# Patient Record
Sex: FEMALE | Race: WHITE | NOT HISPANIC OR LATINO | Employment: OTHER | URBAN - METROPOLITAN AREA
[De-identification: names, ages, dates, MRNs, and addresses within clinical notes are randomized per-mention and may not be internally consistent; named-entity substitution may affect disease eponyms.]

---

## 2017-01-11 ENCOUNTER — HOSPITAL ENCOUNTER (OUTPATIENT)
Dept: MAMMOGRAPHY | Facility: HOSPITAL | Age: 67
Discharge: HOME | End: 2017-01-11

## 2017-01-11 DIAGNOSIS — Z12.31 VISIT FOR SCREENING MAMMOGRAM: ICD-10-CM

## 2017-02-22 ENCOUNTER — OFFICE VISIT - HEALTHEAST (OUTPATIENT)
Dept: BEHAVIORAL HEALTH | Facility: HOSPITAL | Age: 67
End: 2017-02-22

## 2017-02-22 DIAGNOSIS — F33.1 MDD (MAJOR DEPRESSIVE DISORDER), RECURRENT EPISODE, MODERATE (H): ICD-10-CM

## 2017-02-22 DIAGNOSIS — F41.1 GAD (GENERALIZED ANXIETY DISORDER): ICD-10-CM

## 2017-03-01 ENCOUNTER — AMBULATORY - HEALTHEAST (OUTPATIENT)
Dept: BEHAVIORAL HEALTH | Facility: CLINIC | Age: 67
End: 2017-03-01

## 2017-03-01 ENCOUNTER — OFFICE VISIT - HEALTHEAST (OUTPATIENT)
Dept: BEHAVIORAL HEALTH | Facility: HOSPITAL | Age: 67
End: 2017-03-01

## 2017-03-01 DIAGNOSIS — F33.1 MDD (MAJOR DEPRESSIVE DISORDER), RECURRENT EPISODE, MODERATE (H): ICD-10-CM

## 2017-03-01 DIAGNOSIS — F41.1 GAD (GENERALIZED ANXIETY DISORDER): ICD-10-CM

## 2017-03-08 ENCOUNTER — COMMUNICATION - HEALTHEAST (OUTPATIENT)
Dept: BEHAVIORAL HEALTH | Facility: CLINIC | Age: 67
End: 2017-03-08

## 2017-03-15 ENCOUNTER — OFFICE VISIT - HEALTHEAST (OUTPATIENT)
Dept: BEHAVIORAL HEALTH | Facility: HOSPITAL | Age: 67
End: 2017-03-15

## 2017-03-15 DIAGNOSIS — F41.1 GAD (GENERALIZED ANXIETY DISORDER): ICD-10-CM

## 2017-03-15 DIAGNOSIS — F33.1 MDD (MAJOR DEPRESSIVE DISORDER), RECURRENT EPISODE, MODERATE (H): ICD-10-CM

## 2017-03-21 ENCOUNTER — COMMUNICATION - HEALTHEAST (OUTPATIENT)
Dept: BEHAVIORAL HEALTH | Facility: CLINIC | Age: 67
End: 2017-03-21

## 2017-03-21 DIAGNOSIS — F33.1 MDD (MAJOR DEPRESSIVE DISORDER), RECURRENT EPISODE, MODERATE (H): ICD-10-CM

## 2017-03-21 DIAGNOSIS — F29 ATYPICAL PSYCHOSIS (H): ICD-10-CM

## 2017-03-22 ENCOUNTER — OFFICE VISIT - HEALTHEAST (OUTPATIENT)
Dept: BEHAVIORAL HEALTH | Facility: HOSPITAL | Age: 67
End: 2017-03-22

## 2017-03-22 DIAGNOSIS — F33.1 MDD (MAJOR DEPRESSIVE DISORDER), RECURRENT EPISODE, MODERATE (H): ICD-10-CM

## 2017-03-22 DIAGNOSIS — F41.1 GAD (GENERALIZED ANXIETY DISORDER): ICD-10-CM

## 2017-03-29 ENCOUNTER — COMMUNICATION - HEALTHEAST (OUTPATIENT)
Dept: BEHAVIORAL HEALTH | Facility: CLINIC | Age: 67
End: 2017-03-29

## 2017-04-03 ENCOUNTER — OFFICE VISIT - HEALTHEAST (OUTPATIENT)
Dept: BEHAVIORAL HEALTH | Facility: CLINIC | Age: 67
End: 2017-04-03

## 2017-04-03 DIAGNOSIS — F33.1 MDD (MAJOR DEPRESSIVE DISORDER), RECURRENT EPISODE, MODERATE (H): ICD-10-CM

## 2017-04-03 ASSESSMENT — MIFFLIN-ST. JEOR: SCORE: 1482.04

## 2017-04-13 DIAGNOSIS — G20.A1 PARALYSIS AGITANS (H): ICD-10-CM

## 2017-04-13 RX ORDER — CARBIDOPA AND LEVODOPA 25; 100 MG/1; MG/1
2 TABLET ORAL 3 TIMES DAILY
Qty: 186 TABLET | Refills: 5 | Status: SHIPPED | OUTPATIENT
Start: 2017-04-13

## 2017-04-26 ENCOUNTER — COMMUNICATION - HEALTHEAST (OUTPATIENT)
Dept: BEHAVIORAL HEALTH | Facility: CLINIC | Age: 67
End: 2017-04-26

## 2017-05-10 ENCOUNTER — RECORDS - HEALTHEAST (OUTPATIENT)
Dept: LAB | Facility: CLINIC | Age: 67
End: 2017-05-10

## 2017-05-10 LAB
CHOLEST SERPL-MCNC: 88 MG/DL
FASTING STATUS PATIENT QL REPORTED: YES
HDLC SERPL-MCNC: 30 MG/DL
LDLC SERPL CALC-MCNC: 40 MG/DL
TRIGL SERPL-MCNC: 90 MG/DL

## 2017-07-10 ENCOUNTER — OFFICE VISIT - HEALTHEAST (OUTPATIENT)
Dept: BEHAVIORAL HEALTH | Facility: CLINIC | Age: 67
End: 2017-07-10

## 2017-07-10 DIAGNOSIS — F06.30 MOOD DISORDER IN CONDITIONS CLASSIFIED ELSEWHERE: ICD-10-CM

## 2017-07-10 ASSESSMENT — MIFFLIN-ST. JEOR: SCORE: 1441.21

## 2017-12-29 ENCOUNTER — AMBULATORY - HEALTHEAST (OUTPATIENT)
Dept: ADMINISTRATIVE | Facility: CLINIC | Age: 67
End: 2017-12-29

## 2017-12-29 ENCOUNTER — OFFICE VISIT - HEALTHEAST (OUTPATIENT)
Dept: GERIATRICS | Facility: CLINIC | Age: 67
End: 2017-12-29

## 2017-12-29 DIAGNOSIS — Z98.890 S/P ORIF (OPEN REDUCTION INTERNAL FIXATION) FRACTURE: ICD-10-CM

## 2017-12-29 DIAGNOSIS — R52 PAIN MANAGEMENT: ICD-10-CM

## 2017-12-29 DIAGNOSIS — N39.0 URINARY TRACT INFECTION: ICD-10-CM

## 2017-12-29 DIAGNOSIS — S72.001A CLOSED RIGHT HIP FRACTURE (H): ICD-10-CM

## 2017-12-29 DIAGNOSIS — Z87.81 S/P ORIF (OPEN REDUCTION INTERNAL FIXATION) FRACTURE: ICD-10-CM

## 2017-12-29 DIAGNOSIS — D64.9 POSTOPERATIVE ANEMIA: ICD-10-CM

## 2017-12-29 DIAGNOSIS — F32.A DEPRESSION: ICD-10-CM

## 2018-01-01 ENCOUNTER — OFFICE VISIT - HEALTHEAST (OUTPATIENT)
Dept: GERIATRICS | Facility: CLINIC | Age: 68
End: 2018-01-01

## 2018-01-01 DIAGNOSIS — R52 PAIN MANAGEMENT: ICD-10-CM

## 2018-01-01 DIAGNOSIS — S72.009A HIP FRACTURE (H): ICD-10-CM

## 2018-01-01 DIAGNOSIS — K59.00 CONSTIPATION: ICD-10-CM

## 2018-01-01 DIAGNOSIS — R05.9 COUGH: ICD-10-CM

## 2018-01-01 DIAGNOSIS — I10 ESSENTIAL HYPERTENSION: ICD-10-CM

## 2018-01-02 ENCOUNTER — RECORDS - HEALTHEAST (OUTPATIENT)
Dept: LAB | Facility: CLINIC | Age: 68
End: 2018-01-02

## 2018-01-03 LAB — HGB BLD-MCNC: 8.9 G/DL (ref 12–16)

## 2018-01-04 ENCOUNTER — OFFICE VISIT - HEALTHEAST (OUTPATIENT)
Dept: GERIATRICS | Facility: CLINIC | Age: 68
End: 2018-01-04

## 2018-01-04 DIAGNOSIS — R52 PAIN MANAGEMENT: ICD-10-CM

## 2018-01-04 DIAGNOSIS — I10 ESSENTIAL HYPERTENSION: ICD-10-CM

## 2018-01-04 DIAGNOSIS — K59.00 CONSTIPATION: ICD-10-CM

## 2018-01-04 DIAGNOSIS — S72.009A HIP FRACTURE (H): ICD-10-CM

## 2018-01-08 ENCOUNTER — RECORDS - HEALTHEAST (OUTPATIENT)
Dept: LAB | Facility: CLINIC | Age: 68
End: 2018-01-08

## 2018-01-08 ENCOUNTER — OFFICE VISIT - HEALTHEAST (OUTPATIENT)
Dept: GERIATRICS | Facility: CLINIC | Age: 68
End: 2018-01-08

## 2018-01-08 DIAGNOSIS — K59.00 CONSTIPATION: ICD-10-CM

## 2018-01-08 DIAGNOSIS — S72.009A HIP FRACTURE (H): ICD-10-CM

## 2018-01-08 DIAGNOSIS — R41.89 COGNITIVE IMPAIRMENT: ICD-10-CM

## 2018-01-08 DIAGNOSIS — I10 ESSENTIAL HYPERTENSION: ICD-10-CM

## 2018-01-08 LAB
ALBUMIN UR-MCNC: ABNORMAL MG/DL
APPEARANCE UR: ABNORMAL
BACTERIA #/AREA URNS HPF: ABNORMAL HPF
BILIRUB UR QL STRIP: NEGATIVE
CAOX CRY #/AREA URNS HPF: PRESENT /[HPF]
COLOR UR AUTO: YELLOW
GLUCOSE UR STRIP-MCNC: NEGATIVE MG/DL
HGB UR QL STRIP: ABNORMAL
KETONES UR STRIP-MCNC: NEGATIVE MG/DL
LEUKOCYTE ESTERASE UR QL STRIP: ABNORMAL
MUCOUS THREADS #/AREA URNS LPF: ABNORMAL LPF
NITRATE UR QL: NEGATIVE
PH UR STRIP: 7 [PH] (ref 4.5–8)
RBC #/AREA URNS AUTO: ABNORMAL HPF
SP GR UR STRIP: 1.02 (ref 1–1.03)
SQUAMOUS #/AREA URNS AUTO: ABNORMAL LPF
UROBILINOGEN UR STRIP-ACNC: ABNORMAL
WBC #/AREA URNS AUTO: >100 HPF

## 2018-01-10 ENCOUNTER — AMBULATORY - HEALTHEAST (OUTPATIENT)
Dept: GERIATRICS | Facility: CLINIC | Age: 68
End: 2018-01-10

## 2018-01-10 LAB
BACTERIA SPEC CULT: ABNORMAL
BACTERIA SPEC CULT: ABNORMAL

## 2018-01-29 ENCOUNTER — OFFICE VISIT - HEALTHEAST (OUTPATIENT)
Dept: GERIATRICS | Facility: CLINIC | Age: 68
End: 2018-01-29

## 2018-01-29 ENCOUNTER — AMBULATORY - HEALTHEAST (OUTPATIENT)
Dept: GERIATRICS | Facility: CLINIC | Age: 68
End: 2018-01-29

## 2018-01-29 ENCOUNTER — AMBULATORY - HEALTHEAST (OUTPATIENT)
Dept: ADMINISTRATIVE | Facility: CLINIC | Age: 68
End: 2018-01-29

## 2018-01-29 ENCOUNTER — RECORDS - HEALTHEAST (OUTPATIENT)
Dept: LAB | Facility: CLINIC | Age: 68
End: 2018-01-29

## 2018-01-29 DIAGNOSIS — N39.0 URINARY TRACT INFECTION: ICD-10-CM

## 2018-01-29 DIAGNOSIS — S72.009A HIP FRACTURE (H): ICD-10-CM

## 2018-01-29 DIAGNOSIS — D64.9 POSTOPERATIVE ANEMIA: ICD-10-CM

## 2018-01-29 DIAGNOSIS — R52 PAIN MANAGEMENT: ICD-10-CM

## 2018-01-29 DIAGNOSIS — R41.89 COGNITIVE IMPAIRMENT: ICD-10-CM

## 2018-01-29 LAB — HGB BLD-MCNC: 8.5 G/DL (ref 12–16)

## 2018-01-31 ENCOUNTER — RECORDS - HEALTHEAST (OUTPATIENT)
Dept: LAB | Facility: CLINIC | Age: 68
End: 2018-01-31

## 2018-01-31 ENCOUNTER — OFFICE VISIT - HEALTHEAST (OUTPATIENT)
Dept: GERIATRICS | Facility: CLINIC | Age: 68
End: 2018-01-31

## 2018-01-31 DIAGNOSIS — R41.89 COGNITIVE IMPAIRMENT: ICD-10-CM

## 2018-01-31 DIAGNOSIS — S72.009A HIP FRACTURE (H): ICD-10-CM

## 2018-01-31 DIAGNOSIS — K59.00 CONSTIPATION: ICD-10-CM

## 2018-01-31 DIAGNOSIS — D64.9 ANEMIA: ICD-10-CM

## 2018-01-31 DIAGNOSIS — R52 PAIN MANAGEMENT: ICD-10-CM

## 2018-01-31 DIAGNOSIS — I10 ESSENTIAL HYPERTENSION: ICD-10-CM

## 2018-01-31 LAB
25(OH)D3 SERPL-MCNC: 32.6 NG/ML (ref 30–80)
ANION GAP SERPL CALCULATED.3IONS-SCNC: 6 MMOL/L (ref 5–18)
BUN SERPL-MCNC: 15 MG/DL (ref 8–22)
CALCIUM SERPL-MCNC: 7.7 MG/DL (ref 8.5–10.5)
CHLORIDE BLD-SCNC: 107 MMOL/L (ref 98–107)
CO2 SERPL-SCNC: 30 MMOL/L (ref 22–31)
CREAT SERPL-MCNC: 0.57 MG/DL (ref 0.6–1.1)
ERYTHROCYTE [DISTWIDTH] IN BLOOD BY AUTOMATED COUNT: 18.2 % (ref 11–14.5)
GFR SERPL CREATININE-BSD FRML MDRD: >60 ML/MIN/1.73M2
GLUCOSE BLD-MCNC: 83 MG/DL (ref 70–125)
HCT VFR BLD AUTO: 27.9 % (ref 35–47)
HGB BLD-MCNC: 8.1 G/DL (ref 12–16)
MAGNESIUM SERPL-MCNC: 1.3 MG/DL (ref 1.8–2.6)
MCH RBC QN AUTO: 27.6 PG (ref 27–34)
MCHC RBC AUTO-ENTMCNC: 29 G/DL (ref 32–36)
MCV RBC AUTO: 95 FL (ref 80–100)
PLATELET # BLD AUTO: 213 THOU/UL (ref 140–440)
PMV BLD AUTO: 11.2 FL (ref 8.5–12.5)
POTASSIUM BLD-SCNC: 3.7 MMOL/L (ref 3.5–5)
RBC # BLD AUTO: 2.94 MILL/UL (ref 3.8–5.4)
SODIUM SERPL-SCNC: 143 MMOL/L (ref 136–145)
WBC: 3.7 THOU/UL (ref 4–11)

## 2018-02-01 LAB
25(OH)D3 SERPL-MCNC: 30.9 NG/ML (ref 30–80)
ANION GAP SERPL CALCULATED.3IONS-SCNC: 5 MMOL/L (ref 5–18)
BUN SERPL-MCNC: 17 MG/DL (ref 8–22)
CALCIUM SERPL-MCNC: 7.8 MG/DL (ref 8.5–10.5)
CHLORIDE BLD-SCNC: 107 MMOL/L (ref 98–107)
CO2 SERPL-SCNC: 30 MMOL/L (ref 22–31)
CREAT SERPL-MCNC: 0.54 MG/DL (ref 0.6–1.1)
ERYTHROCYTE [DISTWIDTH] IN BLOOD BY AUTOMATED COUNT: 18.3 % (ref 11–14.5)
GFR SERPL CREATININE-BSD FRML MDRD: >60 ML/MIN/1.73M2
GLUCOSE BLD-MCNC: 85 MG/DL (ref 70–125)
HCT VFR BLD AUTO: 25.9 % (ref 35–47)
HGB BLD-MCNC: 7.4 G/DL (ref 12–16)
MAGNESIUM SERPL-MCNC: 1.6 MG/DL (ref 1.8–2.6)
MCH RBC QN AUTO: 27 PG (ref 27–34)
MCHC RBC AUTO-ENTMCNC: 28.6 G/DL (ref 32–36)
MCV RBC AUTO: 95 FL (ref 80–100)
PLATELET # BLD AUTO: 214 THOU/UL (ref 140–440)
PMV BLD AUTO: 10.7 FL (ref 8.5–12.5)
POTASSIUM BLD-SCNC: 4 MMOL/L (ref 3.5–5)
RBC # BLD AUTO: 2.74 MILL/UL (ref 3.8–5.4)
SODIUM SERPL-SCNC: 142 MMOL/L (ref 136–145)
WBC: 3.2 THOU/UL (ref 4–11)

## 2018-02-02 ENCOUNTER — RECORDS - HEALTHEAST (OUTPATIENT)
Dept: LAB | Facility: CLINIC | Age: 68
End: 2018-02-02

## 2018-02-02 LAB — VIT B12 SERPL-MCNC: 468 PG/ML (ref 213–816)

## 2018-02-05 ENCOUNTER — OFFICE VISIT - HEALTHEAST (OUTPATIENT)
Dept: GERIATRICS | Facility: CLINIC | Age: 68
End: 2018-02-05

## 2018-02-05 ENCOUNTER — RECORDS - HEALTHEAST (OUTPATIENT)
Dept: LAB | Facility: CLINIC | Age: 68
End: 2018-02-05

## 2018-02-05 DIAGNOSIS — I10 ESSENTIAL HYPERTENSION: ICD-10-CM

## 2018-02-05 DIAGNOSIS — D64.9 ANEMIA: ICD-10-CM

## 2018-02-05 DIAGNOSIS — S72.009A HIP FRACTURE (H): ICD-10-CM

## 2018-02-05 DIAGNOSIS — R52 PAIN MANAGEMENT: ICD-10-CM

## 2018-02-05 LAB
ERYTHROCYTE [DISTWIDTH] IN BLOOD BY AUTOMATED COUNT: 16.8 % (ref 11–14.5)
HCT VFR BLD AUTO: 29.1 % (ref 35–47)
HGB BLD-MCNC: 8.2 G/DL (ref 12–16)
MCH RBC QN AUTO: 26.4 PG (ref 27–34)
MCHC RBC AUTO-ENTMCNC: 28.2 G/DL (ref 32–36)
MCV RBC AUTO: 94 FL (ref 80–100)
PLATELET # BLD AUTO: 216 THOU/UL (ref 140–440)
PMV BLD AUTO: 10.5 FL (ref 8.5–12.5)
RBC # BLD AUTO: 3.11 MILL/UL (ref 3.8–5.4)
WBC: 2.9 THOU/UL (ref 4–11)

## 2018-02-07 ENCOUNTER — OFFICE VISIT - HEALTHEAST (OUTPATIENT)
Dept: GERIATRICS | Facility: CLINIC | Age: 68
End: 2018-02-07

## 2018-02-07 DIAGNOSIS — R41.89 COGNITIVE IMPAIRMENT: ICD-10-CM

## 2018-02-07 DIAGNOSIS — D64.9 ANEMIA: ICD-10-CM

## 2018-02-07 DIAGNOSIS — S72.009A HIP FRACTURE (H): ICD-10-CM

## 2018-02-07 DIAGNOSIS — I95.1 ORTHOSTATIC HYPOTENSION: ICD-10-CM

## 2018-02-07 DIAGNOSIS — K59.00 CONSTIPATION: ICD-10-CM

## 2018-02-07 DIAGNOSIS — R52 PAIN MANAGEMENT: ICD-10-CM

## 2018-02-08 ENCOUNTER — RECORDS - HEALTHEAST (OUTPATIENT)
Dept: LAB | Facility: CLINIC | Age: 68
End: 2018-02-08

## 2018-02-08 LAB
ALBUMIN UR-MCNC: ABNORMAL MG/DL
APPEARANCE UR: ABNORMAL
BACTERIA #/AREA URNS HPF: ABNORMAL HPF
BILIRUB UR QL STRIP: NEGATIVE
COLOR UR AUTO: YELLOW
GLUCOSE UR STRIP-MCNC: NEGATIVE MG/DL
HGB UR QL STRIP: ABNORMAL
KETONES UR STRIP-MCNC: NEGATIVE MG/DL
LEUKOCYTE ESTERASE UR QL STRIP: ABNORMAL
MUCOUS THREADS #/AREA URNS LPF: ABNORMAL LPF
NITRATE UR QL: NEGATIVE
PH UR STRIP: 6.5 [PH] (ref 4.5–8)
RBC #/AREA URNS AUTO: ABNORMAL HPF
SP GR UR STRIP: 1.02 (ref 1–1.03)
SQUAMOUS #/AREA URNS AUTO: ABNORMAL LPF
UROBILINOGEN UR STRIP-ACNC: ABNORMAL
WBC #/AREA URNS AUTO: >100 HPF
WBC CLUMPS #/AREA URNS HPF: PRESENT /[HPF]

## 2018-02-11 LAB
BACTERIA SPEC CULT: ABNORMAL
BACTERIA SPEC CULT: ABNORMAL

## 2018-02-12 ENCOUNTER — OFFICE VISIT - HEALTHEAST (OUTPATIENT)
Dept: GERIATRICS | Facility: CLINIC | Age: 68
End: 2018-02-12

## 2018-02-12 DIAGNOSIS — D64.9 ANEMIA: ICD-10-CM

## 2018-02-12 DIAGNOSIS — I95.1 ORTHOSTATIC HYPOTENSION: ICD-10-CM

## 2018-02-12 DIAGNOSIS — S72.009A HIP FRACTURE (H): ICD-10-CM

## 2018-02-12 DIAGNOSIS — I10 ESSENTIAL HYPERTENSION: ICD-10-CM

## 2018-02-14 ENCOUNTER — OFFICE VISIT - HEALTHEAST (OUTPATIENT)
Dept: GERIATRICS | Facility: CLINIC | Age: 68
End: 2018-02-14

## 2018-02-14 DIAGNOSIS — R52 PAIN MANAGEMENT: ICD-10-CM

## 2018-02-14 DIAGNOSIS — I10 ESSENTIAL HYPERTENSION: ICD-10-CM

## 2018-02-14 DIAGNOSIS — D64.9 ANEMIA: ICD-10-CM

## 2018-02-14 DIAGNOSIS — K59.00 CONSTIPATION: ICD-10-CM

## 2018-02-14 DIAGNOSIS — R41.89 COGNITIVE IMPAIRMENT: ICD-10-CM

## 2018-02-14 DIAGNOSIS — S72.009A HIP FRACTURE (H): ICD-10-CM

## 2018-02-17 ENCOUNTER — RECORDS - HEALTHEAST (OUTPATIENT)
Dept: LAB | Facility: CLINIC | Age: 68
End: 2018-02-17

## 2018-02-19 ENCOUNTER — OFFICE VISIT - HEALTHEAST (OUTPATIENT)
Dept: GERIATRICS | Facility: CLINIC | Age: 68
End: 2018-02-19

## 2018-02-19 DIAGNOSIS — R52 PAIN MANAGEMENT: ICD-10-CM

## 2018-02-19 DIAGNOSIS — I10 ESSENTIAL HYPERTENSION: ICD-10-CM

## 2018-02-19 DIAGNOSIS — S72.009A HIP FRACTURE (H): ICD-10-CM

## 2018-02-19 DIAGNOSIS — D64.9 ANEMIA: ICD-10-CM

## 2018-02-19 DIAGNOSIS — I95.1 ORTHOSTATIC HYPOTENSION: ICD-10-CM

## 2018-02-19 LAB
ERYTHROCYTE [DISTWIDTH] IN BLOOD BY AUTOMATED COUNT: 15.4 % (ref 11–14.5)
HCT VFR BLD AUTO: 39 % (ref 35–47)
HGB BLD-MCNC: 11.3 G/DL (ref 12–16)
MCH RBC QN AUTO: 26.3 PG (ref 27–34)
MCHC RBC AUTO-ENTMCNC: 29 G/DL (ref 32–36)
MCV RBC AUTO: 91 FL (ref 80–100)
PLATELET # BLD AUTO: 224 THOU/UL (ref 140–440)
PMV BLD AUTO: 10.7 FL (ref 8.5–12.5)
RBC # BLD AUTO: 4.3 MILL/UL (ref 3.8–5.4)
WBC: 4.4 THOU/UL (ref 4–11)

## 2018-02-21 ENCOUNTER — OFFICE VISIT - HEALTHEAST (OUTPATIENT)
Dept: GERIATRICS | Facility: CLINIC | Age: 68
End: 2018-02-21

## 2018-02-21 DIAGNOSIS — R52 PAIN MANAGEMENT: ICD-10-CM

## 2018-02-21 DIAGNOSIS — S72.009A HIP FRACTURE (H): ICD-10-CM

## 2018-02-21 DIAGNOSIS — I95.1 ORTHOSTATIC HYPOTENSION: ICD-10-CM

## 2018-02-21 DIAGNOSIS — K59.01 SLOW TRANSIT CONSTIPATION: ICD-10-CM

## 2018-02-21 DIAGNOSIS — R41.89 COGNITIVE IMPAIRMENT: ICD-10-CM

## 2018-02-21 DIAGNOSIS — I10 ESSENTIAL HYPERTENSION: ICD-10-CM

## 2018-02-21 DIAGNOSIS — D64.9 ANEMIA: ICD-10-CM

## 2018-02-26 ENCOUNTER — OFFICE VISIT - HEALTHEAST (OUTPATIENT)
Dept: GERIATRICS | Facility: CLINIC | Age: 68
End: 2018-02-26

## 2018-02-26 DIAGNOSIS — R41.89 COGNITIVE IMPAIRMENT: ICD-10-CM

## 2018-02-26 DIAGNOSIS — I10 ESSENTIAL HYPERTENSION: ICD-10-CM

## 2018-02-26 DIAGNOSIS — D64.9 ANEMIA: ICD-10-CM

## 2018-02-26 DIAGNOSIS — S72.009A HIP FRACTURE (H): ICD-10-CM

## 2018-02-28 ENCOUNTER — OFFICE VISIT - HEALTHEAST (OUTPATIENT)
Dept: GERIATRICS | Facility: CLINIC | Age: 68
End: 2018-02-28

## 2018-02-28 DIAGNOSIS — S72.009A HIP FRACTURE (H): ICD-10-CM

## 2018-02-28 DIAGNOSIS — D64.9 ANEMIA: ICD-10-CM

## 2018-02-28 DIAGNOSIS — I10 ESSENTIAL HYPERTENSION: ICD-10-CM

## 2018-02-28 DIAGNOSIS — R52 PAIN MANAGEMENT: ICD-10-CM

## 2018-02-28 DIAGNOSIS — I95.1 ORTHOSTATIC HYPOTENSION: ICD-10-CM

## 2018-02-28 DIAGNOSIS — R41.89 COGNITIVE IMPAIRMENT: ICD-10-CM

## 2018-02-28 DIAGNOSIS — K59.01 SLOW TRANSIT CONSTIPATION: ICD-10-CM

## 2018-03-05 ENCOUNTER — OFFICE VISIT - HEALTHEAST (OUTPATIENT)
Dept: GERIATRICS | Facility: CLINIC | Age: 68
End: 2018-03-05

## 2018-03-05 DIAGNOSIS — S72.009A HIP FRACTURE (H): ICD-10-CM

## 2018-03-05 DIAGNOSIS — R41.89 COGNITIVE IMPAIRMENT: ICD-10-CM

## 2018-03-05 DIAGNOSIS — I10 ESSENTIAL HYPERTENSION: ICD-10-CM

## 2018-03-05 DIAGNOSIS — D64.9 ANEMIA: ICD-10-CM

## 2018-03-06 ENCOUNTER — RECORDS - HEALTHEAST (OUTPATIENT)
Dept: LAB | Facility: CLINIC | Age: 68
End: 2018-03-06

## 2018-03-06 LAB
ERYTHROCYTE [DISTWIDTH] IN BLOOD BY AUTOMATED COUNT: 14.4 % (ref 11–14.5)
HCT VFR BLD AUTO: 36.6 % (ref 35–47)
HGB BLD-MCNC: 10.7 G/DL (ref 12–16)
MCH RBC QN AUTO: 25.8 PG (ref 27–34)
MCHC RBC AUTO-ENTMCNC: 29.2 G/DL (ref 32–36)
MCV RBC AUTO: 88 FL (ref 80–100)
PLATELET # BLD AUTO: 192 THOU/UL (ref 140–440)
PMV BLD AUTO: 11.1 FL (ref 8.5–12.5)
RBC # BLD AUTO: 4.14 MILL/UL (ref 3.8–5.4)
WBC: 3.3 THOU/UL (ref 4–11)

## 2018-03-07 ENCOUNTER — OFFICE VISIT - HEALTHEAST (OUTPATIENT)
Dept: GERIATRICS | Facility: CLINIC | Age: 68
End: 2018-03-07

## 2018-03-07 DIAGNOSIS — S72.009A HIP FRACTURE (H): ICD-10-CM

## 2018-03-07 DIAGNOSIS — I10 ESSENTIAL HYPERTENSION: ICD-10-CM

## 2018-03-07 DIAGNOSIS — K59.01 SLOW TRANSIT CONSTIPATION: ICD-10-CM

## 2018-03-07 DIAGNOSIS — R52 PAIN MANAGEMENT: ICD-10-CM

## 2018-03-07 DIAGNOSIS — D64.9 ANEMIA: ICD-10-CM

## 2018-03-07 DIAGNOSIS — R41.89 COGNITIVE IMPAIRMENT: ICD-10-CM

## 2018-03-12 ENCOUNTER — OFFICE VISIT - HEALTHEAST (OUTPATIENT)
Dept: BEHAVIORAL HEALTH | Facility: CLINIC | Age: 68
End: 2018-03-12

## 2018-03-12 ENCOUNTER — OFFICE VISIT - HEALTHEAST (OUTPATIENT)
Dept: GERIATRICS | Facility: CLINIC | Age: 68
End: 2018-03-12

## 2018-03-12 DIAGNOSIS — D64.9 ANEMIA: ICD-10-CM

## 2018-03-12 DIAGNOSIS — F32.9 MDD (MAJOR DEPRESSIVE DISORDER): ICD-10-CM

## 2018-03-12 DIAGNOSIS — R52 PAIN MANAGEMENT: ICD-10-CM

## 2018-03-12 DIAGNOSIS — S72.009A HIP FRACTURE (H): ICD-10-CM

## 2018-03-12 DIAGNOSIS — R41.89 COGNITIVE IMPAIRMENT: ICD-10-CM

## 2018-03-12 DIAGNOSIS — K59.01 SLOW TRANSIT CONSTIPATION: ICD-10-CM

## 2018-03-13 ENCOUNTER — RECORDS - HEALTHEAST (OUTPATIENT)
Dept: LAB | Facility: CLINIC | Age: 68
End: 2018-03-13

## 2018-03-13 LAB
ERYTHROCYTE [DISTWIDTH] IN BLOOD BY AUTOMATED COUNT: 14.5 % (ref 11–14.5)
HCT VFR BLD AUTO: 41.9 % (ref 35–47)
HGB BLD-MCNC: 12 G/DL (ref 12–16)
MCH RBC QN AUTO: 25.7 PG (ref 27–34)
MCHC RBC AUTO-ENTMCNC: 28.6 G/DL (ref 32–36)
MCV RBC AUTO: 90 FL (ref 80–100)
PLATELET # BLD AUTO: 211 THOU/UL (ref 140–440)
PMV BLD AUTO: 11.4 FL (ref 8.5–12.5)
RBC # BLD AUTO: 4.67 MILL/UL (ref 3.8–5.4)
WBC: 4.5 THOU/UL (ref 4–11)

## 2018-03-14 ENCOUNTER — OFFICE VISIT - HEALTHEAST (OUTPATIENT)
Dept: GERIATRICS | Facility: CLINIC | Age: 68
End: 2018-03-14

## 2018-03-14 DIAGNOSIS — S72.001S CLOSED FRACTURE OF RIGHT HIP, SEQUELA: ICD-10-CM

## 2018-03-14 DIAGNOSIS — R52 PAIN MANAGEMENT: ICD-10-CM

## 2018-03-14 DIAGNOSIS — I10 ESSENTIAL HYPERTENSION: ICD-10-CM

## 2018-03-14 DIAGNOSIS — K59.01 SLOW TRANSIT CONSTIPATION: ICD-10-CM

## 2018-03-14 DIAGNOSIS — R41.89 COGNITIVE IMPAIRMENT: ICD-10-CM

## 2018-03-14 DIAGNOSIS — D64.9 ANEMIA: ICD-10-CM

## 2018-03-15 ENCOUNTER — RECORDS - HEALTHEAST (OUTPATIENT)
Dept: LAB | Facility: CLINIC | Age: 68
End: 2018-03-15

## 2018-03-15 LAB — 25(OH)D3 SERPL-MCNC: 43.9 NG/ML (ref 30–80)

## 2018-03-16 ENCOUNTER — OFFICE VISIT - HEALTHEAST (OUTPATIENT)
Dept: GERIATRICS | Facility: CLINIC | Age: 68
End: 2018-03-16

## 2018-03-16 DIAGNOSIS — D64.9 ANEMIA: ICD-10-CM

## 2018-03-16 DIAGNOSIS — S72.001S CLOSED FRACTURE OF RIGHT HIP, SEQUELA: ICD-10-CM

## 2018-03-16 DIAGNOSIS — R52 PAIN MANAGEMENT: ICD-10-CM

## 2018-03-16 DIAGNOSIS — R41.89 COGNITIVE IMPAIRMENT: ICD-10-CM

## 2018-03-16 DIAGNOSIS — K59.01 SLOW TRANSIT CONSTIPATION: ICD-10-CM

## 2018-03-16 DIAGNOSIS — I10 ESSENTIAL HYPERTENSION: ICD-10-CM

## 2018-03-19 ENCOUNTER — OFFICE VISIT - HEALTHEAST (OUTPATIENT)
Dept: GERIATRICS | Facility: CLINIC | Age: 68
End: 2018-03-19

## 2018-03-19 ENCOUNTER — RECORDS - HEALTHEAST (OUTPATIENT)
Dept: LAB | Facility: CLINIC | Age: 68
End: 2018-03-19

## 2018-03-19 DIAGNOSIS — S72.001S CLOSED FRACTURE OF RIGHT HIP, SEQUELA: ICD-10-CM

## 2018-03-19 DIAGNOSIS — D64.9 ANEMIA: ICD-10-CM

## 2018-03-19 DIAGNOSIS — I10 ESSENTIAL HYPERTENSION: ICD-10-CM

## 2018-03-19 DIAGNOSIS — R41.89 COGNITIVE IMPAIRMENT: ICD-10-CM

## 2018-03-19 LAB
ALBUMIN UR-MCNC: ABNORMAL MG/DL
APPEARANCE UR: ABNORMAL
BACTERIA #/AREA URNS HPF: ABNORMAL HPF
BILIRUB UR QL STRIP: NEGATIVE
COLOR UR AUTO: ABNORMAL
GLUCOSE UR STRIP-MCNC: NEGATIVE MG/DL
HGB UR QL STRIP: ABNORMAL
KETONES UR STRIP-MCNC: NEGATIVE MG/DL
LEUKOCYTE ESTERASE UR QL STRIP: ABNORMAL
NITRATE UR QL: POSITIVE
PH UR STRIP: 7 [PH] (ref 4.5–8)
RBC #/AREA URNS AUTO: >100 HPF
SP GR UR STRIP: 1.03 (ref 1–1.03)
SQUAMOUS #/AREA URNS AUTO: ABNORMAL LPF
UROBILINOGEN UR STRIP-ACNC: ABNORMAL
WBC #/AREA URNS AUTO: >100 HPF
WBC CLUMPS #/AREA URNS HPF: PRESENT /[HPF]

## 2018-03-22 ENCOUNTER — AMBULATORY - HEALTHEAST (OUTPATIENT)
Dept: GERIATRICS | Facility: CLINIC | Age: 68
End: 2018-03-22

## 2018-03-22 LAB
BACTERIA SPEC CULT: ABNORMAL
BACTERIA SPEC CULT: ABNORMAL

## 2018-03-27 ENCOUNTER — RECORDS - HEALTHEAST (OUTPATIENT)
Dept: LAB | Facility: CLINIC | Age: 68
End: 2018-03-27

## 2018-03-29 LAB — BACTERIA SPEC CULT: ABNORMAL

## 2018-04-19 ENCOUNTER — RECORDS - HEALTHEAST (OUTPATIENT)
Dept: LAB | Facility: CLINIC | Age: 68
End: 2018-04-19

## 2018-04-19 LAB
ALBUMIN SERPL-MCNC: 3.6 G/DL (ref 3.5–5)
ALP SERPL-CCNC: 327 U/L (ref 45–120)
ALT SERPL W P-5'-P-CCNC: 21 U/L (ref 0–45)
ANION GAP SERPL CALCULATED.3IONS-SCNC: 10 MMOL/L (ref 5–18)
AST SERPL W P-5'-P-CCNC: 24 U/L (ref 0–40)
BILIRUB SERPL-MCNC: 0.2 MG/DL (ref 0–1)
BUN SERPL-MCNC: 28 MG/DL (ref 8–22)
CALCIUM SERPL-MCNC: 8.9 MG/DL (ref 8.5–10.5)
CHLORIDE BLD-SCNC: 105 MMOL/L (ref 98–107)
CHOLEST SERPL-MCNC: 95 MG/DL
CO2 SERPL-SCNC: 27 MMOL/L (ref 22–31)
CREAT SERPL-MCNC: 0.71 MG/DL (ref 0.6–1.1)
FASTING STATUS PATIENT QL REPORTED: ABNORMAL
GFR SERPL CREATININE-BSD FRML MDRD: >60 ML/MIN/1.73M2
GLUCOSE BLD-MCNC: 103 MG/DL (ref 70–125)
HDLC SERPL-MCNC: 35 MG/DL
LDLC SERPL CALC-MCNC: 37 MG/DL
POTASSIUM BLD-SCNC: 4.4 MMOL/L (ref 3.5–5)
PROT SERPL-MCNC: 7.1 G/DL (ref 6–8)
SODIUM SERPL-SCNC: 142 MMOL/L (ref 136–145)
TRIGL SERPL-MCNC: 117 MG/DL

## 2018-04-21 LAB
BACTERIA SPEC CULT: ABNORMAL
BACTERIA SPEC CULT: ABNORMAL

## 2018-04-30 ENCOUNTER — COMMUNICATION - HEALTHEAST (OUTPATIENT)
Dept: BEHAVIORAL HEALTH | Facility: CLINIC | Age: 68
End: 2018-04-30

## 2018-04-30 DIAGNOSIS — F33.1 MDD (MAJOR DEPRESSIVE DISORDER), RECURRENT EPISODE, MODERATE (H): ICD-10-CM

## 2018-05-03 RX ORDER — POLYETHYLENE GLYCOL 3350 17 G/17G
POWDER, FOR SOLUTION ORAL
Qty: 527 G | Refills: 11 | OUTPATIENT
Start: 2018-05-03

## 2018-06-11 ENCOUNTER — OFFICE VISIT - HEALTHEAST (OUTPATIENT)
Dept: BEHAVIORAL HEALTH | Facility: CLINIC | Age: 68
End: 2018-06-11

## 2018-06-11 DIAGNOSIS — F06.30 MOOD DISORDER IN CONDITIONS CLASSIFIED ELSEWHERE: ICD-10-CM

## 2018-06-15 ENCOUNTER — RECORDS - HEALTHEAST (OUTPATIENT)
Dept: LAB | Facility: CLINIC | Age: 68
End: 2018-06-15

## 2018-06-15 LAB
ANION GAP SERPL CALCULATED.3IONS-SCNC: 7 MMOL/L (ref 5–18)
BUN SERPL-MCNC: 29 MG/DL (ref 8–22)
CALCIUM SERPL-MCNC: 9.5 MG/DL (ref 8.5–10.5)
CHLORIDE BLD-SCNC: 108 MMOL/L (ref 98–107)
CHOLEST SERPL-MCNC: 102 MG/DL
CO2 SERPL-SCNC: 29 MMOL/L (ref 22–31)
CREAT SERPL-MCNC: 0.74 MG/DL (ref 0.6–1.1)
FASTING STATUS PATIENT QL REPORTED: ABNORMAL
GFR SERPL CREATININE-BSD FRML MDRD: >60 ML/MIN/1.73M2
GLUCOSE BLD-MCNC: 108 MG/DL (ref 70–125)
HDLC SERPL-MCNC: 32 MG/DL
LDLC SERPL CALC-MCNC: 48 MG/DL
POTASSIUM BLD-SCNC: 4.5 MMOL/L (ref 3.5–5)
SODIUM SERPL-SCNC: 144 MMOL/L (ref 136–145)
TRIGL SERPL-MCNC: 109 MG/DL

## 2018-09-10 ENCOUNTER — OFFICE VISIT - HEALTHEAST (OUTPATIENT)
Dept: BEHAVIORAL HEALTH | Facility: CLINIC | Age: 68
End: 2018-09-10

## 2018-09-10 DIAGNOSIS — F39 MOOD DISORDER (H): ICD-10-CM

## 2018-10-29 RX ORDER — UBIDECARENONE 75 MG
CAPSULE ORAL
Refills: 11 | OUTPATIENT
Start: 2018-10-29

## 2018-11-03 RX ORDER — UBIDECARENONE 75 MG
CAPSULE ORAL
Refills: 11 | OUTPATIENT
Start: 2018-11-03

## 2018-12-10 ENCOUNTER — COMMUNICATION - HEALTHEAST (OUTPATIENT)
Dept: BEHAVIORAL HEALTH | Facility: CLINIC | Age: 68
End: 2018-12-10

## 2019-01-21 ENCOUNTER — OFFICE VISIT - HEALTHEAST (OUTPATIENT)
Dept: BEHAVIORAL HEALTH | Facility: CLINIC | Age: 69
End: 2019-01-21

## 2019-01-21 DIAGNOSIS — F39 MOOD DISORDER (H): ICD-10-CM

## 2019-01-21 DIAGNOSIS — F33.1 MDD (MAJOR DEPRESSIVE DISORDER), RECURRENT EPISODE, MODERATE (H): ICD-10-CM

## 2019-02-15 ENCOUNTER — COMMUNICATION - HEALTHEAST (OUTPATIENT)
Dept: BEHAVIORAL HEALTH | Facility: CLINIC | Age: 69
End: 2019-02-15

## 2019-02-15 DIAGNOSIS — F33.1 MDD (MAJOR DEPRESSIVE DISORDER), RECURRENT EPISODE, MODERATE (H): ICD-10-CM

## 2019-03-22 ENCOUNTER — RECORDS - HEALTHEAST (OUTPATIENT)
Dept: LAB | Facility: CLINIC | Age: 69
End: 2019-03-22

## 2019-03-22 LAB
ALBUMIN SERPL-MCNC: 3.8 G/DL (ref 3.5–5)
ALP SERPL-CCNC: 95 U/L (ref 45–120)
ALT SERPL W P-5'-P-CCNC: 10 U/L (ref 0–45)
ANION GAP SERPL CALCULATED.3IONS-SCNC: 6 MMOL/L (ref 5–18)
AST SERPL W P-5'-P-CCNC: 18 U/L (ref 0–40)
BILIRUB SERPL-MCNC: 0.3 MG/DL (ref 0–1)
BUN SERPL-MCNC: 37 MG/DL (ref 8–22)
CALCIUM SERPL-MCNC: 9.1 MG/DL (ref 8.5–10.5)
CHLORIDE BLD-SCNC: 107 MMOL/L (ref 98–107)
CHOLEST SERPL-MCNC: 92 MG/DL
CO2 SERPL-SCNC: 32 MMOL/L (ref 22–31)
CREAT SERPL-MCNC: 0.75 MG/DL (ref 0.6–1.1)
FASTING STATUS PATIENT QL REPORTED: NO
GFR SERPL CREATININE-BSD FRML MDRD: >60 ML/MIN/1.73M2
GLUCOSE BLD-MCNC: 94 MG/DL (ref 70–125)
HDLC SERPL-MCNC: 38 MG/DL
LDLC SERPL CALC-MCNC: 43 MG/DL
POTASSIUM BLD-SCNC: 4.6 MMOL/L (ref 3.5–5)
PROT SERPL-MCNC: 6.7 G/DL (ref 6–8)
SODIUM SERPL-SCNC: 145 MMOL/L (ref 136–145)
TRIGL SERPL-MCNC: 56 MG/DL

## 2019-04-22 ENCOUNTER — OFFICE VISIT - HEALTHEAST (OUTPATIENT)
Dept: BEHAVIORAL HEALTH | Facility: CLINIC | Age: 69
End: 2019-04-22

## 2019-04-22 DIAGNOSIS — F39 MOOD DISORDER (H): ICD-10-CM

## 2019-04-22 DIAGNOSIS — F33.1 MDD (MAJOR DEPRESSIVE DISORDER), RECURRENT EPISODE, MODERATE (H): ICD-10-CM

## 2019-04-22 ASSESSMENT — MIFFLIN-ST. JEOR: SCORE: 1427.6

## 2019-06-10 ENCOUNTER — RECORDS - HEALTHEAST (OUTPATIENT)
Dept: LAB | Facility: CLINIC | Age: 69
End: 2019-06-10

## 2019-06-11 LAB
ANION GAP SERPL CALCULATED.3IONS-SCNC: 7 MMOL/L (ref 5–18)
BUN SERPL-MCNC: 30 MG/DL (ref 8–22)
CALCIUM SERPL-MCNC: 9.2 MG/DL (ref 8.5–10.5)
CHLORIDE BLD-SCNC: 105 MMOL/L (ref 98–107)
CO2 SERPL-SCNC: 32 MMOL/L (ref 22–31)
CREAT SERPL-MCNC: 0.69 MG/DL (ref 0.6–1.1)
GFR SERPL CREATININE-BSD FRML MDRD: >60 ML/MIN/1.73M2
GLUCOSE BLD-MCNC: 81 MG/DL (ref 70–125)
POTASSIUM BLD-SCNC: 4 MMOL/L (ref 3.5–5)
SODIUM SERPL-SCNC: 144 MMOL/L (ref 136–145)

## 2019-07-17 ENCOUNTER — DOCUMENTATION ONLY (OUTPATIENT)
Dept: CARE COORDINATION | Facility: CLINIC | Age: 69
End: 2019-07-17

## 2019-08-15 ENCOUNTER — COMMUNICATION - HEALTHEAST (OUTPATIENT)
Dept: BEHAVIORAL HEALTH | Facility: CLINIC | Age: 69
End: 2019-08-15

## 2019-08-15 DIAGNOSIS — F33.1 MDD (MAJOR DEPRESSIVE DISORDER), RECURRENT EPISODE, MODERATE (H): ICD-10-CM

## 2019-08-15 DIAGNOSIS — F39 MOOD DISORDER (H): ICD-10-CM

## 2019-08-22 ENCOUNTER — OFFICE VISIT (OUTPATIENT)
Dept: NEUROLOGY | Facility: CLINIC | Age: 69
End: 2019-08-22
Payer: MEDICARE

## 2019-08-22 VITALS
HEART RATE: 67 BPM | SYSTOLIC BLOOD PRESSURE: 127 MMHG | DIASTOLIC BLOOD PRESSURE: 74 MMHG | RESPIRATION RATE: 16 BRPM | OXYGEN SATURATION: 98 % | WEIGHT: 184 LBS

## 2019-08-22 DIAGNOSIS — G20.A1 PARKINSON DISEASE (H): Primary | ICD-10-CM

## 2019-08-22 RX ORDER — METOPROLOL TARTRATE 25 MG/1
12.5 TABLET, FILM COATED ORAL DAILY
COMMUNITY
End: 2021-12-20

## 2019-08-22 RX ORDER — MAGNESIUM OXIDE 400 MG/1
400 TABLET ORAL 2 TIMES DAILY
COMMUNITY

## 2019-08-22 RX ORDER — BACITRACIN ZINC 500 [USP'U]/G
OINTMENT TOPICAL
Status: ON HOLD | COMMUNITY
End: 2022-01-07

## 2019-08-22 RX ORDER — POLYETHYLENE GLYCOL 3350 17 G/17G
17 POWDER, FOR SOLUTION ORAL
COMMUNITY
Start: 2017-12-28 | End: 2021-11-10 | Stop reason: ALTCHOICE

## 2019-08-22 RX ORDER — LORAZEPAM 0.5 MG/1
0.5 TABLET ORAL 2 TIMES DAILY PRN
COMMUNITY
End: 2022-01-12

## 2019-08-22 RX ORDER — PYRIDOXINE HCL (VITAMIN B6) 100 MG
500 TABLET ORAL 2 TIMES DAILY
COMMUNITY
Start: 2017-12-27

## 2019-08-22 RX ORDER — ACETAMINOPHEN 325 MG/1
650 TABLET ORAL EVERY 8 HOURS PRN
Status: ON HOLD | COMMUNITY
End: 2022-01-07

## 2019-08-22 RX ORDER — AMOXICILLIN 250 MG
2 CAPSULE ORAL
COMMUNITY
Start: 2017-12-27 | End: 2021-12-20

## 2019-08-22 RX ORDER — ATORVASTATIN CALCIUM 40 MG/1
40 TABLET, FILM COATED ORAL EVERY EVENING
COMMUNITY
Start: 2017-03-08

## 2019-08-22 RX ORDER — NITROGLYCERIN 0.4 MG/1
0.4 TABLET SUBLINGUAL EVERY 5 MIN PRN
COMMUNITY
Start: 2017-03-08

## 2019-08-22 ASSESSMENT — PAIN SCALES - GENERAL: PAINLEVEL: NO PAIN (0)

## 2019-08-22 NOTE — NURSING NOTE
Chief Complaint   Patient presents with     RECHECK     NEUROPSYCH EVAL     REQUEST     Josette Baer, CMA

## 2019-08-22 NOTE — LETTER
"     RE: Alison Bashir  [address] [address]       Service Date: 2019      Ramon Echeverria MD   [facility]   [address] [place]  [address]      RE: Alison Bashir   MRN: [id number]   : 1950      Dear Dr. Echeverria:      This is in regard to followup on Alison Bashir.  The patient returned today with a chief complaint of Parkinson disease and static encephalopathy.      The patient was accompanied by her sight coordinator, Shanel.  She did not have any medical records on her other than her medical referral form.  This included her medications.  I have not seen the patient for almost 3 years.  She has a very complicated history.  She has been under the care of you but also a psychiatrist, Dr. Johnson.  I was unable to retrieve any notes from your office nor his through the [facility] site.  The patient has been felt to be stable, but review by a coordinator there suggested that she should have neurologic followup as it has been almost 3 years since she was last seen.  The patient had presumed anoxic encephalopathy and has a static condition with memory loss.  This does not seem to be any worse.  She evidently has been diagnosed with formal dementia.  Since I last saw her, she could tell me that she had 2 knee replacements and her right hip operated on in 2017.  She continues to use a walker.  She also told me that she had a \"slight heart attack\" 2 years ago but recovered.  She denied any shortness of breath or any chest pain.  The patient has not had any hallucinations.  She has never had any REM sleep disturbance.  She has not suffered from seizure.  She denied constipation.  She wears briefs but she has not suffered really with difficulties with incontinence of urine or stool.  The patient continues to do her exercises.  She finds she is somewhat bored.  She denied any other new complaints.  The patient does suffer from chronic ankle and leg swelling and does " use compression stockings.  She does get elevation of her legs daily.  She also does get daily soaks.  The patient does suffer from hearing loss but has hearing aids in.  She has two sons and one does regularly visit her.  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  The patient does continue to dress herself with some assistance pulling her pants up.  She eats well.  She can shower.  She does suffer from some depression.  She does not drink or smoke.  It has been felt in the past that she probably had parkinsonian syndrome causing her tremor that could relate to medications but also to her brain injury.  The patient has had no significant change in weight.  She does seem to have a good appetite.  She was able to indicate to me that her one son does not visit and she was not certain why nor was Shanel.  She could tell me she had never .  Her records indicate she had prior substance abuse, which obviously she does not have now.      I reviewed her medical referral form with her and Shanel, and she has allergies listed to ibuprofen and morphine.  The patient does require 24-hour staffing.  Her current medicines include low-dose aspirin, atorvastatin, Sinemet 25/100 two tablets t.i.d., cranberry concentrate, metoprolol, magnesium oxide, mirtazapine 45 mg, MiraLax, pramipexole 0.5 mg at bedtime, Multivite, trazodone 150 mg at bedtime, venlafaxine 300 mg ER along with 75 mg ER daily, VESIcare 10 mg, B12 1000 mcg, vitamin D 4000 International Units, p.r.n. Tylenol, p.r.n. lorazepam 0.5 mg b.i.d., p.r.n. nitroglycerin which she has not taken and p.r.n. tramadol which she has not taken.      The patient said that she has not had any further restless leg symptoms.  She notes, as does Shanel, that Sinemet does seem to help her tremor.      Neurologic examination showed that the patient was oriented to Thursday, 08/22/2019.  She knew she was in a doctor's office but then did not know if it was [place] or  "[place].  She could register 3/3 objects and then recall them after 5 minutes.  She was able to tell me the states that touch [place].  She later told me she thought she was with the psychiatrist and at the \"[facility]\" and said it was [place].  She could not recall the president's name but said he had bland hair.  She said she did not pay attention to him or to politics and could not give any details.  She is somewhat reticent, but she also seems to have some bradyphrenia.  She has fairly severe bilateral leg edema with some dependent rubor.  She had no obvious breakdown of her skin.  The patient's blood pressure was 127/74 with a pulse of 67.  She is thin and has a precordial thrush.  She has a mild heart murmur auscultated between the base and apex.  The patient did not have any gallops and had a regular rhythm.  Her lungs were clear to auscultation.  She did not have palmomental reflexes and did not have Arleth reflexes.  Her toes were downward going to plantar stimulation.  She had hypokinetic arm reflexes and trace knee reflexes and absent ankle jerks.  The patient had normal strength in her limbs.  She had bilateral rest tremor.  This involved her arms.  She had very mild cogwheeling of her limbs and no significant limb rigidity but did have moderate spine rigidity, especially in the neck.  She had marked decreased extension in lateral rotation.  The patient had a positive glabellar tap sign.  She had good extraocular movements.  She had a hypokinetic dysarthria.  Cranial motor strength was unremarkable.  The patient could perceive vibratory and position sense testing of her feet.  She was able to draw a clock correctly with findings of a minimal tremor, indicating the clock stated 4:30, which I requested.  The patient does have bilateral hearing aids in.      IMPRESSION:  Parkinsonian syndrome which could be idiopathic, drug related or in part related to presumed anoxic brain damage.      The " patient fortunately seems to be stable since I last saw her.  It has been almost 3 years.  I reassured her and her caregiver, Shanel.  The patient seems to be doing well on her present medications.  She needs to continue to mobilize.  I cannot really think of anything to offer differently.  I think she should continue to follow up here at [facility].  She and Shanel understand that I am going to be retiring at the end of the year.  I have asked that she see Dr. Saúl Cancino, my partner, or Dr. Wilmer Ny in 6 months and on a p.r.n. basis.  I have not made a change in her Sinemet dosage.      Thank you for allowing me to see this patient.      Sincerely,      Alejandro Singh MD      I spent 50 minutes with the patient today.  Over 50% of the time involved counseling and coordination of care. A complete review of medical systems was done, and a positive review listed in the report above.

## 2019-08-25 NOTE — PROGRESS NOTES
"Service Date: 2019      Ramon Echeverria MD   [address] [place]  [address]      RE: Alison Bashir   MRN: [id number]   : 1950      Dear Dr. Echeverria:      This is in regard to followup on Alison Bashir.  The patient returned today with a chief complaint of Parkinson disease and static encephalopathy.      The patient was accompanied by her sight coordinator, Shanel.  She did not have any medical records on her other than her medical referral form.  This included her medications.  I have not seen the patient for almost 3 years.  She has a very complicated history.  She has been under the care of you but also a psychiatrist, Dr. Johnson.  I was unable to retrieve any notes from your office nor his through the [facility] site.  The patient has been felt to be stable, but review by a coordinator there suggested that she should have neurologic followup as it has been almost 3 years since she was last seen.  The patient had presumed anoxic encephalopathy and has a static condition with memory loss.  This does not seem to be any worse.  She evidently has been diagnosed with formal dementia.  Since I last saw her, she could tell me that she had 2 knee replacements and her right hip operated on in 2017.  She continues to use a walker.  She also told me that she had a \"slight heart attack\" 2 years ago but recovered.  She denied any shortness of breath or any chest pain.  The patient has not had any hallucinations.  She has never had any REM sleep disturbance.  She has not suffered from seizure.  She denied constipation.  She wears briefs but she has not suffered really with difficulties with incontinence of urine or stool.  The patient continues to do her exercises.  She finds she is somewhat bored.  She denied any other new complaints.  The patient does suffer from chronic ankle and leg swelling and does use compression stockings.  She does get elevation of her legs daily.  " She also does get daily soaks.  The patient does suffer from hearing loss but has hearing aids in.  She has two sons and one does regularly visit her.  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  The patient does continue to dress herself with some assistance pulling her pants up.  She eats well.  She can shower.  She does suffer from some depression.  She does not drink or smoke.  It has been felt in the past that she probably had parkinsonian syndrome causing her tremor that could relate to medications but also to her brain injury.  The patient has had no significant change in weight.  She does seem to have a good appetite.  She was able to indicate to me that her one son does not visit and she was not certain why nor was Shanel.  She could tell me she had never .  Her records indicate she had prior substance abuse, which obviously she does not have now.      I reviewed her medical referral form with her and Shanel, and she has allergies listed to ibuprofen and morphine.  The patient does require 24-hour staffing.  Her current medicines include low-dose aspirin, atorvastatin, Sinemet 25/100 two tablets t.i.d., cranberry concentrate, metoprolol, magnesium oxide, mirtazapine 45 mg, MiraLax, pramipexole 0.5 mg at bedtime, Multivite, trazodone 150 mg at bedtime, venlafaxine 300 mg ER along with 75 mg ER daily, VESIcare 10 mg, B12 1000 mcg, vitamin D 4000 International Units, p.r.n. Tylenol, p.r.n. lorazepam 0.5 mg b.i.d., p.r.n. nitroglycerin which she has not taken and p.r.n. tramadol which she has not taken.      The patient said that she has not had any further restless leg symptoms.  She notes, as does Shanel, that Sinemet does seem to help her tremor.      Neurologic examination showed that the patient was oriented to Thursday, 08/22/2019.  She knew she was in a doctor's office but then did not know if it was [place] or [place].  She could register 3/3 objects and then recall them  "after 5 minutes.  She was able to tell me the states that touch [place].  She later told me she thought she was with the psychiatrist and at the \"[facility]\" and said it was [place].  She could not recall the president's name but said he had bland hair.  She said she did not pay attention to him or to politics and could not give any details.  She is somewhat reticent, but she also seems to have some bradyphrenia.  She has fairly severe bilateral leg edema with some dependent rubor.  She had no obvious breakdown of her skin.  The patient's blood pressure was 127/74 with a pulse of 67.  She is thin and has a precordial thrush.  She has a mild heart murmur auscultated between the base and apex.  The patient did not have any gallops and had a regular rhythm.  Her lungs were clear to auscultation.  She did not have palmomental reflexes and did not have Arleth reflexes.  Her toes were downward going to plantar stimulation.  She had hypokinetic arm reflexes and trace knee reflexes and absent ankle jerks.  The patient had normal strength in her limbs.  She had bilateral rest tremor.  This involved her arms.  She had very mild cogwheeling of her limbs and no significant limb rigidity but did have moderate spine rigidity, especially in the neck.  She had marked decreased extension in lateral rotation.  The patient had a positive glabellar tap sign.  She had good extraocular movements.  She had a hypokinetic dysarthria.  Cranial motor strength was unremarkable.  The patient could perceive vibratory and position sense testing of her feet.  She was able to draw a clock correctly with findings of a minimal tremor, indicating the clock stated 4:30, which I requested.  The patient does have bilateral hearing aids in.      IMPRESSION:  Parkinsonian syndrome which could be idiopathic, drug related or in part related to presumed anoxic brain damage.      The patient fortunately seems to be stable since I last saw her.  It " has been almost 3 years.  I reassured her and her caregiver, Shanel.  The patient seems to be doing well on her present medications.  She needs to continue to mobilize.  I cannot really think of anything to offer differently.  I think she should continue to follow up here at [facility].  She and Shanel understand that I am going to be retiring at the end of the year.  I have asked that she see Dr. Saúl Cancino, my partner, or Dr. Wilmer Ny in 6 months and on a p.r.n. basis.  I have not made a change in her Sinemet dosage.      Thank you for allowing me to see this patient.      Sincerely,      Yasmin Singh MD      I spent 50 minutes with the patient today.  Over 50% of the time involved counseling and coordination of care. A complete review of medical systems was done, and a positive review listed in the report above.         YASMIN SINGH MD             D: 2019   T: 2019   MT: [email or web address] m      Name:     DOMINGA SANDOVAL   MRN:      [id number]        Account:      [id number]   :      1950           Service Date: 2019      Document: [id number]

## 2019-09-23 ENCOUNTER — RECORDS - HEALTHEAST (OUTPATIENT)
Dept: LAB | Facility: CLINIC | Age: 69
End: 2019-09-23

## 2019-09-23 LAB — URATE SERPL-MCNC: 3.6 MG/DL (ref 2–7.5)

## 2019-11-04 ENCOUNTER — COMMUNICATION - HEALTHEAST (OUTPATIENT)
Dept: BEHAVIORAL HEALTH | Facility: CLINIC | Age: 69
End: 2019-11-04

## 2019-11-04 DIAGNOSIS — F39 MOOD DISORDER (H): ICD-10-CM

## 2019-11-06 ENCOUNTER — COMMUNICATION - HEALTHEAST (OUTPATIENT)
Dept: BEHAVIORAL HEALTH | Facility: CLINIC | Age: 69
End: 2019-11-06

## 2019-11-06 DIAGNOSIS — F33.1 MDD (MAJOR DEPRESSIVE DISORDER), RECURRENT EPISODE, MODERATE (H): ICD-10-CM

## 2019-12-02 ENCOUNTER — COMMUNICATION - HEALTHEAST (OUTPATIENT)
Dept: BEHAVIORAL HEALTH | Facility: CLINIC | Age: 69
End: 2019-12-02

## 2019-12-02 DIAGNOSIS — F39 MOOD DISORDER (H): ICD-10-CM

## 2019-12-03 ENCOUNTER — COMMUNICATION - HEALTHEAST (OUTPATIENT)
Dept: BEHAVIORAL HEALTH | Facility: CLINIC | Age: 69
End: 2019-12-03

## 2019-12-03 DIAGNOSIS — F33.1 MDD (MAJOR DEPRESSIVE DISORDER), RECURRENT EPISODE, MODERATE (H): ICD-10-CM

## 2019-12-16 ENCOUNTER — OFFICE VISIT - HEALTHEAST (OUTPATIENT)
Dept: BEHAVIORAL HEALTH | Facility: CLINIC | Age: 69
End: 2019-12-16

## 2019-12-16 DIAGNOSIS — F39 MOOD DISORDER (H): ICD-10-CM

## 2019-12-16 DIAGNOSIS — F33.1 MDD (MAJOR DEPRESSIVE DISORDER), RECURRENT EPISODE, MODERATE (H): ICD-10-CM

## 2020-02-26 ENCOUNTER — RECORDS - HEALTHEAST (OUTPATIENT)
Dept: LAB | Facility: CLINIC | Age: 70
End: 2020-02-26

## 2020-02-26 LAB
ALBUMIN SERPL-MCNC: 3.7 G/DL (ref 3.5–5)
ALP SERPL-CCNC: 161 U/L (ref 45–120)
ALT SERPL W P-5'-P-CCNC: <9 U/L (ref 0–45)
ANION GAP SERPL CALCULATED.3IONS-SCNC: 9 MMOL/L (ref 5–18)
AST SERPL W P-5'-P-CCNC: 15 U/L (ref 0–40)
BILIRUB SERPL-MCNC: 0.5 MG/DL (ref 0–1)
BUN SERPL-MCNC: 22 MG/DL (ref 8–28)
CALCIUM SERPL-MCNC: 9.2 MG/DL (ref 8.5–10.5)
CHLORIDE BLD-SCNC: 101 MMOL/L (ref 98–107)
CHOLEST SERPL-MCNC: 106 MG/DL
CO2 SERPL-SCNC: 31 MMOL/L (ref 22–31)
CREAT SERPL-MCNC: 0.71 MG/DL (ref 0.6–1.1)
FASTING STATUS PATIENT QL REPORTED: NO
GFR SERPL CREATININE-BSD FRML MDRD: >60 ML/MIN/1.73M2
GLUCOSE BLD-MCNC: 87 MG/DL (ref 70–125)
HDLC SERPL-MCNC: 43 MG/DL
LDLC SERPL CALC-MCNC: 50 MG/DL
POTASSIUM BLD-SCNC: 4 MMOL/L (ref 3.5–5)
PROT SERPL-MCNC: 7 G/DL (ref 6–8)
SODIUM SERPL-SCNC: 141 MMOL/L (ref 136–145)
TRIGL SERPL-MCNC: 66 MG/DL

## 2020-03-03 ENCOUNTER — COMMUNICATION - HEALTHEAST (OUTPATIENT)
Dept: BEHAVIORAL HEALTH | Facility: CLINIC | Age: 70
End: 2020-03-03

## 2020-03-09 ENCOUNTER — OFFICE VISIT - HEALTHEAST (OUTPATIENT)
Dept: BEHAVIORAL HEALTH | Facility: CLINIC | Age: 70
End: 2020-03-09

## 2020-03-09 DIAGNOSIS — F33.1 MDD (MAJOR DEPRESSIVE DISORDER), RECURRENT EPISODE, MODERATE (H): ICD-10-CM

## 2020-03-09 DIAGNOSIS — F39 MOOD DISORDER (H): ICD-10-CM

## 2020-05-14 ENCOUNTER — COMMUNICATION - HEALTHEAST (OUTPATIENT)
Dept: BEHAVIORAL HEALTH | Facility: CLINIC | Age: 70
End: 2020-05-14

## 2020-05-15 ENCOUNTER — OFFICE VISIT - HEALTHEAST (OUTPATIENT)
Dept: BEHAVIORAL HEALTH | Facility: CLINIC | Age: 70
End: 2020-05-15

## 2020-05-15 DIAGNOSIS — F33.1 MDD (MAJOR DEPRESSIVE DISORDER), RECURRENT EPISODE, MODERATE (H): ICD-10-CM

## 2020-05-15 DIAGNOSIS — F39 MOOD DISORDER (H): ICD-10-CM

## 2020-05-18 ENCOUNTER — COMMUNICATION - HEALTHEAST (OUTPATIENT)
Dept: BEHAVIORAL HEALTH | Facility: CLINIC | Age: 70
End: 2020-05-18

## 2020-06-08 ENCOUNTER — COMMUNICATION - HEALTHEAST (OUTPATIENT)
Dept: BEHAVIORAL HEALTH | Facility: CLINIC | Age: 70
End: 2020-06-08

## 2020-06-11 ENCOUNTER — HOME CARE/HOSPICE - HEALTHEAST (OUTPATIENT)
Dept: HOME HEALTH SERVICES | Facility: HOME HEALTH | Age: 70
End: 2020-06-11

## 2020-06-15 ENCOUNTER — COMMUNICATION - HEALTHEAST (OUTPATIENT)
Dept: EMERGENCY MEDICINE | Facility: HOSPITAL | Age: 70
End: 2020-06-15

## 2020-06-16 ENCOUNTER — OFFICE VISIT - HEALTHEAST (OUTPATIENT)
Dept: GERIATRICS | Facility: CLINIC | Age: 70
End: 2020-06-16

## 2020-06-16 DIAGNOSIS — F02.81 ALZHEIMER'S DEMENTIA WITH BEHAVIORAL DISTURBANCE, UNSPECIFIED TIMING OF DEMENTIA ONSET: ICD-10-CM

## 2020-06-16 DIAGNOSIS — R41.89 COGNITIVE IMPAIRMENT: ICD-10-CM

## 2020-06-16 DIAGNOSIS — G89.4 CHRONIC PAIN SYNDROME: ICD-10-CM

## 2020-06-16 DIAGNOSIS — I95.1 ORTHOSTATIC HYPOTENSION: ICD-10-CM

## 2020-06-16 DIAGNOSIS — I95.9 ACUTE HYPOTENSION: ICD-10-CM

## 2020-06-16 DIAGNOSIS — G30.9 ALZHEIMER'S DEMENTIA WITH BEHAVIORAL DISTURBANCE, UNSPECIFIED TIMING OF DEMENTIA ONSET: ICD-10-CM

## 2020-06-17 ENCOUNTER — OFFICE VISIT - HEALTHEAST (OUTPATIENT)
Dept: GERIATRICS | Facility: CLINIC | Age: 70
End: 2020-06-17

## 2020-06-17 DIAGNOSIS — G89.4 CHRONIC PAIN SYNDROME: ICD-10-CM

## 2020-06-17 DIAGNOSIS — R41.89 COGNITIVE IMPAIRMENT: ICD-10-CM

## 2020-06-17 DIAGNOSIS — G30.9 ALZHEIMER'S DEMENTIA WITH BEHAVIORAL DISTURBANCE, UNSPECIFIED TIMING OF DEMENTIA ONSET: ICD-10-CM

## 2020-06-17 DIAGNOSIS — F02.81 ALZHEIMER'S DEMENTIA WITH BEHAVIORAL DISTURBANCE, UNSPECIFIED TIMING OF DEMENTIA ONSET: ICD-10-CM

## 2020-06-17 DIAGNOSIS — I95.9 ACUTE HYPOTENSION: ICD-10-CM

## 2020-06-19 ENCOUNTER — RECORDS - HEALTHEAST (OUTPATIENT)
Dept: ADMINISTRATIVE | Facility: OTHER | Age: 70
End: 2020-06-19

## 2020-06-22 ENCOUNTER — OFFICE VISIT - HEALTHEAST (OUTPATIENT)
Dept: GERIATRICS | Facility: CLINIC | Age: 70
End: 2020-06-22

## 2020-06-22 DIAGNOSIS — R07.9 CHEST PAIN, UNSPECIFIED TYPE: ICD-10-CM

## 2020-06-22 DIAGNOSIS — I10 ESSENTIAL HYPERTENSION: ICD-10-CM

## 2020-06-22 DIAGNOSIS — I25.10 CORONARY ARTERY DISEASE DUE TO LIPID RICH PLAQUE: ICD-10-CM

## 2020-06-22 DIAGNOSIS — I95.1 ORTHOSTATIC HYPOTENSION: ICD-10-CM

## 2020-06-22 DIAGNOSIS — F02.81 ALZHEIMER'S DEMENTIA WITH BEHAVIORAL DISTURBANCE, UNSPECIFIED TIMING OF DEMENTIA ONSET: ICD-10-CM

## 2020-06-22 DIAGNOSIS — I10 HYPERTENSION, UNSPECIFIED TYPE: ICD-10-CM

## 2020-06-22 DIAGNOSIS — K21.00 GASTROESOPHAGEAL REFLUX DISEASE WITH ESOPHAGITIS: ICD-10-CM

## 2020-06-22 DIAGNOSIS — I25.83 CORONARY ARTERY DISEASE DUE TO LIPID RICH PLAQUE: ICD-10-CM

## 2020-06-22 DIAGNOSIS — G30.9 ALZHEIMER'S DEMENTIA WITH BEHAVIORAL DISTURBANCE, UNSPECIFIED TIMING OF DEMENTIA ONSET: ICD-10-CM

## 2020-06-22 ASSESSMENT — MIFFLIN-ST. JEOR: SCORE: 1307.4

## 2020-06-26 ENCOUNTER — OFFICE VISIT - HEALTHEAST (OUTPATIENT)
Dept: GERIATRICS | Facility: CLINIC | Age: 70
End: 2020-06-26

## 2020-06-26 DIAGNOSIS — R41.89 COGNITIVE IMPAIRMENT: ICD-10-CM

## 2020-06-26 DIAGNOSIS — I95.1 ORTHOSTATIC HYPOTENSION: ICD-10-CM

## 2020-06-26 DIAGNOSIS — S06.9X9S TRAUMATIC BRAIN INJURY WITH LOSS OF CONSCIOUSNESS, SEQUELA (H): ICD-10-CM

## 2020-06-26 DIAGNOSIS — I95.9 ACUTE HYPOTENSION: ICD-10-CM

## 2020-06-30 ENCOUNTER — RECORDS - HEALTHEAST (OUTPATIENT)
Dept: LAB | Facility: CLINIC | Age: 70
End: 2020-06-30

## 2020-06-30 ENCOUNTER — OFFICE VISIT - HEALTHEAST (OUTPATIENT)
Dept: GERIATRICS | Facility: CLINIC | Age: 70
End: 2020-06-30

## 2020-06-30 DIAGNOSIS — F02.81 ALZHEIMER'S DEMENTIA WITH BEHAVIORAL DISTURBANCE, UNSPECIFIED TIMING OF DEMENTIA ONSET: ICD-10-CM

## 2020-06-30 DIAGNOSIS — S06.9X9S TRAUMATIC BRAIN INJURY WITH LOSS OF CONSCIOUSNESS, SEQUELA (H): ICD-10-CM

## 2020-06-30 DIAGNOSIS — G30.9 ALZHEIMER'S DEMENTIA WITH BEHAVIORAL DISTURBANCE, UNSPECIFIED TIMING OF DEMENTIA ONSET: ICD-10-CM

## 2020-06-30 DIAGNOSIS — R07.1 CHEST PAIN ON BREATHING: ICD-10-CM

## 2020-06-30 DIAGNOSIS — I95.1 ORTHOSTATIC HYPOTENSION: ICD-10-CM

## 2020-06-30 LAB
ANION GAP SERPL CALCULATED.3IONS-SCNC: 6 MMOL/L (ref 5–18)
BUN SERPL-MCNC: 33 MG/DL (ref 8–28)
CALCIUM SERPL-MCNC: 8.4 MG/DL (ref 8.5–10.5)
CHLORIDE BLD-SCNC: 105 MMOL/L (ref 98–107)
CO2 SERPL-SCNC: 33 MMOL/L (ref 22–31)
CREAT SERPL-MCNC: 0.76 MG/DL (ref 0.6–1.1)
ERYTHROCYTE [DISTWIDTH] IN BLOOD BY AUTOMATED COUNT: 14.6 % (ref 11–14.5)
GFR SERPL CREATININE-BSD FRML MDRD: >60 ML/MIN/1.73M2
GLUCOSE BLD-MCNC: 85 MG/DL (ref 70–125)
HCT VFR BLD AUTO: 34.1 % (ref 35–47)
HGB BLD-MCNC: 10.2 G/DL (ref 12–16)
MCH RBC QN AUTO: 28.2 PG (ref 27–34)
MCHC RBC AUTO-ENTMCNC: 29.9 G/DL (ref 32–36)
MCV RBC AUTO: 94 FL (ref 80–100)
PLATELET # BLD AUTO: 140 THOU/UL (ref 140–440)
PMV BLD AUTO: 11.4 FL (ref 8.5–12.5)
POTASSIUM BLD-SCNC: 4 MMOL/L (ref 3.5–5)
RBC # BLD AUTO: 3.62 MILL/UL (ref 3.8–5.4)
SODIUM SERPL-SCNC: 144 MMOL/L (ref 136–145)
WBC: 3.5 THOU/UL (ref 4–11)

## 2020-07-02 ENCOUNTER — OFFICE VISIT - HEALTHEAST (OUTPATIENT)
Dept: GERIATRICS | Facility: CLINIC | Age: 70
End: 2020-07-02

## 2020-07-02 DIAGNOSIS — R07.9 CHEST PAIN, UNSPECIFIED TYPE: ICD-10-CM

## 2020-07-02 DIAGNOSIS — I95.1 ORTHOSTATIC HYPOTENSION: ICD-10-CM

## 2020-07-02 DIAGNOSIS — K21.00 GASTROESOPHAGEAL REFLUX DISEASE WITH ESOPHAGITIS: ICD-10-CM

## 2020-07-02 DIAGNOSIS — I95.9 ACUTE HYPOTENSION: ICD-10-CM

## 2020-07-02 DIAGNOSIS — K59.01 SLOW TRANSIT CONSTIPATION: ICD-10-CM

## 2020-07-02 DIAGNOSIS — F51.02 ADJUSTMENT INSOMNIA: ICD-10-CM

## 2020-07-02 DIAGNOSIS — F02.81 ALZHEIMER'S DEMENTIA WITH BEHAVIORAL DISTURBANCE, UNSPECIFIED TIMING OF DEMENTIA ONSET: ICD-10-CM

## 2020-07-02 DIAGNOSIS — G30.9 ALZHEIMER'S DEMENTIA WITH BEHAVIORAL DISTURBANCE, UNSPECIFIED TIMING OF DEMENTIA ONSET: ICD-10-CM

## 2020-07-03 ASSESSMENT — MIFFLIN-ST. JEOR: SCORE: 1307.4

## 2020-07-08 ENCOUNTER — OFFICE VISIT - HEALTHEAST (OUTPATIENT)
Dept: GERIATRICS | Facility: CLINIC | Age: 70
End: 2020-07-08

## 2020-07-08 DIAGNOSIS — I95.1 ORTHOSTATIC HYPOTENSION: ICD-10-CM

## 2020-07-08 DIAGNOSIS — G30.9 ALZHEIMER'S DEMENTIA WITH BEHAVIORAL DISTURBANCE, UNSPECIFIED TIMING OF DEMENTIA ONSET: ICD-10-CM

## 2020-07-08 DIAGNOSIS — I10 ESSENTIAL HYPERTENSION: ICD-10-CM

## 2020-07-08 DIAGNOSIS — K21.00 GASTROESOPHAGEAL REFLUX DISEASE WITH ESOPHAGITIS: ICD-10-CM

## 2020-07-08 DIAGNOSIS — I25.10 CORONARY ARTERY DISEASE DUE TO LIPID RICH PLAQUE: ICD-10-CM

## 2020-07-08 DIAGNOSIS — F02.81 ALZHEIMER'S DEMENTIA WITH BEHAVIORAL DISTURBANCE, UNSPECIFIED TIMING OF DEMENTIA ONSET: ICD-10-CM

## 2020-07-08 DIAGNOSIS — F51.02 ADJUSTMENT INSOMNIA: ICD-10-CM

## 2020-07-08 DIAGNOSIS — I25.83 CORONARY ARTERY DISEASE DUE TO LIPID RICH PLAQUE: ICD-10-CM

## 2020-07-10 ENCOUNTER — OFFICE VISIT - HEALTHEAST (OUTPATIENT)
Dept: GERIATRICS | Facility: CLINIC | Age: 70
End: 2020-07-10

## 2020-07-10 DIAGNOSIS — I95.1 ORTHOSTATIC HYPOTENSION: ICD-10-CM

## 2020-07-10 DIAGNOSIS — F02.81 ALZHEIMER'S DEMENTIA WITH BEHAVIORAL DISTURBANCE, UNSPECIFIED TIMING OF DEMENTIA ONSET: ICD-10-CM

## 2020-07-10 DIAGNOSIS — I95.9 ACUTE HYPOTENSION: ICD-10-CM

## 2020-07-10 DIAGNOSIS — S06.9X9S TRAUMATIC BRAIN INJURY WITH LOSS OF CONSCIOUSNESS, SEQUELA (H): ICD-10-CM

## 2020-07-10 DIAGNOSIS — G30.9 ALZHEIMER'S DEMENTIA WITH BEHAVIORAL DISTURBANCE, UNSPECIFIED TIMING OF DEMENTIA ONSET: ICD-10-CM

## 2020-07-14 ENCOUNTER — OFFICE VISIT - HEALTHEAST (OUTPATIENT)
Dept: GERIATRICS | Facility: CLINIC | Age: 70
End: 2020-07-14

## 2020-07-14 DIAGNOSIS — S06.9X9S TRAUMATIC BRAIN INJURY WITH LOSS OF CONSCIOUSNESS, SEQUELA (H): ICD-10-CM

## 2020-07-14 DIAGNOSIS — F02.81 ALZHEIMER'S DEMENTIA WITH BEHAVIORAL DISTURBANCE, UNSPECIFIED TIMING OF DEMENTIA ONSET: ICD-10-CM

## 2020-07-14 DIAGNOSIS — I95.1 ORTHOSTATIC HYPOTENSION: ICD-10-CM

## 2020-07-14 DIAGNOSIS — G30.9 ALZHEIMER'S DEMENTIA WITH BEHAVIORAL DISTURBANCE, UNSPECIFIED TIMING OF DEMENTIA ONSET: ICD-10-CM

## 2020-07-14 DIAGNOSIS — I10 HYPERTENSION, UNSPECIFIED TYPE: ICD-10-CM

## 2020-07-16 ENCOUNTER — OFFICE VISIT - HEALTHEAST (OUTPATIENT)
Dept: GERIATRICS | Facility: CLINIC | Age: 70
End: 2020-07-16

## 2020-07-16 DIAGNOSIS — F02.81 ALZHEIMER'S DEMENTIA WITH BEHAVIORAL DISTURBANCE, UNSPECIFIED TIMING OF DEMENTIA ONSET: ICD-10-CM

## 2020-07-16 DIAGNOSIS — K21.00 GASTROESOPHAGEAL REFLUX DISEASE WITH ESOPHAGITIS: ICD-10-CM

## 2020-07-16 DIAGNOSIS — E61.2 MAGNESIUM DEFICIENCY: ICD-10-CM

## 2020-07-16 DIAGNOSIS — I95.1 ORTHOSTATIC HYPOTENSION: ICD-10-CM

## 2020-07-16 DIAGNOSIS — G30.9 ALZHEIMER'S DEMENTIA WITH BEHAVIORAL DISTURBANCE, UNSPECIFIED TIMING OF DEMENTIA ONSET: ICD-10-CM

## 2020-07-16 DIAGNOSIS — F51.02 ADJUSTMENT INSOMNIA: ICD-10-CM

## 2020-07-21 ENCOUNTER — OFFICE VISIT - HEALTHEAST (OUTPATIENT)
Dept: GERIATRICS | Facility: CLINIC | Age: 70
End: 2020-07-21

## 2020-07-21 DIAGNOSIS — S06.9X9S TRAUMATIC BRAIN INJURY WITH LOSS OF CONSCIOUSNESS, SEQUELA (H): ICD-10-CM

## 2020-07-21 DIAGNOSIS — G89.4 CHRONIC PAIN SYNDROME: ICD-10-CM

## 2020-07-21 DIAGNOSIS — I95.9 ACUTE HYPOTENSION: ICD-10-CM

## 2020-07-21 DIAGNOSIS — G25.81 RLS (RESTLESS LEGS SYNDROME): ICD-10-CM

## 2020-07-21 DIAGNOSIS — I95.1 ORTHOSTATIC HYPOTENSION: ICD-10-CM

## 2020-07-23 ENCOUNTER — RECORDS - HEALTHEAST (OUTPATIENT)
Dept: ADMINISTRATIVE | Facility: OTHER | Age: 70
End: 2020-07-23

## 2020-07-23 ENCOUNTER — AMBULATORY - HEALTHEAST (OUTPATIENT)
Dept: GERIATRICS | Facility: CLINIC | Age: 70
End: 2020-07-23

## 2020-08-27 ENCOUNTER — COMMUNICATION - HEALTHEAST (OUTPATIENT)
Dept: SCHEDULING | Facility: CLINIC | Age: 70
End: 2020-08-27

## 2020-08-28 ENCOUNTER — OFFICE VISIT - HEALTHEAST (OUTPATIENT)
Dept: GERIATRICS | Facility: CLINIC | Age: 70
End: 2020-08-28

## 2020-08-28 DIAGNOSIS — Z71.89 ACP (ADVANCE CARE PLANNING): ICD-10-CM

## 2020-08-28 DIAGNOSIS — G20.C PARKINSONISM, UNSPECIFIED PARKINSONISM TYPE (H): ICD-10-CM

## 2020-08-28 DIAGNOSIS — R41.89 COGNITIVE IMPAIRMENT: ICD-10-CM

## 2020-08-28 DIAGNOSIS — Z91.81 PERSONAL HISTORY OF FALL: ICD-10-CM

## 2020-08-30 ENCOUNTER — RECORDS - HEALTHEAST (OUTPATIENT)
Dept: LAB | Facility: CLINIC | Age: 70
End: 2020-08-30

## 2020-08-31 ENCOUNTER — COMMUNICATION - HEALTHEAST (OUTPATIENT)
Dept: GERIATRICS | Facility: CLINIC | Age: 70
End: 2020-08-31

## 2020-08-31 LAB
ANION GAP SERPL CALCULATED.3IONS-SCNC: 7 MMOL/L (ref 5–18)
BUN SERPL-MCNC: 30 MG/DL (ref 8–28)
CALCIUM SERPL-MCNC: 8.5 MG/DL (ref 8.5–10.5)
CHLORIDE BLD-SCNC: 107 MMOL/L (ref 98–107)
CO2 SERPL-SCNC: 31 MMOL/L (ref 22–31)
CREAT SERPL-MCNC: 0.72 MG/DL (ref 0.6–1.1)
GFR SERPL CREATININE-BSD FRML MDRD: >60 ML/MIN/1.73M2
GLUCOSE BLD-MCNC: 80 MG/DL (ref 70–125)
HGB BLD-MCNC: 9.9 G/DL (ref 12–16)
POTASSIUM BLD-SCNC: 3.8 MMOL/L (ref 3.5–5)
SODIUM SERPL-SCNC: 145 MMOL/L (ref 136–145)

## 2020-09-01 ENCOUNTER — OFFICE VISIT - HEALTHEAST (OUTPATIENT)
Dept: GERIATRICS | Facility: CLINIC | Age: 70
End: 2020-09-01

## 2020-09-01 DIAGNOSIS — G20.C PARKINSONISM, UNSPECIFIED PARKINSONISM TYPE (H): ICD-10-CM

## 2020-09-01 DIAGNOSIS — I95.1 ORTHOSTATIC HYPOTENSION: ICD-10-CM

## 2020-09-01 DIAGNOSIS — R41.89 COGNITIVE IMPAIRMENT: ICD-10-CM

## 2020-09-01 DIAGNOSIS — G89.4 CHRONIC PAIN SYNDROME: ICD-10-CM

## 2020-09-01 DIAGNOSIS — S06.9X9S TRAUMATIC BRAIN INJURY WITH LOSS OF CONSCIOUSNESS, SEQUELA (H): ICD-10-CM

## 2020-09-01 DIAGNOSIS — Z91.81 PERSONAL HISTORY OF FALL: ICD-10-CM

## 2020-09-03 ENCOUNTER — OFFICE VISIT - HEALTHEAST (OUTPATIENT)
Dept: GERIATRICS | Facility: CLINIC | Age: 70
End: 2020-09-03

## 2020-09-03 DIAGNOSIS — G89.4 CHRONIC PAIN SYNDROME: ICD-10-CM

## 2020-09-03 DIAGNOSIS — G20.C PARKINSONISM, UNSPECIFIED PARKINSONISM TYPE (H): ICD-10-CM

## 2020-09-03 DIAGNOSIS — I95.1 ORTHOSTATIC HYPOTENSION: ICD-10-CM

## 2020-09-03 DIAGNOSIS — R41.89 COGNITIVE IMPAIRMENT: ICD-10-CM

## 2020-09-08 ENCOUNTER — OFFICE VISIT - HEALTHEAST (OUTPATIENT)
Dept: GERIATRICS | Facility: CLINIC | Age: 70
End: 2020-09-08

## 2020-09-08 DIAGNOSIS — G89.4 CHRONIC PAIN SYNDROME: ICD-10-CM

## 2020-09-08 DIAGNOSIS — I95.1 ORTHOSTATIC HYPOTENSION: ICD-10-CM

## 2020-09-08 DIAGNOSIS — G20.C PARKINSONISM, UNSPECIFIED PARKINSONISM TYPE (H): ICD-10-CM

## 2020-09-08 DIAGNOSIS — S06.9X9S TRAUMATIC BRAIN INJURY WITH LOSS OF CONSCIOUSNESS, SEQUELA (H): ICD-10-CM

## 2020-09-08 DIAGNOSIS — R41.89 COGNITIVE IMPAIRMENT: ICD-10-CM

## 2020-09-10 ENCOUNTER — OFFICE VISIT - HEALTHEAST (OUTPATIENT)
Dept: GERIATRICS | Facility: CLINIC | Age: 70
End: 2020-09-10

## 2020-09-10 ENCOUNTER — COMMUNICATION - HEALTHEAST (OUTPATIENT)
Dept: BEHAVIORAL HEALTH | Facility: CLINIC | Age: 70
End: 2020-09-10

## 2020-09-10 DIAGNOSIS — G89.4 CHRONIC PAIN SYNDROME: ICD-10-CM

## 2020-09-10 DIAGNOSIS — R41.89 COGNITIVE IMPAIRMENT: ICD-10-CM

## 2020-09-10 DIAGNOSIS — I95.1 ORTHOSTATIC HYPOTENSION: ICD-10-CM

## 2020-09-10 DIAGNOSIS — F32.1 CURRENT MODERATE EPISODE OF MAJOR DEPRESSIVE DISORDER, UNSPECIFIED WHETHER RECURRENT (H): ICD-10-CM

## 2020-09-10 DIAGNOSIS — G20.C PARKINSONISM, UNSPECIFIED PARKINSONISM TYPE (H): ICD-10-CM

## 2020-09-14 ENCOUNTER — OFFICE VISIT - HEALTHEAST (OUTPATIENT)
Dept: GERIATRICS | Facility: CLINIC | Age: 70
End: 2020-09-14

## 2020-09-14 DIAGNOSIS — G20.C PARKINSONISM, UNSPECIFIED PARKINSONISM TYPE (H): ICD-10-CM

## 2020-09-14 DIAGNOSIS — Z91.81 PERSONAL HISTORY OF FALL: ICD-10-CM

## 2020-09-14 DIAGNOSIS — R26.2 INABILITY TO WALK: ICD-10-CM

## 2020-09-16 ENCOUNTER — OFFICE VISIT - HEALTHEAST (OUTPATIENT)
Dept: GERIATRICS | Facility: CLINIC | Age: 70
End: 2020-09-16

## 2020-09-16 DIAGNOSIS — G89.4 CHRONIC PAIN SYNDROME: ICD-10-CM

## 2020-09-16 DIAGNOSIS — I10 ESSENTIAL HYPERTENSION: ICD-10-CM

## 2020-09-22 ENCOUNTER — OFFICE VISIT - HEALTHEAST (OUTPATIENT)
Dept: GERIATRICS | Facility: CLINIC | Age: 70
End: 2020-09-22

## 2020-09-22 DIAGNOSIS — G20.C PARKINSONISM, UNSPECIFIED PARKINSONISM TYPE (H): ICD-10-CM

## 2020-09-22 DIAGNOSIS — R41.89 COGNITIVE IMPAIRMENT: ICD-10-CM

## 2020-09-22 DIAGNOSIS — G89.4 CHRONIC PAIN SYNDROME: ICD-10-CM

## 2020-09-22 DIAGNOSIS — Z91.81 PERSONAL HISTORY OF FALL: ICD-10-CM

## 2020-09-22 DIAGNOSIS — I95.1 ORTHOSTATIC HYPOTENSION: ICD-10-CM

## 2020-09-23 ENCOUNTER — OFFICE VISIT - HEALTHEAST (OUTPATIENT)
Dept: GERIATRICS | Facility: CLINIC | Age: 70
End: 2020-09-23

## 2020-09-23 DIAGNOSIS — R26.2 INABILITY TO WALK: ICD-10-CM
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
## 2020-09-23 DIAGNOSIS — R41.89 COGNITIVE IMPAIRMENT: ICD-10-CM

## 2020-09-25 ENCOUNTER — COMMUNICATION - HEALTHEAST (OUTPATIENT)
Dept: BEHAVIORAL HEALTH | Facility: CLINIC | Age: 70
End: 2020-09-25

## 2020-09-25 ENCOUNTER — AMBULATORY - HEALTHEAST (OUTPATIENT)
Dept: GERIATRICS | Facility: CLINIC | Age: 70
End: 2020-09-25

## 2020-09-28 ENCOUNTER — OFFICE VISIT - HEALTHEAST (OUTPATIENT)
Dept: BEHAVIORAL HEALTH | Facility: CLINIC | Age: 70
End: 2020-09-28

## 2020-09-28 DIAGNOSIS — F51.01 PRIMARY INSOMNIA: ICD-10-CM

## 2020-09-30 ENCOUNTER — RECORDS - HEALTHEAST (OUTPATIENT)
Dept: ADMINISTRATIVE | Facility: OTHER | Age: 70
End: 2020-09-30

## 2020-10-02 ENCOUNTER — COMMUNICATION - HEALTHEAST (OUTPATIENT)
Dept: SCHEDULING | Facility: CLINIC | Age: 70
End: 2020-10-02

## 2020-10-05 ENCOUNTER — OFFICE VISIT - HEALTHEAST (OUTPATIENT)
Dept: GERIATRICS | Facility: CLINIC | Age: 70
End: 2020-10-05

## 2020-10-05 ENCOUNTER — COMMUNICATION - HEALTHEAST (OUTPATIENT)
Dept: SCHEDULING | Facility: CLINIC | Age: 70
End: 2020-10-05
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
## 2020-10-05 DIAGNOSIS — G20.C PARKINSONISM, UNSPECIFIED PARKINSONISM TYPE (H): ICD-10-CM

## 2020-10-05 DIAGNOSIS — G89.4 CHRONIC PAIN SYNDROME: ICD-10-CM

## 2020-10-05 DIAGNOSIS — I95.1 ORTHOSTATIC HYPOTENSION: ICD-10-CM

## 2020-10-05 DIAGNOSIS — R26.2 INABILITY TO WALK: ICD-10-CM

## 2020-10-05 DIAGNOSIS — Z91.81 PERSONAL HISTORY OF FALL: ICD-10-CM

## 2020-10-07 ENCOUNTER — OFFICE VISIT - HEALTHEAST (OUTPATIENT)
Dept: GERIATRICS | Facility: CLINIC | Age: 70
End: 2020-10-07

## 2020-10-07 DIAGNOSIS — G20.C PARKINSONISM, UNSPECIFIED PARKINSONISM TYPE (H): ICD-10-CM

## 2020-10-07 DIAGNOSIS — Z91.81 PERSONAL HISTORY OF FALL: ICD-10-CM

## 2020-10-07 DIAGNOSIS — G89.4 CHRONIC PAIN SYNDROME: ICD-10-CM

## 2020-10-07 DIAGNOSIS — I95.1 ORTHOSTATIC HYPOTENSION: ICD-10-CM

## 2020-10-07 DIAGNOSIS — R41.89 COGNITIVE IMPAIRMENT: ICD-10-CM

## 2020-10-07 DIAGNOSIS — S06.9X9S TRAUMATIC BRAIN INJURY WITH LOSS OF CONSCIOUSNESS, SEQUELA (H): ICD-10-CM

## 2020-10-12 ENCOUNTER — OFFICE VISIT - HEALTHEAST (OUTPATIENT)
Dept: GERIATRICS | Facility: CLINIC | Age: 70
End: 2020-10-12

## 2020-10-12 DIAGNOSIS — I95.1 ORTHOSTATIC HYPOTENSION: ICD-10-CM

## 2020-10-12 DIAGNOSIS — I95.9 ACUTE HYPOTENSION: ICD-10-CM

## 2020-10-12 DIAGNOSIS — G89.4 CHRONIC PAIN SYNDROME: ICD-10-CM

## 2020-10-12 DIAGNOSIS — G20.C PARKINSONISM, UNSPECIFIED PARKINSONISM TYPE (H): ICD-10-CM

## 2020-10-12 DIAGNOSIS — R41.89 COGNITIVE IMPAIRMENT: ICD-10-CM

## 2020-10-14 ENCOUNTER — OFFICE VISIT - HEALTHEAST (OUTPATIENT)
Dept: GERIATRICS | Facility: CLINIC | Age: 70
End: 2020-10-14

## 2020-10-14 DIAGNOSIS — Z91.81 PERSONAL HISTORY OF FALL: ICD-10-CM

## 2020-10-14 DIAGNOSIS — G89.4 CHRONIC PAIN SYNDROME: ICD-10-CM

## 2020-10-14 DIAGNOSIS — I95.1 ORTHOSTATIC HYPOTENSION: ICD-10-CM

## 2020-10-14 DIAGNOSIS — R26.2 INABILITY TO WALK: ICD-10-CM

## 2020-10-14 DIAGNOSIS — G20.C PARKINSONISM, UNSPECIFIED PARKINSONISM TYPE (H): ICD-10-CM

## 2020-10-14 DIAGNOSIS — R41.89 COGNITIVE IMPAIRMENT: ICD-10-CM

## 2020-10-19 ENCOUNTER — OFFICE VISIT - HEALTHEAST (OUTPATIENT)
Dept: GERIATRICS | Facility: CLINIC | Age: 70
End: 2020-10-19

## 2020-10-19 DIAGNOSIS — R26.2 INABILITY TO WALK: ICD-10-CM

## 2020-10-19 DIAGNOSIS — G20.C PARKINSONISM, UNSPECIFIED PARKINSONISM TYPE (H): ICD-10-CM

## 2020-10-19 DIAGNOSIS — I95.1 ORTHOSTATIC HYPOTENSION: ICD-10-CM

## 2020-10-19 DIAGNOSIS — G89.4 CHRONIC PAIN SYNDROME: ICD-10-CM

## 2020-10-19 DIAGNOSIS — R41.89 COGNITIVE IMPAIRMENT: ICD-10-CM

## 2020-10-21 ENCOUNTER — OFFICE VISIT - HEALTHEAST (OUTPATIENT)
Dept: GERIATRICS | Facility: CLINIC | Age: 70
End: 2020-10-21

## 2020-10-21 DIAGNOSIS — R41.89 COGNITIVE IMPAIRMENT: ICD-10-CM

## 2020-10-21 DIAGNOSIS — G20.C PARKINSONISM, UNSPECIFIED PARKINSONISM TYPE (H): ICD-10-CM

## 2020-10-21 DIAGNOSIS — G89.4 CHRONIC PAIN SYNDROME: ICD-10-CM

## 2020-10-21 DIAGNOSIS — R26.2 INABILITY TO WALK: ICD-10-CM

## 2020-10-21 DIAGNOSIS — I95.1 ORTHOSTATIC HYPOTENSION: ICD-10-CM

## 2020-10-26 ENCOUNTER — OFFICE VISIT - HEALTHEAST (OUTPATIENT)
Dept: GERIATRICS | Facility: CLINIC | Age: 70
End: 2020-10-26

## 2020-10-26 DIAGNOSIS — G20.C PARKINSONISM, UNSPECIFIED PARKINSONISM TYPE (H): ICD-10-CM

## 2020-10-26 DIAGNOSIS — R26.2 INABILITY TO WALK: ICD-10-CM

## 2020-10-26 DIAGNOSIS — G89.4 CHRONIC PAIN SYNDROME: ICD-10-CM

## 2020-10-26 DIAGNOSIS — I95.9 ACUTE HYPOTENSION: ICD-10-CM

## 2020-10-26 DIAGNOSIS — I95.1 ORTHOSTATIC HYPOTENSION: ICD-10-CM

## 2020-10-27 ENCOUNTER — AMBULATORY - HEALTHEAST (OUTPATIENT)
Dept: GERIATRICS | Facility: CLINIC | Age: 70
End: 2020-10-27

## 2020-10-27 NOTE — TELEPHONE ENCOUNTER
RECORDS RECEIVED FROM: Self (former [facility] patient)   Date of Appt: 2/2/21   NOTES (FOR ALL VISITS) STATUS DETAILS   OFFICE NOTE from referring provider N/A    OFFICE NOTE from other specialist Internal Dr Singh @ [facility]:  8/22/19  11/22/16  3/9/16  11/11/15   DISCHARGE SUMMARY from hospital N/A    DISCHARGE REPORT from the ER N/A    OPERATIVE REPORT N/A    MEDICATION LIST Internal    IMAGING  (FOR ALL VISITS)     EMG N/A    EEG N/A    LUMBAR PUNCTURE N/A    LEEROY SCAN N/A    DEXA SCAN *NEUROSURGERY* N/A    ULTRASOUND (CAROTID BILAT) *VASCULAR* N/A    MRI (HEAD, NECK, SPINE) Received [facility]:  MRI Head 7/30/15   XRAY (SPINE) *NEUROSURGERY* N/A    CT (HEAD, NECK, SPINE) N/A       Action 10/27/20 MV 3.03pm   Action Taken Imaging request faxed to [facility] for:  MRI Head 7/30/15     Imaging Received  10/29/20 MV 11.21am  HE   Image Type (x): Disc:   PACS: x   Exam Date/Name MRI Head 7/30/15 Comments: images resolved in PACS

## 2020-11-10 ENCOUNTER — COMMUNICATION - HEALTHEAST (OUTPATIENT)
Dept: BEHAVIORAL HEALTH | Facility: CLINIC | Age: 70
End: 2020-11-10

## 2020-12-21 ENCOUNTER — OFFICE VISIT - HEALTHEAST (OUTPATIENT)
Dept: BEHAVIORAL HEALTH | Facility: CLINIC | Age: 70
End: 2020-12-21

## 2020-12-21 DIAGNOSIS — F32.1 CURRENT MODERATE EPISODE OF MAJOR DEPRESSIVE DISORDER, UNSPECIFIED WHETHER RECURRENT (H): ICD-10-CM

## 2020-12-21 DIAGNOSIS — F39 MOOD DISORDER (H): ICD-10-CM

## 2020-12-31 ENCOUNTER — COMMUNICATION - HEALTHEAST (OUTPATIENT)
Dept: BEHAVIORAL HEALTH | Facility: CLINIC | Age: 70
End: 2020-12-31

## 2021-01-07 ENCOUNTER — COMMUNICATION - HEALTHEAST (OUTPATIENT)
Dept: BEHAVIORAL HEALTH | Facility: CLINIC | Age: 71
End: 2021-01-07

## 2021-01-07 DIAGNOSIS — F32.1 CURRENT MODERATE EPISODE OF MAJOR DEPRESSIVE DISORDER, UNSPECIFIED WHETHER RECURRENT (H): ICD-10-CM

## 2021-01-13 ENCOUNTER — DOCUMENTATION ONLY (OUTPATIENT)
Dept: CARE COORDINATION | Facility: CLINIC | Age: 71
End: 2021-01-13

## 2021-02-01 ENCOUNTER — COMMUNICATION - HEALTHEAST (OUTPATIENT)
Dept: BEHAVIORAL HEALTH | Facility: CLINIC | Age: 71
End: 2021-02-01

## 2021-02-01 DIAGNOSIS — F33.1 MDD (MAJOR DEPRESSIVE DISORDER), RECURRENT EPISODE, MODERATE (H): ICD-10-CM

## 2021-02-02 ENCOUNTER — PRE VISIT (OUTPATIENT)
Dept: NEUROLOGY | Facility: CLINIC | Age: 71
End: 2021-02-02

## 2021-02-08 ENCOUNTER — OFFICE VISIT - HEALTHEAST (OUTPATIENT)
Dept: BEHAVIORAL HEALTH | Facility: CLINIC | Age: 71
End: 2021-02-08

## 2021-02-08 ENCOUNTER — COMMUNICATION - HEALTHEAST (OUTPATIENT)
Dept: BEHAVIORAL HEALTH | Facility: CLINIC | Age: 71
End: 2021-02-08

## 2021-02-08 DIAGNOSIS — F33.1 MDD (MAJOR DEPRESSIVE DISORDER), RECURRENT EPISODE, MODERATE (H): ICD-10-CM

## 2021-05-10 ENCOUNTER — OFFICE VISIT - HEALTHEAST (OUTPATIENT)
Dept: BEHAVIORAL HEALTH | Facility: CLINIC | Age: 71
End: 2021-05-10

## 2021-05-10 DIAGNOSIS — F32.1 CURRENT MODERATE EPISODE OF MAJOR DEPRESSIVE DISORDER, UNSPECIFIED WHETHER RECURRENT (H): ICD-10-CM

## 2021-05-26 ENCOUNTER — RECORDS - HEALTHEAST (OUTPATIENT)
Dept: ADMINISTRATIVE | Facility: CLINIC | Age: 71
End: 2021-05-26

## 2021-05-27 ENCOUNTER — COMMUNICATION - HEALTHEAST (OUTPATIENT)
Dept: BEHAVIORAL HEALTH | Facility: CLINIC | Age: 71
End: 2021-05-27

## 2021-05-27 ENCOUNTER — RECORDS - HEALTHEAST (OUTPATIENT)
Dept: ADMINISTRATIVE | Facility: CLINIC | Age: 71
End: 2021-05-27

## 2021-05-27 DIAGNOSIS — F33.1 MDD (MAJOR DEPRESSIVE DISORDER), RECURRENT EPISODE, MODERATE (H): ICD-10-CM

## 2021-05-28 ENCOUNTER — RECORDS - HEALTHEAST (OUTPATIENT)
Dept: BEHAVIORAL HEALTH | Facility: CLINIC | Age: 71
End: 2021-05-28

## 2021-05-28 NOTE — PROGRESS NOTES
Correct pharmacy verified with patient and confirmed in snapshot? [x] yes []no    Charge captured ? [x] yes  [] no    Medications Phoned  to Pharmacy [] yes [x]no  Name of Pharmacist:  List Medications, including dose, quantity and instructions      Medication Prescriptions given to patient   [] yes  [x] no   List the name of the drug the prescription was written for.       Medications ordered this visit were e-scribed.  Verified by order class [x] yes  [] no   Remeron 30 mg; Effexor-XR 75 mg  Medication changes or discontinuations were communicated to patient's pharmacy: [] yes  [x] no    UA collected [] yes  [x] no    [place] Prescription Monitoring Program Reviewed? [] yes  [x] no    Referrals were made to:  None    Future appointment was made: [x] yes  [] no  8/19/2019  Dictation completed at time of chart check: [x] yes  [] no    I have checked the documentation for today s encounters and the above information has been reviewed and completed.

## 2021-05-28 NOTE — PROGRESS NOTES
Outpatient Followup Psychiatric Evaluation      Pertinent History: Patient presents today for the purposes of medication management.  She has a history of a mood disorder and also with prior psychotic symptoms.  Per the patient's request we have been tapering the Zyprexa and that was discontinued in 2017.    She was restarted on the Remeron earlier in 2018.  I saw the patient in the summer 2018 and did not make any medication changes.  In September 2018 we did increase the patient's Remeron to 45 mg at night.    I saw the patient in January 2019.  At that time she was continuing to struggle with isolation and hoarding behaviors and was extremely anxious.  She was having some bedwetting issues that have been falling more seeming perhaps to be overmedicated.  There had not been any improvement with the increase in Remeron.  At that visit we did decrease the Effexor and the Remeron.    Current Symptoms:   The patient tells me she is doing well.  She is fairly vague.  She tells me she is sleeping well.  No change in cognition or appetite.  No suicidality and no psychosis.  She denies having any new medical concerns or issues and does not feel she needs any medication changes.  She denies side effects.    The staff member present reports the patient is doing well.  Her unsteady gait has improved.  She does have a new walker.  She still is having some bedwetting at night although that may be is improved a bit.  They do get her up at night to use the bathroom.  They report there is been no change in cognition.  She sleeps over 7 hours a night.  They have noted no change in appetite or cognition.  There is been no psychosis and no suicidality.  They do not feel there needs to be any medication changes at this time.  The patient apparently spent Easter with her family and her sister reported that she thought the patient was doing well as well to the staff.    Current Medications: Please see chart    Medication Compliance:  Yes    Side Effects to Medications: No      Vitals:  Wt Readings from Last 3 Encounters:   04/22/19 180 lb (81.6 kg)   03/16/18 197 lb (89.4 kg)   03/14/18 197 lb (89.4 kg)     Temp Readings from Last 3 Encounters:   06/11/18 97.8  F (36.6  C) (Oral)   03/19/18 98  F (36.7  C)   03/16/18 97.8  F (36.6  C)     BP Readings from Last 3 Encounters:   04/22/19 107/61   01/21/19 117/62   09/10/18 119/60     Pulse Readings from Last 3 Encounters:   04/22/19 68   01/21/19 74   09/10/18 66         Mental Status Exam:   Appearance: Patient presents appearing relatively comfortable.  No obvious distress.  Not short of breath.  No obvious pain.  Sitting in a chair.  Occasional eye contact.  Behavior: Patient maintains good behavioral control.  He is currently not restless or agitated.  Speech: No obvious recent change.  Simple answers.  Fairly concrete.  He continues to need structure and prompts.  Mood/Affect: Not significantly depressed or anxious.  She is baseline flat but is a bit more alert and participates a bit more today.  No irritability.  No lability or agitation.  Thought Content: No reports of any recent psychosis.  No evidence of any psychosis.  Suicidal or Homicidal Thoughts:  None apparent or reported. The patient denies any suicidal or homicidal ideation.   Thought Process/Formulation: Slow.  Somewhat concrete and vague.  He is able to track and follow some conversation.  She is slow with a delay.  She does need prompts.  No racing thoughts.  Associations: No obvious recent change.  Somewhat concrete.  Able to process some simple information.  Fund of Knowledge: No significant recent change.  He continues somewhat impaired.  Attention/Concentration: Slow.  Somewhat concrete.  Still with impaired concentration.  Insight:  Grossly unchanged.  Continues impaired  Judgement:  Grossly unchanged.  Continues impaired  Memory:  Grossly fair.  Needing prompts and structure.    Motor Status:  No recent change reported. No  current tremor.   Orientation: No reports of any recent change.     Diagnosis managed and treated at today's visit :      Major depressive disorder   History of psychosis NOS    Plan:  Medication Adjustment:  We will decrease the patient's Remeron down to 30 mg a day.  We are going to decrease the patient's Effexor XR to 225 mg a day.  Staff will monitor for any changes in in 1-2 months will call us for possible further reduction in these medications and attempt to see how the patient does as well as consideration for alternative medication options.    Other: Patient will return to clinic in 3 months for further assessment and treatment the staff and patient agreed to return sooner or call if questions concerns or problems arise.    Continue with the support of the clinic, reassurance, and redirection. Staff monitoring and ongoing assessments per team plan. Current psychotropic medication appears to represent the minimum effective dosage and appears medically necessary. We will continue to monitor and reassess. This team will utilize appropriate emergency services if necessary. I will make myself available if concerns or problems arise.    Norberto Johnson MD

## 2021-05-28 NOTE — PROGRESS NOTES
Pt is here for psychiatric follow up and medication management.    Pt arrived 15 minutes late for appointment today with staff from .

## 2021-05-30 VITALS — WEIGHT: 192 LBS | BODY MASS INDEX: 26.88 KG/M2 | HEIGHT: 71 IN

## 2021-05-31 ENCOUNTER — RECORDS - HEALTHEAST (OUTPATIENT)
Dept: ADMINISTRATIVE | Facility: CLINIC | Age: 71
End: 2021-05-31

## 2021-05-31 VITALS — BODY MASS INDEX: 28.31 KG/M2 | WEIGHT: 203 LBS

## 2021-05-31 VITALS — HEIGHT: 71 IN | BODY MASS INDEX: 25.62 KG/M2 | WEIGHT: 183 LBS

## 2021-06-01 ENCOUNTER — RECORDS - HEALTHEAST (OUTPATIENT)
Dept: ADMINISTRATIVE | Facility: CLINIC | Age: 71
End: 2021-06-01

## 2021-06-01 VITALS — WEIGHT: 200 LBS | BODY MASS INDEX: 27.89 KG/M2

## 2021-06-01 VITALS — BODY MASS INDEX: 27.62 KG/M2 | WEIGHT: 198 LBS

## 2021-06-01 VITALS — BODY MASS INDEX: 27.48 KG/M2 | WEIGHT: 197 LBS

## 2021-06-01 VITALS — WEIGHT: 206 LBS | BODY MASS INDEX: 28.73 KG/M2

## 2021-06-01 VITALS — HEIGHT: 71 IN | BODY MASS INDEX: 27.48 KG/M2

## 2021-06-01 VITALS — WEIGHT: 197 LBS | BODY MASS INDEX: 27.48 KG/M2

## 2021-06-01 VITALS — BODY MASS INDEX: 27.75 KG/M2 | WEIGHT: 199 LBS

## 2021-06-01 VITALS — WEIGHT: 207 LBS | BODY MASS INDEX: 28.87 KG/M2

## 2021-06-01 VITALS — BODY MASS INDEX: 28.03 KG/M2 | WEIGHT: 201 LBS

## 2021-06-01 VITALS — BODY MASS INDEX: 27.48 KG/M2 | HEIGHT: 71 IN

## 2021-06-02 ENCOUNTER — RECORDS - HEALTHEAST (OUTPATIENT)
Dept: ADMINISTRATIVE | Facility: CLINIC | Age: 71
End: 2021-06-02

## 2021-06-02 VITALS — HEIGHT: 71 IN | BODY MASS INDEX: 25.2 KG/M2 | WEIGHT: 180 LBS

## 2021-06-02 VITALS — BODY MASS INDEX: 27.48 KG/M2 | HEIGHT: 71 IN

## 2021-06-03 ENCOUNTER — RECORDS - HEALTHEAST (OUTPATIENT)
Dept: ADMINISTRATIVE | Facility: CLINIC | Age: 71
End: 2021-06-03

## 2021-06-03 NOTE — TELEPHONE ENCOUNTER
Date of Last Office Visit: 4/22/19  Date of Next Office Visit: none - coordinator will contact to schedule  No shows since last visit: 8/19/19   Cancellations since last visit: no  ED visits since last visit: no    Medication Remeron 30 mg date last ordered: 11/5/19  Qty: 31  Refills: 0    Lapse in therapy greater than 7 days: no  Medication refill request verified as identical to current order: yes  Result of Last DAM, VPA, Li+ Level, CBC, or Carbamazepine Level (at or since last visit): N/A        []Eligibility - not seen in last year    [x]Supervision - no future appointment    [x]Compliance : F/U in 3 mos; N/S on 8/19/19    []Verification - order discrepancy    []Controlled Medication    []90 - day supply request    [x]Other LPN pending medications    Current Medication list:      acetaminophen (TYLENOL) 325 MG tablet Take 650 mg by mouth 4 (four) times a day.   aspirin 81 mg chewable tablet Chew 81 mg daily.   carbidopa-levodopa (SINEMET)  mg per tablet Take 2 tablets by mouth 3 (three) times a day.   cholecalciferol, vitamin D3, 1,000 unit tablet Take 1,000 Units by mouth daily.   cranberry 500 mg cap Take 500 mg by mouth 2 (two) times a day.   cyanocobalamin (VITAMIN B-12) 100 MCG tablet Take 100 mcg by mouth daily.   lidocaine (LIDODERM) 5 % Place 1 patch on the skin daily. Remove & Discard patch within 12 hours or as directed by MD   LORazepam (ATIVAN) 0.5 MG tablet Take 0.5 mg by mouth 2 (two) times a day.    magnesium oxide (MAG-OX) 400 mg tablet Take 400 mg by mouth 2 (two) times a day.   metoprolol tartrate (LOPRESSOR) 25 MG tablet Take 12.5 mg by mouth daily.   mirtazapine (REMERON) 30 MG tablet TAKE 1 TABLET BY MOUTH AT BEDTIME.   multivitamin (MULTIVITAMIN) per tablet Take 1 tablet by mouth daily.   nitroglycerin (NITROSTAT) 0.4 MG SL tablet Place 0.4 mg under the tongue every 5 (five) minutes as needed.    polyethylene glycol (MIRALAX) 17 gram packet Take 17 g by mouth daily.   pramipexole  (MIRAPEX) 0.5 MG tablet Take 0.5 mg by mouth bedtime.   psyllium (METAMUCIL) powder Take by mouth every morning. 1 PKT   senna-docusate (PERICOLACE) 8.6-50 mg tablet Take 2 tablets by mouth 2 (two) times a day.   solifenacin (VESICARE) 10 MG tablet Take 10 mg by mouth daily.   sorbitol 70 % solution Take 30 mL by mouth daily.   traZODone (DESYREL) 150 MG tablet TAKE 1 TABLET BY MOUTH AT BEDTIME. *1 TOTAL FILL*   venlafaxine (EFFEXOR-XR) 75 MG 24 hr capsule TAKE 3 CAPSULES (225MG) BY MOUTH ONCE DAILY       Medication Plan of Care at last office visit with MD/CNP:    PLAN:  Plan:  Medication Adjustment:  We will decrease the patient's Remeron down to 30 mg a day.  We are going to decrease the patient's Effexor XR to 225 mg a day.  Staff will monitor for any changes in in 1-2 months will call us for possible further reduction in these medications and attempt to see how the patient does as well as consideration for alternative medication options.     Other: Patient will return to clinic in 3 months for further assessment and treatment the staff and patient agreed to return sooner or call if questions concerns or problems arise.    [place], [place], [place], and [place] : NA

## 2021-06-03 NOTE — TELEPHONE ENCOUNTER
Date of Last Office Visit: 04/22/2019  Date of Next Office Visit: 12/16/2019  No shows since last visit: 08/19/2019  Cancellations since last visit: none  ED visits since last visit:  none  Medication Venlafaxine ER 75 mg date last ordered: 11/07/2019  Qty: 93  Refills: 0  Lapse in therapy greater than 7 days: no  Medication refill request verified as identical to current order: yes  Result of Last DAM, VPA, Li+ Level, CBC, or Carbamazepine Level (at or since last visit): N/A     [x] Medication refilled per [facility] M-1.   [] Medication unable to be refilled by RN due to criteria not met as indicated below:     []Eligibility - not seen in last year    []Supervision - no future appointment    []Compliance     []Verification - order discrepancy    []Controlled Medication    []Medication not included in RN Protocol    []90 - day supply request    []Other   Current Medication list:    Alison Bashir    (MRN [id number])   Your Current Medications Are     acetaminophen (TYLENOL) 325 MG tablet Take 650 mg by mouth 4 (four) times a day.   aspirin 81 mg chewable tablet Chew 81 mg daily.   carbidopa-levodopa (SINEMET)  mg per tablet Take 2 tablets by mouth 3 (three) times a day.   cholecalciferol, vitamin D3, 1,000 unit tablet Take 1,000 Units by mouth daily.   cranberry 500 mg cap Take 500 mg by mouth 2 (two) times a day.   cyanocobalamin (VITAMIN B-12) 100 MCG tablet Take 100 mcg by mouth daily.   lidocaine (LIDODERM) 5 % Place 1 patch on the skin daily. Remove & Discard patch within 12 hours or as directed by MD   LORazepam (ATIVAN) 0.5 MG tablet Take 0.5 mg by mouth 2 (two) times a day.    magnesium oxide (MAG-OX) 400 mg tablet Take 400 mg by mouth 2 (two) times a day.   metoprolol tartrate (LOPRESSOR) 25 MG tablet Take 12.5 mg by mouth daily.   mirtazapine (REMERON) 30 MG tablet TAKE 1 TABLET BY MOUTH AT BEDTIME  *1 TOTAL FILL*   multivitamin (MULTIVITAMIN) per tablet Take 1 tablet by mouth daily.   nitroglycerin  (NITROSTAT) 0.4 MG SL tablet Place 0.4 mg under the tongue every 5 (five) minutes as needed.    polyethylene glycol (MIRALAX) 17 gram packet Take 17 g by mouth daily.   pramipexole (MIRAPEX) 0.5 MG tablet Take 0.5 mg by mouth bedtime.   psyllium (METAMUCIL) powder Take by mouth every morning. 1 PKT   senna-docusate (PERICOLACE) 8.6-50 mg tablet Take 2 tablets by mouth 2 (two) times a day.   solifenacin (VESICARE) 10 MG tablet Take 10 mg by mouth daily.   sorbitol 70 % solution Take 30 mL by mouth daily.   traZODone (DESYREL) 150 MG tablet TAKE 1 TABLET BY MOUTH AT BEDTIME. *1 TOTAL FILL*   venlafaxine (EFFEXOR-XR) 75 MG 24 hr capsule TAKE 3 CAPSULES (225MG) BY MOUTH ONCE DAILY.       Medication Plan of Care at last office visit with MD/CNP:    Plan:  Medication Adjustment:  We will decrease the patient's Remeron down to 30 mg a day.  We are going to decrease the patient's Effexor XR to 225 mg a day.  Staff will monitor for any changes in in 1-2 months will call us for possible further reduction in these medications and attempt to see how the patient does as well as consideration for alternative medication options.     Other: Patient will return to clinic in 3 months for further assessment and treatment the staff and patient agreed to return sooner or call if questions concerns or problems arise.     Continue with the support of the clinic, reassurance, and redirection. Staff monitoring and ongoing assessments per team plan. Current psychotropic medication appears to represent the minimum effective dosage and appears medically necessary. We will continue to monitor and reassess. This team will utilize appropriate emergency services if necessary. I will make myself available if concerns or problems arise.     Norberto Johnson MD

## 2021-06-03 NOTE — TELEPHONE ENCOUNTER
Date of Last Office Visit: 4-22-19  Date of Next Office Visit: none, scheduling will contact patient  No shows since last visit: yes, 8-19-19  Cancellations since last visit: 0  ED visits since last visit:  0  Medication effexor date last ordered: 8-15-19  Qty: 93  Refills: 2  Lapse in therapy greater than 7 days: no  Medication refill request verified as identical to current order: yes  Result of Last DAM, VPA, Li+ Level, CBC, or Carbamazepine Level (at or since last visit): N/A     [] Medication refilled per [facility] M-1.   [x] Medication unable to be refilled by RN due to criteria not met as indicated below:     []Eligibility - not seen in last year    [x]Supervision - no future appointment    []Compliance     []Verification - order discrepancy    []Controlled Medication    []Medication not included in RN Protocol    []90 - day supply request    []Other   Current Medication list:    acetaminophen (TYLENOL) 325 MG tablet Take 650 mg by mouth 4 (four) times a day.   aspirin 81 mg chewable tablet Chew 81 mg daily.   carbidopa-levodopa (SINEMET)  mg per tablet Take 2 tablets by mouth 3 (three) times a day.   cholecalciferol, vitamin D3, 1,000 unit tablet Take 1,000 Units by mouth daily.   cranberry 500 mg cap Take 500 mg by mouth 2 (two) times a day.   cyanocobalamin (VITAMIN B-12) 100 MCG tablet Take 100 mcg by mouth daily.   lidocaine (LIDODERM) 5 % Place 1 patch on the skin daily. Remove & Discard patch within 12 hours or as directed by MD   LORazepam (ATIVAN) 0.5 MG tablet Take 0.5 mg by mouth 2 (two) times a day.    magnesium oxide (MAG-OX) 400 mg tablet Take 400 mg by mouth 2 (two) times a day.   metoprolol tartrate (LOPRESSOR) 25 MG tablet Take 12.5 mg by mouth daily.   mirtazapine (REMERON) 30 MG tablet TAKE 1 TABLET BY MOUTH AT BEDTIME.   multivitamin (MULTIVITAMIN) per tablet Take 1 tablet by mouth daily.   nitroglycerin (NITROSTAT) 0.4 MG SL tablet Place 0.4 mg under the tongue every 5 (five) minutes as  needed.    polyethylene glycol (MIRALAX) 17 gram packet Take 17 g by mouth daily.   pramipexole (MIRAPEX) 0.5 MG tablet Take 0.5 mg by mouth bedtime.   psyllium (METAMUCIL) powder Take by mouth every morning. 1 PKT   senna-docusate (PERICOLACE) 8.6-50 mg tablet Take 2 tablets by mouth 2 (two) times a day.   solifenacin (VESICARE) 10 MG tablet Take 10 mg by mouth daily.   sorbitol 70 % solution Take 30 mL by mouth daily.   traZODone (DESYREL) 150 MG tablet TAKE 1 TABLET BY MOUTH AT BEDTIME. *1 TOTAL FILL*   venlafaxine (EFFEXOR-XR) 75 MG 24 hr capsule TAKE 3 CAPSULES (225 MG TOTAL) BY MOUTH ONCE DAILY   Allergies        Medication Plan of Care at last office visit with MD/CNP:  Medication Adjustment:  We will decrease the patient's Remeron down to 30 mg a day.  We are going to decrease the patient's Effexor XR to 225 mg a day.  Staff will monitor for any changes in in 1-2 months will call us for possible further reduction in these medications and attempt to see how the patient does as well as consideration for alternative medication options.     Other: Patient will return to clinic in 3 months for further assessment and treatment the staff and patient agreed to return sooner or call if questions concerns or problems arise.     Continue with the support of the clinic, reassurance, and redirection. Staff monitoring and ongoing assessments per team plan. Current psychotropic medication appears to represent the minimum effective dosage and appears medically necessary. We will continue to monitor and reassess. This team will utilize appropriate emergency services if necessary. I will make myself available if concerns or problems arise.

## 2021-06-03 NOTE — TELEPHONE ENCOUNTER
Date of Last Office Visit: 4-22-19   Date of Next Office Visit: none, scheduling will contact patient  No show since last appointment 8-19-19  Cancellations since last visit: 0  ED visits since last visit:  0  Medication remeron date last ordered: 8-15-19  Qty: 31  Refills: 2  Lapse in therapy greater than 7 days: no  Medication refill request verified as identical to current order: yes  Result of Last DAM, VPA, Li+ Level, CBC, or Carbamazepine Level (at or since last visit): N/A     [] Medication refilled per [facility] M-1.   [x] Medication unable to be refilled by RN due to criteria not met as indicated below:     []Eligibility - not seen in last year    []Supervision - no future appointment    []Compliance     []Verification - order discrepancy    []Controlled Medication    []Medication not included in RN Protocol    []90 - day supply request    []Other   Current Medication list:    acetaminophen (TYLENOL) 325 MG tablet Take 650 mg by mouth 4 (four) times a day.   aspirin 81 mg chewable tablet Chew 81 mg daily.   carbidopa-levodopa (SINEMET)  mg per tablet Take 2 tablets by mouth 3 (three) times a day.   cholecalciferol, vitamin D3, 1,000 unit tablet Take 1,000 Units by mouth daily.   cranberry 500 mg cap Take 500 mg by mouth 2 (two) times a day.   cyanocobalamin (VITAMIN B-12) 100 MCG tablet Take 100 mcg by mouth daily.   lidocaine (LIDODERM) 5 % Place 1 patch on the skin daily. Remove & Discard patch within 12 hours or as directed by MD   LORazepam (ATIVAN) 0.5 MG tablet Take 0.5 mg by mouth 2 (two) times a day.    magnesium oxide (MAG-OX) 400 mg tablet Take 400 mg by mouth 2 (two) times a day.   metoprolol tartrate (LOPRESSOR) 25 MG tablet Take 12.5 mg by mouth daily.   mirtazapine (REMERON) 30 MG tablet TAKE 1 TABLET BY MOUTH AT BEDTIME   multivitamin (MULTIVITAMIN) per tablet Take 1 tablet by mouth daily.   nitroglycerin (NITROSTAT) 0.4 MG SL tablet Place 0.4 mg under the tongue every 5 (five) minutes as  needed.    polyethylene glycol (MIRALAX) 17 gram packet Take 17 g by mouth daily.   pramipexole (MIRAPEX) 0.5 MG tablet Take 0.5 mg by mouth bedtime.   psyllium (METAMUCIL) powder Take by mouth every morning. 1 PKT   senna-docusate (PERICOLACE) 8.6-50 mg tablet Take 2 tablets by mouth 2 (two) times a day.   solifenacin (VESICARE) 10 MG tablet Take 10 mg by mouth daily.   sorbitol 70 % solution Take 30 mL by mouth daily.   traZODone (DESYREL) 150 MG tablet TAKE 1 TABLET BY MOUTH AT BEDTIME. *1 TOTAL FILL*   venlafaxine (EFFEXOR-XR) 75 MG 24 hr capsule TAKE 3 CAPSULES (225 MG TOTAL) BY MOUTH ONCE DAILY       Medication Plan of Care at last office visit with MD/CNP:  Plan:  Medication Adjustment:  We will decrease the patient's Remeron down to 30 mg a day.  We are going to decrease the patient's Effexor XR to 225 mg a day.  Staff will monitor for any changes in in 1-2 months will call us for possible further reduction in these medications and attempt to see how the patient does as well as consideration for alternative medication options.     Other: Patient will return to clinic in 3 months for further assessment and treatment the staff and patient agreed to return sooner or call if questions concerns or problems arise.     Continue with the support of the clinic, reassurance, and redirection. Staff monitoring and ongoing assessments per team plan. Current psychotropic medication appears to represent the minimum effective dosage and appears medically necessary. We will continue to monitor and reassess. This team will utilize appropriate emergency services if necessary. I will make myself available if concerns or problems arise.     Norberto Johnson MD

## 2021-06-04 VITALS
BODY MASS INDEX: 22.48 KG/M2 | HEART RATE: 64 BPM | DIASTOLIC BLOOD PRESSURE: 67 MMHG | RESPIRATION RATE: 16 BRPM | OXYGEN SATURATION: 95 % | SYSTOLIC BLOOD PRESSURE: 124 MMHG | TEMPERATURE: 98 F | HEIGHT: 70 IN | WEIGHT: 157 LBS

## 2021-06-04 VITALS — HEIGHT: 71 IN | BODY MASS INDEX: 25.66 KG/M2

## 2021-06-04 VITALS
TEMPERATURE: 97 F | SYSTOLIC BLOOD PRESSURE: 109 MMHG | HEART RATE: 63 BPM | WEIGHT: 144 LBS | OXYGEN SATURATION: 94 % | RESPIRATION RATE: 18 BRPM | BODY MASS INDEX: 20.66 KG/M2 | DIASTOLIC BLOOD PRESSURE: 70 MMHG

## 2021-06-04 VITALS
RESPIRATION RATE: 16 BRPM | TEMPERATURE: 97 F | HEART RATE: 73 BPM | BODY MASS INDEX: 22.48 KG/M2 | OXYGEN SATURATION: 99 % | DIASTOLIC BLOOD PRESSURE: 67 MMHG | HEIGHT: 70 IN | SYSTOLIC BLOOD PRESSURE: 115 MMHG | WEIGHT: 157 LBS

## 2021-06-04 VITALS
WEIGHT: 166.4 LBS | TEMPERATURE: 97.8 F | HEART RATE: 65 BPM | BODY MASS INDEX: 23.88 KG/M2 | SYSTOLIC BLOOD PRESSURE: 122 MMHG | DIASTOLIC BLOOD PRESSURE: 68 MMHG

## 2021-06-04 VITALS
DIASTOLIC BLOOD PRESSURE: 79 MMHG | HEART RATE: 61 BPM | TEMPERATURE: 96.4 F | SYSTOLIC BLOOD PRESSURE: 128 MMHG | BODY MASS INDEX: 23.88 KG/M2 | WEIGHT: 166.4 LBS

## 2021-06-04 VITALS
SYSTOLIC BLOOD PRESSURE: 99 MMHG | HEART RATE: 80 BPM | TEMPERATURE: 99 F | WEIGHT: 174.5 LBS | DIASTOLIC BLOOD PRESSURE: 63 MMHG | BODY MASS INDEX: 25.04 KG/M2

## 2021-06-04 VITALS
BODY MASS INDEX: 25.66 KG/M2 | DIASTOLIC BLOOD PRESSURE: 69 MMHG | SYSTOLIC BLOOD PRESSURE: 116 MMHG | HEART RATE: 72 BPM | HEIGHT: 71 IN

## 2021-06-04 VITALS
WEIGHT: 160 LBS | TEMPERATURE: 97 F | SYSTOLIC BLOOD PRESSURE: 136 MMHG | HEART RATE: 67 BPM | OXYGEN SATURATION: 94 % | BODY MASS INDEX: 22.96 KG/M2 | RESPIRATION RATE: 17 BRPM | DIASTOLIC BLOOD PRESSURE: 70 MMHG

## 2021-06-04 VITALS
HEART RATE: 71 BPM | SYSTOLIC BLOOD PRESSURE: 109 MMHG | HEIGHT: 71 IN | DIASTOLIC BLOOD PRESSURE: 57 MMHG | BODY MASS INDEX: 25.66 KG/M2

## 2021-06-04 NOTE — PROGRESS NOTES
Correct pharmacy verified with patient and confirmed in snapshot? [x] yes []no    Charge captured ? [x] yes  [] no    Medications Phoned  to Pharmacy [] yes [x]no  Name of Pharmacist:  List Medications, including dose, quantity and instructions      Medication Prescriptions given to patient   [] yes  [x] no   List the name of the drug the prescription was written for.       Medications ordered this visit were e-scribed.  Verified by order class [x] yes  [] no  Effexor- mg; Remeron 45 mg; trazodone 150 mg   Medication changes or discontinuations were communicated to patient's pharmacy: [] yes  [x] no    UA collected [] yes  [x] no    [place] Prescription Monitoring Program Reviewed? [x] yes  [] no    Referrals were made to:  None    Future appointment was made: [] yes  [x] no  Staff to call and schedule at later date  Dictation completed at time of chart check: [x] yes  [] no    I have checked the documentation for today s encounters and the above information has been reviewed and completed.

## 2021-06-04 NOTE — PROGRESS NOTES
"Outpatient Followup Psychiatric Evaluation      Pertinent History: Patient presents today for the purposes of medication management.  She has a history of a mood disorder and also with prior psychotic symptoms.  Per the patient's request we have been tapering the Zyprexa and that was discontinued in 2017.    She was restarted on the Remeron earlier in 2018.  I saw the patient in the summer 2018 and did not make any medication changes.  In September 2018 we did increase the patient's Remeron to 45 mg at night.    I saw the patient in January 2019.  At that time she was continuing to struggle with isolation and hoarding behaviors and was extremely anxious.  She was having some bedwetting issues that have been falling more seeming perhaps to be overmedicated.  There had not been any improvement with the increase in Remeron.  At that visit we did decrease the Effexor and the Remeron.    I saw the patient in the spring 2019 and at that time she was doing fairly well.  We elected to try a medication taper and we decreased her Effexor XR to 225 mg a day and decrease the Remeron down to 30 mg at night.    Current Symptoms:   The patient presents today with worsening behavioral issues and probable increasing depression.  She has been refusing day programming.  There is been an increase in her SIBs.  She is more defiant she is eating less.  Poor hygiene and refusals to exercise.      The patient was a vague historian.  She admits she is more depressed and frustrated.  She is vague on reasons why.  The staff reaffirm the above-mentioned information but thought that perhaps it was \"just the patient\".  They were not sure it was related to the medication change.  Their recollection as we decrease the medication because the patient was having a bit more difficulty ambulating but now she is wheelchair-bound to to a broken foot.  They report her SIBs consist of \"picking\" at her skin.  I am not sure this is self-injurious behavior " but that has increased as her apparent depression is increased.  They have not noted any psychosis and the patient denies such.  The patient denies having any suicidality.  No diagnoses or treatments.  No new allergies.  I do talk with them about the possibility of going back up on the medication to where she was when she was doing well and both agree.  Risks and benefits were discussed.  They will watch for any unsteadiness.      Current Medications: Please see chart    Medication Compliance: Yes    Side Effects to Medications: No      Vitals:  Wt Readings from Last 3 Encounters:   04/22/19 180 lb (81.6 kg)   03/16/18 197 lb (89.4 kg)   03/14/18 197 lb (89.4 kg)     Temp Readings from Last 3 Encounters:   06/11/18 97.8  F (36.6  C) (Oral)   03/19/18 98  F (36.7  C)   03/16/18 97.8  F (36.6  C)     BP Readings from Last 3 Encounters:   12/16/19 116/69   04/22/19 107/61   01/21/19 117/62     Pulse Readings from Last 3 Encounters:   12/16/19 72   04/22/19 68   01/21/19 74         Mental Status Exam:   Appearance: The patient appears flat and slow.  Sitting up in a wheelchair.  Limited initiation and limited eye.  The patient appears depressed.  Behavior: Limited initiation.  Flat slow and disinterested.  Not agitated or threatening.  No current restlessness.  Speech: Shaking and nodding her head but no initiation.  Vague but appropriate and consistent responses.  Mood/Affect: Patient appears depressed, flat and slow.  Not currently with any manic symptoms.  No significant anxiety or agitation.  Not irritable or labile.  Thought Content: No reports of any recent psychosis.  No evidence of psychosis on exam.  Suicidal or Homicidal Thoughts: None reported or apparent.  Thought Process/Formulation: [place] and slow.  No racing thoughts.  Limited effort however.  Patient needs prompts to participate.  Associations: Limited effort.  Fair.  Fund of Knowledge: Limited effort and participation.  No obvious significant  change.  Attention/Concentration: Patient needs prompts to participate.  Overall with limited effort and disinterested.  Tracking some conversation.  Insight: No obvious change.  Limited effort.  Judgement: No obvious change.  Limited effort.  Memory:  Limited participation.  No obvious significant change.  Disinterested and slow.  Motor Status:  Limited participation. No current tremor.  Orientation: Limited effort and participation.  No apparent recent change..    Diagnosis managed and treated at today's visit :      Major depressive disorder   History of psychosis NOS    Plan:  Medication Adjustment:  I going to increase the Remeron back up to 45 mg a day and increase the Effexor XR up to 300 mg a day again.  They will let us know if there is no improvement in the next month or so.    Other: Patient will return to clinic in 3 months for further assessment and treatment the staff and patient agreed to return sooner or call if questions concerns or problems arise.    Continue with the support of the clinic, reassurance, and redirection. Staff monitoring and ongoing assessments per team plan. Current psychotropic medication appears to represent the minimum effective dosage and appears medically necessary. We will continue to monitor and reassess. This team will utilize appropriate emergency services if necessary. I will make myself available if concerns or problems arise.    Norberto Johnson MD

## 2021-06-05 VITALS
BODY MASS INDEX: 24.22 KG/M2 | SYSTOLIC BLOOD PRESSURE: 128 MMHG | DIASTOLIC BLOOD PRESSURE: 76 MMHG | WEIGHT: 168.8 LBS | TEMPERATURE: 98.5 F | HEART RATE: 57 BPM

## 2021-06-06 NOTE — TELEPHONE ENCOUNTER
RECEIVED MEDICAL UPDATE TO BE REVIEWED BY PROVIDER AT NEXT SCHEDULED APPOINTMENT OF 3- - PLACED IN PROVIDER'S CLINIC MAILBOX

## 2021-06-06 NOTE — PROGRESS NOTES
Correct pharmacy verified with patient and confirmed in snapshot? [x] yes []no    Charge captured ? [x] yes  [] no    Medications Phoned  to Pharmacy [] yes [x]no  Name of Pharmacist:  List Medications, including dose, quantity and instructions      Medication Prescriptions given to patient   [] yes  [x] no   List the name of the drug the prescription was written for.       Medications ordered this visit were e-scribed.  Verified by order class [x] yes  [] no  Effexor- mg; Remeron 45 mg; Wellbutrin  mg   Medication changes or discontinuations were communicated to patient's pharmacy: [] yes  [x] no    UA collected [] yes  [x] no    [place] Prescription Monitoring Program Reviewed? [x] yes  [] no    Referrals were made to:   None    Future appointment was made: [x] yes  [] no  5/11/2020  Dictation completed at time of chart check: [x] yes  [] no    I have checked the documentation for today s encounters and the above information has been reviewed and completed.

## 2021-06-06 NOTE — PROGRESS NOTES
Outpatient Followup Psychiatric Evaluation      Pertinent History: Patient presents today for the purposes of medication management.  She has a history of a mood disorder and also with prior psychotic symptoms.  Per the patient's request we have been tapering the Zyprexa and that was discontinued in 2017.    She was restarted on the Remeron earlier in 2018.  I saw the patient in the summer 2018 and did not make any medication changes.  In September 2018 we did increase the patient's Remeron to 45 mg at night.    I saw the patient in January 2019.  At that time she was continuing to struggle with isolation and hoarding behaviors and was extremely anxious.  She was having some bedwetting issues that have been falling more seeming perhaps to be overmedicated.  There had not been any improvement with the increase in Remeron.  At that visit we did decrease the Effexor and the Remeron.    I saw the patient in the spring 2019 and at that time she was doing fairly well.  We elected to try a medication taper and we decreased her Effexor XR to 225 mg a day and decrease the Remeron down to 30 mg at night.    I saw the patient in December 2019.  She was having increased depression with some worsening behavioral issues.  She had been refusing day programming and there was an increase in her SIBs.  She was more defiant and eating less.  She had a decline in her hygiene.  We did increase both the Remeron and Effexor at that time.    Current Symptoms:   The patient reports with to staff today.  Both report the patient is been flat and slow with no significant improvement.  They report that all though their objective scores do not show any significant change or problems they feel that the staff filling those out have adjusted to the patient's new normal which is low motivation low energy and low participation.  They also bring forth information from the primary care doctor when she filled out the PHQ 9 there the score was 14 and she  endorsed suicidality.  I asked the patient about this she states she is not suicidal anymore and was unsure if she was ever suicidal.  She is able to contract for safety.  She admits she is frustrated.    In talking with staff they report there is been a lot of staff turnover in the quality of interactions with staff have diminished quite a bit.  They do wonder whether that may be contributing.  There is been no change in cognition.  They were wondering about a neuropsych but at this point with the superimposed depression and no other clinical indications I do not know that that will be useful.  We can consider that in the future.  They also wanted to know if she should be admitted to the hospital for a period of time so they could do more observation.  Again, I do not think that that is clinically indicated at this time and I explained to them that I thought doing medication changes in her home environment with staff that know her may actually be more beneficial.    There is been no psychosis.  No self-injurious behaviors.  No other new medical issues although the patient has had some leg pain from a joint injury.  She will be following up with orthopedics in the near future.  Risks and benefits of the medication changes were discussed.  The patient has been on Wellbutrin in the distant past and felt that was helpful.  Although it shows that she had some GI questionable issues with that      Current Medications: Please see chart    Medication Compliance: Yes    Side Effects to Medications: No      Vitals:  Wt Readings from Last 3 Encounters:   04/22/19 180 lb (81.6 kg)   03/16/18 197 lb (89.4 kg)   03/14/18 197 lb (89.4 kg)     Temp Readings from Last 3 Encounters:   06/11/18 97.8  F (36.6  C) (Oral)   03/19/18 98  F (36.7  C)   03/16/18 97.8  F (36.6  C)     BP Readings from Last 3 Encounters:   12/16/19 116/69   04/22/19 107/61   01/21/19 117/62     Pulse Readings from Last 3 Encounters:   12/16/19 72   04/22/19  68   01/21/19 74         Mental Status Exam:   Appearance: Patient appears with limited effort in participation.  Limited eye contact.  Sitting up in a wheelchair.  Slow and flat.  No obvious pain or shortness of breath.  Behavior: Flat and disinterested with limited initiation.  Not currently agitated.  No current restlessness.  Speech: Rare slow soft-spoken and monotone.  Mood/Affect: Depressed and flat.  Slow with limited participation.  Not currently labile, anxious or agitated.  Limited effort however.  Thought Content: No reports of psychosis recently.  No current evidence of psychosis but limited participation.  Suicidal or Homicidal Thoughts:  None apparent or reported.   Thought Process/Formulation: Limited effort.  Slow with a delay.  No obvious racing thoughts.  Associations: [place] and slow with limited effort.  No obvious racing thoughts.    Fund of Knowledge: Please see the therapy notes.  Limited effort in participation at this time.  Attention/Concentration: Limited effort.  Limited attention and participation.    Insight:  Limited effort.  No obvious significant recent change however.  Continues impaired.  Judgement:  Limited effort.  Continues impaired.  Memory:  Limited participation. Slow. Disinterested.    Motor Status:  Limited participation. No current tremor.  Orientation: No reports of any recent change.  Limited effort at this time.      Diagnosis managed and treated at today's visit :      Major depressive disorder   History of psychosis NOS    Plan:  Medication Adjustment:  I have added Wellbutrin  mg a day.  Risks and benefits were discussed.    Other: Patient will return to clinic in 2 months for further assessment and treatment the staff and patient agreed to return sooner or call if questions concerns or problems arise.  Did discuss trying to program more activities for the patient and to keep her busier during the day.  They will attempt to do that.    Continue with the  support of the clinic, reassurance, and redirection. Staff monitoring and ongoing assessments per team plan. Current psychotropic medication appears to represent the minimum effective dosage and appears medically necessary. We will continue to monitor and reassess. This team will utilize appropriate emergency services if necessary. I will make myself available if concerns or problems arise.    Norberto Johnson MD

## 2021-06-08 NOTE — TELEPHONE ENCOUNTER
Writer calling to reschedule patient's appt for 7.13 with Dr Johnson to following week. This patient was scheduled as office visit not telephone visit in epic but staff had been told telephone visit by [facility] on 5.15. when appt was last scheduled.       Changed to phone follow up for 7.20.2020 with Dr Johnson however, Rissa the  wanted RN to call back as this patient has been having lots of mood issues and SI. Patient is currently in ER @ [facility] for chest pains/ breathing so staff is wondering if she can possibly have mental health assessment there since the Covid19 crisis has been the reason she could not be seen until now for her mental health [phone]

## 2021-06-08 NOTE — PROGRESS NOTES
"[facility]   Video Visit    Code Status: DNR    Alison Bashir is a 69 y.o. female who is being evaluated via a billable video visit.      The patient has been notified of following:     \"This video visit will be conducted via a call between you and your physician/provider. We have found that certain health care needs can be provided without the need for an in-person physical exam.  This service lets us provide the care you need with a video conversation.  If a prescription is necessary we can send it to the facility team.  If lab work is needed we can place an order through the facility team to have that test done at a later time.    If during the course of the call the physician/provider feels a video visit is not appropriate, you will not be charged for this service.\"     Physician/Provider has received verbal consent for a Video Visit from the patient? Yes    Patient would like the video invitation sent by: Send to: [facility]    Video Start Time: 10.10am    Chief Complaint/Reason for Visit:  Chief Complaint   Patient presents with     H & P       HPI:   Alison is a 69 y.o. female who is admitted to the TCU.  Patient was admitted in the hospital with chest pain.  Cardiology was consulted.  Due to underlying history of CAD and stenting she underwent a nuclear stress test which was unrevealing for any reversible ischemia  She has been discharged on medical management.    She was noted to have profound hypotension with bradycardia.  She has been started on midodrine for blood pressure management.  Unfortunately blood pressures remain low.  They recommended outpatient follow-up and her beta-blockers were held in the hospital.    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    She also has a history of falls and has been discharged to the TCU for strengthening and rehab    I have reviewed and updated the patient's Past Medical History, including history of traumatic brain injury hypertension and coronary artery disease  Social History,lives in a group home; no smoking or etoh   Family History includes cancer in her mother; father had cad and chf   and Medication List.    ALLERGIES  Blood-group specific substance; Fd and c no.5 (tartrazine); Ibuprofen; and Morphine    Review of Systems  Constitutional: Negative.  Negative for fever, chills, she has activity change, appetite change and fatigue.   Loss of 50lb in last few months  HENT: Negative for congestion and facial swelling.    Eyes: Negative for photophobia, redness and visual disturbance.   Respiratory: Negative for cough and chest tightness.    Cardiovascular: Negative for chest pain, palpitations and leg swelling.   Gastrointestinal: Negative for nausea, diarrhea, constipation, blood in stool and abdominal distention.   Genitourinary: Has chronic incontinence due to overactive bladder    Musculoskeletal: Has gait instability and falls requires assistance with ADLs  Skin: Negative.    Neurological: Negative for dizziness, tremors, syncope, weakness, light-headedness and headaches.   Hematological: Does not bruise/bleed easily.   Psychiatric/Behavioral: Negative.          Physical exam  Wt 197lb  Bp113/60 t98 p70  GENERAL: no acute distress. Cooperative in conversation.   Appears very frail and weak  HEENT: pupils are equal, round and reactive. Oral mucosa is moist and intact.  RESP:Chest symmetric. Regular respiratory rate. No stridor.  ABD: Nondistended, soft.  EXTREMITIES: No lower extremity edema.  Range of movement in the legs especially right lower extremity is quite limited  NEURO: non focal. Alert and oriented x3.  Recall is impaired  PSYCH: within normal  limits. No depression or anxiety.  SKIN: warm dry intact         Medication List:  Current Outpatient Medications   Medication Sig     acetaminophen (TYLENOL) 500 MG tablet Take 500 mg by mouth every 6 (six) hours as needed for pain.     aspirin 81 mg chewable tablet Chew 81 mg daily.     atorvastatin (LIPITOR) 40 MG tablet Take 40 mg by mouth every evening.      bacitracin 500 unit/gram ointment Apply 1 application topically as needed.     buPROPion (WELLBUTRIN XL) 300 MG 24 hr tablet Take 300 mg by mouth every morning.      carbidopa-levodopa (SINEMET)  mg per tablet Take 2 tablets by mouth 3 (three) times a day.     cholecalciferol, vitamin D3, 1,000 unit tablet Take 4,000 Units by mouth daily.      cranberry 500 mg cap Take 500 mg by mouth 2 (two) times a day.     cyanocobalamin (VITAMIN B-12) 100 MCG tablet Take 100 mcg by mouth daily.      doxycycline (VIBRA-TABS) 100 MG tablet Take 1 tablet (100 mg total) by mouth 2 (two) times a day for 7 days.     furosemide (LASIX) 20 MG tablet 20 mg daily.      ketoconazole (NIZORAL) 2 % cream 1 application daily. Apply to clean and dry stomach fold     lansoprazole (PREVACID) 30 MG capsule Take 30 mg by mouth daily.      magnesium oxide (MAG-OX) 400 mg tablet Take 400 mg by mouth 2 (two) times a day.     midodrine (PROAMATINE) 5 MG tablet Take 1 tablet (5 mg total) by mouth 3 (three) times a day with meals.     mirtazapine (REMERON) 45 MG tablet TAKE 1 TABLET BY MOUTH AT BEDTIME  *1 TOTAL FILL*     multivitamin (MULTIVITAMIN) per tablet Take 1 tablet by mouth daily.     nitroglycerin (NITROSTAT) 0.4 MG SL tablet Place 0.4 mg under the tongue every 5 (five) minutes as needed.      nystatin (MYCOSTATIN) powder Apply 1 application topically daily.     nystatin (MYCOSTATIN) powder Apply 1 application topically 2 (two) times a day as needed.     polyethylene glycol (MIRALAX) 17 gram packet Take 17 g by mouth daily.     pramipexole (MIRAPEX) 0.25 MG tablet Take 1 tablet  (0.25 mg total) by mouth at bedtime.     solifenacin (VESICARE) 10 MG tablet Take 10 mg by mouth daily.     traZODone (DESYREL) 150 MG tablet TAKE 1 TABLET BY MOUTH AT BEDTIME. *1 TOTAL FILL*     venlafaxine (EFFEXOR-XR) 150 MG 24 hr capsule TAKE 2 CAPSULES (300MG) BY MOUTH ONCE DAILY *1 TOTAL FILL*       Labs:  Recent Results (from the past 240 hour(s))   ECG 12 lead nursing unit performed   Result Value Ref Range    SYSTOLIC BLOOD PRESSURE 110 mmHg    DIASTOLIC BLOOD PRESSURE 58 mmHg    VENTRICULAR RATE 71 BPM    ATRIAL RATE 71 BPM    P-R INTERVAL 162 ms    QRS DURATION 160 ms    Q-T INTERVAL 448 ms    QTC CALCULATION (BEZET) 486 ms    P Axis 64 degrees    R AXIS 12 degrees    T AXIS 46 degrees    MUSE DIAGNOSIS       Normal sinus rhythm  Right bundle branch block  Abnormal ECG  When compared with ECG of 05-MAY-2014 10:14,  Right bundle branch block is now Present  Confirmed by SEE ED PROVIDER NOTE FOR, ECG INTERPRETATION ([id number]),  DELISA LANDEROS ([id number]) on 6/9/2020 7:24:34 AM     Basic metabolic panel   Result Value Ref Range    Sodium 144 136 - 145 mmol/L    Potassium 4.7 3.5 - 5.0 mmol/L    Chloride 104 98 - 107 mmol/L    CO2 31 22 - 31 mmol/L    Anion Gap, Calculation 9 5 - 18 mmol/L    Glucose 98 70 - 125 mg/dL    Calcium 9.1 8.5 - 10.5 mg/dL    BUN 29 (H) 8 - 28 mg/dL    Creatinine 0.78 0.60 - 1.10 mg/dL    GFR MDRD Af Amer >60 >60 mL/min/1.73m2    GFR MDRD Non Af Amer >60 >60 mL/min/1.73m2   B-type natriuretic peptide   Result Value Ref Range    BNP 80 0 - 120 pg/mL   Troponin I   Result Value Ref Range    Troponin I 0.01 0.00 - 0.29 ng/mL   D-dimer, Quantitative   Result Value Ref Range    D-Dimer, Quant 0.37 <=0.50 FEU ug/mL   APTT   Result Value Ref Range    PTT 29 24 - 37 seconds   Protime-INR   Result Value Ref Range    INR 1.10 0.90 - 1.10   CBC   Result Value Ref Range    WBC 3.7 (L) 4.0 - 11.0 thou/uL    RBC 4.00 3.80 - 5.40 mill/uL    Hemoglobin 11.4 (L) 12.0 - 16.0 g/dL    Hematocrit 36.2  35.0 - 47.0 %    MCV 91 80 - 100 fL    MCH 28.5 27.0 - 34.0 pg    MCHC 31.5 (L) 32.0 - 36.0 g/dL    RDW 14.3 11.0 - 14.5 %    Platelets 146 140 - 440 thou/uL    MPV 10.7 8.5 - 12.5 fL   Lactic Acid   Result Value Ref Range    Lactic Acid 0.6 0.5 - 2.2 mmol/L   COVID-19 Virus PCR MRF    Specimen: Respiratory   Result Value Ref Range    COVID-19 VIRUS SPECIMEN SOURCE Nasopharyngeal     2019-nCOV       Test received-See reflex to IDDL test SARS CoV2 (COVID-19) Virus RT-PCR   SARS-CoV-2 (COVID-19) RT-PCR-IDDL    Specimen: Respiratory   Result Value Ref Range    SARS-CoV-2 Virus Specimen Source Nasopharyngeal     SARS-CoV-2 PCR Result NEGATIVE     SARS-COV-2 PCR COMMENT       This automated, real-time RT-PCR assay by [employer] on the [employer] Instrument   Troponin I   Result Value Ref Range    Troponin I <0.01 0.00 - 0.29 ng/mL   Troponin I   Result Value Ref Range    Troponin I <0.01 0.00 - 0.29 ng/mL   Comprehensive Metabolic Panel   Result Value Ref Range    Sodium 142 136 - 145 mmol/L    Potassium 4.1 3.5 - 5.0 mmol/L    Chloride 105 98 - 107 mmol/L    CO2 34 (H) 22 - 31 mmol/L    Anion Gap, Calculation 3 (L) 5 - 18 mmol/L    Glucose 92 70 - 125 mg/dL    BUN 27 8 - 28 mg/dL    Creatinine 0.75 0.60 - 1.10 mg/dL    GFR MDRD Af Amer >60 >60 mL/min/1.73m2    GFR MDRD Non Af Amer >60 >60 mL/min/1.73m2    Bilirubin, Total 0.3 0.0 - 1.0 mg/dL    Calcium 8.2 (L) 8.5 - 10.5 mg/dL    Protein, Total 5.2 (L) 6.0 - 8.0 g/dL    Albumin 3.0 (L) 3.5 - 5.0 g/dL    Alkaline Phosphatase 79 45 - 120 U/L    AST 12 0 - 40 U/L    ALT <9 0 - 45 U/L   HM1 (CBC with Diff)   Result Value Ref Range    WBC 4.2 4.0 - 11.0 thou/uL    RBC 3.68 (L) 3.80 - 5.40 mill/uL    Hemoglobin 10.4 (L) 12.0 - 16.0 g/dL    Hematocrit 33.7 (L) 35.0 - 47.0 %    MCV 92 80 - 100 fL    MCH 28.3 27.0 - 34.0 pg    MCHC 30.9 (L) 32.0 - 36.0 g/dL    RDW 14.6 (H) 11.0 - 14.5 %    Platelets 127 (L) 140 - 440 thou/uL    MPV 10.6 8.5 - 12.5 fL    Neutrophils % 50 50 - 70 %     Lymphocytes % 40 20 - 40 %    Monocytes % 7 2 - 10 %    Eosinophils % 3 0 - 6 %    Basophils % 0 0 - 2 %    Neutrophils Absolute 2.1 2.0 - 7.7 thou/uL    Lymphocytes Absolute 1.7 0.8 - 4.4 thou/uL    Monocytes Absolute 0.3 0.0 - 0.9 thou/uL    Eosinophils Absolute 0.1 0.0 - 0.4 thou/uL    Basophils Absolute 0.0 0.0 - 0.2 thou/uL   Echo Complete   Result Value Ref Range    LV volume diastolic 89.3 46 - 106 cm3    LV volume systolic 31.3 14 - 42 cm3    HR 79 bpm    IVSd 1.43 (!) 0.6 - 0.9 cm    LVIDd 4.68 3.8 - 5.2 cm    LVIDs 2.48 2.2 - 3.5 cm    LVOT diam 2.1 cm    LVOT mean gradient 1 mmHg    LVOT peak VTI 19.5 cm    LVOT mean tali 54 cm/s    LVOT peak tali 73.9 cm/s    LVOT peak gradient 2 mmHg    LV PWd 1.36 (!) 0.6 - 0.9 cm    MV E' lat tali 10.5 cm/s    MV E' med tali 7.54 cm/s    AR decel slope 2,260 mm/s2    AR p 1/2 time 467 ms    AR peak tali 358 cm/s    AO root 3.6 cm    LA size 3.8 cm    LA length 3.7 cm    MV decel slope 4,520 mm/s2    MV decel time 201 ms    MV P 1/2 time 59 ms    MV peak A tali 96 cm/s    MV peak E tali 90.7 cm/s    RVIDd 3.29 cm    TR peak tali 238 cm/s    LA area 2 21.4 cm2    LA area 1 14.8 cm2    BSA 1.89 m2    Hieght 71 in    Weight 2,505.6 lbs    BP 90/71 mmHg    IVS/PW ratio 1.1     TR peak gradent 22.7 mmHg    LV FS 47.0 28 - 44 %    Echo LVEF calculated 65 55 - 75 %    LA volume 72.8 mL    LV mass 262.1 g    MV area p 1/2 time 3.7 cm2    MV E/A Ratio 0.9     LVOT area 3.46 cm2    LVOT SV 67.5 cm3    LV systolic volume index 16.6 8 - 24 cm3/m2    LV diastolic volume index 47.2 29 - 61 cm3/m2    LA volume index 38.5 mL/m2    LV mass index 138.7 g/m2    LV SVi 35.7 ml/m2    TAPSE 2.1 cm    MV med E/e' ratio 12.0     MV lat E/e' ratio 8.6     LV CO 5.3 l/min    LV Ci 2.8 l/min/m2    Height 71.0 in    Weight 157 lbs    MV Avg E/e' Ratio 10.1 cm/s    AR peak gradient 51.3 mmHg   Comprehensive Metabolic Panel   Result Value Ref Range    Sodium 141 136 - 145 mmol/L    Potassium 4.2 3.5 - 5.0  mmol/L    Chloride 105 98 - 107 mmol/L    CO2 34 (H) 22 - 31 mmol/L    Anion Gap, Calculation 2 (L) 5 - 18 mmol/L    Glucose 99 70 - 125 mg/dL    BUN 24 8 - 28 mg/dL    Creatinine 0.74 0.60 - 1.10 mg/dL    GFR MDRD Af Amer >60 >60 mL/min/1.73m2    GFR MDRD Non Af Amer >60 >60 mL/min/1.73m2    Bilirubin, Total 0.3 0.0 - 1.0 mg/dL    Calcium 7.9 (L) 8.5 - 10.5 mg/dL    Protein, Total 5.1 (L) 6.0 - 8.0 g/dL    Albumin 2.8 (L) 3.5 - 5.0 g/dL    Alkaline Phosphatase 77 45 - 120 U/L    AST 11 0 - 40 U/L    ALT <9 0 - 45 U/L   HM1 (CBC with Diff)   Result Value Ref Range    WBC 4.0 4.0 - 11.0 thou/uL    RBC 3.85 3.80 - 5.40 mill/uL    Hemoglobin 10.8 (L) 12.0 - 16.0 g/dL    Hematocrit 35.6 35.0 - 47.0 %    MCV 93 80 - 100 fL    MCH 28.1 27.0 - 34.0 pg    MCHC 30.3 (L) 32.0 - 36.0 g/dL    RDW 14.6 (H) 11.0 - 14.5 %    Platelets 124 (L) 140 - 440 thou/uL    MPV 10.7 8.5 - 12.5 fL    Neutrophils % 57 50 - 70 %    Lymphocytes % 33 20 - 40 %    Monocytes % 6 2 - 10 %    Eosinophils % 3 0 - 6 %    Basophils % 0 0 - 2 %    Neutrophils Absolute 2.3 2.0 - 7.7 thou/uL    Lymphocytes Absolute 1.3 0.8 - 4.4 thou/uL    Monocytes Absolute 0.3 0.0 - 0.9 thou/uL    Eosinophils Absolute 0.1 0.0 - 0.4 thou/uL    Basophils Absolute 0.0 0.0 - 0.2 thou/uL   Urine culture    Specimen: Urine   Result Value Ref Range    Culture >100,000 col/ml Morganella morganii (!)     Culture 10,000-50,000 col/ml Escherichia coli (!)        Susceptibility    Escherichia coli - KULDEEP     Amoxicillin + Clavulanate <=4/2 Sensitive      Ampicillin <=4 Sensitive      Cefazolin <=1 Sensitive      Cefepime <=1 Sensitive      Ceftriaxone <=1 Sensitive      Ciprofloxacin >2 Resistant      Gentamicin <=2 Sensitive      Levofloxacin >4 Resistant      Meropenem <=0.25 Sensitive      Nitrofurantoin <=16 Sensitive      Tetracycline <=2 Sensitive      Tobramycin <=2 Sensitive      Trimethoprim + Sulfamethoxazole <=0.5 Sensitive     Morganella morganii - KULDEEP     Amoxicillin +  Clavulanate >16/8 Resistant      Ampicillin >16 Resistant      Cefazolin >16 Resistant      Cefepime <=1 Sensitive      Ceftriaxone <=1 Sensitive      Ciprofloxacin >2 Resistant      Gentamicin <=2 Sensitive      Levofloxacin >4 Resistant      Meropenem <=0.25 Sensitive      Nitrofurantoin 64 Resistant      Tetracycline <=2 Sensitive      Tobramycin <=2 Sensitive      Trimethoprim + Sulfamethoxazole >2/38 Resistant    Comprehensive Metabolic Panel   Result Value Ref Range    Sodium 142 136 - 145 mmol/L    Potassium 3.8 3.5 - 5.0 mmol/L    Chloride 108 (H) 98 - 107 mmol/L    CO2 31 22 - 31 mmol/L    Anion Gap, Calculation 3 (L) 5 - 18 mmol/L    Glucose 99 70 - 125 mg/dL    BUN 25 8 - 28 mg/dL    Creatinine 0.77 0.60 - 1.10 mg/dL    GFR MDRD Af Amer >60 >60 mL/min/1.73m2    GFR MDRD Non Af Amer >60 >60 mL/min/1.73m2    Bilirubin, Total 0.3 0.0 - 1.0 mg/dL    Calcium 7.8 (L) 8.5 - 10.5 mg/dL    Protein, Total 4.9 (L) 6.0 - 8.0 g/dL    Albumin 2.7 (L) 3.5 - 5.0 g/dL    Alkaline Phosphatase 82 45 - 120 U/L    AST 10 0 - 40 U/L    ALT <9 0 - 45 U/L   HM1 (CBC with Diff)   Result Value Ref Range    WBC 4.1 4.0 - 11.0 thou/uL    RBC 3.64 (L) 3.80 - 5.40 mill/uL    Hemoglobin 10.4 (L) 12.0 - 16.0 g/dL    Hematocrit 34.0 (L) 35.0 - 47.0 %    MCV 93 80 - 100 fL    MCH 28.6 27.0 - 34.0 pg    MCHC 30.6 (L) 32.0 - 36.0 g/dL    RDW 14.6 (H) 11.0 - 14.5 %    Platelets 127 (L) 140 - 440 thou/uL    MPV 10.8 8.5 - 12.5 fL    Neutrophils % 48 (L) 50 - 70 %    Lymphocytes % 40 20 - 40 %    Monocytes % 8 2 - 10 %    Eosinophils % 3 0 - 6 %    Basophils % 0 0 - 2 %    Neutrophils Absolute 2.0 2.0 - 7.7 thou/uL    Lymphocytes Absolute 1.7 0.8 - 4.4 thou/uL    Monocytes Absolute 0.3 0.0 - 0.9 thou/uL    Eosinophils Absolute 0.1 0.0 - 0.4 thou/uL    Basophils Absolute 0.0 0.0 - 0.2 thou/uL   ECG 12 lead MUSE   Result Value Ref Range    SYSTOLIC BLOOD PRESSURE      DIASTOLIC BLOOD PRESSURE      VENTRICULAR RATE 70 BPM    ATRIAL RATE 70 BPM     P-R INTERVAL 162 ms    QRS DURATION 162 ms    Q-T INTERVAL 438 ms    QTC CALCULATION (BEZET) 473 ms    P Axis 51 degrees    R AXIS -11 degrees    T AXIS 26 degrees    MUSE DIAGNOSIS       Sinus rhythm with occasional Premature ventricular complexes  Right bundle branch block  Abnormal ECG  When compared with ECG of 08-JUN-2020 10:29,  Premature ventricular complexes are now Present  Confirmed by TAWNYA BEAULIEU MD LOC:[facility] ([id number]) on 6/11/2020 1:28:16 PM     NM Pharmacologic Stress Test   Result Value Ref Range    Pharmacologic Protocol  Lexiscan     Test Type Pharmacological     Baseline HR 81     Baseline Systolic      Baseline Diastolic BP 53     Last Stress HR 87     Last Stress Systolic      Last Stress Diastolic BP 53     Target      PERCENT HR 85%     ST Deviation Elevation II 0.1mm     Deviation Time V2 -0.6mm     ST Elevation Amount II 2.0mm     ST Deviation Amount he V1 -2.1mm     Final Resting /55     Final Resting HR 86     Max Treadmill Speed 0.0     Max Treadmill Grade 0.0     Peak Systolic /44     Peak Diastolic /55     Max HR 89     Stress Phase Resting     Stress Resting Pt Position SUPINE     Current HR 82     Current /53     Stress Phase Stress     Stage Minute EXE 00:00     Exercise Stage STAGE 2     Current HR 81     Current /53     Stress Phase Stress     Stage Minute EXE 01:00     Exercise Stage STAGE 3     Current HR 80     Current /53     Stress Phase Stress     Stage Minute EXE 01:36     Exercise Stage STAGE 3     Current HR 85     Current /44     Stress Phase Stress     Stage Minute EXE 02:00     Exercise Stage STAGE 4     Current HR 85     Current /44     Stress Phase Stress     Stage Minute EXE 02:42     Exercise Stage STAGE 4     Current HR 86     Current BP 98/52     Stress Phase Stress     Stage Minute EXE 03:00     Exercise Stage STAGE 5     Current HR 87     Current BP 98/52     Stress Phase Stress     Stage Minute EXE  03:25     Exercise Stage STAGE 5     Current HR 87     Current /53     Stress Phase Stress     Stage Minute EXE 04:00     Exercise Stage STAGE 6     Current HR 87     Current /53     Stress Phase Stress     Stage Minute EXE 04:00     Exercise Stage STAGE 6     Current HR 87     Current /53     Stress Phase Recovery     Stage Minute REC 00:31     Exercise Stage Recovery     Current HR 88     Current /51     Stress Phase Recovery     Stage Minute REC 00:59     Exercise Stage Recovery     Current HR 87     Current /51     Stress Phase Recovery     Stage Minute REC 01:59     Exercise Stage Recovery     Current HR 86     Current /51     Stress Phase Recovery     Stage Minute REC 02:06     Exercise Stage Recovery     Current HR 86     Current /55     Stress Phase Recovery     Stage Minute REC 02:59     Exercise Stage Recovery     Current HR 88     Current /55     Stress Phase Recovery     Stage Minute REC 03:59     Exercise Stage Recovery     Current HR 86     Current /55     Stress Phase Recovery     Stage Minute REC 04:01     Exercise Stage Recovery     Current HR 86     Current /55     Calculated Percent HR 59 %    Rate Pressure Product 8,989.0     Left Ventricular EF 69 %   Basic Metabolic Panel   Result Value Ref Range    Sodium 142 136 - 145 mmol/L    Potassium 4.0 3.5 - 5.0 mmol/L    Chloride 106 98 - 107 mmol/L    CO2 32 (H) 22 - 31 mmol/L    Anion Gap, Calculation 4 (L) 5 - 18 mmol/L    Glucose 90 70 - 125 mg/dL    Calcium 8.0 (L) 8.5 - 10.5 mg/dL    BUN 25 8 - 28 mg/dL    Creatinine 0.69 0.60 - 1.10 mg/dL    GFR MDRD Af Amer >60 >60 mL/min/1.73m2    GFR MDRD Non Af Amer >60 >60 mL/min/1.73m2   HM2(CBC w/o Differential)   Result Value Ref Range    WBC 3.6 (L) 4.0 - 11.0 thou/uL    RBC 3.36 (L) 3.80 - 5.40 mill/uL    Hemoglobin 9.4 (L) 12.0 - 16.0 g/dL    Hematocrit 31.4 (L) 35.0 - 47.0 %    MCV 94 80 - 100 fL    MCH 28.0 27.0 - 34.0 pg    MCHC 29.9 (L)  32.0 - 36.0 g/dL    RDW 14.7 (H) 11.0 - 14.5 %    Platelets 112 (L) 140 - 440 thou/uL    MPV 11.0 8.5 - 12.5 fL   COVID-19 Virus PCR MRF    Specimen: Respiratory   Result Value Ref Range    COVID-19 VIRUS SPECIMEN SOURCE Nasopharyngeal     2019-nCOV       Test received-See reflex to IDDL test SARS CoV2 (COVID-19) Virus RT-PCR   SARS-CoV-2 (COVID-19) RT-PCR-IDDL    Specimen: Respiratory   Result Value Ref Range    SARS-CoV-2 Virus Specimen Source Nasopharyngeal     SARS-CoV-2 PCR Result NEGATIVE     SARS-COV-2 PCR COMMENT       The [employer] COVID-19 direct PCR assay by [employer] on the [facility] [employer] instrument       Assessment/Plan:  -History of chest pain/shortness of breath currently resolved  -Underlying history of coronary artery disease with stenting on medical management  -Profound hypotension with orthostatic hypotension  - Bradycardia  -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  She was admitted to the hospital and had extensive evaluation both by cardiology and neurology.  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  Mood and behaviors have been stable.  She is currently on Effexor Wellbutrin will monitor mood closely.  She is also had progressive weight loss and will be monitored for any dysphagia she is on Remeron.  Overall she is bedridden and remains  a high fall risk.  She does remain low and she is quite on a low-dose of metoprolol currently.  We will monitor her blood pressures and intake if she is continuing to shows  Continue with her PT OT and rehab  Neurology has recommended palliative approach in light of her overall guarded prognosis  Video-Visit Details    Type of service:  Video Visit    Video End Time (time video stopped): 10.35am    Originating Location (pt. Location):[facility] [[id number]]    Distant Location (provider location):  [facility]     Mode of Communication:  Zoom Video Conference        NERY Sosa

## 2021-06-08 NOTE — TELEPHONE ENCOUNTER
Spoke with patient. She reports that she hasn't noticed a difference yet with the increase to 300 mg of wellbutrin. Pt would like to continue taking it to see if it will help eventually. She has no other concerns at this time.

## 2021-06-08 NOTE — TELEPHONE ENCOUNTER
Returned call to Miky and she reported that patient was admitted yesterday and remains at the hospital. No updates noted.

## 2021-06-08 NOTE — PROGRESS NOTES
________________________________________  Medications Phoned  to Pharmacy [] yes [x]no  Name of Pharmacist:  List Medications, including dose, quantity and instructions    Medications ordered this visit were e-scribed.  Verified by order class [x] yes  [] no  Effexor- mg; Remeron 45 mg; trazodone 150 mg; Wellbutrin  mg  Medication changes or discontinuations were communicated to patient's pharmacy: [] yes  [x] no    Dictation completed at time of chart check: [x] yes  [] no    I have checked the documentation for today s encounters and the above information has been reviewed and completed.

## 2021-06-08 NOTE — PROGRESS NOTES
[facility] Admission note      Patient: Alison Bashir  MRN: [id number]  Date of Service: 6/17/2020      [facility] [[id number]]  Reason for Visit     Chief Complaint   Patient presents with     Review Of Multiple Medical Conditions   Evaluation for weakness and low blood pressures    Code Status     dnr    Assessment     -Persistent hypotension  -Underlying history of Parkinson's disease  -Cognitive impairment  - Generalized weakness quite profound with a history of falls  -Failure to thrive    Plan     Admitted to the TCU.  Blood pressures were reviewed and continue to run on the low side.  She has been taken off most of her antihypertensives now  She is on Midodrin for additional support.  Staff also advised to use compression hose and push fluids  Suspect some degree of deconditioning also contributes and she may need aggressive therapy.  Unfortunately she gets very anxious and her participation remains poor  At present therapy feels that she is testing cognitively much lower than last time and with her progressive weakness suspect that she may become wheelchair-bound.  She remains a high fall risk.  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 female who is admitted to TCU  Patient was evaluated due to persistent low blood pressure she was noted to have profound hypotension with bradycardia in the hospital and remains on midodrine for supplementation.  Unfortunately most of her antihypertensives have been discontinued nursing was advised to push fluids.  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  She also has a history of profound cognitive decline and is testing much worse cognitively than last time she was in the TCU as per my discussion with OT      Past Medical History     Active Ambulatory (Non-Hospital) Problems    Diagnosis     Acute hypotension     RBBB     Chest pain, unspecified     Leukopenia     Gastroesophageal reflux disease with esophagitis     Personal history of fall     Chest pain     Chronic pain syndrome     Traumatic brain injury (H)     Stented coronary artery     RLS (restless legs syndrome)     Overactive bladder     HTN (hypertension)     Depression     CAD (coronary artery disease)     Alzheimer disease (H)     Orthostatic hypotension     Anemia     Cognitive impairment     Hip fracture (H)     Pain management     Constipation     Essential hypertension     Neuroleptic signed 8/29/16     Past Medical History:   Diagnosis Date     Acute blood loss anemia      Alzheimer disease (H)      CAD (coronary artery disease)      Chronic pain syndrome      Dementia (H)      Depression      Depression      Hip fracture, right (H) 12/21/2017     HTN (hypertension)      Kidney stone      Overactive bladder      PMB (postmenopausal bleeding)      RLS (restless legs syndrome)      Spine pain      Stented coronary artery      Traumatic brain injury (H)      Unsteady gait      UTI (lower urinary tract infection)        Past Social History     Reviewed, and she  reports that she has never smoked. She has never used smokeless tobacco. She reports that she does not drink alcohol or use drugs.    Family History     Reviewed, and family history includes Cancer in her mother; Coronary artery disease in her father; Heart attack in her father; Heart failure in her father.    Medication List   Post Discharge Medication Reconciliation Status: discharge medications reconciled, continue medications  without change   Current Outpatient Medications on File Prior to Visit   Medication Sig Dispense Refill     acetaminophen (TYLENOL) 500 MG tablet Take 500 mg by mouth every 6 (six) hours as needed for pain.       aspirin 81 mg chewable tablet Chew 81 mg daily.       atorvastatin (LIPITOR) 40 MG tablet Take 40 mg by mouth every evening.        bacitracin 500 unit/gram ointment Apply 1 application topically as needed.       buPROPion (WELLBUTRIN XL) 300 MG 24 hr tablet Take 300 mg by mouth every morning.        carbidopa-levodopa (SINEMET)  mg per tablet Take 2 tablets by mouth 3 (three) times a day.       cholecalciferol, vitamin D3, 1,000 unit tablet Take 4,000 Units by mouth daily.        cranberry 500 mg cap Take 500 mg by mouth 2 (two) times a day.       cyanocobalamin (VITAMIN B-12) 100 MCG tablet Take 100 mcg by mouth daily.        doxycycline (VIBRA-TABS) 100 MG tablet Take 1 tablet (100 mg total) by mouth 2 (two) times a day for 7 days. 14 tablet 0     furosemide (LASIX) 20 MG tablet 20 mg daily.        ketoconazole (NIZORAL) 2 % cream 1 application daily. Apply to clean and dry stomach fold       lansoprazole (PREVACID) 30 MG capsule Take 30 mg by mouth daily.        magnesium oxide (MAG-OX) 400 mg tablet Take 400 mg by mouth 2 (two) times a day.       midodrine (PROAMATINE) 5 MG tablet Take 1 tablet (5 mg total) by mouth 3 (three) times a day with meals. 30 tablet 0     mirtazapine (REMERON) 45 MG tablet TAKE 1 TABLET BY MOUTH AT BEDTIME  *1 TOTAL FILL* 30 tablet 3     multivitamin (MULTIVITAMIN) per tablet Take 1 tablet by mouth daily.       nitroglycerin (NITROSTAT) 0.4 MG SL tablet Place 0.4 mg under the tongue every 5 (five) minutes as needed.        nystatin (MYCOSTATIN) powder Apply 1 application topically daily.       nystatin (MYCOSTATIN) powder Apply 1 application topically 2 (two) times a day as needed.       polyethylene glycol (MIRALAX) 17 gram packet Take 17 g by mouth daily.        pramipexole (MIRAPEX) 0.25 MG tablet Take 1 tablet (0.25 mg total) by mouth at bedtime. 30 tablet 0     solifenacin (VESICARE) 10 MG tablet Take 10 mg by mouth daily.       traZODone (DESYREL) 150 MG tablet TAKE 1 TABLET BY MOUTH AT BEDTIME. *1 TOTAL FILL* 31 tablet 3     venlafaxine (EFFEXOR-XR) 150 MG 24 hr capsule TAKE 2 CAPSULES (300MG) BY MOUTH ONCE DAILY *1 TOTAL FILL* 60 capsule 3     No current facility-administered medications on file prior to visit.        Allergies     Allergies   Allergen Reactions     Blood-Group Specific Substance Other (See Comments)     Patient has anti-K. Blood product orders maybe delayed. Draw one red top and 2 purple top tubes for all Type and Screen/Red blood Cell product orders     Fd And C No.5 (Tartrazine)      GI UPSET     Ibuprofen Nausea And Vomiting     Morphine Rash     swelling       Review of Systems   A comprehensive review of 14 systems was done. Pertinent findings noted here and in history of present illness. All the rest negative.  Constitutional: Negative.  Negative for fever, chills, she has activity change, appetite change and fatigue.   HENT: Negative for congestion and facial swelling.    Eyes: Negative for photophobia, redness and visual disturbance.   Respiratory: Negative for cough and chest tightness.    Cardiovascular: Negative for chest pain, palpitations and leg swelling.   Gastrointestinal: Negative for nausea, diarrhea, constipation, blood in stool and abdominal distention.   Genitourinary: Negative.    Musculoskeletal: Negative.  Very weak  Skin: Negative.    Neurological: Negative for dizziness, tremors, syncope, weakness, light-headedness and headaches.   Hematological: Does not bruise/bleed easily.   Psychiatric/Behavioral: Negative.  Gets anxious with any therapy cognitively worse than before      Physical Exam     Recent Vitals 6/15/2020   Height -   Weight -   BSA (m2) -   /72   Pulse 77   Temp 97.9   Temp src 1   SpO2 99   Some recent  data might be hidden     Recheck blood pressure 90/55 wt 160lb  Constitutional: Oriented to person, and appears well-developed.   HEENT:  Normocephalic and atraumatic.  Eyes: Conjunctivae and EOM are normal. Pupils are equal, round, and reactive to light. No discharge.  No scleral icterus. Nose normal. Mouth/Throat: Oropharynx is clear and moist. No oropharyngeal exudate.    NECK: Normal range of motion. Neck supple. No JVD present. No tracheal deviation present. No thyromegaly present.   CARDIOVASCULAR: Normal rate, regular rhythm and intact distal pulses.  Exam reveals no gallop and no friction rub.  Systolic murmur present.  PULMONARY: Effort normal and breath sounds normal. No respiratory distress.No Wheezing or rales.  ABDOMEN: Soft. Bowel sounds are normal. No distension and no mass.  There is no tenderness. There is no rebound and no guarding. No HSM.  MUSCULOSKELETAL: Normal range of motion. No edema and no tenderness. Mild kyphosis, no tenderness.  LYMPH NODES: Has no cervical, supraclavicular, axillary and groin adenopathy.   NEUROLOGICAL: Alert and oriented to person. No cranial nerve deficit.  Normal muscle tone. Coordination normal.   GENITOURINARY: Deferred exam.  SKIN: Skin is warm and dry. No rash noted. No erythema. No pallor.   EXTREMITIES: No cyanosis, no clubbing, no edema. No Deformity.  PSYCHIATRIC: Normal mood, affect and behavior.  However exhibits anxiety with movement      Lab Results     Recent Results (from the past 240 hour(s))   ECG 12 lead nursing unit performed    Collection Time: 06/08/20 10:29 AM   Result Value Ref Range    SYSTOLIC BLOOD PRESSURE 110 mmHg    DIASTOLIC BLOOD PRESSURE 58 mmHg    VENTRICULAR RATE 71 BPM    ATRIAL RATE 71 BPM    P-R INTERVAL 162 ms    QRS DURATION 160 ms    Q-T INTERVAL 448 ms    QTC CALCULATION (BEZET) 486 ms    P Axis 64 degrees    R AXIS 12 degrees    T AXIS 46 degrees    MUSE DIAGNOSIS       Normal sinus rhythm  Right bundle branch block  Abnormal  ECG  When compared with ECG of 05-MAY-2014 10:14,  Right bundle branch block is now Present  Confirmed by SEE ED PROVIDER NOTE FOR, ECG INTERPRETATION ([id number]),  DELISA LANDEROS ([id number]) on 6/9/2020 7:24:34 AM     Basic metabolic panel    Collection Time: 06/08/20 11:10 AM   Result Value Ref Range    Sodium 144 136 - 145 mmol/L    Potassium 4.7 3.5 - 5.0 mmol/L    Chloride 104 98 - 107 mmol/L    CO2 31 22 - 31 mmol/L    Anion Gap, Calculation 9 5 - 18 mmol/L    Glucose 98 70 - 125 mg/dL    Calcium 9.1 8.5 - 10.5 mg/dL    BUN 29 (H) 8 - 28 mg/dL    Creatinine 0.78 0.60 - 1.10 mg/dL    GFR MDRD Af Amer >60 >60 mL/min/1.73m2    GFR MDRD Non Af Amer >60 >60 mL/min/1.73m2   B-type natriuretic peptide    Collection Time: 06/08/20 11:10 AM   Result Value Ref Range    BNP 80 0 - 120 pg/mL   Troponin I    Collection Time: 06/08/20 11:10 AM   Result Value Ref Range    Troponin I 0.01 0.00 - 0.29 ng/mL   D-dimer, Quantitative    Collection Time: 06/08/20 11:10 AM   Result Value Ref Range    D-Dimer, Quant 0.37 <=0.50 FEU ug/mL   APTT    Collection Time: 06/08/20 11:10 AM   Result Value Ref Range    PTT 29 24 - 37 seconds   Protime-INR    Collection Time: 06/08/20 11:10 AM   Result Value Ref Range    INR 1.10 0.90 - 1.10   CBC    Collection Time: 06/08/20 11:10 AM   Result Value Ref Range    WBC 3.7 (L) 4.0 - 11.0 thou/uL    RBC 4.00 3.80 - 5.40 mill/uL    Hemoglobin 11.4 (L) 12.0 - 16.0 g/dL    Hematocrit 36.2 35.0 - 47.0 %    MCV 91 80 - 100 fL    MCH 28.5 27.0 - 34.0 pg    MCHC 31.5 (L) 32.0 - 36.0 g/dL    RDW 14.3 11.0 - 14.5 %    Platelets 146 140 - 440 thou/uL    MPV 10.7 8.5 - 12.5 fL   Lactic Acid    Collection Time: 06/08/20 11:10 AM   Result Value Ref Range    Lactic Acid 0.6 0.5 - 2.2 mmol/L   COVID-19 Virus PCR MRF    Collection Time: 06/08/20 11:41 AM    Specimen: Respiratory   Result Value Ref Range    COVID-19 VIRUS SPECIMEN SOURCE Nasopharyngeal     2019-nCOV       Test received-See reflex to IDDL test SARS  CoV2 (COVID-19) Virus RT-PCR   SARS-CoV-2 (COVID-19) RT-PCR-IDDL    Collection Time: 06/08/20 11:41 AM    Specimen: Respiratory   Result Value Ref Range    SARS-CoV-2 Virus Specimen Source Nasopharyngeal     SARS-CoV-2 PCR Result NEGATIVE     SARS-COV-2 PCR COMMENT       This automated, real-time RT-PCR assay by [employer] on the [employer] Instrument   Troponin I    Collection Time: 06/08/20  8:10 PM   Result Value Ref Range    Troponin I <0.01 0.00 - 0.29 ng/mL   Troponin I    Collection Time: 06/09/20  1:16 AM   Result Value Ref Range    Troponin I <0.01 0.00 - 0.29 ng/mL   Comprehensive Metabolic Panel    Collection Time: 06/09/20  5:32 AM   Result Value Ref Range    Sodium 142 136 - 145 mmol/L    Potassium 4.1 3.5 - 5.0 mmol/L    Chloride 105 98 - 107 mmol/L    CO2 34 (H) 22 - 31 mmol/L    Anion Gap, Calculation 3 (L) 5 - 18 mmol/L    Glucose 92 70 - 125 mg/dL    BUN 27 8 - 28 mg/dL    Creatinine 0.75 0.60 - 1.10 mg/dL    GFR MDRD Af Amer >60 >60 mL/min/1.73m2    GFR MDRD Non Af Amer >60 >60 mL/min/1.73m2    Bilirubin, Total 0.3 0.0 - 1.0 mg/dL    Calcium 8.2 (L) 8.5 - 10.5 mg/dL    Protein, Total 5.2 (L) 6.0 - 8.0 g/dL    Albumin 3.0 (L) 3.5 - 5.0 g/dL    Alkaline Phosphatase 79 45 - 120 U/L    AST 12 0 - 40 U/L    ALT <9 0 - 45 U/L   HM1 (CBC with Diff)    Collection Time: 06/09/20  5:32 AM   Result Value Ref Range    WBC 4.2 4.0 - 11.0 thou/uL    RBC 3.68 (L) 3.80 - 5.40 mill/uL    Hemoglobin 10.4 (L) 12.0 - 16.0 g/dL    Hematocrit 33.7 (L) 35.0 - 47.0 %    MCV 92 80 - 100 fL    MCH 28.3 27.0 - 34.0 pg    MCHC 30.9 (L) 32.0 - 36.0 g/dL    RDW 14.6 (H) 11.0 - 14.5 %    Platelets 127 (L) 140 - 440 thou/uL    MPV 10.6 8.5 - 12.5 fL    Neutrophils % 50 50 - 70 %    Lymphocytes % 40 20 - 40 %    Monocytes % 7 2 - 10 %    Eosinophils % 3 0 - 6 %    Basophils % 0 0 - 2 %    Neutrophils Absolute 2.1 2.0 - 7.7 thou/uL    Lymphocytes Absolute 1.7 0.8 - 4.4 thou/uL    Monocytes Absolute 0.3 0.0 - 0.9 thou/uL    Eosinophils  Absolute 0.1 0.0 - 0.4 thou/uL    Basophils Absolute 0.0 0.0 - 0.2 thou/uL   Echo Complete    Collection Time: 06/09/20  9:21 AM   Result Value Ref Range    LV volume diastolic 89.3 46 - 106 cm3    LV volume systolic 31.3 14 - 42 cm3    HR 79 bpm    IVSd 1.43 (!) 0.6 - 0.9 cm    LVIDd 4.68 3.8 - 5.2 cm    LVIDs 2.48 2.2 - 3.5 cm    LVOT diam 2.1 cm    LVOT mean gradient 1 mmHg    LVOT peak VTI 19.5 cm    LVOT mean tali 54 cm/s    LVOT peak tali 73.9 cm/s    LVOT peak gradient 2 mmHg    LV PWd 1.36 (!) 0.6 - 0.9 cm    MV E' lat tali 10.5 cm/s    MV E' med tali 7.54 cm/s    AR decel slope 2,260 mm/s2    AR p 1/2 time 467 ms    AR peak tali 358 cm/s    AO root 3.6 cm    LA size 3.8 cm    LA length 3.7 cm    MV decel slope 4,520 mm/s2    MV decel time 201 ms    MV P 1/2 time 59 ms    MV peak A tali 96 cm/s    MV peak E tali 90.7 cm/s    RVIDd 3.29 cm    TR peak tali 238 cm/s    LA area 2 21.4 cm2    LA area 1 14.8 cm2    BSA 1.89 m2    Hieght 71 in    Weight 2,505.6 lbs    BP 90/71 mmHg    IVS/PW ratio 1.1     TR peak gradent 22.7 mmHg    LV FS 47.0 28 - 44 %    Echo LVEF calculated 65 55 - 75 %    LA volume 72.8 mL    LV mass 262.1 g    MV area p 1/2 time 3.7 cm2    MV E/A Ratio 0.9     LVOT area 3.46 cm2    LVOT SV 67.5 cm3    LV systolic volume index 16.6 8 - 24 cm3/m2    LV diastolic volume index 47.2 29 - 61 cm3/m2    LA volume index 38.5 mL/m2    LV mass index 138.7 g/m2    LV SVi 35.7 ml/m2    TAPSE 2.1 cm    MV med E/e' ratio 12.0     MV lat E/e' ratio 8.6     LV CO 5.3 l/min    LV Ci 2.8 l/min/m2    Height 71.0 in    Weight 157 lbs    MV Avg E/e' Ratio 10.1 cm/s    AR peak gradient 51.3 mmHg   Comprehensive Metabolic Panel    Collection Time: 06/10/20  5:17 AM   Result Value Ref Range    Sodium 141 136 - 145 mmol/L    Potassium 4.2 3.5 - 5.0 mmol/L    Chloride 105 98 - 107 mmol/L    CO2 34 (H) 22 - 31 mmol/L    Anion Gap, Calculation 2 (L) 5 - 18 mmol/L    Glucose 99 70 - 125 mg/dL    BUN 24 8 - 28 mg/dL    Creatinine  0.74 0.60 - 1.10 mg/dL    GFR MDRD Af Amer >60 >60 mL/min/1.73m2    GFR MDRD Non Af Amer >60 >60 mL/min/1.73m2    Bilirubin, Total 0.3 0.0 - 1.0 mg/dL    Calcium 7.9 (L) 8.5 - 10.5 mg/dL    Protein, Total 5.1 (L) 6.0 - 8.0 g/dL    Albumin 2.8 (L) 3.5 - 5.0 g/dL    Alkaline Phosphatase 77 45 - 120 U/L    AST 11 0 - 40 U/L    ALT <9 0 - 45 U/L   HM1 (CBC with Diff)    Collection Time: 06/10/20  5:17 AM   Result Value Ref Range    WBC 4.0 4.0 - 11.0 thou/uL    RBC 3.85 3.80 - 5.40 mill/uL    Hemoglobin 10.8 (L) 12.0 - 16.0 g/dL    Hematocrit 35.6 35.0 - 47.0 %    MCV 93 80 - 100 fL    MCH 28.1 27.0 - 34.0 pg    MCHC 30.3 (L) 32.0 - 36.0 g/dL    RDW 14.6 (H) 11.0 - 14.5 %    Platelets 124 (L) 140 - 440 thou/uL    MPV 10.7 8.5 - 12.5 fL    Neutrophils % 57 50 - 70 %    Lymphocytes % 33 20 - 40 %    Monocytes % 6 2 - 10 %    Eosinophils % 3 0 - 6 %    Basophils % 0 0 - 2 %    Neutrophils Absolute 2.3 2.0 - 7.7 thou/uL    Lymphocytes Absolute 1.3 0.8 - 4.4 thou/uL    Monocytes Absolute 0.3 0.0 - 0.9 thou/uL    Eosinophils Absolute 0.1 0.0 - 0.4 thou/uL    Basophils Absolute 0.0 0.0 - 0.2 thou/uL   Urine culture    Collection Time: 06/10/20  7:56 PM    Specimen: Urine   Result Value Ref Range    Culture >100,000 col/ml Morganella morganii (!)     Culture 10,000-50,000 col/ml Escherichia coli (!)        Susceptibility    Escherichia coli - KULDEEP     Amoxicillin + Clavulanate <=4/2 Sensitive      Ampicillin <=4 Sensitive      Cefazolin <=1 Sensitive      Cefepime <=1 Sensitive      Ceftriaxone <=1 Sensitive      Ciprofloxacin >2 Resistant      Gentamicin <=2 Sensitive      Levofloxacin >4 Resistant      Meropenem <=0.25 Sensitive      Nitrofurantoin <=16 Sensitive      Tetracycline <=2 Sensitive      Tobramycin <=2 Sensitive      Trimethoprim + Sulfamethoxazole <=0.5 Sensitive     Morganella morganii - KULDEEP     Amoxicillin + Clavulanate >16/8 Resistant      Ampicillin >16 Resistant      Cefazolin >16 Resistant      Cefepime <=1  Sensitive      Ceftriaxone <=1 Sensitive      Ciprofloxacin >2 Resistant      Gentamicin <=2 Sensitive      Levofloxacin >4 Resistant      Meropenem <=0.25 Sensitive      Nitrofurantoin 64 Resistant      Tetracycline <=2 Sensitive      Tobramycin <=2 Sensitive      Trimethoprim + Sulfamethoxazole >2/38 Resistant    Comprehensive Metabolic Panel    Collection Time: 06/11/20  5:14 AM   Result Value Ref Range    Sodium 142 136 - 145 mmol/L    Potassium 3.8 3.5 - 5.0 mmol/L    Chloride 108 (H) 98 - 107 mmol/L    CO2 31 22 - 31 mmol/L    Anion Gap, Calculation 3 (L) 5 - 18 mmol/L    Glucose 99 70 - 125 mg/dL    BUN 25 8 - 28 mg/dL    Creatinine 0.77 0.60 - 1.10 mg/dL    GFR MDRD Af Amer >60 >60 mL/min/1.73m2    GFR MDRD Non Af Amer >60 >60 mL/min/1.73m2    Bilirubin, Total 0.3 0.0 - 1.0 mg/dL    Calcium 7.8 (L) 8.5 - 10.5 mg/dL    Protein, Total 4.9 (L) 6.0 - 8.0 g/dL    Albumin 2.7 (L) 3.5 - 5.0 g/dL    Alkaline Phosphatase 82 45 - 120 U/L    AST 10 0 - 40 U/L    ALT <9 0 - 45 U/L   HM1 (CBC with Diff)    Collection Time: 06/11/20  5:14 AM   Result Value Ref Range    WBC 4.1 4.0 - 11.0 thou/uL    RBC 3.64 (L) 3.80 - 5.40 mill/uL    Hemoglobin 10.4 (L) 12.0 - 16.0 g/dL    Hematocrit 34.0 (L) 35.0 - 47.0 %    MCV 93 80 - 100 fL    MCH 28.6 27.0 - 34.0 pg    MCHC 30.6 (L) 32.0 - 36.0 g/dL    RDW 14.6 (H) 11.0 - 14.5 %    Platelets 127 (L) 140 - 440 thou/uL    MPV 10.8 8.5 - 12.5 fL    Neutrophils % 48 (L) 50 - 70 %    Lymphocytes % 40 20 - 40 %    Monocytes % 8 2 - 10 %    Eosinophils % 3 0 - 6 %    Basophils % 0 0 - 2 %    Neutrophils Absolute 2.0 2.0 - 7.7 thou/uL    Lymphocytes Absolute 1.7 0.8 - 4.4 thou/uL    Monocytes Absolute 0.3 0.0 - 0.9 thou/uL    Eosinophils Absolute 0.1 0.0 - 0.4 thou/uL    Basophils Absolute 0.0 0.0 - 0.2 thou/uL   ECG 12 lead MUSE    Collection Time: 06/11/20  7:42 AM   Result Value Ref Range    SYSTOLIC BLOOD PRESSURE      DIASTOLIC BLOOD PRESSURE      VENTRICULAR RATE 70 BPM    ATRIAL RATE  70 BPM    P-R INTERVAL 162 ms    QRS DURATION 162 ms    Q-T INTERVAL 438 ms    QTC CALCULATION (BEZET) 473 ms    P Axis 51 degrees    R AXIS -11 degrees    T AXIS 26 degrees    MUSE DIAGNOSIS       Sinus rhythm with occasional Premature ventricular complexes  Right bundle branch block  Abnormal ECG  When compared with ECG of 08-JUN-2020 10:29,  Premature ventricular complexes are now Present  Confirmed by TAWNYA BEAULIEU MD LOC:[facility] ([id number]) on 6/11/2020 1:28:16 PM     Blood culture from PERIPHERAL SITE    Collection Time: 06/11/20  9:42 AM    Specimen: Vein, Peripheral; Blood   Result Value Ref Range    Anaerobic Blood Culture Bottle No Growth No Growth, No organisms seen, bottle returned to instrument, Specimen not received, No Growth at 24 hours, No Growth at 48 hours, No Growth at 72 hours, No Growth at 96 hours, No Growth at 120 hours    Aerobic Blood Culture Bottle No Growth No Growth, No organisms seen, bottle returned to instrument, Specimen not received, No Growth at 24 hours, No Growth at 120 hours, No Growth at 48 hours, No Growth at 72 hours, No Growth at 96 hours   Blood Culture from PERIPHERAL SITE (2nd One)    Collection Time: 06/11/20  9:42 AM    Specimen: Vein, Peripheral; Blood   Result Value Ref Range    Anaerobic Blood Culture Bottle No Growth No Growth, No organisms seen, bottle returned to instrument, Specimen not received, No Growth at 24 hours, No Growth at 48 hours, No Growth at 72 hours, No Growth at 96 hours, No Growth at 120 hours    Aerobic Blood Culture Bottle No Growth No Growth, No organisms seen, bottle returned to instrument, Specimen not received, No Growth at 24 hours, No Growth at 120 hours, No Growth at 48 hours, No Growth at 72 hours, No Growth at 96 hours   NM Pharmacologic Stress Test    Collection Time: 06/11/20  1:47 PM   Result Value Ref Range    Pharmacologic Protocol  Lexiscan     Test Type Pharmacological     Baseline HR 81     Baseline Systolic      Baseline  Diastolic BP 53     Last Stress HR 87     Last Stress Systolic      Last Stress Diastolic BP 53     Target      PERCENT HR 85%     ST Deviation Elevation II 0.1mm     Deviation Time V2 -0.6mm     ST Elevation Amount II 2.0mm     ST Deviation Amount he V1 -2.1mm     Final Resting /55     Final Resting HR 86     Max Treadmill Speed 0.0     Max Treadmill Grade 0.0     Peak Systolic /44     Peak Diastolic /55     Max HR 89     Stress Phase Resting     Stress Resting Pt Position SUPINE     Current HR 82     Current /53     Stress Phase Stress     Stage Minute EXE 00:00     Exercise Stage STAGE 2     Current HR 81     Current /53     Stress Phase Stress     Stage Minute EXE 01:00     Exercise Stage STAGE 3     Current HR 80     Current /53     Stress Phase Stress     Stage Minute EXE 01:36     Exercise Stage STAGE 3     Current HR 85     Current /44     Stress Phase Stress     Stage Minute EXE 02:00     Exercise Stage STAGE 4     Current HR 85     Current /44     Stress Phase Stress     Stage Minute EXE 02:42     Exercise Stage STAGE 4     Current HR 86     Current BP 98/52     Stress Phase Stress     Stage Minute EXE 03:00     Exercise Stage STAGE 5     Current HR 87     Current BP 98/52     Stress Phase Stress     Stage Minute EXE 03:25     Exercise Stage STAGE 5     Current HR 87     Current /53     Stress Phase Stress     Stage Minute EXE 04:00     Exercise Stage STAGE 6     Current HR 87     Current /53     Stress Phase Stress     Stage Minute EXE 04:00     Exercise Stage STAGE 6     Current HR 87     Current /53     Stress Phase Recovery     Stage Minute REC 00:31     Exercise Stage Recovery     Current HR 88     Current /51     Stress Phase Recovery     Stage Minute REC 00:59     Exercise Stage Recovery     Current HR 87     Current /51     Stress Phase Recovery     Stage Minute REC 01:59     Exercise Stage Recovery      Current HR 86     Current /51     Stress Phase Recovery     Stage Minute REC 02:06     Exercise Stage Recovery     Current HR 86     Current /55     Stress Phase Recovery     Stage Minute REC 02:59     Exercise Stage Recovery     Current HR 88     Current /55     Stress Phase Recovery     Stage Minute REC 03:59     Exercise Stage Recovery     Current HR 86     Current /55     Stress Phase Recovery     Stage Minute REC 04:01     Exercise Stage Recovery     Current HR 86     Current /55     Calculated Percent HR 59 %    Rate Pressure Product 8,989.0     Left Ventricular EF 69 %   Basic Metabolic Panel    Collection Time: 06/13/20  5:24 AM   Result Value Ref Range    Sodium 142 136 - 145 mmol/L    Potassium 4.0 3.5 - 5.0 mmol/L    Chloride 106 98 - 107 mmol/L    CO2 32 (H) 22 - 31 mmol/L    Anion Gap, Calculation 4 (L) 5 - 18 mmol/L    Glucose 90 70 - 125 mg/dL    Calcium 8.0 (L) 8.5 - 10.5 mg/dL    BUN 25 8 - 28 mg/dL    Creatinine 0.69 0.60 - 1.10 mg/dL    GFR MDRD Af Amer >60 >60 mL/min/1.73m2    GFR MDRD Non Af Amer >60 >60 mL/min/1.73m2   HM2(CBC w/o Differential)    Collection Time: 06/13/20  5:24 AM   Result Value Ref Range    WBC 3.6 (L) 4.0 - 11.0 thou/uL    RBC 3.36 (L) 3.80 - 5.40 mill/uL    Hemoglobin 9.4 (L) 12.0 - 16.0 g/dL    Hematocrit 31.4 (L) 35.0 - 47.0 %    MCV 94 80 - 100 fL    MCH 28.0 27.0 - 34.0 pg    MCHC 29.9 (L) 32.0 - 36.0 g/dL    RDW 14.7 (H) 11.0 - 14.5 %    Platelets 112 (L) 140 - 440 thou/uL    MPV 11.0 8.5 - 12.5 fL   COVID-19 Virus PCR MRF    Collection Time: 06/15/20  9:43 AM    Specimen: Respiratory   Result Value Ref Range    COVID-19 VIRUS SPECIMEN SOURCE Nasopharyngeal     2019-nCOV       Test received-See reflex to IDDL test SARS CoV2 (COVID-19) Virus RT-PCR   SARS-CoV-2 (COVID-19) RT-PCR-IDDL    Collection Time: 06/15/20  9:43 AM    Specimen: Respiratory   Result Value Ref Range    SARS-CoV-2 Virus Specimen Source Nasopharyngeal     SARS-CoV-2 PCR  Result NEGATIVE     SARS-COV-2 PCR COMMENT       The [employer]plex[employer] COVID-19 direct PCR assay by [employer] on the [employer] instrument            Imaging Results     Xr Chest 1 View Portable    Result Date: 6/8/2020  EXAM: XR CHEST 1 VIEW PORTABLE LOCATION: [facility] DATE/TIME: 6/8/2020 11:52 AM INDICATION: chest pain COMPARISON: 7/16/2015.     The lungs are clear of focal consolidation. There is no pneumothorax or pleural effusion. There are postoperative changes with surgical clips over the right hilum. Heart size and pulmonary vascularity are normal. There are old fractures of the left third through sixth posterior ribs.    Xr Ribs Left    Result Date: 6/8/2020  EXAM: XR RIBS LEFT LOCATION: [facility] DATE/TIME: 6/8/2020 6:39 PM INDICATION: left-sided chest pain with hx of falls. previous hx of rib fractures s/p falls. COMPARISON: 2/13/2020 and 1/17/2019     Heart size normal. Tortuous thoracic aorta similar to previous exam with mediastinal clips that project centrally, unchanged. Multiple old healed left rib fractures, no definite acute fracture evident.     Nm Pharmacologic Stress Test    Result Date: 6/11/2020     The nuclear stress test is negative for inducible myocardial ischemia or infarction.    The left ventricular ejection fraction at stress is 69%.    There is no prior study for comparison.    The findings of this examination were communicated to Dr. Stephan Hurd.             NERY Sosa

## 2021-06-08 NOTE — PROGRESS NOTES
This video/telephone visit will be conducted via a call between you and your physician/provider. We have found that certain health care needs can be provided without the need for an in-person physical exam. This service lets us provide the care you need with a video /telephone conversation. If a prescription is necessary we can send it directly to your pharmacy. If lab work is needed we can place an order for that and you can then stop by our lab to have the test done at a later time.    Just as we bill insurance for in-person visits, we also bill insurance for video/telephone visits. If you have questions about your insurance coverage, we recommend that you speak with your insurance company.    Patient has given verbal consent for video/Telephone visit? Yes   Patient would like the video visit invitation sent by: Text to cell phone: [phone]  Altagracia [facility] faxed over Referral form for today's visit. Please have provider complete and fax back to [facility] as requested.   Pt is currently in wheel chair bound due to continuous falls.     Patient verified allergies, medications and pharmacy via phone. Patient states she is ready for visit.

## 2021-06-08 NOTE — TELEPHONE ENCOUNTER
Please call this patient the week of June 1st to see how she is doing on increased dose of Wellbutrin.  [place] [phone]

## 2021-06-08 NOTE — PROGRESS NOTES
"Telemedicine Visit: The patient's condition can be safely assessed and treated via synchronous audio and visual telemedicine encounter.      Reason for Telemedicine Visit: Patient has requested telehealth visit    Originating Site (Patient Location): Patient's home    Distant Site (Provider Location): [facility]: [facility].    Consent:  The patient/guardian has verbally consented to: the potential risks and benefits of telemedicine (video visit) versus in person care; bill my insurance or make self-payment for services provided; and responsibility for payment of non-covered services.     Mode of Communication:  Video Conference via codetag    As the provider I attest to compliance with applicable laws and regulations related to telemedicine.         HPI: 69-year-old female with MDD, history of psychosis, Parkinson's. Last saw Dr. Johnson 3/9/2020.  At that visit, Wellbutrin  mg started. Nothing changed with this.    Seen with staff Antonio.  Has lived at [facility]5 years.    Staff report pt is passive, hesitant, refuses to participate in activities, amotivation, apathy, lack of interest.    \"At times she is like a wild cat and feisty\".    Does solo activities- colors, plays game on the tablet.     Quit day program a few years ago. Not interested in returning.  When asked why she replied:\" I do not want to\".    Frequent falls, prefers to use wheelchair to walker.    Lorazepam DC'd December 2018.    -------------------------------------------------------------------------------------------------------  Note by Dr. Johnson 3/9/2020:  \"Pertinent History: Patient presents today for the purposes of medication management.  She has a history of a mood disorder and also with prior psychotic symptoms.  Per the patient's request we have been tapering the Zyprexa and that was discontinued in 2017.     She was restarted on the Remeron earlier in 2018.  I saw the patient in " the summer 2018 and did not make any medication changes.  In September 2018 we did increase the patient's Remeron to 45 mg at night.     I saw the patient in January 2019.  At that time she was continuing to struggle with isolation and hoarding behaviors and was extremely anxious.  She was having some bedwetting issues that have been falling more seeming perhaps to be overmedicated.  There had not been any improvement with the increase in Remeron.  At that visit we did decrease the Effexor and the Remeron.     I saw the patient in the spring 2019 and at that time she was doing fairly well.  We elected to try a medication taper and we decreased her Effexor XR to 225 mg a day and decrease the Remeron down to 30 mg at night.    I saw the patient in December 2019.  She was having increased depression with some worsening behavioral issues.  She had been refusing day programming and there was an increase in her SIBs.  She was more defiant and eating less.  She had a decline in her hygiene.  We did increase both the Remeron and Effexor at that time.     Current Symptoms:   The patient reports with to staff today.  Both report the patient is been flat and slow with no significant improvement.  They report that all though their objective scores do not show any significant change or problems they feel that the staff filling those out have adjusted to the patient's new normal which is low motivation low energy and low participation.  They also bring forth information from the primary care doctor when she filled out the PHQ 9 there the score was 14 and she endorsed suicidality.  I asked the patient about this she states she is not suicidal anymore and was unsure if she was ever suicidal.  She is able to contract for safety.  She admits she is frustrated.     In talking with staff they report there is been a lot of staff turnover in the quality of interactions with staff have diminished quite a bit.  They do wonder whether that  "may be contributing.  There is been no change in cognition.  They were wondering about a neuropsych but at this point with the superimposed depression and no other clinical indications I do not know that that will be useful.  We can consider that in the future.  They also wanted to know if she should be admitted to the hospital for a period of time so they could do more observation.  Again, I do not think that that is clinically indicated at this time and I explained to them that I thought doing medication changes in her home environment with staff that know her may actually be more beneficial.     There is been no psychosis.  No self-injurious behaviors.  No other new medical issues although the patient has had some leg pain from a joint injury.  She will be following up with orthopedics in the near future.  Risks and benefits of the medication changes were discussed.  The patient has been on Wellbutrin in the distant past and felt that was helpful.  Although it shows that she had some GI questionable issues with that.\"   -Dr. Johnson 3/9/2020  -----------------------------------------------------------------------------------------------------------------------------       ROS:  Sleep/appetite good. Denies SI/manic/psychotic sx.   10 point ROS was negative except for the items listed in HPI.      LABS: lipids, CMP wnl 2/2020      I have reviewed and updated the patient's Past Medical History, Social History, Family History and Medication List.      Current Outpatient Medications   Medication Sig Dispense Refill     atorvastatin (LIPITOR) 40 MG tablet        carbidopa-levodopa (SINEMET)  mg per tablet Take 2 tablets by mouth 3 (three) times a day.       cholecalciferol, vitamin D3, 1,000 unit tablet Take 1,000 Units by mouth daily.       cranberry 500 mg cap Take 500 mg by mouth 2 (two) times a day.       cyanocobalamin (VITAMIN B-12) 100 MCG tablet Take 100 mcg by mouth daily.       furosemide (LASIX) 20 MG " "tablet        ketoconazole (NIZORAL) 2 % cream        lansoprazole (PREVACID) 30 MG capsule        magnesium oxide (MAG-OX) 400 mg tablet Take 400 mg by mouth 2 (two) times a day.       metoprolol tartrate (LOPRESSOR) 25 MG tablet Take 12.5 mg by mouth daily.       multivitamin (MULTIVITAMIN) per tablet Take 1 tablet by mouth daily.       nitroglycerin (NITROSTAT) 0.4 MG SL tablet Place 0.4 mg under the tongue every 5 (five) minutes as needed.        NYAMYC powder        polyethylene glycol (MIRALAX) 17 gram packet Take 17 g by mouth daily.       pramipexole (MIRAPEX) 0.5 MG tablet Take 0.5 mg by mouth bedtime.       psyllium (METAMUCIL) powder Take by mouth every morning. 1 PKT       senna-docusate (PERICOLACE) 8.6-50 mg tablet Take 2 tablets by mouth 2 (two) times a day.       solifenacin (VESICARE) 10 MG tablet Take 10 mg by mouth daily.       sorbitol 70 % solution Take 30 mL by mouth daily.       aspirin 81 mg chewable tablet Chew 81 mg daily.       buPROPion (WELLBUTRIN XL) 300 MG 24 hr tablet Take 1 tablet (300 mg total) by mouth daily. 30 tablet 3     mirtazapine (REMERON) 45 MG tablet TAKE 1 TABLET BY MOUTH AT BEDTIME  *1 TOTAL FILL* 30 tablet 3     traZODone (DESYREL) 150 MG tablet TAKE 1 TABLET BY MOUTH AT BEDTIME. *1 TOTAL FILL* 31 tablet 3     venlafaxine (EFFEXOR-XR) 150 MG 24 hr capsule TAKE 2 CAPSULES (300MG) BY MOUTH ONCE DAILY *1 TOTAL FILL* 60 capsule 3     No current facility-administered medications for this visit.        ALLERGIES  Blood-group specific substance; Fd and c no.5 (tartrazine); Ibuprofen; and Morphine        MSE:      Vital signs: Height 5' 11\" (1.803 m), last menstrual period 09/09/1999.       Mental Status Exam:   Appearance: Well groomed, Sitting up in a wheelchair.  Limited initiation and limited eye.    Behavior: Limited initiation.  Flat slow and disinterested.  Not agitated or threatening.  No current restlessness.  Speech: Shaking and nodding her head but no initiation.  " Vague but appropriate and consistent responses.  Mood/Affect: Patient appears depressed, flat and slow. Cried during visit.   No significant anxiety or agitation.    Thought Content: No reports of any recent psychosis.  No evidence of psychosis on exam.  Suicidal or Homicidal Thoughts: None reported or apparent.  Thought Process/Formulation: [place] and slow.  No racing thoughts.  Limited effort however.  Patient needs prompts to participate.  Associations: wnl  Fund of Knowledge: Limited effort and participation.  No obvious significant change.  Attention/Concentration: Patient needs prompts to participate.  Overall with limited effort and disinterested.  Tracking some conversation.  Insight: Limited  Judgement: Limited   Memory:  Limited participation.  No obvious significant change.  Disinterested and slow.  Motor Status:  No tremor.  Orientation: A&O x 3, limited effort and participation.           Impression:  Major depressive disorder  History of psychosis NOS  PD  Apathy, hypofrontality- consider low-dose stimulant after seeing response to increased Wellbutrin.  Frequent falls, on multiple medications- consider anticholinergic s/e.         Plan:  Discussed risks/benefits of medication including seizure.  1. Could benefit from dose increase of Wellbutrin.  Increase to 300 mg/day.    RN call 2 weeks. Dr. Johnson visit in 10 weeks.  2.  Continue other meds as above.      Dianna Brown MD       Start Time 3:00 PM  End Time  3:40 PM      Total Time and Coordination of Care:       40 Minutes spent in the care of this patient with >50% of this time was spent in   Face-to-face counseling specifically discussing and educating about pharmacologic and psycho-social treatment options, the risks, benefits and side effects of medication,    And Coordination of Care specifically   Completing paperwork, Discussing and educating the patient and  staff.      Prolonged service    I spent an additional 31 minutes  with the patient from  Start Time 3:40  End Time 4:12  Additional time needed due to cognitive issues, discuss motivational strategies with pt and [facility] staff.     High risk decision making secondary to drug therapy requiring ongoing lab monitoring. Additional tests such as EKG, potential for multiple interactions, requiring special neuroleptic consent and education of the patient.

## 2021-06-09 NOTE — PROGRESS NOTES
Outpatient Mental Health Treatment Plan    Name:  Alison Bashir  :  1950  MRN:  [id number]    Treatment Plan:  Initial Treatment Plan  Intake/initial treatment plan date:  3/01/2017  Benefit and risks and alternatives have been discussed: Yes  Is this treatment appropriate with minimal intrusion/restrictions: Yes  Estimated duration of treatment:  10 +  Anticipated frequency of services:  Every week  Necessity for frequency: This frequency is needed to establish therapeutic goals and for continuity of care in order to monitor progress.  Necessity for treatment: To address cognitive, behavioral, and/or emotional barriers in order to work toward goals and to improve quality of life.    Plan:      ? Depression    Goal:  Decrease average depression level from 4 to 3   Strategies:    ?[x] Decrease social isolation     [x] Increase involvement in meaningful activities     ?[x] Discuss sleep hygiene     ?[x] Explore thoughts and expectations about self and others     ?[x] Process grief (loss of significant person, independence, role, etc.)     ?[x] Assess for suicide risk     ?[x] Implement physical activity routine (with physician approval)     [x] Consider introduction of bibliotherapy and/or videos     [x] Continue compliance with medical treatment plan (or explore barriers)     Degree to which this is a problem (1-4): 4   Degree to which goal is met (1-4)1  Date of Review: 2017        ?   ? Anxiety    Goal:  Decrease average anxiety level from 4 to 3   Strategies: ? [x]Learn and practice relaxation techniques and other coping  strategies (e.g., thought stopping, reframing, meditation)     ? [x] Increase involvement in meaningful activities     ? [x] Discuss sleep hygiene     ? [x] Explore thoughts and expectations about self and others     ? [x] Identify and monitor triggers for panic/anxiety symptoms     ? [x] Implement physical activity routine (with physician approval)     ? [x] Consider introduction of  "bibliotherapy and/or videos     ? [x] Continue compliance with medical treatment plan (or explore barriers)     Degree to which this is a problem (1-4): 4  Degree to which goal is met (1-4)1  Date of Review: 6/01/2017    Functional Impairment: 1=Not at all/Rarely  2=Some days  3=Most Days  4=Every Day   Personal: 4  Family: 4  Social: 4  Work: 0    Diagnosis:  1.MDD (major depressive disorder), recurrent episode, moderate F 33.1also per patient and chart  2.FOZIA (generalized anxiety disorder) F 41.1 also per patient and chart      WHODAS 2.0 12-item version: 34  H1=30   H2=30   H3=30   In the last 30 days, the patient's level of disability was at 71 %      Clinical assessments completed: FOZIA-7, PHQ-9, Mood Questionnaire current, CAGE-AID, PANSI, WHODAS 2.0    Strengths:  \"[place], caring,good sister, strong, good work ethic; good mother, good friend.\"    Limitations: \" lack of self confidence, low self esteem, Physical and mental health issues.     Cultural Considerations:, navigate the system with some help from the group home,uses [facility] , has health insurance.    Persons responsible for this plan:  ? [x] Patient ? [x] Provider ? [x] Other: ___[facility]______________      Provider:Performed and documented by INES Dee; [facility] 3/1/2017    Date:  3/1/2017  Time:  8:28 AM        Patient Signature:____________________________________ Date: ______________     Guardian Signature: __________________________________ Date: ______________     Therapist Signature: __________________________________ Date: ______________      "

## 2021-06-09 NOTE — PROGRESS NOTES
"3/16/2017    Start time: 9:10    Stop Time: 10:00   Session # 2    Alison Bashir is a 66 y.o. female is being seen today for    Chief Complaint   Patient presents with      Follow Up     Follow up in regards to ongoing symptom management of anxiety and depression.     New symptoms or complaints: None    Functional Impairment:   Personal: 4  Family: 3  Social: 4  Work: 0    Clinical assessment of mental status:   Alison Bashir presented on time.   She was oriented x3, open and cooperative, and dressed appropriately for this session and weather. Her memory was Normal cognitive functioning .  Her speech was  Soft.  Language was appropriate when she talks, most of the talking are being done by Marlen.  Concentration and focus is Within normal. Psychosis is not noted or reported. She reports her mood is Congruent w/content of speech.  Affect is congruent with speech and is Tearful, Anxious and Depressed.  Fund of knowledge is adequate. Insight is adequate for therapy.    Suicidal/Homicidal Ideation present: Patient denies suicidal and homicidal ideations/means or plans.     Patient's impression of their current status:Patient presented to the clinic with her , Marlen. Patient reports no change in her mood; feeling depressed. She still has not had her hearing Aid yet, Alona is still working with her insurance to approve the prescription. Meanwhile, Marlen is the one who speaks most of the time. Patient missed her appointment last week. She was at [facility]; had a minor heart attack. Had stint in her heart and reports feeling better. Brother who lives in [place] lost wife several days before the heart attack. Marlen does not think their is a connection with her recent heart attack event.  Patient has been crying because \" I don't feel good about myself\" and \" I am not happy about myself\" but she is not able to pin point the clear reasons of these feelings. Patient's sister has been gone to [place] to be with her " patient's brother. Patient reports missing her; though Marlen stated even if she was around, patient was not seeing her everyday.  Patient's son won't return phone calls. Patient has been crying for not being able to see her grand daughter. Marlen believes their are some issues between patient and son that prevent him from communicating with patient. Marlen wants the patient to be able to express these issues in therapy. Patient never had therapy before and reports she does not want to be here. Though also stated she wants to return to the session. Patient and Marlen are working on challenging the patient negative thinking especially the self defeating thinking or worthless. Her next session is scheduled for next week.     Therapist impression of patients current state: This 66 y.o. White or  female presented to the clinic today with her  Marlen. Patient was using a walker. Has difficulties to walk due to pain on her foot. Writer actively listened to the patient; offered empathy, validated her feelings where appropriate and guided the patient and Marlen with some practical strategies to cope with presenting issues today especially how to challenge her negative thinking especially the cognitive distortions reported or noted  in the session.  Patient will benefit from different modalities that include patient's centered, CBT; existential approach, motivational interviewing, and DBT.     Assessment tools used today include:  How to you feel today?    Type of psychotherapeutic technique provided: Client centered and CBT    Progress toward short term goals:reports feeling the same( anxious and depressed)    Review of long term goals: initial treatment plan: 3/01/2017 next review: 6/01/2017 .    Diagnosis:   1. MDD (major depressive disorder), recurrent episode, moderate    2. FOZIA (generalized anxiety disorder)    No change    Plan and Follow-up:   1.  Patient to continue taking her medications as  prescribed.  2. Patient will using positive self talk to feel better, as needed  3.Patient will participate in different activities held at her group home   4. Patient will see this therapist in a week    Discharge Criteria/Planning: Client has chronic symptoms and ongoing therapy for maintenance stability recommended.    Performed and documented by INES Dee; [facility] 3/16/2017    This note has been dictated using voice recognition software. Any grammatical or context distortions are unintentional and inherent to the software.

## 2021-06-09 NOTE — PROGRESS NOTES
[facility]      Facility:    [facility] [[id number]]  Code Status: DNR      Chief Complaint/Reason for Visit:   Chief Complaint   Patient presents with     Review Of Multiple Medical Conditions       HPI:   Aliosn is a 69 y.o. female who is a transfer from [facility] with an admission on 20 and discharge on 6/15/20. She has a PMH of Parkinsons dx, Htn, frequent UTIs, depression, CAD, GERD, leukopenia who presented to the hospital chest pain shortness of breath.  Had a negative work-up through cardiology.  Noted to have severe hypotension despite holding PTA medications.  Therefore Middaugh drain was started at discharge, likely due to Parkinson's per neurology evaluation.  She was then discharged to TCU for additional rehab.    Past Medical History:  Past Medical History:   Diagnosis Date     Acute blood loss anemia      Alzheimer disease (H)      CAD (coronary artery disease)      Chronic pain syndrome      Dementia (H)      Depression      Depression      Hip fracture, right (H) 2017     HTN (hypertension)      Kidney stone      Overactive bladder      PMB (postmenopausal bleeding)      RLS (restless legs syndrome)      Spine pain      Stented coronary artery      Traumatic brain injury (H)      Unsteady gait     uses walker     UTI (lower urinary tract infection)     on antibiotic course           Surgical History:  Past Surgical History:   Procedure Laterality Date      SECTION       CHOLECYSTECTOMY  May 2014     COMBINED HYSTEROSCOPY DIAGNOSTIC / D&C N/A 2014    Procedure: DILATION AND CURETTAGE WITH HYSTEROSCOPY;  Surgeon: Karime Cifuentes MD;  Location: [facility];  Service:      CORONARY STENT PLACEMENT       HEMIARTHROPLASTY HIP Right 2017     INCISION AND DRAINAGE HIP Right 2018    ORIF REVISION      LUNG REMOVAL, PARTIAL       REVISION TOTAL HIP ARTHROPLASTY Right 01/10/2018     TONSILLECTOMY AND  ADENOIDECTOMY       TOTAL KNEE ARTHROPLASTY Right 09/30/2016     TOTAL KNEE ARTHROPLASTY Left 11/2015       Family History:   Family History   Problem Relation Age of Onset     Cancer Mother      Heart attack Father      Coronary artery disease Father      Heart failure Father        Social History:    Social History     Socioeconomic History     Marital status: Single     Spouse name: Not on file     Number of children: Not on file     Years of education: Not on file     Highest education level: Not on file   Occupational History     Not on file   Social Needs     Financial resource strain: Not on file     Food insecurity     Worry: Not on file     Inability: Not on file     Transportation needs     Medical: Not on file     Non-medical: Not on file   Tobacco Use     Smoking status: Never Smoker     Smokeless tobacco: Never Used   Substance and Sexual Activity     Alcohol use: No     Drug use: No     Sexual activity: Not on file   Lifestyle     Physical activity     Days per week: Not on file     Minutes per session: Not on file     Stress: Not on file   Relationships     Social connections     Talks on phone: Not on file     Gets together: Not on file     Attends [employer] service: Not on file     Active member of club or organization: Not on file     Attends meetings of clubs or organizations: Not on file     Relationship status: Not on file     Intimate partner violence     Fear of current or ex partner: Not on file     Emotionally abused: Not on file     Physically abused: Not on file     Forced sexual activity: Not on file   Other Topics Concern     Not on file   Social History Narrative    She lives in a group home right now. She can make her own medical decisions. Patient is not on supplemental O2 at home. Patient uses walker and wheelchair for ambulation. Please update sister Chelo.           Review of Systems   Constitutional: Positive for activity change and fatigue. Negative for appetite change, chills  "and diaphoresis.   HENT: Negative for congestion and hearing loss.    Eyes: Negative.    Cardiovascular: Negative.    Gastrointestinal: Negative for abdominal distention, abdominal pain, blood in stool, constipation, diarrhea and nausea.   Endocrine: Negative.    Genitourinary: Negative for difficulty urinating.   Musculoskeletal: Positive for gait problem.        Denies pain   Skin:        intact   Allergic/Immunologic: Negative.    Neurological: Positive for tremors. Negative for dizziness.   Psychiatric/Behavioral: Positive for confusion. Negative for agitation, hallucinations and sleep disturbance. The patient is not nervous/anxious.        Vitals:    06/22/20 1057   BP: 115/67   Pulse: 73   Resp: 16   Temp: 97  F (36.1  C)   SpO2: 99%   Weight: 157 lb (71.2 kg)   Height: 5' 10\" (1.778 m)       Physical Exam  HENT:      Head: Normocephalic.      Right Ear: External ear normal.      Left Ear: External ear normal.      Nose: No congestion or rhinorrhea.      Mouth/Throat:      Pharynx: No oropharyngeal exudate.   Eyes:      General:         Right eye: No discharge.         Left eye: No discharge.   Cardiovascular:      Rate and Rhythm: Normal rate.   Pulmonary:      Effort: Pulmonary effort is normal. No respiratory distress.      Comments: Room air, no auscultation per COVID-19 recommendations  Abdominal:      General: There is no distension.      Comments: No constipation diarrhea reported   Genitourinary:     Comments: Incontinent  Musculoskeletal:      Right lower leg: No edema.      Left lower leg: No edema.      Comments: History of Parkinson's, denies pain   Skin:     General: Skin is warm and dry.      Comments: intact   Neurological:      Mental Status: She is alert. She is disoriented.      Motor: Weakness present.      Comments: A/O x2   Psychiatric:         Mood and Affect: Mood normal.      Comments: No behaviors reported         Medication List:  Current Outpatient Medications   Medication Sig     " metoprolol succinate (TOPROL-XL) 12.5 MG Take 12.5 mg by mouth daily.     acetaminophen (TYLENOL) 500 MG tablet Take 500 mg by mouth every 6 (six) hours as needed for pain.     aspirin 81 mg chewable tablet Chew 81 mg daily.     atorvastatin (LIPITOR) 40 MG tablet Take 40 mg by mouth every evening.      buPROPion (WELLBUTRIN XL) 300 MG 24 hr tablet Take 300 mg by mouth every morning.      carbidopa-levodopa (SINEMET)  mg per tablet Take 2 tablets by mouth 3 (three) times a day.     cholecalciferol, vitamin D3, 1,000 unit tablet Take 4,000 Units by mouth daily.      cyanocobalamin (VITAMIN B-12) 100 MCG tablet Take 100 mcg by mouth daily.      furosemide (LASIX) 20 MG tablet 20 mg daily.      lansoprazole (PREVACID) 30 MG capsule Take 30 mg by mouth daily.      magnesium oxide (MAG-OX) 400 mg tablet Take 400 mg by mouth 2 (two) times a day.     midodrine (PROAMATINE) 5 MG tablet Take 1 tablet (5 mg total) by mouth 3 (three) times a day with meals.     mirtazapine (REMERON) 45 MG tablet TAKE 1 TABLET BY MOUTH AT BEDTIME  *1 TOTAL FILL*     multivitamin (MULTIVITAMIN) per tablet Take 1 tablet by mouth daily.     nitroglycerin (NITROSTAT) 0.4 MG SL tablet Place 0.4 mg under the tongue every 5 (five) minutes as needed.      polyethylene glycol (MIRALAX) 17 gram packet Take 17 g by mouth daily.     pramipexole (MIRAPEX) 0.25 MG tablet Take 1 tablet (0.25 mg total) by mouth at bedtime.     solifenacin (VESICARE) 10 MG tablet Take 10 mg by mouth daily.     traZODone (DESYREL) 150 MG tablet TAKE 1 TABLET BY MOUTH AT BEDTIME. *1 TOTAL FILL*     venlafaxine (EFFEXOR-XR) 150 MG 24 hr capsule TAKE 2 CAPSULES (300MG) BY MOUTH ONCE DAILY *1 TOTAL FILL*       Labs:  Results for orders placed or performed during the hospital encounter of 06/08/20   Basic Metabolic Panel   Result Value Ref Range    Sodium 142 136 - 145 mmol/L    Potassium 4.0 3.5 - 5.0 mmol/L    Chloride 106 98 - 107 mmol/L    CO2 32 (H) 22 - 31 mmol/L    Anion  Gap, Calculation 4 (L) 5 - 18 mmol/L    Glucose 90 70 - 125 mg/dL    Calcium 8.0 (L) 8.5 - 10.5 mg/dL    BUN 25 8 - 28 mg/dL    Creatinine 0.69 0.60 - 1.10 mg/dL    GFR MDRD Af Amer >60 >60 mL/min/1.73m2    GFR MDRD Non Af Amer >60 >60 mL/min/1.73m2     Lab Results   Component Value Date    WBC 3.6 (L) 06/13/2020    HGB 9.4 (L) 06/13/2020    HCT 31.4 (L) 06/13/2020    MCV 94 06/13/2020     (L) 06/13/2020     Lab Results   Component Value Date    TSH 1.09 07/28/2015     Vitamin D, Total (25-Hydroxy)   Date Value Ref Range Status   03/15/2018 43.9 30.0 - 80.0 ng/mL Final         Assessment/Plan:    Chest pain with a history of CAD: Continue aspirin 81 mg daily and atorvastatin 40 mg every evening, denies chest pain    Depression: Continue Wellbutrin 300 mg in AM, Remeron 45 mg at bedtime and Effexor 300 mg daily    Parkinson's: Continue Sinemet 25/100 mg 2 tabs 3 times daily    Vitamin D deficiency: Continue D3 4000 units daily    Vitamin B12 deficiency: continue cyanocobalamin 100mcg daily    Hypomagnesia: continue mag oxide 400mg two times a day    Htn: continue lasix 20mg daily and metoprolol succinate 12.5 mg daily, SBP <110    Hypotension: Continue midodrine 5 mg 3 times daily    GERD: Continue Prevacid 30 mg daily    Constipation: continue miralax daily    Insomnia: continue trazodone 150mg at HS    [facility]     The care plan has been reviewed and all orders signed. Changes to care plan, if any, as noted. Otherwise, continue care plan of care.  The total time spent with this patient was 35 minutes, with greater than 50% spent in counseling and coordination of care that included multiple issues per CP, BP and therapy, which lasted 18 minutes.    Post Discharge Medication Reconciliation Status: discharge medications reconciled and changed, per note/orders (see AVS)        Electronically signed by: Dexter Rico NP

## 2021-06-09 NOTE — PROGRESS NOTES
Correct pharmacy verified with patient and confirmed in snapshot? [x] yes []no    Medications Phoned  to Pharmacy [] yes [x]no  Name of Pharmacist:  List Medications, including dose, quantity and instructions      Medication Prescriptions given to patient   [] yes  [x] no   List the name of the drug the prescription was written for.       Medications ordered this visit were e-scribed.  Verified by order class [x] yes  [] no  Remeron 45 mg   Medication changes or discontinuations were communicated to patient's pharmacy: [] yes  [x] no   Remeron 30 mg no refills remaining  UA collected [] yes    [x] no    [place] Prescription Monitoring Program Reviewed? [] yes  [x] no    Referrals were made to:  none    Future appointment was made: [x] yes  [] no    Dictation completed at time of chart check: [x] yes  [] no    I have checked the documentation for today s encounters and the above information has been reviewed and completed.
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
## 2021-06-09 NOTE — PROGRESS NOTES
[facility] Admission note      Patient: Alison Bashir  MRN: [id number]  Date of Service: 7/14/2020      [facility] [[id number]]  Reason for Visit     Chief Complaint   Patient presents with     Review Of Multiple Medical Conditions   Evaluation for weakness and low blood pressures    Code Status     dnr    Assessment     -Persistent hypotension  -Underlying history of Parkinson's disease  -Cognitive impairment  --TBI  - Generalized weakness quite profound with a history of falls  -Failure to thrive    Plan     Admitted to the TCU.  She is admitted with significant gait instability and weakness and unfortunately she remains poorly ambulatory  She is primarily using a wheelchair  Blood pressures today appear to be stable  Nursing holds her metoprolol often and gives her midodrine.  Cognitively mood and behaviors have been stable  Recheck slums is 16/30  Recheck weights were done because of 10 pound weight loss and her actual weights are stable at 160 pounds with no further weight loss noted.  Patient oral intake appears to be adequate as per her  Continue with her PT OT and rehab discharge plan is to go back to group home if able  Per staff discharge planning has been initiated her accepting facility has requested she be covered negative before they will accept her and she is pending the results of those tests    History     Patient is a very pleasant 69 y.o. female who is admitted to TCU  Patient was evaluated due to persistent low blood pressure she was noted to have profound hypotension with bradycardia in the hospital and remains on midodrine for supplementation.  She continues to have several episodes of low blood pressure.  She is on low-dose of metoprolol nursing has been holding it quite often.  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  She is currently compliant with her restrictions    She also has a history of profound cognitive decline and is testing much worse cognitively than last time she was in the TCU as per my discussion with ZAHRA ALFORD 16/30    Past Medical History     Active Ambulatory (Non-Hospital) Problems    Diagnosis     Adjustment insomnia     Acute hypotension     RBBB     Chest pain, unspecified     Leukopenia     Gastroesophageal reflux disease with esophagitis     Personal history of fall     Chest pain     Chronic pain syndrome     Traumatic brain injury (H)     Stented coronary artery     RLS (restless legs syndrome)     Overactive bladder     HTN (hypertension)     Depression     CAD (coronary artery disease)     Alzheimer disease (H)     Orthostatic hypotension     Anemia     Cognitive impairment     Hip fracture (H)     Pain management     Constipation     Essential hypertension     Neuroleptic signed 8/29/16     Past Medical History:   Diagnosis Date     Acute blood loss anemia      Alzheimer disease (H)      CAD (coronary artery disease)      Chronic pain syndrome      Dementia (H)      Depression      Depression      Hip fracture, right (H) 12/21/2017     HTN (hypertension)      Kidney stone      Overactive bladder      PMB (postmenopausal bleeding)      RLS (restless legs syndrome)      Spine pain      Stented coronary artery      Traumatic brain injury (H)      Unsteady gait      UTI (lower urinary tract infection)        Past Social History     Reviewed, and she  reports that she has never smoked. She has never used smokeless tobacco. She reports that she does not drink alcohol or use drugs.    Family History     Reviewed, and family history includes Cancer in her mother; Coronary artery disease  in her father; Heart attack in her father; Heart failure in her father.    Medication List   Post Discharge Medication Reconciliation Status: discharge medications reconciled, continue medications without change   Current Outpatient Medications on File Prior to Visit   Medication Sig Dispense Refill     acetaminophen (TYLENOL) 500 MG tablet Take 500 mg by mouth every 6 (six) hours as needed for pain.       aspirin 81 mg chewable tablet Chew 81 mg daily.       atorvastatin (LIPITOR) 40 MG tablet Take 40 mg by mouth every evening.        buPROPion (WELLBUTRIN XL) 300 MG 24 hr tablet Take 300 mg by mouth every morning.        carbidopa-levodopa (SINEMET)  mg per tablet Take 2 tablets by mouth 3 (three) times a day.       cholecalciferol, vitamin D3, 1,000 unit tablet Take 4,000 Units by mouth daily.        cyanocobalamin (VITAMIN B-12) 100 MCG tablet Take 100 mcg by mouth daily.        furosemide (LASIX) 20 MG tablet 20 mg daily.        lansoprazole (PREVACID) 30 MG capsule Take 30 mg by mouth daily.        magnesium oxide (MAG-OX) 400 mg tablet Take 400 mg by mouth 2 (two) times a day.       metoprolol succinate (TOPROL-XL) 12.5 MG Take 12.5 mg by mouth daily.       midodrine (PROAMATINE) 5 MG tablet Take 1 tablet (5 mg total) by mouth 3 (three) times a day with meals. (Patient taking differently: Take 7.5 mg by mouth 3 (three) times a day with meals. ) 30 tablet 0     mirtazapine (REMERON) 45 MG tablet TAKE 1 TABLET BY MOUTH AT BEDTIME  *1 TOTAL FILL* 30 tablet 3     multivitamin (MULTIVITAMIN) per tablet Take 1 tablet by mouth daily.       nitroglycerin (NITROSTAT) 0.4 MG SL tablet Place 0.4 mg under the tongue every 5 (five) minutes as needed.        polyethylene glycol (MIRALAX) 17 gram packet Take 17 g by mouth daily.       pramipexole (MIRAPEX) 0.25 MG tablet Take 1 tablet (0.25 mg total) by mouth at bedtime. 30 tablet 0     solifenacin (VESICARE) 10 MG tablet Take 10 mg by mouth daily.       traZODone  (DESYREL) 150 MG tablet TAKE 1 TABLET BY MOUTH AT BEDTIME. *1 TOTAL FILL* 31 tablet 3     venlafaxine (EFFEXOR-XR) 150 MG 24 hr capsule TAKE 2 CAPSULES (300MG) BY MOUTH ONCE DAILY *1 TOTAL FILL* 60 capsule 3     No current facility-administered medications on file prior to visit.        Allergies     Allergies   Allergen Reactions     Blood-Group Specific Substance Other (See Comments)     Patient has anti-K. Blood product orders maybe delayed. Draw one red top and 2 purple top tubes for all Type and Screen/Red blood Cell product orders     Fd And C No.5 (Tartrazine)      GI UPSET     Ibuprofen Nausea And Vomiting     Morphine Rash     swelling       Review of Systems   A comprehensive review of 14 systems was done. Pertinent findings noted here and in history of present illness. All the rest negative.  Constitutional: Negative.  Negative for fever, chills, she has activity change, appetite change and fatigue.   HENT: Negative for congestion and facial swelling.    Eyes: Negative for photophobia, redness and visual disturbance.   Respiratory: Negative for cough and chest tightness.    Cardiovascular: Negative for chest pain, palpitations and leg swelling.   Gastrointestinal: Negative for nausea, diarrhea, constipation, blood in stool and abdominal distention.   Genitourinary: Negative.    Musculoskeletal: Negative.  Very weak  Skin: Negative.    Neurological: Negative for dizziness, tremors, syncope, weakness, light-headedness and headaches.   Hematological: Does not bruise/bleed easily.   Psychiatric/Behavioral: Negative.  Gets anxious with any therapy cognitively worse than before      Physical Exam     Recent Vitals 7/9/2020   Height -   Weight 144 lbs   BSA (m2) 1.8 m2   /70   Pulse 63   Temp 97   Temp src -   SpO2 94   Some recent data might be hidden     Recheck WT 160LB /79  Constitutional: Oriented to person, and appears well-developed.   HEENT:  Normocephalic and atraumatic.  Eyes: Conjunctivae  and EOM are normal. Pupils are equal, round, and reactive to light. No discharge.  No scleral icterus. Nose normal. Mouth/Throat: Oropharynx is clear and moist. No oropharyngeal exudate.    NECK: Normal range of motion. Neck supple. No JVD present. No tracheal deviation present. No thyromegaly present.   CARDIOVASCULAR: Normal rate, regular rhythm and intact distal pulses.  Exam reveals no gallop and no friction rub.  Systolic murmur present.  PULMONARY: Effort normal and breath sounds normal. No respiratory distress.No Wheezing or rales.  ABDOMEN: Soft. Bowel sounds are normal. No distension and no mass.  There is no tenderness. There is no rebound and no guarding. No HSM.  MUSCULOSKELETAL: Normal range of motion. No edema and no tenderness. Mild kyphosis, no tenderness.  LYMPH NODES: Has no cervical, supraclavicular, axillary and groin adenopathy.   NEUROLOGICAL: Alert and oriented to person. No cranial nerve deficit.  Normal muscle tone. Coordination normal.   GENITOURINARY: Deferred exam.  SKIN: Skin is warm and dry. No rash noted. No erythema. No pallor.   EXTREMITIES: No cyanosis, no clubbing, no edema. No Deformity.  PSYCHIATRIC: Normal mood, affect and behavior.  However exhibits anxiety with movement      Lab Results     Results for orders placed or performed in visit on 06/30/20   Basic Metabolic Panel   Result Value Ref Range    Sodium 144 136 - 145 mmol/L    Potassium 4.0 3.5 - 5.0 mmol/L    Chloride 105 98 - 107 mmol/L    CO2 33 (H) 22 - 31 mmol/L    Anion Gap, Calculation 6 5 - 18 mmol/L    Glucose 85 70 - 125 mg/dL    Calcium 8.4 (L) 8.5 - 10.5 mg/dL    BUN 33 (H) 8 - 28 mg/dL    Creatinine 0.76 0.60 - 1.10 mg/dL    GFR MDRD Af Amer >60 >60 mL/min/1.73m2    GFR MDRD Non Af Amer >60 >60 mL/min/1.73m2             NERY Sosa

## 2021-06-09 NOTE — PROGRESS NOTES
[facility]      Facility:    [facility] [[id number]]  Code Status: DNR      Chief Complaint/Reason for Visit:   Chief Complaint   Patient presents with     Review Of Multiple Medical Conditions     Parkinsons dx       HPI:   Alison is a 69 y.o. female who is a transfer from [facility] with an admission on 20 and discharge on 6/15/20. She has a PMH of Parkinsons dx, Htn, frequent UTIs, depression, CAD, GERD, leukopenia who presented to the hospital chest pain shortness of breath.  Had a negative work-up through cardiology.  Noted to have severe hypotension despite holding PTA medications.  Therefore midodrine was started at discharge, likely due to Parkinson's per neurology evaluation.  She was then discharged to TCU for additional rehab.    Today will assess vitals, hypotension tx and therapy progress.    Past Medical History:  Past Medical History:   Diagnosis Date     Acute blood loss anemia      Alzheimer disease (H)      CAD (coronary artery disease)      Chronic pain syndrome      Dementia (H)      Depression      Depression      Hip fracture, right (H) 2017     HTN (hypertension)      Kidney stone      Overactive bladder      PMB (postmenopausal bleeding)      RLS (restless legs syndrome)      Spine pain      Stented coronary artery      Traumatic brain injury (H)      Unsteady gait     uses walker     UTI (lower urinary tract infection)     on antibiotic course           Surgical History:  Past Surgical History:   Procedure Laterality Date      SECTION       CHOLECYSTECTOMY  May 2014     COMBINED HYSTEROSCOPY DIAGNOSTIC / D&C N/A 2014    Procedure: DILATION AND CURETTAGE WITH HYSTEROSCOPY;  Surgeon: Karime Cifuentes MD;  Location: [facility] OR;  Service:      CORONARY STENT PLACEMENT       HEMIARTHROPLASTY HIP Right 2017     INCISION AND DRAINAGE HIP Right 2018    ORIF REVISION      LUNG REMOVAL, PARTIAL        REVISION TOTAL HIP ARTHROPLASTY Right 01/10/2018     TONSILLECTOMY AND ADENOIDECTOMY       TOTAL KNEE ARTHROPLASTY Right 09/30/2016     TOTAL KNEE ARTHROPLASTY Left 11/2015       Family History:   Family History   Problem Relation Age of Onset     Cancer Mother      Heart attack Father      Coronary artery disease Father      Heart failure Father        Social History:    Social History     Socioeconomic History     Marital status: Single     Spouse name: Not on file     Number of children: Not on file     Years of education: Not on file     Highest education level: Not on file   Occupational History     Not on file   Social Needs     Financial resource strain: Not on file     Food insecurity     Worry: Not on file     Inability: Not on file     Transportation needs     Medical: Not on file     Non-medical: Not on file   Tobacco Use     Smoking status: Never Smoker     Smokeless tobacco: Never Used   Substance and Sexual Activity     Alcohol use: No     Drug use: No     Sexual activity: Not on file   Lifestyle     Physical activity     Days per week: Not on file     Minutes per session: Not on file     Stress: Not on file   Relationships     Social connections     Talks on phone: Not on file     Gets together: Not on file     Attends Spiritism service: Not on file     Active member of club or organization: Not on file     Attends meetings of clubs or organizations: Not on file     Relationship status: Not on file     Intimate partner violence     Fear of current or ex partner: Not on file     Emotionally abused: Not on file     Physically abused: Not on file     Forced sexual activity: Not on file   Other Topics Concern     Not on file   Social History Narrative    She lives in a group home right now. She can make her own medical decisions. Patient is not on supplemental O2 at home. Patient uses walker and wheelchair for ambulation. Please update sister Chelo.           Review of Systems   Constitutional: Positive  for activity change. Negative for appetite change, chills, diaphoresis and fatigue.        No concerns   HENT: Negative for congestion and hearing loss.    Eyes: Negative.    Cardiovascular: Negative.    Gastrointestinal: Negative for abdominal distention, abdominal pain, blood in stool, constipation, diarrhea and nausea.   Endocrine: Negative.    Genitourinary: Negative for difficulty urinating.   Musculoskeletal: Negative for gait problem.        Denies pain   Skin:        intact   Allergic/Immunologic: Negative.    Neurological: Positive for tremors. Negative for dizziness.   Psychiatric/Behavioral: Positive for confusion. Negative for agitation, hallucinations and sleep disturbance. The patient is not nervous/anxious.        There were no vitals filed for this visit.    Physical Exam  Vitals signs reviewed.   Constitutional:       Comments: Hypotension improved   HENT:      Head: Normocephalic.      Right Ear: External ear normal.      Left Ear: External ear normal.      Nose: No congestion or rhinorrhea.      Mouth/Throat:      Pharynx: No oropharyngeal exudate.   Eyes:      General:         Right eye: No discharge.         Left eye: No discharge.   Cardiovascular:      Rate and Rhythm: Normal rate.   Pulmonary:      Effort: Pulmonary effort is normal. No respiratory distress.      Comments: Room air, no auscultation per COVID-19 recommendations  Abdominal:      General: There is no distension.      Comments: No constipation diarrhea reported   Genitourinary:     Comments: Incontinent  Musculoskeletal:      Right lower leg: No edema.      Left lower leg: No edema.      Comments: History of Parkinson's, denies pain   Skin:     General: Skin is warm and dry.      Comments: intact   Neurological:      Mental Status: She is alert. She is disoriented.      Motor: Weakness present.      Comments: A/O x2   Psychiatric:         Mood and Affect: Mood normal.      Comments: No behaviors reported         Medication  List:  Current Outpatient Medications   Medication Sig     acetaminophen (TYLENOL) 500 MG tablet Take 500 mg by mouth every 6 (six) hours as needed for pain.     aspirin 81 mg chewable tablet Chew 81 mg daily.     atorvastatin (LIPITOR) 40 MG tablet Take 40 mg by mouth every evening.      buPROPion (WELLBUTRIN XL) 300 MG 24 hr tablet Take 300 mg by mouth every morning.      carbidopa-levodopa (SINEMET)  mg per tablet Take 2 tablets by mouth 3 (three) times a day.     cholecalciferol, vitamin D3, 1,000 unit tablet Take 4,000 Units by mouth daily.      cyanocobalamin (VITAMIN B-12) 100 MCG tablet Take 100 mcg by mouth daily.      furosemide (LASIX) 20 MG tablet 20 mg daily.      lansoprazole (PREVACID) 30 MG capsule Take 30 mg by mouth daily.      magnesium oxide (MAG-OX) 400 mg tablet Take 400 mg by mouth 2 (two) times a day.     metoprolol succinate (TOPROL-XL) 12.5 MG Take 12.5 mg by mouth daily.     midodrine (PROAMATINE) 5 MG tablet Take 1 tablet (5 mg total) by mouth 3 (three) times a day with meals. (Patient taking differently: Take 7.5 mg by mouth 3 (three) times a day with meals. )     mirtazapine (REMERON) 45 MG tablet TAKE 1 TABLET BY MOUTH AT BEDTIME  *1 TOTAL FILL*     multivitamin (MULTIVITAMIN) per tablet Take 1 tablet by mouth daily.     nitroglycerin (NITROSTAT) 0.4 MG SL tablet Place 0.4 mg under the tongue every 5 (five) minutes as needed.      polyethylene glycol (MIRALAX) 17 gram packet Take 17 g by mouth daily.     pramipexole (MIRAPEX) 0.25 MG tablet Take 1 tablet (0.25 mg total) by mouth at bedtime.     solifenacin (VESICARE) 10 MG tablet Take 10 mg by mouth daily.     traZODone (DESYREL) 150 MG tablet TAKE 1 TABLET BY MOUTH AT BEDTIME. *1 TOTAL FILL*     venlafaxine (EFFEXOR-XR) 150 MG 24 hr capsule TAKE 2 CAPSULES (300MG) BY MOUTH ONCE DAILY *1 TOTAL FILL*       Labs:  Results for orders placed or performed in visit on 06/30/20   Basic Metabolic Panel   Result Value Ref Range    Sodium  144 136 - 145 mmol/L    Potassium 4.0 3.5 - 5.0 mmol/L    Chloride 105 98 - 107 mmol/L    CO2 33 (H) 22 - 31 mmol/L    Anion Gap, Calculation 6 5 - 18 mmol/L    Glucose 85 70 - 125 mg/dL    Calcium 8.4 (L) 8.5 - 10.5 mg/dL    BUN 33 (H) 8 - 28 mg/dL    Creatinine 0.76 0.60 - 1.10 mg/dL    GFR MDRD Af Amer >60 >60 mL/min/1.73m2    GFR MDRD Non Af Amer >60 >60 mL/min/1.73m2     Lab Results   Component Value Date    WBC 3.5 (L) 06/30/2020    HGB 10.2 (L) 06/30/2020    HCT 34.1 (L) 06/30/2020    MCV 94 06/30/2020     06/30/2020     Lab Results   Component Value Date    TSH 1.09 07/28/2015     Vitamin D, Total (25-Hydroxy)   Date Value Ref Range Status   03/15/2018 43.9 30.0 - 80.0 ng/mL Final         Assessment/Plan:    Chest pain with a history of CAD: Continue aspirin 81 mg daily and atorvastatin 40 mg every evening, denies chest pain    Depression: Continue Wellbutrin 300 mg in AM, Remeron 45 mg at bedtime and Effexor 300 mg daily    Parkinson's: Continue Sinemet 25/100 mg 2 tabs 3 times daily    Vitamin D deficiency: Continue D3 4000 units daily    Vitamin B12 deficiency: continue cyanocobalamin 100mcg daily    Hypomagnesia: continue mag oxide 400mg two times a day    Htn: continue lasix 20mg daily and metoprolol succinate 12.5 mg daily, SBP <110    CAD: continue metoprolol succinate 12.5mg daily, HR <70    Orthostatic hypotension: Probably due to Parkinson's dysfunction, prior increased midodrine to 7.5 mg 3 times daily, improved    GERD: Continue Prevacid 30 mg daily    Constipation: continue miralax daily    Insomnia: continue trazodone 150mg at HS    Therapy: has plateau'd, will be ending next week    Disposition: Group home is not taking her back, TBD. ROCÍO 16/30   CPT 4.9      Electronically signed by: Dexter Rico NP

## 2021-06-09 NOTE — PROGRESS NOTES
Outpatient Followup Psychiatric Evaluation      Pertinent History: Patient presents today for the purposes of medication management.  She has a history of a mood disorder and also with prior psychotic symptoms.  We did not make any changes at the last clinic appointment.  The fairly recent past we increase the patient's Effexor and the Remeron.  At the last visit I did decrease the at bedtime Zyprexa.    Current Symptoms:   Patient has had multiple issues since our last meeting.  She did have knee surgery and was sent to a rehab facility and then home.  She apparently then broke her ankle and she had a heart attack.  She is required a cardiac stent.  She is about to start cardiac rehabilitation.  She is currently being treated for UTI.  The team reports that there is been very poor appetite.  She is not drinking fluids.  She is disinterested and appears depressed.      Current Medications: Please see chart    Medication Compliance: Yes    Side Effects to Medications: No      Vitals:  Wt Readings from Last 3 Encounters:   11/28/16 212 lb (96.2 kg)   08/29/16 219 lb (99.3 kg)   02/29/16 (!) 245 lb (111.1 kg)     Temp Readings from Last 3 Encounters:   08/29/16 97.6  F (36.4  C) (Oral)   02/29/16 98.1  F (36.7  C) (Oral)   11/16/15 98.3  F (36.8  C) (Oral)     BP Readings from Last 3 Encounters:   11/28/16 126/60   08/29/16 139/63   02/29/16 119/68     Pulse Readings from Last 3 Encounters:   11/28/16 87   08/29/16 80   02/29/16 79         Mental Status Exam:   The patient was flat and slow but fairly well groomed.  Only occasional eye contact.  No initiation.  She needed multiple prompts.  She was not loose or agitated.  Speech was rare slow and simple.  She occasionally shook and nodded her head.  Affect was flat.  Mood was depressed.  Concentration and attention are both impaired.  Thought content does not show any hallucinations or delusions.  No suicidal or homicidal ideation.  Thought formation does not show the  patient to be loose.  Insight, judgment and memory all appear baseline impaired and unchanged.    Diagnosis managed and treated at today's visit :    Axis I: Major depressive disorder   History of psychosis NOS    Axis II: Deferred    Axis III: See intake note    Plan:  Medication Adjustment:  Discontinue the at bedtime Zyprexa.  I have increased the Remeron to 45 mg at night.  We will continue with the Effexor at this time.    Other: Patient will return to clinic in 3 months for further assessment and treatment the staff and patient agreed to return sooner or call if questions concerns or problems arise.    Continue with the support of the clinic, reassurance, and redirection. Staff monitoring and ongoing assessments per team plan. Current psychotropic medication appears to represent the minimum effective dosage and appears medically necessary. We will continue to monitor and reassess. This team will utilize appropriate emergency services if necessary. I will make myself available if concerns or problems arise.    Norberto Johnson

## 2021-06-09 NOTE — PROGRESS NOTES
3/22/2017    Start time: 10:05    Stop Time: 11:00   Session # 3    Alison Bashir is a 66 y.o. female is being seen today for    Chief Complaint   Patient presents with      Follow Up     Follow up in regards to ongoing symptom management of anxiety and depression.     New symptoms or complaints: None    Functional Impairment:   Personal: 4  Family: 3  Social: 4  Work: 0    Clinical assessment of mental status:   Alison Bashir presented on time.   She was oriented x3, open and cooperative, and dressed appropriately for this session and weather. Her memory was Normal cognitive functioning .  Her speech was  Soft.  Language was appropriate when she talks, most of the talking are being done by Marlen.  Concentration and focus is Within normal. Psychosis is not noted or reported. She reports her mood is Congruent w/content of speech.  Affect is congruent with speech and is Tearful, Anxious and Depressed.  Fund of knowledge is adequate. Insight is adequate for therapy.    Suicidal/Homicidal Ideation present: Patient denies suicidal and homicidal ideations/means or plans.     Patient's impression of their current status:Patient presented to the clinic with her , Marlen. Patient reports no change in her mood; feeling depressed and continues with self defeating thinking that she is a bad mother and she is not a good person. She still has not had her hearing Aid yet, Alona is still working with her insurance to approve the prescription. Meanwhile, Marlen is the one who speaks most of the time. Today, they came in at wrong time. They were able to be seen then as there was a cancellation. Marlen stated she looked at a wrong date on her calendar.  Patient is healing well from her foot operation. She and Marlen stated that she has been able to walk some short distance without a walker. Patient continues to express frustration from not seeing her older son. Marlen was able to sort out with the patient what  might have happened between the patient and her older son. Patient was agreeable to let it go and focus on herself. She also was agreeable to stay in touch with her other family members for support. Patient stated she felt better and wants to continue wit therapy weekly.     Therapist impression of patients current state: This 66 y.o. White or  female presented to the clinic today with her  Marlen. Patient was able to walk without a walker to this writers' office. Writer actively listened to the patient; offered empathy, validated her feelings where appropriate and guided the patient and Marlen with some practical strategies to cope with presenting issues today especially how to challenge her negative thinking especially the cognitive distortions reported or noted  in the session.  Patient will benefit from different modalities that include patient's centered, CBT; existential approach, motivational interviewing, and DBT. Today, writer introduced the 3 Cs concept in the patient and practiced it with some examples from Marlen. Patient and Marlen reported satisfaction from today's intervention.    Assessment tools used today include:  How to you feel today?    Type of psychotherapeutic technique provided: Client centered and CBT    Progress toward short term goals:reports feeling the same( anxious and depressed)    Review of long term goals: initial treatment plan: 3/01/2017 next review: 6/01/2017 .    Diagnosis:   1. MDD (major depressive disorder), recurrent episode, moderate    2. FOZIA (generalized anxiety disorder)    No change    Plan and Follow-up:   1.  Patient to continue taking her medications as prescribed.  2. Patient will using positive self talk to feel better, as needed  3. Patient will participate in different activities held at her group home   4.Patient will challenge her negative thinking using the 3 Cs with the help from the   5. Patient will see this  therapist in a week    Discharge Criteria/Planning: Client has chronic symptoms and ongoing therapy for maintenance stability recommended.    Performed and documented by SHANTI Dee- YANELY; [facility] 3/22/2017    This note has been dictated using voice recognition software. Any grammatical or context distortions are unintentional and inherent to the software.

## 2021-06-09 NOTE — PROGRESS NOTES
3/1/2017    Start time: 8:10    Stop Time: 9:00   Session # 1    Alison Bashir is a 66 y.o. female is being seen today for    Chief Complaint   Patient presents with      Follow Up   .     Follow up in regards to ongoing symptom management of anxiety and depression.     New symptoms or complaints: None    Functional Impairment:   Personal: 4  Family: 2  Social: 4  Work: 0    Clinical assessment of mental status:   Alison Bashir presented on time.   She was oriented x3, open and cooperative, and dressed appropriately for this session and weather. Her memory was Normal cognitive functioning .  Her speech was  Soft.  Language was appropriate when she talks, most of the talking are being done by Marlen.  Concentration and focus is Within normal. Psychosis is not noted or reported. She reports her mood is Congruent w/content of speech.  Affect is congruent with speech and is Anxious and Depressed.  Fund of knowledge is adequate. Insight is adequate for therapy.    Suicidal/Homicidal Ideation present: Patient denies suicidal and homicidal ideations/means or plans.     Patient's impression of their current status: patient presented to the clinic with her , Marlen. Patient reports no change in her mood; feeling depressed. She still has not had her hearing Aid yet so, Marlen is the one who speaks most of the time. Marlen assisted with identifying the patient's needs to include in the treatment plan. Patient and Marlen see that a weekly session will be helpful which was supported by this writer. Patients' plan will be targeting depression and anxiety. Marlen identified low self esteem and lack of self confidence being the every day struggle with the patient. Patient nodded positive and wishes to do something about it. Her next session is scheduled for next week.     Therapist impression of patients current state: This 66 y.o. White or  female presented to the clinic today with her group home  supervisor Marlen. Writer actively listened to the patient; offered empathy, validated her feelings where appropriate and guided the patient and Marlen with some practical strategies to cope with presenting issues today. Writer went over the results from the assessment, answered questions about these results as needed and guided the patient Marlen developing the treatment plan. Patient will benefit from different modalities that include patient's centered, CBT; existential approach, motivational interviewing, and DBT.     Assessment tools used today include:  FOZIA-7, PHQ-9, CAGE-AID, Mood Disorder Questionnaire, PANSI, WHODAS 2.0     Type of psychotherapeutic technique provided: Client centered, CBT and initial treatment Plan    Progress toward short term goals:reports feeling the same( anxious and depressed)    Review of long term goals: initial treatment plan: 3/01/2017 next review: 6/01/2017 .    Diagnosis:   1. MDD (major depressive disorder), recurrent episode, moderate    2. FOZIA (generalized anxiety disorder)    No change    Plan and Follow-up:   1.  Patient to continue taking her medications as prescribed.  2. Patient will using positive self talk to feel better, as needed  3. Patient will see this therapist in a week    Discharge Criteria/Planning: Client has chronic symptoms and ongoing therapy for maintenance stability recommended.    Performed and documented by SHANTI Dee- YANELY; [facility] 3/1/2017    This note has been dictated using voice recognition software. Any grammatical or context distortions are unintentional and inherent to the software.

## 2021-06-09 NOTE — PROGRESS NOTES
[facility]      Facility:    [facility] [[id number]]  Code Status: DNR      Chief Complaint/Reason for Visit:   Chief Complaint   Patient presents with     Review Of Multiple Medical Conditions     hypotension       HPI:   Alison is a 69 y.o. female who is a transfer from [facility] with an admission on 20 and discharge on 6/15/20. She has a PMH of Parkinsons dx, Htn, frequent UTIs, depression, CAD, GERD, leukopenia who presented to the hospital chest pain shortness of breath.  Had a negative work-up through cardiology.  Noted to have severe hypotension despite holding PTA medications.  Therefore midodrine was started at discharge, likely due to Parkinson's per neurology evaluation.  She was then discharged to TCU for additional rehab.    Today will assess vitals, labs and therapy progress.    Past Medical History:  Past Medical History:   Diagnosis Date     Acute blood loss anemia      Alzheimer disease (H)      CAD (coronary artery disease)      Chronic pain syndrome      Dementia (H)      Depression      Depression      Hip fracture, right (H) 2017     HTN (hypertension)      Kidney stone      Overactive bladder      PMB (postmenopausal bleeding)      RLS (restless legs syndrome)      Spine pain      Stented coronary artery      Traumatic brain injury (H)      Unsteady gait     uses walker     UTI (lower urinary tract infection)     on antibiotic course           Surgical History:  Past Surgical History:   Procedure Laterality Date      SECTION       CHOLECYSTECTOMY  May 2014     COMBINED HYSTEROSCOPY DIAGNOSTIC / D&C N/A 2014    Procedure: DILATION AND CURETTAGE WITH HYSTEROSCOPY;  Surgeon: Karime Cifuentes MD;  Location: [facility] OR;  Service:      CORONARY STENT PLACEMENT       HEMIARTHROPLASTY HIP Right 2017     INCISION AND DRAINAGE HIP Right 2018    ORIF REVISION      LUNG REMOVAL, PARTIAL       REVISION TOTAL  HIP ARTHROPLASTY Right 01/10/2018     TONSILLECTOMY AND ADENOIDECTOMY       TOTAL KNEE ARTHROPLASTY Right 09/30/2016     TOTAL KNEE ARTHROPLASTY Left 11/2015       Family History:   Family History   Problem Relation Age of Onset     Cancer Mother      Heart attack Father      Coronary artery disease Father      Heart failure Father        Social History:    Social History     Socioeconomic History     Marital status: Single     Spouse name: Not on file     Number of children: Not on file     Years of education: Not on file     Highest education level: Not on file   Occupational History     Not on file   Social Needs     Financial resource strain: Not on file     Food insecurity     Worry: Not on file     Inability: Not on file     Transportation needs     Medical: Not on file     Non-medical: Not on file   Tobacco Use     Smoking status: Never Smoker     Smokeless tobacco: Never Used   Substance and Sexual Activity     Alcohol use: No     Drug use: No     Sexual activity: Not on file   Lifestyle     Physical activity     Days per week: Not on file     Minutes per session: Not on file     Stress: Not on file   Relationships     Social connections     Talks on phone: Not on file     Gets together: Not on file     Attends Hoahaoism service: Not on file     Active member of club or organization: Not on file     Attends meetings of clubs or organizations: Not on file     Relationship status: Not on file     Intimate partner violence     Fear of current or ex partner: Not on file     Emotionally abused: Not on file     Physically abused: Not on file     Forced sexual activity: Not on file   Other Topics Concern     Not on file   Social History Narrative    She lives in a group home right now. She can make her own medical decisions. Patient is not on supplemental O2 at home. Patient uses walker and wheelchair for ambulation. Please update sister Chelo.           Review of Systems   Constitutional: Positive for activity  "change and fatigue. Negative for appetite change, chills and diaphoresis.        No concerns   HENT: Negative for congestion and hearing loss.    Eyes: Negative.    Cardiovascular: Negative.    Gastrointestinal: Negative for abdominal distention, abdominal pain, blood in stool, constipation, diarrhea and nausea.   Endocrine: Negative.    Genitourinary: Negative for difficulty urinating.   Musculoskeletal: Positive for gait problem.        Denies pain   Skin:        intact   Allergic/Immunologic: Negative.    Neurological: Positive for tremors. Negative for dizziness.   Psychiatric/Behavioral: Positive for confusion. Negative for agitation, hallucinations and sleep disturbance. The patient is not nervous/anxious.        Vitals:    07/03/20 1034   BP: 124/67   Pulse: 64   Resp: 16   Temp: 98  F (36.7  C)   SpO2: 95%   Weight: 157 lb (71.2 kg)   Height: 5' 10\" (1.778 m)       Physical Exam  Constitutional:       Comments: hypotension   HENT:      Head: Normocephalic.      Right Ear: External ear normal.      Left Ear: External ear normal.      Nose: No congestion or rhinorrhea.      Mouth/Throat:      Pharynx: No oropharyngeal exudate.   Eyes:      General:         Right eye: No discharge.         Left eye: No discharge.   Cardiovascular:      Rate and Rhythm: Normal rate.   Pulmonary:      Effort: Pulmonary effort is normal. No respiratory distress.      Comments: Room air, no auscultation per COVID-19 recommendations  Abdominal:      General: There is no distension.      Comments: No constipation diarrhea reported   Genitourinary:     Comments: Incontinent  Musculoskeletal:      Right lower leg: No edema.      Left lower leg: No edema.      Comments: History of Parkinson's, denies pain   Skin:     General: Skin is warm and dry.      Comments: intact   Neurological:      Mental Status: She is alert. She is disoriented.      Motor: Weakness present.      Comments: A/O x2   Psychiatric:         Mood and Affect: Mood " normal.      Comments: No behaviors reported         Medication List:  Current Outpatient Medications   Medication Sig     acetaminophen (TYLENOL) 500 MG tablet Take 500 mg by mouth every 6 (six) hours as needed for pain.     aspirin 81 mg chewable tablet Chew 81 mg daily.     atorvastatin (LIPITOR) 40 MG tablet Take 40 mg by mouth every evening.      buPROPion (WELLBUTRIN XL) 300 MG 24 hr tablet Take 300 mg by mouth every morning.      carbidopa-levodopa (SINEMET)  mg per tablet Take 2 tablets by mouth 3 (three) times a day.     cholecalciferol, vitamin D3, 1,000 unit tablet Take 4,000 Units by mouth daily.      cyanocobalamin (VITAMIN B-12) 100 MCG tablet Take 100 mcg by mouth daily.      furosemide (LASIX) 20 MG tablet 20 mg daily.      lansoprazole (PREVACID) 30 MG capsule Take 30 mg by mouth daily.      magnesium oxide (MAG-OX) 400 mg tablet Take 400 mg by mouth 2 (two) times a day.     metoprolol succinate (TOPROL-XL) 12.5 MG Take 12.5 mg by mouth daily.     midodrine (PROAMATINE) 5 MG tablet Take 1 tablet (5 mg total) by mouth 3 (three) times a day with meals. (Patient taking differently: Take 7.5 mg by mouth 3 (three) times a day with meals. )     mirtazapine (REMERON) 45 MG tablet TAKE 1 TABLET BY MOUTH AT BEDTIME  *1 TOTAL FILL*     multivitamin (MULTIVITAMIN) per tablet Take 1 tablet by mouth daily.     nitroglycerin (NITROSTAT) 0.4 MG SL tablet Place 0.4 mg under the tongue every 5 (five) minutes as needed.      polyethylene glycol (MIRALAX) 17 gram packet Take 17 g by mouth daily.     pramipexole (MIRAPEX) 0.25 MG tablet Take 1 tablet (0.25 mg total) by mouth at bedtime.     solifenacin (VESICARE) 10 MG tablet Take 10 mg by mouth daily.     traZODone (DESYREL) 150 MG tablet TAKE 1 TABLET BY MOUTH AT BEDTIME. *1 TOTAL FILL*     venlafaxine (EFFEXOR-XR) 150 MG 24 hr capsule TAKE 2 CAPSULES (300MG) BY MOUTH ONCE DAILY *1 TOTAL FILL*       Labs:  Results for orders placed or performed in visit on  06/30/20   Basic Metabolic Panel   Result Value Ref Range    Sodium 144 136 - 145 mmol/L    Potassium 4.0 3.5 - 5.0 mmol/L    Chloride 105 98 - 107 mmol/L    CO2 33 (H) 22 - 31 mmol/L    Anion Gap, Calculation 6 5 - 18 mmol/L    Glucose 85 70 - 125 mg/dL    Calcium 8.4 (L) 8.5 - 10.5 mg/dL    BUN 33 (H) 8 - 28 mg/dL    Creatinine 0.76 0.60 - 1.10 mg/dL    GFR MDRD Af Amer >60 >60 mL/min/1.73m2    GFR MDRD Non Af Amer >60 >60 mL/min/1.73m2     Lab Results   Component Value Date    WBC 3.5 (L) 06/30/2020    HGB 10.2 (L) 06/30/2020    HCT 34.1 (L) 06/30/2020    MCV 94 06/30/2020     06/30/2020     Lab Results   Component Value Date    TSH 1.09 07/28/2015     Vitamin D, Total (25-Hydroxy)   Date Value Ref Range Status   03/15/2018 43.9 30.0 - 80.0 ng/mL Final         Assessment/Plan:    Chest pain with a history of CAD: Continue aspirin 81 mg daily and atorvastatin 40 mg every evening, denies chest pain    Depression: Continue Wellbutrin 300 mg in AM, Remeron 45 mg at bedtime and Effexor 300 mg daily    Parkinson's: Continue Sinemet 25/100 mg 2 tabs 3 times daily    Vitamin D deficiency: Continue D3 4000 units daily    Vitamin B12 deficiency: continue cyanocobalamin 100mcg daily    Hypomagnesia: continue mag oxide 400mg two times a day    Htn: continue lasix 20mg daily and metoprolol succinate 12.5 mg daily, SBP <110    Orthostatic hypotension: Probably due to Parkinson's dysfunction, increase midodrine to 7.5 mg 3 times daily    GERD: Continue Prevacid 30 mg daily    Constipation: continue miralax daily    Insomnia: continue trazodone 150mg at HS    Disposition: JEYSON ALFORD 16/30      Electronically signed by: Dexter Rico NP

## 2021-06-09 NOTE — PROGRESS NOTES
"[facility]   Video Visit    Code Status: DNR    Alison Bashir is a 69 y.o. female who is being evaluated via a billable video visit.      The patient has been notified of following:     \"This video visit will be conducted via a call between you and your physician/provider. We have found that certain health care needs can be provided without the need for an in-person physical exam.  This service lets us provide the care you need with a video conversation.  If a prescription is necessary we can send it to the facility team.  If lab work is needed we can place an order through the facility team to have that test done at a later time.    If during the course of the call the physician/provider feels a video visit is not appropriate, you will not be charged for this service.\"     Physician/Provider has received verbal consent for a Video Visit from the patient? Yes    Patient would like the video invitation sent by: Send to: [email or web address]    Video Start Time: 9.25 am    Chief Complaint/Reason for Visit:  Chief Complaint   Patient presents with     Review Of Multiple Medical Conditions     Evaluation for acute hypertension and Parkinson's  HPI:   Alison is a 69 y.o. female who is admitted to the TCU.  Patient was admitted in the hospital with chest pain.  Cardiology was consulted.  Due to underlying history of CAD and stenting she underwent a nuclear stress test which was unrevealing for any reversible ischemia  She has been discharged on medical management.  Currently denying any chest pain and plans to follow-up with cardiology as an outpatient    She was noted to have profound hypotension with bradycardia.  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  She is also has some cognitive decline but overall mood and behaviors has been stable    I have reviewed and updated the patient's Past Medical History, including history of traumatic brain injury hypertension and coronary artery disease    Social History,lives in a group home; no smoking or etoh     Family History includes cancer in her mother; father had cad and chf   and Medication List.    ALLERGIES  Blood-group specific substance; Fd and c no.5 (tartrazine); Ibuprofen; and Morphine    Review of Systems  Constitutional: Negative.  Negative for fever, chills, she has activity change, appetite change and fatigue.   Loss of 50lb in last few months  HENT: Negative for congestion and facial swelling.    Eyes: Negative for photophobia, redness and visual disturbance.   Respiratory: Negative for cough and chest tightness.    Cardiovascular: Negative for chest pain, palpitations and leg swelling.   Gastrointestinal: Negative for nausea, diarrhea, constipation, blood in stool and abdominal distention.   Genitourinary: Has chronic incontinence due to overactive bladder    Musculoskeletal: Has gait instability and falls requires assistance with ADLs  Skin: Negative.    Neurological: Negative for dizziness, tremors, syncope, weakness, light-headedness and headaches.   Hematological: Does not bruise/bleed easily.   Psychiatric/Behavioral: Negative.          Physical exam  Wt 157 lb  Bp 90 / 58  t 98 p70  GENERAL: no acute distress. Cooperative in conversation.   Appears very frail and weak  HEENT: pupils are equal, round and reactive. Oral mucosa is moist and intact.  RESP:Chest  symmetric. Regular respiratory rate. No stridor.  ABD: Nondistended, soft.  EXTREMITIES: No lower extremity edema.  Range of movement in the legs especially right lower extremity is quite limited  NEURO: non focal. Alert and oriented x3.  Recall is impaired  PSYCH: within normal limits. No depression or anxiety.  SKIN: warm dry intact         Medication List:  Current Outpatient Medications   Medication Sig     acetaminophen (TYLENOL) 500 MG tablet Take 500 mg by mouth every 6 (six) hours as needed for pain.     aspirin 81 mg chewable tablet Chew 81 mg daily.     atorvastatin (LIPITOR) 40 MG tablet Take 40 mg by mouth every evening.      buPROPion (WELLBUTRIN XL) 300 MG 24 hr tablet Take 300 mg by mouth every morning.      carbidopa-levodopa (SINEMET)  mg per tablet Take 2 tablets by mouth 3 (three) times a day.     cholecalciferol, vitamin D3, 1,000 unit tablet Take 4,000 Units by mouth daily.      cyanocobalamin (VITAMIN B-12) 100 MCG tablet Take 100 mcg by mouth daily.      furosemide (LASIX) 20 MG tablet 20 mg daily.      lansoprazole (PREVACID) 30 MG capsule Take 30 mg by mouth daily.      magnesium oxide (MAG-OX) 400 mg tablet Take 400 mg by mouth 2 (two) times a day.     metoprolol succinate (TOPROL-XL) 12.5 MG Take 12.5 mg by mouth daily.     midodrine (PROAMATINE) 5 MG tablet Take 1 tablet (5 mg total) by mouth 3 (three) times a day with meals.     mirtazapine (REMERON) 45 MG tablet TAKE 1 TABLET BY MOUTH AT BEDTIME  *1 TOTAL FILL*     multivitamin (MULTIVITAMIN) per tablet Take 1 tablet by mouth daily.     nitroglycerin (NITROSTAT) 0.4 MG SL tablet Place 0.4 mg under the tongue every 5 (five) minutes as needed.      polyethylene glycol (MIRALAX) 17 gram packet Take 17 g by mouth daily.     pramipexole (MIRAPEX) 0.25 MG tablet Take 1 tablet (0.25 mg total) by mouth at bedtime.     solifenacin (VESICARE) 10 MG tablet Take 10 mg by mouth daily.     traZODone (DESYREL) 150 MG tablet TAKE 1 TABLET BY  MOUTH AT BEDTIME. *1 TOTAL FILL*     venlafaxine (EFFEXOR-XR) 150 MG 24 hr capsule TAKE 2 CAPSULES (300MG) BY MOUTH ONCE DAILY *1 TOTAL FILL*       Labs:  Recent Results (from the past 240 hour(s))   COVID-19 Virus PCR MRF    Specimen: Respiratory   Result Value Ref Range    COVID-19 VIRUS SPECIMEN SOURCE Nasopharyngeal     2019-nCOV Not Detected        Assessment/Plan:  -History of chest pain/shortness of breath currently resolved  -Underlying history of coronary artery disease with stenting on medical management  -Profound hypotension with orthostatic hypotension  - Bradycardia  -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  She was admitted to the hospital and had extensive evaluation both by cardiology and neurology.  Noted to be hypotensive suspect this is partly related to poor oral intake and declining weights.  She has lost greater than 40 pounds in the last 1 year.  She is currently on midodrine for support.  Nursing given hold parameters for her metoprolol due to persistent hypotension noted.  She is continuing with therapy but unfortunately is wheelchair-bound and probably will discharge at the level back to her group home.  She is also noticing cognitive decline and significant autonomic instability.  Encourage oral intake.  Continue with her PT OT and rehab remains a high fall risk        Video-Visit Details    Type of service:  Video Visit    Video End Time (time video stopped): 9.40am    Originating Location (pt. Location):[facility] [[id number]]    Distant Location (provider location):  [facility]     Mode of Communication:  Zoom Video Conference        NERY Sosa

## 2021-06-09 NOTE — PROGRESS NOTES
[facility] Admission note      Patient: Alison Bashir  MRN: [id number]  Date of Service: 7/21/2020      [facility] [[id number]]  Reason for Visit     Chief Complaint   Patient presents with     Discharge Summary   Evaluation for weakness and low blood pressures    Code Status     dnr    Assessment     -Persistent hypotension  -Underlying history of Parkinson's disease  -Cognitive impairment  --TBI  - Generalized weakness quite profound with a history of falls  -Failure to thrive        History     Patient is a very pleasant 69 y.o. female who is admitted to TCU  Patient was evaluated due to persistent low blood pressure she was noted to have profound hypotension with bradycardia in the hospital and remains on midodrine for supplementation.  Unfortunately blood pressures have not improved and she will have frequent episodes of low blood pressure in spite of being on midodrine for supplementation.  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  She is currently compliant with her restrictions    She also has a history of profound cognitive decline and is testing much worse cognitively than last time she was in the TCU as per my discussion with ZAHRA ALFORD 16/30  Unfortunately she has not done a huge amount of progress.  She has been refusing to ambulate and some degree of anxiety and falls risk also contributes to that.  She continues to require a higher level of care.  There is also ongoing concern of progressive weight loss in the last couple of months.  This is secondary to declining  intake.  In the nursing home due to aggressive supplementation weights have gone slightly up  pounds      Past Medical History     Active Ambulatory (Non-Hospital) Problems    Diagnosis     Magnesium deficiency     Adjustment insomnia     Acute hypotension     RBBB     Chest pain, unspecified     Leukopenia     Gastroesophageal reflux disease with esophagitis     Personal history of fall     Chest pain     Chronic pain syndrome     Traumatic brain injury (H)     Stented coronary artery     RLS (restless legs syndrome)     Overactive bladder     HTN (hypertension)     Depression     CAD (coronary artery disease)     Alzheimer disease (H)     Orthostatic hypotension     Anemia     Cognitive impairment     Hip fracture (H)     Pain management     Constipation     Essential hypertension     Neuroleptic signed 8/29/16     Past Medical History:   Diagnosis Date     Acute blood loss anemia      Alzheimer disease (H)      CAD (coronary artery disease)      Chronic pain syndrome      Dementia (H)      Depression      Depression      Hip fracture, right (H) 12/21/2017     HTN (hypertension)      Kidney stone      Overactive bladder      PMB (postmenopausal bleeding)      RLS (restless legs syndrome)      Spine pain      Stented coronary artery      Traumatic brain injury (H)      Unsteady gait      UTI (lower urinary tract infection)        Past Social History     Reviewed, and she  reports that she has never smoked. She has never used smokeless tobacco. She reports that she does not drink alcohol or use drugs.    Family History     Reviewed, and family history includes Cancer in her mother; Coronary artery disease in her father; Heart attack in her father; Heart failure in her father.    Medication List   Post Discharge Medication Reconciliation Status: discharge medications reconciled, continue medications without change   Current Outpatient Medications on File Prior to Visit   Medication Sig Dispense Refill      acetaminophen (TYLENOL) 500 MG tablet Take 500 mg by mouth every 6 (six) hours as needed for pain.       aspirin 81 mg chewable tablet Chew 81 mg daily.       atorvastatin (LIPITOR) 40 MG tablet Take 40 mg by mouth every evening.        buPROPion (WELLBUTRIN XL) 300 MG 24 hr tablet Take 300 mg by mouth every morning.        carbidopa-levodopa (SINEMET)  mg per tablet Take 2 tablets by mouth 3 (three) times a day.       cholecalciferol, vitamin D3, 1,000 unit tablet Take 4,000 Units by mouth daily.        cyanocobalamin (VITAMIN B-12) 100 MCG tablet Take 100 mcg by mouth daily.        furosemide (LASIX) 20 MG tablet 20 mg daily.        lansoprazole (PREVACID) 30 MG capsule Take 30 mg by mouth daily.        magnesium oxide (MAG-OX) 400 mg tablet Take 400 mg by mouth 2 (two) times a day.       metoprolol succinate (TOPROL-XL) 12.5 MG Take 12.5 mg by mouth daily.       midodrine (PROAMATINE) 5 MG tablet Take 1 tablet (5 mg total) by mouth 3 (three) times a day with meals. (Patient taking differently: Take 7.5 mg by mouth 3 (three) times a day with meals. ) 30 tablet 0     mirtazapine (REMERON) 45 MG tablet TAKE 1 TABLET BY MOUTH AT BEDTIME  *1 TOTAL FILL* 30 tablet 3     multivitamin (MULTIVITAMIN) per tablet Take 1 tablet by mouth daily.       nitroglycerin (NITROSTAT) 0.4 MG SL tablet Place 0.4 mg under the tongue every 5 (five) minutes as needed.        polyethylene glycol (MIRALAX) 17 gram packet Take 17 g by mouth daily.       pramipexole (MIRAPEX) 0.25 MG tablet Take 1 tablet (0.25 mg total) by mouth at bedtime. 30 tablet 0     solifenacin (VESICARE) 10 MG tablet Take 10 mg by mouth daily.       traZODone (DESYREL) 150 MG tablet TAKE 1 TABLET BY MOUTH AT BEDTIME. *1 TOTAL FILL* 31 tablet 3     venlafaxine (EFFEXOR-XR) 150 MG 24 hr capsule TAKE 2 CAPSULES (300MG) BY MOUTH ONCE DAILY *1 TOTAL FILL* 60 capsule 3     No current facility-administered medications on file prior to visit.        Allergies      Allergies   Allergen Reactions     Blood-Group Specific Substance Other (See Comments)     Patient has anti-K. Blood product orders maybe delayed. Draw one red top and 2 purple top tubes for all Type and Screen/Red blood Cell product orders     Fd And C No.5 (Tartrazine)      GI UPSET     Ibuprofen Nausea And Vomiting     Morphine Rash     swelling       Review of Systems   A comprehensive review of 14 systems was done. Pertinent findings noted here and in history of present illness. All the rest negative.  Constitutional: Negative.  Negative for fever, chills, she has activity change, appetite change and fatigue.   HENT: Negative for congestion and facial swelling.    Eyes: Negative for photophobia, redness and visual disturbance.   Respiratory: Negative for cough and chest tightness.    Cardiovascular: Negative for chest pain, palpitations and leg swelling.   Gastrointestinal: Negative for nausea, diarrhea, constipation, blood in stool and abdominal distention.   Genitourinary: Negative.    Musculoskeletal: Negative.  Very weak  Skin: Negative.    Neurological: Negative for dizziness, tremors, syncope, weakness, light-headedness and headaches.   Hematological: Does not bruise/bleed easily.   Psychiatric/Behavioral: Negative.  Gets anxious with any therapy cognitively worse than before      Physical Exam     Recent Vitals 7/16/2020   Height -   Weight 160 lbs   BSA (m2) 1.89 m2   /70   Pulse 67   Temp 97   Temp src -   SpO2 94   Some recent data might be hidden     Recheck WT 160LB /79  GENERAL: no acute distress. Cooperative in conversation.   HEENT: pupils are equal, round and reactive. Oral mucosa is moist and intact.  RESP:Chest symmetric. Regular respiratory rate. No stridor.  ABD: Nondistended, soft.  EXTREMITIES: No lower extremity edema.   NEURO: non focal. Alert and oriented x3.   PSYCH: within normal limits. No depression or anxiety.  SKIN: warm dry intact           Lab Results     Results  for orders placed or performed in visit on 06/30/20   Basic Metabolic Panel   Result Value Ref Range    Sodium 144 136 - 145 mmol/L    Potassium 4.0 3.5 - 5.0 mmol/L    Chloride 105 98 - 107 mmol/L    CO2 33 (H) 22 - 31 mmol/L    Anion Gap, Calculation 6 5 - 18 mmol/L    Glucose 85 70 - 125 mg/dL    Calcium 8.4 (L) 8.5 - 10.5 mg/dL    BUN 33 (H) 8 - 28 mg/dL    Creatinine 0.76 0.60 - 1.10 mg/dL    GFR MDRD Af Amer >60 >60 mL/min/1.73m2    GFR MDRD Non Af Amer >60 >60 mL/min/1.73m2       MEDICAL EQUIPMENT NEEDS:  WALKER     DISCHARGE PLAN/FACE TO FACE:  I certify that services are/were furnished while this patient was under the care of a physician and that a physician or an allowed non-physician practitioner (NPP), had a face-to-face encounter that meets the physician face-to-face encounter requirements. The encounter was in whole, or in part, related to the primary reason for home health. The patient is confined to his/her home and needs intermittent skilled nursing, physical therapy, speech-language pathology, or the continued need for occupational therapy. A plan of care has been established by a physician and is periodically reviewed by a physician.  Date of Face-to-Face Encounter: 7/21/20     I certify that, based on my findings, the following services are medically necessary home health services: [facility] HHA/RN and PT/OT to evaluate and treat    My clinical findings support the need for the above skilled services because: patient will be discharging to home. Patient will need assistance with medication management and performing IADLs and ADLs effectively and safely.     Patient to re-establish plan of care with their PCP within 7 days after leaving TCU.   Patient examined using a video encounter.  Informed consent of the patient taken prior to the start of encounter  Location of the patient is [facility]  [facility]  Start time 8:45 AM  End time is 8.55 AM    Noy  NERY Goodrich

## 2021-06-09 NOTE — PROGRESS NOTES
Pt here for follow up.    Knee Surgery 1/20/17: did rehab and then went home.  3/06/17 Fx in right ankle- casted  3/07/17 small MI Stent placed. Will be starting cardiac rehab soon.        Per staff having issues regarding patients welfare.  Refusing to eat on many occasions  Reports feeling horrible herself  Staff needs to push pt to do self cares and walk

## 2021-06-09 NOTE — PROGRESS NOTES
"[facility]   Video Visit    Code Status: DNR    Alison Bashir is a 69 y.o. female who is being evaluated via a billable video visit.      The patient has been notified of following:     \"This video visit will be conducted via a call between you and your physician/provider. We have found that certain health care needs can be provided without the need for an in-person physical exam.  This service lets us provide the care you need with a video conversation.  If a prescription is necessary we can send it to the facility team.  If lab work is needed we can place an order through the facility team to have that test done at a later time.    If during the course of the call the physician/provider feels a video visit is not appropriate, you will not be charged for this service.\"     Physician/Provider has received verbal consent for a Video Visit from the patient? Yes    Patient would like the video invitation sent by: Send to: [employer]    Video Start Time: 9.22 am    Chief Complaint/Reason for Visit:  Chief Complaint   Patient presents with     Review Of Multiple Medical Conditions     Evaluation for wt loss and Parkinson's  HPI:   Alison is a 69 y.o. female who is admitted to the TCU.  Patient was admitted in the hospital with chest pain.  Cardiology was consulted.  Due to underlying history of CAD and stenting she underwent a nuclear stress test which was unrevealing for any reversible ischemia  Patient is currently chest pain-free she is on medical management    Blood pressures continue to fluctuate widely she is on Midodrin for additional support for blood pressures and frequently will have low blood pressures.  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  She is also has some cognitive decline but overall mood and behaviors has been stable  She is requesting today a discharge back to her memory care group home    She has an ongoing concern for weight loss today her weights are down further to 144 pounds.  She denies any concerns she told me she has been eating 100% of the offered meal    I have reviewed and updated the patient's Past Medical History, including history of traumatic brain injury hypertension and coronary artery disease    Social History,lives in a group home; no smoking or etoh     Family History includes cancer in her mother; father had cad and chf   and Medication List.    ALLERGIES  Blood-group specific substance; Fd and c no.5 (tartrazine); Ibuprofen; and Morphine    Review of Systems  Constitutional: Negative.  Negative for fever, chills, she has activity change, appetite change and fatigue.   Loss of 50lb in last few months  HENT: Negative for congestion and facial swelling.    Eyes: Negative for photophobia, redness and visual disturbance.   Respiratory: Negative for cough and chest tightness.    Cardiovascular: Negative for chest pain, palpitations and leg swelling.   Gastrointestinal: Negative for nausea, diarrhea, constipation, blood in stool and abdominal distention.   Genitourinary: Has chronic incontinence due to overactive bladder    Musculoskeletal: Has gait instability and falls requires assistance with ADLs  Skin: Negative.    Neurological: Negative for dizziness, tremors, syncope, weakness, light-headedness and headaches.   Hematological: Does not bruise/bleed easily.   Psychiatric/Behavioral: Negative.          Physical exam  Wt 144 lb  Bp  109 / 65  t 98 p78  GENERAL: no acute distress.  Cooperative in conversation.   Appears very frail and weak  HEENT: pupils are equal, round and reactive. Oral mucosa is moist and intact.  RESP:Chest symmetric. Regular respiratory rate. No stridor.  ABD: Nondistended, soft.  EXTREMITIES: No lower extremity edema.  Range of movement in the legs especially right lower extremity is quite limited  NEURO: non focal. Alert and oriented x3.  Recall is impaired  PSYCH: within normal limits. No depression or anxiety.  SKIN: warm dry intact         Medication List:  Current Outpatient Medications   Medication Sig     acetaminophen (TYLENOL) 500 MG tablet Take 500 mg by mouth every 6 (six) hours as needed for pain.     aspirin 81 mg chewable tablet Chew 81 mg daily.     atorvastatin (LIPITOR) 40 MG tablet Take 40 mg by mouth every evening.      buPROPion (WELLBUTRIN XL) 300 MG 24 hr tablet Take 300 mg by mouth every morning.      carbidopa-levodopa (SINEMET)  mg per tablet Take 2 tablets by mouth 3 (three) times a day.     cholecalciferol, vitamin D3, 1,000 unit tablet Take 4,000 Units by mouth daily.      cyanocobalamin (VITAMIN B-12) 100 MCG tablet Take 100 mcg by mouth daily.      furosemide (LASIX) 20 MG tablet 20 mg daily.      lansoprazole (PREVACID) 30 MG capsule Take 30 mg by mouth daily.      magnesium oxide (MAG-OX) 400 mg tablet Take 400 mg by mouth 2 (two) times a day.     metoprolol succinate (TOPROL-XL) 12.5 MG Take 12.5 mg by mouth daily.     midodrine (PROAMATINE) 5 MG tablet Take 1 tablet (5 mg total) by mouth 3 (three) times a day with meals. (Patient taking differently: Take 7.5 mg by mouth 3 (three) times a day with meals. )     mirtazapine (REMERON) 45 MG tablet TAKE 1 TABLET BY MOUTH AT BEDTIME  *1 TOTAL FILL*     multivitamin (MULTIVITAMIN) per tablet Take 1 tablet by mouth daily.     nitroglycerin (NITROSTAT) 0.4 MG SL tablet Place 0.4 mg under the tongue every 5 (five) minutes as needed.      polyethylene glycol (MIRALAX) 17 gram packet Take 17  g by mouth daily.     pramipexole (MIRAPEX) 0.25 MG tablet Take 1 tablet (0.25 mg total) by mouth at bedtime.     solifenacin (VESICARE) 10 MG tablet Take 10 mg by mouth daily.     traZODone (DESYREL) 150 MG tablet TAKE 1 TABLET BY MOUTH AT BEDTIME. *1 TOTAL FILL*     venlafaxine (EFFEXOR-XR) 150 MG 24 hr capsule TAKE 2 CAPSULES (300MG) BY MOUTH ONCE DAILY *1 TOTAL FILL*       Labs:  Results for orders placed or performed in visit on 06/30/20   Basic Metabolic Panel   Result Value Ref Range    Sodium 144 136 - 145 mmol/L    Potassium 4.0 3.5 - 5.0 mmol/L    Chloride 105 98 - 107 mmol/L    CO2 33 (H) 22 - 31 mmol/L    Anion Gap, Calculation 6 5 - 18 mmol/L    Glucose 85 70 - 125 mg/dL    Calcium 8.4 (L) 8.5 - 10.5 mg/dL    BUN 33 (H) 8 - 28 mg/dL    Creatinine 0.76 0.60 - 1.10 mg/dL    GFR MDRD Af Amer >60 >60 mL/min/1.73m2    GFR MDRD Non Af Amer >60 >60 mL/min/1.73m2         Assessment/Plan:  -Weight loss of 10 pounds  -History of chest pain/shortness of breath currently resolved  -Underlying history of coronary artery disease with stenting on medical management  -Profound hypotension with orthostatic hypotension  - Bradycardia  -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  Monitor blood pressures remain frequently low  Metoprolol is being held and she also is on midodrine.  Continue with her compression hoses  Cognitive impairment is present with impaired testing scores including  SLUMS 16/30   Mood and behavior however been stable with no significant issues.  Physically she has made some progress.  She is now able to ambulate using a walker in her room.  Unfortunately she has had progressive weight loss with a weight loss of 10 pounds in over she is down to 144 pounds we will have staff urgently reweigh  her if this is a real weight loss will have dietary involved to ensure she is eating well  Staff will be updating me regarding new weights    Video-Visit Details    Type of service:  Video Visit    Video End Time (time video stopped): 9.34am    Originating Location (pt. Location):[facility] [[id number]]    Distant Location (provider location):  [facility]     Mode of Communication:  Zoom Video Conference        NERY Sosa

## 2021-06-09 NOTE — PROGRESS NOTES
[facility]      Facility:    [facility] [[id number]]  Code Status: DNR      Chief Complaint/Reason for Visit:   Chief Complaint   Patient presents with     Review Of Multiple Medical Conditions     hypotension       HPI:   Alison is a 69 y.o. female who is a transfer from [facility] with an admission on 20 and discharge on 6/15/20. She has a PMH of Parkinsons dx, Htn, frequent UTIs, depression, CAD, GERD, leukopenia who presented to the hospital chest pain shortness of breath.  Had a negative work-up through cardiology.  Noted to have severe hypotension despite holding PTA medications.  Therefore midodrine was started at discharge, likely due to Parkinson's per neurology evaluation.  She was then discharged to TCU for additional rehab.    Today will assess vitals and therapy progress.    Past Medical History:  Past Medical History:   Diagnosis Date     Acute blood loss anemia      Alzheimer disease (H)      CAD (coronary artery disease)      Chronic pain syndrome      Dementia (H)      Depression      Depression      Hip fracture, right (H) 2017     HTN (hypertension)      Kidney stone      Overactive bladder      PMB (postmenopausal bleeding)      RLS (restless legs syndrome)      Spine pain      Stented coronary artery      Traumatic brain injury (H)      Unsteady gait     uses walker     UTI (lower urinary tract infection)     on antibiotic course           Surgical History:  Past Surgical History:   Procedure Laterality Date      SECTION       CHOLECYSTECTOMY  May 2014     COMBINED HYSTEROSCOPY DIAGNOSTIC / D&C N/A 2014    Procedure: DILATION AND CURETTAGE WITH HYSTEROSCOPY;  Surgeon: Karime Cifuentes MD;  Location: [facility] OR;  Service:      CORONARY STENT PLACEMENT       HEMIARTHROPLASTY HIP Right 2017     INCISION AND DRAINAGE HIP Right 2018    ORIF REVISION      LUNG REMOVAL, PARTIAL       REVISION TOTAL HIP  ARTHROPLASTY Right 01/10/2018     TONSILLECTOMY AND ADENOIDECTOMY       TOTAL KNEE ARTHROPLASTY Right 09/30/2016     TOTAL KNEE ARTHROPLASTY Left 11/2015       Family History:   Family History   Problem Relation Age of Onset     Cancer Mother      Heart attack Father      Coronary artery disease Father      Heart failure Father        Social History:    Social History     Socioeconomic History     Marital status: Single     Spouse name: Not on file     Number of children: Not on file     Years of education: Not on file     Highest education level: Not on file   Occupational History     Not on file   Social Needs     Financial resource strain: Not on file     Food insecurity     Worry: Not on file     Inability: Not on file     Transportation needs     Medical: Not on file     Non-medical: Not on file   Tobacco Use     Smoking status: Never Smoker     Smokeless tobacco: Never Used   Substance and Sexual Activity     Alcohol use: No     Drug use: No     Sexual activity: Not on file   Lifestyle     Physical activity     Days per week: Not on file     Minutes per session: Not on file     Stress: Not on file   Relationships     Social connections     Talks on phone: Not on file     Gets together: Not on file     Attends Oriental orthodox service: Not on file     Active member of club or organization: Not on file     Attends meetings of clubs or organizations: Not on file     Relationship status: Not on file     Intimate partner violence     Fear of current or ex partner: Not on file     Emotionally abused: Not on file     Physically abused: Not on file     Forced sexual activity: Not on file   Other Topics Concern     Not on file   Social History Narrative    She lives in a group home right now. She can make her own medical decisions. Patient is not on supplemental O2 at home. Patient uses walker and wheelchair for ambulation. Please update sister Chelo.           Review of Systems   Constitutional: Positive for activity change  and fatigue. Negative for appetite change, chills and diaphoresis.        No concerns   HENT: Negative for congestion and hearing loss.    Eyes: Negative.    Cardiovascular: Negative.    Gastrointestinal: Negative for abdominal distention, abdominal pain, blood in stool, constipation, diarrhea and nausea.   Endocrine: Negative.    Genitourinary: Negative for difficulty urinating.   Musculoskeletal: Positive for gait problem.        Denies pain   Skin:        intact   Allergic/Immunologic: Negative.    Neurological: Positive for tremors. Negative for dizziness.   Psychiatric/Behavioral: Positive for confusion. Negative for agitation, hallucinations and sleep disturbance. The patient is not nervous/anxious.        Vitals:    07/09/20 1048   BP: 109/70   Pulse: 63   Resp: 18   Temp: 97  F (36.1  C)   SpO2: 94%   Weight: 144 lb (65.3 kg)       Physical Exam  Vitals signs reviewed.   Constitutional:       Comments: Hypotension improved   HENT:      Head: Normocephalic.      Right Ear: External ear normal.      Left Ear: External ear normal.      Nose: No congestion or rhinorrhea.      Mouth/Throat:      Pharynx: No oropharyngeal exudate.   Eyes:      General:         Right eye: No discharge.         Left eye: No discharge.   Cardiovascular:      Rate and Rhythm: Normal rate.   Pulmonary:      Effort: Pulmonary effort is normal. No respiratory distress.      Comments: Room air, no auscultation per COVID-19 recommendations  Abdominal:      General: There is no distension.      Comments: No constipation diarrhea reported   Genitourinary:     Comments: Incontinent  Musculoskeletal:      Right lower leg: No edema.      Left lower leg: No edema.      Comments: History of Parkinson's, denies pain   Skin:     General: Skin is warm and dry.      Comments: intact   Neurological:      Mental Status: She is alert. She is disoriented.      Motor: Weakness present.      Comments: A/O x2   Psychiatric:         Mood and Affect: Mood  normal.      Comments: No behaviors reported         Medication List:  Current Outpatient Medications   Medication Sig     acetaminophen (TYLENOL) 500 MG tablet Take 500 mg by mouth every 6 (six) hours as needed for pain.     aspirin 81 mg chewable tablet Chew 81 mg daily.     atorvastatin (LIPITOR) 40 MG tablet Take 40 mg by mouth every evening.      buPROPion (WELLBUTRIN XL) 300 MG 24 hr tablet Take 300 mg by mouth every morning.      carbidopa-levodopa (SINEMET)  mg per tablet Take 2 tablets by mouth 3 (three) times a day.     cholecalciferol, vitamin D3, 1,000 unit tablet Take 4,000 Units by mouth daily.      cyanocobalamin (VITAMIN B-12) 100 MCG tablet Take 100 mcg by mouth daily.      furosemide (LASIX) 20 MG tablet 20 mg daily.      lansoprazole (PREVACID) 30 MG capsule Take 30 mg by mouth daily.      magnesium oxide (MAG-OX) 400 mg tablet Take 400 mg by mouth 2 (two) times a day.     metoprolol succinate (TOPROL-XL) 12.5 MG Take 12.5 mg by mouth daily.     midodrine (PROAMATINE) 5 MG tablet Take 1 tablet (5 mg total) by mouth 3 (three) times a day with meals. (Patient taking differently: Take 7.5 mg by mouth 3 (three) times a day with meals. )     mirtazapine (REMERON) 45 MG tablet TAKE 1 TABLET BY MOUTH AT BEDTIME  *1 TOTAL FILL*     multivitamin (MULTIVITAMIN) per tablet Take 1 tablet by mouth daily.     nitroglycerin (NITROSTAT) 0.4 MG SL tablet Place 0.4 mg under the tongue every 5 (five) minutes as needed.      polyethylene glycol (MIRALAX) 17 gram packet Take 17 g by mouth daily.     pramipexole (MIRAPEX) 0.25 MG tablet Take 1 tablet (0.25 mg total) by mouth at bedtime.     solifenacin (VESICARE) 10 MG tablet Take 10 mg by mouth daily.     traZODone (DESYREL) 150 MG tablet TAKE 1 TABLET BY MOUTH AT BEDTIME. *1 TOTAL FILL*     venlafaxine (EFFEXOR-XR) 150 MG 24 hr capsule TAKE 2 CAPSULES (300MG) BY MOUTH ONCE DAILY *1 TOTAL FILL*       Labs:  Results for orders placed or performed in visit on  06/30/20   Basic Metabolic Panel   Result Value Ref Range    Sodium 144 136 - 145 mmol/L    Potassium 4.0 3.5 - 5.0 mmol/L    Chloride 105 98 - 107 mmol/L    CO2 33 (H) 22 - 31 mmol/L    Anion Gap, Calculation 6 5 - 18 mmol/L    Glucose 85 70 - 125 mg/dL    Calcium 8.4 (L) 8.5 - 10.5 mg/dL    BUN 33 (H) 8 - 28 mg/dL    Creatinine 0.76 0.60 - 1.10 mg/dL    GFR MDRD Af Amer >60 >60 mL/min/1.73m2    GFR MDRD Non Af Amer >60 >60 mL/min/1.73m2     Lab Results   Component Value Date    WBC 3.5 (L) 06/30/2020    HGB 10.2 (L) 06/30/2020    HCT 34.1 (L) 06/30/2020    MCV 94 06/30/2020     06/30/2020     Lab Results   Component Value Date    TSH 1.09 07/28/2015     Vitamin D, Total (25-Hydroxy)   Date Value Ref Range Status   03/15/2018 43.9 30.0 - 80.0 ng/mL Final         Assessment/Plan:    Chest pain with a history of CAD: Continue aspirin 81 mg daily and atorvastatin 40 mg every evening, denies chest pain    Depression: Continue Wellbutrin 300 mg in AM, Remeron 45 mg at bedtime and Effexor 300 mg daily    Parkinson's: Continue Sinemet 25/100 mg 2 tabs 3 times daily    Vitamin D deficiency: Continue D3 4000 units daily    Vitamin B12 deficiency: continue cyanocobalamin 100mcg daily    Hypomagnesia: continue mag oxide 400mg two times a day    Htn: continue lasix 20mg daily and metoprolol succinate 12.5 mg daily, SBP <110    CAD: continue metoprolol succinate 12.5mg daily, HR <70    Orthostatic hypotension: Probably due to Parkinson's dysfunction, prior increased midodrine to 7.5 mg 3 times daily, improved    GERD: Continue Prevacid 30 mg daily    Constipation: continue miralax daily    Insomnia: continue trazodone 150mg at HS    Disposition: JEYSON ALFORD 16/30   CPT 4.9      Electronically signed by: Dexter Rico NP

## 2021-06-09 NOTE — PROGRESS NOTES
"[facility]   Video Visit    Code Status: DNR    Alison Bashir is a 69 y.o. female who is being evaluated via a billable video visit.      The patient has been notified of following:     \"This video visit will be conducted via a call between you and your physician/provider. We have found that certain health care needs can be provided without the need for an in-person physical exam.  This service lets us provide the care you need with a video conversation.  If a prescription is necessary we can send it to the facility team.  If lab work is needed we can place an order through the facility team to have that test done at a later time.    If during the course of the call the physician/provider feels a video visit is not appropriate, you will not be charged for this service.\"     Physician/Provider has received verbal consent for a Video Visit from the patient? Yes    Patient would like the video invitation sent by: Send to: [employer]    Video Start Time: 10.15 am    Chief Complaint/Reason for Visit:  Chief Complaint   Patient presents with     Review Of Multiple Medical Conditions     Evaluation for acute hypertension and Parkinson's  HPI:   Alison is a 69 y.o. female who is admitted to the TCU.  Patient was admitted in the hospital with chest pain.  Cardiology was consulted.  Due to underlying history of CAD and stenting she underwent a nuclear stress test which was unrevealing for any reversible ischemia  She remains on medical management currently has been chest pain-free since admission with no concerns    She was noted to have profound hypotension with bradycardia.  Currently discharged on oral midodrine  Blood pressures in general have trended on the low side and she has had her medications held quite often because of low blood pressures.  She also is on metoprolol.    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  She is also has some cognitive decline but overall mood and behaviors has been stable  She is requesting today a discharge back to her memory care group home    I have reviewed and updated the patient's Past Medical History, including history of traumatic brain injury hypertension and coronary artery disease    Social History,lives in a group home; no smoking or etoh     Family History includes cancer in her mother; father had cad and chf   and Medication List.    ALLERGIES  Blood-group specific substance; Fd and c no.5 (tartrazine); Ibuprofen; and Morphine    Review of Systems  Constitutional: Negative.  Negative for fever, chills, she has activity change, appetite change and fatigue.   Loss of 50lb in last few months  HENT: Negative for congestion and facial swelling.    Eyes: Negative for photophobia, redness and visual disturbance.   Respiratory: Negative for cough and chest tightness.    Cardiovascular: Negative for chest pain, palpitations and leg swelling.   Gastrointestinal: Negative for nausea, diarrhea, constipation, blood in stool and abdominal distention.   Genitourinary: Has chronic incontinence due to overactive bladder    Musculoskeletal: Has gait instability and falls requires assistance with ADLs  Skin: Negative.    Neurological: Negative for dizziness, tremors, syncope, weakness, light-headedness and headaches.   Hematological: Does not bruise/bleed easily.   Psychiatric/Behavioral: Negative.          Physical exam  Wt 157 lb  Bp  109 / 65  t 98 p78  GENERAL: no acute distress. Cooperative in conversation.   Appears very frail and weak  HEENT: pupils are equal, round and reactive. Oral  mucosa is moist and intact.  RESP:Chest symmetric. Regular respiratory rate. No stridor.  ABD: Nondistended, soft.  EXTREMITIES: No lower extremity edema.  Range of movement in the legs especially right lower extremity is quite limited  NEURO: non focal. Alert and oriented x3.  Recall is impaired  PSYCH: within normal limits. No depression or anxiety.  SKIN: warm dry intact         Medication List:  Current Outpatient Medications   Medication Sig     acetaminophen (TYLENOL) 500 MG tablet Take 500 mg by mouth every 6 (six) hours as needed for pain.     aspirin 81 mg chewable tablet Chew 81 mg daily.     atorvastatin (LIPITOR) 40 MG tablet Take 40 mg by mouth every evening.      buPROPion (WELLBUTRIN XL) 300 MG 24 hr tablet Take 300 mg by mouth every morning.      carbidopa-levodopa (SINEMET)  mg per tablet Take 2 tablets by mouth 3 (three) times a day.     cholecalciferol, vitamin D3, 1,000 unit tablet Take 4,000 Units by mouth daily.      cyanocobalamin (VITAMIN B-12) 100 MCG tablet Take 100 mcg by mouth daily.      furosemide (LASIX) 20 MG tablet 20 mg daily.      lansoprazole (PREVACID) 30 MG capsule Take 30 mg by mouth daily.      magnesium oxide (MAG-OX) 400 mg tablet Take 400 mg by mouth 2 (two) times a day.     metoprolol succinate (TOPROL-XL) 12.5 MG Take 12.5 mg by mouth daily.     midodrine (PROAMATINE) 5 MG tablet Take 1 tablet (5 mg total) by mouth 3 (three) times a day with meals.     mirtazapine (REMERON) 45 MG tablet TAKE 1 TABLET BY MOUTH AT BEDTIME  *1 TOTAL FILL*     multivitamin (MULTIVITAMIN) per tablet Take 1 tablet by mouth daily.     nitroglycerin (NITROSTAT) 0.4 MG SL tablet Place 0.4 mg under the tongue every 5 (five) minutes as needed.      polyethylene glycol (MIRALAX) 17 gram packet Take 17 g by mouth daily.     pramipexole (MIRAPEX) 0.25 MG tablet Take 1 tablet (0.25 mg total) by mouth at bedtime.     solifenacin (VESICARE) 10 MG tablet Take 10 mg by mouth daily.     traZODone  (DESYREL) 150 MG tablet TAKE 1 TABLET BY MOUTH AT BEDTIME. *1 TOTAL FILL*     venlafaxine (EFFEXOR-XR) 150 MG 24 hr capsule TAKE 2 CAPSULES (300MG) BY MOUTH ONCE DAILY *1 TOTAL FILL*       Labs:  Recent Results (from the past 240 hour(s))   COVID-19 Virus PCR MRF    Specimen: Respiratory   Result Value Ref Range    COVID-19 VIRUS SPECIMEN SOURCE Nasopharyngeal     2019-nCOV Not Detected        Assessment/Plan:  -History of chest pain/shortness of breath currently resolved  -Underlying history of coronary artery disease with stenting on medical management  -Profound hypotension with orthostatic hypotension  - Bradycardia  -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  Monitor blood pressures remain frequently low  Metoprolol is being held and she also is on midodrine.  Compression hoses have been recommended for her lower extremity and aggressive therapy because of concern that deconditioning may be contributing to her low blood pressures.  OveraLL prognosis remains guarded because of suspected Parkinson's versus multisystem atrophy with cognitive impairment also noted.  Patient is aware of some of these deficits.  She has become stronger in therapy now ambulating better and is requesting a discharge plan.   will be consulted and they will work with her to get this taken care of.  Weights have recently been stable.  Mood and behaviors have been stable to  [facility] 16/30       Video-Visit Details    Type of service:  Video Visit    Video End Time (time video stopped): 10.27 am    Originating Location (pt. Location):[facility] [[id number]]    Distant Location (provider location):  [facility]     Mode of Communication:  Zoom Video Conference        Noy  NERY Goodrich

## 2021-06-11 NOTE — PROGRESS NOTES
"  [facility]      NAME:  Alison Bashir             :  1950    MRN: [id number]    CODE STATUS:  DNR/DNI    FACILITY: [facility] [[id number]]         CHIEF COMPLAIN/REASON FOR VISIT:  Chief Complaint   Patient presents with     Review Of Multiple Medical Conditions     rehab progress       HISTORY OF PRESENT ILLNESS: Alison Bashir is a 70 y.o. female being seen t for review of multiple medical conditions.  She is admitted to the TCU s/p fall  [facility]. Per herEMR \" with a history of frequent falls and unsteady gait.  She fell at the group home while she was trying to use her walker.  No head injuries and no chest pain prior to fall.  She had some slight pain in the hip and was unable to get up.  Generalized weakness with difficulty moving.    Recurrent falls.  Suspect due to symptoms of parkinsonism.  Infection possibly adding to the weakness/ UA looks infected.    Patient was seen by neurology, no change in Parkinson medications recommended. PT/OT recommended TCU.\"  She reports stable pain, bowels moving an sleeping well. We reviewed her med regimE.  I did ask patient about her eating habits specifically as per her medical records it appears she has had a 10 pound weight loss.  Possible erroneous admission weight.  Denies any nausea vomiting or decrease in appetite.    Allergies   Allergen Reactions     Blood-Group Specific Substance Other (See Comments)     Patient has anti-K. Blood product orders maybe delayed. Draw one red top and 2 purple top tubes for all Type and Screen/Red blood Cell product orders     Fd And C No.5 (Tartrazine)      GI UPSET     Ibuprofen Nausea And Vomiting     Morphine Rash     swelling   :     Current Outpatient Medications   Medication Sig     acetaminophen (TYLENOL) 500 MG tablet Take 2 tablets (1,000 mg total) by mouth 3 (three) times a day.     aspirin 81 mg chewable tablet Chew 81 mg daily.     atorvastatin (LIPITOR) 40 MG " tablet Take 40 mg by mouth every evening.      buPROPion (WELLBUTRIN XL) 300 MG 24 hr tablet TAKE 1 TABLET BY MOUTH ONCE DAILY.     carbidopa-levodopa (SINEMET)  mg per tablet Take 2 tablets by mouth 3 (three) times a day.     cholecalciferol, vitamin D3, 1,000 unit tablet Take 4,000 Units by mouth daily.      cyanocobalamin (VITAMIN B-12) 100 MCG tablet Take 100 mcg by mouth daily.      lansoprazole (PREVACID) 30 MG capsule Take 30 mg by mouth daily.      magnesium oxide (MAG-OX) 400 mg tablet Take 400 mg by mouth 2 (two) times a day.     metoprolol succinate (TOPROL-XL) 12.5 MG Take 12.5 mg by mouth daily.     midodrine (PROAMATINE) 2.5 MG tablet Take 7.5 mg by mouth 3 (three) times a day with meals.     mirtazapine (REMERON) 45 MG tablet TAKE 1 TABLET BY MOUTH AT BEDTIME  *1 TOTAL FILL*     multivitamin (MULTIVITAMIN) per tablet Take 1 tablet by mouth daily.     nitroglycerin (NITROSTAT) 0.4 MG SL tablet Place 0.4 mg under the tongue every 5 (five) minutes as needed.      polyethylene glycol (MIRALAX) 17 gram packet Take 17 g by mouth daily.     pramipexole (MIRAPEX) 0.5 MG tablet Take 0.5 mg by mouth at bedtime.     solifenacin (VESICARE) 10 MG tablet Take 10 mg by mouth daily.     traZODone (DESYREL) 150 MG tablet TAKE 1 TABLET BY MOUTH AT BEDTIME. *1 TOTAL FILL*     venlafaxine (EFFEXOR-XR) 150 MG 24 hr capsule TAKE 2 CAPSULES (300MG) BY MOUTH ONCE DAILY *1 TOTAL FILL*         REVIEW OF SYSTEMS:    Currently, no fever, chills, or rigors. Does not have any visual or hearing problems. Denies any chest pain, headaches, palpitations, lightheadedness, dizziness, shortness of breath, or cough. Appetite is good. Denies any GERD symptoms. Denies any difficulty with swallowing, nausea, or vomiting.  Denies any abdominal pain, diarrhea or constipation. Denies any urinary symptoms. No insomnia. No active bleeding. No rash.       PHYSICAL EXAMINATION:  Vitals:    09/15/20 0624   BP: 122/68   Pulse: 65   Temp: 97.8   F (36.6  C)   Weight: 166 lb 6.4 oz (75.5 kg)         GENERAL: Awake, Alert, oriented x3, not in any form of acute distress, answers questions appropriately, follows simple commands, conversant [place]  HEENT: Head is normocephalic with normal hair distribution. No evidence of trauma. Ears: No acute purulent discharge. Eyes: Conjunctivae pink with no scleral jaundice. Nose: Normal mucosa and septum. NECK: Supple with no cervical or supraclavicular lymphadenopathy. Trachea is midline.   SKIN: Warm and dry, no erythema noted, no rashes or lesions.  NEUROLOGICAL: The patient is oriented to person, place and time. Strength and sensation are grossly intact. Face is symmetric.      Due to C 19 Social distancing performed during assessment       LABS:    Lab Results   Component Value Date    WBC 2.9 (L) 08/26/2020    HGB 9.9 (L) 08/31/2020    HCT 29.6 (L) 08/26/2020    MCV 95 08/26/2020     (L) 08/26/2020       Results for orders placed or performed in visit on 08/31/20   Basic Metabolic Panel   Result Value Ref Range    Sodium 145 136 - 145 mmol/L    Potassium 3.8 3.5 - 5.0 mmol/L    Chloride 107 98 - 107 mmol/L    CO2 31 22 - 31 mmol/L    Anion Gap, Calculation 7 5 - 18 mmol/L    Glucose 80 70 - 125 mg/dL    Calcium 8.5 8.5 - 10.5 mg/dL    BUN 30 (H) 8 - 28 mg/dL    Creatinine 0.72 0.60 - 1.10 mg/dL    GFR MDRD Af Amer >60 >60 mL/min/1.73m2    GFR MDRD Non Af Amer >60 >60 mL/min/1.73m2           Lab Results   Component Value Date    HGBA1C 5.4 09/29/2011     Vitamin D, Total (25-Hydroxy)   Date Value Ref Range Status   03/15/2018 43.9 30.0 - 80.0 ng/mL Final     Lab Results   Component Value Date    PIYIWTAC16 468 02/02/2018       ASSESSMENT/PLAN:  1. Inability to walk    2. Personal history of fall    3. Parkinsonism, unspecified Parkinsonism type (H)      1.  Inability to walk: Patient uses wheelchair for mobility.  She is in therapy for ambulation and strengthening.  Resides in a group home we will continue with  therapies as many comorbidities history of falls, recent UTI, and Parkinson's.    2. Falls: Weakened state has UTI and Parkinsons which is a contrectation to fallsmContinues with PT/OT and SN for management of chronic medical conditions.. Reports improvement in transfers and strength    3. Parkinsons: Neuro saw in acute care, no changes in her Parkinson's meds, see above med list., sinemet TID    Of note patient appears to have had a 10 pound weight loss according to EMR, I am requesting a re-weight today discussed weight loss with patient he did not feel like she haS  had any weight loss and her appetite remains stable.  We will recheck her weight today it could be an error in the recording.      Electronically signed by:  Archana Siddiqui CNP  This progress note was completed using Dragon software and there may be grammatical errors.

## 2021-06-11 NOTE — TELEPHONE ENCOUNTER
Date of Last Office Visit: 5-15-20  Date of Next Office Visit: none  No shows since last visit: 0  Cancellations since last visit: 3 ( 1 was hospitalized and 1 was provider initiated.)  ED visits since last visit:  yes  Medication Wellbutrin last ordered inpatient  Lapse in therapy greater than 7 days: no  Medication refill request verified as identical to current order: yes  Result of Last DAM, VPA, Li+ Level, CBC, or Carbamazepine Level (at or since last visit): N/A     [] Medication refilled per [facility] M-1.   [x] Medication unable to be refilled by RN due to criteria not met as indicated below:     []Eligibility - not seen in last year    [x]Supervision - no future appointment    [x]Compliance     []Verification - order discrepancy    []Controlled Medication    []Medication not included in RN Protocol    []90 - day supply request    []Other   Current Medication list:  Your Current Medications Are     acetaminophen (TYLENOL) 500 MG tablet  Take 2 tablets (1,000 mg total) by mouth 3 (three) times a day.    aspirin 81 mg chewable tablet  Chew 81 mg daily.    atorvastatin (LIPITOR) 40 MG tablet  Take 40 mg by mouth every evening.    buPROPion (WELLBUTRIN XL) 300 MG 24 hr tablet  Take 300 mg by mouth every morning.    carbidopa-levodopa (SINEMET)  mg per tablet  Take 2 tablets by mouth 3 (three) times a day.    cholecalciferol, vitamin D3, 1,000 unit tablet  Take 4,000 Units by mouth daily.    cyanocobalamin (VITAMIN B-12) 100 MCG tablet  Take 100 mcg by mouth daily.    lansoprazole (PREVACID) 30 MG capsule  Take 30 mg by mouth daily.    magnesium oxide (MAG-OX) 400 mg tablet  Take 400 mg by mouth 2 (two) times a day.    metoprolol succinate (TOPROL-XL) 12.5 MG  Take 12.5 mg by mouth daily.    midodrine (PROAMATINE) 2.5 MG tablet  Take 7.5 mg by mouth 3 (three) times a day with meals.    mirtazapine (REMERON) 45 MG tablet  TAKE 1 TABLET BY MOUTH AT BEDTIME  *1 TOTAL FILL*    multivitamin (MULTIVITAMIN) per tablet   Take 1 tablet by mouth daily.    nitroglycerin (NITROSTAT) 0.4 MG SL tablet  Place 0.4 mg under the tongue every 5 (five) minutes as needed.    polyethylene glycol (MIRALAX) 17 gram packet  Take 17 g by mouth daily.    pramipexole (MIRAPEX) 0.5 MG tablet  Take 0.5 mg by mouth at bedtime.    solifenacin (VESICARE) 10 MG tablet  Take 10 mg by mouth daily.    traZODone (DESYREL) 150 MG tablet  TAKE 1 TABLET BY MOUTH AT BEDTIME. *1 TOTAL FILL*    venlafaxine (EFFEXOR-XR) 150 MG 24 hr capsule  TAKE 2 CAPSULES (300MG) BY MOUTH ONCE DAILY *1 TOTAL FILL*        Medication Plan of Care at last office visit with MD/CNP:  Plan:  Discussed risks/benefits of medication including seizure.  1. Could benefit from dose increase of Wellbutrin.  Increase to 300 mg/day.    RN call 2 weeks. Dr. Johnson visit in 10 weeks.  2.  Continue other meds as above.

## 2021-06-11 NOTE — TELEPHONE ENCOUNTER
[facility] Nurse Triage Telephone Note      Provider: GUS Lai  Facility: [facility]    Facility Type: TCU    Caller: Annie  Call Back Number:  [phone]    Allergies: Blood-group specific substance; Fd and c no.5 (tartrazine); Ibuprofen; and Morphine    Reason for call: Nurse calling to report Hgb and BMP results.  Notable meds:  Midodrine 7.5mg three times a day, Metoprolol ER 12.5mg daily.       Verbal Order/Direction given by Provider: No new orders.      Provider giving order: GUS Lai    Verbal order given to: Kamilah Fisher RN

## 2021-06-11 NOTE — PATIENT INSTRUCTIONS - HE
I will see the patient back in 2 to 3 months for medication check. We have decrease the patient's trazodone to 75 mg at night and the team will monitor for any change in her sleep. She will continue with the recently restarted physical therapy and occupational therapy. The team will try and engage the patient in more social activities and continue to monitor her progress. They agree to call back with any errors or concerns

## 2021-06-11 NOTE — PROGRESS NOTES
________________________________________  Medications Phoned  to Pharmacy [] yes [x]no  Name of Pharmacist:  List Medications, including dose, quantity and instructions    Medications ordered this visit were e-scribed.  Verified by order class [x] yes  [] no  Trazodone    Medication changes or discontinuations were communicated to patient's pharmacy: [] yes  [x] no changes    Dictation completed at time of chart check: [x] yes  [] no    I have checked the documentation for today s encounters and the above information has been reviewed and completed.

## 2021-06-11 NOTE — TELEPHONE ENCOUNTER
Beckie Calix, [phone],  from [facility], faxed Medical Referral Form for Monday's appt with Dr Johnson.  Call placed to Beckie to inform her that we had received fax and to explain that provider would be remote so form would not be signed.  She stated that would not be a problem for their records as long as they have a AVS.

## 2021-06-11 NOTE — PROGRESS NOTES
Outpatient Followup Psychiatric Evaluation      Pertinent History: Patient presents today for the purposes of medication management.  She has a history of a mood disorder and also with prior psychotic symptoms.  Per the patient's request we have been tapering the Zyprexa and that was discontinued in 2017.    She was restarted on the Remeron earlier in 2018.  I saw the patient in the summer 2018 and did not make any medication changes.  In September 2018 we did increase the patient's Remeron to 45 mg at night.    I saw the patient in January 2019.  At that time she was continuing to struggle with isolation and hoarding behaviors and was extremely anxious.  She was having some bedwetting issues that have been falling more seeming perhaps to be overmedicated.  There had not been any improvement with the increase in Remeron.  At that visit we did decrease the Effexor and the Remeron.    I saw the patient in the spring 2019 and at that time she was doing fairly well.  We elected to try a medication taper and we decreased her Effexor XR to 225 mg a day and decrease the Remeron down to 30 mg at night.    I saw the patient in December 2019.  She was having increased depression with some worsening behavioral issues.  She had been refusing day programming and there was an increase in her SIBs.  She was more defiant and eating less.  She had a decline in her hygiene.  We did increase both the Remeron and Effexor at that time.     I saw the patient in February 2020. At that time she was more flattered depressed in the context of staff turnover. We did add some low-dose Wellbutrin at that time. Over the course of the summer the patient has had multiple falls at her living place. The coronavirus is led to much more isolation. The patient is reportedly more depressed according to staff much of this apparently has been related to frequent hospitalizations for falls and other medical issues.    Current Symptoms:   The patient and  staff was interviewed by phone conference today.I had an opportunity to interview two different staff members today regarding the patient as well.     My interview with patient today the staff reports she's seemed much more withdrawn and depressed over the last few weeks and they are unsure why but it may have been related to her multiple hospitalizations and change in her schedule. She has seemed less motivated and has been less interactive. They do not believe that she's been suicidal and when I questioned the patient about that she denied having any suicidal ideation, intent or plan. She denies that she's had any hallucinations or delusions and she denies having any change in cognition.     The patient reports that she is being treated well and states she still likes where she is living. I did spend some time talking with her about the importance of staying engaged in active in activities and that's been a chronic problem for the patient. She denies having any significant pain issues. She is now enrolled back in physical therapy and occupational therapy and we will see whether that is at all helpful.     With regard to her false she is somewhat vague but denies being dizzy. She states she occasionally tries to sit down and misses the chair. She does not believe she's more distractible. She denies that she's noted any change in her physical abilities.     With regard to her medication. She denies having any side effects. She tells me she sleeping quite well at night. I do talk with her about the possibility to possibly cut back on the trazodone to see if she improves with her balance and she is willing to do that. I did talk with her about monitoring for a change in her sleep patterns and the staff stated they would monitor for this as well. They had no other questions or concerns.      Current Medications: Please see chart    Medication Compliance: Yes    Side Effects to Medications: No      Vitals:  Wt Readings  from Last 3 Encounters:   09/25/20 168 lb 12.8 oz (76.6 kg)   09/17/20 166 lb 6.4 oz (75.5 kg)   09/15/20 166 lb 6.4 oz (75.5 kg)     Temp Readings from Last 3 Encounters:   09/25/20 98.5  F (36.9  C)   09/17/20 (!) 96.4  F (35.8  C)   09/15/20 97.8  F (36.6  C)     BP Readings from Last 3 Encounters:   09/25/20 128/76   09/17/20 128/79   09/15/20 122/68     Pulse Readings from Last 3 Encounters:   09/25/20 (!) 57   09/17/20 61   09/15/20 65         Mental Status Exam:   Appearance:  The patient was interviewed by phone. I also interviewed two of her staff members.  Behavior: Patient was cooperative but extremely vague with no initiation. The staff reported she was not agitated or irritable. No recent restlessness.  Speech:  Again, the patient was rare slow and flat. Monotone. Limited initiation. Answers were consistent.  Mood/Affect: Depressed. No irritability no agitation.  Thought Content: No reports of psychosis recently.  Again, no current evidence of psychosis but limited participation.  Suicidal or Homicidal Thoughts:  None apparent or reported.   Thought Process/Formulation: Limited effort.  Slow with a delay.  No obvious racing thoughts.  Associations: [place] and slow with limited effort.  No obvious racing thoughts.    Fund of Knowledge:  No initiation. Limited effort.  Attention/Concentration: Limited effort.  Limited attention and participation. Not obviously loose. No racing thoughts.    Insight:  Limited effort.  No obvious significant recent change however.  Continues impaired.  Judgement:  Limited effort.  Continues impaired.  Memory:  Limited participation. Slow. Disinterested.    Motor Status:  Limited participation. No current tremor.  Orientation: No reports of any recent change.  Limited effort at this time.      Diagnosis managed and treated at today's visit :      Major depressive disorder   History of psychosis NOS    Plan:  Medication Adjustment:   I am going to decrease the patient's  trazodone to 75 mg at night.    Other: Patient will return to clinic in 2-3 months for further assessment and treatment the staff and patient agreed to return sooner or call if questions concerns or problems arise.   Patient is going to be restarting occupational therapy and physical therapy. The team is going to monitor for sleep hygiene issues.    Continue with the support of the clinic, reassurance, and redirection. Staff monitoring and ongoing assessments per team plan. Current psychotropic medication appears to represent the minimum effective dosage and appears medically necessary. We will continue to monitor and reassess. This team will utilize appropriate emergency services if necessary. I will make myself available if concerns or problems arise.     I saw the patient for 23 minutes   Norberto Johnson MD

## 2021-06-11 NOTE — PROGRESS NOTES
[facility] ER Admission note      Patient: Alison Bashir  MRN: [id number]  Date of Service: 9/3/2020      [facility] [[id number]]  Reason for Visit     Chief Complaint   Patient presents with     Review Of Multiple Medical Conditions   F/U ON PAIN    Code Status     DNR /DNI    Assessment     -Acute fall in the group home  - Acute subcutaneous hematoma along the lateral margin of the right hip secondary to fall  -Pain management with patient requesting better management  -Underlying history of Parkinson's with bilateral upper extremity tremors  -Underlying history of Alzheimer's disease  -History of hypertension with hypotension  -CAD  -Depression  -History of overactive bladder  -Acute UTI cultures growing E. coli and Morganella treated now with cefdinir  -Generalized debilitation with recurrent falls requiring lengthy TCU stays  -History of TBI      Plan     Patient has been admitted to the TCU.  She has a history of recurrent falls and recently had a prolonged lengthy stay in the TCU and was discharged back to her group home.  Unfortunately she readmitted within a week with another fall.  Her group home has refused accept her back till she receives her prior level of functioning.  Unfortunately she currently remains wheelchair-bound and poorly ambulatory.  Pain management reviewed with her.  She did not get any tramadol.  She was asked to ask for extra tramadol to see if that is effective for her pain.  We will discontinue her Voltaren at her request as she is believes that is not effective.  Also advise aggressive icing.  Therapy can try different modalities also  Blood pressures remain low with underlying history of orthostatic hypotension continue to monitor closely.  Dysuria is resolved with no UTI symptoms.  Continue with the PT OT and rehab discharge planning continued on her ability to function.  Suspect she may be becoming long-term care appropriate soon if she continues to fall  and have multiple ER and TCU stays    History     Patient is a very pleasant 70 y.o. female who is admitted to TCU  Patient presented to the hospital after she fell.  At baseline she has frequent falls in her group home.  She had recently a prolonged lengthy stay at the [facility] because of her falls and was discharged home.  Unfortunately she presented after a fall and has been discharged back to the TCU.  She also has underlying history of Parkinson's which has been slowly getting worse.  She was evaluated by neurology.  They have recommended PT OT no change in medications.  Unfortunately she is limited still by pain.  Not much progress noted she remains wheelchair-bound.  She is not ambulating as yet  She continues to complain of right thigh pain unfortunately that is not responsive to topical treatments.  MRI was negative for any fractures but did show a subcutaneous hematoma.  She has been given only Tylenol for pain managemenT  Unfortunately I had given her tramadol but nursing did not verify the order so she has not received any dosages yet.  She wants to Voltaren gel discontinued as it is not effective for her as per her.  She has not tried any icing either    She also has baseline cognitive impairment due to Alzheimer's dementia with some intermittent agitation today however she appears to be quite clear and cognitively alert with good recall of recent events  SLUMS 18/30  Mood and behaviors have been stable and patient is pleasant  Had UTI in the hospital cultures grew Morganella and E. coli she was given ceftriaxone and is on oral cefdinir in the TCU  She has a history of orthostatic hypotension is on midodrine however had also hypertension and is on metoprolol she has some low blood pressures especially in the evening in the hospital  Blood pressures continue to be on the low side in the TCU.  She is asymptomatic since she is not ambulating    Past Medical History     Active Ambulatory  (Non-Hospital) Problems    Diagnosis     Hematoma of right hip     Urinary tract infection     Inability to walk     Parkinsonism, unspecified Parkinsonism type (H)     Fall, initial encounter     Magnesium deficiency     Adjustment insomnia     Acute hypotension     RBBB     Chest pain, unspecified     Leukopenia     Gastroesophageal reflux disease with esophagitis     Personal history of fall     Chest pain     Chronic pain syndrome     Traumatic brain injury (H)     Stented coronary artery     RLS (restless legs syndrome)     Overactive bladder     HTN (hypertension)     Depression     CAD (coronary artery disease)     Alzheimer disease (H)     Orthostatic hypotension     Anemia     Cognitive impairment     Hip fracture (H)     ACP (advance care planning)     Constipation     Essential hypertension     Neuroleptic signed 8/29/16     Past Medical History:   Diagnosis Date     Acute blood loss anemia      Alzheimer disease (H)      CAD (coronary artery disease)      Chronic pain syndrome      Dementia (H)      Depression      Depression      Hip fracture, right (H) 12/21/2017     HTN (hypertension)      Kidney stone      Overactive bladder      PMB (postmenopausal bleeding)      RLS (restless legs syndrome)      Spine pain      Stented coronary artery      Traumatic brain injury (H)      Unsteady gait      UTI (lower urinary tract infection)        Past Social History     Reviewed, and she  reports that she has never smoked. She has never used smokeless tobacco. She reports that she does not drink alcohol or use drugs.    Family History     Reviewed, and family history includes Cancer in her mother; Coronary artery disease in her father; Heart attack in her father; Heart failure in her father.    Medication List   Post Discharge Medication Reconciliation Status: discharge medications reconciled and changed, per note/orders   Current Outpatient Medications on File Prior to Visit   Medication Sig Dispense Refill      acetaminophen (TYLENOL) 500 MG tablet Take 2 tablets (1,000 mg total) by mouth 3 (three) times a day.  0     aspirin 81 mg chewable tablet Chew 81 mg daily.       atorvastatin (LIPITOR) 40 MG tablet Take 40 mg by mouth every evening.        buPROPion (WELLBUTRIN XL) 300 MG 24 hr tablet Take 300 mg by mouth every morning.        carbidopa-levodopa (SINEMET)  mg per tablet Take 2 tablets by mouth 3 (three) times a day.       cefdinir (OMNICEF) 300 MG capsule Take 1 capsule (300 mg total) by mouth 2 times a day at 6:00 am and 4:00 pm for 6 days. 12 capsule 0     cholecalciferol, vitamin D3, 1,000 unit tablet Take 4,000 Units by mouth daily.        cyanocobalamin (VITAMIN B-12) 100 MCG tablet Take 100 mcg by mouth daily.        lansoprazole (PREVACID) 30 MG capsule Take 30 mg by mouth daily.        magnesium oxide (MAG-OX) 400 mg tablet Take 400 mg by mouth 2 (two) times a day.       metoprolol succinate (TOPROL-XL) 12.5 MG Take 12.5 mg by mouth daily.       midodrine (PROAMATINE) 2.5 MG tablet Take 7.5 mg by mouth 3 (three) times a day with meals.       mirtazapine (REMERON) 45 MG tablet TAKE 1 TABLET BY MOUTH AT BEDTIME  *1 TOTAL FILL* 30 tablet 3     multivitamin (MULTIVITAMIN) per tablet Take 1 tablet by mouth daily.       nitroglycerin (NITROSTAT) 0.4 MG SL tablet Place 0.4 mg under the tongue every 5 (five) minutes as needed.        polyethylene glycol (MIRALAX) 17 gram packet Take 17 g by mouth daily.       pramipexole (MIRAPEX) 0.5 MG tablet Take 0.5 mg by mouth at bedtime.       solifenacin (VESICARE) 10 MG tablet Take 10 mg by mouth daily.       traZODone (DESYREL) 150 MG tablet TAKE 1 TABLET BY MOUTH AT BEDTIME. *1 TOTAL FILL* 31 tablet 3     venlafaxine (EFFEXOR-XR) 150 MG 24 hr capsule TAKE 2 CAPSULES (300MG) BY MOUTH ONCE DAILY *1 TOTAL FILL* 60 capsule 3     No current facility-administered medications on file prior to visit.        Allergies     Allergies   Allergen Reactions     Blood-Group  Specific Substance Other (See Comments)     Patient has anti-K. Blood product orders maybe delayed. Draw one red top and 2 purple top tubes for all Type and Screen/Red blood Cell product orders     Fd And C No.5 (Tartrazine)      GI UPSET     Ibuprofen Nausea And Vomiting     Morphine Rash     swelling       Review of Systems   A comprehensive review of 14 systems was done. Pertinent findings noted here and in history of present illness. All the rest negative.  Constitutional: Negative.  Negative for fever, chills, she has  activity change, appetite change and fatigue.   Patient has had significant weight loss recently also  HENT: Negative for congestion and facial swelling.    Eyes: Negative for photophobia, redness and visual disturbance.   Respiratory: Negative for cough and chest tightness.    Cardiovascular: Negative for chest pain, palpitations and leg swelling.   Gastrointestinal: Negative for nausea, diarrhea, constipation, blood in stool and abdominal distention.   Genitourinary: Negative.    Musculoskeletal: Negative.  She is reporting multiple falls pain in her right thigh  Skin: Negative.    Neurological: Negative for dizziness, tremors, syncope, weakness, light-headedness and headaches.   Hematological: Does not bruise/bleed easily.   Psychiatric/Behavioral: Negative.  Recall is impaired but patient is quite pleasant today      Physical Exam     Recent Vitals 8/31/2020   Height -   Weight 174 lbs 8 oz   BSA (m2) 1.98 m2   BP 99/63   Pulse 80   Temp 99   Temp src -   SpO2 -   Some recent data might be hidden       Constitutional: Oriented to person, place, and time and appears well-developed.   Appears frail and weak  HEENT:  Normocephalic and atraumatic.  Eyes: Conjunctivae and EOM are normal. Pupils are equal, round, and reactive to light. No discharge.  No scleral icterus. Nose normal. Mouth/Throat: Oropharynx is clear and moist. No oropharyngeal exudate.    NECK: Normal range of motion. Neck supple.  No JVD present. No tracheal deviation present. No thyromegaly present.   CARDIOVASCULAR: Normal rate, regular rhythm and intact distal pulses.  Exam reveals no gallop and no friction rub.  Systolic murmur present.  PULMONARY: Effort normal and breath sounds normal. No respiratory distress.No Wheezing or rales.  ABDOMEN: Soft. Bowel sounds are normal. No distension and no mass.  There is no tenderness. There is no rebound and no guarding. No HSM.  MUSCULOSKELETAL: Normal range of motion. No edema and no tenderness. Mild kyphosis, tenderness.  On her right thigh even to light touch  LYMPH NODES: Has no cervical, supraclavicular, axillary and groin adenopathy.   NEUROLOGICAL: Alert and oriented to person, place, and time. No cranial nerve deficit.  Normal muscle tone. Coordination normal  Resting tremors bilateral upper extremity noted.   GENITOURINARY: Deferred exam.  SKIN: Skin is warm and dry. No rash noted. No erythema. No pallor.   EXTREMITIES: No cyanosis, no clubbing, no edema. No Deformity.  PSYCHIATRIC: Normal mood, affect and behavior.  Recall is impaired      Lab Results     Recent Results (from the past 240 hour(s))   Magnesium    Collection Time: 08/25/20  4:54 PM   Result Value Ref Range    Magnesium 2.2 1.8 - 2.6 mg/dL   Hepatic Profile    Collection Time: 08/25/20  4:54 PM   Result Value Ref Range    Bilirubin, Total 0.3 0.0 - 1.0 mg/dL    Bilirubin, Direct 0.2 <=0.5 mg/dL    Protein, Total 6.6 6.0 - 8.0 g/dL    Albumin 3.6 3.5 - 5.0 g/dL    Alkaline Phosphatase 105 45 - 120 U/L    AST 12 0 - 40 U/L    ALT 16 0 - 45 U/L   Basic Metabolic Panel    Collection Time: 08/25/20  4:54 PM   Result Value Ref Range    Sodium 144 136 - 145 mmol/L    Potassium 4.1 3.5 - 5.0 mmol/L    Chloride 105 98 - 107 mmol/L    CO2 33 (H) 22 - 31 mmol/L    Anion Gap, Calculation 6 5 - 18 mmol/L    Glucose 93 70 - 125 mg/dL    Calcium 8.7 8.5 - 10.5 mg/dL    BUN 28 8 - 28 mg/dL    Creatinine 0.72 0.60 - 1.10 mg/dL    GFR MDRD Af  Amer >60 >60 mL/min/1.73m2    GFR MDRD Non Af Amer >60 >60 mL/min/1.73m2   Thyroid Cascade    Collection Time: 08/25/20  4:54 PM   Result Value Ref Range    TSH 1.09 0.30 - 5.00 uIU/mL   HM1 (CBC with Diff)    Collection Time: 08/25/20  4:54 PM   Result Value Ref Range    WBC 4.5 4.0 - 11.0 thou/uL    RBC 3.76 (L) 3.80 - 5.40 mill/uL    Hemoglobin 10.7 (L) 12.0 - 16.0 g/dL    Hematocrit 35.5 35.0 - 47.0 %    MCV 94 80 - 100 fL    MCH 28.5 27.0 - 34.0 pg    MCHC 30.1 (L) 32.0 - 36.0 g/dL    RDW 13.4 11.0 - 14.5 %    Platelets 153 140 - 440 thou/uL    MPV 10.8 8.5 - 12.5 fL    Neutrophils % 67 50 - 70 %    Lymphocytes % 24 20 - 40 %    Monocytes % 7 2 - 10 %    Eosinophils % 2 0 - 6 %    Basophils % 0 0 - 2 %    Immature Granulocyte % 0 <=0 %    Neutrophils Absolute 3.0 2.0 - 7.7 thou/uL    Lymphocytes Absolute 1.1 0.8 - 4.4 thou/uL    Monocytes Absolute 0.3 0.0 - 0.9 thou/uL    Eosinophils Absolute 0.1 0.0 - 0.4 thou/uL    Basophils Absolute 0.0 0.0 - 0.2 thou/uL    Immature Granulocyte Absolute 0.0 <=0.0 thou/uL   COVID-19 Virus PCR MRF    Collection Time: 08/25/20  7:22 PM    Specimen: Respiratory   Result Value Ref Range    COVID-19 VIRUS SPECIMEN SOURCE Nasopharyngeal     2019-nCOV Not Detected    ECG 12 lead MUSE    Collection Time: 08/25/20  9:04 PM   Result Value Ref Range    SYSTOLIC BLOOD PRESSURE      DIASTOLIC BLOOD PRESSURE      VENTRICULAR RATE 62 BPM    ATRIAL RATE 62 BPM    P-R INTERVAL 166 ms    QRS DURATION 162 ms    Q-T INTERVAL 466 ms    QTC CALCULATION (BEZET) 472 ms    P Axis 40 degrees    R AXIS -25 degrees    T AXIS 16 degrees    MUSE DIAGNOSIS       Normal sinus rhythm  Right bundle branch block  Abnormal ECG  When compared with ECG of 23-JUL-2020 18:53,  No significant change was found  Confirmed by JOSR BELL MD LOC:[facility] ([id number]) on 8/26/2020 1:44:04 PM     Basic Metabolic Panel    Collection Time: 08/26/20  5:59 AM   Result Value Ref Range    Sodium 144 136 - 145 mmol/L    Potassium 4.2 3.5  - 5.0 mmol/L    Chloride 108 (H) 98 - 107 mmol/L    CO2 32 (H) 22 - 31 mmol/L    Anion Gap, Calculation 4 (L) 5 - 18 mmol/L    Glucose 96 70 - 125 mg/dL    Calcium 8.0 (L) 8.5 - 10.5 mg/dL    BUN 29 (H) 8 - 28 mg/dL    Creatinine 0.72 0.60 - 1.10 mg/dL    GFR MDRD Af Amer >60 >60 mL/min/1.73m2    GFR MDRD Non Af Amer >60 >60 mL/min/1.73m2   HM2(CBC w/o Differential)    Collection Time: 08/26/20  5:59 AM   Result Value Ref Range    WBC 2.9 (L) 4.0 - 11.0 thou/uL    RBC 3.11 (L) 3.80 - 5.40 mill/uL    Hemoglobin 9.0 (L) 12.0 - 16.0 g/dL    Hematocrit 29.6 (L) 35.0 - 47.0 %    MCV 95 80 - 100 fL    MCH 28.9 27.0 - 34.0 pg    MCHC 30.4 (L) 32.0 - 36.0 g/dL    RDW 13.7 11.0 - 14.5 %    Platelets 119 (L) 140 - 440 thou/uL    MPV 10.8 8.5 - 12.5 fL   CK Total    Collection Time: 08/26/20  5:59 AM   Result Value Ref Range    CK, Total 27 (L) 30 - 190 U/L   Lactic Acid    Collection Time: 08/26/20  6:55 AM   Result Value Ref Range    Lactic Acid 0.4 (L) 0.7 - 2.0 mmol/L   Urinalysis-UC if Indicated    Collection Time: 08/26/20  7:21 PM   Result Value Ref Range    Color, UA Yellow Colorless, Yellow, Straw, Light Yellow    Clarity, UA Cloudy (!) Clear    Glucose, UA Negative Negative    Bilirubin, UA Negative Negative    Ketones, UA Negative Negative, 60 mg/dL    Specific Gravity, UA 1.017 1.001 - 1.030    Blood, UA Moderate (!) Negative    pH, UA 6.0 4.5 - 8.0    Protein, UA Trace (!) Negative mg/dL    Urobilinogen, UA <2.0 E.U./dL <2.0 E.U./dL, 2.0 E.U./dL    Nitrite, UA Negative Negative    Leukocytes, UA Large (!) Negative    Bacteria, UA None Seen None Seen hpf    RBC, UA >100 (!) None Seen, 0-2 hpf    WBC, UA >100 (!) None Seen, 0-5 hpf    Squam Epithel, UA 0-5 None Seen, 0-5 lpf    Trans Epithel, UA 5-10 (!) None Seen lpf   Culture, Urine    Collection Time: 08/26/20  7:21 PM    Specimen: Urine   Result Value Ref Range    Culture >100,000 col/ml Gram Negative Rods (!)    Organism ID culture    Collection Time: 08/26/20   7:21 PM    Specimen: Other; Urine   Result Value Ref Range    Specimen Description Unspecified Urine (!)     Isolate SEE NOTES (!)     ISO Susceptibility Morganella (Proteus) morganii (!)     ISO 2 Susceptibility  Strain 2 Morganella (Proteus) morganii (!)    Hemoglobin    Collection Time: 08/27/20  4:48 PM   Result Value Ref Range    Hemoglobin 9.3 (L) 12.0 - 16.0 g/dL   Basic Metabolic Panel    Collection Time: 08/31/20  9:48 AM   Result Value Ref Range    Sodium 145 136 - 145 mmol/L    Potassium 3.8 3.5 - 5.0 mmol/L    Chloride 107 98 - 107 mmol/L    CO2 31 22 - 31 mmol/L    Anion Gap, Calculation 7 5 - 18 mmol/L    Glucose 80 70 - 125 mg/dL    Calcium 8.5 8.5 - 10.5 mg/dL    BUN 30 (H) 8 - 28 mg/dL    Creatinine 0.72 0.60 - 1.10 mg/dL    GFR MDRD Af Amer >60 >60 mL/min/1.73m2    GFR MDRD Non Af Amer >60 >60 mL/min/1.73m2   Hemoglobin    Collection Time: 08/31/20  9:48 AM   Result Value Ref Range    Hemoglobin 9.9 (L) 12.0 - 16.0 g/dL                  NERY Sosa

## 2021-06-11 NOTE — PROGRESS NOTES
"  [facility]      NAME:  Alison Bashir             :  1950    MRN: [id number]    CODE STATUS:  DNR/DNI    FACILITY: [facility] [[id number]]         CHIEF COMPLAIN/REASON FOR VISIT:  Chief Complaint   Patient presents with     Problem Visit     pain       HISTORY OF PRESENT ILLNESS: Alison Bashir is a 70 y.o. female being seen t for review of pain management. She .  She is admitted to the TCU s/p fall  From [facility]. Per herEMR \" with a history of frequent falls and unsteady gait.  She fell at the group home while she was trying to use her walker.  No head injuries and no chest pain prior to fall.  She had some slight pain in the hip and was unable to get up.  Generalized weakness with difficulty moving.    Recurrent falls.  Suspect due to symptoms of parkinsonism.  Infection possibly adding to the weakness/ UA looks infected.    Patient was seen by neurology, no change in Parkinson medications recommended. PT/OT recommended TCU.\"  She reports stable pain, bowels moving an sleeping well. We reviewed her med regimE.  We reviewed pain management as well as HTN managment    Allergies   Allergen Reactions     Blood-Group Specific Substance Other (See Comments)     Patient has anti-K. Blood product orders maybe delayed. Draw one red top and 2 purple top tubes for all Type and Screen/Red blood Cell product orders     Fd And C No.5 (Tartrazine)      GI UPSET     Ibuprofen Nausea And Vomiting     Morphine Rash     swelling   :     Current Outpatient Medications   Medication Sig     acetaminophen (TYLENOL) 500 MG tablet Take 2 tablets (1,000 mg total) by mouth 3 (three) times a day.     aspirin 81 mg chewable tablet Chew 81 mg daily.     atorvastatin (LIPITOR) 40 MG tablet Take 40 mg by mouth every evening.      buPROPion (WELLBUTRIN XL) 300 MG 24 hr tablet TAKE 1 TABLET BY MOUTH ONCE DAILY.     carbidopa-levodopa (SINEMET)  mg per tablet Take 2 tablets by mouth 3 " (three) times a day.     cholecalciferol, vitamin D3, 1,000 unit tablet Take 4,000 Units by mouth daily.      cyanocobalamin (VITAMIN B-12) 100 MCG tablet Take 100 mcg by mouth daily.      lansoprazole (PREVACID) 30 MG capsule Take 30 mg by mouth daily.      magnesium oxide (MAG-OX) 400 mg tablet Take 400 mg by mouth 2 (two) times a day.     metoprolol succinate (TOPROL-XL) 12.5 MG Take 12.5 mg by mouth daily.     midodrine (PROAMATINE) 2.5 MG tablet Take 7.5 mg by mouth 3 (three) times a day with meals.     mirtazapine (REMERON) 45 MG tablet TAKE 1 TABLET BY MOUTH AT BEDTIME  *1 TOTAL FILL*     multivitamin (MULTIVITAMIN) per tablet Take 1 tablet by mouth daily.     nitroglycerin (NITROSTAT) 0.4 MG SL tablet Place 0.4 mg under the tongue every 5 (five) minutes as needed.      polyethylene glycol (MIRALAX) 17 gram packet Take 17 g by mouth daily.     pramipexole (MIRAPEX) 0.5 MG tablet Take 0.5 mg by mouth at bedtime.     solifenacin (VESICARE) 10 MG tablet Take 10 mg by mouth daily.     traZODone (DESYREL) 150 MG tablet TAKE 1 TABLET BY MOUTH AT BEDTIME. *1 TOTAL FILL*     venlafaxine (EFFEXOR-XR) 150 MG 24 hr capsule TAKE 2 CAPSULES (300MG) BY MOUTH ONCE DAILY *1 TOTAL FILL*         REVIEW OF SYSTEMS:    Currently, no fever, chills, or rigors. Does not have any visual or hearing problems. Denies any chest pain, headaches, palpitations, lightheadedness, dizziness, shortness of breath, or cough. Appetite is good. Denies any GERD symptoms. Denies any difficulty with swallowing, nausea, or vomiting.  Denies any abdominal pain, diarrhea or constipation. Denies any urinary symptoms. No insomnia. No active bleeding. No rash.       PHYSICAL EXAMINATION:  Vitals:    09/17/20 0821   BP: 128/79   Pulse: 61   Temp: (!) 96.4  F (35.8  C)   Weight: 166 lb 6.4 oz (75.5 kg)         GENERAL: Awake, Alert, oriented x3, not in any form of acute distress, answers questions appropriately, follows simple commands, conversant  [place]  HEENT: Head is normocephalic with normal hair distribution. No evidence of trauma. Ears: No acute purulent discharge. Eyes: Conjunctivae pink with no scleral jaundice. Nose: Normal mucosa and septum. NECK: Supple with no cervical or supraclavicular lymphadenopathy. Trachea is midline.   SKIN: Warm and dry, no erythema noted, no rashes or lesions.  NEUROLOGICAL: The patient is oriented to person, place and time. Strength and sensation are grossly intact. Face is symmetric.      Due to C 19 Social distancing performed during assessment       LABS:    Lab Results   Component Value Date    WBC 2.9 (L) 08/26/2020    HGB 9.9 (L) 08/31/2020    HCT 29.6 (L) 08/26/2020    MCV 95 08/26/2020     (L) 08/26/2020       Results for orders placed or performed in visit on 08/31/20   Basic Metabolic Panel   Result Value Ref Range    Sodium 145 136 - 145 mmol/L    Potassium 3.8 3.5 - 5.0 mmol/L    Chloride 107 98 - 107 mmol/L    CO2 31 22 - 31 mmol/L    Anion Gap, Calculation 7 5 - 18 mmol/L    Glucose 80 70 - 125 mg/dL    Calcium 8.5 8.5 - 10.5 mg/dL    BUN 30 (H) 8 - 28 mg/dL    Creatinine 0.72 0.60 - 1.10 mg/dL    GFR MDRD Af Amer >60 >60 mL/min/1.73m2    GFR MDRD Non Af Amer >60 >60 mL/min/1.73m2           Lab Results   Component Value Date    HGBA1C 5.4 09/29/2011     Vitamin D, Total (25-Hydroxy)   Date Value Ref Range Status   03/15/2018 43.9 30.0 - 80.0 ng/mL Final     Lab Results   Component Value Date    VOZIEJPV06 468 02/02/2018       ASSESSMENT/PLAN:  1. Closed fracture of right hip, sequela    2. Chronic pain syndrome    3. Essential hypertension      1.  Pain: Has dx of chronic pain and body with generalized pain due to habitus. Due to chronic pain we will re order her tramadol 50 mg po Q 6 prn    2. HTN; Metaprel 12.5 mg po daily and bp doing well. We discussed exercise, which is limited due to being wc bound and up with assist and we reviewed healthye eating habits as well.       Electronically signed by:   Archana Siddiqui CNP  This progress note was completed using Dragon software and there may be grammatical errors.

## 2021-06-11 NOTE — PROGRESS NOTES
[facility] Admission note      Patient: Alison Bashir  MRN: [id number]  Date of Service: 9/8/2020      [facility] [[id number]]  Reason for Visit     Chief Complaint   Patient presents with     Review Of Multiple Medical Conditions   F/U ON PAIN; LEG SWELLING    Code Status     DNR /DNI    Assessment     -Acute fall in the group home  - Acute subcutaneous hematoma along the lateral margin of the right hip secondary to fall  -Pain management with patient requesting better management  -Underlying history of Parkinson's with bilateral upper extremity tremors  -Underlying history of Alzheimer's disease  -History of hypertension with hypotension  -CAD  -Depression  -History of overactive bladder  -Acute UTI cultures growing E. coli and Morganella treated now with cefdinir  -Generalized debilitation with recurrent falls requiring lengthy TCU stays  -History of TBI      Plan     Patient has been admitted to the TCU.  She has a history of recurrent falls and recently had a prolonged lengthy stay in the TCU and was discharged back to her group home.  Unfortunately she readmitted within a week with another fall.  At baseline she remains poorly ambulatory and wheelchair-bound.  In light of her multiple falls suspect she may be going home at a wheelchair level again.  Pain concerns with her hematoma reviewed she is doing better.  Her pain is now down to a 3 score out of 10.  Voltaren was discontinued at her request she is on tramadol as needed along with Tylenol.  Blood pressures have been stable with underlying history of orthostatic hypotension but again she is not ambulating very well.  UTI concerns have resolved  Monitor lymphedema concerns so far weights appear to be stable.  Suspect this is dependent edema continue with her compression hose    History     Patient is a very pleasant 70 y.o. female who is admitted to TCU  Patient presented to the hospital after she fell.  At baseline she has  frequent falls in her group home.  She had recently a prolonged lengthy stay at the [facility] because of her falls and was discharged home.  Unfortunately she presented after a fall and has been discharged back to the TCU.  Unfortunately with profound gait instability she remains wheelchair-bound and poorly ambulatory as yet  She also has underlying history of Parkinson's which has been slowly getting worse.  Neurology was consulted during hospitalization they feel that this is a natural progression of the disease they have not recommended any medication changes as yet    She continues to complain of right thigh pain unfortunately that is not responsive to topical treatments.  MRI was negative for any fractures but did show a subcutaneous hematoma.  She has been taken off Voltaren gel at her request.  She has been icing her hip.  Reporting pain is improved and slowly trended down to a 3/10 recently.  She is on Tylenol tramadol was added as needed    She also has baseline cognitive impairment due to Alzheimer's dementia with some intermittent agitation today however she appears to be quite clear and cognitively alert with good recall of recent events  SLUMS 18/30  Mood and behaviors have been pleasant she is cooperative with her cares.  She is overall stable with no significant issue    Had UTI in the hospital cultures grew Morganella and E. coli she was given ceftriaxone and is on oral cefdinir in the TCU  She has a history of orthostatic hypotension is on midodrine however had also hypertension and is on metoprolol currently.  Blood pressures have been stable but again she is not ambulating either.  She has noticed to have developed leg swelling and edema this is something new she is not had it before weights however are stable at 169 pounds her Tubigrip stockings have been ordered for her    Past Medical History     Active Ambulatory (Non-Hospital) Problems    Diagnosis     Hematoma of right hip      Urinary tract infection     Inability to walk     Parkinsonism, unspecified Parkinsonism type (H)     Fall, initial encounter     Magnesium deficiency     Adjustment insomnia     Acute hypotension     RBBB     Chest pain, unspecified     Leukopenia     Gastroesophageal reflux disease with esophagitis     Personal history of fall     Chest pain     Chronic pain syndrome     Traumatic brain injury (H)     Stented coronary artery     RLS (restless legs syndrome)     Overactive bladder     HTN (hypertension)     Depression     CAD (coronary artery disease)     Alzheimer disease (H)     Orthostatic hypotension     Anemia     Cognitive impairment     Hip fracture (H)     ACP (advance care planning)     Constipation     Essential hypertension     Neuroleptic signed 8/29/16     Past Medical History:   Diagnosis Date     Acute blood loss anemia      Alzheimer disease (H)      CAD (coronary artery disease)      Chronic pain syndrome      Dementia (H)      Depression      Depression      Hip fracture, right (H) 12/21/2017     HTN (hypertension)      Kidney stone      Overactive bladder      PMB (postmenopausal bleeding)      RLS (restless legs syndrome)      Spine pain      Stented coronary artery      Traumatic brain injury (H)      Unsteady gait      UTI (lower urinary tract infection)        Past Social History     Reviewed, and she  reports that she has never smoked. She has never used smokeless tobacco. She reports that she does not drink alcohol or use drugs.    Family History     Reviewed, and family history includes Cancer in her mother; Coronary artery disease in her father; Heart attack in her father; Heart failure in her father.    Medication List   Post Discharge Medication Reconciliation Status: discharge medications reconciled and changed, per note/orders   Current Outpatient Medications on File Prior to Visit   Medication Sig Dispense Refill     acetaminophen (TYLENOL) 500 MG tablet Take 2 tablets (1,000 mg  total) by mouth 3 (three) times a day.  0     aspirin 81 mg chewable tablet Chew 81 mg daily.       atorvastatin (LIPITOR) 40 MG tablet Take 40 mg by mouth every evening.        buPROPion (WELLBUTRIN XL) 300 MG 24 hr tablet Take 300 mg by mouth every morning.        carbidopa-levodopa (SINEMET)  mg per tablet Take 2 tablets by mouth 3 (three) times a day.       cholecalciferol, vitamin D3, 1,000 unit tablet Take 4,000 Units by mouth daily.        cyanocobalamin (VITAMIN B-12) 100 MCG tablet Take 100 mcg by mouth daily.        lansoprazole (PREVACID) 30 MG capsule Take 30 mg by mouth daily.        magnesium oxide (MAG-OX) 400 mg tablet Take 400 mg by mouth 2 (two) times a day.       metoprolol succinate (TOPROL-XL) 12.5 MG Take 12.5 mg by mouth daily.       midodrine (PROAMATINE) 2.5 MG tablet Take 7.5 mg by mouth 3 (three) times a day with meals.       mirtazapine (REMERON) 45 MG tablet TAKE 1 TABLET BY MOUTH AT BEDTIME  *1 TOTAL FILL* 30 tablet 3     multivitamin (MULTIVITAMIN) per tablet Take 1 tablet by mouth daily.       nitroglycerin (NITROSTAT) 0.4 MG SL tablet Place 0.4 mg under the tongue every 5 (five) minutes as needed.        polyethylene glycol (MIRALAX) 17 gram packet Take 17 g by mouth daily.       pramipexole (MIRAPEX) 0.5 MG tablet Take 0.5 mg by mouth at bedtime.       solifenacin (VESICARE) 10 MG tablet Take 10 mg by mouth daily.       traZODone (DESYREL) 150 MG tablet TAKE 1 TABLET BY MOUTH AT BEDTIME. *1 TOTAL FILL* 31 tablet 3     venlafaxine (EFFEXOR-XR) 150 MG 24 hr capsule TAKE 2 CAPSULES (300MG) BY MOUTH ONCE DAILY *1 TOTAL FILL* 60 capsule 3     No current facility-administered medications on file prior to visit.        Allergies     Allergies   Allergen Reactions     Blood-Group Specific Substance Other (See Comments)     Patient has anti-K. Blood product orders maybe delayed. Draw one red top and 2 purple top tubes for all Type and Screen/Red blood Cell product orders     Fd And C  No.5 (Tartrazine)      GI UPSET     Ibuprofen Nausea And Vomiting     Morphine Rash     swelling       Review of Systems   A comprehensive review of 14 systems was done. Pertinent findings noted here and in history of present illness. All the rest negative.  Constitutional: Negative.  Negative for fever, chills, she has  activity change, appetite change and fatigue.   Patient has had significant weight loss recently also  HENT: Negative for congestion and facial swelling.    Eyes: Negative for photophobia, redness and visual disturbance.   Respiratory: Negative for cough and chest tightness.    Cardiovascular: Negative for chest pain, palpitations and has some leg swelling.   Gastrointestinal: Negative for nausea, diarrhea, constipation, blood in stool and abdominal distention.   Genitourinary: Negative.    Musculoskeletal: Negative.  She is reporting multiple falls pain in her right thigh  Skin: Negative.    Neurological: Negative for dizziness, tremors, syncope, weakness, light-headedness and headaches.   Hematological: Does not bruise/bleed easily.   Psychiatric/Behavioral: Negative.  Recall is impaired but patient is quite pleasant today      Physical Exam     Recent Vitals 8/31/2020   Height -   Weight 174 lbs 8 oz   BSA (m2) 1.98 m2   BP 99/63   Pulse 80   Temp 99   Temp src -   SpO2 -   Some recent data might be hidden     Recheck weight is 169 pounds  Constitutional: Oriented to person, place, and time and appears well-developed.   Appears frail and weak  HEENT:  Normocephalic and atraumatic.  Eyes: Conjunctivae and EOM are normal. Pupils are equal, round, and reactive to light. No discharge.  No scleral icterus. Nose normal. Mouth/Throat: Oropharynx is clear and moist. No oropharyngeal exudate.    NECK: Normal range of motion. Neck supple. No JVD present. No tracheal deviation present. No thyromegaly present.   CARDIOVASCULAR: Normal rate, regular rhythm and intact distal pulses.  Exam reveals no gallop and  no friction rub.  Systolic murmur present.  PULMONARY: Effort normal and breath sounds normal. No respiratory distress.No Wheezing or rales.  ABDOMEN: Soft. Bowel sounds are normal. No distension and no mass.  There is no tenderness. There is no rebound and no guarding. No HSM.  MUSCULOSKELETAL: Normal range of motion. 1+ edema and no tenderness. Mild kyphosis, tenderness.  On her right thigh even to light touch  LYMPH NODES: Has no cervical, supraclavicular, axillary and groin adenopathy.   NEUROLOGICAL: Alert and oriented to person, place, and time. No cranial nerve deficit.  Normal muscle tone. Coordination normal  Resting tremors bilateral upper extremity noted.   GENITOURINARY: Deferred exam.  SKIN: Skin is warm and dry. No rash noted. No erythema. No pallor.   EXTREMITIES: No cyanosis, no clubbing, 1+ edema. No Deformity.  PSYCHIATRIC: Normal mood, affect and behavior.  Recall is impaired      Lab Results     Recent Results (from the past 240 hour(s))   Basic Metabolic Panel    Collection Time: 08/31/20  9:48 AM   Result Value Ref Range    Sodium 145 136 - 145 mmol/L    Potassium 3.8 3.5 - 5.0 mmol/L    Chloride 107 98 - 107 mmol/L    CO2 31 22 - 31 mmol/L    Anion Gap, Calculation 7 5 - 18 mmol/L    Glucose 80 70 - 125 mg/dL    Calcium 8.5 8.5 - 10.5 mg/dL    BUN 30 (H) 8 - 28 mg/dL    Creatinine 0.72 0.60 - 1.10 mg/dL    GFR MDRD Af Amer >60 >60 mL/min/1.73m2    GFR MDRD Non Af Amer >60 >60 mL/min/1.73m2   Hemoglobin    Collection Time: 08/31/20  9:48 AM   Result Value Ref Range    Hemoglobin 9.9 (L) 12.0 - 16.0 g/dL            NERY Sosa

## 2021-06-11 NOTE — PROGRESS NOTES
[facility] Admission note      Patient: Alison Bashir  MRN: [id number]  Date of Service: 9/10/2020      [facility] [[id number]]  Reason for Visit     Chief Complaint   Patient presents with     Review Of Multiple Medical Conditions   F/U ON PAIN; LEG SWELLING/ WT LOSS    Code Status     DNR /DNI    Assessment     -Acute fall in the group home  - Acute subcutaneous hematoma along the lateral margin of the right hip secondary to fall  -Pain management with patient requesting better management  -Underlying history of Parkinson's with bilateral upper extremity tremors  -Underlying history of Alzheimer's disease  -History of hypertension with hypotension  -CAD  -Depression  -History of overactive bladder  -Acute UTI cultures growing E. coli and Morganella treated now with cefdinir  -Generalized debilitation with recurrent falls requiring lengthy TCU stays  -History of TBI      Plan     Patient has been admitted to the TCU.  She has a history of recurrent falls and recently had a prolonged lengthy stay in the TCU and was discharged back to her group home.  Unfortunately she readmitted within a week with another fall.  At baseline she is profoundly debilitated and wheelchair bound and poorly ambulatory.  Her right thigh hematoma is improving slowly.  She has been using tramadol effectively in addition to ice and scheduled Tylenol for pain management.  Weights are being watched closely.  She has had a 2 pound weight loss but overall review weight reveals quite a bit of fluctuation with weights ranging from 161 to 171 pounds wondering if this is an error  Dietary consultation for adequate intake.  ReWEIGH  requested for the patient also  TEDS ordered for swelling of the legs and monitor weights    History     Patient is a very pleasant 70 y.o. female who is admitted to TCU  Patient presented to the hospital after she fell.  At baseline she has frequent falls in her group home.  She is currently  wheelchair-bound and remains a high fall risk    She also has underlying history of Parkinson's which has been slowly getting worse.  Neurology was consulted during hospitalization they feel that this is a natural progression of the disease they have not recommended any medication changes as yet  At baseline she is at a significant decline with underlying history of Parkinson's disease.  She is wheelchair-bound currently and has some resting tremors noted    She continues to complain of right thigh pain unfortunately that is not responsive to topical treatments.  MRI was negative for any fractures but did show a subcutaneous hematoma.  She has been taken off Voltaren gel at her request.  She has been icing her hip.  She is also been using 2 tramadol with quite a good response she has not had any confusion episodes with this and overall is quite happy with tramadol    She also has baseline cognitive impairment due to Alzheimer's dementia with some intermittent agitation today however she appears to be quite clear   Will currently is reasonably not impaired.  SLUMS 18/30    Had UTI in the hospital cultures grew Morganella and E. coli she was given ceftriaxone and is on oral cefdinir in the TCU  She also has a history of orthostatic hypotension and has been on midodrine recently blood pressures have been stable but staff did report when they tried to stand her up she did immediately drops her blood pressure she has been given JAMIA hose for lymphedema concerns  Nursing wanted to review because of weight loss however on review it seems her weights are fluctuating widely with gains and losses so have asked them to check her weights again she told me she is eating well    Past Medical History     Active Ambulatory (Non-Hospital) Problems    Diagnosis     Hematoma of right hip     Urinary tract infection     Inability to walk     Parkinsonism, unspecified Parkinsonism type (H)     Fall, initial encounter     Magnesium  deficiency     Adjustment insomnia     Acute hypotension     RBBB     Chest pain, unspecified     Leukopenia     Gastroesophageal reflux disease with esophagitis     Personal history of fall     Chest pain     Chronic pain syndrome     Traumatic brain injury (H)     Stented coronary artery     RLS (restless legs syndrome)     Overactive bladder     HTN (hypertension)     Depression     CAD (coronary artery disease)     Alzheimer disease (H)     Orthostatic hypotension     Anemia     Cognitive impairment     Hip fracture (H)     ACP (advance care planning)     Constipation     Essential hypertension     Neuroleptic signed 8/29/16     Past Medical History:   Diagnosis Date     Acute blood loss anemia      Alzheimer disease (H)      CAD (coronary artery disease)      Chronic pain syndrome      Dementia (H)      Depression      Depression      Hip fracture, right (H) 12/21/2017     HTN (hypertension)      Kidney stone      Overactive bladder      PMB (postmenopausal bleeding)      RLS (restless legs syndrome)      Spine pain      Stented coronary artery      Traumatic brain injury (H)      Unsteady gait      UTI (lower urinary tract infection)        Past Social History     Reviewed, and she  reports that she has never smoked. She has never used smokeless tobacco. She reports that she does not drink alcohol or use drugs.    Family History     Reviewed, and family history includes Cancer in her mother; Coronary artery disease in her father; Heart attack in her father; Heart failure in her father.    Medication List   Post Discharge Medication Reconciliation Status: discharge medications reconciled and changed, per note/orders   Current Outpatient Medications on File Prior to Visit   Medication Sig Dispense Refill     acetaminophen (TYLENOL) 500 MG tablet Take 2 tablets (1,000 mg total) by mouth 3 (three) times a day.  0     aspirin 81 mg chewable tablet Chew 81 mg daily.       atorvastatin (LIPITOR) 40 MG tablet Take 40  mg by mouth every evening.        buPROPion (WELLBUTRIN XL) 300 MG 24 hr tablet Take 300 mg by mouth every morning.        carbidopa-levodopa (SINEMET)  mg per tablet Take 2 tablets by mouth 3 (three) times a day.       cholecalciferol, vitamin D3, 1,000 unit tablet Take 4,000 Units by mouth daily.        cyanocobalamin (VITAMIN B-12) 100 MCG tablet Take 100 mcg by mouth daily.        lansoprazole (PREVACID) 30 MG capsule Take 30 mg by mouth daily.        magnesium oxide (MAG-OX) 400 mg tablet Take 400 mg by mouth 2 (two) times a day.       metoprolol succinate (TOPROL-XL) 12.5 MG Take 12.5 mg by mouth daily.       midodrine (PROAMATINE) 2.5 MG tablet Take 7.5 mg by mouth 3 (three) times a day with meals.       mirtazapine (REMERON) 45 MG tablet TAKE 1 TABLET BY MOUTH AT BEDTIME  *1 TOTAL FILL* 30 tablet 3     multivitamin (MULTIVITAMIN) per tablet Take 1 tablet by mouth daily.       nitroglycerin (NITROSTAT) 0.4 MG SL tablet Place 0.4 mg under the tongue every 5 (five) minutes as needed.        polyethylene glycol (MIRALAX) 17 gram packet Take 17 g by mouth daily.       pramipexole (MIRAPEX) 0.5 MG tablet Take 0.5 mg by mouth at bedtime.       solifenacin (VESICARE) 10 MG tablet Take 10 mg by mouth daily.       traZODone (DESYREL) 150 MG tablet TAKE 1 TABLET BY MOUTH AT BEDTIME. *1 TOTAL FILL* 31 tablet 3     venlafaxine (EFFEXOR-XR) 150 MG 24 hr capsule TAKE 2 CAPSULES (300MG) BY MOUTH ONCE DAILY *1 TOTAL FILL* 60 capsule 3     No current facility-administered medications on file prior to visit.        Allergies     Allergies   Allergen Reactions     Blood-Group Specific Substance Other (See Comments)     Patient has anti-K. Blood product orders maybe delayed. Draw one red top and 2 purple top tubes for all Type and Screen/Red blood Cell product orders     Fd And C No.5 (Tartrazine)      GI UPSET     Ibuprofen Nausea And Vomiting     Morphine Rash     swelling       Review of Systems   A comprehensive review  of 14 systems was done. Pertinent findings noted here and in history of present illness. All the rest negative.  Constitutional: Negative.  Negative for fever, chills, she has  activity change, appetite change and fatigue.   Patient has had significant weight loss recently also  HENT: Negative for congestion and facial swelling.    Eyes: Negative for photophobia, redness and visual disturbance.   Respiratory: Negative for cough and chest tightness.    Cardiovascular: Negative for chest pain, palpitations and has some leg swelling.   Gastrointestinal: Negative for nausea, diarrhea, constipation, blood in stool and abdominal distention.   Genitourinary: Negative.    Musculoskeletal: Negative.  She is reporting multiple falls pain in her right thigh  Skin: Negative.    Neurological: Negative for dizziness, tremors, syncope, weakness, light-headedness and headaches.   Hematological: Does not bruise/bleed easily.   Psychiatric/Behavioral: Negative.  Recall is impaired but patient is quite pleasant today      Physical Exam     Recent Vitals 8/31/2020   Height -   Weight 174 lbs 8 oz   BSA (m2) 1.98 m2   BP 99/63   Pulse 80   Temp 99   Temp src -   SpO2 -   Some recent data might be hidden     Recheck weight is 169 pounds was 161 pounds and 167 pounds also  Constitutional: Oriented to person, place, and time and appears well-developed.   Appears frail and weak  HEENT:  Normocephalic and atraumatic.  Eyes: Conjunctivae and EOM are normal. Pupils are equal, round, and reactive to light. No discharge.  No scleral icterus. Nose normal. Mouth/Throat: Oropharynx is clear and moist. No oropharyngeal exudate.    NECK: Normal range of motion. Neck supple. No JVD present. No tracheal deviation present. No thyromegaly present.   CARDIOVASCULAR: Normal rate, regular rhythm and intact distal pulses.  Exam reveals no gallop and no friction rub.  Systolic murmur present.  PULMONARY: Effort normal and breath sounds normal. No respiratory  distress.No Wheezing or rales.  ABDOMEN: Soft. Bowel sounds are normal. No distension and no mass.  There is no tenderness. There is no rebound and no guarding. No HSM.  MUSCULOSKELETAL: Normal range of motion. 1+ edema and no tenderness. Mild kyphosis, tenderness.  On her right thigh even to light touch  LYMPH NODES: Has no cervical, supraclavicular, axillary and groin adenopathy.   NEUROLOGICAL: Alert and oriented to person, place, and time. No cranial nerve deficit.  Normal muscle tone. Coordination normal  Resting tremors bilateral upper extremity noted.   GENITOURINARY: Deferred exam.  SKIN: Skin is warm and dry. No rash noted. No erythema. No pallor.   EXTREMITIES: No cyanosis, no clubbing, 1+ edema. No Deformity.  PSYCHIATRIC: Normal mood, affect and behavior.  Recall is impaired      Lab Results     Results for orders placed or performed in visit on 08/31/20   Basic Metabolic Panel   Result Value Ref Range    Sodium 145 136 - 145 mmol/L    Potassium 3.8 3.5 - 5.0 mmol/L    Chloride 107 98 - 107 mmol/L    CO2 31 22 - 31 mmol/L    Anion Gap, Calculation 7 5 - 18 mmol/L    Glucose 80 70 - 125 mg/dL    Calcium 8.5 8.5 - 10.5 mg/dL    BUN 30 (H) 8 - 28 mg/dL    Creatinine 0.72 0.60 - 1.10 mg/dL    GFR MDRD Af Amer >60 >60 mL/min/1.73m2    GFR MDRD Non Af Amer >60 >60 mL/min/1.73m2              NERY Sosa

## 2021-06-11 NOTE — PROGRESS NOTES
"  [facility]      NAME:  Alison Bashir             :  1950    MRN: [id number]    CODE STATUS:  DNR/DNI    FACILITY: [facility] [[id number]]         CHIEF COMPLAIN/REASON FOR VISIT:  Chief Complaint   Patient presents with     Review Of Multiple Medical Conditions     initiate care       HISTORY OF PRESENT ILLNESS: Alison Bashir is a 70 y.o. female being seen today to initiate care with MCS. She is admitted to the TCU s/p fall  From [facility]. Per herEMR \" with a history of frequent falls and unsteady gait.  She fell at the group home while she was trying to use her walker.  No head injuries and no chest pain prior to fall.  She had some slight pain in the hip and was unable to get up.  Generalized weakness with difficulty moving.    Recurrent falls.  Suspect due to symptoms of parkinsonism.  Infection possibly adding to the weakness/ UA looks infected.    Patient was seen by neurology, no change in Parkinson medications recommended. PT/OT recommended TCU.\"  She reports stable pain, bowels moving an sleeping well. We reviewed her med regime, TCU routines as well as ACP today.    Allergies   Allergen Reactions     Blood-Group Specific Substance Other (See Comments)     Patient has anti-K. Blood product orders maybe delayed. Draw one red top and 2 purple top tubes for all Type and Screen/Red blood Cell product orders     Fd And C No.5 (Tartrazine)      GI UPSET     Ibuprofen Nausea And Vomiting     Morphine Rash     swelling   :     Current Outpatient Medications   Medication Sig     acetaminophen (TYLENOL) 500 MG tablet Take 2 tablets (1,000 mg total) by mouth 3 (three) times a day.     aspirin 81 mg chewable tablet Chew 81 mg daily.     atorvastatin (LIPITOR) 40 MG tablet Take 40 mg by mouth every evening.      buPROPion (WELLBUTRIN XL) 300 MG 24 hr tablet Take 300 mg by mouth every morning.      carbidopa-levodopa (SINEMET)  mg per tablet Take 2 tablets " by mouth 3 (three) times a day.     cefdinir (OMNICEF) 300 MG capsule Take 1 capsule (300 mg total) by mouth 2 times a day at 6:00 am and 4:00 pm for 6 days.     cholecalciferol, vitamin D3, 1,000 unit tablet Take 4,000 Units by mouth daily.      cyanocobalamin (VITAMIN B-12) 100 MCG tablet Take 100 mcg by mouth daily.      lansoprazole (PREVACID) 30 MG capsule Take 30 mg by mouth daily.      magnesium oxide (MAG-OX) 400 mg tablet Take 400 mg by mouth 2 (two) times a day.     metoprolol succinate (TOPROL-XL) 12.5 MG Take 12.5 mg by mouth daily.     midodrine (PROAMATINE) 2.5 MG tablet Take 7.5 mg by mouth 3 (three) times a day with meals.     mirtazapine (REMERON) 45 MG tablet TAKE 1 TABLET BY MOUTH AT BEDTIME  *1 TOTAL FILL*     multivitamin (MULTIVITAMIN) per tablet Take 1 tablet by mouth daily.     nitroglycerin (NITROSTAT) 0.4 MG SL tablet Place 0.4 mg under the tongue every 5 (five) minutes as needed.      polyethylene glycol (MIRALAX) 17 gram packet Take 17 g by mouth daily.     pramipexole (MIRAPEX) 0.5 MG tablet Take 0.5 mg by mouth at bedtime.     solifenacin (VESICARE) 10 MG tablet Take 10 mg by mouth daily.     traZODone (DESYREL) 150 MG tablet TAKE 1 TABLET BY MOUTH AT BEDTIME. *1 TOTAL FILL*     venlafaxine (EFFEXOR-XR) 150 MG 24 hr capsule TAKE 2 CAPSULES (300MG) BY MOUTH ONCE DAILY *1 TOTAL FILL*         REVIEW OF SYSTEMS:    Currently, no fever, chills, or rigors. Does not have any visual or hearing problems. Denies any chest pain, headaches, palpitations, lightheadedness, dizziness, shortness of breath, or cough. Appetite is good. Denies any GERD symptoms. Denies any difficulty with swallowing, nausea, or vomiting.  Denies any abdominal pain, diarrhea or constipation. Denies any urinary symptoms. No insomnia. No active bleeding. No rash.       PHYSICAL EXAMINATION:  Vitals:    08/31/20 0644   BP: 99/63   Pulse: 80   Temp: 99  F (37.2  C)   Weight: 174 lb 8 oz (79.2 kg)         GENERAL: Awake, Alert,  oriented x3, not in any form of acute distress, answers questions appropriately, follows simple commands, conversant [place]  HEENT: Head is normocephalic with normal hair distribution. No evidence of trauma. Ears: No acute purulent discharge. Eyes: Conjunctivae pink with no scleral jaundice. Nose: Normal mucosa and septum. NECK: Supple with no cervical or supraclavicular lymphadenopathy. Trachea is midline.   SKIN: Warm and dry, no erythema noted, no rashes or lesions.  NEUROLOGICAL: The patient is oriented to person, place and time. Strength and sensation are grossly intact. Face is symmetric.      Due to C 19 Social distancing performed during assessment       LABS:    Lab Results   Component Value Date    WBC 2.9 (L) 08/26/2020    HGB 9.3 (L) 08/27/2020    HCT 29.6 (L) 08/26/2020    MCV 95 08/26/2020     (L) 08/26/2020       Results for orders placed or performed during the hospital encounter of 08/25/20   Basic Metabolic Panel   Result Value Ref Range    Sodium 144 136 - 145 mmol/L    Potassium 4.2 3.5 - 5.0 mmol/L    Chloride 108 (H) 98 - 107 mmol/L    CO2 32 (H) 22 - 31 mmol/L    Anion Gap, Calculation 4 (L) 5 - 18 mmol/L    Glucose 96 70 - 125 mg/dL    Calcium 8.0 (L) 8.5 - 10.5 mg/dL    BUN 29 (H) 8 - 28 mg/dL    Creatinine 0.72 0.60 - 1.10 mg/dL    GFR MDRD Af Amer >60 >60 mL/min/1.73m2    GFR MDRD Non Af Amer >60 >60 mL/min/1.73m2           Lab Results   Component Value Date    HGBA1C 5.4 09/29/2011     Vitamin D, Total (25-Hydroxy)   Date Value Ref Range Status   03/15/2018 43.9 30.0 - 80.0 ng/mL Final     Lab Results   Component Value Date    [id number] 468 02/02/2018       ASSESSMENT/PLAN:  1. Parkinsonism, unspecified Parkinsonism type (H)    2. Personal history of fall    3. Cognitive impairment    4. ACP (advance care planning)      1. Parkinsons: Neuro saw in acute care, no changes in her Parkinson's meds, see above med list.    2. Falls: Weakened state has UTI and Parkinsons which is a  contrectation to falls. Malagasy 6 day oral abx, therapies for PT/OT and SN for management of chronic medical conditions.    3. Cognitive impairment: Therapy will complete cognition testing. She is alert and oriented to PPT and would appreciate therapies assessment.    4. ACP: DNR/DNI discussion with pt as well as signing POLST today.      Electronically signed by:  Archana Siddiqui CNP  This progress note was completed using Dragon software and there may be grammatical errors.      45  minutes spent of which greater than 55 % was face to face communication with the patient about above plan of care including MCS role in her care,med regime, TCU routines as well as ACP today.

## 2021-06-11 NOTE — PROGRESS NOTES
[facility] Admission note      Patient: Alison Bashir  MRN: [id number]  Date of Service: 9/1/2020      [facility] [[id number]]  Reason for Visit     Chief Complaint   Patient presents with     H & P       Code Status     DNR /DNI    Assessment     -Acute fall in the group home  - Acute subcutaneous hematoma along the lateral margin of the right hip secondary to fall  -Pain management with patient requesting better management  -Underlying history of Parkinson's with bilateral upper extremity tremors  -Underlying history of Alzheimer's disease  -History of hypertension with hypotension  -CAD  -Depression  -History of overactive bladder  -Acute UTI cultures growing E. coli and Morganella treated now with cefdinir  -Generalized debilitation with recurrent falls requiring lengthy TCU stays  -History of TBI    Plan     Patient has been admitted to the TCU.  She has a history of recurrent falls and recently had a prolonged lengthy stay in the TCU and was discharged back to her group home.  Unfortunately she readmitted within a week with another fall.  Her group home has refused accept her back till she receives her prior level of functioning.  Suspect she may need long-term care if she does not improve.  Neurology was involved in her care because of underlying history of Parkinson's disease and dementia.  No new recommendations for medication obtained.  They have recommended continuing with her current medication regimen and Sinemet.  She will be participating in PT and OT.  Alison's main concern is pain management of her right thigh hematoma.  She has been given only Tylenol.  Advised her to do some icing.  Also topical pain gel Voltaren gel has been added twice a day to her regimen to see if that is effective.  Tramadol 50 mg every 6 hours as needed will be added for additional pain relief.  Celebrex 200 mg daily to be added.  Therapy to try some alternative modalities also.  Recheck labs  including hemoglobin due to anemia concerns.  Denies any dysuria and is currently on antibiotics.  She has a history of orthostatic hypotension with hypertension noted she was hypotensive in the hospital.  Currently she is on midodrine as well as low-dose of metoprolol.  We will monitor blood pressure trends  She also has a history of mood disorder and sees psychiatry she is on multiple medications will monitor mood closely.  She also has a recent history of significant weight loss of more than 30 pounds.  She will be monitored further  Recheck routine labs  Her overall prognosis has been guarded with recent progressive decline noted both cognitively and physically with recurrent falls.  Patient has chosen a DNR/DNI CODE STATUS    History     Patient is a very pleasant 70 y.o. female who is admitted to TCU  Patient presented to the hospital after she fell.  At baseline she has frequent falls in her group home.  She had recently a prolonged lengthy stay at the [facility] because of her falls and was discharged home.  Unfortunately she presented after a fall and has been discharged back to the TCU.  She also has underlying history of Parkinson's which has been slowly getting worse.  She was evaluated by neurology.  They have recommended PT OT no change in medications.  She continues to complain of right thigh pain unfortunately that is not responsive to topical treatments.  MRI was negative for any fractures but did show a subcutaneous hematoma.  She has been given only Tylenol for pain management and wondering if she can have anything stronger.  She also has baseline cognitive impairment due to Alzheimer's dementia with some intermittent agitation today however she appears to be quite clear and cognitively alert with good recall of recent events  Had UTI in the hospital cultures grew Morganella and E. coli she was given ceftriaxone and is on oral cefdinir in the TCU  She has a history of orthostatic  hypotension is on midodrine however had also hypertension and is on metoprolol she has some low blood pressures especially in the evening in the hospital    Past Medical History     Active Ambulatory (Non-Hospital) Problems    Diagnosis     Hematoma of right hip     Urinary tract infection     Inability to walk     Parkinsonism, unspecified Parkinsonism type (H)     Fall, initial encounter     Magnesium deficiency     Adjustment insomnia     Acute hypotension     RBBB     Chest pain, unspecified     Leukopenia     Gastroesophageal reflux disease with esophagitis     Personal history of fall     Chest pain     Chronic pain syndrome     Traumatic brain injury (H)     Stented coronary artery     RLS (restless legs syndrome)     Overactive bladder     HTN (hypertension)     Depression     CAD (coronary artery disease)     Alzheimer disease (H)     Orthostatic hypotension     Anemia     Cognitive impairment     Hip fracture (H)     ACP (advance care planning)     Constipation     Essential hypertension     Neuroleptic signed 8/29/16     Past Medical History:   Diagnosis Date     Acute blood loss anemia      Alzheimer disease (H)      CAD (coronary artery disease)      Chronic pain syndrome      Dementia (H)      Depression      Depression      Hip fracture, right (H) 12/21/2017     HTN (hypertension)      Kidney stone      Overactive bladder      PMB (postmenopausal bleeding)      RLS (restless legs syndrome)      Spine pain      Stented coronary artery      Traumatic brain injury (H)      Unsteady gait      UTI (lower urinary tract infection)        Past Social History     Reviewed, and she  reports that she has never smoked. She has never used smokeless tobacco. She reports that she does not drink alcohol or use drugs.    Family History     Reviewed, and family history includes Cancer in her mother; Coronary artery disease in her father; Heart attack in her father; Heart failure in her father.    Medication List   Post  Discharge Medication Reconciliation Status: discharge medications reconciled and changed, per note/orders   Current Outpatient Medications on File Prior to Visit   Medication Sig Dispense Refill     acetaminophen (TYLENOL) 500 MG tablet Take 2 tablets (1,000 mg total) by mouth 3 (three) times a day.  0     aspirin 81 mg chewable tablet Chew 81 mg daily.       atorvastatin (LIPITOR) 40 MG tablet Take 40 mg by mouth every evening.        buPROPion (WELLBUTRIN XL) 300 MG 24 hr tablet Take 300 mg by mouth every morning.        carbidopa-levodopa (SINEMET)  mg per tablet Take 2 tablets by mouth 3 (three) times a day.       cefdinir (OMNICEF) 300 MG capsule Take 1 capsule (300 mg total) by mouth 2 times a day at 6:00 am and 4:00 pm for 6 days. 12 capsule 0     cholecalciferol, vitamin D3, 1,000 unit tablet Take 4,000 Units by mouth daily.        cyanocobalamin (VITAMIN B-12) 100 MCG tablet Take 100 mcg by mouth daily.        lansoprazole (PREVACID) 30 MG capsule Take 30 mg by mouth daily.        magnesium oxide (MAG-OX) 400 mg tablet Take 400 mg by mouth 2 (two) times a day.       metoprolol succinate (TOPROL-XL) 12.5 MG Take 12.5 mg by mouth daily.       midodrine (PROAMATINE) 2.5 MG tablet Take 7.5 mg by mouth 3 (three) times a day with meals.       mirtazapine (REMERON) 45 MG tablet TAKE 1 TABLET BY MOUTH AT BEDTIME  *1 TOTAL FILL* 30 tablet 3     multivitamin (MULTIVITAMIN) per tablet Take 1 tablet by mouth daily.       nitroglycerin (NITROSTAT) 0.4 MG SL tablet Place 0.4 mg under the tongue every 5 (five) minutes as needed.        polyethylene glycol (MIRALAX) 17 gram packet Take 17 g by mouth daily.       pramipexole (MIRAPEX) 0.5 MG tablet Take 0.5 mg by mouth at bedtime.       solifenacin (VESICARE) 10 MG tablet Take 10 mg by mouth daily.       traZODone (DESYREL) 150 MG tablet TAKE 1 TABLET BY MOUTH AT BEDTIME. *1 TOTAL FILL* 31 tablet 3     venlafaxine (EFFEXOR-XR) 150 MG 24 hr capsule TAKE 2 CAPSULES  (300MG) BY MOUTH ONCE DAILY *1 TOTAL FILL* 60 capsule 3     No current facility-administered medications on file prior to visit.        Allergies     Allergies   Allergen Reactions     Blood-Group Specific Substance Other (See Comments)     Patient has anti-K. Blood product orders maybe delayed. Draw one red top and 2 purple top tubes for all Type and Screen/Red blood Cell product orders     Fd And C No.5 (Tartrazine)      GI UPSET     Ibuprofen Nausea And Vomiting     Morphine Rash     swelling       Review of Systems   A comprehensive review of 14 systems was done. Pertinent findings noted here and in history of present illness. All the rest negative.  Constitutional: Negative.  Negative for fever, chills, she has  activity change, appetite change and fatigue.   Patient has had significant weight loss recently also  HENT: Negative for congestion and facial swelling.    Eyes: Negative for photophobia, redness and visual disturbance.   Respiratory: Negative for cough and chest tightness.    Cardiovascular: Negative for chest pain, palpitations and leg swelling.   Gastrointestinal: Negative for nausea, diarrhea, constipation, blood in stool and abdominal distention.   Genitourinary: Negative.    Musculoskeletal: Negative.  She is reporting multiple falls pain in her right thigh  Skin: Negative.    Neurological: Negative for dizziness, tremors, syncope, weakness, light-headedness and headaches.   Hematological: Does not bruise/bleed easily.   Psychiatric/Behavioral: Negative.  Recall is impaired but patient is quite pleasant today      Physical Exam     Recent Vitals 8/31/2020   Height -   Weight 174 lbs 8 oz   BSA (m2) 1.98 m2   BP 99/63   Pulse 80   Temp 99   Temp src -   SpO2 -   Some recent data might be hidden       Constitutional: Oriented to person, place, and time and appears well-developed.   Appears frail and weak  HEENT:  Normocephalic and atraumatic.  Eyes: Conjunctivae and EOM are normal. Pupils are  equal, round, and reactive to light. No discharge.  No scleral icterus. Nose normal. Mouth/Throat: Oropharynx is clear and moist. No oropharyngeal exudate.    NECK: Normal range of motion. Neck supple. No JVD present. No tracheal deviation present. No thyromegaly present.   CARDIOVASCULAR: Normal rate, regular rhythm and intact distal pulses.  Exam reveals no gallop and no friction rub.  Systolic murmur present.  PULMONARY: Effort normal and breath sounds normal. No respiratory distress.No Wheezing or rales.  ABDOMEN: Soft. Bowel sounds are normal. No distension and no mass.  There is no tenderness. There is no rebound and no guarding. No HSM.  MUSCULOSKELETAL: Normal range of motion. No edema and no tenderness. Mild kyphosis, tenderness.  On her right thigh even to light touch  LYMPH NODES: Has no cervical, supraclavicular, axillary and groin adenopathy.   NEUROLOGICAL: Alert and oriented to person, place, and time. No cranial nerve deficit.  Normal muscle tone. Coordination normal  Resting tremors bilateral upper extremity noted.   GENITOURINARY: Deferred exam.  SKIN: Skin is warm and dry. No rash noted. No erythema. No pallor.   EXTREMITIES: No cyanosis, no clubbing, no edema. No Deformity.  PSYCHIATRIC: Normal mood, affect and behavior.  Recall is impaired      Lab Results     Recent Results (from the past 240 hour(s))   Magnesium    Collection Time: 08/25/20  4:54 PM   Result Value Ref Range    Magnesium 2.2 1.8 - 2.6 mg/dL   Hepatic Profile    Collection Time: 08/25/20  4:54 PM   Result Value Ref Range    Bilirubin, Total 0.3 0.0 - 1.0 mg/dL    Bilirubin, Direct 0.2 <=0.5 mg/dL    Protein, Total 6.6 6.0 - 8.0 g/dL    Albumin 3.6 3.5 - 5.0 g/dL    Alkaline Phosphatase 105 45 - 120 U/L    AST 12 0 - 40 U/L    ALT 16 0 - 45 U/L   Basic Metabolic Panel    Collection Time: 08/25/20  4:54 PM   Result Value Ref Range    Sodium 144 136 - 145 mmol/L    Potassium 4.1 3.5 - 5.0 mmol/L    Chloride 105 98 - 107 mmol/L     CO2 33 (H) 22 - 31 mmol/L    Anion Gap, Calculation 6 5 - 18 mmol/L    Glucose 93 70 - 125 mg/dL    Calcium 8.7 8.5 - 10.5 mg/dL    BUN 28 8 - 28 mg/dL    Creatinine 0.72 0.60 - 1.10 mg/dL    GFR MDRD Af Amer >60 >60 mL/min/1.73m2    GFR MDRD Non Af Amer >60 >60 mL/min/1.73m2   Thyroid Cascade    Collection Time: 08/25/20  4:54 PM   Result Value Ref Range    TSH 1.09 0.30 - 5.00 uIU/mL   HM1 (CBC with Diff)    Collection Time: 08/25/20  4:54 PM   Result Value Ref Range    WBC 4.5 4.0 - 11.0 thou/uL    RBC 3.76 (L) 3.80 - 5.40 mill/uL    Hemoglobin 10.7 (L) 12.0 - 16.0 g/dL    Hematocrit 35.5 35.0 - 47.0 %    MCV 94 80 - 100 fL    MCH 28.5 27.0 - 34.0 pg    MCHC 30.1 (L) 32.0 - 36.0 g/dL    RDW 13.4 11.0 - 14.5 %    Platelets 153 140 - 440 thou/uL    MPV 10.8 8.5 - 12.5 fL    Neutrophils % 67 50 - 70 %    Lymphocytes % 24 20 - 40 %    Monocytes % 7 2 - 10 %    Eosinophils % 2 0 - 6 %    Basophils % 0 0 - 2 %    Immature Granulocyte % 0 <=0 %    Neutrophils Absolute 3.0 2.0 - 7.7 thou/uL    Lymphocytes Absolute 1.1 0.8 - 4.4 thou/uL    Monocytes Absolute 0.3 0.0 - 0.9 thou/uL    Eosinophils Absolute 0.1 0.0 - 0.4 thou/uL    Basophils Absolute 0.0 0.0 - 0.2 thou/uL    Immature Granulocyte Absolute 0.0 <=0.0 thou/uL   COVID-19 Virus PCR MRF    Collection Time: 08/25/20  7:22 PM    Specimen: Respiratory   Result Value Ref Range    COVID-19 VIRUS SPECIMEN SOURCE Nasopharyngeal     2019-nCOV Not Detected    ECG 12 lead MUSE    Collection Time: 08/25/20  9:04 PM   Result Value Ref Range    SYSTOLIC BLOOD PRESSURE      DIASTOLIC BLOOD PRESSURE      VENTRICULAR RATE 62 BPM    ATRIAL RATE 62 BPM    P-R INTERVAL 166 ms    QRS DURATION 162 ms    Q-T INTERVAL 466 ms    QTC CALCULATION (BEZET) 472 ms    P Axis 40 degrees    R AXIS -25 degrees    T AXIS 16 degrees    MUSE DIAGNOSIS       Normal sinus rhythm  Right bundle branch block  Abnormal ECG  When compared with ECG of 23-JUL-2020 18:53,  No significant change was  found  Confirmed by JOSR BELL MD LOC:[facility] ([id number]) on 8/26/2020 1:44:04 PM     Basic Metabolic Panel    Collection Time: 08/26/20  5:59 AM   Result Value Ref Range    Sodium 144 136 - 145 mmol/L    Potassium 4.2 3.5 - 5.0 mmol/L    Chloride 108 (H) 98 - 107 mmol/L    CO2 32 (H) 22 - 31 mmol/L    Anion Gap, Calculation 4 (L) 5 - 18 mmol/L    Glucose 96 70 - 125 mg/dL    Calcium 8.0 (L) 8.5 - 10.5 mg/dL    BUN 29 (H) 8 - 28 mg/dL    Creatinine 0.72 0.60 - 1.10 mg/dL    GFR MDRD Af Amer >60 >60 mL/min/1.73m2    GFR MDRD Non Af Amer >60 >60 mL/min/1.73m2   HM2(CBC w/o Differential)    Collection Time: 08/26/20  5:59 AM   Result Value Ref Range    WBC 2.9 (L) 4.0 - 11.0 thou/uL    RBC 3.11 (L) 3.80 - 5.40 mill/uL    Hemoglobin 9.0 (L) 12.0 - 16.0 g/dL    Hematocrit 29.6 (L) 35.0 - 47.0 %    MCV 95 80 - 100 fL    MCH 28.9 27.0 - 34.0 pg    MCHC 30.4 (L) 32.0 - 36.0 g/dL    RDW 13.7 11.0 - 14.5 %    Platelets 119 (L) 140 - 440 thou/uL    MPV 10.8 8.5 - 12.5 fL   CK Total    Collection Time: 08/26/20  5:59 AM   Result Value Ref Range    CK, Total 27 (L) 30 - 190 U/L   Lactic Acid    Collection Time: 08/26/20  6:55 AM   Result Value Ref Range    Lactic Acid 0.4 (L) 0.7 - 2.0 mmol/L   Urinalysis-UC if Indicated    Collection Time: 08/26/20  7:21 PM   Result Value Ref Range    Color, UA Yellow Colorless, Yellow, Straw, Light Yellow    Clarity, UA Cloudy (!) Clear    Glucose, UA Negative Negative    Bilirubin, UA Negative Negative    Ketones, UA Negative Negative, 60 mg/dL    Specific Gravity, UA 1.017 1.001 - 1.030    Blood, UA Moderate (!) Negative    pH, UA 6.0 4.5 - 8.0    Protein, UA Trace (!) Negative mg/dL    Urobilinogen, UA <2.0 E.U./dL <2.0 E.U./dL, 2.0 E.U./dL    Nitrite, UA Negative Negative    Leukocytes, UA Large (!) Negative    Bacteria, UA None Seen None Seen hpf    RBC, UA >100 (!) None Seen, 0-2 hpf    WBC, UA >100 (!) None Seen, 0-5 hpf    Squam Epithel, UA 0-5 None Seen, 0-5 lpf    Trans Epithel, UA 5-10  (!) None Seen lpf   Culture, Urine    Collection Time: 08/26/20  7:21 PM    Specimen: Urine   Result Value Ref Range    Culture >100,000 col/ml Gram Negative Rods (!)    Hemoglobin    Collection Time: 08/27/20  4:48 PM   Result Value Ref Range    Hemoglobin 9.3 (L) 12.0 - 16.0 g/dL   Basic Metabolic Panel    Collection Time: 08/31/20  9:48 AM   Result Value Ref Range    Sodium 145 136 - 145 mmol/L    Potassium 3.8 3.5 - 5.0 mmol/L    Chloride 107 98 - 107 mmol/L    CO2 31 22 - 31 mmol/L    Anion Gap, Calculation 7 5 - 18 mmol/L    Glucose 80 70 - 125 mg/dL    Calcium 8.5 8.5 - 10.5 mg/dL    BUN 30 (H) 8 - 28 mg/dL    Creatinine 0.72 0.60 - 1.10 mg/dL    GFR MDRD Af Amer >60 >60 mL/min/1.73m2    GFR MDRD Non Af Amer >60 >60 mL/min/1.73m2   Hemoglobin    Collection Time: 08/31/20  9:48 AM   Result Value Ref Range    Hemoglobin 9.9 (L) 12.0 - 16.0 g/dL            Imaging Results     Xr Femur Right 2 Vws    Result Date: 8/25/2020  EXAM: XR PELVIS AP, XR FEMUR RIGHT 2 VWS LOCATION: [facility] DATE/TIME: 8/25/2020 3:47 PM INDICATION: fall, hip and thigh pain COMPARISON: 07/23/2020 and 02/08/2019     Pelvis: No fracture or dislocation. There is mild sclerosis at the left hip which is mild osteoarthritic change. Right hip and femur: Right hip arthroplasty with a longstem prosthesis and cerclage wires. Lucency at the lesser trochanter has not changed. No new periprosthetic lucency or fracture. Right total knee arthroplasty noted. No periprosthetic fracture or lucency identified on this limited view of the knee. No suprapatellar effusion appreciated.     Mr Pelvis Without Contrast    Result Date: 8/26/2020  EXAM: MR PELVIS WO CONTRAST LOCATION: [facility] DATE/TIME: 8/26/2020 4:27 PM INDICATION: 70-year-old female with Parkinsonism with fall today with right hip/pelvis pain. Negative plain films. COMPARISON: 08/25/2020 radiographs. TECHNIQUE: Unenhanced. FINDINGS: HIPS: No acute fracture or contusion. Right  bipolar femoral endoprosthesis with cerclage wire fixation of the proximal femur. Moderate-sized areas of heterotopic ossification arising from the anterior and medial portions of the proximal femur. There is also a small area of heterotopic ossification arising from the lateral margin of the right acetabulum. Moderate degenerative changes in the left hip. TENDONS: Gluteal minimus and medius tendons intact. No tendinopathy. No trochanteric bursitis. Proximal hamstring tendons intact. No tendinopathy. No iliopsoas tendon tear or tendinopathy. BONES: No marrow edema. No evidence of a marrow replacing lesion. SI joints are normal. Visualized lower lumbar spine is unremarkable. SOFT TISSUES: Moderate-sized acute subcutaneous hematoma along the lateral margin of the hip and proximal thigh as seen on series 5 image 8. This is incompletely visualized on this study. INTRA-PELVIC CONTENTS: No free fluid.     1.  No acute fracture. 2.  Right bipolar femoral endoprosthesis with cerclage wire fixation with a moderate amount of adjacent heterotopic ossification. 3.  Moderate-sized acute subcutaneous hematoma along the lateral margin of the right hip. 4.  Moderate degenerative changes in the left hip.     Xr Finger Right 2 Or More Vws    Result Date: 8/25/2020  EXAM: XR FINGER RIGHT 2 OR MORE VWS LOCATION: [facility] DATE/TIME: 8/25/2020 4:50 PM INDICATION: fall, PIP bruising COMPARISON: None.     Normal joint spaces and alignment. No fracture. Soft tissue swelling about the PIP joint.     Xr Pelvis Ap    Result Date: 8/25/2020  EXAM: XR PELVIS AP, XR FEMUR RIGHT 2 VWS LOCATION: [facility] DATE/TIME: 8/25/2020 3:47 PM INDICATION: fall, hip and thigh pain COMPARISON: 07/23/2020 and 02/08/2019     Pelvis: No fracture or dislocation. There is mild sclerosis at the left hip which is mild osteoarthritic change. Right hip and femur: Right hip arthroplasty with a longstem prosthesis and cerclage wires. Lucency at the  lesser trochanter has not changed. No new periprosthetic lucency or fracture. Right total knee arthroplasty noted. No periprosthetic fracture or lucency identified on this limited view of the knee. No suprapatellar effusion appreciated.             NERY Sosa

## 2021-06-11 NOTE — PROGRESS NOTES
Pt here for follow up with 2 staff.    Will be having surgery on October to remove kidney stones.  Finished Cardiac rehab not to long ago.

## 2021-06-11 NOTE — PROGRESS NOTES
[facility] Admission note      Patient: Alison Bashir  MRN: [id number]  Date of Service: 9/22/2020      [facility] [[id number]]  Reason for Visit     Chief Complaint   Patient presents with     Review Of Multiple Medical Conditions   F/U ONWEAKNESS/ WT LOSS    Code Status     DNR /DNI    Assessment     -Acute fall in the group home  - Acute subcutaneous hematoma along the lateral margin of the right hip secondary to fall  -Pain management with patient requesting better management  -Underlying history of Parkinson's with bilateral upper extremity tremors  -Underlying history of Alzheimer's disease  -History of hypertension with hypotension  -CAD  -Depression  -History of overactive bladder  -Acute UTI cultures growing E. coli and Morganella treated now with cefdinir  -Generalized debilitation with recurrent falls requiring lengthy TCU stays  -History of TBI      Plan     Patient has been admitted to the TCU.  She has a history of recurrent falls and recently had a prolonged lengthy stay in the TCU and was discharged back to her group home.  Unfortunately she readmitted within a week with another fall.  At baseline she is profoundly debilitated and wheelchair bound and poorly ambulatory.  Unfortunately not much progress noted and she remains wheelchair-bound.  She is also high fall risk for multiple falls though none reported recently.  She has underlying history of Parkinson's disease.  Pain management reviewed with her with recent history of hematoma for which she has been using tramadol pretty much every 6 hours.  She is still rating her pain is high  Recheck hemoglobin has been stable with a last hemoglobin being 9.9  Recheck BMP has been stable though her sodium is slightly elevated at 145 she was again advised to push some more fluids  Continue with the PT OT discharge plan is to go back to her group home    History     Patient is a very pleasant 70 y.o. female who is admitted to  TCU  Patient presented to the hospital after she fell.  At baseline she has frequent falls in her group home.  Unfortunately she continues to have gait instability and remains a falls risk.  She is wheelchair-bound currently    She also has underlying history of Parkinson's which has been slowly getting worse.  Neurology was consulted during hospitalization they feel that this is a natural progression of the disease they have not recommended any medication changes as yet  At baseline she is at a significant decline with underlying history of Parkinson's disease.  She has seen neurology with no new recommendations.  She continues to have significant resting tremors and hypertonicity noted    She continues to complain of right thigh pain unfortunately that is not responsive to topical treatments.  MRI was negative for any fractures but did show a subcutaneous hematoma.  She has been taken off Voltaren gel at her request.  She has been icing her hip.  Currently she has been requesting tramadol around-the-clock she is not ready for any weaning she told me her pain in the right hip is still quite high    She also has baseline cognitive impairment due to Alzheimer's dementia with some intermittent agitation today however she appears to be quite clear   SLUMS 18/30  Mood has been pleasant and stable with no behavioral issues reported by staff    Had UTI in the hospital cultures grew Morganella and E. coli she was given ceftriaxone and is on oral cefdinir in the TCU  She also has a history of orthostatic hypotension and has been on midodrine recently blood pressures have been stable but staff did report when they tried to stand her up she did immediately drops her blood pressure she has been given JAMIA hose for lymphedema concerns  Overall has been doing well    Past Medical History     Active Ambulatory (Non-Hospital) Problems    Diagnosis     Hematoma of right hip     Urinary tract infection     Inability to walk      Parkinsonism, unspecified Parkinsonism type (H)     Fall, initial encounter     Magnesium deficiency     Adjustment insomnia     Acute hypotension     RBBB     Chest pain, unspecified     Leukopenia     Gastroesophageal reflux disease with esophagitis     Personal history of fall     Chest pain     Chronic pain syndrome     Traumatic brain injury (H)     Stented coronary artery     RLS (restless legs syndrome)     Overactive bladder     HTN (hypertension)     Depression     CAD (coronary artery disease)     Alzheimer disease (H)     Orthostatic hypotension     Anemia     Cognitive impairment     Hip fracture (H)     ACP (advance care planning)     Constipation     Essential hypertension     Neuroleptic signed 8/29/16     Past Medical History:   Diagnosis Date     Acute blood loss anemia      Alzheimer disease (H)      CAD (coronary artery disease)      Chronic pain syndrome      Dementia (H)      Depression      Depression      Hip fracture, right (H) 12/21/2017     HTN (hypertension)      Kidney stone      Overactive bladder      PMB (postmenopausal bleeding)      RLS (restless legs syndrome)      Spine pain      Stented coronary artery      Traumatic brain injury (H)      Unsteady gait      UTI (lower urinary tract infection)        Past Social History     Reviewed, and she  reports that she has never smoked. She has never used smokeless tobacco. She reports that she does not drink alcohol or use drugs.    Family History     Reviewed, and family history includes Cancer in her mother; Coronary artery disease in her father; Heart attack in her father; Heart failure in her father.    Medication List   Post Discharge Medication Reconciliation Status: discharge medications reconciled and changed, per note/orders   Current Outpatient Medications on File Prior to Visit   Medication Sig Dispense Refill     acetaminophen (TYLENOL) 500 MG tablet Take 2 tablets (1,000 mg total) by mouth 3 (three) times a day.  0     aspirin  81 mg chewable tablet Chew 81 mg daily.       atorvastatin (LIPITOR) 40 MG tablet Take 40 mg by mouth every evening.        buPROPion (WELLBUTRIN XL) 300 MG 24 hr tablet TAKE 1 TABLET BY MOUTH ONCE DAILY. 31 tablet 0     carbidopa-levodopa (SINEMET)  mg per tablet Take 2 tablets by mouth 3 (three) times a day.       cholecalciferol, vitamin D3, 1,000 unit tablet Take 4,000 Units by mouth daily.        cyanocobalamin (VITAMIN B-12) 100 MCG tablet Take 100 mcg by mouth daily.        lansoprazole (PREVACID) 30 MG capsule Take 30 mg by mouth daily.        magnesium oxide (MAG-OX) 400 mg tablet Take 400 mg by mouth 2 (two) times a day.       metoprolol succinate (TOPROL-XL) 12.5 MG Take 12.5 mg by mouth daily.       midodrine (PROAMATINE) 2.5 MG tablet Take 7.5 mg by mouth 3 (three) times a day with meals.       mirtazapine (REMERON) 45 MG tablet TAKE 1 TABLET BY MOUTH AT BEDTIME  *1 TOTAL FILL* 30 tablet 3     multivitamin (MULTIVITAMIN) per tablet Take 1 tablet by mouth daily.       nitroglycerin (NITROSTAT) 0.4 MG SL tablet Place 0.4 mg under the tongue every 5 (five) minutes as needed.        polyethylene glycol (MIRALAX) 17 gram packet Take 17 g by mouth daily.       pramipexole (MIRAPEX) 0.5 MG tablet Take 0.5 mg by mouth at bedtime.       solifenacin (VESICARE) 10 MG tablet Take 10 mg by mouth daily.       traZODone (DESYREL) 150 MG tablet TAKE 1 TABLET BY MOUTH AT BEDTIME. *1 TOTAL FILL* 31 tablet 3     venlafaxine (EFFEXOR-XR) 150 MG 24 hr capsule TAKE 2 CAPSULES (300MG) BY MOUTH ONCE DAILY *1 TOTAL FILL* 60 capsule 3     No current facility-administered medications on file prior to visit.        Allergies     Allergies   Allergen Reactions     Blood-Group Specific Substance Other (See Comments)     Patient has anti-K. Blood product orders maybe delayed. Draw one red top and 2 purple top tubes for all Type and Screen/Red blood Cell product orders     Fd And C No.5 (Tartrazine)      GI UPSET     Ibuprofen  Nausea And Vomiting     Morphine Rash     swelling       Review of Systems   A comprehensive review of 14 systems was done. Pertinent findings noted here and in history of present illness. All the rest negative.  Constitutional: Negative.  Negative for fever, chills, she has  activity change, appetite change and fatigue.   Patient has had significant weight loss recently also  HENT: Negative for congestion and facial swelling.    Eyes: Negative for photophobia, redness and visual disturbance.   Respiratory: Negative for cough and chest tightness.    Cardiovascular: Negative for chest pain, palpitations and has some leg swelling.   Gastrointestinal: Negative for nausea, diarrhea, constipation, blood in stool and abdominal distention.   Genitourinary: Negative.    Musculoskeletal: Negative.  She is reporting multiple falls pain in her right thigh  Skin: Negative.    Neurological: Negative for dizziness, tremors, syncope, weakness, light-headedness and headaches.   Hematological: Does not bruise/bleed easily.   Psychiatric/Behavioral: Negative.  Recall is impaired but patient is quite pleasant today      Physical Exam     Recent Vitals 9/17/2020   Height -   Weight 166 lbs 6 oz   BSA (m2) 1.93 m2   /79   Pulse 61   Temp 96.4   Temp src -   SpO2 -   Some recent data might be hidden       Constitutional: Oriented to person, place, and time and appears well-developed.   Appears frail and weak  HEENT:  Normocephalic and atraumatic.  Eyes: Conjunctivae and EOM are normal. Pupils are equal, round, and reactive to light. No discharge.  No scleral icterus. Nose normal. Mouth/Throat: Oropharynx is clear and moist. No oropharyngeal exudate.    NECK: Normal range of motion. Neck supple. No JVD present. No tracheal deviation present. No thyromegaly present.   CARDIOVASCULAR: Normal rate, regular rhythm and intact distal pulses.  Exam reveals no gallop and no friction rub.  Systolic murmur present.  PULMONARY: Effort normal  and breath sounds normal. No respiratory distress.No Wheezing or rales.  ABDOMEN: Soft. Bowel sounds are normal. No distension and no mass.  There is no tenderness. There is no rebound and no guarding. No HSM.  MUSCULOSKELETAL: Normal range of motion. 1+ edema and no tenderness. Mild kyphosis, tenderness.  On her right thigh even to light touch  LYMPH NODES: Has no cervical, supraclavicular, axillary and groin adenopathy.   NEUROLOGICAL: Alert and oriented to person, place, and time. No cranial nerve deficit.  Normal muscle tone. Coordination normal  Resting tremors bilateral upper extremity noted.   GENITOURINARY: Deferred exam.  SKIN: Skin is warm and dry. No rash noted. No erythema. No pallor.   EXTREMITIES: No cyanosis, no clubbing, 1+ edema. No Deformity.  PSYCHIATRIC: Normal mood, affect and behavior.  Recall is impaired      Lab Results     Results for orders placed or performed in visit on 08/31/20   Basic Metabolic Panel   Result Value Ref Range    Sodium 145 136 - 145 mmol/L    Potassium 3.8 3.5 - 5.0 mmol/L    Chloride 107 98 - 107 mmol/L    CO2 31 22 - 31 mmol/L    Anion Gap, Calculation 7 5 - 18 mmol/L    Glucose 80 70 - 125 mg/dL    Calcium 8.5 8.5 - 10.5 mg/dL    BUN 30 (H) 8 - 28 mg/dL    Creatinine 0.72 0.60 - 1.10 mg/dL    GFR MDRD Af Amer >60 >60 mL/min/1.73m2    GFR MDRD Non Af Amer >60 >60 mL/min/1.73m2              NERY Sosa

## 2021-06-11 NOTE — PROGRESS NOTES
This video/telephone visit will be conducted via a call between you and your physician/provider. We have found that certain health care needs can be provided without the need for an in-person physical exam. This service lets us provide the care you need with a video /telephone conversation. If a prescription is necessary we can send it directly to your pharmacy. If lab work is needed we can place an order for that and you can then stop by our lab to have the test done at a later time.    Just as we bill insurance for in-person visits, we also bill insurance for video/telephone visits. If you have questions about your insurance coverage, we recommend that you speak with your insurance company.    Patient has given verbal consent for video/Telephone visit?yes  Patient would like the video visit invitation sent by: Text to cell phone:[phone]  RAMA Delvalle     Patient verified allergies, medications and pharmacy via phone  done verbally with writer. Patient states she  is ready for visit.

## 2021-06-11 NOTE — PROGRESS NOTES
Correct pharmacy verified with patient and confirmed in snapshot? [x] yes []no    Medications Phoned  to Pharmacy [] yes [x]no  Name of Pharmacist:  List Medications, including dose, quantity and instructions      Medication Prescriptions given to patient   [] yes  [x] no   List the name of the drug the prescription was written for.       Medications ordered this visit were e-scribed.  Verified by order class [] yes  [x] no    Medication changes or discontinuations were communicated to patient's pharmacy: [] yes  [x] no    UA collected [] yes    [x] no    [place] Prescription Monitoring Program Reviewed? [] yes  [x] no    Referrals were made to:  none  Future appointment was made: [] yes  [x] no    Dictation completed at time of chart check: [x] yes  [] no    I have checked the documentation for today s encounters and the above information has been reviewed and completed.

## 2021-06-11 NOTE — PROGRESS NOTES
Outpatient Followup Psychiatric Evaluation      Pertinent History: Patient presents today for the purposes of medication management.  She has a history of a mood disorder and also with prior psychotic symptoms.  Per the patient's request we have been tapering the Zyprexa and that was discontinued at the last visit.  We did increase Remeron at that time.  She has physician cardiac rehab.  Overt from her ankle injury.  She has had multiple other medical issues but despite this has been doing quite well from a mood and behavior standpoint.    Current Symptoms:   Patient admits she is doing quite well.  The staff present report the patient is doing extremely well.  She is brighter.  She has lost weight.  She is exercising by walking a lot.  Overall her medical conditions are improving.  She still has multiple kidney stones that need attention in the next few months but overall they voiced no specific concerns or complaints.  The patient is sleeping well.  No significant depression or anxiety.  No change in cognition or concentration and if anything she has been participating more and is more motivated.  There is no side effects to the medication that they are aware of.  The patient is sleeping well at night with the Remeron.  No hallucinations or delusions.  No suicidality.  All present are requesting no changes at this time.    I did review and fill out staff and paperwork for them.      Current Medications: Please see chart    Medication Compliance: Yes    Side Effects to Medications: No      Vitals:  Wt Readings from Last 3 Encounters:   04/03/17 192 lb (87.1 kg)   11/28/16 212 lb (96.2 kg)   08/29/16 219 lb (99.3 kg)     Temp Readings from Last 3 Encounters:   08/29/16 97.6  F (36.4  C) (Oral)   02/29/16 98.1  F (36.7  C) (Oral)   11/16/15 98.3  F (36.8  C) (Oral)     BP Readings from Last 3 Encounters:   04/03/17 105/58   11/28/16 126/60   08/29/16 139/63     Pulse Readings from Last 3 Encounters:   04/03/17 86    11/28/16 87   08/29/16 80         Mental Status Exam:   Patient is more alert maintaining better eye contact and occasionally smiles.  Still overall somewhat slow but pleasant comfortable with no pain or shortness of breath.  Attention and concentration appear improved.  She is tracking better with fewer prompts.  Mood is not obviously depressed.  As stated above she smiling.  No anxiety.  No lability.  Speech is slow simple somewhat monotone.  Not pressured or rambling but answers are consistent and appropriate with less of a delay.  Thought content does not show any hallucinations or delusions.  No suicidal or homicidal ideation.  Thought formation does not show the patient to be loose.  Insight, judgment and memory are all baseline and unchanged.  Fund of knowledge is baseline.    Diagnosis managed and treated at today's visit :    Axis I: Major depressive disorder   History of psychosis NOS    Axis II: Deferred    Axis III: See intake note    Plan:  Medication Adjustment:  No medication changes at this time.    Other: Patient will return to clinic in 6 months for further assessment and treatment the staff and patient agreed to return sooner or call if questions concerns or problems arise.    Continue with the support of the clinic, reassurance, and redirection. Staff monitoring and ongoing assessments per team plan. Current psychotropic medication appears to represent the minimum effective dosage and appears medically necessary. We will continue to monitor and reassess. This team will utilize appropriate emergency services if necessary. I will make myself available if concerns or problems arise.    Norberto Johnson

## 2021-06-12 NOTE — PROGRESS NOTES
[facility] Admission note      Patient: Alison Bashir  MRN: [id number]  Date of Service: 10/12/2020      [facility] [[id number]]  Reason for Visit     Chief Complaint   Patient presents with     Review Of Multiple Medical Conditions     F/u low bp and pain  Code Status     DNI    Assessment     -History of a mechanical fall in the setting of recurrent hospitalizations related to falls.  -Right hip pain with underlying history of right hip arthroplasty  -Recent history of a fall related right hip thigh hematoma  -History of recurrent UTIs; urine culture again showing Morganella morganii species  -Hypotension with underlying history of orthostatic hypotension  - Parkinson's disease  -Cognitive impairment/dementia  - HTN  -Depression with anxiety  Plan     Patient readmitted to the TCU within days of discharge.  Acute fall in the setting of recurrent falls reported.  Unfortunately she is declining quite rapidly and may require a higher level of care.   and multiple hospitalizations she may need a higher level of care  However patient now reports that she would like to discharge back to her group home.  She continues to fluctuate in her decision earlier she had requested she did not want to discharge to the group home.  Staff is working on her discharge planning.  Blood pressure reviewed and she has been running low blood pressures overnight.  Highest blood pressure was 107/55.  She has an underlying history of orthostatic hypotension and remains on midodrine 7.5 mg 3 times daily.  She also gets metoprolol XL 12.5 daily.  We will plan on putting hold parameters for her metoprolol based on her blood pressure numbers and will give her some permissive hypertension.  Pain management again reviewed with her she has been using her tramadol intermittently but does not want to discontinue it she is resistant to the idea of trying any other medication for pain management including topical gels  as she feels this is working well.  Continue with the rehab      History     Patient is a very pleasant 70 y.o. female who is admitted to TCU  Patient was recently in a TCU and was discharged home at baseline she has profound debilitation with a high fall risk and has had recurrent hospitalizations related to falls.  She presented after she fell again at home within days of discharge.  She was complaining of right-sided chest pain as well as right hip pain.  She recently was admitted with a right hip pain with resultant right hip thigh hematoma.  She has been discharged to the TCU.  At baseline she remains quite debilitated wheelchair-bound and poorly ambulatory.  She will remain a very high fall risk and probably will not ambulate much in future either  Pain management reviewed with her recently she has been using some tramadol over the weekend intermittently 1 or 2 tabs a day she is also on scheduled Tylenol she does not want to taper her tramadol.  We have offered various other modalities including diathermy and topical pain gels she has refused all of them as she feels they are not effective for her  Blood pressure review reveals that she has had some low blood pressures with resultant symptomatic issues.  She has had a prior history of orthostatic hypotension.  She gets midodrine scheduled and also remains on metoprolol.  She also had a UTI with cultures growing E. coli and Morganella.  They have elected not to treat as she is not symptomatic.  She will require monitoring in the TCU.  She has no dysuria symptoms and is being monitored clinically also remains afebrile mood has been stable  She has underlying history of Parkinson's disease is been declining neurology has seen her and no new med changes have been evaluated by them.  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  No new concerns voiced by patient or nursing      Past Medical History     Active Ambulatory (Non-Hospital) Problems    Diagnosis     Accident due to mechanical fall without injury, initial encounter     Hematoma of right thigh, subsequent encounter     Right hip pain     Hematoma of right hip     Urinary tract infection     Inability to walk     Parkinsonism, unspecified Parkinsonism type (H)     Fall, initial encounter     Magnesium deficiency     Adjustment insomnia     Acute hypotension     RBBB     Chest pain, unspecified     Leukopenia     Gastroesophageal reflux disease with esophagitis     Personal history of fall     Chest pain     Chronic pain syndrome     Traumatic brain injury (H)     Stented coronary artery     RLS (restless legs syndrome)     Overactive bladder     HTN (hypertension)     Depression     CAD (coronary artery disease)     Alzheimer disease (H)     Orthostatic hypotension     Anemia     Cognitive impairment     Hip fracture (H)     ACP (advance care planning)     Constipation     Essential hypertension     Neuroleptic signed 8/29/16     Past Medical History:   Diagnosis Date     Acute blood loss anemia      Alzheimer disease (H)      CAD (coronary artery disease)      Chronic pain syndrome      Dementia (H)      Depression      Depression      Hip fracture, right (H) 12/21/2017     HTN (hypertension)      Kidney stone      Overactive bladder      PMB (postmenopausal bleeding)      RLS (restless legs syndrome)      Spine pain      Stented coronary artery      Traumatic brain injury (H)      Unsteady gait      UTI (lower urinary tract infection)        Past Social History     Reviewed, and she  reports that she has never smoked. She has never used smokeless tobacco. She reports that she does not drink alcohol or use drugs.    Family History     Reviewed, and family history includes Cancer in her mother; Coronary artery disease in her father; Heart attack  in her father; Heart failure in her father.    Medication List     Current Outpatient Medications on File Prior to Visit   Medication Sig Dispense Refill     acetaminophen (TYLENOL) 650 MG CR tablet Take 650 mg by mouth 4 (four) times a day.       aspirin 81 mg chewable tablet Chew 81 mg daily.       atorvastatin (LIPITOR) 40 MG tablet Take 40 mg by mouth every evening.        buPROPion (WELLBUTRIN XL) 300 MG 24 hr tablet TAKE 1 TABLET BY MOUTH ONCE DAILY. 31 tablet 0     carbidopa-levodopa (SINEMET)  mg per tablet Take 2 tablets by mouth 3 (three) times a day.       cholecalciferol, vitamin D3, 1,000 unit tablet Take 4,000 Units by mouth daily.        cyanocobalamin (VITAMIN B-12) 100 MCG tablet Take 100 mcg by mouth daily.        furosemide (LASIX) 20 MG tablet Take 20 mg by mouth daily.       lansoprazole (PREVACID) 30 MG capsule Take 30 mg by mouth daily before breakfast.        magnesium oxide (MAG-OX) 400 mg tablet Take 400 mg by mouth 2 (two) times a day.       melatonin 1 mg Tab tablet Take 5 mg by mouth at bedtime.       metoprolol succinate (TOPROL-XL) 12.5 MG Take 12.5 mg by mouth daily.       midodrine (PROAMATINE) 2.5 MG tablet Take 7.5 mg by mouth 3 (three) times a day with meals.       mirtazapine (REMERON) 45 MG tablet TAKE 1 TABLET BY MOUTH AT BEDTIME  *1 TOTAL FILL* 30 tablet 3     multivitamin (MULTIVITAMIN) per tablet Take 1 tablet by mouth daily.       nitroglycerin (NITROSTAT) 0.4 MG SL tablet Place 0.4 mg under the tongue every 5 (five) minutes as needed.        nystatin (MYCOSTATIN) powder Apply topically 2 (two) times a day as needed. Fungal infection       polyethylene glycol (MIRALAX) 17 gram packet Take 17 g by mouth daily.       pramipexole (MIRAPEX) 0.5 MG tablet Take 0.5 mg by mouth at bedtime.       solifenacin (VESICARE) 10 MG tablet Take 10 mg by mouth daily.       traMADoL (ULTRAM) 50 mg tablet Take 1 tablet (50 mg total) by mouth every 6 (six) hours as needed for pain. 12  tablet 0     traZODone (DESYREL) 50 MG tablet Take 50 mg by mouth at bedtime.       venlafaxine (EFFEXOR-XR) 150 MG 24 hr capsule TAKE 2 CAPSULES (300MG) BY MOUTH ONCE DAILY *1 TOTAL FILL* 60 capsule 3     No current facility-administered medications on file prior to visit.        Allergies     Allergies   Allergen Reactions     Blood-Group Specific Substance Other (See Comments)     Patient has anti-K. Blood product orders maybe delayed. Draw one red top and 2 purple top tubes for all Type and Screen/Red blood Cell product orders     Fd And C No.5 (Tartrazine)      GI UPSET     Ibuprofen Nausea And Vomiting     Morphine Rash     swelling       Review of Systems   A comprehensive review of 14 systems was done. Pertinent findings noted here and in history of present illness. All the rest negative.  Constitutional: Negative.  Negative for fever, chills, she has activity change, appetite change and fatigue.   HENT: Negative for congestion and facial swelling.    Eyes: Negative for photophobia, redness and visual disturbance.   Respiratory: Negative for cough and chest tightness.    Cardiovascular: Negative for chest pain, palpitations and leg swelling.   Gastrointestinal: Negative for nausea, diarrhea, constipation, blood in stool and abdominal distention.   Genitourinary: Negative.    Musculoskeletal: Reporting back and hip pain.  She has had multiple falls  Skin: Negative.    Neurological: Negative for dizziness, tremors, syncope, weakness, light-headedness and headaches.   Hematological: Does not bruise/bleed easily.   Psychiatric/Behavioral: Negative.  Anxious about pain management      Physical Exam     Recent Vitals 10/2/2020   Height -   Weight -   BSA (m2) -   /59   Pulse 73   Temp 98.4   Temp src 1   SpO2 98   Some recent data might be hidden   Recheck blood pressure 107/55    Constitutional: Oriented to person, place, and time and appears well-developed.   HEENT:  Normocephalic and atraumatic.  Eyes:  Conjunctivae and EOM are normal. Pupils are equal, round, and reactive to light. No discharge.  No scleral icterus. Nose normal. Mouth/Throat: Oropharynx is clear and moist. No oropharyngeal exudate.    NECK: Normal range of motion. Neck supple. No JVD present. No tracheal deviation present. No thyromegaly present.   CARDIOVASCULAR: Normal rate, regular rhythm and intact distal pulses.  Exam reveals no gallop and no friction rub.  Systolic murmur present.  PULMONARY: Effort normal and breath sounds normal. No respiratory distress.No Wheezing or rales.  ABDOMEN: Soft. Bowel sounds are normal. No distension and no mass.  There is no tenderness. There is no rebound and no guarding. No HSM.  MUSCULOSKELETAL: Normal range of motion. No edema and no tenderness. Mild kyphosis with asymmetry of the chest noted more predominant on the right side,  Tenderness to palpation over her right thigh.  Very ataxic gait noted with patient noted to be deviating on the right side  LYMPH NODES: Has no cervical, supraclavicular, axillary and groin adenopathy.   NEUROLOGICAL: Alert and oriented to person, place, and time. No cranial nerve deficit.  Normal muscle tone. Coordination normal.   GENITOURINARY: Deferred exam.  SKIN: Skin is warm and dry. No rash noted. No erythema. No pallor.   EXTREMITIES: No cyanosis, no clubbing, no edema. No Deformity.  PSYCHIATRIC: Normal mood, affect and behavior.      Lab Results     Recent Results (from the past 240 hour(s))   COVID-19 Virus PCR MRF    Collection Time: 10/07/20  8:00 AM    Specimen: Respiratory   Result Value Ref Range    COVID-19 VIRUS SPECIMEN SOURCE Nasopharyngeal     2019-nCOV Not Detected       Results for orders placed or performed during the hospital encounter of 09/30/20   Basic metabolic panel   Result Value Ref Range    Sodium 143 136 - 145 mmol/L    Potassium 4.0 3.5 - 5.0 mmol/L    Chloride 105 98 - 107 mmol/L    CO2 29 22 - 31 mmol/L    Anion Gap, Calculation 9 5 - 18 mmol/L     Glucose 106 70 - 125 mg/dL    Calcium 9.2 8.5 - 10.5 mg/dL    BUN 32 (H) 8 - 28 mg/dL    Creatinine 0.73 0.60 - 1.10 mg/dL    GFR MDRD Af Amer >60 >60 mL/min/1.73m2    GFR MDRD Non Af Amer >60 >60 mL/min/1.73m2             NERY Sosa

## 2021-06-12 NOTE — PROGRESS NOTES
[facility] Admission note      Patient: Alison Bashir  MRN: [id number]  Date of Service: 10/7/2020      [facility] [[id number]]  Reason for Visit     Chief Complaint   Patient presents with     Review Of Multiple Medical Conditions       Code Status     DNI    Assessment     -History of a mechanical fall in the setting of recurrent hospitalizations related to falls.  -Right hip pain with underlying history of right hip arthroplasty  -Recent history of a fall related right hip thigh hematoma  -History of recurrent UTIs; urine culture again showing Morganella morganii species  - Parkinson's disease  -Cognitive impairment/dementia  - HTN  -Depression with anxiety  Plan     Patient readmitted to the TCU within days of discharge.  Acute fall in the setting of recurrent falls reported.  Unfortunately she is declining quite rapidly and may require a higher level of care.  Currently she is wheelchair-bound but has been ambulating somewhat with therapy but remains a very high fall risk.  Pain management was reviewed with her.  She is now on scheduled Tylenol.  She has also been using tramadol as needed which she feels is effective.  I have again talked to her about trying some different modalities for pain including diathermy heat ice or ultrasound she refuses all of them.  I did offer Voltaren gel or Aspercreme to her right hip area which is her most painful area she does not want to use any of those either.  I have asked therapy to offer them to her and see if she will except those.  She has a history of recurrent UTI with cultures mostly growing Morganella.  We are electing not to treat her and she remains asymptomatic.  She also has a history of orthostatic hypotension and blood pressures recently have been stable  Discharge plan is to go back to the group home if able however with her recent falls and multiple hospitalizations she may need a higher level of care    History     Patient  is a very pleasant 70 y.o. female who is admitted to TCU  Patient was recently in a TCU and was discharged home at baseline she has profound debilitation with a high fall risk and has had recurrent hospitalizations related to falls.  She presented after she fell again at home within days of discharge.  She was complaining of right-sided chest pain as well as right hip pain.  She recently was admitted with a right hip pain with resultant right hip thigh hematoma.  She has been discharged to the TCU for strengthening and rehab at baseline she remains a very high fall risk.  She has been noted to be ambulating with therapy using a walker but unfortunately has profound debility.  Pain management again reviewed with her she is on scheduled Tylenol now which has been workable for her in addition she does not want to discontinue her tramadol.  She told me that at that regimen works she does not want to try anything else.  Of tried to get her to use ice or heat or any diathermy she refuses all of those interventions  She also had a UTI with cultures growing E. coli and Morganella.  They have elected not to treat as she is not symptomatic.  She will require monitoring in the TCU.  She has underlying history of Parkinson's disease is been declining neurology has seen her and no new med changes have been evaluated by them.  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      Past Medical History     Active Ambulatory (Non-Hospital) Problems    Diagnosis     Accident due to mechanical fall without injury, initial encounter     Hematoma of right thigh, subsequent encounter     Right hip pain     Hematoma of right hip     Urinary tract infection     Inability to walk     Parkinsonism, unspecified Parkinsonism type (H)     Fall, initial encounter     Magnesium  deficiency     Adjustment insomnia     Acute hypotension     RBBB     Chest pain, unspecified     Leukopenia     Gastroesophageal reflux disease with esophagitis     Personal history of fall     Chest pain     Chronic pain syndrome     Traumatic brain injury (H)     Stented coronary artery     RLS (restless legs syndrome)     Overactive bladder     HTN (hypertension)     Depression     CAD (coronary artery disease)     Alzheimer disease (H)     Orthostatic hypotension     Anemia     Cognitive impairment     Hip fracture (H)     ACP (advance care planning)     Constipation     Essential hypertension     Neuroleptic signed 8/29/16     Past Medical History:   Diagnosis Date     Acute blood loss anemia      Alzheimer disease (H)      CAD (coronary artery disease)      Chronic pain syndrome      Dementia (H)      Depression      Depression      Hip fracture, right (H) 12/21/2017     HTN (hypertension)      Kidney stone      Overactive bladder      PMB (postmenopausal bleeding)      RLS (restless legs syndrome)      Spine pain      Stented coronary artery      Traumatic brain injury (H)      Unsteady gait      UTI (lower urinary tract infection)        Past Social History     Reviewed, and she  reports that she has never smoked. She has never used smokeless tobacco. She reports that she does not drink alcohol or use drugs.    Family History     Reviewed, and family history includes Cancer in her mother; Coronary artery disease in her father; Heart attack in her father; Heart failure in her father.    Medication List     Current Outpatient Medications on File Prior to Visit   Medication Sig Dispense Refill     acetaminophen (TYLENOL) 650 MG CR tablet Take 650 mg by mouth 4 (four) times a day.       aspirin 81 mg chewable tablet Chew 81 mg daily.       atorvastatin (LIPITOR) 40 MG tablet Take 40 mg by mouth every evening.        buPROPion (WELLBUTRIN XL) 300 MG 24 hr tablet TAKE 1 TABLET BY MOUTH ONCE DAILY. 31 tablet 0      carbidopa-levodopa (SINEMET)  mg per tablet Take 2 tablets by mouth 3 (three) times a day.       cholecalciferol, vitamin D3, 1,000 unit tablet Take 4,000 Units by mouth daily.        cyanocobalamin (VITAMIN B-12) 100 MCG tablet Take 100 mcg by mouth daily.        furosemide (LASIX) 20 MG tablet Take 20 mg by mouth daily.       lansoprazole (PREVACID) 30 MG capsule Take 30 mg by mouth daily before breakfast.        magnesium oxide (MAG-OX) 400 mg tablet Take 400 mg by mouth 2 (two) times a day.       melatonin 1 mg Tab tablet Take 5 mg by mouth at bedtime.       metoprolol succinate (TOPROL-XL) 12.5 MG Take 12.5 mg by mouth daily.       midodrine (PROAMATINE) 2.5 MG tablet Take 7.5 mg by mouth 3 (three) times a day with meals.       mirtazapine (REMERON) 45 MG tablet TAKE 1 TABLET BY MOUTH AT BEDTIME  *1 TOTAL FILL* 30 tablet 3     multivitamin (MULTIVITAMIN) per tablet Take 1 tablet by mouth daily.       nitroglycerin (NITROSTAT) 0.4 MG SL tablet Place 0.4 mg under the tongue every 5 (five) minutes as needed.        nystatin (MYCOSTATIN) powder Apply topically 2 (two) times a day as needed. Fungal infection       polyethylene glycol (MIRALAX) 17 gram packet Take 17 g by mouth daily.       pramipexole (MIRAPEX) 0.5 MG tablet Take 0.5 mg by mouth at bedtime.       solifenacin (VESICARE) 10 MG tablet Take 10 mg by mouth daily.       traMADoL (ULTRAM) 50 mg tablet Take 1 tablet (50 mg total) by mouth every 6 (six) hours as needed for pain. 12 tablet 0     traZODone (DESYREL) 50 MG tablet Take 50 mg by mouth at bedtime.       venlafaxine (EFFEXOR-XR) 150 MG 24 hr capsule TAKE 2 CAPSULES (300MG) BY MOUTH ONCE DAILY *1 TOTAL FILL* 60 capsule 3     No current facility-administered medications on file prior to visit.        Allergies     Allergies   Allergen Reactions     Blood-Group Specific Substance Other (See Comments)     Patient has anti-K. Blood product orders maybe delayed. Draw one red top and 2 purple top  tubes for all Type and Screen/Red blood Cell product orders     Fd And C No.5 (Tartrazine)      GI UPSET     Ibuprofen Nausea And Vomiting     Morphine Rash     swelling       Review of Systems   A comprehensive review of 14 systems was done. Pertinent findings noted here and in history of present illness. All the rest negative.  Constitutional: Negative.  Negative for fever, chills, she has activity change, appetite change and fatigue.   HENT: Negative for congestion and facial swelling.    Eyes: Negative for photophobia, redness and visual disturbance.   Respiratory: Negative for cough and chest tightness.    Cardiovascular: Negative for chest pain, palpitations and leg swelling.   Gastrointestinal: Negative for nausea, diarrhea, constipation, blood in stool and abdominal distention.   Genitourinary: Negative.    Musculoskeletal: Reporting back and hip pain.  She has had multiple falls  Skin: Negative.    Neurological: Negative for dizziness, tremors, syncope, weakness, light-headedness and headaches.   Hematological: Does not bruise/bleed easily.   Psychiatric/Behavioral: Negative.  Anxious about pain management      Physical Exam     Recent Vitals 10/2/2020   Height -   Weight -   BSA (m2) -   /59   Pulse 73   Temp 98.4   Temp src 1   SpO2 98   Some recent data might be hidden       Constitutional: Oriented to person, place, and time and appears well-developed.   HEENT:  Normocephalic and atraumatic.  Eyes: Conjunctivae and EOM are normal. Pupils are equal, round, and reactive to light. No discharge.  No scleral icterus. Nose normal. Mouth/Throat: Oropharynx is clear and moist. No oropharyngeal exudate.    NECK: Normal range of motion. Neck supple. No JVD present. No tracheal deviation present. No thyromegaly present.   CARDIOVASCULAR: Normal rate, regular rhythm and intact distal pulses.  Exam reveals no gallop and no friction rub.  Systolic murmur present.  PULMONARY: Effort normal and breath sounds  normal. No respiratory distress.No Wheezing or rales.  ABDOMEN: Soft. Bowel sounds are normal. No distension and no mass.  There is no tenderness. There is no rebound and no guarding. No HSM.  MUSCULOSKELETAL: Normal range of motion. No edema and no tenderness. Mild kyphosis with asymmetry of the chest noted more predominant on the right side,  Tenderness to palpation over her right thigh.  Very ataxic gait noted with patient noted to be deviating on the right side  LYMPH NODES: Has no cervical, supraclavicular, axillary and groin adenopathy.   NEUROLOGICAL: Alert and oriented to person, place, and time. No cranial nerve deficit.  Normal muscle tone. Coordination normal.   GENITOURINARY: Deferred exam.  SKIN: Skin is warm and dry. No rash noted. No erythema. No pallor.   EXTREMITIES: No cyanosis, no clubbing, no edema. No Deformity.  PSYCHIATRIC: Normal mood, affect and behavior.      Lab Results     Recent Results (from the past 240 hour(s))   COVID-19 Virus PCR MRF    Collection Time: 09/30/20  3:06 PM    Specimen: Respiratory   Result Value Ref Range    COVID-19 VIRUS SPECIMEN SOURCE Nasopharyngeal     2019-nCOV       Test received-See reflex to IDDL test SARS CoV2 (COVID-19) Virus RT-PCR   SARS-CoV-2 (COVID-19) RT-PCR-IDDL    Collection Time: 09/30/20  3:06 PM    Specimen: Respiratory   Result Value Ref Range    SARS-CoV-2 Virus Specimen Source Nasopharyngeal     SARS-CoV-2 PCR Result NEGATIVE     SARS-COV-2 PCR COMMENT       Testing was performed using the Aptima SARS-CoV-2 Assay on the [place]   Hemogram with PLT    Collection Time: 09/30/20  7:10 PM   Result Value Ref Range    WBC 5.0 4.0 - 11.0 thou/uL    RBC 3.69 (L) 3.80 - 5.40 mill/uL    Hemoglobin 10.5 (L) 12.0 - 16.0 g/dL    Hematocrit 34.7 (L) 35.0 - 47.0 %    MCV 94 80 - 100 fL    MCH 28.5 27.0 - 34.0 pg    MCHC 30.3 (L) 32.0 - 36.0 g/dL    RDW 13.5 11.0 - 14.5 %    Platelets 160 140 - 440 thou/uL    MPV 11.3 8.5 - 12.5 fL   Protime-INR    Collection  Time: 09/30/20  7:10 PM   Result Value Ref Range    INR 1.07 0.90 - 1.10   APTT    Collection Time: 09/30/20  7:10 PM   Result Value Ref Range    PTT 33 24 - 37 seconds   Basic metabolic panel    Collection Time: 09/30/20  7:45 PM   Result Value Ref Range    Sodium 143 136 - 145 mmol/L    Potassium 4.0 3.5 - 5.0 mmol/L    Chloride 105 98 - 107 mmol/L    CO2 29 22 - 31 mmol/L    Anion Gap, Calculation 9 5 - 18 mmol/L    Glucose 106 70 - 125 mg/dL    Calcium 9.2 8.5 - 10.5 mg/dL    BUN 32 (H) 8 - 28 mg/dL    Creatinine 0.73 0.60 - 1.10 mg/dL    GFR MDRD Af Amer >60 >60 mL/min/1.73m2    GFR MDRD Non Af Amer >60 >60 mL/min/1.73m2   Urinalysis-UC if Indicated    Collection Time: 10/01/20  4:33 AM   Result Value Ref Range    Color, UA Yellow Colorless, Yellow, Straw, Light Yellow    Clarity, UA Cloudy (!) Clear    Glucose, UA Negative Negative    Bilirubin, UA Negative Negative    Ketones, UA Negative Negative, 60 mg/dL    Specific Gravity, UA 1.012 1.001 - 1.030    Blood, UA Negative Negative    pH, UA 6.5 4.5 - 8.0    Protein, UA Negative Negative mg/dL    Urobilinogen, UA <2.0 E.U./dL <2.0 E.U./dL, 2.0 E.U./dL    Nitrite, UA Negative Negative    Leukocytes, UA Large (!) Negative    Bacteria, UA Many (!) None Seen hpf    RBC, UA 0-2 None Seen, 0-2 hpf    WBC, UA  (!) None Seen, 0-5 hpf    Squam Epithel, UA 0-5 None Seen, 0-5 lpf    Mucus, UA Few (!) None Seen lpf   Culture, Urine    Collection Time: 10/01/20  4:33 AM    Specimen: Urine   Result Value Ref Range    Culture >100,000 col/ml Morganella morganii (!)        Susceptibility    Morganella morganii - KULDEEP     Amoxicillin + Clavulanate >16/8 Resistant      Ampicillin >16 Resistant      Cefazolin >16 Resistant      Cefepime <=1 Sensitive      Ceftriaxone <=1 Sensitive      Ciprofloxacin >2 Resistant      Gentamicin <=2 Sensitive      Levofloxacin >4 Resistant      Meropenem <=0.25 Sensitive      Nitrofurantoin 64 Resistant      Tetracycline <=2 Sensitive       Tobramycin <=2 Sensitive      Trimethoprim + Sulfamethoxazole >2/38 Resistant    Troponin I    Collection Time: 10/01/20  8:36 AM   Result Value Ref Range    Troponin I <0.01 0.00 - 0.29 ng/mL   ECG 12 lead MUSE    Collection Time: 10/01/20  9:15 AM   Result Value Ref Range    SYSTOLIC BLOOD PRESSURE      DIASTOLIC BLOOD PRESSURE      VENTRICULAR RATE 67 BPM    ATRIAL RATE 67 BPM    P-R INTERVAL 156 ms    QRS DURATION 168 ms    Q-T INTERVAL 458 ms    QTC CALCULATION (BEZET) 483 ms    P Axis 28 degrees    R AXIS -21 degrees    T AXIS 0 degrees    MUSE DIAGNOSIS       Normal sinus rhythm  Right bundle branch block  Abnormal ECG  When compared with ECG of 25-AUG-2020 21:04,  T wave inversion now evident in Anterior leads  Confirmed by MIREILLE JOSEPH MD LOC:JN ([id number]) on 10/1/2020 2:41:42 PM     Troponin I    Collection Time: 10/01/20  2:01 PM   Result Value Ref Range    Troponin I <0.01 0.00 - 0.29 ng/mL   HM2(CBC W/O DIFF)    Collection Time: 10/02/20  6:39 AM   Result Value Ref Range    WBC 3.8 (L) 4.0 - 11.0 thou/uL    RBC 3.39 (L) 3.80 - 5.40 mill/uL    Hemoglobin 9.6 (L) 12.0 - 16.0 g/dL    Hematocrit 32.3 (L) 35.0 - 47.0 %    MCV 95 80 - 100 fL    MCH 28.3 27.0 - 34.0 pg    MCHC 29.7 (L) 32.0 - 36.0 g/dL    RDW 13.7 11.0 - 14.5 %    Platelets 136 (L) 140 - 440 thou/uL    MPV 11.0 8.5 - 12.5 fL              NERY Sosa

## 2021-06-12 NOTE — PROGRESS NOTES
[facility] Admission note      Patient: Alison Bashir  MRN: [id number]  Date of Service: 10/19/2020      [facility] [[id number]]  Reason for Visit     Chief Complaint   Patient presents with     Review Of Multiple Medical Conditions     F/u low bp and pain  Code Status     DNI    Assessment     -History of a mechanical fall in the setting of recurrent hospitalizations related to falls.  -Right hip pain with underlying history of right hip arthroplasty  -Recent history of a fall related right hip thigh hematoma  -History of recurrent UTIs; urine culture again showing Morganella morganii species  -Hypotension with underlying history of orthostatic hypotension  - Parkinson's disease  -Cognitive impairment/dementia  - HTN  -Depression with anxiety  Plan     Patient readmitted to the TCU within days of discharge.  Acute fall in the setting of recurrent falls reported.  Today she remains wheelchair-bound she has been ambulating a little bit with a walker with assistance but is not progressing to independent ambulation.  At baseline she remains a very high fall risk.  She wants to discharge back to her group home.  She understands that she will need to enhance her level of care and if she fails again she may need to move to a different facility.  Blood pressure review was done metoprolol is being held quite often and her blood pressures appear to have improved she has underlying history of orthostatic hypotension.  She has Parkinson's disease which has been progressive recently saw neurologist with no change made in her medication.  Mood and behaviors have been stable  Continue with her PT OT and rehab  Revisit pain control at my next visit again and encourage patient to consider getting off tramadol she has refused all topical pain gels  Continue with her current medication regimen        History     Patient is a very pleasant 70 y.o. female who is admitted to TCU  Patient was recently  in a TCU and was discharged home at baseline she has profound debilitation with a high fall risk and has had recurrent hospitalizations related to falls.  She presented after she fell again at home within days of discharge.  She was complaining of right-sided chest pain as well as right hip pain.  She recently was admitted with a right hip pain with resultant right hip thigh hematoma.  Today she has no pain concerns addressed.  She remains on scheduled Tylenol.  She has however been asking for tramadol intermittently but less frequently now.  She told me she still has significant pain and wants to use tramadol occasionally      Unfortunately she has advancing Parkinson's disease   unfortunately patient has had significant physical and cognitive decline related to this.  She remains on Sinemet and recently was seen by neurology who had elected not to make any changes in her medication.  At baseline she is wheelchair-bound she is ambulatory using a walker but has profound gait instability and remains a very high fall risk    Blood pressure review reveals that she has had some low blood pressures with resultant symptomatic issues.  She has had a prior history of orthostatic hypotension.  She gets midodrine scheduled and also remains on metoprolol  Hold parameters have been given for her midodrine with improvement noted in blood pressures.  She denies any symptoms of orthostatic hypotension today either.    She has some cognitive impairment issues but overall mood and behaviors have been stable      Past Medical History     Active Ambulatory (Non-Hospital) Problems    Diagnosis     Accident due to mechanical fall without injury, initial encounter     Hematoma of right thigh, subsequent encounter     Right hip pain     Hematoma of right hip     Urinary tract infection     Inability to walk     Parkinsonism, unspecified Parkinsonism type (H)     Fall, initial encounter     Magnesium deficiency     Adjustment insomnia      Acute hypotension     RBBB     Chest pain, unspecified     Leukopenia     Gastroesophageal reflux disease with esophagitis     Personal history of fall     Chest pain     Chronic pain syndrome     Traumatic brain injury (H)     Stented coronary artery     RLS (restless legs syndrome)     Overactive bladder     HTN (hypertension)     Depression     CAD (coronary artery disease)     Alzheimer disease (H)     Orthostatic hypotension     Anemia     Cognitive impairment     Hip fracture (H)     ACP (advance care planning)     Constipation     Essential hypertension     Neuroleptic signed 8/29/16     Past Medical History:   Diagnosis Date     Acute blood loss anemia      Alzheimer disease (H)      CAD (coronary artery disease)      Chronic pain syndrome      Dementia (H)      Depression      Depression      Hip fracture, right (H) 12/21/2017     HTN (hypertension)      Kidney stone      Overactive bladder      PMB (postmenopausal bleeding)      RLS (restless legs syndrome)      Spine pain      Stented coronary artery      Traumatic brain injury (H)      Unsteady gait      UTI (lower urinary tract infection)        Past Social History     Reviewed, and she  reports that she has never smoked. She has never used smokeless tobacco. She reports that she does not drink alcohol or use drugs.    Family History     Reviewed, and family history includes Cancer in her mother; Coronary artery disease in her father; Heart attack in her father; Heart failure in her father.    Medication List     Current Outpatient Medications on File Prior to Visit   Medication Sig Dispense Refill     acetaminophen (TYLENOL) 650 MG CR tablet Take 650 mg by mouth 4 (four) times a day.       aspirin 81 mg chewable tablet Chew 81 mg daily.       atorvastatin (LIPITOR) 40 MG tablet Take 40 mg by mouth every evening.        buPROPion (WELLBUTRIN XL) 300 MG 24 hr tablet TAKE 1 TABLET BY MOUTH ONCE DAILY. 31 tablet 0     carbidopa-levodopa (SINEMET)  mg  per tablet Take 2 tablets by mouth 3 (three) times a day.       cholecalciferol, vitamin D3, 1,000 unit tablet Take 4,000 Units by mouth daily.        cyanocobalamin (VITAMIN B-12) 100 MCG tablet Take 100 mcg by mouth daily.        furosemide (LASIX) 20 MG tablet Take 20 mg by mouth daily.       lansoprazole (PREVACID) 30 MG capsule Take 30 mg by mouth daily before breakfast.        magnesium oxide (MAG-OX) 400 mg tablet Take 400 mg by mouth 2 (two) times a day.       melatonin 1 mg Tab tablet Take 5 mg by mouth at bedtime.       metoprolol succinate (TOPROL-XL) 12.5 MG Take 12.5 mg by mouth daily.       midodrine (PROAMATINE) 2.5 MG tablet Take 7.5 mg by mouth 3 (three) times a day with meals.       mirtazapine (REMERON) 45 MG tablet TAKE 1 TABLET BY MOUTH AT BEDTIME  *1 TOTAL FILL* 30 tablet 3     multivitamin (MULTIVITAMIN) per tablet Take 1 tablet by mouth daily.       nitroglycerin (NITROSTAT) 0.4 MG SL tablet Place 0.4 mg under the tongue every 5 (five) minutes as needed.        nystatin (MYCOSTATIN) powder Apply topically 2 (two) times a day as needed. Fungal infection       polyethylene glycol (MIRALAX) 17 gram packet Take 17 g by mouth daily.       pramipexole (MIRAPEX) 0.5 MG tablet Take 0.5 mg by mouth at bedtime.       solifenacin (VESICARE) 10 MG tablet Take 10 mg by mouth daily.       traMADoL (ULTRAM) 50 mg tablet Take 1 tablet (50 mg total) by mouth every 6 (six) hours as needed for pain. 12 tablet 0     traZODone (DESYREL) 50 MG tablet Take 50 mg by mouth at bedtime.       venlafaxine (EFFEXOR-XR) 150 MG 24 hr capsule TAKE 2 CAPSULES (300MG) BY MOUTH ONCE DAILY *1 TOTAL FILL* 60 capsule 3     No current facility-administered medications on file prior to visit.        Allergies     Allergies   Allergen Reactions     Blood-Group Specific Substance Other (See Comments)     Patient has anti-K. Blood product orders maybe delayed. Draw one red top and 2 purple top tubes for all Type and Screen/Red blood  Cell product orders     Fd And C No.5 (Tartrazine)      GI UPSET     Ibuprofen Nausea And Vomiting     Morphine Rash     swelling       Review of Systems   A comprehensive review of 14 systems was done. Pertinent findings noted here and in history of present illness. All the rest negative.  Constitutional: Negative.  Negative for fever, chills, she has activity change, appetite change and fatigue.   HENT: Negative for congestion and facial swelling.    Eyes: Negative for photophobia, redness and visual disturbance.   Respiratory: Negative for cough and chest tightness.    Cardiovascular: Negative for chest pain, palpitations and leg swelling.   Gastrointestinal: Negative for nausea, diarrhea, constipation, blood in stool and abdominal distention.   Genitourinary: Negative.    Musculoskeletal: Reporting back and hip pain.  She has had multiple falls  Skin: Negative.    Neurological: Negative for dizziness, tremors, syncope, weakness, light-headedness and headaches.   Hematological: Does not bruise/bleed easily.   Psychiatric/Behavioral: Negative.  Anxious about pain management      Physical Exam     Recent Vitals 10/2/2020   Height -   Weight -   BSA (m2) -   /59   Pulse 73   Temp 98.4   Temp src 1   SpO2 98   Some recent data might be hidden   Recheck blood pressure 147/55  Weight is 158 pound    Constitutional: Oriented to person, place, and time and appears well-developed.   HEENT:  Normocephalic and atraumatic.  Eyes: Conjunctivae and EOM are normal. Pupils are equal, round, and reactive to light. No discharge.  No scleral icterus. Nose normal. Mouth/Throat: Oropharynx is clear and moist. No oropharyngeal exudate.    NECK: Normal range of motion. Neck supple. No JVD present. No tracheal deviation present. No thyromegaly present.   CARDIOVASCULAR: Normal rate, regular rhythm and intact distal pulses.  Exam reveals no gallop and no friction rub.  Systolic murmur present.  PULMONARY: Effort normal and breath  sounds normal. No respiratory distress.No Wheezing or rales.  ABDOMEN: Soft. Bowel sounds are normal. No distension and no mass.  There is no tenderness. There is no rebound and no guarding. No HSM.  MUSCULOSKELETAL: Normal range of motion. 1+ le edema and no tenderness. Mild kyphosis with asymmetry of the chest noted more predominant on the right side,  Tenderness to palpation over her right thigh.  Very ataxic gait noted with patient noted to be deviating on the right side  LYMPH NODES: Has no cervical, supraclavicular, axillary and groin adenopathy.   NEUROLOGICAL: Alert and oriented to person, place, and time. No cranial nerve deficit.  Normal muscle tone. Coordination normal.   GENITOURINARY: Deferred exam.  SKIN: Skin is warm and dry. No rash noted. No erythema. No pallor.   EXTREMITIES: No cyanosis, no clubbing, has chronic lower extremity 1+ edema. No Deformity.  PSYCHIATRIC: Normal mood, affect and behavior.      Lab Results     Recent Results (from the past 240 hour(s))   COVID-19 Virus PCR MRF    Collection Time: 10/14/20  8:00 AM    Specimen: Respiratory   Result Value Ref Range    COVID-19 VIRUS SPECIMEN SOURCE Nasopharyngeal     2019-nCOV Not Detected       Results for orders placed or performed during the hospital encounter of 09/30/20   Basic metabolic panel   Result Value Ref Range    Sodium 143 136 - 145 mmol/L    Potassium 4.0 3.5 - 5.0 mmol/L    Chloride 105 98 - 107 mmol/L    CO2 29 22 - 31 mmol/L    Anion Gap, Calculation 9 5 - 18 mmol/L    Glucose 106 70 - 125 mg/dL    Calcium 9.2 8.5 - 10.5 mg/dL    BUN 32 (H) 8 - 28 mg/dL    Creatinine 0.73 0.60 - 1.10 mg/dL    GFR MDRD Af Amer >60 >60 mL/min/1.73m2    GFR MDRD Non Af Amer >60 >60 mL/min/1.73m2             NERY Sosa

## 2021-06-12 NOTE — PROGRESS NOTES
[facility] Admission note      Patient: Alison Bashir  MRN: [id number]  Date of Service: 10/21/2020      [facility] [[id number]]  Reason for Visit     Chief Complaint   Patient presents with     Review Of Multiple Medical Conditions     F/u low bp and pain  Code Status     DNI    Assessment     -History of a mechanical fall in the setting of recurrent hospitalizations related to falls.  -Right hip pain with underlying history of right hip arthroplasty  -Recent history of a fall related right hip thigh hematoma  -History of recurrent UTIs; urine culture again showing Morganella morganii species  -Hypotension with underlying history of orthostatic hypotension  - Parkinson's disease  -Cognitive impairment/dementia  - HTN  -Depression with anxiety  Plan     Patient readmitted to the TCU within days of discharge.  Acute fall in the setting of recurrent falls reported.  Has significant  Gait instability and  Walking issues  Using a walker  Pain control is still an issue and using tramadol  Refill of tramadol given  discharge planning discussed with SW . - wants to go back to her group home  However per SW group home is refusing acceptance  If she cannot go back she may need to go to LTC  BP stable and improved  Metoprolol held based on parameters  Cont with PT/OT      History     Patient is a very pleasant 70 y.o. female who is admitted to TCU  Patient was recently in a TCU and was discharged home at baseline she has profound debilitation with a high fall risk and has had recurrent hospitalizations related to falls.  She presented after she fell again at home within days of discharge.  She was complaining of right-sided chest pain as well as right hip pain.  She recently was admitted with a right hip pain with resultant right hip thigh hematoma.  Pain concerns addressed   Requesting refill on tramadol  Has refused topical pain gels and other modalities for pain manageemnt  Has been  asking for it once or twice a day    Unfortunately she has advancing Parkinson's disease   unfortunately patient has had significant physical and cognitive decline related to this.  Seen by neurology and now no new changes recommended by them  She is on sinemet    Has a h/o of orthostatic Hypotension and is on midodrine  She has metoprolol and med held based on parameters  BP are stable    discharge plan is contingent on group home acceptance  She needs a higher level of care and has a h/o of frequent fall s        Past Medical History     Active Ambulatory (Non-Hospital) Problems    Diagnosis     Accident due to mechanical fall without injury, initial encounter     Hematoma of right thigh, subsequent encounter     Right hip pain     Hematoma of right hip     Urinary tract infection     Inability to walk     Parkinsonism, unspecified Parkinsonism type (H)     Fall, initial encounter     Magnesium deficiency     Adjustment insomnia     Acute hypotension     RBBB     Chest pain, unspecified     Leukopenia     Gastroesophageal reflux disease with esophagitis     Personal history of fall     Chest pain     Chronic pain syndrome     Traumatic brain injury (H)     Stented coronary artery     RLS (restless legs syndrome)     Overactive bladder     HTN (hypertension)     Depression     CAD (coronary artery disease)     Alzheimer disease (H)     Orthostatic hypotension     Anemia     Cognitive impairment     Hip fracture (H)     ACP (advance care planning)     Constipation     Essential hypertension     Neuroleptic signed 8/29/16     Past Medical History:   Diagnosis Date     Acute blood loss anemia      Alzheimer disease (H)      CAD (coronary artery disease)      Chronic pain syndrome      Dementia (H)      Depression      Depression      Hip fracture, right (H) 12/21/2017     HTN (hypertension)      Kidney stone      Overactive bladder      PMB (postmenopausal bleeding)      RLS (restless legs syndrome)      Spine pain       Stented coronary artery      Traumatic brain injury (H)      Unsteady gait      UTI (lower urinary tract infection)        Past Social History     Reviewed, and she  reports that she has never smoked. She has never used smokeless tobacco. She reports that she does not drink alcohol or use drugs.    Family History     Reviewed, and family history includes Cancer in her mother; Coronary artery disease in her father; Heart attack in her father; Heart failure in her father.    Medication List     Current Outpatient Medications on File Prior to Visit   Medication Sig Dispense Refill     acetaminophen (TYLENOL) 650 MG CR tablet Take 650 mg by mouth 4 (four) times a day.       aspirin 81 mg chewable tablet Chew 81 mg daily.       atorvastatin (LIPITOR) 40 MG tablet Take 40 mg by mouth every evening.        buPROPion (WELLBUTRIN XL) 300 MG 24 hr tablet TAKE 1 TABLET BY MOUTH ONCE DAILY. 31 tablet 0     carbidopa-levodopa (SINEMET)  mg per tablet Take 2 tablets by mouth 3 (three) times a day.       cholecalciferol, vitamin D3, 1,000 unit tablet Take 4,000 Units by mouth daily.        cyanocobalamin (VITAMIN B-12) 100 MCG tablet Take 100 mcg by mouth daily.        furosemide (LASIX) 20 MG tablet Take 20 mg by mouth daily.       lansoprazole (PREVACID) 30 MG capsule Take 30 mg by mouth daily before breakfast.        magnesium oxide (MAG-OX) 400 mg tablet Take 400 mg by mouth 2 (two) times a day.       melatonin 1 mg Tab tablet Take 5 mg by mouth at bedtime.       metoprolol succinate (TOPROL-XL) 12.5 MG Take 12.5 mg by mouth daily.       midodrine (PROAMATINE) 2.5 MG tablet Take 7.5 mg by mouth 3 (three) times a day with meals.       mirtazapine (REMERON) 45 MG tablet TAKE 1 TABLET BY MOUTH AT BEDTIME  *1 TOTAL FILL* 30 tablet 3     multivitamin (MULTIVITAMIN) per tablet Take 1 tablet by mouth daily.       nitroglycerin (NITROSTAT) 0.4 MG SL tablet Place 0.4 mg under the tongue every 5 (five) minutes as needed.         nystatin (MYCOSTATIN) powder Apply topically 2 (two) times a day as needed. Fungal infection       polyethylene glycol (MIRALAX) 17 gram packet Take 17 g by mouth daily.       pramipexole (MIRAPEX) 0.5 MG tablet Take 0.5 mg by mouth at bedtime.       solifenacin (VESICARE) 10 MG tablet Take 10 mg by mouth daily.       traMADoL (ULTRAM) 50 mg tablet Take 1 tablet (50 mg total) by mouth every 6 (six) hours as needed for pain. 12 tablet 0     traZODone (DESYREL) 50 MG tablet Take 50 mg by mouth at bedtime.       venlafaxine (EFFEXOR-XR) 150 MG 24 hr capsule TAKE 2 CAPSULES (300MG) BY MOUTH ONCE DAILY *1 TOTAL FILL* 60 capsule 3     No current facility-administered medications on file prior to visit.        Allergies     Allergies   Allergen Reactions     Blood-Group Specific Substance Other (See Comments)     Patient has anti-K. Blood product orders maybe delayed. Draw one red top and 2 purple top tubes for all Type and Screen/Red blood Cell product orders     Fd And C No.5 (Tartrazine)      GI UPSET     Ibuprofen Nausea And Vomiting     Morphine Rash     swelling       Review of Systems   A comprehensive review of 14 systems was done. Pertinent findings noted here and in history of present illness. All the rest negative.  Constitutional: Negative.  Negative for fever, chills, she has activity change, appetite change and fatigue.   HENT: Negative for congestion and facial swelling.    Eyes: Negative for photophobia, redness and visual disturbance.   Respiratory: Negative for cough and chest tightness.    Cardiovascular: Negative for chest pain, palpitations and leg swelling.   Gastrointestinal: Negative for nausea, diarrhea, constipation, blood in stool and abdominal distention.   Genitourinary: Negative.    Musculoskeletal: Reporting back and hip pain.  She has had multiple falls  Skin: Negative.    Neurological: Negative for dizziness, tremors, syncope, weakness, light-headedness and headaches.   Hematological:  Does not bruise/bleed easily.   Psychiatric/Behavioral: Negative.  Anxious about pain management      Physical Exam     Recent Vitals 10/2/2020   Height -   Weight -   BSA (m2) -   /59   Pulse 73   Temp 98.4   Temp src 1   SpO2 98   Some recent data might be hidden   Recheck blood pressure 147/55  Weight is 158 pound    Constitutional: Oriented to person, place, and time and appears well-developed.   HEENT:  Normocephalic and atraumatic.  Eyes: Conjunctivae and EOM are normal. Pupils are equal, round, and reactive to light. No discharge.  No scleral icterus. Nose normal. Mouth/Throat: Oropharynx is clear and moist. No oropharyngeal exudate.    NECK: Normal range of motion. Neck supple. No JVD present. No tracheal deviation present. No thyromegaly present.   CARDIOVASCULAR: Normal rate, regular rhythm and intact distal pulses.  Exam reveals no gallop and no friction rub.  Systolic murmur present.  PULMONARY: Effort normal and breath sounds normal. No respiratory distress.No Wheezing or rales.  ABDOMEN: Soft. Bowel sounds are normal. No distension and no mass.  There is no tenderness. There is no rebound and no guarding. No HSM.  MUSCULOSKELETAL: Normal range of motion. 1+ le edema and no tenderness. Mild kyphosis with asymmetry of the chest noted more predominant on the right side,  Tenderness to palpation over her right thigh.  Very ataxic gait noted with patient noted to be deviating on the right side  LYMPH NODES: Has no cervical, supraclavicular, axillary and groin adenopathy.   NEUROLOGICAL: Alert and oriented to person, place, and time. No cranial nerve deficit.  Normal muscle tone. Coordination normal.   GENITOURINARY: Deferred exam.  SKIN: Skin is warm and dry. No rash noted. No erythema. No pallor.   EXTREMITIES: No cyanosis, no clubbing, has chronic lower extremity 1+ edema. No Deformity.  PSYCHIATRIC: Normal mood, affect and behavior.      Lab Results     Recent Results (from the past 240 hour(s))    COVID-19 Virus PCR MRF    Collection Time: 10/14/20  8:00 AM    Specimen: Respiratory   Result Value Ref Range    COVID-19 VIRUS SPECIMEN SOURCE Nasopharyngeal     2019-nCOV Not Detected       Results for orders placed or performed during the hospital encounter of 09/30/20   Basic metabolic panel   Result Value Ref Range    Sodium 143 136 - 145 mmol/L    Potassium 4.0 3.5 - 5.0 mmol/L    Chloride 105 98 - 107 mmol/L    CO2 29 22 - 31 mmol/L    Anion Gap, Calculation 9 5 - 18 mmol/L    Glucose 106 70 - 125 mg/dL    Calcium 9.2 8.5 - 10.5 mg/dL    BUN 32 (H) 8 - 28 mg/dL    Creatinine 0.73 0.60 - 1.10 mg/dL    GFR MDRD Af Amer >60 >60 mL/min/1.73m2    GFR MDRD Non Af Amer >60 >60 mL/min/1.73m2             NERY Sosa

## 2021-06-12 NOTE — PROGRESS NOTES
[facility] Admission note      Patient: Alison Bashir  MRN: [id number]  Date of Service: 10/14/2020      [facility] [[id number]]  Reason for Visit     Chief Complaint   Patient presents with     Review Of Multiple Medical Conditions     F/u low bp and pain  Code Status     DNI    Assessment     -History of a mechanical fall in the setting of recurrent hospitalizations related to falls.  -Right hip pain with underlying history of right hip arthroplasty  -Recent history of a fall related right hip thigh hematoma  -History of recurrent UTIs; urine culture again showing Morganella morganii species  -Hypotension with underlying history of orthostatic hypotension  - Parkinson's disease  -Cognitive impairment/dementia  - HTN  -Depression with anxiety  Plan     Patient readmitted to the TCU within days of discharge.  Acute fall in the setting of recurrent falls reported.  Unfortunately she is declining quite rapidly and may require a higher level of care.   and multiple hospitalizations she may need a higher level of care  However patient now reports that she would like to discharge back to her group home.  At baseline she has significant gait instability she remains wheelchair-bound and is declining with underlying history of Parkinson's disease  Pain management reviewed and she is been using tramadol still intermittently she is not ready for a taper.  Blood pressures are stable today we did hold her metoprolol based on parameters.  Improvement noted.  She has underlying history of orthostatic hypotension and remains on midodrine.  Fluid intake appears to be adequate.  Has chronic dependent lymphedema bilateral lower extremities being managed with Tubigrip stockings.  Weights have been stable        History     Patient is a very pleasant 70 y.o. female who is admitted to TCU  Patient was recently in a TCU and was discharged home at baseline she has profound debilitation with a high fall  risk and has had recurrent hospitalizations related to falls.  She presented after she fell again at home within days of discharge.  She was complaining of right-sided chest pain as well as right hip pain.  She recently was admitted with a right hip pain with resultant right hip thigh hematoma.  She has been discharged to the TCU.  At baseline she remains quite debilitated wheelchair-bound and poorly ambulatory.  She is still wheelchair-bound and remains poorly ambulatory.  She requires significant assistance but can ambulate using a walker.  Unfortunately she has advancing Parkinson's disease  She understands that she is declining from that and does not anticipate that she will be walking.  She knows that she probably will need a higher level of care soon but wants to return back to the group home if possible  Recently had some increased pain related to her fall she has been using tramadol told me she is not ready for a taper as yet  Blood pressure review reveals that she has had some low blood pressures with resultant symptomatic issues.  She has had a prior history of orthostatic hypotension.  She gets midodrine scheduled and also remains on metoprolol  Toprol is being held on parameters and in addition she also gets her midodrine with no recent evidence of orthostatic low blood pressures   Overall has been stable   she has chronic lymphedema bilateral lower extremities with no changes either        Past Medical History     Active Ambulatory (Non-Hospital) Problems    Diagnosis     Accident due to mechanical fall without injury, initial encounter     Hematoma of right thigh, subsequent encounter     Right hip pain     Hematoma of right hip     Urinary tract infection     Inability to walk     Parkinsonism, unspecified Parkinsonism type (H)     Fall, initial encounter     Magnesium deficiency     Adjustment insomnia     Acute hypotension     RBBB     Chest pain, unspecified     Leukopenia     Gastroesophageal  reflux disease with esophagitis     Personal history of fall     Chest pain     Chronic pain syndrome     Traumatic brain injury (H)     Stented coronary artery     RLS (restless legs syndrome)     Overactive bladder     HTN (hypertension)     Depression     CAD (coronary artery disease)     Alzheimer disease (H)     Orthostatic hypotension     Anemia     Cognitive impairment     Hip fracture (H)     ACP (advance care planning)     Constipation     Essential hypertension     Neuroleptic signed 8/29/16     Past Medical History:   Diagnosis Date     Acute blood loss anemia      Alzheimer disease (H)      CAD (coronary artery disease)      Chronic pain syndrome      Dementia (H)      Depression      Depression      Hip fracture, right (H) 12/21/2017     HTN (hypertension)      Kidney stone      Overactive bladder      PMB (postmenopausal bleeding)      RLS (restless legs syndrome)      Spine pain      Stented coronary artery      Traumatic brain injury (H)      Unsteady gait      UTI (lower urinary tract infection)        Past Social History     Reviewed, and she  reports that she has never smoked. She has never used smokeless tobacco. She reports that she does not drink alcohol or use drugs.    Family History     Reviewed, and family history includes Cancer in her mother; Coronary artery disease in her father; Heart attack in her father; Heart failure in her father.    Medication List     Current Outpatient Medications on File Prior to Visit   Medication Sig Dispense Refill     acetaminophen (TYLENOL) 650 MG CR tablet Take 650 mg by mouth 4 (four) times a day.       aspirin 81 mg chewable tablet Chew 81 mg daily.       atorvastatin (LIPITOR) 40 MG tablet Take 40 mg by mouth every evening.        buPROPion (WELLBUTRIN XL) 300 MG 24 hr tablet TAKE 1 TABLET BY MOUTH ONCE DAILY. 31 tablet 0     carbidopa-levodopa (SINEMET)  mg per tablet Take 2 tablets by mouth 3 (three) times a day.       cholecalciferol, vitamin  D3, 1,000 unit tablet Take 4,000 Units by mouth daily.        cyanocobalamin (VITAMIN B-12) 100 MCG tablet Take 100 mcg by mouth daily.        furosemide (LASIX) 20 MG tablet Take 20 mg by mouth daily.       lansoprazole (PREVACID) 30 MG capsule Take 30 mg by mouth daily before breakfast.        magnesium oxide (MAG-OX) 400 mg tablet Take 400 mg by mouth 2 (two) times a day.       melatonin 1 mg Tab tablet Take 5 mg by mouth at bedtime.       metoprolol succinate (TOPROL-XL) 12.5 MG Take 12.5 mg by mouth daily.       midodrine (PROAMATINE) 2.5 MG tablet Take 7.5 mg by mouth 3 (three) times a day with meals.       mirtazapine (REMERON) 45 MG tablet TAKE 1 TABLET BY MOUTH AT BEDTIME  *1 TOTAL FILL* 30 tablet 3     multivitamin (MULTIVITAMIN) per tablet Take 1 tablet by mouth daily.       nitroglycerin (NITROSTAT) 0.4 MG SL tablet Place 0.4 mg under the tongue every 5 (five) minutes as needed.        nystatin (MYCOSTATIN) powder Apply topically 2 (two) times a day as needed. Fungal infection       polyethylene glycol (MIRALAX) 17 gram packet Take 17 g by mouth daily.       pramipexole (MIRAPEX) 0.5 MG tablet Take 0.5 mg by mouth at bedtime.       solifenacin (VESICARE) 10 MG tablet Take 10 mg by mouth daily.       traMADoL (ULTRAM) 50 mg tablet Take 1 tablet (50 mg total) by mouth every 6 (six) hours as needed for pain. 12 tablet 0     traZODone (DESYREL) 50 MG tablet Take 50 mg by mouth at bedtime.       venlafaxine (EFFEXOR-XR) 150 MG 24 hr capsule TAKE 2 CAPSULES (300MG) BY MOUTH ONCE DAILY *1 TOTAL FILL* 60 capsule 3     No current facility-administered medications on file prior to visit.        Allergies     Allergies   Allergen Reactions     Blood-Group Specific Substance Other (See Comments)     Patient has anti-K. Blood product orders maybe delayed. Draw one red top and 2 purple top tubes for all Type and Screen/Red blood Cell product orders     Fd And C No.5 (Tartrazine)      GI UPSET     Ibuprofen Nausea And  Vomiting     Morphine Rash     swelling       Review of Systems   A comprehensive review of 14 systems was done. Pertinent findings noted here and in history of present illness. All the rest negative.  Constitutional: Negative.  Negative for fever, chills, she has activity change, appetite change and fatigue.   HENT: Negative for congestion and facial swelling.    Eyes: Negative for photophobia, redness and visual disturbance.   Respiratory: Negative for cough and chest tightness.    Cardiovascular: Negative for chest pain, palpitations and leg swelling.   Gastrointestinal: Negative for nausea, diarrhea, constipation, blood in stool and abdominal distention.   Genitourinary: Negative.    Musculoskeletal: Reporting back and hip pain.  She has had multiple falls  Skin: Negative.    Neurological: Negative for dizziness, tremors, syncope, weakness, light-headedness and headaches.   Hematological: Does not bruise/bleed easily.   Psychiatric/Behavioral: Negative.  Anxious about pain management      Physical Exam     Recent Vitals 10/2/2020   Height -   Weight -   BSA (m2) -   /59   Pulse 73   Temp 98.4   Temp src 1   SpO2 98   Some recent data might be hidden   Recheck blood pressure 107/55    Constitutional: Oriented to person, place, and time and appears well-developed.   HEENT:  Normocephalic and atraumatic.  Eyes: Conjunctivae and EOM are normal. Pupils are equal, round, and reactive to light. No discharge.  No scleral icterus. Nose normal. Mouth/Throat: Oropharynx is clear and moist. No oropharyngeal exudate.    NECK: Normal range of motion. Neck supple. No JVD present. No tracheal deviation present. No thyromegaly present.   CARDIOVASCULAR: Normal rate, regular rhythm and intact distal pulses.  Exam reveals no gallop and no friction rub.  Systolic murmur present.  PULMONARY: Effort normal and breath sounds normal. No respiratory distress.No Wheezing or rales.  ABDOMEN: Soft. Bowel sounds are normal. No  distension and no mass.  There is no tenderness. There is no rebound and no guarding. No HSM.  MUSCULOSKELETAL: Normal range of motion. 1+ le edema and no tenderness. Mild kyphosis with asymmetry of the chest noted more predominant on the right side,  Tenderness to palpation over her right thigh.  Very ataxic gait noted with patient noted to be deviating on the right side  LYMPH NODES: Has no cervical, supraclavicular, axillary and groin adenopathy.   NEUROLOGICAL: Alert and oriented to person, place, and time. No cranial nerve deficit.  Normal muscle tone. Coordination normal.   GENITOURINARY: Deferred exam.  SKIN: Skin is warm and dry. No rash noted. No erythema. No pallor.   EXTREMITIES: No cyanosis, no clubbing, has chronic lower extremity 1+ edema. No Deformity.  PSYCHIATRIC: Normal mood, affect and behavior.      Lab Results     Recent Results (from the past 240 hour(s))   COVID-19 Virus PCR MRF    Collection Time: 10/07/20  8:00 AM    Specimen: Respiratory   Result Value Ref Range    COVID-19 VIRUS SPECIMEN SOURCE Nasopharyngeal     2019-nCOV Not Detected       Results for orders placed or performed during the hospital encounter of 09/30/20   Basic metabolic panel   Result Value Ref Range    Sodium 143 136 - 145 mmol/L    Potassium 4.0 3.5 - 5.0 mmol/L    Chloride 105 98 - 107 mmol/L    CO2 29 22 - 31 mmol/L    Anion Gap, Calculation 9 5 - 18 mmol/L    Glucose 106 70 - 125 mg/dL    Calcium 9.2 8.5 - 10.5 mg/dL    BUN 32 (H) 8 - 28 mg/dL    Creatinine 0.73 0.60 - 1.10 mg/dL    GFR MDRD Af Amer >60 >60 mL/min/1.73m2    GFR MDRD Non Af Amer >60 >60 mL/min/1.73m2             NERY Sosa

## 2021-06-12 NOTE — PROGRESS NOTES
[facility] Admission note      Patient: Alison Bashir  MRN: [id number]  Date of Service: 10/5/2020      [facility] [[id number]]  Reason for Visit     Chief Complaint   Patient presents with     H & P       Code Status     DNI    Assessment     -History of a mechanical fall in the setting of recurrent hospitalizations related to falls.  -Right hip pain with underlying history of right hip arthroplasty  -Recent history of a fall related right hip thigh hematoma  -History of recurrent UTIs; urine culture again showing Morganella morganii species  - Parkinson's disease  -Cognitive impairment/dementia  - HTN  -Depression with anxiety  Plan     Patient readmitted to the TCU within days of discharge.  Acute fall in the setting of recurrent falls reported.  Unfortunately she is declining quite rapidly and may require a higher level of care.  In the past she has elected to go back to her group home but now is requiring probably long-term care placement.  MRI negative for any periprosthetic fracture in her right hip thigh hematoma was felt to be stable.  Discharge back to the TCU for strengthening rehab and placement evaluation  Pain management continued with Tylenol and tramadol  Plan is to put her on scheduled Tylenol 650 mg 4 times daily  Continue with PRN tramadol as needed for additional pain relief  I did talk to the patient about trying some topical pain gels which we had tried during her prior hospitalization and subsequent TCU stay for pain management patient reports those were not very effective she is happy with tramadol though.  Continue with her current pain treatment  The hospital  Deferred her UTI treatment.  I did review her urine culture and it is again growing Morganella Aydin night.  Going back to 6/10/2020 she was growing Morganella morganii and it is gradually becoming more drug-resistant  I suspect she is colonized as she remains asymptomatic.  She is afebrile and  denies any dysuria I would like to monitor her clinically before starting her on anti-biotics for UTI treatment  Recheck urine culture if she becomes symptomatic  Blood pressures are stable she is on midodrine for orthostatic hypotension.  Continue midodrine they suspect this is autonomic dysfunction related to Parkinson's  She is on several medications at bedtime for insomnia  Plan is to reduce her trazodone 50 mg  ADD melatonin 5 mg  Sleep log to be kept by patient' well as nursing and update me if she is not sleeping well but will try to reduce trazodone as much as possible as she gets several psychotropic meds at bedtime which could be contributing to her fall  Mood and behaviors have been stable CODE STATUS is DNR/DNI she may be looking at alternative placement and does not seem to want to go back to her prior group home      History     Patient is a very pleasant 70 y.o. female who is admitted to TCU  Patient was recently in a TCU and was discharged home at baseline she has profound debilitation with a high fall risk and has had recurrent hospitalizations related to falls.  She presented after she fell again at home within days of discharge.  She was complaining of right-sided chest pain as well as right hip pain.  She recently was admitted with a right hip pain with resultant right hip thigh hematoma.  Imaging was negative for any periprostatic fracture MRI report was reviewed.  For pain management she has been discharged on tramadol she also has Tylenol she will continue with the same.  She also had a UTI with cultures growing E. coli and Morganella.  They have elected not to treat as she is not symptomatic.  She will require monitoring in the TCU.  She has underlying history of Parkinson's disease is been declining neurology has seen her and no new med changes have been evaluated by them.  She also has cognitive impairment with underlying history of dementia which has been progressive but overall mood and  behaviors have been stable      Past Medical History     Active Ambulatory (Non-Hospital) Problems    Diagnosis     Accident due to mechanical fall without injury, initial encounter     Hematoma of right thigh, subsequent encounter     Right hip pain     Hematoma of right hip     Urinary tract infection     Inability to walk     Parkinsonism, unspecified Parkinsonism type (H)     Fall, initial encounter     Magnesium deficiency     Adjustment insomnia     Acute hypotension     RBBB     Chest pain, unspecified     Leukopenia     Gastroesophageal reflux disease with esophagitis     Personal history of fall     Chest pain     Chronic pain syndrome     Traumatic brain injury (H)     Stented coronary artery     RLS (restless legs syndrome)     Overactive bladder     HTN (hypertension)     Depression     CAD (coronary artery disease)     Alzheimer disease (H)     Orthostatic hypotension     Anemia     Cognitive impairment     Hip fracture (H)     ACP (advance care planning)     Constipation     Essential hypertension     Neuroleptic signed 8/29/16     Past Medical History:   Diagnosis Date     Acute blood loss anemia      Alzheimer disease (H)      CAD (coronary artery disease)      Chronic pain syndrome      Dementia (H)      Depression      Depression      Hip fracture, right (H) 12/21/2017     HTN (hypertension)      Kidney stone      Overactive bladder      PMB (postmenopausal bleeding)      RLS (restless legs syndrome)      Spine pain      Stented coronary artery      Traumatic brain injury (H)      Unsteady gait      UTI (lower urinary tract infection)        Past Social History     Reviewed, and she  reports that she has never smoked. She has never used smokeless tobacco. She reports that she does not drink alcohol or use drugs.    Family History     Reviewed, and family history includes Cancer in her mother; Coronary artery disease in her father; Heart attack in her father; Heart failure in her  father.    Medication List     Current Outpatient Medications on File Prior to Visit   Medication Sig Dispense Refill     acetaminophen (TYLENOL) 500 MG tablet Take 500 mg by mouth every 6 (six) hours as needed for pain.       aspirin 81 mg chewable tablet Chew 81 mg daily.       atorvastatin (LIPITOR) 40 MG tablet Take 40 mg by mouth every evening.        buPROPion (WELLBUTRIN XL) 300 MG 24 hr tablet TAKE 1 TABLET BY MOUTH ONCE DAILY. 31 tablet 0     carbidopa-levodopa (SINEMET)  mg per tablet Take 2 tablets by mouth 3 (three) times a day.       cholecalciferol, vitamin D3, 1,000 unit tablet Take 4,000 Units by mouth daily.        cyanocobalamin (VITAMIN B-12) 100 MCG tablet Take 100 mcg by mouth daily.        furosemide (LASIX) 20 MG tablet Take 20 mg by mouth daily.       lansoprazole (PREVACID) 30 MG capsule Take 30 mg by mouth daily before breakfast.        magnesium oxide (MAG-OX) 400 mg tablet Take 400 mg by mouth 2 (two) times a day.       metoprolol succinate (TOPROL-XL) 12.5 MG Take 12.5 mg by mouth daily.       midodrine (PROAMATINE) 2.5 MG tablet Take 7.5 mg by mouth 3 (three) times a day with meals.       mirtazapine (REMERON) 45 MG tablet TAKE 1 TABLET BY MOUTH AT BEDTIME  *1 TOTAL FILL* 30 tablet 3     multivitamin (MULTIVITAMIN) per tablet Take 1 tablet by mouth daily.       nitroglycerin (NITROSTAT) 0.4 MG SL tablet Place 0.4 mg under the tongue every 5 (five) minutes as needed.        nystatin (MYCOSTATIN) powder Apply topically 2 (two) times a day as needed. Fungal infection       polyethylene glycol (MIRALAX) 17 gram packet Take 17 g by mouth daily.       pramipexole (MIRAPEX) 0.5 MG tablet Take 0.5 mg by mouth at bedtime.       solifenacin (VESICARE) 10 MG tablet Take 10 mg by mouth daily.       traMADoL (ULTRAM) 50 mg tablet Take 1 tablet (50 mg total) by mouth every 6 (six) hours as needed for pain. 12 tablet 0     traZODone (DESYREL) 50 MG tablet Take 1.5 tablets (75 mg total) by mouth  at bedtime. 45 tablet 3     venlafaxine (EFFEXOR-XR) 150 MG 24 hr capsule TAKE 2 CAPSULES (300MG) BY MOUTH ONCE DAILY *1 TOTAL FILL* 60 capsule 3     No current facility-administered medications on file prior to visit.        Allergies     Allergies   Allergen Reactions     Blood-Group Specific Substance Other (See Comments)     Patient has anti-K. Blood product orders maybe delayed. Draw one red top and 2 purple top tubes for all Type and Screen/Red blood Cell product orders     Fd And C No.5 (Tartrazine)      GI UPSET     Ibuprofen Nausea And Vomiting     Morphine Rash     swelling       Review of Systems   A comprehensive review of 14 systems was done. Pertinent findings noted here and in history of present illness. All the rest negative.  Constitutional: Negative.  Negative for fever, chills, she has activity change, appetite change and fatigue.   HENT: Negative for congestion and facial swelling.    Eyes: Negative for photophobia, redness and visual disturbance.   Respiratory: Negative for cough and chest tightness.    Cardiovascular: Negative for chest pain, palpitations and leg swelling.   Gastrointestinal: Negative for nausea, diarrhea, constipation, blood in stool and abdominal distention.   Genitourinary: Negative.    Musculoskeletal: Reporting back and hip pain.  She has had multiple falls  Skin: Negative.    Neurological: Negative for dizziness, tremors, syncope, weakness, light-headedness and headaches.   Hematological: Does not bruise/bleed easily.   Psychiatric/Behavioral: Negative.  Anxious about pain management      Physical Exam     Recent Vitals 10/2/2020   Height -   Weight -   BSA (m2) -   /59   Pulse 73   Temp 98.4   Temp src 1   SpO2 98   Some recent data might be hidden       Constitutional: Oriented to person, place, and time and appears well-developed.   HEENT:  Normocephalic and atraumatic.  Eyes: Conjunctivae and EOM are normal. Pupils are equal, round, and reactive to light. No  discharge.  No scleral icterus. Nose normal. Mouth/Throat: Oropharynx is clear and moist. No oropharyngeal exudate.    NECK: Normal range of motion. Neck supple. No JVD present. No tracheal deviation present. No thyromegaly present.   CARDIOVASCULAR: Normal rate, regular rhythm and intact distal pulses.  Exam reveals no gallop and no friction rub.  Systolic murmur present.  PULMONARY: Effort normal and breath sounds normal. No respiratory distress.No Wheezing or rales.  ABDOMEN: Soft. Bowel sounds are normal. No distension and no mass.  There is no tenderness. There is no rebound and no guarding. No HSM.  MUSCULOSKELETAL: Normal range of motion. No edema and no tenderness. Mild kyphosis with asymmetry of the chest noted more predominant on the right side,  Tenderness to palpation over her right thigh.  Very ataxic gait noted with patient noted to be deviating on the right side  LYMPH NODES: Has no cervical, supraclavicular, axillary and groin adenopathy.   NEUROLOGICAL: Alert and oriented to person, place, and time. No cranial nerve deficit.  Normal muscle tone. Coordination normal.   GENITOURINARY: Deferred exam.  SKIN: Skin is warm and dry. No rash noted. No erythema. No pallor.   EXTREMITIES: No cyanosis, no clubbing, no edema. No Deformity.  PSYCHIATRIC: Normal mood, affect and behavior.      Lab Results     Recent Results (from the past 240 hour(s))   COVID-19 Virus PCR MRF    Collection Time: 09/30/20  3:06 PM    Specimen: Respiratory   Result Value Ref Range    COVID-19 VIRUS SPECIMEN SOURCE Nasopharyngeal     2019-nCOV       Test received-See reflex to IDDL test SARS CoV2 (COVID-19) Virus RT-PCR   SARS-CoV-2 (COVID-19) RT-PCR-IDDL    Collection Time: 09/30/20  3:06 PM    Specimen: Respiratory   Result Value Ref Range    SARS-CoV-2 Virus Specimen Source Nasopharyngeal     SARS-CoV-2 PCR Result NEGATIVE     SARS-COV-2 PCR COMMENT       Testing was performed using the Aptima SARS-CoV-2 Assay on the [facility]    Hemogram with PLT    Collection Time: 09/30/20  7:10 PM   Result Value Ref Range    WBC 5.0 4.0 - 11.0 thou/uL    RBC 3.69 (L) 3.80 - 5.40 mill/uL    Hemoglobin 10.5 (L) 12.0 - 16.0 g/dL    Hematocrit 34.7 (L) 35.0 - 47.0 %    MCV 94 80 - 100 fL    MCH 28.5 27.0 - 34.0 pg    MCHC 30.3 (L) 32.0 - 36.0 g/dL    RDW 13.5 11.0 - 14.5 %    Platelets 160 140 - 440 thou/uL    MPV 11.3 8.5 - 12.5 fL   Protime-INR    Collection Time: 09/30/20  7:10 PM   Result Value Ref Range    INR 1.07 0.90 - 1.10   APTT    Collection Time: 09/30/20  7:10 PM   Result Value Ref Range    PTT 33 24 - 37 seconds   Basic metabolic panel    Collection Time: 09/30/20  7:45 PM   Result Value Ref Range    Sodium 143 136 - 145 mmol/L    Potassium 4.0 3.5 - 5.0 mmol/L    Chloride 105 98 - 107 mmol/L    CO2 29 22 - 31 mmol/L    Anion Gap, Calculation 9 5 - 18 mmol/L    Glucose 106 70 - 125 mg/dL    Calcium 9.2 8.5 - 10.5 mg/dL    BUN 32 (H) 8 - 28 mg/dL    Creatinine 0.73 0.60 - 1.10 mg/dL    GFR MDRD Af Amer >60 >60 mL/min/1.73m2    GFR MDRD Non Af Amer >60 >60 mL/min/1.73m2   Urinalysis-UC if Indicated    Collection Time: 10/01/20  4:33 AM   Result Value Ref Range    Color, UA Yellow Colorless, Yellow, Straw, Light Yellow    Clarity, UA Cloudy (!) Clear    Glucose, UA Negative Negative    Bilirubin, UA Negative Negative    Ketones, UA Negative Negative, 60 mg/dL    Specific Gravity, UA 1.012 1.001 - 1.030    Blood, UA Negative Negative    pH, UA 6.5 4.5 - 8.0    Protein, UA Negative Negative mg/dL    Urobilinogen, UA <2.0 E.U./dL <2.0 E.U./dL, 2.0 E.U./dL    Nitrite, UA Negative Negative    Leukocytes, UA Large (!) Negative    Bacteria, UA Many (!) None Seen hpf    RBC, UA 0-2 None Seen, 0-2 hpf    WBC, UA  (!) None Seen, 0-5 hpf    Squam Epithel, UA 0-5 None Seen, 0-5 lpf    Mucus, UA Few (!) None Seen lpf   Culture, Urine    Collection Time: 10/01/20  4:33 AM    Specimen: Urine   Result Value Ref Range    Culture >100,000 col/ml Gram  Negative Rods (!)    Troponin I    Collection Time: 10/01/20  8:36 AM   Result Value Ref Range    Troponin I <0.01 0.00 - 0.29 ng/mL   ECG 12 lead MUSE    Collection Time: 10/01/20  9:15 AM   Result Value Ref Range    SYSTOLIC BLOOD PRESSURE      DIASTOLIC BLOOD PRESSURE      VENTRICULAR RATE 67 BPM    ATRIAL RATE 67 BPM    P-R INTERVAL 156 ms    QRS DURATION 168 ms    Q-T INTERVAL 458 ms    QTC CALCULATION (BEZET) 483 ms    P Axis 28 degrees    R AXIS -21 degrees    T AXIS 0 degrees    MUSE DIAGNOSIS       Normal sinus rhythm  Right bundle branch block  Abnormal ECG  When compared with ECG of 25-AUG-2020 21:04,  T wave inversion now evident in Anterior leads  Confirmed by MIREILLE JOSEPH MD LOC:[facility] ([id number]) on 10/1/2020 2:41:42 PM     Troponin I    Collection Time: 10/01/20  2:01 PM   Result Value Ref Range    Troponin I <0.01 0.00 - 0.29 ng/mL   HM2(CBC W/O DIFF)    Collection Time: 10/02/20  6:39 AM   Result Value Ref Range    WBC 3.8 (L) 4.0 - 11.0 thou/uL    RBC 3.39 (L) 3.80 - 5.40 mill/uL    Hemoglobin 9.6 (L) 12.0 - 16.0 g/dL    Hematocrit 32.3 (L) 35.0 - 47.0 %    MCV 95 80 - 100 fL    MCH 28.3 27.0 - 34.0 pg    MCHC 29.7 (L) 32.0 - 36.0 g/dL    RDW 13.7 11.0 - 14.5 %    Platelets 136 (L) 140 - 440 thou/uL    MPV 11.0 8.5 - 12.5 fL            Imaging Results     Xr Ribs Right W Pa Chest    Result Date: 9/30/2020  EXAM: XR RIBS RIGHT W PA CHEST LOCATION: [facility] DATE/TIME: 9/30/2020 12:14 PM INDICATION: fall, right posterolateral chest wall pain COMPARISON: 06/08/2020     Stable surgical clips in the subcarinal region. There are multiple old left rib fractures. No displaced rib fractures. Possible nondisplaced right 10th rib fracture.    Mr Pelvis Without Contrast    Result Date: 9/30/2020  EXAM: MR PELVIS WO CONTRAST LOCATION: [facility] DATE/TIME: 9/30/2020 1:28 PM INDICATION: Fall. Concern for occult fracture. COMPARISON: Pelvis and right hip radiographic study 09/30/2020. Pelvis MRI  08/26/2020. TECHNIQUE: Unenhanced. FINDINGS: Image quality is degraded by patient motion and artifact. HIPS: No acute fracture or appreciable bone contusion. Right hip arthroplasty with cerclage wire fixation of the proximal femur. Moderate-sized areas of heterotopic ossification arising from the anterior and medial portions of the proximal femur. There is also a small area of heterotopic ossification arising from the lateral margin of the right supra-acetabular ilium. Moderate degenerative changes in the left hip. No sizable hip joint effusion. TENDONS: Gluteal minimus and medius tendons intact. No tendinopathy. No trochanteric bursitis. Proximal hamstring tendons intact. No tendinopathy. No iliopsoas tendon tear or tendinopathy. BONES: No marrow edema. No evidence of a marrow replacing lesion. Symmetric degenerative SI joints. Multilevel lumbar spondylosis, incompletely assessed. SOFT TISSUES: Large sized lobulated subcutaneous hematoma along the right lateral hip and proximal thigh measuring approximately 24 cm craniocaudal by maximum 4 cm transverse by approximately 5 cm anteroposterior. Asymmetric right gluteal muscle atrophy compared with the contralateral left gluteal musculature. Asymmetric atrophy of the right piriformis. No high-grade pelvic muscle strain. INTRA-PELVIC CONTENTS: Trace free fluid. No space-occupying soft tissue mass. No bulky adenopathy.     1.  No definitive acute lower lumbar spine, sacral, pelvic or proximal femur fracture identified. 2.  Right hip arthroplasty remains in place with cerclage wire fixation with a moderate amount of adjacent heterotopic ossification, unchanged. 3.  Redemonstrated is a large deep subcutaneous hematoma along the lateral margin of the right hip and right proximal thigh. 4.  Unchanged moderate contralateral left hip osteoarthritis.     Xr Pelvis W 2 Vw Hip Right    Result Date: 9/30/2020  EXAM: XR PELVIS WITH 2 VIEW HIP RIGHT LOCATION: [facility]  DATE/TIME: 09/30/2020, 12:15 PM INDICATION: Fall, hip pain. COMPARISON: None.     Postoperative changes of right bipolar hip arthroplasty with longstem femoral component and cerclage wire fixation. Heterotopic ossification about the greater trochanter and superolateral margin of the hip. No evidence for acute fracture. Degenerative change left hip joint. No evidence for fracture. Pelvis negative for fracture.             NERY Sosa

## 2021-06-12 NOTE — PROGRESS NOTES
[facility] Admission note      Patient: Alison Bashir  MRN: [id number]  Date of Service: 10/26/2020      [facility] [[id number]]  Reason for Visit     Chief Complaint   Patient presents with     Discharge Summary       Code Status     DNI    Assessment     -History of a mechanical fall in the setting of recurrent hospitalizations related to falls.  -Right hip pain with underlying history of right hip arthroplasty  -Recent history of a fall related right hip thigh hematoma  -History of recurrent UTIs; urine culture again showing Morganella morganii species  -Hypotension with underlying history of orthostatic hypotension  - Parkinson's disease  -Cognitive impairment/dementia  - HTN  -Depression with anxiety        History     Patient is a very pleasant 70 y.o. female who is admitted to TCU  Patient was recently in a TCU and was discharged home at baseline she has profound debilitation with a high fall risk and has had recurrent hospitalizations related to falls.  She presented after she fell again at home within days of discharge.  She was complaining of right-sided chest pain as well as right hip pain.  She recently was admitted with a right hip pain with resultant right hip thigh hematoma.  Pain has been her biggest challenge.  She has refused any topical modality for pain.  She remains on scheduled Tylenol we will switch to as needed.  She has been using her tramadol and refusing to wean today at discharge she will be discontinued off her tramadol which was given primarily for right hip hematoma pain issues.    Unfortunately she has advancing Parkinson's disease   unfortunately patient has had significant physical and cognitive decline related to this.  Seen by neurology and now no new changes recommended by them  She is on sinemet  At baseline she is frail and debilitated with significant ongoing issue of weight loss and physical debilitation secondary to that .advised to  follow-up with neurology as an outpatient     Has a h/o of orthostatic Hypotension and is on midodrine  She has metoprolol and med held based on parameters  BP are stable  She has a history of hypertension and frequently her blood pressures are low and metoprolol is being held accordingly    discharge plan is contingent on group home acceptance  She needs a higher level of care and has a h/o of frequent fall s  However patient has now again electing to go back to her group home.  She understands that if she feels again she may need to move to long-term care      Past Medical History     Active Ambulatory (Non-Hospital) Problems    Diagnosis     Accident due to mechanical fall without injury, initial encounter     Hematoma of right thigh, subsequent encounter     Right hip pain     Hematoma of right hip     Urinary tract infection     Inability to walk     Parkinsonism, unspecified Parkinsonism type (H)     Fall, initial encounter     Magnesium deficiency     Adjustment insomnia     Acute hypotension     RBBB     Chest pain, unspecified     Leukopenia     Gastroesophageal reflux disease with esophagitis     Personal history of fall     Chest pain     Chronic pain syndrome     Traumatic brain injury (H)     Stented coronary artery     RLS (restless legs syndrome)     Overactive bladder     HTN (hypertension)     Depression     CAD (coronary artery disease)     Alzheimer disease (H)     Orthostatic hypotension     Anemia     Cognitive impairment     Hip fracture (H)     ACP (advance care planning)     Constipation     Essential hypertension     Neuroleptic signed 8/29/16     Past Medical History:   Diagnosis Date     Acute blood loss anemia      Alzheimer disease (H)      CAD (coronary artery disease)      Chronic pain syndrome      Dementia (H)      Depression      Depression      Hip fracture, right (H) 12/21/2017     HTN (hypertension)      Kidney stone      Overactive bladder      PMB (postmenopausal bleeding)       RLS (restless legs syndrome)      Spine pain      Stented coronary artery      Traumatic brain injury (H)      Unsteady gait      UTI (lower urinary tract infection)        Past Social History     Reviewed, and she  reports that she has never smoked. She has never used smokeless tobacco. She reports that she does not drink alcohol or use drugs.    Family History     Reviewed, and family history includes Cancer in her mother; Coronary artery disease in her father; Heart attack in her father; Heart failure in her father.    Medication List     Current Outpatient Medications on File Prior to Visit   Medication Sig Dispense Refill     acetaminophen (TYLENOL) 650 MG CR tablet Take 650 mg by mouth 4 (four) times a day.       aspirin 81 mg chewable tablet Chew 81 mg daily.       atorvastatin (LIPITOR) 40 MG tablet Take 40 mg by mouth every evening.        buPROPion (WELLBUTRIN XL) 300 MG 24 hr tablet TAKE 1 TABLET BY MOUTH ONCE DAILY. 31 tablet 0     carbidopa-levodopa (SINEMET)  mg per tablet Take 2 tablets by mouth 3 (three) times a day.       cholecalciferol, vitamin D3, 1,000 unit tablet Take 4,000 Units by mouth daily.        cyanocobalamin (VITAMIN B-12) 100 MCG tablet Take 100 mcg by mouth daily.        furosemide (LASIX) 20 MG tablet Take 20 mg by mouth daily.       lansoprazole (PREVACID) 30 MG capsule Take 30 mg by mouth daily before breakfast.        magnesium oxide (MAG-OX) 400 mg tablet Take 400 mg by mouth 2 (two) times a day.       melatonin 1 mg Tab tablet Take 5 mg by mouth at bedtime.       metoprolol succinate (TOPROL-XL) 12.5 MG Take 12.5 mg by mouth daily.       midodrine (PROAMATINE) 2.5 MG tablet Take 7.5 mg by mouth 3 (three) times a day with meals.       mirtazapine (REMERON) 45 MG tablet TAKE 1 TABLET BY MOUTH AT BEDTIME  *1 TOTAL FILL* 30 tablet 3     multivitamin (MULTIVITAMIN) per tablet Take 1 tablet by mouth daily.       nitroglycerin (NITROSTAT) 0.4 MG SL tablet Place 0.4 mg under the  tongue every 5 (five) minutes as needed.        nystatin (MYCOSTATIN) powder Apply topically 2 (two) times a day as needed. Fungal infection       polyethylene glycol (MIRALAX) 17 gram packet Take 17 g by mouth daily.       pramipexole (MIRAPEX) 0.5 MG tablet Take 0.5 mg by mouth at bedtime.       solifenacin (VESICARE) 10 MG tablet Take 10 mg by mouth daily.       traZODone (DESYREL) 50 MG tablet Take 50 mg by mouth at bedtime.       venlafaxine (EFFEXOR-XR) 150 MG 24 hr capsule TAKE 2 CAPSULES (300MG) BY MOUTH ONCE DAILY *1 TOTAL FILL* 60 capsule 3     [DISCONTINUED] traMADoL (ULTRAM) 50 mg tablet Take 1 tablet (50 mg total) by mouth every 6 (six) hours as needed for pain. 12 tablet 0     No current facility-administered medications on file prior to visit.        Allergies     Allergies   Allergen Reactions     Blood-Group Specific Substance Other (See Comments)     Patient has anti-K. Blood product orders maybe delayed. Draw one red top and 2 purple top tubes for all Type and Screen/Red blood Cell product orders     Fd And C No.5 (Tartrazine)      GI UPSET     Ibuprofen Nausea And Vomiting     Morphine Rash     swelling       Review of Systems   A comprehensive review of 14 systems was done. Pertinent findings noted here and in history of present illness. All the rest negative.  Constitutional: Negative.  Negative for fever, chills, she has activity change, appetite change and fatigue.   HENT: Negative for congestion and facial swelling.    Eyes: Negative for photophobia, redness and visual disturbance.   Respiratory: Negative for cough and chest tightness.    Cardiovascular: Negative for chest pain, palpitations and leg swelling.   Gastrointestinal: Negative for nausea, diarrhea, constipation, blood in stool and abdominal distention.   Genitourinary: Negative.    Musculoskeletal: Reporting back and hip pain.  She has had multiple falls  Skin: Negative.    Neurological: Negative for dizziness, tremors, syncope,  weakness, light-headedness and headaches.   Hematological: Does not bruise/bleed easily.   Psychiatric/Behavioral: Negative.  Anxious about pain management      Physical Exam     Recent Vitals 10/2/2020   Height -   Weight -   BSA (m2) -   /59   Pulse 73   Temp 98.4   Temp src 1   SpO2 98   Some recent data might be hidden   Recheck blood pressure 147/55  Weight is 158 pound    Constitutional: Oriented to person, place, and time and appears well-developed.   HEENT:  Normocephalic and atraumatic.  Eyes: Conjunctivae and EOM are normal. Pupils are equal, round, and reactive to light. No discharge.  No scleral icterus. Nose normal. Mouth/Throat: Oropharynx is clear and moist. No oropharyngeal exudate.    NECK: Normal range of motion. Neck supple. No JVD present. No tracheal deviation present. No thyromegaly present.   CARDIOVASCULAR: Normal rate, regular rhythm and intact distal pulses.  Exam reveals no gallop and no friction rub.  Systolic murmur present.  PULMONARY: Effort normal and breath sounds normal. No respiratory distress.No Wheezing or rales.  ABDOMEN: Soft. Bowel sounds are normal. No distension and no mass.  There is no tenderness. There is no rebound and no guarding. No HSM.  MUSCULOSKELETAL: Normal range of motion. 1+ le edema and no tenderness. Mild kyphosis with asymmetry of the chest noted more predominant on the right side,  Tenderness to palpation over her right thigh.  Very ataxic gait noted with patient noted to be deviating on the right side  LYMPH NODES: Has no cervical, supraclavicular, axillary and groin adenopathy.   NEUROLOGICAL: Alert and oriented to person, place, and time. No cranial nerve deficit.  Normal muscle tone. Coordination normal.   GENITOURINARY: Deferred exam.  SKIN: Skin is warm and dry. No rash noted. No erythema. No pallor.   EXTREMITIES: No cyanosis, no clubbing, has chronic lower extremity 1+ edema. No Deformity.  PSYCHIATRIC: Normal mood, affect and  behavior.      Lab Results     Recent Results (from the past 240 hour(s))   COVID-19 Virus PCR MRF    Collection Time: 10/21/20  8:00 AM    Specimen: Respiratory   Result Value Ref Range    COVID-19 VIRUS SPECIMEN SOURCE Nasopharyngeal     2019-nCOV Not Detected       Results for orders placed or performed during the hospital encounter of 09/30/20   Basic metabolic panel   Result Value Ref Range    Sodium 143 136 - 145 mmol/L    Potassium 4.0 3.5 - 5.0 mmol/L    Chloride 105 98 - 107 mmol/L    CO2 29 22 - 31 mmol/L    Anion Gap, Calculation 9 5 - 18 mmol/L    Glucose 106 70 - 125 mg/dL    Calcium 9.2 8.5 - 10.5 mg/dL    BUN 32 (H) 8 - 28 mg/dL    Creatinine 0.73 0.60 - 1.10 mg/dL    GFR MDRD Af Amer >60 >60 mL/min/1.73m2    GFR MDRD Non Af Amer >60 >60 mL/min/1.73m2     MEDICAL EQUIPMENT NEEDS:  WALKER / RYLEE     DISCHARGE PLAN/FACE TO FACE:  I certify that services are/were furnished while this patient was under the care of a physician and that a physician or an allowed non-physician practitioner (NPP), had a face-to-face encounter that meets the physician face-to-face encounter requirements. The encounter was in whole, or in part, related to the primary reason for home health. The patient is confined to his/her home and needs intermittent skilled nursing, physical therapy, speech-language pathology, or the continued need for occupational therapy. A plan of care has been established by a physician and is periodically reviewed by a physician.  Date of Face-to-Face Encounter: 10/26/20     I certify that, based on my findings, the following services are medically necessary home health services: [facility] HHA/RN and PT/OT to evaluate and treat    My clinical findings support the need for the above skilled services because: patient will be discharging to home. Patient will need assistance with medication management and performing IADLs and ADLs effectively and safely.     Patient to re-establish plan of care with their PCP  within 7 days after leaving TCU.         NERY Sosa

## 2021-06-13 NOTE — PROGRESS NOTES
Outpatient Followup Psychiatric Evaluation      Pertinent History: Patient presents today for the purposes of medication management.  She has a history of a mood disorder and also with prior psychotic symptoms.  Per the patient's request we have been tapering the Zyprexa and that was discontinued in 2017.    She was restarted on the Remeron earlier in 2018.  I saw the patient in the summer 2018 and did not make any medication changes.  In September 2018 we did increase the patient's Remeron to 45 mg at night.    I saw the patient in January 2019.  At that time she was continuing to struggle with isolation and hoarding behaviors and was extremely anxious.  She was having some bedwetting issues that have been falling more seeming perhaps to be overmedicated.  There had not been any improvement with the increase in Remeron.  At that visit we did decrease the Effexor and the Remeron.    I saw the patient in the spring 2019 and at that time she was doing fairly well.  We elected to try a medication taper and we decreased her Effexor XR to 225 mg a day and decrease the Remeron down to 30 mg at night.    I saw the patient in December 2019.  She was having increased depression with some worsening behavioral issues.  She had been refusing day programming and there was an increase in her SIBs.  She was more defiant and eating less.  She had a decline in her hygiene.  We did increase both the Remeron and Effexor at that time.     I saw the patient in February 2020. At that time she was more flattered depressed in the context of staff turnover. We did add some low-dose Wellbutrin at that time. Over the course of the summer the patient has had multiple falls at her living place. The coronavirus is led to much more isolation. The patient is reportedly more depressed according to staff much of this apparently has been related to frequent hospitalizations for falls and other medical issues.    I saw the patient for a medication  check via a phone conference on September 28 of 2020.  2 staff member attended that interview and reported the patient seemed more withdrawn and depressed over the few weeks prior.  She had had multiple stressors including some medical hospitalizations as well as the stress of isolation due to the coronavirus.  She was less motivated and less interactive.  She felt that she was perhaps having a bit more difficulty with balance.  She was not dizzy but stated she was having a harder time negotiating furniture walking.  She appeared to be more tired and flat.  We did decrease her trazodone to 75 mg at night at that interview.    Current Symptoms:   I interviewed the patient as well as staff by phone today.  The patient was a vague historian but minimizes any concerns or complaints she thought she was doing better.  She stated her unsteadiness was better.  She denied depression or anxiety.  No desire to be dead or thoughts of suicide.  No hallucinations or delusions.  She told me she was sleeping well denied having any new medical issues or concerns.    The staff however report that the patient continues with irritability and some manipulative behavior.  They report that she is putting herself unsafe situations at times and has had a few falls.  She is also had fainted some falls so she could get back into the emergency room.  She has been trying to get back into the hospital where she apparently either like some of the staff or the patient.  They are having a very difficult time redirecting her.  They are working on the possibility of considering other placement for her.  They do report she is frequently irritable..  She has been obsessing about a particular patient or staff member that she wants to go visit at the TCU.  She apparently has been enrolled in a Parkinson's movement/PT class.  There is been no other new medical diagnoses or no new treatments.    Current Medications: Please see chart    Medication  Compliance: Yes    Side Effects to Medications: No      Vitals:  Wt Readings from Last 3 Encounters:   09/30/20 168 lb (76.2 kg)   09/25/20 168 lb 12.8 oz (76.6 kg)   09/17/20 166 lb 6.4 oz (75.5 kg)     Temp Readings from Last 3 Encounters:   10/02/20 98.4  F (36.9  C) (Oral)   09/25/20 98.5  F (36.9  C)   09/17/20 (!) 96.4  F (35.8  C)     BP Readings from Last 3 Encounters:   10/02/20 126/59   09/25/20 128/76   09/17/20 128/79     Pulse Readings from Last 3 Encounters:   10/02/20 73   09/25/20 (!) 57   09/17/20 61         Mental Status Exam:   Appearance: I interviewed the patient as well as a staff member by phone.  Behavior: Patient was a bit more alert and interactive today.  She was not irritable or agitated with me.  She apparently however has been more irritable with staff and has been a bit more manipulative.  Please see above.  Speech: A bit more talkative today but slow and vague.  Answers were consistent but apparently not accurate according to staff.  Mood/Affect: A bit less depressed.  A bit more interactive.  Not irritable or agitated with me but reportedly more irritable recently.  Thought Content: No reports of any psychosis.  The patient denies such.  Suicidal or Homicidal Thoughts:  None apparent or reported.  The patient denies having any suicidality to me.  Thought Process/Formulation: Slow vague and concrete.  Limited effort again.  Associations: [place] and slow with limited effort.  No obvious racing thoughts.  Not disorganized  Fund of Knowledge:  No initiation. Limited effort.  Continues to appear impaired but at baseline.  Attention/Concentration: Able to follow conversation.  Staff report however she picks and chooses when she will participate.  No obvious racing thoughts and not disorganized.  Fairly concrete.  Insight: Impaired with no reports of any significant recent change.  Judgement:  Limited effort.  Continues impaired.  Memory:  Limited participation. Slow.  Limited effort  again.  Motor Status:  Limited participation. No current tremor according to the patient.  Orientation: No reports of any recent change.  Limited effort at this time.      Diagnosis managed and treated at today's visit :      Major depressive disorder   History of psychosis NOS    Plan:  Medication Adjustment:   I am going to decrease the patient's Remeron to 30 mg at night..    Other: Patient will return to clinic in 6 weeks for further assessment and treatment the staff and patient agreed to return sooner or call if questions concerns or problems arise.  The patient is apparently starting a new therapy class in the near future.   Patient is going to be restarting occupational therapy and physical therapy. The team is going to monitor for sleep hygiene issues.    Continue with the support of the clinic, reassurance, and redirection. Staff monitoring and ongoing assessments per team plan. Current psychotropic medication appears to represent the minimum effective dosage and appears medically necessary. We will continue to monitor and reassess. This team will utilize appropriate emergency services if necessary. I will make myself available if concerns or problems arise.     I saw the patient for 22 minutes   Norberto Johnson MD

## 2021-06-13 NOTE — PROGRESS NOTES
________________________________________  Medications Phoned  to Pharmacy [] yes [x]no  Name of Pharmacist:  List Medications, including dose, quantity and instructions    Medications ordered this visit were e-scribed.  Verified by order class [x] yes  [] no  Bupropion 300 mg  Mirtazapine 30 mg    Medication changes or discontinuations were communicated to patient's pharmacy: [] yes  [x] no    Dictation completed at time of chart check: [x] yes  [] no    I have checked the documentation for today s encounters and the above information has been reviewed and completed.

## 2021-06-13 NOTE — PROGRESS NOTES
This video/telephone visit will be conducted via a call between you and your physician/provider. We have found that certain health care needs can be provided without the need for an in-person physical exam. This service lets us provide the care you need with a video /telephone conversation. If a prescription is necessary we can send it directly to your pharmacy. If lab work is needed we can place an order for that and you can then stop by our lab to have the test done at a later time.    Just as we bill insurance for in-person visits, we also bill insurance for video/telephone visits. If you have questions about your insurance coverage, we recommend that you speak with your insurance company.    Patient has given verbal consent for video/Telephone visit? Yes  Patient would like telephone visit, please call:  [phone]  LYDIA/YANELY JENSEN CMA    Patient verified allergies, medications and pharmacy via phone.  Patient states she is ready for visit.    Unable to review MN , awaiting access from provider.

## 2021-06-13 NOTE — PATIENT INSTRUCTIONS - HE
I am going to decrease the patient's Remeron to 30 mg at night.  We will continue with the other medications as ordered.  We may need to consider other placement opportunities due to the patient's behavioral issues.  I will see the patient back in 6 weeks for medication check and the staff agrees to call before then with any questions or concerns.

## 2021-06-14 NOTE — TELEPHONE ENCOUNTER
Nadine from [facility] called back.  Said the fax was not working. Alternate fax number given. [phone] The Mental Health Referral form was faxed and confirmed a successful transmission.  Mental Health Referral form was sent for scanning

## 2021-06-14 NOTE — TELEPHONE ENCOUNTER
Found number in [facility] for a staff at [facility], [place] at [phone].  Phone was identified as [employer]'s , left msg regarding the Mental Health Referral form the clinic has been attempting to fax to [facility].  Asked for fax number clarification.

## 2021-06-14 NOTE — TELEPHONE ENCOUNTER
Received Mental Health Referral Form from [facility].  It has been filled out and attempted to fax # [phone] back multiple times on 12-29-20 and 12-30-20 unsuccessfully.  Called [facility] at [phone] and LM regarding this.  Asked for a call back to the clinic to verify fax number.

## 2021-06-14 NOTE — TELEPHONE ENCOUNTER
Phone call to [facility].   The office is closed due to Covid, msg. States they review their voice msgs daily. Left voice mail that attempts have been made to fax the Mental Health Referral Form to [phone] but have not gone through successfully.  As ked that someone call this clinic with an updated fax number

## 2021-06-14 NOTE — TELEPHONE ENCOUNTER
Pharmacy request for extended refill.    Date of Last Office Visit: 12/21/20  Date of Next Office Visit: 2/8/2021  No shows since last visit: none  Cancellations since last visit: none  ED visits since last visit:  none  Medication Bupropion 300 mg 24 hr date last ordered: 12/21/2020  Qty: 31  Refills: 0  Lapse in therapy greater than 7 days: no  Medication refill request verified as identical to current order: yes  Result of Last DAM, VPA, Li+ Level, CBC, or Carbamazepine Level (at or since last visit): N/A     [] Medication refilled per AC M-1.   [x] Medication unable to be refilled by RN due to criteria not met as indicated below:     []Eligibility - not seen in last year    []Supervision - no future appointment    []Compliance     []Verification - order discrepancy    []Controlled Medication    []Medication not included in RN Protocol    []90 - day supply request    [x]Other 12 month supply request from pharmacy   Current Medication list:    acetaminophen (TYLENOL) 650 MG CR tablet Take 650 mg by mouth 4 (four) times a day.   aspirin 81 mg chewable tablet Chew 81 mg daily.   atorvastatin (LIPITOR) 40 MG tablet Take 40 mg by mouth every evening.    buPROPion (WELLBUTRIN XL) 300 MG 24 hr tablet Take 1 tablet (300 mg total) by mouth daily.   carbidopa-levodopa (SINEMET)  mg per tablet Take 2 tablets by mouth 3 (three) times a day.   cholecalciferol, vitamin D3, 1,000 unit tablet Take 4,000 Units by mouth daily.    cyanocobalamin (VITAMIN B-12) 100 MCG tablet Take 100 mcg by mouth daily.    furosemide (LASIX) 20 MG tablet Take 20 mg by mouth daily.   lansoprazole (PREVACID) 30 MG capsule Take 30 mg by mouth daily before breakfast.    magnesium oxide (MAG-OX) 400 mg tablet Take 400 mg by mouth 2 (two) times a day.   melatonin 1 mg Tab tablet Take 5 mg by mouth at bedtime.   metoprolol succinate (TOPROL-XL) 12.5 MG Take 12.5 mg by mouth daily.   midodrine (PROAMATINE) 2.5 MG tablet Take 7.5 mg by mouth 3  (three) times a day with meals.   mirtazapine (REMERON) 30 MG tablet Take 1 tablet (30 mg total) by mouth at bedtime.   multivitamin (MULTIVITAMIN) per tablet Take 1 tablet by mouth daily.   nitroglycerin (NITROSTAT) 0.4 MG SL tablet Place 0.4 mg under the tongue every 5 (five) minutes as needed.    nystatin (MYCOSTATIN) powder Apply topically 2 (two) times a day as needed. Fungal infection   polyethylene glycol (MIRALAX) 17 gram packet Take 17 g by mouth daily.   pramipexole (MIRAPEX) 0.5 MG tablet Take 0.5 mg by mouth at bedtime.   solifenacin (VESICARE) 10 MG tablet Take 10 mg by mouth daily.   traZODone (DESYREL) 50 MG tablet Take 50 mg by mouth at bedtime.   venlafaxine (EFFEXOR-XR) 150 MG 24 hr capsule TAKE 2 CAPSULES (300MG) BY MOUTH ONCE DAILY *1 TOTAL FILL*       Medication Plan of Care at last office visit with MD/CNP:     Plan:  Medication Adjustment:   I am going to decrease the patient's Remeron to 30 mg at night..     Other: Patient will return to clinic in 6 weeks for further assessment and treatment the staff and patient agreed to return sooner or call if questions concerns or problems arise.  The patient is apparently starting a new therapy class in the near future.   Patient is going to be restarting occupational therapy and physical therapy. The team is going to monitor for sleep hygiene issues.     Continue with the support of the clinic, reassurance, and redirection. Staff monitoring and ongoing assessments per team plan. Current psychotropic medication appears to represent the minimum effective dosage and appears medically necessary. We will continue to monitor and reassess. This team will utilize appropriate emergency services if necessary. I will make myself available if concerns or problems arise.

## 2021-06-15 NOTE — PROGRESS NOTES
[facility]    Facility:   [facility] [[id number]]   Code Status: FULL CODE      CHIEF COMPLAINT/REASON FOR VISIT:  Chief Complaint   Patient presents with     Follow Up     hip, pain, rehab       HISTORY:      HPI: Alison is a 67 y.o. female who I had a chance to revisit with today secondary to her hospitalization for a closed right hip fracture status post ORIF and hemiarthroplasty in December 22, 2017.  She currently is on the transitional care unit.  Limited exercises.  She does have cognitive impairment along with a history of traumatic brain injury.  For pain she is on oxycodone 10 mg every 4 hours as needed takes anywhere from 3-4 doses per day otherwise on scheduled Tylenol 3 times a day does have Flexeril as needed usually about 3 doses per day as well.  Potential discharge for next week.  Her bowels have improved adding sorbitol.  Hemoglobin also improved.  Appetite is fine.  We also have been treating for her lungs and a cough and again I did stress the importance of getting up coughing deep breathing use incentive spirometry and the duo nebs were helpful.  For sleep does take trazodone.    Past Medical History:   Diagnosis Date     Acute blood loss anemia      Alzheimer disease      CAD (coronary artery disease)      Chronic pain syndrome      Dementia      Depression      Hip fracture, right 12/21/2017     HTN (hypertension)      Kidney stone      PMB (postmenopausal bleeding)      RLS (restless legs syndrome)      Stented coronary artery      Traumatic brain injury      Unsteady gait     uses walker     UTI (lower urinary tract infection)     on antibiotic course             No family history on file.  Social History     Social History     Marital status: Single     Spouse name: N/A     Number of children: N/A     Years of education: N/A     Social History Main Topics     Smoking status: Never Smoker     Smokeless tobacco: Never Used     Alcohol use No     Drug  use: No     Sexual activity: Not on file     Other Topics Concern     Not on file     Social History Narrative         Review of Systems  Currently she denies any particular fevers cold or flulike symptoms.  Recently right hip fracture along with a history of chronic pain traumatic brain injury hypertension.    Current Outpatient Prescriptions   Medication Sig     acetaminophen (TYLENOL) 500 MG tablet Take 1,000 mg by mouth 3 (three) times a day.     aspirin 81 MG tablet Take 81 mg by mouth daily.     atorvastatin (LIPITOR) 40 MG tablet Take 40 mg by mouth at bedtime.     BRILINTA 90 mg Tab Take 90 mg by mouth 2 (two) times a day.      carbidopa-levodopa (SINEMET)  mg per tablet Take 2 tablets by mouth 3 (three) times a day.     cholecalciferol, vitamin D3, 1,000 unit tablet Take 4,000 Units by mouth daily.     cranberry 500 mg cap Take 500 mg by mouth 2 (two) times a day.     cyanocobalamin (VITAMIN B-12) 100 MCG tablet Take 100 mcg by mouth daily.     ketoconazole (NIZORAL) 2 % cream Apply 1 application topically bedtime. Apply to stomach skin fold(s)     metoprolol succinate (TOPROL-XL) 25 MG Take 12.5 mg by mouth daily.      multivitamin (MULTIVITAMIN) per tablet Take 1 tablet by mouth daily.     nitroglycerin (NITROSTAT) 0.4 MG SL tablet Place 0.4 mg under the tongue every 5 (five) minutes as needed.      nystatin (MYCOSTATIN) powder Apply 1 application topically daily.     oxyCODONE (ROXICODONE) 10 mg immediate release tablet Take 5 mg by mouth every 4 (four) hours as needed for pain.     polyethylene glycol (MIRALAX) 17 gram packet Take 17 g by mouth daily.     pramipexole (MIRAPEX) 0.5 MG tablet Take 0.5 mg by mouth bedtime.     psyllium (METAMUCIL) powder Take by mouth every morning. 1 PKT     senna-docusate (PERICOLACE) 8.6-50 mg tablet Take 2 tablets by mouth 2 (two) times a day.     solifenacin (VESICARE) 10 MG tablet Take 10 mg by mouth daily.     traZODone (DESYREL) 150 MG tablet Take 1 tablet  (150 mg total) by mouth bedtime.     venlafaxine (EFFEXOR-XR) 75 MG 24 hr capsule Take 5 capsules (375 mg total) by mouth daily.       .There were no vitals filed for this visit.  Blood pressure 127/70 pulse 87 respirations 18 saturation room air 93%  Physical Exam    Constitutional: No distress.   HENT:   Head: Normocephalic.   Neck: Neck supple. No thyromegaly present.   Cardiovascular: Normal rate, regular rhythm and normal heart sounds.    Pulmonary/Chest: Breath sounds normal.   Abdominal: Bowel sounds are normal. There is no tenderness. There is no guarding.   Musculoskeletal:   Right hip fracture hemiarthroplasty.  Hydrocolloid dressing.  Good CMS to her right leg.  Chronic pain syndrome.   Lymphadenopathy:     She has no cervical adenopathy.     LABS:   Lab Results   Component Value Date    WBC 4.9 07/28/2015    HGB 8.9 (L) 01/03/2018    HCT 44.1 07/28/2015    MCV 88 07/28/2015     07/28/2015         ASSESSMENT:      ICD-10-CM    1. Hip fracture S72.009A    2. Essential hypertension I10    3. Constipation K59.00    4. Cognitive impairment R41.89        PLAN:    Continue to manage and follow.  Potential discharge for next week.  Did talk to her about coughing and deep breathing as well as continue to try to keep her lungs opened up and also participated with the rehabilitation processes.  Potential discharge next week also looking to find out when her next visit is to see orthopedics for a follow-up.  We do need to remove the staples soon.      Electronically signed by: Michael Duane Johnson, CNP

## 2021-06-15 NOTE — PROGRESS NOTES
This video/telephone visit will be conducted via a call between you and your physician/provider. We have found that certain health care needs can be provided without the need for an in-person physical exam. This service lets us provide the care you need with a video /telephone conversation. If a prescription is necessary we can send it directly to your pharmacy. If lab work is needed we can place an order for that and you can then stop by our lab to have the test done at a later time.    Just as we bill insurance for in-person visits, we also bill insurance for video/telephone visits. If you have questions about your insurance coverage, we recommend that you speak with your insurance company.    Patient has given verbal consent for video/Telephone visit? Yes  Patient would like telephone visit, please call:  [phone]  LYDIA/YANELY JENSEN CMA    Patient verified allergies, medications and pharmacy via phone.  Patient states she is ready for visit.    Unable to review MN , awaiting access from provider.

## 2021-06-15 NOTE — PROGRESS NOTES
[facility]    Facility:   [facility] [[id number]]   Code Status: FULL CODE      CHIEF COMPLAINT/REASON FOR VISIT:  Chief Complaint   Patient presents with     Follow Up     rehab, hip, pain       HISTORY:      HPI: Alison is a 67 y.o. female who I had a chance to visit with secondary to her hospitalization for right closed hip fracture status post ORIF and hemiarthroplasty on December 22, 2017.  She did finish her antibiotic in January 3 for UTI.  Currently no symptoms.  We did add sorbitol earlier in the week for constipation which has been helpful.  Also did not like the hydromorphone instead would rather have the oxycodone 10 mg every 4 hours as needed for pain she has only had 2 or 3 doses per day I had a chance to talk to her about her pain issues.  She also did receive some nebs secondary to a cough which has been helpful.  She also is on Tylenol schedule III times a day for pain.  She is possibly getting up in a chair supposed to be working out with therapy department.  For sleep she does take trazodone 150 mg.  Appetite is fair.  She has been normotensive and afebrile.    Past Medical History:   Diagnosis Date     Acute blood loss anemia      Alzheimer disease      CAD (coronary artery disease)      Chronic pain syndrome      Dementia      Depression      Hip fracture, right 12/21/2017     HTN (hypertension)      Kidney stone      PMB (postmenopausal bleeding)      RLS (restless legs syndrome)      Stented coronary artery      Traumatic brain injury      Unsteady gait     uses walker     UTI (lower urinary tract infection)     on antibiotic course             No family history on file.  Social History     Social History     Marital status: Single     Spouse name: N/A     Number of children: N/A     Years of education: N/A     Social History Main Topics     Smoking status: Never Smoker     Smokeless tobacco: Never Used     Alcohol use No     Drug use: No     Sexual  activity: Not on file     Other Topics Concern     Not on file     Social History Narrative         Review of Systems  Currently she denies any particular fevers cold or flulike symptoms.  Recently right hip fracture along with a history of chronic pain traumatic brain injury hypertension.     Current Outpatient Prescriptions:      oxyCODONE (ROXICODONE) 10 mg immediate release tablet, Take 5 mg by mouth every 4 (four) hours as needed for pain., Disp: , Rfl:      acetaminophen (TYLENOL) 500 MG tablet, Take 1,000 mg by mouth 3 (three) times a day., Disp: , Rfl:      aspirin 81 MG tablet, Take 81 mg by mouth daily., Disp: , Rfl:      atorvastatin (LIPITOR) 40 MG tablet, Take 40 mg by mouth at bedtime., Disp: , Rfl:      BRILINTA 90 mg Tab, Take 90 mg by mouth 2 (two) times a day. , Disp: , Rfl:      carbidopa-levodopa (SINEMET)  mg per tablet, Take 2 tablets by mouth 3 (three) times a day., Disp: , Rfl:      cefuroxime (CEFTIN) 250 MG tablet, Take 250 mg by mouth 2 (two) times a day., Disp: , Rfl:      cholecalciferol, vitamin D3, 1,000 unit tablet, Take 4,000 Units by mouth daily., Disp: , Rfl:      cranberry 500 mg cap, Take 500 mg by mouth 2 (two) times a day., Disp: , Rfl:      cyanocobalamin (VITAMIN B-12) 100 MCG tablet, Take 100 mcg by mouth daily., Disp: , Rfl:      ketoconazole (NIZORAL) 2 % cream, Apply 1 application topically bedtime. Apply to stomach skin fold(s), Disp: , Rfl:      metoprolol succinate (TOPROL-XL) 25 MG, Take 12.5 mg by mouth daily. , Disp: , Rfl:      multivitamin (MULTIVITAMIN) per tablet, Take 1 tablet by mouth daily., Disp: , Rfl:      nitroglycerin (NITROSTAT) 0.4 MG SL tablet, Place 0.4 mg under the tongue every 5 (five) minutes as needed. , Disp: , Rfl:      nystatin (MYCOSTATIN) powder, Apply 1 application topically daily., Disp: , Rfl:      polyethylene glycol (MIRALAX) 17 gram packet, Take 17 g by mouth daily., Disp: , Rfl:      pramipexole (MIRAPEX) 0.5 MG tablet, Take 0.5  mg by mouth bedtime., Disp: , Rfl:      psyllium (METAMUCIL) powder, Take by mouth every morning. 1 PKT, Disp: , Rfl:      senna-docusate (PERICOLACE) 8.6-50 mg tablet, Take 2 tablets by mouth 2 (two) times a day., Disp: , Rfl:      solifenacin (VESICARE) 10 MG tablet, Take 10 mg by mouth daily., Disp: , Rfl:      traZODone (DESYREL) 150 MG tablet, Take 1 tablet (150 mg total) by mouth bedtime., Disp: 30 tablet, Rfl: 0     venlafaxine (EFFEXOR-XR) 75 MG 24 hr capsule, Take 5 capsules (375 mg total) by mouth daily., Disp: 150 capsule, Rfl: 3  .There were no vitals filed for this visit.  Blood pressure 116/73 pulse 88 respirations 22 temperature 98.8  Physical Exam    Constitutional: No distress.   HENT:   Head: Normocephalic.   Eyes: Pupils are equal, round, and reactive to light.   Neck: Neck supple. No thyromegaly present.   Cardiovascular: Normal rate, regular rhythm and normal heart sounds.    Pulmonary/Chest: Breath sounds normal.   Abdominal: Bowel sounds are normal. There is no tenderness. There is no guarding.   Musculoskeletal:   Right hip fracture hemiarthroplasty.  Hydrocolloid dressing.  Good CMS to her right leg.  Chronic pain syndrome.   Lymphadenopathy:     She has no cervical adenopathy.   Neurological: She is alert.   Skin: Skin is warm and dry. No rash noted.   LABS:   Lab Results   Component Value Date    WBC 4.9 07/28/2015    HGB 8.9 (L) 01/03/2018    HCT 44.1 07/28/2015    MCV 88 07/28/2015     07/28/2015     Results for orders placed or performed in visit on 09/15/14   BASIC METABOLIC PANEL   Result Value Ref Range    Sodium 142 136 - 145 mmol/L    Potassium 4.9 3.5 - 5.0 mmol/L    Chloride 107 98 - 107 mmol/L    CO2 27 22 - 31 mmol/L    Anion Gap, Calculation 8 5 - 18 mmol/L    Glucose 109 70 - 125 mg/dL    Calcium 8.8 8.5 - 10.5 mg/dL    BUN 23 (H) 8 - 22 mg/dL    Creatinine 0.72 0.60 - 1.10 mg/dL    GFR MDRD Af Amer >60 >60 mL/min/1.73m2    GFR MDRD Non Af Amer >60 >60 mL/min/1.73m2            ASSESSMENT:      ICD-10-CM    1. Hip fracture S72.009A    2. Constipation K59.00    3. Essential hypertension I10    4. Pain management R52        PLAN:    Her hemoglobin did come back yesterday and it had improved in the hospital it was 7.7.  Continue to manage her chronic medical conditions including constipation and pain.  Staff will keep me updated as any other problems.  I do not know when her next visit is to see orthopedics for recheck.        Electronically signed by: Michael Duane Johnson, CNP

## 2021-06-15 NOTE — PROGRESS NOTES
________________________________________  Medications Phoned  to Pharmacy [] yes [x]no  Name of Pharmacist:  List Medications, including dose, quantity and instructions    Medications ordered this visit were e-scribed.  Verified by order class [x] yes  [] no  Mirtazapine 15 mg  Venlafaxine 37.5 mg  Venlafaxine 150 mg    Medication changes or discontinuations were communicated to patient's pharmacy: [] yes  [x] no    Dictation completed at time of chart check: [x] yes  [] no    I have checked the documentation for today s encounters and the above information has been reviewed and completed.

## 2021-06-15 NOTE — PATIENT INSTRUCTIONS - HE
I have increased the patient's Effexor to 337.5 mg a day.  We have decreased the Remeron to 15 mg at at bedtime.  The patient is no longer in physical therapy or other therapies due to poor compliance and poor progress.  We will continue to encourage participation.  The patient will return to this clinic in 3 months for medication check and the staff agreed to call before then with any questions or concerns.

## 2021-06-15 NOTE — PROGRESS NOTES
Outpatient Followup Psychiatric Evaluation      Pertinent History: Patient presents today for the purposes of medication management.  She has a history of a mood disorder and also with prior psychotic symptoms.  Per the patient's request we have been tapering the Zyprexa and that was discontinued in 2017.    She was restarted on the Remeron earlier in 2018.  I saw the patient in the summer 2018 and did not make any medication changes.  In September 2018 we did increase the patient's Remeron to 45 mg at night.    I saw the patient in January 2019.  At that time she was continuing to struggle with isolation and hoarding behaviors and was extremely anxious.  She was having some bedwetting issues that have been falling more seeming perhaps to be overmedicated.  There had not been any improvement with the increase in Remeron.  At that visit we did decrease the Effexor and the Remeron.    I saw the patient in the spring 2019 and at that time she was doing fairly well.  We elected to try a medication taper and we decreased her Effexor XR to 225 mg a day and decrease the Remeron down to 30 mg at night.    I saw the patient in December 2019.  She was having increased depression with some worsening behavioral issues.  She had been refusing day programming and there was an increase in her SIBs.  She was more defiant and eating less.  She had a decline in her hygiene.  We did increase both the Remeron and Effexor at that time.     I saw the patient in February 2020. At that time she was more flattered depressed in the context of staff turnover. We did add some low-dose Wellbutrin at that time. Over the course of the summer the patient has had multiple falls at her living place. The coronavirus is led to much more isolation. The patient is reportedly more depressed according to staff much of this apparently has been related to frequent hospitalizations for falls and other medical issues.    I saw the patient for a medication  check via a phone conference on September 28 of 2020.  2 staff member attended that interview and reported the patient seemed more withdrawn and depressed over the few weeks prior.  She had had multiple stressors including some medical hospitalizations as well as the stress of isolation due to the coronavirus.  She was less motivated and less interactive.  She felt that she was perhaps having a bit more difficulty with balance.  She was not dizzy but stated she was having a harder time negotiating furniture walking.  She appeared to be more tired and flat.  We did decrease her trazodone to 75 mg at night at that interview.    I saw the patient for a virtual visit December 21 of 2020.  She was a vague historian but felt she was doing a bit better.  It was unclear whether her unsteadiness was a bit better but she had no significant depression or anxiety.  She was sleeping well.  The staff reported she still had periods of irritability and depression.  They felt she was still unsteady.  The team was looking at other placement options for her.  She had been in rolled in a Parkinson's movement physical therapy class.  At that visit we did decrease her Remeron to 30 mg at at bedtime.    Current Symptoms:   The patient was interviewed by phone.  She again was an extremely vague history of [place] and fairly disinterested.  She told me she was doing well and denies having any problems or concerns.  She denied any depression or anxiety.  No desire to be dead or thoughts of suicide.  She admitted she occasionally got better but stated that had improved.  She told me she was making progress in physical therapy.    When I talked to the staff I got a very different story.  They reported the patient was flat disinterested and had refused many of her therapies and that had been discontinued.  She seemed disinterested.  She was sabotaging much of her treatment and seemed to get out of her bed at night and sit in her wheelchair at  times.  She however was able to sleep through the night fairly well.  They noted no new problems or concerns with the prior reduction in the Remeron.  The patient was not having any hallucinations or delusions.  There was no suicidality.  The staff stated the patient was often sitting putting her legs down and was trying to get increased swelling in her legs so she could stay in her wheelchair more.  They noted that she was often crying.  There is been no other new medical issues or concerns.        Current Medications: Please see chart    Medication Compliance: Yes    Side Effects to Medications: No      Vitals:  Wt Readings from Last 3 Encounters:   09/30/20 168 lb (76.2 kg)   09/25/20 168 lb 12.8 oz (76.6 kg)   09/17/20 166 lb 6.4 oz (75.5 kg)     Temp Readings from Last 3 Encounters:   10/02/20 98.4  F (36.9  C) (Oral)   09/25/20 98.5  F (36.9  C)   09/17/20 (!) 96.4  F (35.8  C)     BP Readings from Last 3 Encounters:   10/02/20 126/59   09/25/20 128/76   09/17/20 128/79     Pulse Readings from Last 3 Encounters:   10/02/20 73   09/25/20 (!) 57   09/17/20 61         Mental Status Exam:   Appearance: I interviewed the patient as well as a staff member by phone.  The patient participated but her history was unreliable.  Please see above.  Behavior: Patient was flat and slow.  She participated but was vague and apparently not accurate.  The staff reports she continues to be minimally interactive.  Often avoidant.  She is frequently trying to transfer herself and do other noncompliant activities.  Speech: Slow, vague and flat.  Not pressured or rambling.  Mood/Affect: Flat and slow.  Depressed.  Not anxious.  No irritability.  O  Thought content: No reports of any hallucinations or delusions.  The patient again denies this.  Suicidal or Homicidal Thoughts: Patient denies.  The staff do not report any suicidality.  Thought Process/Formulation: Vague slow and flat with limited effort.  Associations: [place] and slow  with limited effort.  No obvious racing thoughts.  Not disorganized.  No obvious recent change.  Fund of Knowledge:  No initiation. Limited effort.  Many of her answers again were inconsistent.  Attention/Concentration: She was able to follow simple conversation but had limited effort and was slow.  Limited effort and limited initiation.  Insight: Impaired with no reports of any significant recent change.  Judgement:  Limited effort.  Continues impaired.  Memory:  Limited participation. Slow.  Limited effort again.  Motor Status:  Limited participation. No current tremor according to the patient.  Orientation: No reports of any recent change.  Limited effort at this time.      Diagnosis managed and treated at today's visit :      Major depressive disorder   History of psychosis NOS    Plan:  Medication Adjustment:  I have increased the patient's Effexor XR to 337.5 mg a day.  I have decreased the Remeron down to 15 mg at at bedtime.  I discussed this with the staff who was in agreement.  Risks and benefits were discussed.     Other: Patient will return to clinic in 3 months for further assessment and treatment the staff and patient agreed to return sooner or call if questions concerns or problems arise.  We will continue to try and encourage participation and compliance.    Continue with the support of the clinic, reassurance, and redirection. Staff monitoring and ongoing assessments per team plan. Current psychotropic medication appears to represent the minimum effective dosage and appears medically necessary. We will continue to monitor and reassess. This team will utilize appropriate emergency services if necessary. I will make myself available if concerns or problems arise.     I saw the patient for 25 minutes which included patient and staff interview, chart review and documentation.      Norberto Johnson MD

## 2021-06-15 NOTE — PROGRESS NOTES
[facility]      Code Status:  FULL CODE  Visit Type: Review Of Multiple Medical Conditions     Facility:  [facility] [[id number]]           History of Present Illness: Alison Bashir is a 67 y.o. female who is currently admitted as a transfer from [facility] to the TCU.  She is a resident of a group home with underlying history of a traumatic brain injury, hypertension, coronary artery disease who presented to the emergency room after she fell on 12/17.  She was diagnosed with a right hip fracture and underwent right hip hemiarthroplasty. She was also noted to have a UTI and subsequently discharged to TCU.  She readmitted to the hospital with ongoing pain in her right leg and imaging revealed a periprosthetic fractures orthopedics was consulted.  They recommended repeat surgical intervention.  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  She subsequently underwent an ORIF of right femur with revision hemiarthroplasty of the right hip by Dr. Magaña on 1/11/18. She had an EBL 2000ml. She had developed hypotension intraop and in PACU requiring multiple blood products. She was admitted to ICU postop given hypotension and blood loss and on 1/17/2018, she developed a draining hematoma on her right hip and was noted to have interval displacement of greater trochanter on Xray. She received 2 units of pRBC 1/17 and underwent right hip irrigation and debridement with ORIF of the right greater trochanter. Her Hgb had been stable 7.5 - 8.0. Brilinta was on hold, resumed on 1/23/2018 with okay from Orthopedics and decreased ASA to 81mg daily from 325mg daily. Then on 1/24/2018, she had developed hypotension overnight and received 1U PRBCs. Brilinta stopped on 1/25/18 per Dr. Magaña for the foreseeable future, at least until her follow up appointment with Orthopedics in 10-14 days. Hypotension again developed on  1/25/2018 overnight, received 1L fluid bolus. Hemoglobin was stable. Hypotension was felt to be related to narcotics. Raised parameters to hold Dilaudid for SBP <110. Pain clinic following for pain med management. Stopped as needed Dilaudid on 1/24/2018 due to hypotension, decreased frequency of scheduled Dilaudid to every 6 hours on 1/25/2018, and added parameters to scheduled Dilaudid to hold for sedation or SBP < 110. She does have significant anxiety when transferring, fear of falling. She would benefit from seeing Psychiatry as an outpatient, refused this admission. Her pain medications may be adjusted as long as blood pressure tolerates. Her Brilinta is on hold until follow up with Orthopedics, and she was discharged on ASA 325mg daily, which can be decreased to 81mg daily when Brilinta restarted. Hemoglobin should be rechecked in 2 days. Her Metoprolol was stopped and should be restarted when blood pressure tolerates.      TCU: Alison is a 68 yo old with various concerns today. She had significant blood loss anemia secondary to blood loss and intraoperative hypotension and Hgb recheck is 8.1. No obvious signs of bleeding noted. Will start ferrous sulfate and recheck CBC in 1 wk. Pain management was consulted for pain management optimization and she is currentdischarged on oral Dilaudid, though per low BPs switched to tramadol QID, effective. Previously she was still not moving in bed and had difficulty moving her right lower extremity at all. Xray ordered per nursing request.  She still has high anxiety with transfers though started lorazepam PRN, which if report has been received well.     Past Medical History:   Diagnosis Date     Acute blood loss anemia      Alzheimer disease      CAD (coronary artery disease)      Chronic pain syndrome      Dementia      Depression      Hip fracture, right 12/21/2017     HTN (hypertension)      Kidney stone      PMB (postmenopausal bleeding)      RLS (restless legs  syndrome)      Stented coronary artery      Traumatic brain injury      Unsteady gait     uses walker     UTI (lower urinary tract infection)     on antibiotic course     Past Surgical History:   Procedure Laterality Date      SECTION       CHOLECYSTECTOMY  May 2014     COMBINED HYSTEROSCOPY DIAGNOSTIC / D&C N/A 2014    Procedure: DILATION AND CURETTAGE WITH HYSTEROSCOPY;  Surgeon: Karime Cifuentes MD;  Location: [facility];  Service:      CORONARY STENT PLACEMENT       HEMIARTHROPLASTY HIP Right 2017     INCISION AND DRAINAGE HIP Right 2018    ORIF REVISION      LUNG REMOVAL, PARTIAL       REVISION TOTAL HIP ARTHROPLASTY Right 01/10/2018     TONSILLECTOMY AND ADENOIDECTOMY       TOTAL KNEE ARTHROPLASTY Right 2016     TOTAL KNEE ARTHROPLASTY Left 2015     No family history on file.  Social History     Social History     Marital status: Single     Spouse name: N/A     Number of children: N/A     Years of education: N/A     Occupational History     Not on file.     Social History Main Topics     Smoking status: Never Smoker     Smokeless tobacco: Never Used     Alcohol use No     Drug use: No     Sexual activity: Not on file     Other Topics Concern     Not on file     Social History Narrative     Current Outpatient Prescriptions   Medication Sig Dispense Refill     acetaminophen (TYLENOL) 325 MG tablet Take 650 mg by mouth 4 (four) times a day.       cholecalciferol, vitamin D3, 5,000 unit Tab Take 5,000 Units by mouth Daily at 8:00 am..       ferrous sulfate 325 (65 FE) MG tablet Take 1 tablet by mouth 2 (two) times a day with meals.       LORazepam (ATIVAN) 0.5 MG tablet Take 0.5 mg by mouth every 6 (six) hours as needed for anxiety.       traMADol (ULTRAM) 50 mg tablet Take 50 mg by mouth 4 (four) times a day.       aspirin 325 MG EC tablet Take 325 mg by mouth daily.       atorvastatin (LIPITOR) 40 MG tablet Take 40 mg by mouth at bedtime.       carbidopa-levodopa (SINEMET)   mg per tablet Take 2 tablets by mouth 3 (three) times a day.       cranberry 500 mg cap Take 500 mg by mouth 2 (two) times a day.       cyanocobalamin (VITAMIN B-12) 100 MCG tablet Take 100 mcg by mouth daily.       lidocaine (LIDODERM) 5 % Place 1 patch on the skin daily. Remove & Discard patch within 12 hours or as directed by MD       multivitamin (MULTIVITAMIN) per tablet Take 1 tablet by mouth daily.       nitroglycerin (NITROSTAT) 0.4 MG SL tablet Place 0.4 mg under the tongue every 5 (five) minutes as needed.        nystatin (MYCOSTATIN) powder Apply 1 application topically daily.       polyethylene glycol (MIRALAX) 17 gram packet Take 17 g by mouth daily.       pramipexole (MIRAPEX) 0.5 MG tablet Take 0.5 mg by mouth bedtime.       psyllium (METAMUCIL) powder Take by mouth every morning. 1 PKT       senna-docusate (PERICOLACE) 8.6-50 mg tablet Take 2 tablets by mouth 2 (two) times a day.       solifenacin (VESICARE) 10 MG tablet Take 10 mg by mouth daily.       sorbitol 70 % solution Take 30 mL by mouth daily.       traZODone (DESYREL) 150 MG tablet Take 1 tablet (150 mg total) by mouth bedtime. 30 tablet 0     venlafaxine (EFFEXOR-XR) 75 MG 24 hr capsule Take 5 capsules (375 mg total) by mouth daily. 150 capsule 3     No current facility-administered medications for this visit.      Allergies   Allergen Reactions     Blood-Group Specific Substance Other (See Comments)     Patient has anti-K. Blood product orders maybe delayed. Draw one red top and 2 purple top tubes for all Type and Screen/Red blood Cell product orders     Fd And C No.5 (Tartrazine)      GI UPSET     Ibuprofen Nausea And Vomiting     Morphine Rash     swelling         Review of Systems:    Constitutional: Negative.  Negative for fever, chills, HAS activity change, appetite change and fatigue.   HENT: Negative for congestion and facial swelling.    Eyes: Negative for photophobia, redness and visual disturbance.   Respiratory: Negative  for cough and chest tightness.    Cardiovascular: Negative for chest pain, palpitations and leg swelling.   Gastrointestinal: Negative for , diarrhea, constipation, blood in stool and abdominal distention.     Genitourinary: Negative.    Musculoskeletal: Negative.  Had severe right hip pain though improved. Better mobility.  Skin: Negative.    Neurological: Negative for dizziness, tremors, syncope, weakness, light-headedness and headaches.   Hematological: Does not bruise/bleed easily.   Psychiatric/Behavioral: Negative.  Very anxious she is reporting severe pain in her leg when she ambulates and refusing to get out of bed    Vitals:    01/31/18 1941   BP: 99/65   Pulse: 93   Resp: 20   Temp: 98.4  F (36.9  C)   SpO2: 96%       Physical Exam:    GENERAL: no acute distress. Cooperative in conversation. Pain and anxiety better controlled.  HEENT: pupils are equal, round and reactive. Oral mucosa is moist and intact.  RESP:Chest symmetric. Regular respiratory rate. No stridor.  CVS: S1S2  ABD: Nondistended, soft.  EXTREMITIES: No lower extremity edema.  Right hip incision is intact.  NEURO: non focal. Alert and oriented x3.   PSYCH: within normal limits. No depression, has anxiety.  SKIN: warm dry intact,       Labs:    .     HEMOGLOBIN (01/26/2018 8:07 AM)  Only the most recent of 25 results within the time period is included.    HEMOGLOBIN (01/26/2018 8:07 AM)   Component Value Ref Range   HEMOGLOBIN  8.3 (L) 12.0 - 16.0 g/dL   MCV  88 80 - 100 fL     HEMOGLOBIN (01/26/2018 8:07 AM)   Specimen Performing Laboratory   Blood [facility] LABORATORY    SENDOUT INTERNAL ZIP [id number]    [address] [place] [address]       HEMOGLOBIN (01/26/2018 8:07 AM)   Narrative                Back to top of Results      EXTRA TUBE LAVENDER (01/25/2018 10:14 AM)  Only the most recent of 2 results within the time period is included.    EXTRA TUBE LAVENDER (01/25/2018 10:14 AM)   Specimen Performing Laboratory   Blood  [facility] LABORATORY    SENDOUT INTERNAL ZIP [id number]    [address] [place] [address]     Back to top of Results      TRANSFUSE RBC (NURSE COMMUNICATION ORDER) (2018 11:10 AM)  Only the most recent of 15 results within the time period is included.    TRANSFUSE RBC (NURSE COMMUNICATION ORDER) (2018 11:10 AM)   Specimen Performing Laboratory   Blood      Back to top of Results      ANTIBODY IDENTIFICATION EACH PANEL (2018 5:03 AM)  Only the most recent of 3 results within the time period is included.    ANTIBODY IDENTIFICATION EACH PANEL (2018 5:03 AM)   Component Value Ref Range   QUANTITY 1     PANEL JENA ID  Panel Antibody ID       ANTIBODY IDENTIFICATION EACH PANEL (2018 5:03 AM)   Specimen Performing Laboratory     [facility] BLOOD BANK    [address] [place] [address]     Back to top of Results      RED BLOOD CELLS EA UNIT (2018 5:03 AM)  Only the most recent of 18 results within the time period is included.    RED BLOOD CELLS EA UNIT (2018 5:03 AM)   Component Value Ref Range   CROSSMATCH Compatible Compatible   PRODUCT BLOOD TYPE O Neg     PRODUCT ID NUMBER [id number]     PRODUCT STATUS  /Released     PRODUCT DESCRIPTION  RBC AS-1 LR     PRODUCT CODE [id number]       RED BLOOD CELLS EA UNIT (2018 5:03 AM)   Specimen Performing Laboratory     [facility] LABORATORY BLOOD BANK    [address] [place] [address]     Back to top of Results      RBC W TYPE AND SCREEN (2018 4:47 AM)  Only the most recent of 4 results within the time period is included.    RBC W TYPE AND SCREEN (2018 4:47 AM)   Component Value Ref Range   ABORH  O Rh Negative     ANTIBODY SCREEN Positive (A) Negative   SPECIMEN EXPIRATION DATE/TIME 18 23:59       RBC W TYPE AND SCREEN (2018 4:47 AM)   Specimen Performing Laboratory   Blood [facility] LABORATORY BLOOD BANK    [address] [place] [address]  [place] [address]     Back to top of Results      ANTIBODY IDENTIFICATION LAB USE ONLY (01/24/2018 4:47 AM)  Only the most recent of 3 results within the time period is included.    ANTIBODY IDENTIFICATION LAB USE ONLY (01/24/2018 4:47 AM)   Component Value Ref Range   ANTIBODY IDENTIFICATION  Anti-Maite (A)       ANTIBODY IDENTIFICATION LAB USE ONLY (01/24/2018 4:47 AM)   Specimen Performing Laboratory   Blood [facility]ORAT[facility] BLOOD BANK    [address] [place] [address]     Back to top of Results      EXTRA TUBE GOLD/SST (01/20/2018 8:05 AM)  Only the most recent of 2 results within the time period is included.    EXTRA TUBE GOLD/SST (01/20/2018 8:05 AM)   Specimen Performing Laboratory   Blood [facility] LABORATORY    SENDOUT INTERNAL ZIP [id number]    [address] [place] [address]     Back to top of Results      XR PELVIS 1 VIEW PORTABLE (01/18/2018 10:14 AM)  Only the most recent of 3 results within the time period is included.    XR PELVIS 1 VIEW PORTABLE (01/18/2018 10:14 AM)   Narrative   XR PELVIS 1 VIEW PORTABLE    1/18/2018 10:14 AM        INDICATION: Follow-up right hip ORIF. History of right hip hemiarthroplasty.    COMPARISON: Hip radiograph, 01/17/2018        FINDINGS: There has been interval internal fixation of the right hip trochanteric fracture with good anatomic alignment of the fracture fragments. Evidence of previous right hip hemiarthroplasty. Hardware appears well seated and intact.       XR PELVIS 1 VIEW PORTABLE (01/18/2018 10:14 AM)   Procedure Note   Interface, Powerscribe - 01/18/2018 10:25 AM CST    XR PELVIS 1 VIEW PORTABLE  1/18/2018 10:14 AM    INDICATION: Follow-up right hip ORIF. History of right hip hemiarthroplasty.  COMPARISON: Hip radiograph, 01/17/2018    FINDINGS: There has been interval internal fixation of the right hip trochanteric fracture with good anatomic alignment of the fracture fragments. Evidence of previous  right hip hemiarthroplasty. Hardware appears well seated and intact.     Back to top of Results      AEROBIC BACTERIAL CULTURE, STAIN (01/18/2018 8:24 AM)  Only the most recent of 2 results within the time period is included.    AEROBIC BACTERIAL CULTURE, STAIN (01/18/2018 8:24 AM)   Component Value Ref Range   CULTURE No Growth.     GRAM STAIN  1+ PMNs     GRAM STAIN  No organisms seen     GRAM STAIN  Gram stain performed by [place], [place]       AEROBIC BACTERIAL CULTURE, STAIN (01/18/2018 8:24 AM)   Specimen Performing Laboratory   Swab - Right Hip [facility] LABORATORY-[facility] LABORATORY    [address]    [place] [address]     Back to top of Results      ANAEROBIC CULTURE (01/18/2018 8:24 AM)  Only the most recent of 2 results within the time period is included.    ANAEROBIC CULTURE (01/18/2018 8:24 AM)   Component Value Ref Range   CULTURE No anaerobes isolated       ANAEROBIC CULTURE (01/18/2018 8:24 AM)   Specimen Performing Laboratory   Swab - Right Hip [facility] LABORATORY-CEN[facility]AL LABORATORY    [address]    [place] [address]     Back to top of Results      ENDOTRACHEAL TUBE (01/18/2018 8:13 AM)  ENDOTRACHEAL TUBE (01/18/2018 8:13 AM)   Narrative   Jenniffer Mcfarland CRNA     1/18/2018  8:13 AM    Procedure: ETT        Patient location during procedure: OR    ETT Properties    Mask Ventilation: oral airway and easy    Final Technique: direct laryngoscopy    Type: straight    Location: oral    Cuffed: yes    Tube Size: 7.0 mm    Stylet: yes    Laryngoscope Blade: Quick    Blade Size: 2    Cormack-Lehane Grade View: 1    Insertion Attempts: 1    Placement Verification: auscultation and end tidal CO2    Assessment: pharynx clear, atraumatic and dentition unchanged    Secured at: 23    Measured From: teeth    Tooth guard used and removed: yes    Difficulty: 0 (not difficult)    Electronically signed by JENNIFFER MCFARLAND                                                                                                                                                                    ENDOTRACHEAL TUBE (01/18/2018 8:13 AM)   Procedure Note   Jenniffer Mcfarland, CRNA - 01/18/2018 8:13 AM CST    Procedure: ETT    Patient location during procedure: OR  ETT Properties  Mask Ventilation: oral airway and easy  Final Technique: direct laryngoscopy  Type: straight  Location: oral  Cuffed: yes  Tube Size: 7.0 mm  Stylet: yes  Laryngoscope Blade: Quick  Blade Size: 2  Cormack-Lehane Grade View: 1  Insertion Attempts: 1  Placement Verification: auscultation and end tidal CO2  Assessment: pharynx clear, atraumatic and dentition unchanged  Secured at: 23  Measured From: teeth  Tooth guard used and removed: yes  Difficulty: 0 (not difficult)  Electronically signed by JENNIFFER MCFARLAND                    Back to top of Results      EXTRA TUBE LIGHT GREEN (01/18/2018 5:29 AM)  EXTRA TUBE LIGHT GREEN (01/18/2018 5:29 AM)   Specimen Performing Laboratory   Blood [facility] LABORATORY    SENDOUT INTERNAL ZIP [id number]    [address] [place] [address]     Back to top of Results      NUCLEATED RED BLOOD CELLS AS PERCENT OF BLOOD LEUKOCYTES (01/18/2018 5:29 AM)  Only the most recent of 4 results within the time period is included.    NUCLEATED RED BLOOD CELLS AS PERCENT OF BLOOD LEUKOCYTES (01/18/2018 5:29 AM)   Component Value Ref Range   NRBC  0.0 %   ABS NRBC 0.0 thou /cu mm     NUCLEATED RED BLOOD CELLS AS PERCENT OF BLOOD LEUKOCYTES (01/18/2018 5:29 AM)   Specimen Performing Laboratory   Blood [facility] LABORATORY    SENDOUT INTERNAL ZIP [id number]    [address] [place] [address]       NUCLEATED RED BLOOD CELLS AS PERCENT OF BLOOD LEUKOCYTES (01/18/2018 5:29 AM)   Narrative                Back to top of Results      CBC with Platelets no Differential (01/18/2018 5:29 AM)  Only the most recent of 2 results within the time  period is included.    CBC with Platelets no Differential (01/18/2018 5:29 AM)   Component Value Ref Range   WHITE BLOOD COUNT  6.1 4.5 - 11.0 thou/cu mm   RED BLOOD COUNT  2.77 (L) 4.00 - 5.20 mil/cu mm   HEMOGLOBIN  7.8 (L) 12.0 - 16.0 g/dL   HEMATOCRIT  24.1 (L) 33.0 - 51.0 %   MCV  87 80 - 100 fL   MCH  28.2 26.0 - 34.0 pg   MCHC  32.4 32.0 - 36.0 g/dL   RDW  15.4 11.5 - 15.5 %   PLATELET COUNT  240 140 - 440 thou/cu mm   MPV  10.0 6.5 - 11.0 fL     CBC with Platelets no Differential (01/18/2018 5:29 AM)   Specimen Performing Laboratory   Blood [facility] LABORATORY    SENDOUT INTERNAL ZIP [id number]    [address] [place] [address]         Assessment/Plan:    Right hip fracture status post right hip hemiarthroplasty subsequently patient represented with a periprosthetic fx: ORIF of right femur with revision hemiarthroplasty of the right hip by Dr. Magaña on 1/11/18. EBL 2000ml. On 1/17/2018, she developed a draining hematoma right hip and was noted to have interval displacement of greater trochanter on Xray. She received 2 units of pRBC 1/17 and underwent right hip irrigation and debridement with ORIF of the right greater trochanter. She has been discharged currently with TTWB.    Nondisplaced fracture of the radial head:   Course complicated by nonunion currently orthopedics has recommended a sling for her.    Pain management:  seen by the pain clinic and started on Dilaudid for pain management along with Tylenol which has been changed to scheduled, unable to receive dilaudid per low BPs, start tramadol 50mg QID, dc flexeril and dilaudid per non use. She also has an underlying history of chronic pain.    DVT prophylaxis: on aspirin 325    [id number] and recheck hemoglobins.  Patient underwent hemorrhagic check in the hospital she required several units of blood transfusion the hospital discharge hemoglobin remained low. Last Hgb 8.1. Start ferrous sulfate 325 mg BID, recheck CBC in 1 wk  and guaiac stools.    Recent history of coronary artery disease: status post stent placement on 3/17, she was unable to tolerate Brilinta 90 mg twice daily due to bleeding complications, currently on aspirin 325 daily which was increased from her home regimen of 81 mg. She can restart Brilinta when okayed by orthopedics.    Postoperative hypotension: initially felt to be due to anemia secondary to hemorrhagic shock and blood loss she was given multiple units of blood products.  Subsequently she was given fluid resuscitation as her hemoglobin was stable. Last Hgb 8.1, see above.  Was taken off metoprolol it has been recommended her dosage be increased if her blood pressures rebound. SBP .    Anxiety disorder:  she is refusing to ambulate due to severe anxiety. Started lorazepam 0.5mg q6h PRN, used 1-2x daily.    HLD-Lipitor, lipid panel looks good.    History of movement disorder: Sinemet 3 times daily.  She is also on Mirapex.    Vit D deficiency: last 30.3, increased to 5000U daily with recheck in 6 wks    B12 deficiency: on supplement, no current level, will recheck level.    Overactive bladder: Vesicare but also has an indwelling Molina catheter. This was primarily given due to her lack of mobility for comfort.  We will try to get her a voiding trial on 2/5.    Depression with insomnia:  takes trazodone at bedtime along with Effexor. Recently started on Vistaril, used limited doses with tylenol.    Profound debilitation sent from the nursing home to the TCU.        The care plan has been reviewed and all orders signed. Changes to care plan, if any, as noted. Otherwise, continue care plan of care.      Electronically signed by: Dexter Rico NP    .

## 2021-06-15 NOTE — PROGRESS NOTES
[facility]      Code Status:  FULL CODE  Visit Type: Review Of Multiple Medical Conditions     Facility:  [facility] [[id number]]           History of Present Illness: Alison Bashir is a 67 y.o. female who is currently admitted as a transfer from [facility] to the TCU.  She is a resident of a group home with underlying history of a traumatic brain injury, hypertension, coronary artery disease who presented to the emergency room after she fell on 12/17.  She was diagnosed with a right hip fracture and underwent right hip hemiarthroplasty. She was also noted to have a UTI and subsequently discharged to TCU.  She readmitted to the hospital with ongoing pain in her right leg and imaging revealed a periprosthetic fractures orthopedics was consulted.  They recommended repeat surgical intervention.  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  She subsequently underwent an ORIF of right femur with revision hemiarthroplasty of the right hip by Dr. Magaña on 1/11/18. She had an EBL 2000ml. She had developed hypotension intraop and in PACU requiring multiple blood products. She was admitted to ICU postop given hypotension and blood loss and on 1/17/2018, she developed a draining hematoma on her right hip and was noted to have interval displacement of greater trochanter on Xray. She received 2 units of pRBC 1/17 and underwent right hip irrigation and debridement with ORIF of the right greater trochanter. Her Hgb had been stable 7.5 - 8.0. Brilinta was on hold, resumed on 1/23/2018 with okay from Orthopedics and decreased ASA to 81mg daily from 325mg daily. Then on 1/24/2018, she had developed hypotension overnight and received 1U PRBCs. Brilinta stopped on 1/25/18 per Dr. Magaña for the foreseeable future, at least until her follow up appointment with Orthopedics in 10-14 days. Hypotension again developed on  1/25/2018 overnight, received 1L fluid bolus. Hemoglobin was stable. Hypotension was felt to be related to narcotics. Raised parameters to hold Dilaudid for SBP <110. Pain clinic following for pain med management. Stopped as needed Dilaudid on 1/24/2018 due to hypotension, decreased frequency of scheduled Dilaudid to every 6 hours on 1/25/2018, and added parameters to scheduled Dilaudid to hold for sedation or SBP < 110. She does have significant anxiety when transferring, fear of falling. She would benefit from seeing Psychiatry as an outpatient, refused this admission. Her pain medications may be adjusted as long as blood pressure tolerates. Her Brilinta is on hold until follow up with Orthopedics, and she was discharged on ASA 325mg daily, which can be decreased to 81mg daily when Brilinta restarted. Hemoglobin should be rechecked in 2 days. Her Metoprolol was stopped and should be restarted when blood pressure tolerates.      TCU: Alison is a 68 yo old with various concerns today. She had significant blood loss anemia secondary to blood loss and intraoperative hypotension and Hgb recheck is 8.2. No obvious signs of bleeding noted. Maintain ferrous sulfate. Pain management was consulted for pain management optimization and she is currently discharged on oral Dilaudid, though per low BPs switched to tramadol QID, effective. Previously she was still not moving in bed and had difficulty moving her right lower extremity at all. Xray ordered per nursing request, no issue identified and is now mobility improved.  She had high anxiety with transfers, started lorazepam PRN and BID, which is reported to be effective. No WB currently per therapy. Now BPs have been low    Past Medical History:   Diagnosis Date     Acute blood loss anemia      Alzheimer disease      CAD (coronary artery disease)      Chronic pain syndrome      Dementia      Depression      Hip fracture, right 12/21/2017     HTN (hypertension)      Kidney  stone      PMB (postmenopausal bleeding)      RLS (restless legs syndrome)      Stented coronary artery      Traumatic brain injury      Unsteady gait     uses walker     UTI (lower urinary tract infection)     on antibiotic course     Past Surgical History:   Procedure Laterality Date      SECTION       CHOLECYSTECTOMY  May 2014     COMBINED HYSTEROSCOPY DIAGNOSTIC / D&C N/A 2014    Procedure: DILATION AND CURETTAGE WITH HYSTEROSCOPY;  Surgeon: Karime Cifuentes MD;  Location: [facility];  Service:      CORONARY STENT PLACEMENT       HEMIARTHROPLASTY HIP Right 2017     INCISION AND DRAINAGE HIP Right 2018    ORIF REVISION      LUNG REMOVAL, PARTIAL       REVISION TOTAL HIP ARTHROPLASTY Right 01/10/2018     TONSILLECTOMY AND ADENOIDECTOMY       TOTAL KNEE ARTHROPLASTY Right 2016     TOTAL KNEE ARTHROPLASTY Left 2015     No family history on file.  Social History     Social History     Marital status: Single     Spouse name: N/A     Number of children: N/A     Years of education: N/A     Occupational History     Not on file.     Social History Main Topics     Smoking status: Never Smoker     Smokeless tobacco: Never Used     Alcohol use No     Drug use: No     Sexual activity: Not on file     Other Topics Concern     Not on file     Social History Narrative     Current Outpatient Prescriptions   Medication Sig Dispense Refill     acetaminophen (TYLENOL) 325 MG tablet Take 650 mg by mouth 4 (four) times a day.       aspirin 325 MG EC tablet Take 325 mg by mouth daily.       atorvastatin (LIPITOR) 40 MG tablet Take 40 mg by mouth at bedtime.       carbidopa-levodopa (SINEMET)  mg per tablet Take 2 tablets by mouth 3 (three) times a day.       cholecalciferol, vitamin D3, 5,000 unit Tab Take 5,000 Units by mouth Daily at 8:00 am..       cranberry 500 mg cap Take 500 mg by mouth 2 (two) times a day.       cyanocobalamin (VITAMIN B-12) 100 MCG tablet Take 100 mcg by mouth daily.        ferrous sulfate 325 (65 FE) MG tablet Take 1 tablet by mouth 2 (two) times a day with meals.       lidocaine (LIDODERM) 5 % Place 1 patch on the skin daily. Remove & Discard patch within 12 hours or as directed by MD       LORazepam (ATIVAN) 0.5 MG tablet Take 0.5 mg by mouth every 6 (six) hours as needed for anxiety.       multivitamin (MULTIVITAMIN) per tablet Take 1 tablet by mouth daily.       nitroglycerin (NITROSTAT) 0.4 MG SL tablet Place 0.4 mg under the tongue every 5 (five) minutes as needed.        nystatin (MYCOSTATIN) powder Apply 1 application topically daily.       polyethylene glycol (MIRALAX) 17 gram packet Take 17 g by mouth daily.       pramipexole (MIRAPEX) 0.5 MG tablet Take 0.5 mg by mouth bedtime.       psyllium (METAMUCIL) powder Take by mouth every morning. 1 PKT       senna-docusate (PERICOLACE) 8.6-50 mg tablet Take 2 tablets by mouth 2 (two) times a day.       solifenacin (VESICARE) 10 MG tablet Take 10 mg by mouth daily.       sorbitol 70 % solution Take 30 mL by mouth daily.       traMADol (ULTRAM) 50 mg tablet Take 50 mg by mouth 4 (four) times a day.       traZODone (DESYREL) 150 MG tablet Take 1 tablet (150 mg total) by mouth bedtime. 30 tablet 0     venlafaxine (EFFEXOR-XR) 75 MG 24 hr capsule Take 5 capsules (375 mg total) by mouth daily. 150 capsule 3     No current facility-administered medications for this visit.      Allergies   Allergen Reactions     Blood-Group Specific Substance Other (See Comments)     Patient has anti-K. Blood product orders maybe delayed. Draw one red top and 2 purple top tubes for all Type and Screen/Red blood Cell product orders     Fd And C No.5 (Tartrazine)      GI UPSET     Ibuprofen Nausea And Vomiting     Morphine Rash     swelling         Review of Systems:    Constitutional: Negative.  Negative for fever, chills, HAS activity change, appetite change and fatigue.   HENT: Negative for congestion and facial swelling.    Eyes: Negative for  photophobia, redness and visual disturbance.   Respiratory: Negative for cough and chest tightness.    Cardiovascular: Negative for chest pain, palpitations and leg swelling.   Gastrointestinal: Negative for , diarrhea, constipation, blood in stool and abdominal distention.     Genitourinary: Negative.    Musculoskeletal: Negative.  Had severe right hip pain though improved. Better mobility.  Skin: Negative.    Neurological: Negative for dizziness, tremors, syncope, weakness, light-headedness and headaches.   Hematological: Does not bruise/bleed easily.   Psychiatric/Behavioral: Negative.  Anxiety improved.    Vitals:    02/07/18 1310   BP: (!) 87/69       Physical Exam:    GENERAL: no acute distress. Cooperative in conversation. Pain and anxiety better controlled. Some low BPs noted.  HEENT: pupils are equal, round and reactive. Oral mucosa is moist and intact.  RESP:Chest symmetric. Regular respiratory rate. No stridor. CTA. RA.  CVS: S1S2, no murmur, rub, or gallop.  ABD: Nondistended, soft. No constipation or diarrhea.  EXTREMITIES: No lower extremity edema.  Right hip incision is intact.  NEURO: no focal deficits. Alert and oriented x3.   PSYCH: within normal limits. No depression, has anxiety.  SKIN: warm dry intact,       Labs:    .     HEMOGLOBIN (01/26/2018 8:07 AM)  Only the most recent of 25 results within the time period is included.    HEMOGLOBIN (01/26/2018 8:07 AM)   Component Value Ref Range   HEMOGLOBIN  8.3 (L) 12.0 - 16.0 g/dL   MCV  88 80 - 100 fL     HEMOGLOBIN (01/26/2018 8:07 AM)   Specimen Performing Laboratory   Blood [facility] LABORATORY    SENDOUT INTERNAL ZIP [id number]    [address] [place] [address]       HEMOGLOBIN (01/26/2018 8:07 AM)   Narrative                Back to top of Results      EXTRA TUBE LAVENDER (01/25/2018 10:14 AM)  Only the most recent of 2 results within the time period is included.    EXTRA TUBE LAVENDER (01/25/2018 10:14 AM)   Specimen Performing  Laboratory   Blood [facility] LABORATORY    SENDOUT INTERNAL ZIP [id number]    [address] [place] [address]     Back to top of Results      TRANSFUSE RBC (NURSE COMMUNICATION ORDER) (2018 11:10 AM)  Only the most recent of 15 results within the time period is included.    TRANSFUSE RBC (NURSE COMMUNICATION ORDER) (2018 11:10 AM)   Specimen Performing Laboratory   Blood      Back to top of Results      ANTIBODY IDENTIFICATION EACH PANEL (2018 5:03 AM)  Only the most recent of 3 results within the time period is included.    ANTIBODY IDENTIFICATION EACH PANEL (2018 5:03 AM)   Component Value Ref Range   QUANTITY 1     PANEL JENA ID  Panel Antibody ID       ANTIBODY IDENTIFICATION EACH PANEL (2018 5:03 AM)   Specimen Performing Laboratory     [facility] BLOOD BANK    [address] [place] [address]     Back to top of Results      RED BLOOD CELLS EA UNIT (2018 5:03 AM)  Only the most recent of 18 results within the time period is included.    RED BLOOD CELLS EA UNIT (2018 5:03 AM)   Component Value Ref Range   CROSSMATCH Compatible Compatible   PRODUCT BLOOD TYPE O Neg     PRODUCT ID NUMBER [id number]     PRODUCT STATUS  /Released     PRODUCT DESCRIPTION  RBC AS-1 LR     PRODUCT CODE [id number]       RED BLOOD CELLS EA UNIT (2018 5:03 AM)   Specimen Performing Laboratory     [facility] LABORATORY BLOOD BANK    [address] [place] [address]     Back to top of Results      RBC W TYPE AND SCREEN (2018 4:47 AM)  Only the most recent of 4 results within the time period is included.    RBC W TYPE AND SCREEN (2018 4:47 AM)   Component Value Ref Range   ABORH  O Rh Negative     ANTIBODY SCREEN Positive (A) Negative   SPECIMEN EXPIRATION DATE/TIME 18 23:59       RBC W TYPE AND SCREEN (2018 4:47 AM)   Specimen Performing Laboratory   Blood [facility] LABORATORY BLOOD BANK     [address] [place] [address]     Back to top of Results      ANTIBODY IDENTIFICATION LAB USE ONLY (01/24/2018 4:47 AM)  Only the most recent of 3 results within the time period is included.    ANTIBODY IDENTIFICATION LAB USE ONLY (01/24/2018 4:47 AM)   Component Value Ref Range   ANTIBODY IDENTIFICATION  Anti-[place] (A)       ANTIBODY IDENTIFICATION LAB USE ONLY (01/24/2018 4:47 AM)   Specimen Performing Laboratory   Blood [facility] LABORATORY BLOOD BANK    [address] [place] [address]     Back to top of Results      EXTRA TUBE GOLD/SST (01/20/2018 8:05 AM)  Only the most recent of 2 results within the time period is included.    EXTRA TUBE GOLD/SST (01/20/2018 8:05 AM)   Specimen Performing Laboratory   Blood [facility] LABORATORY    SENDOUT INTERNAL ZIP [id number]    [address] [place] [address]     Back to top of Results      XR PELVIS 1 VIEW PORTABLE (01/18/2018 10:14 AM)  Only the most recent of 3 results within the time period is included.    XR PELVIS 1 VIEW PORTABLE (01/18/2018 10:14 AM)   Narrative   XR PELVIS 1 VIEW PORTABLE    1/18/2018 10:14 AM        INDICATION: Follow-up right hip ORIF. History of right hip hemiarthroplasty.    COMPARISON: Hip radiograph, 01/17/2018        FINDINGS: There has been interval internal fixation of the right hip trochanteric fracture with good anatomic alignment of the fracture fragments. Evidence of previous right hip hemiarthroplasty. Hardware appears well seated and intact.       XR PELVIS 1 VIEW PORTABLE (01/18/2018 10:14 AM)   Procedure Note   Interface, Powerscribe - 01/18/2018 10:25 AM CST    XR PELVIS 1 VIEW PORTABLE  1/18/2018 10:14 AM    INDICATION: Follow-up right hip ORIF. History of right hip hemiarthroplasty.  COMPARISON: Hip radiograph, 01/17/2018    FINDINGS: There has been interval internal fixation of the right hip trochanteric fracture with good anatomic alignment of the fracture fragments.  Evidence of previous right hip hemiarthroplasty. Hardware appears well seated and intact.     Back to top of Results      AEROBIC BACTERIAL CULTURE, STAIN (01/18/2018 8:24 AM)  Only the most recent of 2 results within the time period is included.    AEROBIC BACTERIAL CULTURE, STAIN (01/18/2018 8:24 AM)   Component Value Ref Range   CULTURE No Growth.     GRAM STAIN  1+ PMNs     GRAM STAIN  No organisms seen     GRAM STAIN  Gram stain performed by [place], [place]       AEROBIC BACTERIAL CULTURE, STAIN (01/18/2018 8:24 AM)   Specimen Performing Laboratory   Swab - Right Hip [facility] LABORATORY-CENTRAL LABORATORY    [address]    [place] [address]     Back to top of Results      ANAEROBIC CULTURE (01/18/2018 8:24 AM)  Only the most recent of 2 results within the time period is included.    ANAEROBIC CULTURE (01/18/2018 8:24 AM)   Component Value Ref Range   CULTURE No anaerobes isolated       ANAEROBIC CULTURE (01/18/2018 8:24 AM)   Specimen Performing Laboratory   Swab - Right Hip [facility] LABORATORY-CENTRAL LABORATORY    [address]    [place] [address]     Back to top of Results      ENDOTRACHEAL TUBE (01/18/2018 8:13 AM)  ENDOTRACHEAL TUBE (01/18/2018 8:13 AM)   Narrative   Jenniffer Mcfarland, CRNA     1/18/2018  8:13 AM    Procedure: ETT        Patient location during procedure: OR    ETT Properties    Mask Ventilation: oral airway and easy    Final Technique: direct laryngoscopy    Type: straight    Location: oral    Cuffed: yes    Tube Size: 7.0 mm    Stylet: yes    Laryngoscope Blade: Quick    Blade Size: 2    Cormack-Lehane Grade View: 1    Insertion Attempts: 1    Placement Verification: auscultation and end tidal CO2    Assessment: pharynx clear, atraumatic and dentition unchanged    Secured at: 23    Measured From: teeth    Tooth guard used and removed: yes    Difficulty: 0 (not difficult)    Electronically signed by JENNIFFER MCFARLAND                                                                                                                                                                    ENDOTRACHEAL TUBE (01/18/2018 8:13 AM)   Procedure Note   Jenniffer Mcfarland, CRNA - 01/18/2018 8:13 AM CST    Procedure: ETT    Patient location during procedure: OR  ETT Properties  Mask Ventilation: oral airway and easy  Final Technique: direct laryngoscopy  Type: straight  Location: oral  Cuffed: yes  Tube Size: 7.0 mm  Stylet: yes  Laryngoscope Blade: Quick  Blade Size: 2  Cormack-Lehane Grade View: 1  Insertion Attempts: 1  Placement Verification: auscultation and end tidal CO2  Assessment: pharynx clear, atraumatic and dentition unchanged  Secured at: 23  Measured From: teeth  Tooth guard used and removed: yes  Difficulty: 0 (not difficult)  Electronically signed by JENNIFFER MCFARLAND                    Back to top of Results      EXTRA TUBE LIGHT GREEN (01/18/2018 5:29 AM)  EXTRA TUBE LIGHT GREEN (01/18/2018 5:29 AM)   Specimen Performing Laboratory   Blood [facility] LABORATORY    SENDOUT INTERNAL ZIP [id number]    [address] [place] [address]     Back to top of Results      NUCLEATED RED BLOOD CELLS AS PERCENT OF BLOOD LEUKOCYTES (01/18/2018 5:29 AM)  Only the most recent of 4 results within the time period is included.    NUCLEATED RED BLOOD CELLS AS PERCENT OF BLOOD LEUKOCYTES (01/18/2018 5:29 AM)   Component Value Ref Range   NRBC  0.0 %   ABS NRBC 0.0 thou /cu mm     NUCLEATED RED BLOOD CELLS AS PERCENT OF BLOOD LEUKOCYTES (01/18/2018 5:29 AM)   Specimen Performing Laboratory   Blood [facility] LABORATORY    SENDOUT INTERNAL ZIP [id number]    [address] [place] [address]       NUCLEATED RED BLOOD CELLS AS PERCENT OF BLOOD LEUKOCYTES (01/18/2018 5:29 AM)   Narrative                Back to top of Results      CBC with Platelets no Differential (01/18/2018 5:29 AM)  Only the most recent of 2 results within the time  period is included.    CBC with Platelets no Differential (01/18/2018 5:29 AM)   Component Value Ref Range   WHITE BLOOD COUNT  6.1 4.5 - 11.0 thou/cu mm   RED BLOOD COUNT  2.77 (L) 4.00 - 5.20 mil/cu mm   HEMOGLOBIN  7.8 (L) 12.0 - 16.0 g/dL   HEMATOCRIT  24.1 (L) 33.0 - 51.0 %   MCV  87 80 - 100 fL   MCH  28.2 26.0 - 34.0 pg   MCHC  32.4 32.0 - 36.0 g/dL   RDW  15.4 11.5 - 15.5 %   PLATELET COUNT  240 140 - 440 thou/cu mm   MPV  10.0 6.5 - 11.0 fL     CBC with Platelets no Differential (01/18/2018 5:29 AM)   Specimen Performing Laboratory   Blood [facility] LABORATORY    SENDOUT INTERNAL ZIP [id number]    [address] [place] [address]         Assessment/Plan:    Right hip fracture status post right hip hemiarthroplasty subsequently patient represented with a periprosthetic fx: ORIF of right femur with revision hemiarthroplasty of the right hip by Dr. Magaña on 1/11/18. EBL 2000ml. On 1/17/2018, she developed a draining hematoma right hip and was noted to have interval displacement of greater trochanter on Xray. She received 2 units of pRBC 1/17 and underwent right hip irrigation and debridement with ORIF of the right greater trochanter. She has been discharged currently with TTWB.    Nondisplaced fracture of the radial head:   Course complicated by nonunion currently orthopedics has recommended a sling for her.    Pain management:  seen by the pain clinic and started on Dilaudid for pain management along with Tylenol which has been changed to scheduled, unable to receive dilaudid per low BPs, start tramadol 50mg QID, dc flexeril and dilaudid per non use. She also has an underlying history of chronic pain. Tramadol has been effective.    DVT prophylaxis: on aspirin 325    [id number] and recheck hemoglobins.  Patient underwent hemorrhagic check in the hospital she required several units of blood transfusion the hospital discharge hemoglobin remained low. Last Hgb 8.1. Start ferrous sulfate 325  mg BID, recheck CBC was 8.2. No bleeding noted.    Recent history of coronary artery disease: status post stent placement on 3/17, she was unable to tolerate Brilinta 90 mg twice daily due to bleeding complications, currently on aspirin 325 daily which was increased from her home regimen of 81 mg. She can restart Brilinta when okayed by orthopedics.    Postoperative hypotension: initially felt to be due to anemia secondary to hemorrhagic shock and blood loss she was given multiple units of blood products.  Subsequently she was given fluid resuscitation as her hemoglobin was stable. Last Hgb 8.1, see above.  Was taken off metoprolol it has been recommended her dosage be increased if her blood pressures rebound. SBP , not orthostatic, will monitor.    Anxiety disorder:  she is refusing to ambulate due to severe anxiety. Started lorazepam 0.5mg q6h PRN, used 1-2x daily.    HLD-Lipitor, lipid panel looks good.    History of movement disorder: Sinemet 3 times daily.  She is also on Mirapex.    Vit D deficiency: last 30.3, increased to 5000U daily with recheck in 6 wks    B12 deficiency: on supplement, no current level, will recheck 468.    Overactive bladder: Vesicare but also has an indwelling Molina catheter. This was primarily given due to her lack of mobility for comfort.  We will try to get her a voiding trial on 2/5.    Depression with insomnia:  takes trazodone at bedtime along with Effexor. Recently started on Vistaril, used limited doses with tylenol.    Profound debilitation sent from the nursing home to the TCU.        The care plan has been reviewed and all orders signed. Changes to care plan, if any, as noted. Otherwise, continue care plan of care.  The total time spent with this patient was greater than 40 minutes, with greater than 50% spent in counseling and coordination of care that included multiple issues per treatment for low BPs.      Electronically signed by: Dexter Rico NP    .

## 2021-06-15 NOTE — PROGRESS NOTES
[facility]      Facility:    [facility] [[id number]]  Code Status: UNKNOWN      Chief Complaint/Reason for Visit:  Chief Complaint   Patient presents with     H & P     Right hip fracture, status post ORIF on 12/22/2017, UTI growing out Morganella urinary retention, chronic pain syndrome, restless leg syndrome, acute blood loss anemia, constipation.       HPI:   Alison is a 67 y.o. female who added to the hospital on December 25, 2017.  She does have chronic pain and dementia and traumatic brain injury and currently lives in a group home.  She suffered a mechanical fall outside on 12/21/2017 she has chronic ataxia and balance issues she lives in a group home.  She slipped and fell on her right hip.  She was then brought to the hospital and orthopedic surgery was consulted on 12/22/2017 and had a right hip hemiarthroplasty.  Postoperative issues included pain control urinary retention which required Molina catheter, urinary tract infection secondary to Morganella and she did require Molina catheter placement following surgery for urinary retention.  The Molina catheter was removed.  She then had a UA on 1225 which showed pyuria and was treated for a urinary tract infection with ciprofloxacin.  Hemoglobin did stabilize at 7.7 and it was felt that she could not return back to her group home and she was transferred here to the TCU in stable condition.  One part of the discharge summary says she was ORIF the other one says it was hemiarthroplasty.    Patient has no complaints except for poorly controlled pain the nurse does indicate she thinks the pain is in pretty good control patient does indicate to me that she is having uncontrolled pain.  She does have chronic pain as a been treated in the past for chronic pain and may have a tolerance to the medications given she is on hydromorphone every 4 hours as needed she is asking it every time.  When asked if it worksheet says it does  not seem to may be a little bit.  She describes her pain as 10 out of 10.  She is moving her bowels okay and urinating without difficulty and denies any other issues at this time.  Her incision is covered with a hydrocolloid dressing.    Past Medical History:  Past Medical History:   Diagnosis Date     Acute blood loss anemia      Alzheimer disease      CAD (coronary artery disease)      Chronic pain syndrome      Dementia      Depression      Hip fracture, right 2017     HTN (hypertension)      Kidney stone      PMB (postmenopausal bleeding)      RLS (restless legs syndrome)      Stented coronary artery      Traumatic brain injury      Unsteady gait     uses walker     UTI (lower urinary tract infection)     on antibiotic course           Surgical History:  Past Surgical History:   Procedure Laterality Date      SECTION       CHOLECYSTECTOMY  May 2014     COMBINED HYSTEROSCOPY DIAGNOSTIC / D&C N/A 2014    Procedure: DILATION AND CURETTAGE WITH HYSTEROSCOPY;  Surgeon: Karime Cifuentes MD;  Location: [facility] [place];  Service:      HEMIARTHROPLASTY HIP Right 2017     LUNG REMOVAL, PARTIAL       TONSILLECTOMY AND ADENOIDECTOMY       TOTAL KNEE ARTHROPLASTY Right 2016     TOTAL KNEE ARTHROPLASTY Left 2015       Family History:   History reviewed. No pertinent family history.    Social History:    Social History     Social History     Marital status: Single     Spouse name: N/A     Number of children: N/A     Years of education: N/A     Social History Main Topics     Smoking status: Never Smoker     Smokeless tobacco: Never Used     Alcohol use No     Drug use: No     Sexual activity: Not Asked     Other Topics Concern     None     Social History Narrative          Review of Systems   Constitutional:        Pain is the main issue at this time she denies any fevers chills nausea vomiting diarrhea change in vision hearing taste or smell weakness one side of the chest pain shortness of  breath.  She is moving her bowels okay and urinating without difficulty without any urgency frequency burning with urination and the remainder of the review of systems is negative.       Vitals:    12/29/17 1312   BP: 102/65   Pulse: 88   Resp: 16   Temp: 97.8  F (36.6  C)   SpO2: 94%       Physical Exam   Constitutional: No distress.   Patient is lying in bed comfortably and does not appear to be in any acute distress.   HENT:   Head: Normocephalic and atraumatic.   Nose: Nose normal.   Mouth/Throat: No oropharyngeal exudate.   Eyes: Right eye exhibits no discharge. Left eye exhibits no discharge. No scleral icterus.   Neck: Neck supple. No thyromegaly present.   Cardiovascular: Normal rate and regular rhythm.    Pulmonary/Chest: Effort normal and breath sounds normal. No respiratory distress. She has no wheezes.   Abdominal: Soft. Bowel sounds are normal. She exhibits distension. There is no tenderness. There is no rebound.   Musculoskeletal:   I did not do range of motion secondary to the pain she was in.  The incision was covered with hydrocolloid dressing and she did have trace edema bilateral.  She could move her lower both lower extremities pretty well without any issues without moving the hip.   Lymphadenopathy:     She has no cervical adenopathy.   Neurological: She is alert. No cranial nerve deficit. She exhibits normal muscle tone.   Skin: Skin is warm and dry. She is not diaphoretic.   Psychiatric: She has a normal mood and affect. Her behavior is normal.       Medication List:  Current Outpatient Prescriptions   Medication Sig     acetaminophen (TYLENOL) 500 MG tablet Take 1,000 mg by mouth 3 (three) times a day.     aspirin 81 MG tablet Take 81 mg by mouth daily.     atorvastatin (LIPITOR) 40 MG tablet Take 40 mg by mouth at bedtime.     BRILINTA 90 mg Tab Take 90 mg by mouth 2 (two) times a day.      carbidopa-levodopa (SINEMET)  mg per tablet Take 2 tablets by mouth 3 (three) times a day.      cefuroxime (CEFTIN) 250 MG tablet Take 250 mg by mouth 2 (two) times a day.     cholecalciferol, vitamin D3, 1,000 unit tablet Take 4,000 Units by mouth daily.     cranberry 500 mg cap Take 500 mg by mouth 2 (two) times a day.     cyanocobalamin (VITAMIN B-12) 100 MCG tablet Take 100 mcg by mouth daily.     HYDROmorphone (DILAUDID) 2 MG tablet Take 2 mg by mouth every 4 (four) hours as needed for pain.     ketoconazole (NIZORAL) 2 % cream Apply 1 application topically bedtime. Apply to stomach skin fold(s)     metoprolol succinate (TOPROL-XL) 25 MG Take 12.5 mg by mouth daily.      multivitamin (MULTIVITAMIN) per tablet Take 1 tablet by mouth daily.     nitroglycerin (NITROSTAT) 0.4 MG SL tablet Place 0.4 mg under the tongue every 5 (five) minutes as needed.      nystatin (MYCOSTATIN) powder Apply 1 application topically daily.     polyethylene glycol (MIRALAX) 17 gram packet Take 17 g by mouth daily.     pramipexole (MIRAPEX) 0.5 MG tablet Take 0.5 mg by mouth bedtime.     psyllium (METAMUCIL) powder Take by mouth every morning. 1 PKT     senna-docusate (PERICOLACE) 8.6-50 mg tablet Take 2 tablets by mouth 2 (two) times a day.     solifenacin (VESICARE) 10 MG tablet Take 10 mg by mouth daily.     traZODone (DESYREL) 150 MG tablet Take 1 tablet (150 mg total) by mouth bedtime.     venlafaxine (EFFEXOR-XR) 75 MG 24 hr capsule Take 5 capsules (375 mg total) by mouth daily.       Labs: Hospital labs are as follows; sodium is 141, potassium is 3.6, calcium is 7.9, creatinine is 0.6, GFR is greater than 60, hemoglobin was 7.7.      Assessment:    ICD-10-CM    1. Closed right hip fracture S72.001A    2. S/P ORIF (open reduction internal fixation) fracture Z96.7     Z87.81    3. Pain management R52    4. Postoperative anemia D64.9    5. Urinary tract infection N39.0    6. Depression F32.9        Plan: Pain at this time after discussion with nursing staff and looking through the records it is likely she is under poor pain  control.  I would recommend at this time we titrate up her Dilaudid over the weekend to 5 mg every 4 hours as needed into pain assessments around the clock.  We may be okay with a slight increase in getting above the pain curve.  Patient did agree to this at this time and will continue nonpharmacologic modalities.  I will continue to monitor above medical problems and staff will monitor bowel movements and notify me if any lack of bowel movements occur in 48 hours.  No other changes to care plan at this time.        Electronically signed by: Domenic Winn DO

## 2021-06-15 NOTE — PROGRESS NOTES
[facility]      Code Status:  FULL CODE  Visit Type: H & P     Facility:  [facility] [[id number]]           History of Present Illness: Alison Bashir is a 67 y.o. female who is currently admitted as a transfer from [facility] to the TCU.  This is a elderly 67-year-old resident of a group home with underlying history of a traumatic brain injury along with hypertension and coronary artery disease who presented to the emergency room after she fell on 12/17.  She was diagnosed with a right hip fracture and underwent right hip hemiarthroplasty she was also noted to have a UTI and subsequently discharged to TCU.  She readmitted to the hospital with ongoing pain in her right leg and imaging revealed a periprosthetic fractures orthopedics was consulted.  They recommended repeat surgical intervention.  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  She underwent open-reduction and internal fixation of right femur with revision hemiarthroplasty of the right hip by Dr. Magaña on 1/11/18. EBL 2000ml.      She had hypotension intraop and in PACU requiring multiple blood products. She was admitted to ICU postop given hypotension and blood loss. Hemorraghic shock resolved . On 1/17/2018, she developed a draining hematoma right hip and was noted to have interval displacement of greater trochanter on Xray. She received 2 units of pRBC 1/17 and underwent right hip irrigation and debridement with ORIF of the right greater trochante hemoglobin , had been stable 7.5 - 8.0. Brilinta was on hold, resumed 1/23/2018 with okay from Orthopedics and decreased ASA to 81mg daily from 325mg daily.      On 1/24/2018, she had hypotension overnight and received 1U Packed red blood cells. Brilinta stopped on 1/25/18 per Dr. Magaña for the foreseeable future, at least until her follow up appointment with Orthopedics in 10-14 days. Hypotension  again 1/25/2018 overnight, received 1L fluid bolus. Hemoglobin stable. Hypotension was felt to be related to narcotics. Raised parameters to hold Dilaudid for SBP <110.      She completed a 7 day course of Ceftriaxone for Morganella morganii UTI. Molina in place due to lack of mobility, and she was discharged with this in place. This should be removed when mobility has improved.      Pain clinic following for pain med management. Stopped as needed Dilaudid on 1/24/2018 due to hypotension, decreased frequency of scheduled Dilaudid to every 6 hours on 1/25/2018, and added parameters to scheduled Dilaudid to hold for sedation or SBP < 110. She does have significant anxiety when transferring, fear of falling. She would benefit from seeing Psychiatry as an outpatient, refused this admission. Her pain medications may be adjusted as long as blood pressure tolerates.   , her Brilinta is on hold until follow up with Orthopedics, and she was discharged on ASA 325mg daily, which can be decreased to 81mg daily when Brilinta restarted. Hemoglobin should be rechecked in 2 days. Her Metoprolol was stopped and should be restarted when blood pressure tolerates.      She was also found to have non-displaced radial head fracture. Ortho recommended sling.   She had significant blood loss anemia secondary to blood loss and intraoperative hypotension.  Pain management was consulted for pain management optimization and she is currently discharged on oral Dilaudid.  She is still not moving in bed and cannot lift her right lower extremity at all and remains extremely mobile she has a open area in her right buttock from her suspected fall.  She is currently bedbound with difficulty in even lifting her right lower extremity she reports significant anxiety and pain  In addition she is complaining of nausea nonspecific in nature      Past Medical History:   Diagnosis Date     Acute blood loss anemia      Alzheimer disease      CAD (coronary  artery disease)      Chronic pain syndrome      Dementia      Depression      Hip fracture, right 2017     HTN (hypertension)      Kidney stone      PMB (postmenopausal bleeding)      RLS (restless legs syndrome)      Stented coronary artery      Traumatic brain injury      Unsteady gait     uses walker     UTI (lower urinary tract infection)     on antibiotic course     Past Surgical History:   Procedure Laterality Date      SECTION       CHOLECYSTECTOMY  May 2014     COMBINED HYSTEROSCOPY DIAGNOSTIC / D&C N/A 2014    Procedure: DILATION AND CURETTAGE WITH HYSTEROSCOPY;  Surgeon: Karime Cifuentes MD;  [place] [facility] [place]  Service:      HEMIARTHROPLASTY HIP Right 2017     LUNG REMOVAL, PARTIAL       TONSILLECTOMY AND ADENOIDECTOMY       TOTAL KNEE ARTHROPLASTY Right 2016     TOTAL KNEE ARTHROPLASTY Left 2015     No family history on file.  Social History     Social History     Marital status: Single     Spouse name: N/A     Number of children: N/A     Years of education: N/A     Occupational History     Not on file.     Social History Main Topics     Smoking status: Never Smoker     Smokeless tobacco: Never Used     Alcohol use No     Drug use: No     Sexual activity: Not on file     Other Topics Concern     Not on file     Social History Narrative     Current Outpatient Prescriptions   Medication Sig Dispense Refill     acetaminophen (TYLENOL) 500 MG tablet Take 1,000 mg by mouth 3 (three) times a day.       aspirin 81 MG tablet Take 81 mg by mouth daily.       atorvastatin (LIPITOR) 40 MG tablet Take 40 mg by mouth at bedtime.       BRILINTA 90 mg Tab Take 90 mg by mouth 2 (two) times a day.        carbidopa-levodopa (SINEMET)  mg per tablet Take 2 tablets by mouth 3 (three) times a day.       cholecalciferol, vitamin D3, 1,000 unit tablet Take 4,000 Units by mouth daily.       cranberry 500 mg cap Take 500 mg by mouth 2 (two) times a day.       cyanocobalamin (VITAMIN  B-12) 100 MCG tablet Take 100 mcg by mouth daily.       ketoconazole (NIZORAL) 2 % cream Apply 1 application topically bedtime. Apply to stomach skin fold(s)       metoprolol succinate (TOPROL-XL) 25 MG Take 12.5 mg by mouth daily.        multivitamin (MULTIVITAMIN) per tablet Take 1 tablet by mouth daily.       nitroglycerin (NITROSTAT) 0.4 MG SL tablet Place 0.4 mg under the tongue every 5 (five) minutes as needed.        nystatin (MYCOSTATIN) powder Apply 1 application topically daily.       oxyCODONE (ROXICODONE) 10 mg immediate release tablet Take 5 mg by mouth every 4 (four) hours as needed for pain.       polyethylene glycol (MIRALAX) 17 gram packet Take 17 g by mouth daily.       pramipexole (MIRAPEX) 0.5 MG tablet Take 0.5 mg by mouth bedtime.       psyllium (METAMUCIL) powder Take by mouth every morning. 1 PKT       senna-docusate (PERICOLACE) 8.6-50 mg tablet Take 2 tablets by mouth 2 (two) times a day.       solifenacin (VESICARE) 10 MG tablet Take 10 mg by mouth daily.       traZODone (DESYREL) 150 MG tablet Take 1 tablet (150 mg total) by mouth bedtime. 30 tablet 0     venlafaxine (EFFEXOR-XR) 75 MG 24 hr capsule Take 5 capsules (375 mg total) by mouth daily. 150 capsule 3     No current facility-administered medications for this visit.      Allergies   Allergen Reactions     Fd And C No.5 (Tartrazine)      GI UPSET     Ibuprofen Nausea And Vomiting     Morphine Rash     swelling         Review of Systems:    Constitutional: Negative.  Negative for fever, chills, HAS activity change, appetite change and fatigue.   HENT: Negative for congestion and facial swelling.    Eyes: Negative for photophobia, redness and visual disturbance.   Respiratory: Negative for cough and chest tightness.    Cardiovascular: Negative for chest pain, palpitations and leg swelling.   Gastrointestinal: Negative for , diarrhea, constipation, blood in stool and abdominal distention.   Complaining of some nausea  Genitourinary:  Negative.    Musculoskeletal: Negative.    Has severe right hip pain  Skin: Negative.    Neurological: Negative for dizziness, tremors, syncope, weakness, light-headedness and headaches.   Hematological: Does not bruise/bleed easily.   Psychiatric/Behavioral: Negative.  Very anxious she is reporting severe pain in her leg when she ambulates and refusing to get out of bed    Vitals:    01/29/18 1209   BP: 101/65   Pulse: 76   Temp: 98  F (36.7  C)       Physical Exam:    GENERAL: no acute distress. Cooperative in conversation.   HEENT: pupils are equal, round and reactive. Oral mucosa is moist and intact.  RESP:Chest symmetric. Regular respiratory rate. No stridor.  CVS: S1S2  ABD: Nondistended, soft.  EXTREMITIES: No lower extremity edema.  Right hip incision is intact.  Patient cannot move her right lower extremity at all  Open area on her right buttock  NEURO: non focal. Alert and oriented x3.   PSYCH: within normal limits. No depression or anxiety.  SKIN: warm dry intact       Labs:    .     HEMOGLOBIN (01/26/2018 8:07 AM)  Only the most recent of 25 results within the time period is included.    HEMOGLOBIN (01/26/2018 8:07 AM)   Component Value Ref Range   HEMOGLOBIN  8.3 (L) 12.0 - 16.0 g/dL   MCV  88 80 - 100 fL     HEMOGLOBIN (01/26/2018 8:07 AM)   Specimen Performing Laboratory   [facility]    SENDOUT INTERNAL ZIP [id number]    [address] [place] [address]       HEMOGLOBIN (01/26/2018 8:07 AM)   Narrative                Back to top of Results      EXTRA TUBE LAVENDER (01/25/2018 10:14 AM)  Only the most recent of 2 results within the time period is included.    EXTRA TUBE LAVENDER (01/25/2018 10:14 AM)   Specimen Performing Laboratory   [facility]    SENDOUT INTERNAL ZIP [id number]    [address] [place] [address]     Back to top of Results      TRANSFUSE RBC (NURSE COMMUNICATION ORDER) (01/24/2018 11:10 AM)  Only the most recent of 15  results within the time period is included.    TRANSFUSE RBC (NURSE COMMUNICATION ORDER) (2018 11:10 AM)   Specimen Performing Laboratory   Blood      Back to top of Results      ANTIBODY IDENTIFICATION EACH PANEL (2018 5:03 AM)  Only the most recent of 3 results within the time period is included.    ANTIBODY IDENTIFICATION EACH PANEL (2018 5:03 AM)   Component Value Ref Range   QUANTITY 1     PANEL JENA ID  Panel Antibody ID       ANTIBODY IDENTIFICATION EACH PANEL (2018 5:03 AM)   Specimen Performing Laboratory     [facility] BLOOD BANK    [address] [place] [address]     Back to top of Results      RED BLOOD CELLS EA UNIT (2018 5:03 AM)  Only the most recent of 18 results within the time period is included.    RED BLOOD CELLS EA UNIT (2018 5:03 AM)   Component Value Ref Range   CROSSMATCH Compatible Compatible   PRODUCT BLOOD TYPE O Neg     PRODUCT ID NUMBER [id number]     PRODUCT STATUS  /Released     PRODUCT DESCRIPTION  RBC AS-1 LR     PRODUCT CODE [id number]       RED BLOOD CELLS EA UNIT (2018 5:03 AM)   Specimen Performing Laboratory     [facility] LABORATORY BLOOD BANK    [address] [place] [address]     Back to top of Results      RBC W TYPE AND SCREEN (2018 4:47 AM)  Only the most recent of 4 results within the time period is included.    RBC W TYPE AND SCREEN (2018 4:47 AM)   Component Value Ref Range   ABORH  O Rh Negative     ANTIBODY SCREEN Positive (A) Negative   SPECIMEN EXPIRATION DATE/TIME 18 23:59       RBC W TYPE AND SCREEN (2018 4:47 AM)   Specimen Performing Laboratory   [facility] BLOOD BANK    [address] [place] [address]     Back to top of Results      ANTIBODY IDENTIFICATION LAB USE ONLY (2018 4:47 AM)  Only the most recent of 3 results within the time period is included.    ANTIBODY IDENTIFICATION LAB USE ONLY  (01/24/2018 4:47 AM)   Component Value Ref Range   ANTIBODY IDENTIFICATION  Anti-Maite (A)       ANTIBODY IDENTIFICATION LAB USE ONLY (01/24/2018 4:47 AM)   Specimen Performing Laboratory   [facility] BLOOD BANK    [address] [place] [address]     Back to top of Results      EXTRA TUBE GOLD/SST (01/20/2018 8:05 AM)  Only the most recent of 2 results within the time period is included.    EXTRA TUBE GOLD/SST (01/20/2018 8:05 AM)   Specimen Performing Laboratory   [facility]    SENDOUT INTERNAL ZIP [id number]    [address] [place] [address]     Back to top of Results      XR PELVIS 1 VIEW PORTABLE (01/18/2018 10:14 AM)  Only the most recent of 3 results within the time period is included.    XR PELVIS 1 VIEW PORTABLE (01/18/2018 10:14 AM)   Narrative   XR PELVIS 1 VIEW PORTABLE    1/18/2018 10:14 AM        INDICATION: Follow-up right hip ORIF. History of right hip hemiarthroplasty.    COMPARISON: Hip radiograph, 01/17/2018        FINDINGS: There has been interval internal fixation of the right hip trochanteric fracture with good anatomic alignment of the fracture fragments. Evidence of previous right hip hemiarthroplasty. Hardware appears well seated and intact.       XR PELVIS 1 VIEW PORTABLE (01/18/2018 10:14 AM)   Procedure Note   Interface, Powerscribe - 01/18/2018 10:25 AM CST    XR PELVIS 1 VIEW PORTABLE  1/18/2018 10:14 AM    INDICATION: Follow-up right hip ORIF. History of right hip hemiarthroplasty.  COMPARISON: Hip radiograph, 01/17/2018    FINDINGS: There has been interval internal fixation of the right hip trochanteric fracture with good anatomic alignment of the fracture fragments. Evidence of previous right hip hemiarthroplasty. Hardware appears well seated and intact.     Back to top of Results      AEROBIC BACTERIAL CULTURE, STAIN (01/18/2018 8:24 AM)  Only the most recent of 2 results within the time period is included.     AEROBIC BACTERIAL CULTURE, STAIN (01/18/2018 8:24 AM)   Component Value Ref Range   CULTURE No Growth.     GRAM STAIN  1+ PMNs     GRAM STAIN  No organisms seen     GRAM STAIN  Gram stain performed by [place], [place]       AEROBIC BACTERIAL CULTURE, STAIN (01/18/2018 8:24 AM)   Specimen Performing Laboratory   Swab - Right Hip [facility]    [address]    [place] [address]     Back to top of Results      ANAEROBIC CULTURE (01/18/2018 8:24 AM)  Only the most recent of 2 results within the time period is included.    ANAEROBIC CULTURE (01/18/2018 8:24 AM)   Component Value Ref Range   CULTURE No anaerobes isolated       ANAEROBIC CULTURE (01/18/2018 8:24 AM)   Specimen Performing Laboratory   Swab - Right Hip [facility] LAB[facility]    [address]    [place] [address]     Back to top of Results      ENDOTRACHEAL TUBE (01/18/2018 8:13 AM)  ENDOTRACHEAL TUBE (01/18/2018 8:13 AM)   Narrative   Jenniffer Mcfarland CRNA     1/18/2018  8:13 AM    Procedure: ETT        Patient location during procedure: OR    ETT Properties    Mask Ventilation: oral airway and easy    Final Technique: direct laryngoscopy    Type: straight    Location: oral    Cuffed: yes    Tube Size: 7.0 mm    Stylet: yes    Laryngoscope Blade: Quick    Blade Size: 2    Cormack-Lehane Grade View: 1    Insertion Attempts: 1    Placement Verification: auscultation and end tidal CO2    Assessment: pharynx clear, atraumatic and dentition unchanged    Secured at: 23    Measured From: teeth    Tooth guard used and removed: yes    Difficulty: 0 (not difficult)    Electronically signed by JENNIFFER MCFRALAND                                                                                                                                                                   ENDOTRACHEAL TUBE (01/18/2018 8:13 AM)   Procedure Note   Jenniffer Mcfarland CRNA - 01/18/2018 8:13  AM CST    Procedure: ETT    Patient location during procedure: OR  ETT Properties  Mask Ventilation: oral airway and easy  Final Technique: direct laryngoscopy  Type: straight  Location: oral  Cuffed: yes  Tube Size: 7.0 mm  Stylet: yes  Laryngoscope Blade: Quick  Blade Size: 2  Cormack-Lehane Grade View: 1  Insertion Attempts: 1  Placement Verification: auscultation and end tidal CO2  Assessment: pharynx clear, atraumatic and dentition unchanged  Secured at: 23  Measured From: teeth  Tooth guard used and removed: yes  Difficulty: 0 (not difficult)  Electronically signed by MIKE MCFARLAND                    Back to top of Results      EXTRA TUBE LIGHT GREEN (01/18/2018 5:29 AM)  EXTRA TUBE LIGHT GREEN (01/18/2018 5:29 AM)   Specimen Performing Laboratory   [facility]    SENDOUT INTERNAL ZIP [id number]    [address] [place] [address]     Back to top of Results      NUCLEATED RED BLOOD CELLS AS PERCENT OF BLOOD LEUKOCYTES (01/18/2018 5:29 AM)  Only the most recent of 4 results within the time period is included.    NUCLEATED RED BLOOD CELLS AS PERCENT OF BLOOD LEUKOCYTES (01/18/2018 5:29 AM)   Component Value Ref Range   NRBC  0.0 %   ABS NRBC 0.0 thou /cu mm     NUCLEATED RED BLOOD CELLS AS PERCENT OF BLOOD LEUKOCYTES (01/18/2018 5:29 AM)   Specimen Performing Laboratory   [facility]    SENDOUT INTERNAL ZIP [id number]    [address] [place] [address]       NUCLEATED RED BLOOD CELLS AS PERCENT OF BLOOD LEUKOCYTES (01/18/2018 5:29 AM)   Narrative                Back to top of Results      CBC with Platelets no Differential (01/18/2018 5:29 AM)  Only the most recent of 2 results within the time period is included.    CBC with Platelets no Differential (01/18/2018 5:29 AM)   Component Value Ref Range   WHITE BLOOD COUNT  6.1 4.5 - 11.0 thou/cu mm   RED BLOOD COUNT  2.77 (L) 4.00 - 5.20 mil/cu mm   HEMOGLOBIN  7.8 (L) 12.0 - 16.0 g/dL   HEMATOCRIT   24.1 (L) 33.0 - 51.0 %   MCV  87 80 - 100 fL   MCH  28.2 26.0 - 34.0 pg   MCHC  32.4 32.0 - 36.0 g/dL   RDW  15.4 11.5 - 15.5 %   PLATELET COUNT  240 140 - 440 thou/cu mm   MPV  10.0 6.5 - 11.0 fL     CBC with Platelets no Differential (01/18/2018 5:29 AM)   Specimen Performing Laboratory   Blood [facility] LABORATORY    SENDOUT INTERNAL ZIP [id number]    [address] [place] [address]         Assessment/Plan:    Right hip fracture status post right hip hemiarthroplasty subsequently patient represented with a periprosthetic fracture  She underwent open-reduction and internal fixation of right femur with revision hemiarthroplasty of the right hip by Dr. Magaña on 1/11/18. EBL 2000ml.   . On 1/17/2018, she developed a draining hematoma right hip and was noted to have interval displacement of greater trochanter on Xray. She received 2 units of pRBC 1/17 and underwent right hip irrigation and debridement with ORIF of the right greater trochante  She has been discharged currently with TTWB  Postoperative course was complicated by hemorrhagic shock requiring several units of blood products  Nondisplaced fracture of the radial head .  Course complicated by nonunion currently orthopedics has recommended a sling for her  Pain management seen by the pain clinic and started on Dilaudid for pain management along with Tylenol which has been scheduled for her  She also has an underlying history of chronic pain.  She was on scheduled Dilaudid in the hospital now is on as needed.  She also has Flexeril available 3 times a day as needed  DVT prophylaxis on aspirin 325  [id number] and recheck hemoglobins.  Patient underwent hemorrhagic check in the hospital she required several units of blood transfusion the hospital discharge hemoglobin remained low    Recent history of coronary artery disease status post stent placement on 3/17 she was unable to tolerate Brilinta 90 mg twice daily due to bleeding  complications  .  She is currently on aspirin 325 daily which was increased from her home regimen of 81 mg and she can restart Brilinta when okayed by orthopedics  Postoperative hypotension -initially felt to be due to anemia secondary to hemorrhagic shock and blood loss she was given multiple units of blood products.  Subsequently she was given fluid resuscitation as her hemoglobin was stable  taken off metoprolol it has been recommended her dosage be increased if her blood pressures rebound.  Blood pressures remain low.    Anxiety disorder she is refusing to ambulate due to severe anxiety as per my assessment  HLD-she is on Lipitor  History of movement disorder she takes Sinemet 3 times daily.  She is also on Mirapex  B12 deficiency on supplement  Overactive bladder currently is on Vesicare but also has an indwelling Molina catheter.  This was primarily given due to her lack of mobility for comfort.  We will try to get her a voiding trial    Depression with insomnia takes trazodone at bedtime along with Effexor  Remains anxious will see how Vistaril helps that if not effective he may need to see psychiatric    Profound debilitation sent from the nursing home to the TCU.  She continues to remain off metoprolol due to postoperative hypertension.  We will continue to monitor blood pressures and start medication only blood pressures rebound continue with her current PT OT and rehab  Due to significant anxiety noted on exam today and also going to give her some Vistaril for additional pain relief and see if that helps her.    Total time spent was 45 minutes, more than half of it was in face-to-face counseling regarding disease state, treatment, side effects, documentation, review of clinical data and coordination of care  Electronically signed by: NERY Sosa    .

## 2021-06-15 NOTE — PROGRESS NOTES
[facility]      Code Status:  FULL CODE  Visit Type: Review Of Multiple Medical Conditions     Facility:  [facility] [[id number]]           History of Present Illness: Alison Bashir is a 67 y.o. female who is currently admitted as a transfer from [facility] to the TCU.  This is a elderly 67-year-old resident of a group home with underlying history of a traumatic brain injury along with hypertension and coronary artery disease who presented to the emergency room after she fell on 12/17.  She was diagnosed with a right hip fracture and underwent right hip hemiarthroplasty she was also noted to have a UTI and subsequently discharged to TCU.  She readmitted to the hospital with ongoing pain in her right leg and imaging revealed a periprosthetic fractures orthopedics was consulted.  They recommended repeat surgical intervention.  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  She underwent open-reduction and internal fixation of right femur with revision hemiarthroplasty of the right hip by Dr. Magaña on 1/11/18. EBL 2000ml.      She had hypotension intraop and in PACU requiring multiple blood products. She was admitted to ICU postop given hypotension and blood loss. Hemorraghic shock resolved . On 1/17/2018, she developed a draining hematoma right hip and was noted to have interval displacement of greater trochanter on Xray. She received 2 units of pRBC 1/17 and underwent right hip irrigation and debridement with ORIF of the right greater trochante hemoglobin , had been stable 7.5 - 8.0. Brilinta was on hold, resumed 1/23/2018 with okay from Orthopedics and decreased ASA to 81mg daily from 325mg daily.      On 1/24/2018, she had hypotension overnight and received 1U Packed red blood cells. Brilinta stopped on 1/25/18 per Dr. Magaña for the foreseeable future, at least until her follow up appointment with  Orthopedics in 10-14 days. Hypotension again 1/25/2018 overnight, received 1L fluid bolus. Hemoglobin stable. Hypotension was felt to be related to narcotics. Raised parameters to hold Dilaudid for SBP <110.      She completed a 7 day course of Ceftriaxone for Morganella morganii UTI. Molina in place due to lack of mobility, and she was discharged with this in place. This should be removed when mobility has improved.      Pain clinic following for pain med management. Stopped as needed Dilaudid on 1/24/2018 due to hypotension, decreased frequency of scheduled Dilaudid to every 6 hours on 1/25/2018, and added parameters to scheduled Dilaudid to hold for sedation or SBP < 110. She does have significant anxiety when transferring, fear of falling. She would benefit from seeing Psychiatry as an outpatient, refused this admission. Her pain medications may be adjusted as long as blood pressure tolerates.   , her Brilinta is on hold until follow up with Orthopedics, and she was discharged on ASA 325mg daily, which can be decreased to 81mg daily when Brilinta restarted. Hemoglobin should be rechecked in 2 days. Her Metoprolol was stopped and should be restarted when blood pressure tolerates.      She was also found to have non-displaced radial head fracture. Ortho recommended sling.   She had significant blood loss anemia secondary to blood loss and intraoperative hypotension.  Pain management was consulted for pain management optimization and she is currently discharged on oral Dilaudid.  She is still not moving in bed and cannot lift her right lower extremity at all and remains extremely mobile she has a open area in her right buttock from her suspected fall.  She is currently bedbound with difficulty in even lifting her right lower extremity she reports significant anxiety and pain  Care plan reviewed both with nursing as well as therapy.  Her transfers remain erratic.  They feel she can do better but she chooses not to do  so and is limited by anxiety.  Recheck hemoglobin has been stable.  She has had close monitoring done due to significant drop in hemoglobin with some positive guaiacs.      Past Medical History:   Diagnosis Date     Acute blood loss anemia      Alzheimer disease      CAD (coronary artery disease)      Chronic pain syndrome      Dementia      Depression      Hip fracture, right 2017     HTN (hypertension)      Kidney stone      PMB (postmenopausal bleeding)      RLS (restless legs syndrome)      Stented coronary artery      Traumatic brain injury      Unsteady gait     uses walker     UTI (lower urinary tract infection)     on antibiotic course     Past Surgical History:   Procedure Laterality Date      SECTION       CHOLECYSTECTOMY  May 2014     COMBINED HYSTEROSCOPY DIAGNOSTIC / D&C N/A 2014    Procedure: DILATION AND CURETTAGE WITH HYSTEROSCOPY;  Surgeon: Karime Cifuentes MD;  [place] [facility] [place]  Service:      CORONARY STENT PLACEMENT       HEMIARTHROPLASTY HIP Right 2017     INCISION AND DRAINAGE HIP Right 2018    ORIF REVISION      LUNG REMOVAL, PARTIAL       REVISION TOTAL HIP ARTHROPLASTY Right 01/10/2018     TONSILLECTOMY AND ADENOIDECTOMY       TOTAL KNEE ARTHROPLASTY Right 2016     TOTAL KNEE ARTHROPLASTY Left 2015     No family history on file.  Social History     Social History     Marital status: Single     Spouse name: N/A     Number of children: N/A     Years of education: N/A     Occupational History     Not on file.     Social History Main Topics     Smoking status: Never Smoker     Smokeless tobacco: Never Used     Alcohol use No     Drug use: No     Sexual activity: Not on file     Other Topics Concern     Not on file     Social History Narrative     Current Outpatient Prescriptions   Medication Sig Dispense Refill     acetaminophen (TYLENOL) 325 MG tablet Take 650 mg by mouth 4 (four) times a day.       aspirin 325 MG EC tablet Take 325 mg by mouth  daily.       atorvastatin (LIPITOR) 40 MG tablet Take 40 mg by mouth at bedtime.       carbidopa-levodopa (SINEMET)  mg per tablet Take 2 tablets by mouth 3 (three) times a day.       cholecalciferol, vitamin D3, 5,000 unit Tab Take 5,000 Units by mouth Daily at 8:00 am..       cranberry 500 mg cap Take 500 mg by mouth 2 (two) times a day.       cyanocobalamin (VITAMIN B-12) 100 MCG tablet Take 100 mcg by mouth daily.       ferrous sulfate 325 (65 FE) MG tablet Take 1 tablet by mouth 2 (two) times a day with meals.       lidocaine (LIDODERM) 5 % Place 1 patch on the skin daily. Remove & Discard patch within 12 hours or as directed by MD       LORazepam (ATIVAN) 0.5 MG tablet Take 0.5 mg by mouth every 6 (six) hours as needed for anxiety.       multivitamin (MULTIVITAMIN) per tablet Take 1 tablet by mouth daily.       nitroglycerin (NITROSTAT) 0.4 MG SL tablet Place 0.4 mg under the tongue every 5 (five) minutes as needed.        nystatin (MYCOSTATIN) powder Apply 1 application topically daily.       polyethylene glycol (MIRALAX) 17 gram packet Take 17 g by mouth daily.       pramipexole (MIRAPEX) 0.5 MG tablet Take 0.5 mg by mouth bedtime.       psyllium (METAMUCIL) powder Take by mouth every morning. 1 PKT       senna-docusate (PERICOLACE) 8.6-50 mg tablet Take 2 tablets by mouth 2 (two) times a day.       solifenacin (VESICARE) 10 MG tablet Take 10 mg by mouth daily.       sorbitol 70 % solution Take 30 mL by mouth daily.       traMADol (ULTRAM) 50 mg tablet Take 50 mg by mouth 4 (four) times a day.       traZODone (DESYREL) 150 MG tablet Take 1 tablet (150 mg total) by mouth bedtime. 30 tablet 0     venlafaxine (EFFEXOR-XR) 75 MG 24 hr capsule Take 5 capsules (375 mg total) by mouth daily. 150 capsule 3     No current facility-administered medications for this visit.      Allergies   Allergen Reactions     Blood-Group Specific Substance Other (See Comments)     Patient has anti-K. Blood product orders maybe  delayed. Draw one red top and 2 purple top tubes for all Type and Screen/Red blood Cell product orders     Fd And C No.5 (Tartrazine)      GI UPSET     Ibuprofen Nausea And Vomiting     Morphine Rash     swelling         Review of Systems:    Constitutional: Negative.  Negative for fever, chills, HAS activity change, appetite change and fatigue.   HENT: Negative for congestion and facial swelling.    Eyes: Negative for photophobia, redness and visual disturbance.   Respiratory: Negative for cough and chest tightness.    Cardiovascular: Negative for chest pain, palpitations and leg swelling.   Gastrointestinal: Negative for , diarrhea, constipation, blood in stool and abdominal distention.   Complaining of some nausea  Genitourinary: Negative.    Musculoskeletal: Negative.    Has severe right hip pain  Skin: Negative.    Neurological: Negative for dizziness, tremors, syncope, weakness, light-headedness and headaches.   Hematological: Does not bruise/bleed easily.   Psychiatric/Behavioral: Negative.  Very anxious she is reporting severe pain in her leg when she ambulates and refusing to get out of bed    Vitals:    02/05/18 1134   BP: 133/68   Pulse: 80   Resp: 17   Temp: 98  F (36.7  C)       Physical Exam:    GENERAL: no acute distress. Cooperative in conversation.   HEENT: pupils are equal, round and reactive. Oral mucosa is moist and intact.  RESP:Chest symmetric. Regular respiratory rate. No stridor.  CVS: S1S2  ABD: Nondistended, soft.  EXTREMITIES: No lower extremity edema.  Right hip incision is intact.  Patient cannot move her right lower extremity at all  Open area on her right buttock  NEURO: non focal. Alert and oriented x3.   PSYCH: within normal limits. No depression or anxiety.  SKIN: warm dry intact     Labs:    Recent Results (from the past 240 hour(s))   Hemoglobin   Result Value Ref Range    Hemoglobin 8.5 (L) 12.0 - 16.0 g/dL   Basic Metabolic Panel   Result Value Ref Range    Sodium 143 136 - 145  mmol/L    Potassium 3.7 3.5 - 5.0 mmol/L    Chloride 107 98 - 107 mmol/L    CO2 30 22 - 31 mmol/L    Anion Gap, Calculation 6 5 - 18 mmol/L    Glucose 83 70 - 125 mg/dL    Calcium 7.7 (L) 8.5 - 10.5 mg/dL    BUN 15 8 - 22 mg/dL    Creatinine 0.57 (L) 0.60 - 1.10 mg/dL    GFR MDRD Af Amer >60 >60 mL/min/1.73m2    GFR MDRD Non Af Amer >60 >60 mL/min/1.73m2   Magnesium   Result Value Ref Range    Magnesium 1.3 (L) 1.8 - 2.6 mg/dL   Vitamin D, Total (25-Hydroxy)   Result Value Ref Range    Vitamin D, Total (25-Hydroxy) 32.6 30.0 - 80.0 ng/mL   HM2(CBC w/o Differential)   Result Value Ref Range    WBC 3.7 (L) 4.0 - 11.0 thou/uL    RBC 2.94 (L) 3.80 - 5.40 mill/uL    Hemoglobin 8.1 (L) 12.0 - 16.0 g/dL    Hematocrit 27.9 (L) 35.0 - 47.0 %    MCV 95 80 - 100 fL    MCH 27.6 27.0 - 34.0 pg    MCHC 29.0 (L) 32.0 - 36.0 g/dL    RDW 18.2 (H) 11.0 - 14.5 %    Platelets 213 140 - 440 thou/uL    MPV 11.2 8.5 - 12.5 fL   Basic Metabolic Panel   Result Value Ref Range    Sodium 142 136 - 145 mmol/L    Potassium 4.0 3.5 - 5.0 mmol/L    Chloride 107 98 - 107 mmol/L    CO2 30 22 - 31 mmol/L    Anion Gap, Calculation 5 5 - 18 mmol/L    Glucose 85 70 - 125 mg/dL    Calcium 7.8 (L) 8.5 - 10.5 mg/dL    BUN 17 8 - 22 mg/dL    Creatinine 0.54 (L) 0.60 - 1.10 mg/dL    GFR MDRD Af Amer >60 >60 mL/min/1.73m2    GFR MDRD Non Af Amer >60 >60 mL/min/1.73m2   Magnesium   Result Value Ref Range    Magnesium 1.6 (L) 1.8 - 2.6 mg/dL   Vitamin D, Total (25-Hydroxy)   Result Value Ref Range    Vitamin D, Total (25-Hydroxy) 30.9 30.0 - 80.0 ng/mL   HM2(CBC w/o Differential)   Result Value Ref Range    WBC 3.2 (L) 4.0 - 11.0 thou/uL    RBC 2.74 (L) 3.80 - 5.40 mill/uL    Hemoglobin 7.4 (L) 12.0 - 16.0 g/dL    Hematocrit 25.9 (L) 35.0 - 47.0 %    MCV 95 80 - 100 fL    MCH 27.0 27.0 - 34.0 pg    MCHC 28.6 (L) 32.0 - 36.0 g/dL    RDW 18.3 (H) 11.0 - 14.5 %    Platelets 214 140 - 440 thou/uL    MPV 10.7 8.5 - 12.5 fL   Vitamin B12   Result Value Ref Range     Vitamin B-12 468 213 - 816 pg/mL   HM2(CBC w/o Differential)   Result Value Ref Range    WBC 2.9 (L) 4.0 - 11.0 thou/uL    RBC 3.11 (L) 3.80 - 5.40 mill/uL    Hemoglobin 8.2 (L) 12.0 - 16.0 g/dL    Hematocrit 29.1 (L) 35.0 - 47.0 %    MCV 94 80 - 100 fL    MCH 26.4 (L) 27.0 - 34.0 pg    MCHC 28.2 (L) 32.0 - 36.0 g/dL    RDW 16.8 (H) 11.0 - 14.5 %    Platelets 216 140 - 440 thou/uL    MPV 10.5 8.5 - 12.5 fL       Assessment/Plan:    Right hip fracture status post right hip hemiarthroplasty subsequently patient represented with a periprosthetic fracture  She underwent open-reduction and internal fixation of right femur with revision hemiarthroplasty of the right hip by Dr. Magaña on 1/11/18. EBL 2000ml.   . On 1/17/2018, she developed a draining hematoma right hip and was noted to have interval displacement of greater trochanter on Xray. She received 2 units of pRBC 1/17 and underwent right hip irrigation and debridement with ORIF of the right greater trochante  She has been discharged currently with TTWB  Postoperative course was complicated by hemorrhagic shock requiring several units of blood products  Nondisplaced fracture of the radial head .  Course complicated by nonunion currently orthopedics has recommended a sling for her  Pain management seen by the pain clinic and started on Dilaudid for pain management along with Tylenol which has been scheduled for her  She also has an underlying history of chronic pain.  She was on scheduled Dilaudid in the hospital now is on as needed.  She also has Flexeril available 3 times a day as needed  DVT prophylaxis on aspirin 325  OLRE-eqrrfo-am and recheck hemoglobins.  Patient underwent hemorrhagic check in the hospital she required several units of blood transfusion the hospital discharge hemoglobin remained low  Recheck low but stable.  Recent history of coronary artery disease status post stent placement on 3/17 she was unable to tolerate Brilinta 90 mg twice daily due  to bleeding complications  .  She is currently on aspirin 325 daily which was increased from her home regimen of 81 mg and she can restart Brilinta when okayed by orthopedics  Postoperative hypotension -initially felt to be due to anemia secondary to hemorrhagic shock and blood loss she was given multiple units of blood products.  Subsequently she was given fluid resuscitation as her hemoglobin was stable  taken off metoprolol it has been recommended her dosage be increased if her blood pressures rebound.  Blood pressures remain low.    Anxiety disorder she is refusing to ambulate due to severe anxiety as per my assessment  HLD-she is on Lipitor  History of movement disorder she takes Sinemet 3 times daily.  She is also on Mirapex  B12 deficiency on supplement  Overactive bladder currently is on Vesicare but also has an indwelling Molina catheter.  This was primarily given due to her lack of mobility for comfort.  We will try to get her a voiding trial    Depression with insomnia takes trazodone at bedtime along with Effexor  Remains anxious will see how Vistaril helps that if not effective he may need to see psychiatric    Profound debilitation sent from the nursing home to the TCU.  She continues to remain off metoprolol due to postoperative hypertension.    Blood pressures remain low but stable.  Care plan reviewed with therapy.  Transfers remain erratic due to anxiety.  Monitor hemoglobins closely.  sHe also continues to be leukopenic    Total time spent was 35 minutes, more than half of it was in face-to-face counseling regarding disease state, treatment, side effects, documentation, review of clinical data and coordination of care  Electronically signed by: NERY Sosa    .

## 2021-06-15 NOTE — TELEPHONE ENCOUNTER
Spoke to pharmacist. Clarified mirtazapine dose was decreased to 15 mg at bedtime. Pharmacist states that the venlafaxine extended release dose of 337.5 mg is over the recommended daily amount and wanted clarification that provider is ok with it. Please advise.

## 2021-06-15 NOTE — TELEPHONE ENCOUNTER
Yes I am fine with that dose.  My intention was to increase the Effexor to 337.5 mg a day.  Thank you for your attention to this and your help.

## 2021-06-15 NOTE — PROGRESS NOTES
[facility]    Facility:   [facility] [[id number]]   Code Status: FULL CODE      CHIEF COMPLAINT/REASON FOR VISIT:  Chief Complaint   Patient presents with     Follow Up     rehab, constip., pain,       HISTORY:      HPI: Alison is a 67 y.o. female who I had a chance to visit with secondary to her hospitalization for a right hip closed fracture status post ORIF or hemiarthroplasty December 22, 2017.  She also did have urinary retention along with UTI and will be finishing up her antibiotic on January 3.  She is having issues with constipation she is on stool softeners along with suppository and MiraLAX we will add sorbitol at noontime 30 cc.  She does have a cough but still not getting up coughing deep breathing and using incentive spirometry that was significantly encouraged at this particular visit we will also add duo nebs for 7 days.  She states that she is in pain does not care for the Dilaudid currently at 5 mg in the past she has used oxycodone 10 mg to give her a 10 mg tab every 4 hours as needed for pain she also is on Tylenol thousand milligrams 3 times a day.  Also try to get her to use the incentive spirometry and get into the chair for coughing and deep breathing and pulmonary toilet and also she does need to start participating with rehabilitation services.  She otherwise is being treated for hypertension and she has been normotensive afebrile and also on room air.    Past Medical History:   Diagnosis Date     Acute blood loss anemia      Alzheimer disease      CAD (coronary artery disease)      Chronic pain syndrome      Dementia      Depression      Hip fracture, right 12/21/2017     HTN (hypertension)      Kidney stone      PMB (postmenopausal bleeding)      RLS (restless legs syndrome)      Stented coronary artery      Traumatic brain injury      Unsteady gait     uses walker     UTI (lower urinary tract infection)     on antibiotic course             No  family history on file.  Social History     Social History     Marital status: Single     Spouse name: N/A     Number of children: N/A     Years of education: N/A     Social History Main Topics     Smoking status: Never Smoker     Smokeless tobacco: Never Used     Alcohol use No     Drug use: No     Sexual activity: Not on file     Other Topics Concern     Not on file     Social History Narrative         Review of Systems  Currently she denies any particular fevers cold or flulike symptoms.  Recently right hip fracture along with a history of chronic pain traumatic brain injury hypertension.    Current Outpatient Prescriptions:      oxyCODONE (ROXICODONE) 10 mg immediate release tablet, Take 5 mg by mouth every 4 (four) hours as needed for pain., Disp: , Rfl:      acetaminophen (TYLENOL) 500 MG tablet, Take 1,000 mg by mouth 3 (three) times a day., Disp: , Rfl:      aspirin 81 MG tablet, Take 81 mg by mouth daily., Disp: , Rfl:      atorvastatin (LIPITOR) 40 MG tablet, Take 40 mg by mouth at bedtime., Disp: , Rfl:      BRILINTA 90 mg Tab, Take 90 mg by mouth 2 (two) times a day. , Disp: , Rfl:      carbidopa-levodopa (SINEMET)  mg per tablet, Take 2 tablets by mouth 3 (three) times a day., Disp: , Rfl:      cefuroxime (CEFTIN) 250 MG tablet, Take 250 mg by mouth 2 (two) times a day., Disp: , Rfl:      cholecalciferol, vitamin D3, 1,000 unit tablet, Take 4,000 Units by mouth daily., Disp: , Rfl:      cranberry 500 mg cap, Take 500 mg by mouth 2 (two) times a day., Disp: , Rfl:      cyanocobalamin (VITAMIN B-12) 100 MCG tablet, Take 100 mcg by mouth daily., Disp: , Rfl:      ketoconazole (NIZORAL) 2 % cream, Apply 1 application topically bedtime. Apply to stomach skin fold(s), Disp: , Rfl:      metoprolol succinate (TOPROL-XL) 25 MG, Take 12.5 mg by mouth daily. , Disp: , Rfl:      multivitamin (MULTIVITAMIN) per tablet, Take 1 tablet by mouth daily., Disp: , Rfl:      nitroglycerin (NITROSTAT) 0.4 MG SL tablet,  Place 0.4 mg under the tongue every 5 (five) minutes as needed. , Disp: , Rfl:      nystatin (MYCOSTATIN) powder, Apply 1 application topically daily., Disp: , Rfl:      polyethylene glycol (MIRALAX) 17 gram packet, Take 17 g by mouth daily., Disp: , Rfl:      pramipexole (MIRAPEX) 0.5 MG tablet, Take 0.5 mg by mouth bedtime., Disp: , Rfl:      psyllium (METAMUCIL) powder, Take by mouth every morning. 1 PKT, Disp: , Rfl:      senna-docusate (PERICOLACE) 8.6-50 mg tablet, Take 2 tablets by mouth 2 (two) times a day., Disp: , Rfl:      solifenacin (VESICARE) 10 MG tablet, Take 10 mg by mouth daily., Disp: , Rfl:      traZODone (DESYREL) 150 MG tablet, Take 1 tablet (150 mg total) by mouth bedtime., Disp: 30 tablet, Rfl: 0     venlafaxine (EFFEXOR-XR) 75 MG 24 hr capsule, Take 5 capsules (375 mg total) by mouth daily., Disp: 150 capsule, Rfl: 3    .There were no vitals filed for this visit.  Blood pressure 115/71 pulse 71 respirations 16 temperature 98.7  Physical Exam   Constitutional: No distress.   HENT:   Head: Normocephalic.   Eyes: Pupils are equal, round, and reactive to light.   Neck: Neck supple. No thyromegaly present.   Cardiovascular: Normal rate, regular rhythm and normal heart sounds.    Pulmonary/Chest: Breath sounds normal.   Abdominal: Bowel sounds are normal. There is no tenderness. There is no guarding.   Musculoskeletal:   Right hip fracture hemiarthroplasty.  Hydrocolloid dressing.  Good CMS to her right leg.  Chronic pain syndrome.   Lymphadenopathy:     She has no cervical adenopathy.   Neurological: She is alert.   Skin: Skin is warm and dry. No rash noted.         LABS:   Lab Results   Component Value Date    WBC 4.9 07/28/2015    HGB 13.8 07/28/2015    HCT 44.1 07/28/2015    MCV 88 07/28/2015     07/28/2015         ASSESSMENT:      ICD-10-CM    1. Hip fracture S72.009A    2. Pain management R52    3. Constipation K59.00    4. Essential hypertension I10    5. Cough R05        PLAN:     Adding incentive spirometry duo nebs 3 times a day for 7 days oxycodone 10 mg every 4 hours as needed instead of the Dilaudid and then also sorbitol and also she needs to get in the chair sitting and also does need to participate with the rehabilitation services.    For documentation purposes total visit was over 40 minutes to which over 50% was spent with the patient going over her multiple chronic medical conditions or medications various treatment modalities as well as her stay on the transitional care unit and also encouragement of her rehabilitation with her hip hemiarthroplasty.        Electronically signed by: Michael Duane Johnson, CNP

## 2021-06-16 NOTE — PROGRESS NOTES
[facility]      Code Status:  FULL CODE  Visit Type: Review Of Multiple Medical Conditions     Facility:  [facility] [[id number]]           History of Present Illness: Alison Bashir is a 67 y.o. female who is currently admitted as a transfer from [facility] to the TCU.  She is a resident of a group home with underlying history of a traumatic brain injury, hypertension, coronary artery disease who presented to the emergency room after she fell on 12/17.  She was diagnosed with a right hip fracture and underwent right hip hemiarthroplasty. She was also noted to have a UTI and subsequently discharged to TCU.  She readmitted to the hospital with ongoing pain in her right leg and imaging revealed a periprosthetic fractures orthopedics was consulted.  They recommended repeat surgical intervention.  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  She subsequently underwent an ORIF of right femur with revision hemiarthroplasty of the right hip by Dr. Magaña on 1/11/18. She had an EBL 2000ml. She had developed hypotension intraop and in PACU requiring multiple blood products. She was admitted to ICU postop given hypotension and blood loss and on 1/17/2018, she developed a draining hematoma on her right hip and was noted to have interval displacement of greater trochanter on Xray. She received 2 units of pRBC 1/17 and underwent right hip irrigation and debridement with ORIF of the right greater trochanter. Her Hgb had been stable 7.5 - 8.0. Brilinta was on hold, resumed on 1/23/2018 with okay from Orthopedics and decreased ASA to 81mg daily from 325mg daily. Then on 1/24/2018, she had developed hypotension overnight and received 1U PRBCs. Brilinta stopped on 1/25/18 per Dr. Magaña for the foreseeable future, at least until her follow up appointment with Orthopedics in 10-14 days. Hypotension again developed on  1/25/2018 overnight, received 1L fluid bolus. Hemoglobin was stable. Hypotension was felt to be related to narcotics. Raised parameters to hold Dilaudid for SBP <110. Pain clinic following for pain med management. Stopped as needed Dilaudid on 1/24/2018 due to hypotension, decreased frequency of scheduled Dilaudid to every 6 hours on 1/25/2018, and added parameters to scheduled Dilaudid to hold for sedation or SBP < 110. She does have significant anxiety when transferring, fear of falling. She would benefit from seeing Psychiatry as an outpatient, refused this admission. Her pain medications may be adjusted as long as blood pressure tolerates. Her Brilinta is on hold until follow up with Orthopedics, and she was discharged on ASA 325mg daily, which can be decreased to 81mg daily when Brilinta restarted. Hemoglobin should be rechecked in 2 days. Her Metoprolol was stopped and should be restarted when blood pressure tolerates.      TCU: Alison is a 66 yo old with various concerns today. She had significant blood loss anemia secondary to blood loss and intraoperative hypotension and Hgb recheck is 8.2. No obvious signs of bleeding noted. Maintain ferrous sulfate. Pain management was consulted for pain management optimization and she is currently discharged on oral Dilaudid, though per low BPs switched to tramadol QID, effective. Previously she was still not moving in bed and had difficulty moving her right lower extremity at all. Xray ordered per nursing request, no issue identified and is now mobility improved.  She had high anxiety with transfers, started lorazepam PRN and recently schedule low dose in am, which is reported to be effective.  Now BPs have been normalized.     Patient denies pain, headache, chest pain, numbness or tingling, shortest of breath, eating or swallowing concerns, nausea or vomiting, diarrhea or bowel abnormalities, or no new integumentary concerns today.    Past Medical History:   Diagnosis  Date     Acute blood loss anemia      Alzheimer disease      CAD (coronary artery disease)      Chronic pain syndrome      Dementia      Depression      Hip fracture, right 2017     HTN (hypertension)      Kidney stone      PMB (postmenopausal bleeding)      RLS (restless legs syndrome)      Stented coronary artery      Traumatic brain injury      Unsteady gait     uses walker     UTI (lower urinary tract infection)     on antibiotic course     Past Surgical History:   Procedure Laterality Date      SECTION       CHOLECYSTECTOMY  May 2014     COMBINED HYSTEROSCOPY DIAGNOSTIC / D&C N/A 2014    Procedure: DILATION AND CURETTAGE WITH HYSTEROSCOPY;  Surgeon: Karime Cifuentes MD;  Location: [facility] [place], [place];  Service:      CORONARY STENT PLACEMENT       HEMIARTHROPLASTY HIP Right 2017     INCISION AND DRAINAGE HIP Right 2018    ORIF REVISION      LUNG REMOVAL, PARTIAL       REVISION TOTAL HIP ARTHROPLASTY Right 01/10/2018     TONSILLECTOMY AND ADENOIDECTOMY       TOTAL KNEE ARTHROPLASTY Right 2016     TOTAL KNEE ARTHROPLASTY Left 2015     No family history on file.  Social History     Social History     Marital status: Single     Spouse name: N/A     Number of children: N/A     Years of education: N/A     Occupational History     Not on file.     Social History Main Topics     Smoking status: Never Smoker     Smokeless tobacco: Never Used     Alcohol use No     Drug use: No     Sexual activity: Not on file     Other Topics Concern     Not on file     Social History Narrative     Current Outpatient Prescriptions   Medication Sig Dispense Refill     LORazepam (ATIVAN) 0.5 MG tablet Take 0.5 mg by mouth daily before breakfast.       acetaminophen (TYLENOL) 325 MG tablet Take 650 mg by mouth 4 (four) times a day.       aspirin 325 MG EC tablet Take 325 mg by mouth daily.       atorvastatin (LIPITOR) 40 MG tablet Take 40 mg by mouth at bedtime.       carbidopa-levodopa (SINEMET)  mg  per tablet Take 2 tablets by mouth 3 (three) times a day.       cholecalciferol, vitamin D3, 5,000 unit Tab Take 5,000 Units by mouth Daily at 8:00 am..       cranberry 500 mg cap Take 500 mg by mouth 2 (two) times a day.       cyanocobalamin (VITAMIN B-12) 100 MCG tablet Take 100 mcg by mouth daily.       ferrous sulfate 325 (65 FE) MG tablet Take 1 tablet by mouth 2 (two) times a day with meals.       lidocaine (LIDODERM) 5 % Place 1 patch on the skin daily. Remove & Discard patch within 12 hours or as directed by MD       LORazepam (ATIVAN) 0.5 MG tablet Take 0.5 mg by mouth every 6 (six) hours as needed for anxiety.       multivitamin (MULTIVITAMIN) per tablet Take 1 tablet by mouth daily.       nitroglycerin (NITROSTAT) 0.4 MG SL tablet Place 0.4 mg under the tongue every 5 (five) minutes as needed.        nystatin (MYCOSTATIN) powder Apply 1 application topically daily.       polyethylene glycol (MIRALAX) 17 gram packet Take 17 g by mouth daily.       pramipexole (MIRAPEX) 0.5 MG tablet Take 0.5 mg by mouth bedtime.       psyllium (METAMUCIL) powder Take by mouth every morning. 1 PKT       senna-docusate (PERICOLACE) 8.6-50 mg tablet Take 2 tablets by mouth 2 (two) times a day.       solifenacin (VESICARE) 10 MG tablet Take 10 mg by mouth daily.       sorbitol 70 % solution Take 30 mL by mouth daily.       traMADol (ULTRAM) 50 mg tablet Take 50 mg by mouth 4 (four) times a day.       traZODone (DESYREL) 150 MG tablet Take 1 tablet (150 mg total) by mouth bedtime. 30 tablet 0     venlafaxine (EFFEXOR-XR) 75 MG 24 hr capsule Take 5 capsules (375 mg total) by mouth daily. 150 capsule 3     No current facility-administered medications for this visit.      Allergies   Allergen Reactions     Blood-Group Specific Substance Other (See Comments)     Patient has anti-K. Blood product orders maybe delayed. Draw one red top and 2 purple top tubes for all Type and Screen/Red blood Cell product orders     Fd And C No.5  (Tartrazine)      GI UPSET     Ibuprofen Nausea And Vomiting     Morphine Rash     swelling         Review of Systems:    Constitutional: Negative.  Negative for fever, chills, HAS activity change, appetite change and fatigue.   HENT: Negative for congestion and facial swelling.    Eyes: Negative for photophobia, redness and visual disturbance.   Respiratory: Negative for cough and chest tightness.    Cardiovascular: Negative for chest pain, palpitations and leg swelling.   Gastrointestinal: Negative for , diarrhea, constipation, blood in stool and abdominal distention.     Genitourinary: Negative.    Musculoskeletal: Negative.  Had severe right hip pain though improved. Better mobility.  Skin: Negative.    Neurological: Negative for dizziness, tremors, syncope, weakness, light-headedness and headaches.   Hematological: Does not bruise/bleed easily.   Psychiatric/Behavioral: Negative.  Anxiety improved.    Vitals:    02/14/18 1002   BP: 121/76   Pulse: 91   Resp: 20   Temp: 98  F (36.7  C)   SpO2: 95%       Physical Exam:    GENERAL: no acute distress. Cooperative in conversation. Pain and anxiety better controlled. Some low BPs noted.  HEENT: pupils are equal, round and reactive. Oral mucosa is moist and intact.  RESP:Chest symmetric. Regular respiratory rate. No stridor. CTA. RA.  CVS: S1S2, no murmur, rub, or gallop.  ABD: Nondistended, soft. No constipation or diarrhea.  EXTREMITIES: No lower extremity edema.  Right hip incision is intact.  NEURO: no focal deficits. Alert and oriented x3.   PSYCH: within normal limits. No depression, has anxiety.  SKIN: warm dry intact,       Labs:    .     HEMOGLOBIN (01/26/2018 8:07 AM)  Only the most recent of 25 results within the time period is included.    HEMOGLOBIN (01/26/2018 8:07 AM)   Component Value Ref Range   HEMOGLOBIN  8.3 (L) 12.0 - 16.0 g/dL   MCV  88 80 - 100 fL     HEMOGLOBIN (01/26/2018 8:07 AM)   Specimen Performing Laboratory   Blood [facility]  LABORATORY    SENDOUT INTERNAL ZIP [id number]    [address] [place] [address]       HEMOGLOBIN (2018 8:07 AM)   Narrative                Back to top of Results      EXTRA TUBE LAVENDER (2018 10:14 AM)  Only the most recent of 2 results within the time period is included.    EXTRA TUBE LAVENDER (2018 10:14 AM)   Specimen Performing Laboratory   Blood [facility] LABORATORY    SENDOUT INTERNAL ZIP [id number]    [address] [place] [address]     Back to top of Results      TRANSFUSE RBC (NURSE COMMUNICATION ORDER) (2018 11:10 AM)  Only the most recent of 15 results within the time period is included.    TRANSFUSE RBC (NURSE COMMUNICATION ORDER) (2018 11:10 AM)   Specimen Performing Laboratory   Blood      Back to top of Results      ANTIBODY IDENTIFICATION EACH PANEL (2018 5:03 AM)  Only the most recent of 3 results within the time period is included.    ANTIBODY IDENTIFICATION EACH PANEL (2018 5:03 AM)   Component Value Ref Range   QUANTITY 1     PANEL JENA ID  Panel Antibody ID       ANTIBODY IDENTIFICATION EACH PANEL (2018 5:03 AM)   Specimen Performing Laboratory     [facility] LABORATORY BLOOD BANK    [address] [place] [address]     Back to top of Results      RED BLOOD CELLS EA UNIT (2018 5:03 AM)  Only the most recent of 18 results within the time period is included.    RED BLOOD CELLS EA UNIT (2018 5:03 AM)   Component Value Ref Range   CROSSMATCH Compatible Compatible   PRODUCT BLOOD TYPE O Neg     PRODUCT ID NUMBER [id number]     PRODUCT STATUS  /Released     PRODUCT DESCRIPTION  RBC AS-1 LR     PRODUCT CODE [id number]       RED BLOOD CELLS EA UNIT (2018 5:03 AM)   Specimen Performing Laboratory     [facility] LABORATORY BLOOD BANK    [address] [place] [address]     Back to top of Results      RBC W TYPE AND SCREEN (2018 4:47 AM)  Only the most recent of 4  results within the time period is included.    RBC W TYPE AND SCREEN (01/24/2018 4:47 AM)   Component Value Ref Range   ABORH  O Rh Negative     ANTIBODY SCREEN Positive (A) Negative   SPECIMEN EXPIRATION DATE/TIME 01/27/18 23:59       RBC W TYPE AND SCREEN (01/24/2018 4:47 AM)   Specimen Performing Laboratory   Blood [facility] LABORATORY BLOOD BANK    [address] [place] [address]     Back to top of Results      ANTIBODY IDENTIFICATION LAB USE ONLY (01/24/2018 4:47 AM)  Only the most recent of 3 results within the time period is included.    ANTIBODY IDENTIFICATION LAB USE ONLY (01/24/2018 4:47 AM)   Component Value Ref Range   ANTIBODY IDENTIFICATION  Anti-Maite (A)       ANTIBODY IDENTIFICATION LAB USE ONLY (01/24/2018 4:47 AM)   Specimen Performing Laboratory   Blood [facility] LABORATORY BLOOD BANK    [address] [place] [address]     Back to top of Results      EXTRA TUBE GOLD/SST (01/20/2018 8:05 AM)  Only the most recent of 2 results within the time period is included.    EXTRA TUBE GOLD/SST (01/20/2018 8:05 AM)   Specimen Performing Laboratory   Blood [facility] LABORATORY    SENDOUT INTERNAL ZIP [id number]    [address] [place] [address]     Back to top of Results      XR PELVIS 1 VIEW PORTABLE (01/18/2018 10:14 AM)  Only the most recent of 3 results within the time period is included.    XR PELVIS 1 VIEW PORTABLE (01/18/2018 10:14 AM)   Narrative   XR PELVIS 1 VIEW PORTABLE    1/18/2018 10:14 AM        INDICATION: Follow-up right hip ORIF. History of right hip hemiarthroplasty.    COMPARISON: Hip radiograph, 01/17/2018        FINDINGS: There has been interval internal fixation of the right hip trochanteric fracture with good anatomic alignment of the fracture fragments. Evidence of previous right hip hemiarthroplasty. Hardware appears well seated and intact.       XR PELVIS 1 VIEW PORTABLE (01/18/2018 10:14 AM)   Procedure Note   Interface,  Powerscribe - 01/18/2018 10:25 AM CST    XR PELVIS 1 VIEW PORTABLE  1/18/2018 10:14 AM    INDICATION: Follow-up right hip ORIF. History of right hip hemiarthroplasty.  COMPARISON: Hip radiograph, 01/17/2018    FINDINGS: There has been interval internal fixation of the right hip trochanteric fracture with good anatomic alignment of the fracture fragments. Evidence of previous right hip hemiarthroplasty. Hardware appears well seated and intact.     Back to top of Results      AEROBIC BACTERIAL CULTURE, STAIN (01/18/2018 8:24 AM)  Only the most recent of 2 results within the time period is included.    AEROBIC BACTERIAL CULTURE, STAIN (01/18/2018 8:24 AM)   Component Value Ref Range   CULTURE No Growth.     GRAM STAIN  1+ PMNs     GRAM STAIN  No organisms seen     GRAM STAIN  Gram stain performed by [place], [place]       AEROBIC BACTERIAL CULTURE, STAIN (01/18/2018 8:24 AM)   Specimen Performing Laboratory   Swab - Right Hip [facility] LABORATORY-[facility]ENTR[facility] LABORATORY    [address]    [place] [address]     Back to top of Results      ANAEROBIC CULTURE (01/18/2018 8:24 AM)  Only the most recent of 2 results within the time period is included.    ANAEROBIC CULTURE (01/18/2018 8:24 AM)   Component Value Ref Range   CULTURE No anaerobes isolated       ANAEROBIC CULTURE (01/18/2018 8:24 AM)   Specimen Performing Laboratory   Swab - Right Hip [facility] LABORATORY-CENTRAL LABORATORY    [address] [place] [id number]     Back to top of Results      ENDOTRACHEAL TUBE (01/18/2018 8:13 AM)  ENDOTRACHEAL TUBE (01/18/2018 8:13 AM)   Narrative   Etjoana, Jenniffer LIRA CRNA     1/18/2018  8:13 AM    Procedure: ETT        Patient location during procedure: OR    ETT Properties    Mask Ventilation: oral airway and easy    Final Technique: direct laryngoscopy    Type: straight    Location: oral    Cuffed: yes    Tube Size: 7.0 mm    Stylet: yes    Laryngoscope Blade: Quick    Blade  Size: 2    Cormack-Lehane Grade View: 1    Insertion Attempts: 1    Placement Verification: auscultation and end tidal CO2    Assessment: pharynx clear, atraumatic and dentition unchanged    Secured at: 23    Measured From: teeth    Tooth guard used and removed: yes    Difficulty: 0 (not difficult)    Electronically signed by JENNIFFER MCFARLAND                                                                                                                                                                   ENDOTRACHEAL TUBE (01/18/2018 8:13 AM)   Procedure Note   Jenniffer Mcfarland, CRNA - 01/18/2018 8:13 AM CST    Procedure: ETT    Patient location during procedure: OR  ETT Properties  Mask Ventilation: oral airway and easy  Final Technique: direct laryngoscopy  Type: straight  Location: oral  Cuffed: yes  Tube Size: 7.0 mm  Stylet: yes  Laryngoscope Blade: Quick  Blade Size: 2  Cormack-Lehane Grade View: 1  Insertion Attempts: 1  Placement Verification: auscultation and end tidal CO2  Assessment: pharynx clear, atraumatic and dentition unchanged  Secured at: 23  Measured From: teeth  Tooth guard used and removed: yes  Difficulty: 0 (not difficult)  Electronically signed by JENNIFFER MCFARLAND                    Back to top of Results      EXTRA TUBE LIGHT GREEN (01/18/2018 5:29 AM)  EXTRA TUBE LIGHT GREEN (01/18/2018 5:29 AM)   Specimen Performing Laboratory   Blood [facility] LABORATORY    SENDOUT INTERNAL ZIP [id number]    [address] [place] [address]     Back to top of Results      NUCLEATED RED BLOOD CELLS AS PERCENT OF BLOOD LEUKOCYTES (01/18/2018 5:29 AM)  Only the most recent of 4 results within the time period is included.    NUCLEATED RED BLOOD CELLS AS PERCENT OF BLOOD LEUKOCYTES (01/18/2018 5:29 AM)   Component Value Ref Range   NRBC  0.0 %   ABS NRBC 0.0 thou /cu mm     NUCLEATED RED BLOOD CELLS AS PERCENT OF BLOOD LEUKOCYTES (01/18/2018 5:29 AM)   Specimen Performing Laboratory   Blood [facility] LABORATORY    SENDOUT INTERNAL ZIP [id number]    [address] [place] [address]       NUCLEATED RED BLOOD CELLS AS PERCENT OF BLOOD LEUKOCYTES (01/18/2018 5:29 AM)   Narrative                Back to top of Results      CBC with Platelets no Differential (01/18/2018 5:29 AM)  Only the most recent of 2 results within the time period is included.    CBC with Platelets no Differential (01/18/2018 5:29 AM)   Component Value Ref Range   WHITE BLOOD COUNT  6.1 4.5 - 11.0 thou/cu mm   RED BLOOD COUNT  2.77 (L) 4.00 - 5.20 mil/cu mm   HEMOGLOBIN  7.8 (L) 12.0 - 16.0 g/dL   HEMATOCRIT  24.1 (L) 33.0 - 51.0 %   MCV  87 80 - 100 fL   MCH  28.2 26.0 - 34.0 pg   MCHC  32.4 32.0 - 36.0 g/dL   RDW  15.4 11.5 - 15.5 %   PLATELET COUNT  240 140 - 440 thou/cu mm   MPV  10.0 6.5 - 11.0 fL     CBC with Platelets no Differential (01/18/2018 5:29 AM)   Specimen Performing Laboratory   Blood [facility] LABORATORY    SENDOUT INTERNAL ZIP [id number]    [address] [place] [address]         Assessment/Plan:    Right hip fracture status post right hip hemiarthroplasty subsequently patient represented with a periprosthetic fx: ORIF of right femur with revision hemiarthroplasty of the right hip by Dr. Magaña on 1/11/18. EBL 2000ml. On 1/17/2018, she developed a draining hematoma right hip and was noted to have interval displacement of greater trochanter on Xray. She received 2 units of pRBC 1/17 and underwent right hip irrigation and debridement with ORIF of the right greater trochanter. She has been discharged and currently with WBAT.    Nondisplaced fracture of the radial head:   Course complicated by nonunion currently orthopedics has recommended a sling for her.    Pain management:  seen by the pain clinic and started on Dilaudid for pain management along with Tylenol which has been changed to scheduled, unable to receive dilaudid per low BPs, start tramadol 50mg QID, dc flexeril and dilaudid per non  use. She also has an underlying history of chronic pain. Tramadol has been effective.     DVT prophylaxis: on aspirin 325 daily.    [id number] and recheck hemoglobins.  Patient underwent hemorrhagic check in the hospital she required several units of blood transfusion the hospital discharge hemoglobin remained low. Last Hgb 8.1. Start ferrous sulfate 325 mg BID, recheck CBC was 8.2. No bleeding noted.    Recent history of coronary artery disease: status post stent placement on 3/17, she was unable to tolerate Brilinta 90 mg twice daily due to bleeding complications, currently on aspirin 325 daily which was increased from her home regimen of 81 mg. She can restart Brilinta when okayed by orthopedics.    Postoperative hypotension: initially felt to be due to anemia secondary to hemorrhagic shock and blood loss she was given multiple units of blood products.  Subsequently she was given fluid resuscitation as her hemoglobin was stable. Last Hgb 8.1, see above.  Was taken off metoprolol it has been recommended her dosage be increased if her blood pressures rebound. SBP , not orthostatic, will monitor.    Anxiety disorder:  she is refusing to ambulate due to severe anxiety. Started lorazepam 0.5mg q6h PRN, used 1-2x daily and now has scheduled 0.5mg in am.    HLD-Lipitor, lipid panel looks good.    History of movement disorder: Sinemet 3 times daily.  She is also on Mirapex.    Vit D deficiency: last 30.3, increased to 5000U daily with recheck in 6 wks    B12 deficiency: on supplement, no current level, will recheck 468.    Overactive bladder: Vesicare but also has an indwelling Molina catheter. This was primarily given due to her lack of mobility for comfort.  We will try to get her a voiding trial on 2/5.    Depression with insomnia:  takes trazodone at bedtime along with Effexor. Recently started on Vistaril, used limited doses with tylenol.    Profound debilitation sent from the nursing home to the TCU.         The care plan has been reviewed and all orders signed. Changes to care plan, if any, as noted. Otherwise, continue care plan of care.      Electronically signed by: Dexter Rico NP    .

## 2021-06-16 NOTE — TELEPHONE ENCOUNTER
Telephone Encounter by Anika Hdez LPN at 2/15/2019  2:43 PM     Author: Anika Hdez LPN Service: -- Author Type: Licensed Nurse    Filed: 2/15/2019  2:50 PM Encounter Date: 2/15/2019 Status: Signed    : Anika Hdez LPN (Licensed Nurse)       Date of Last Office Visit:1/21/19  Date of Next Office Visit: 4/22/19  No shows since last visit: none  Cancellations since last visit: none  ED visits since last visit: none    Medication Trazodone 150 mg date last ordered: 1/21/19  Qty: 30  Refills: 0    traZODone (DESYREL) 150 MG tablet 30 tablet 0 1/21/2019     Sig - Route: Take 1 tablet (150 mg total) by mouth at bedtime. - Oral      Lapse in therapy greater than 7 days: no  Medication refill request verified as identical to current order: yes except asking for 11 refills  Result of Last DAM, VPA, Li+ Level, CBC, or Carbamazepine Level (at or since last visit): N/A        []Eligibility - not seen in last year    []Supervision - no future appointment    []Compliance     []Verification - order discrepancy    []Controlled Medication    []90 - day supply request    [x]Other LPN pending medications    Current Medication list:        Medication Plan of Care at last office visit with MD/CNP:    PLAN:  Medication Adjustment:  We will decrease the patient's Remeron down to 30 mg a day.  We are going to decrease the patient's Effexor XR to 225 mg a day.  Staff will monitor for any changes in in 1-2 months will call us for possible further reduction in these medications and attempt to see how the patient does as well as consideration for alternative medication options.     Other: Patient will return to clinic in 3 months for further assessment and treatment the staff and patient agreed to return sooner or call if questions concerns or problems arise.     Continue with the support of the clinic, reassurance, and redirection. Staff monitoring and ongoing assessments per team plan. Current  psychotropic medication appears to represent the minimum effective dosage and appears medically necessary. We will continue to monitor and reassess. This team will utilize appropriate emergency services if necessary. I will make myself available if concerns or problems arise.     Norberto Johnson MD      [place], [place], and [place] : NA

## 2021-06-16 NOTE — PROGRESS NOTES
[facility]      Code Status:  FULL CODE  Visit Type: Review Of Multiple Medical Conditions     Facility:  [facility] [[id number]]           History of Present Illness: Alison Bashir is a 67 y.o. female who is currently admitted as a transfer from [facility] to the TCU.  She is a resident of a group home with underlying history of a traumatic brain injury, hypertension, coronary artery disease who presented to the emergency room after she fell on 12/17.  She was diagnosed with a right hip fracture and underwent right hip hemiarthroplasty. She was also noted to have a UTI and subsequently discharged to TCU.  She readmitted to the hospital with ongoing pain in her right leg and imaging revealed a periprosthetic fractures orthopedics was consulted.  They recommended repeat surgical intervention.  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  She subsequently underwent an ORIF of right femur with revision hemiarthroplasty of the right hip by Dr. Magaña on 1/11/18. She had an EBL 2000ml. She had developed hypotension intraop and in PACU requiring multiple blood products. She was admitted to ICU postop given hypotension and blood loss and on 1/17/2018, she developed a draining hematoma on her right hip and was noted to have interval displacement of greater trochanter on Xray. She received 2 units of pRBC 1/17 and underwent right hip irrigation and debridement with ORIF of the right greater trochanter. Her Hgb had been stable 7.5 - 8.0. Brilinta was on hold, resumed on 1/23/2018 with okay from Orthopedics and decreased ASA to 81mg daily from 325mg daily. Then on 1/24/2018, she had developed hypotension overnight and received 1U PRBCs. Brilinta stopped on 1/25/18 per Dr. Magaña for the foreseeable future, at least until her follow up appointment with Orthopedics in 10-14 days. Hypotension again developed on  1/25/2018 overnight, received 1L fluid bolus. Hemoglobin was stable. Hypotension was felt to be related to narcotics. Raised parameters to hold Dilaudid for SBP <110. Pain clinic following for pain med management. Stopped as needed Dilaudid on 1/24/2018 due to hypotension, decreased frequency of scheduled Dilaudid to every 6 hours on 1/25/2018, and added parameters to scheduled Dilaudid to hold for sedation or SBP < 110. She does have significant anxiety when transferring, fear of falling. She would benefit from seeing Psychiatry as an outpatient, refused this admission. Her pain medications may be adjusted as long as blood pressure tolerates. Her Brilinta is on hold until follow up with Orthopedics, and she was discharged on ASA 325mg daily, which can be decreased to 81mg daily when Brilinta restarted. Hemoglobin should be rechecked in 2 days. Her Metoprolol was stopped and should be restarted when blood pressure tolerates.      TCU: Alison is a 68 yo old with various concerns today. She had significant blood loss anemia secondary to blood loss and intraoperative hypotension and Hgb recheck is 8.2. No obvious signs of bleeding noted. Maintain ferrous sulfate. Pain management was consulted for pain management optimization and she is currently discharged on oral Dilaudid, though per low BPs switched to tramadol QID, effective. Previously she was still not moving in bed and had difficulty moving her right lower extremity at all. Xray ordered per nursing request, no issue identified and is now mobility improved.  She had high anxiety with transfers, started lorazepam PRN and recently schedule low dose BID, which is reported to be effective.  Now BPs have been normalized.  Last Hgb 11.2, wean Fe.    Patient denies pain, headache, chest pain, numbness or tingling, shortest of breath, eating or swallowing concerns, nausea or vomiting, diarrhea or bowel abnormalities, or no new integumentary concerns today.    Past Medical  History:   Diagnosis Date     Acute blood loss anemia      Alzheimer disease      CAD (coronary artery disease)      Chronic pain syndrome      Dementia      Depression      Hip fracture, right 2017     HTN (hypertension)      Kidney stone      PMB (postmenopausal bleeding)      RLS (restless legs syndrome)      Stented coronary artery      Traumatic brain injury      Unsteady gait     uses walker     UTI (lower urinary tract infection)     on antibiotic course     Past Surgical History:   Procedure Laterality Date      SECTION       CHOLECYSTECTOMY  May 2014     COMBINED HYSTEROSCOPY DIAGNOSTIC / D&C N/A 2014    Procedure: DILATION AND CURETTAGE WITH HYSTEROSCOPY;  Surgeon: Karime Cifuentes MD;  Location: [facility] [place];  Service:      CORONARY STENT PLACEMENT       HEMIARTHROPLASTY HIP Right 2017     INCISION AND DRAINAGE HIP Right 2018    ORIF REVISION      LUNG REMOVAL, PARTIAL       REVISION TOTAL HIP ARTHROPLASTY Right 01/10/2018     TONSILLECTOMY AND ADENOIDECTOMY       TOTAL KNEE ARTHROPLASTY Right 2016     TOTAL KNEE ARTHROPLASTY Left 2015     No family history on file.  Social History     Social History     Marital status: Single     Spouse name: N/A     Number of children: N/A     Years of education: N/A     Occupational History     Not on file.     Social History Main Topics     Smoking status: Never Smoker     Smokeless tobacco: Never Used     Alcohol use No     Drug use: No     Sexual activity: Not on file     Other Topics Concern     Not on file     Social History Narrative     Current Outpatient Prescriptions   Medication Sig Dispense Refill     acetaminophen (TYLENOL) 325 MG tablet Take 650 mg by mouth 4 (four) times a day.       aspirin 325 MG EC tablet Take 325 mg by mouth daily.       atorvastatin (LIPITOR) 40 MG tablet Take 40 mg by mouth at bedtime.       carbidopa-levodopa (SINEMET)  mg per tablet Take 2 tablets by mouth 3 (three) times a day.        cholecalciferol, vitamin D3, 5,000 unit Tab Take 5,000 Units by mouth Daily at 8:00 am..       cranberry 500 mg cap Take 500 mg by mouth 2 (two) times a day.       cyanocobalamin (VITAMIN B-12) 100 MCG tablet Take 100 mcg by mouth daily.       ferrous sulfate 325 (65 FE) MG tablet Take 1 tablet by mouth daily with breakfast.        lidocaine (LIDODERM) 5 % Place 1 patch on the skin daily. Remove & Discard patch within 12 hours or as directed by MD       LORazepam (ATIVAN) 0.5 MG tablet Take 0.5 mg by mouth every 6 (six) hours as needed for anxiety.       LORazepam (ATIVAN) 0.5 MG tablet Take 0.5 mg by mouth 2 (two) times a day.        multivitamin (MULTIVITAMIN) per tablet Take 1 tablet by mouth daily.       nitroglycerin (NITROSTAT) 0.4 MG SL tablet Place 0.4 mg under the tongue every 5 (five) minutes as needed.        nystatin (MYCOSTATIN) powder Apply 1 application topically daily.       polyethylene glycol (MIRALAX) 17 gram packet Take 17 g by mouth daily.       pramipexole (MIRAPEX) 0.5 MG tablet Take 0.5 mg by mouth bedtime.       psyllium (METAMUCIL) powder Take by mouth every morning. 1 PKT       senna-docusate (PERICOLACE) 8.6-50 mg tablet Take 2 tablets by mouth 2 (two) times a day.       solifenacin (VESICARE) 10 MG tablet Take 10 mg by mouth daily.       sorbitol 70 % solution Take 30 mL by mouth daily.       traMADol (ULTRAM) 50 mg tablet Take 50 mg by mouth 4 (four) times a day.       traZODone (DESYREL) 150 MG tablet Take 1 tablet (150 mg total) by mouth bedtime. 30 tablet 0     venlafaxine (EFFEXOR-XR) 75 MG 24 hr capsule Take 5 capsules (375 mg total) by mouth daily. 150 capsule 3     No current facility-administered medications for this visit.      Allergies   Allergen Reactions     Blood-Group Specific Substance Other (See Comments)     Patient has anti-K. Blood product orders maybe delayed. Draw one red top and 2 purple top tubes for all Type and Screen/Red blood Cell product orders     Fd And C  No.5 (Tartrazine)      GI UPSET     Ibuprofen Nausea And Vomiting     Morphine Rash     swelling         Review of Systems:    Constitutional: Negative.  Negative for fever, chills, has activity change, appetite change and fatigue.  Anxiety with mobility.  HENT: Negative for congestion and facial swelling.    Eyes: Negative for photophobia, redness and visual disturbance.   Respiratory: Negative for cough and chest tightness.    Cardiovascular: Negative for chest pain, palpitations and leg swelling.   Gastrointestinal: Negative for , diarrhea, constipation, blood in stool and abdominal distention.     Genitourinary: Negative.    Musculoskeletal: Negative.  Had severe right hip pain though improved. Better mobility.  Skin: Negative.    Neurological: Negative for dizziness, tremors, syncope, weakness, light-headedness and headaches.   Hematological: Does not bruise/bleed easily.   Psychiatric/Behavioral: Negative.  Anxiety improved.    Vitals:    02/21/18 1116   BP: 130/81   Pulse: 72   Resp: 18   Temp: 98.5  F (36.9  C)   SpO2: 95%       Physical Exam:    GENERAL: no acute distress. Cooperative in conversation. Pain and anxiety better controlled.   HEENT: pupils are equal, round and reactive. Oral mucosa is moist and intact.  RESP:Chest symmetric. Regular respiratory rate. No stridor. CTA. RA.  CVS: S1S2, no murmur, rub, or gallop.  ABD: Nondistended, soft. No constipation or diarrhea.  EXTREMITIES: No lower extremity edema.  Right hip incision is intact.  NEURO: no focal deficits. Alert and oriented x3.   PSYCH: within normal limits. No depression, has anxiety.  SKIN: warm dry intact,       Labs:    .     HEMOGLOBIN (01/26/2018 8:07 AM)  Only the most recent of 25 results within the time period is included.    HEMOGLOBIN (01/26/2018 8:07 AM)   Component Value Ref Range   HEMOGLOBIN  8.3 (L) 12.0 - 16.0 g/dL   MCV  88 80 - 100 fL     HEMOGLOBIN (01/26/2018 8:07 AM)   Specimen Performing Laboratory   [facility]    SENDOUT INTERNAL ZIP [id number]    [address] [place] [address]       HEMOGLOBIN (2018 8:07 AM)   Narrative                Back to top of Results      EXTRA TUBE LAVENDER (2018 10:14 AM)  Only the most recent of 2 results within the time period is included.    EXTRA TUBE LAVENDER (2018 10:14 AM)   Specimen Performing Laboratory   Blood [facility] LABORATORY    SENDOUT INTERNAL ZIP [id number]    [address] [place] [address]     Back to top of Results      TRANSFUSE RBC (NURSE COMMUNICATION ORDER) (2018 11:10 AM)  Only the most recent of 15 results within the time period is included.    TRANSFUSE RBC (NURSE COMMUNICATION ORDER) (2018 11:10 AM)   Specimen Performing Laboratory   Blood      Back to top of Results      ANTIBODY IDENTIFICATION EACH PANEL (2018 5:03 AM)  Only the most recent of 3 results within the time period is included.    ANTIBODY IDENTIFICATION EACH PANEL (2018 5:03 AM)   Component Value Ref Range   QUANTITY 1     PANEL JENA ID  Panel Antibody ID       ANTIBODY IDENTIFICATION EACH PANEL (2018 5:03 AM)   Specimen Performing Laboratory     [facility] LABORATORY BLOOD BANK    [address] [place] [address]     Back to top of Results      RED BLOOD CELLS EA UNIT (2018 5:03 AM)  Only the most recent of 18 results within the time period is included.    RED BLOOD CELLS EA UNIT (2018 5:03 AM)   Component Value Ref Range   CROSSMATCH Compatible Compatible   PRODUCT BLOOD TYPE O Neg     PRODUCT ID NUMBER [id number]     PRODUCT STATUS  /Released     PRODUCT DESCRIPTION  RBC AS-1 LR     PRODUCT CODE [id number]       RED BLOOD CELLS EA UNIT (2018 5:03 AM)   Specimen Performing Laboratory     [facility] LABORATORY BLOOD BANK    [address] [place] [address]     Back to top of Results      RBC W TYPE AND SCREEN (2018 4:47 AM)  Only the most  recent of 4 results within the time period is included.    RBC W TYPE AND SCREEN (01/24/2018 4:47 AM)   Component Value Ref Range   ABORH  O Rh Negative     ANTIBODY SCREEN Positive (A) Negative   SPECIMEN EXPIRATION DATE/TIME 01/27/18 23:59       RBC W TYPE AND SCREEN (01/24/2018 4:47 AM)   Specimen Performing Laboratory   Blood [facility] LABORATORY BLOOD BANK    [address] [place] [address]     Back to top of Results      ANTIBODY IDENTIFICATION LAB USE ONLY (01/24/2018 4:47 AM)  Only the most recent of 3 results within the time period is included.    ANTIBODY IDENTIFICATION LAB USE ONLY (01/24/2018 4:47 AM)   Component Value Ref Range   ANTIBODY IDENTIFICATION  Anti-[place] (A)       ANTIBODY IDENTIFICATION LAB USE ONLY (01/24/2018 4:47 AM)   Specimen Performing Laboratory   Blood [facility] LABORATORY BLOOD BANK    [address] [place] [address]     Back to top of Results      EXTRA TUBE GOLD/SST (01/20/2018 8:05 AM)  Only the most recent of 2 results within the time period is included.    EXTRA TUBE GOLD/SST (01/20/2018 8:05 AM)   Specimen Performing Laboratory   Blood [facility] LABORATORY    SENDOUT INTERNAL ZIP [id number]    [address] [place] [address]     Back to top of Results      XR PELVIS 1 VIEW PORTABLE (01/18/2018 10:14 AM)  Only the most recent of 3 results within the time period is included.    XR PELVIS 1 VIEW PORTABLE (01/18/2018 10:14 AM)   Narrative   XR PELVIS 1 VIEW PORTABLE    1/18/2018 10:14 AM        INDICATION: Follow-up right hip ORIF. History of right hip hemiarthroplasty.    COMPARISON: Hip radiograph, 01/17/2018        FINDINGS: There has been interval internal fixation of the right hip trochanteric fracture with good anatomic alignment of the fracture fragments. Evidence of previous right hip hemiarthroplasty. Hardware appears well seated and intact.       XR PELVIS 1 VIEW PORTABLE (01/18/2018 10:14 AM)   Procedure Note    Interface, Powerscribe - 01/18/2018 10:25 AM CST    XR PELVIS 1 VIEW PORTABLE  1/18/2018 10:14 AM    INDICATION: Follow-up right hip ORIF. History of right hip hemiarthroplasty.  COMPARISON: Hip radiograph, 01/17/2018    FINDINGS: There has been interval internal fixation of the right hip trochanteric fracture with good anatomic alignment of the fracture fragments. Evidence of previous right hip hemiarthroplasty. Hardware appears well seated and intact.     Back to top of Results      AEROBIC BACTERIAL CULTURE, STAIN (01/18/2018 8:24 AM)  Only the most recent of 2 results within the time period is included.    AEROBIC BACTERIAL CULTURE, STAIN (01/18/2018 8:24 AM)   Component Value Ref Range   CULTURE No Growth.     GRAM STAIN  1+ PMNs     GRAM STAIN  No organisms seen     GRAM STAIN  Gram stain performed by [place], [place]       AEROBIC BACTERIAL CULTURE, STAIN (01/18/2018 8:24 AM)   Specimen Performing Laboratory   Swab - Right Hip [facility]ORATORY-[facility]AT[facility]    [address]    [place] [address]     Back to top of Results      ANAEROBIC CULTURE (01/18/2018 8:24 AM)  Only the most recent of 2 results within the time period is included.    ANAEROBIC CULTURE (01/18/2018 8:24 AM)   Component Value Ref Range   CULTURE No anaerobes isolated       ANAEROBIC CULTURE (01/18/2018 8:24 AM)   Specimen Performing Laboratory   Swab - Right Hip [facility] LAB[facility]ATORY-[facility] LAB[facility]ATORY    [address]    [place] [address]     Back to top of Results      ENDOTRACHEAL TUBE (01/18/2018 8:13 AM)  ENDOTRACHEAL TUBE (01/18/2018 8:13 AM)   Narrative   EtJenniffer bernard CRNA     1/18/2018  8:13 AM    Procedure: ETT        Patient location during procedure: OR    ETT Properties    Mask Ventilation: oral airway and easy    Final Technique: direct laryngoscopy    Type: straight    Location: oral    Cuffed: yes    Tube Size: 7.0 mm    Stylet: yes    Laryngoscope Blade:  Quick    Blade Size: 2    Cormack-Lehane Grade View: 1    Insertion Attempts: 1    Placement Verification: auscultation and end tidal CO2    Assessment: pharynx clear, atraumatic and dentition unchanged    Secured at: 23    Measured From: teeth    Tooth guard used and removed: yes    Difficulty: 0 (not difficult)    Electronically signed by JENNIFFER MCFARLAND                                                                                                                                                                   ENDOTRACHEAL TUBE (01/18/2018 8:13 AM)   Procedure Note   Jenniffer Mcfarland, CRNA - 01/18/2018 8:13 AM CST    Procedure: ETT    Patient location during procedure: OR  ETT Properties  Mask Ventilation: oral airway and easy  Final Technique: direct laryngoscopy  Type: straight  Location: oral  Cuffed: yes  Tube Size: 7.0 mm  Stylet: yes  Laryngoscope Blade: Quick  Blade Size: 2  Cormack-Lehane Grade View: 1  Insertion Attempts: 1  Placement Verification: auscultation and end tidal CO2  Assessment: pharynx clear, atraumatic and dentition unchanged  Secured at: 23  Measured From: teeth  Tooth guard used and removed: yes  Difficulty: 0 (not difficult)  Electronically signed by JENNIFFER MCFARLAND                    Back to top of Results      EXTRA TUBE LIGHT GREEN (01/18/2018 5:29 AM)  EXTRA TUBE LIGHT GREEN (01/18/2018 5:29 AM)   Specimen Performing Laboratory   Blood [facility] LABORATORY    SENDOUT INTERNAL ZIP [id number]    [address] [place] [address]     Back to top of Results      NUCLEATED RED BLOOD CELLS AS PERCENT OF BLOOD LEUKOCYTES (01/18/2018 5:29 AM)  Only the most recent of 4 results within the time period is included.    NUCLEATED RED BLOOD CELLS AS PERCENT OF BLOOD LEUKOCYTES (01/18/2018 5:29 AM)   Component Value Ref Range   NRBC  0.0 %   ABS NRBC 0.0 thou /cu mm     NUCLEATED RED BLOOD CELLS AS PERCENT OF BLOOD LEUKOCYTES (01/18/2018 5:29 AM)   Specimen Performing Laboratory    Blood [facility] LABORATORY    SENDOUT INTERNAL ZIP [id number]    [address] [place] [address]       NUCLEATED RED BLOOD CELLS AS PERCENT OF BLOOD LEUKOCYTES (01/18/2018 5:29 AM)   Narrative                Back to top of Results      CBC with Platelets no Differential (01/18/2018 5:29 AM)  Only the most recent of 2 results within the time period is included.    CBC with Platelets no Differential (01/18/2018 5:29 AM)   Component Value Ref Range   WHITE BLOOD COUNT  6.1 4.5 - 11.0 thou/cu mm   RED BLOOD COUNT  2.77 (L) 4.00 - 5.20 mil/cu mm   HEMOGLOBIN  7.8 (L) 12.0 - 16.0 g/dL   HEMATOCRIT  24.1 (L) 33.0 - 51.0 %   MCV  87 80 - 100 fL   MCH  28.2 26.0 - 34.0 pg   MCHC  32.4 32.0 - 36.0 g/dL   RDW  15.4 11.5 - 15.5 %   PLATELET COUNT  240 140 - 440 thou/cu mm   MPV  10.0 6.5 - 11.0 fL     CBC with Platelets no Differential (01/18/2018 5:29 AM)   Specimen Performing Laboratory   Blood [facility] LABORATORY    SENDOUT INTERNAL ZIP [id number]    [address] [place] [address]       Recent Results (from the past 240 hour(s))   HM2(CBC w/o Differential)    Collection Time: 02/19/18  8:24 AM   Result Value Ref Range    WBC 4.4 4.0 - 11.0 thou/uL    RBC 4.30 3.80 - 5.40 mill/uL    Hemoglobin 11.3 (L) 12.0 - 16.0 g/dL    Hematocrit 39.0 35.0 - 47.0 %    MCV 91 80 - 100 fL    MCH 26.3 (L) 27.0 - 34.0 pg    MCHC 29.0 (L) 32.0 - 36.0 g/dL    RDW 15.4 (H) 11.0 - 14.5 %    Platelets 224 140 - 440 thou/uL    MPV 10.7 8.5 - 12.5 fL         Assessment/Plan:    Right hip fracture status post right hip hemiarthroplasty subsequently patient represented with a periprosthetic fx: ORIF of right femur with revision hemiarthroplasty of the right hip by Dr. Magaña on 1/11/18. EBL 2000ml. On 1/17/2018, she developed a draining hematoma right hip and was noted to have interval displacement of greater trochanter on Xray. She received 2 units of pRBC 1/17 and underwent right hip irrigation and debridement  with ORIF of the right greater trochanter. She has been discharged and currently with WBAT.    Nondisplaced fracture of the radial head:   Course complicated by nonunion currently orthopedics has recommended a sling for her.    Pain management:  seen by the pain clinic and started on Dilaudid for pain management along with Tylenol which has been changed to scheduled, unable to receive dilaudid per low BPs, start tramadol 50mg QID, dc flexeril and dilaudid per non use. She also has an underlying history of chronic pain. Tramadol has been effective.     DVT prophylaxis: on aspirin 325 daily.    [id number] and recheck hemoglobins.  Patient underwent hemorrhagic check in the hospital she required several units of blood transfusion the hospital discharge hemoglobin remained low. Decrease ferrous sulfate 325 mg to daily, recheck CBC was 11.2.    Recent history of coronary artery disease: status post stent placement on 3/17, she was unable to tolerate Brilinta 90 mg twice daily due to bleeding complications, currently on aspirin 325 daily which was increased from her home regimen of 81 mg. She can restart Brilinta when okayed by orthopedics.    Postoperative hypotension: initially felt to be due to anemia secondary to hemorrhagic shock and blood loss she was given multiple units of blood products.  Subsequently she was given fluid resuscitation as her hemoglobin was stable. Last Hgb 8.1, see above.  Was taken off metoprolol it has been recommended her dosage be increased if her blood pressures rebound. SBP , not orthostatic. No concern currently.    Anxiety disorder:  she is refusing to ambulate due to severe anxiety. Started lorazepam 0.5mg q6h PRN, used 1-2x daily and now has scheduled 0.5mg BID. Some improvement.    HLD-Lipitor, lipid panel looks good.    History of movement disorder: Sinemet 3 times daily.  She is also on Mirapex.    Vit D deficiency: last 30.3, increased to 5000U daily with recheck in 4  wks    B12 deficiency: on supplement, no current level, will recheck 468.    Overactive bladder: Vesicare but also has an indwelling Molina catheter. This was primarily given due to her lack of mobility for comfort. Removed. No issue.     Depression with insomnia:  takes trazodone at bedtime along with Effexor. Recently started on Vistaril, used limited doses with tylenol.    Profound debilitation sent from the nursing home to the TCU.        The care plan has been reviewed and all orders signed. Changes to care plan, if any, as noted. Otherwise, continue care plan of care.      Electronically signed by: Dexter Rico NP    .

## 2021-06-16 NOTE — PROGRESS NOTES
Pt is here for routine psychiatric med management follow up. Client is recovering after hip fracture, doing PT X 2/day, is complaining of pain, doing alright mentally.    [place] Date: 18  Query Report Page#: 1  Patient Rx History Report  LORI ORR  Search Criteria: Last Name 'Lori' and First Name 'Alison' and  =  and Request Period =  to  ' - 8 out of 8 Recipients Selected.  Fill Date Product, Str, Form Qty Days Pt ID Prescriber Written RX# N/R* Pharm **MED+  ---------- -------------------------------- ------ ---- --------- ---------- ---------- ------------ ----- --------- ------  2018 LORAZEPAM 0.5 MG TABLET 30.00 15 [id number] 2018 [id number] N [id number] 00.0  2018 TRAMADOL HCL 50 MG TABLET 15.00 5 [id number] 2018 [id number] N [id number] 15.0  2018 TRAMADOL HCL 50 MG TABLET 15.00 7 [id number] 2018 [id number] R [id number] 10.2018 TRAMADOL HCL 50 MG TABLET 15.00 7 [id number] 2018 [id number] N [id number] 10.2018 TRAMADOL HCL 50 MG TABLET 30.00 8 [id number] 2018 [id number] R [id number] 18.75  02/15/2018 TRAMADOL HCL 50 MG TABLET 30.00 8 [id number] 2018 [id number] R [id number] 18.2018 LORAZEPAM 0.5 MG TABLET 30.00 5 [id number] 2018 [id number] N [id number] 00.0  2018 TRAMADOL HCL 50 MG TABLET 31.00 8 [id number] 2018 [id number] N [id number] 19.38  2018 LORAZEPAM 0.5 MG TABLET 15.00 4 [id number] 2018 [id number] N [id number] 00.0  2018 TRAMADOL HCL 50 MG TABLET 30.00 8 [id number] 2018 [id number] N [id number] 18.75  2018 HYDROMORPHONE 2 MG TABLET 30.00 7 [id number] 2018 [id number] N [id number] 34.29  2018 OXYCODONE HCL 10 MG TABLET 30.00 5 [id number] 2018 [id number] N [id number] 90.0  2018 OXYCODONE HCL 10 MG TABLET 30.00 5 [id number] 2018  [id number]  [id number] 90.0  2017 HYDROMORPHONE 2 MG TABLET 90.00 6 [id number] 2017 [id number]  [id number] 120.0  *N/R N=New R=Refill  +MED Daily  Prescribers for prescriptions listed  ----------------------------------------------------------------------------------------------------------------------------------  [id number] JAMARCUS HERNANDEZ DO; [facility], [address] [address]  [id number] JOHNSON, MICHAEL DUANE NP; [facility], [address], [place] [address]  [id number] [facility]; [address], [place] [address]  [id number] YOLY RIDLEY; [facility], [address], [place] [address]  Pharmacies that dispensed prescriptions listed  ----------------------------------------------------------------------------------------------------------------------------------  [id number] [facility];  [facility] [address], [facility] [address],  [id number] [id number]; [address] [facility], [address], [facility] [address],  Patients that match search criteria  ----------------------------------------------------------------------------------------------------------------------------------  [id number] LORI ORR,  49; [address], [place] [address]  [id number] LORI ORR,  49; [address] [facility] [id number], [place] [address]  [id number] LORI LIRA,  49; [address] [facility] , [place] [address]  [id number] LORI ORR,  49; [address], [place] [address]  [id number] LORI ORR,  49; [address], [place] [address]  [id number] LORI ORR,  49; [facility] [address], [place] [address]  **Per CDC guidance, the conversion factors and associated daily morphine milligram equivalents for drugs prescribed as part of  medication-assisted treatment for opioid use disorder should not be used to benchmark against dosage thresholds  meant for opioids  prescribed for pain.    Correct pharmacy verified with patient and confirmed in snapshot? [x] yes []no    Charge captured ? [x] yes  [] no    Medications Phoned  to Pharmacy [] yes [x]no  Name of Pharmacist:  List Medications, including dose, quantity and instructions      Medication Prescriptions given to patient   [] yes  [x] no   List the name of the drug the prescription was written for.       Medications ordered this visit were e-scribed.  Verified by order class [] yes  [x] no    Medication changes or discontinuations were communicated to patient's pharmacy: [] yes  [x] no    UA collected [] yes  [x] no    [place] Prescription Monitoring Program Reviewed? [x] yes  [] no    Referrals were made to:  none     Future appointment was made: [] yes  [x] no    Dictation completed at time of chart check: [x] yes  [] no    I have checked the documentation for today s encounters and the above information has been reviewed and completed.

## 2021-06-16 NOTE — PROGRESS NOTES
[facility]      Code Status:  FULL CODE  Visit Type: Review Of Multiple Medical Conditions     Facility:  [facility] [[id number]]           History of Present Illness: Alison Bashir is a 67 y.o. female who is currently admitted as a transfer from [facility] to the TCU.  This is a elderly 67-year-old resident of a group home with underlying history of a traumatic brain injury along with hypertension and coronary artery disease who presented to the emergency room after she fell on 12/17.  She was diagnosed with a right hip fracture and underwent right hip hemiarthroplasty she was also noted to have a UTI and subsequently discharged to TCU.  She readmitted to the hospital with ongoing pain in her right leg and imaging revealed a periprosthetic fractures orthopedics was consulted.  They recommended repeat surgical intervention.  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  She underwent open-reduction and internal fixation of right femur with revision hemiarthroplasty of the right hip by Dr. Magaña on 1/11/18. EBL 2000ml.      She had hypotension intraop and in PACU requiring multiple blood products. She was admitted to ICU postop given hypotension and blood loss. Hemorraghic shock resolved . On 1/17/2018, she developed a draining hematoma right hip and was noted to have interval displacement of greater trochanter on Xray. She received 2 units of pRBC 1/17 and underwent right hip irrigation and debridement with ORIF of the right greater trochante hemoglobin , had been stable 7.5 - 8.0. Brilinta was on hold, resumed 1/23/2018 with okay from Orthopedics and decreased ASA to 81mg daily from 325mg daily.      On 1/24/2018, she had hypotension overnight and received 1U Packed red blood cells. Brilinta stopped on 1/25/18 per Dr. Magaña for the foreseeable future, at least until her follow up appointment with  Orthopedics in 10-14 days. Hypotension again 1/25/2018 overnight, received 1L fluid bolus. Hemoglobin stable. Hypotension was felt to be related to narcotics. Raised parameters to hold Dilaudid for SBP <110.      She completed a 7 day course of Ceftriaxone for Morganella morganii UTI.  Her retention is resolved and she is voiding.     Pain clinic following for pain med management. Stopped as needed Dilaudid on 1/24/2018 due to hypotension, decreased frequency of scheduled Dilaudid to every 6 hours on 1/25/2018, and added parameters to scheduled Dilaudid to hold for sedation or SBP < 110. She does have significant anxiety when transferring, fear of falling. She would benefit from seeing Psychiatry as an outpatient, refused this admission. Her pain medications may be adjusted as long as blood pressure tolerates.   , her Brilinta is on hold until follow up with Orthopedics, and she was discharged on ASA 325mg daily, which can be decreased to 81mg daily when Brilinta restarted. . Her Metoprolol was stopped and should be restarted when blood pressure tolerates.      She was also found to have non-displaced radial head fracture. Ortho recommended sling. .  Care plan reviewed both with nursing as well as therapy.  Her transfers remain erratic.  They feel she can do better but she chooses not to do so and is limited by anxiety. Seen walking today  Recheck hemoglobin has been stable.   Her Brilinta is still so far not been restarted in order to follow-up has been done with no change her recheck hemoglobin has not be greater than 11 and she has no more any bleeding concerns      Past Medical History:   Diagnosis Date     Acute blood loss anemia      Alzheimer disease      CAD (coronary artery disease)      Chronic pain syndrome      Dementia      Depression      Hip fracture, right 12/21/2017     HTN (hypertension)      Kidney stone      PMB (postmenopausal bleeding)      RLS (restless legs syndrome)      Stented coronary  artery      Traumatic brain injury      Unsteady gait     uses walker     UTI (lower urinary tract infection)     on antibiotic course     Past Surgical History:   Procedure Laterality Date      SECTION       CHOLECYSTECTOMY  May 2014     COMBINED HYSTEROSCOPY DIAGNOSTIC / D&C N/A 2014    Procedure: DILATION AND CURETTAGE WITH HYSTEROSCOPY;  Surgeon: Karime Cifuentes MD;  Location: [facility];  Service:      CORONARY STENT PLACEMENT       HEMIARTHROPLASTY HIP Right 2017     INCISION AND DRAINAGE HIP Right 2018    ORIF REVISION      LUNG REMOVAL, PARTIAL       REVISION TOTAL HIP ARTHROPLASTY Right 01/10/2018     TONSILLECTOMY AND ADENOIDECTOMY       TOTAL KNEE ARTHROPLASTY Right 2016     TOTAL KNEE ARTHROPLASTY Left 2015     No family history on file.  Social History     Social History     Marital status: Single     Spouse name: N/A     Number of children: N/A     Years of education: N/A     Occupational History     Not on file.     Social History Main Topics     Smoking status: Never Smoker     Smokeless tobacco: Never Used     Alcohol use No     Drug use: No     Sexual activity: Not on file     Other Topics Concern     Not on file     Social History Narrative     Current Outpatient Prescriptions   Medication Sig Dispense Refill     acetaminophen (TYLENOL) 325 MG tablet Take 650 mg by mouth 4 (four) times a day.       aspirin 325 MG EC tablet Take 325 mg by mouth daily.       atorvastatin (LIPITOR) 40 MG tablet Take 40 mg by mouth at bedtime.       carbidopa-levodopa (SINEMET)  mg per tablet Take 2 tablets by mouth 3 (three) times a day.       cholecalciferol, vitamin D3, 5,000 unit Tab Take 5,000 Units by mouth Daily at 8:00 am..       cranberry 500 mg cap Take 500 mg by mouth 2 (two) times a day.       cyanocobalamin (VITAMIN B-12) 100 MCG tablet Take 100 mcg by mouth daily.       ferrous sulfate 325 (65 FE) MG tablet Take 1 tablet by mouth daily with breakfast.         lidocaine (LIDODERM) 5 % Place 1 patch on the skin daily. Remove & Discard patch within 12 hours or as directed by MD       LORazepam (ATIVAN) 0.5 MG tablet Take 0.5 mg by mouth every 6 (six) hours as needed for anxiety.       LORazepam (ATIVAN) 0.5 MG tablet Take 0.5 mg by mouth 2 (two) times a day.        multivitamin (MULTIVITAMIN) per tablet Take 1 tablet by mouth daily.       nitroglycerin (NITROSTAT) 0.4 MG SL tablet Place 0.4 mg under the tongue every 5 (five) minutes as needed.        nystatin (MYCOSTATIN) powder Apply 1 application topically daily.       polyethylene glycol (MIRALAX) 17 gram packet Take 17 g by mouth daily.       pramipexole (MIRAPEX) 0.5 MG tablet Take 0.5 mg by mouth bedtime.       psyllium (METAMUCIL) powder Take by mouth every morning. 1 PKT       senna-docusate (PERICOLACE) 8.6-50 mg tablet Take 2 tablets by mouth 2 (two) times a day.       solifenacin (VESICARE) 10 MG tablet Take 10 mg by mouth daily.       sorbitol 70 % solution Take 30 mL by mouth daily.       traMADol (ULTRAM) 50 mg tablet Take 50 mg by mouth 4 (four) times a day.       traZODone (DESYREL) 150 MG tablet Take 1 tablet (150 mg total) by mouth bedtime. 30 tablet 0     venlafaxine (EFFEXOR-XR) 75 MG 24 hr capsule Take 5 capsules (375 mg total) by mouth daily. 150 capsule 3     No current facility-administered medications for this visit.      Allergies   Allergen Reactions     Blood-Group Specific Substance Other (See Comments)     Patient has anti-K. Blood product orders maybe delayed. Draw one red top and 2 purple top tubes for all Type and Screen/Red blood Cell product orders     Fd And C No.5 (Tartrazine)      GI UPSET     Ibuprofen Nausea And Vomiting     Morphine Rash     swelling         Review of Systems:    Constitutional: Negative.  Negative for fever, chills, HAS activity change, appetite change and fatigue.   HENT: Negative for congestion and facial swelling.    Eyes: Negative for photophobia, redness and  visual disturbance.   Respiratory: Negative for cough and chest tightness.    Cardiovascular: Negative for chest pain, palpitations and leg swelling.   Gastrointestinal: Negative for , diarrhea, constipation, blood in stool and abdominal distention.     Genitourinary: Negative.    Musculoskeletal: Negative.    Has  right hip pain  Skin: Negative.    Neurological: Negative for dizziness, tremors, syncope, weakness, light-headedness and headaches.   Hematological: Does not bruise/bleed easily.   Psychiatric/Behavioral: Negative.     Vitals:    03/05/18 1021   BP: 117/70   Pulse: 80   Temp: 98  F (36.7  C)       Physical Exam:    GENERAL: no acute distress. Cooperative in conversation.   HEENT: pupils are equal, round and reactive. Oral mucosa is moist and intact.  RESP:Chest symmetric. Regular respiratory rate. No stridor.  CVS: S1S2  ABD: Nondistended, soft.  EXTREMITIES: No lower extremity edema.  Right hip incision is healed.  NEURO: non focal. Alert and oriented x3.   PSYCH: within normal limits. No depression or anxiety.  SKIN: warm dry intact     Labs:    Lab Results   Component Value Date    WBC 4.4 02/19/2018    HGB 11.3 (L) 02/19/2018    HCT 39.0 02/19/2018    MCV 91 02/19/2018     02/19/2018       Assessment/Plan:    Right hip fracture status post right hip hemiarthroplasty subsequently patient represented with a periprosthetic fracture  She underwent open-reduction and internal fixation of right femur with revision hemiarthroplasty of the right hip by Dr. Magaña on 1/11/18. EBL 2000ml.   . On 1/17/2018, she developed a draining hematoma right hip and was noted to have interval displacement of greater trochanter on Xray. She received 2 units of pRBC 1/17 and underwent right hip irrigation and debridement with ORIF of the right greater trochante  Saw orthopedics and currently she is weightbearing as tolerated but advised to avoid active abduction.    Nondisplaced fracture of the radial head .  Course  complicated by nonunion currently orthopedics has recommended a sling for her  HAS NO PAIN.    Pain management -now on tylenol and tramdol    DVT prophylaxis on aspirin 325.switch to asa 81mg    SRWE-ajfrvm-ri and recheck hemoglobins.  Patient underwent hemorrhagic check in the hospital she required several units of blood transfusion the hospital discharge hemoglobin remained low  R/C 11.3  Recent history of coronary artery disease status post stent placement on 3/17 she was unable to tolerate Brilinta 90 mg twice daily due to bleeding complications  .  She is currently on aspirin 325 daily which was increased from her home regimen of 81 mg and she can restart Brilinta when okayed by orthopedics  Unfortunately this has not been resumed as ordered my plan is to resume Brilinta 90 mg.   DC her aspirin 325 down to 81 mg and have her follow cardiology will also notify orthopedics of the change  Plan will also be to recheck a CBC if she has another drop will DC Brilinta again    Postoperative hypotension -initially felt to be due to anemia secondary to hemorrhagic shock and blood loss she was given multiple units of blood products.  Subsequently she was given fluid resuscitation as her hemoglobin was stable  taken off metoprolol it has been recommended her dosage be increased if her blood pressures rebound.  Blood pressures remain low.    Anxiety disorder she is now on no Ativan 0.5 twice daily and taken OFF as needed.  HLD-she is on Lipitor  History of movement disorder she takes Sinemet 3 times daily.  She is also on Mirapex  B12 deficiency on supplement  Overactive bladder currently is on Vesicare   She is currently in a voiding trial and a catheter has been removed she no longer complains of urinary frequency and urgency    UTI in a patient who had an indwelling Molina catheter which was subsequently removed she is not having any symptoms currently of burning frequency and urgency culture grew the same organisms which  she had before  Cultures grow Morganella which is what she had previously in the hospital for which she was treated.  I suspect this is most likely colonization from the indwelling Molina catheter  NO dysuria or any other symptoms reported by patient today    Depression with insomnia takes trazodone at bedtime along with Effexor  Remains anxious will see how Vistaril helps that if not effective he may need to see psychiatric    Profound debilitation sent from the nursing home to the TCU.  She continues to remain off metoprolol due to postoperative hypertension.    Blood pressures remain low but stable.  Care plan reviewed with therapy.  Transfers remain erratic due to anxiety. CPT 4.5/6  Continue with her current care plan.  PT is reporting anxiety continues to hold back her progress she is making slow progress walking 50 feet with contact-guard assistance transfers still requires mod assist  Total time spent was 35 minutes, more than half of it was in face-to-face counseling regarding disease state, treatment, side effects, documentation, review of clinical data and coordination of care      Electronically signed by: NERY Sosa    .

## 2021-06-16 NOTE — PROGRESS NOTES
Outpatient Followup Psychiatric Evaluation      Pertinent History: Patient presents today for the purposes of medication management.  She has a history of a mood disorder and also with prior psychotic symptoms.  Per the patient's request we have been tapering the Zyprexa and that was discontinued in 2017.  We did increase Remeron at that time.  There were no medication changes at the last visit as she was doing fairly well from a mood and behavior standpoint.    Current Symptoms:   Patient tells me she was quite depressed after she had broken her hip and became immobile.  She states she likes where she is staying now.  She is in a rehab facility.  She reports she is making progress and her mood is improving.  The staff agrees.  Apparently she did have another physician prescribed some Ativan during this process which is been helpful.  The patient states she sleeping well.  No change in cognition or appetite.  No side effects to the medication.  She is hoping to be able to return home soon.  She does however report she is being treated well where she is.  No psychosis.  No suicidality.  No side effects to the medication.  She also is requesting no change in the treatment plan.          Current Medications: Please see chart    Medication Compliance: Yes    Side Effects to Medications: No      Vitals:  Wt Readings from Last 3 Encounters:   03/07/18 197 lb (89.4 kg)   03/05/18 201 lb (91.2 kg)   02/28/18 200 lb (90.7 kg)     Temp Readings from Last 3 Encounters:   03/12/18 98.1  F (36.7  C) (Oral)   03/07/18 98  F (36.7  C)   03/05/18 98  F (36.7  C)     BP Readings from Last 3 Encounters:   03/12/18 112/62   03/07/18 (!) 142/93   03/05/18 117/70     Pulse Readings from Last 3 Encounters:   03/12/18 83   03/07/18 81   03/05/18 80         Mental Status Exam:   Alert.  Better eye contact.  Tracking and participating well.  No obvious pain or shortness of breath.  Attention and concentration appear adequate.  She is able  to track and follow.  Mood does not appear to be significantly depressed although her affect is flat she is not irritable or agitated.  No lability.  Speech is monotone but she is able to initiate.  Sentence structure is intact.  Not pressured or rambling.  Answers are consistent but somewhat vague.  Not pressured.  Thought content does not show any obvious psychosis.  There is no suicidal or homicidal ideation.  Thought formation does not show the patient to be loose.  She is tracking fairly well.  Insight, judgment and memory are all baseline and unchanged.  Fund of knowledge is unchanged.    Diagnosis managed and treated at today's visit :      Major depressive disorder   History of psychosis NOS    Plan:  Medication Adjustment:  No medication changes at this time.    Other: Patient will return to clinic in 3 months for further assessment and treatment the staff and patient agreed to return sooner or call if questions concerns or problems arise.    Continue with the support of the clinic, reassurance, and redirection. Staff monitoring and ongoing assessments per team plan. Current psychotropic medication appears to represent the minimum effective dosage and appears medically necessary. We will continue to monitor and reassess. This team will utilize appropriate emergency services if necessary. I will make myself available if concerns or problems arise.    Norberto Johnson

## 2021-06-16 NOTE — PROGRESS NOTES
[facility]      Code Status:  FULL CODE  Visit Type: Review Of Multiple Medical Conditions     Facility:  [facility] [[id number]]           History of Present Illness: Alison Bashir is a 67 y.o. female who is currently admitted as a transfer from [facility] to the TCU.  She is a resident of a group home with underlying history of a traumatic brain injury, hypertension, coronary artery disease who presented to the emergency room after she fell on 12/17.  She was diagnosed with a right hip fracture and underwent right hip hemiarthroplasty. She was also noted to have a UTI and subsequently discharged to TCU.  She readmitted to the hospital with ongoing pain in her right leg and imaging revealed a periprosthetic fractures orthopedics was consulted.  They recommended repeat surgical intervention.  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  She subsequently underwent an ORIF of right femur with revision hemiarthroplasty of the right hip by Dr. Magaña on 1/11/18. She had an EBL 2000ml. She had developed hypotension intraop and in PACU requiring multiple blood products. She was admitted to ICU postop given hypotension and blood loss and on 1/17/2018, she developed a draining hematoma on her right hip and was noted to have interval displacement of greater trochanter on Xray. She received 2 units of pRBC 1/17 and underwent right hip irrigation and debridement with ORIF of the right greater trochanter. Her Hgb had been stable 7.5 - 8.0. Brilinta was on hold, resumed on 1/23/2018 with okay from Orthopedics and decreased ASA to 81mg daily from 325mg daily. Then on 1/24/2018, she had developed hypotension overnight and received 1U PRBCs. Brilinta stopped on 1/25/18 per Dr. Magaña for the foreseeable future, at least until her follow up appointment with Orthopedics in 10-14 days. Hypotension again developed on  1/25/2018 overnight, received 1L fluid bolus. Hemoglobin was stable. Hypotension was felt to be related to narcotics. Raised parameters to hold Dilaudid for SBP <110. Pain clinic following for pain med management. Stopped as needed Dilaudid on 1/24/2018 due to hypotension, decreased frequency of scheduled Dilaudid to every 6 hours on 1/25/2018, and added parameters to scheduled Dilaudid to hold for sedation or SBP < 110. She does have significant anxiety when transferring, fear of falling. She would benefit from seeing Psychiatry as an outpatient, refused this admission. Her pain medications may be adjusted as long as blood pressure tolerates. Her Brilinta is on hold until follow up with Orthopedics, and she was discharged on ASA 325mg daily, which can be decreased to 81mg daily when Brilinta restarted. Hemoglobin should be rechecked in 2 days. Her Metoprolol was stopped and should be restarted when blood pressure tolerates.      TCU: Alison is a 66 yo old with various concerns today. She had significant blood loss anemia secondary to blood loss and intraoperative hypotension and Hgb recheck is 8.2. No obvious signs of bleeding noted. Maintain ferrous sulfate. Pain management was consulted for pain management optimization and she is currently discharged on oral Dilaudid, though per low BPs switched to tramadol QID, effective. Previously she was still not moving in bed and had difficulty moving her right lower extremity at all. Xray ordered per nursing request, no issue identified and is now mobility improved.  She had high anxiety with transfers, started lorazepam PRN and recently schedule low dose BID, which is reported to be effective.  Now BPs have been normalized.  Last Hgb 10.7. Reporting more pain, will increase tramadol dosing and monitor response.     Patient denies pain, headache, chest pain, numbness or tingling, shortest of breath, eating or swallowing concerns, nausea or vomiting, diarrhea or bowel  abnormalities, or no new integumentary concerns today.    Past Medical History:   Diagnosis Date     Acute blood loss anemia      Alzheimer disease      CAD (coronary artery disease)      Chronic pain syndrome      Dementia      Depression      Hip fracture, right 2017     HTN (hypertension)      Kidney stone      PMB (postmenopausal bleeding)      RLS (restless legs syndrome)      Stented coronary artery      Traumatic brain injury      Unsteady gait     uses walker     UTI (lower urinary tract infection)     on antibiotic course     Past Surgical History:   Procedure Laterality Date      SECTION       CHOLECYSTECTOMY  May 2014     COMBINED HYSTEROSCOPY DIAGNOSTIC / D&C N/A 2014    Procedure: DILATION AND CURETTAGE WITH HYSTEROSCOPY;  Surgeon: Karime Cifuentes MD;  Location: [facility];  Service:      CORONARY STENT PLACEMENT       HEMIARTHROPLASTY HIP Right 2017     INCISION AND DRAINAGE HIP Right 2018    ORIF REVISION      LUNG REMOVAL, PARTIAL       REVISION TOTAL HIP ARTHROPLASTY Right 01/10/2018     TONSILLECTOMY AND ADENOIDECTOMY       TOTAL KNEE ARTHROPLASTY Right 2016     TOTAL KNEE ARTHROPLASTY Left 2015     No family history on file.  Social History     Social History     Marital status: Single     Spouse name: N/A     Number of children: N/A     Years of education: N/A     Occupational History     Not on file.     Social History Main Topics     Smoking status: Never Smoker     Smokeless tobacco: Never Used     Alcohol use No     Drug use: No     Sexual activity: Not on file     Other Topics Concern     Not on file     Social History Narrative     Current Outpatient Prescriptions   Medication Sig Dispense Refill     acetaminophen (TYLENOL) 325 MG tablet Take 650 mg by mouth 4 (four) times a day.       aspirin 325 MG EC tablet Take 325 mg by mouth daily.       atorvastatin (LIPITOR) 40 MG tablet Take 40 mg by mouth at bedtime.       carbidopa-levodopa (SINEMET)   mg per tablet Take 2 tablets by mouth 3 (three) times a day.       cholecalciferol, vitamin D3, 5,000 unit Tab Take 5,000 Units by mouth Daily at 8:00 am..       cranberry 500 mg cap Take 500 mg by mouth 2 (two) times a day.       cyanocobalamin (VITAMIN B-12) 100 MCG tablet Take 100 mcg by mouth daily.       ferrous sulfate 325 (65 FE) MG tablet Take 1 tablet by mouth daily with breakfast.        lidocaine (LIDODERM) 5 % Place 1 patch on the skin daily. Remove & Discard patch within 12 hours or as directed by MD       LORazepam (ATIVAN) 0.5 MG tablet Take 0.5 mg by mouth every 6 (six) hours as needed for anxiety.       LORazepam (ATIVAN) 0.5 MG tablet Take 0.5 mg by mouth 2 (two) times a day.        multivitamin (MULTIVITAMIN) per tablet Take 1 tablet by mouth daily.       nitroglycerin (NITROSTAT) 0.4 MG SL tablet Place 0.4 mg under the tongue every 5 (five) minutes as needed.        nystatin (MYCOSTATIN) powder Apply 1 application topically daily.       polyethylene glycol (MIRALAX) 17 gram packet Take 17 g by mouth daily.       pramipexole (MIRAPEX) 0.5 MG tablet Take 0.5 mg by mouth bedtime.       psyllium (METAMUCIL) powder Take by mouth every morning. 1 PKT       senna-docusate (PERICOLACE) 8.6-50 mg tablet Take 2 tablets by mouth 2 (two) times a day.       solifenacin (VESICARE) 10 MG tablet Take 10 mg by mouth daily.       sorbitol 70 % solution Take 30 mL by mouth daily.       traMADol (ULTRAM) 50 mg tablet Take 50 mg by mouth 4 (four) times a day.       traZODone (DESYREL) 150 MG tablet Take 1 tablet (150 mg total) by mouth bedtime. 30 tablet 0     venlafaxine (EFFEXOR-XR) 75 MG 24 hr capsule Take 5 capsules (375 mg total) by mouth daily. 150 capsule 3     No current facility-administered medications for this visit.      Allergies   Allergen Reactions     Blood-Group Specific Substance Other (See Comments)     Patient has anti-K. Blood product orders maybe delayed. Draw one red top and 2 purple top  tubes for all Type and Screen/Red blood Cell product orders     Fd And C No.5 (Tartrazine)      GI UPSET     Ibuprofen Nausea And Vomiting     Morphine Rash     swelling         Review of Systems:    Constitutional: Negative.  Negative for fever, chills, has activity change, appetite change and fatigue.  Anxiety with mobility.  HENT: Negative for congestion and facial swelling.    Eyes: Negative for photophobia, redness and visual disturbance.   Respiratory: Negative for cough and chest tightness.    Cardiovascular: Negative for chest pain, palpitations and leg swelling.   Gastrointestinal: Negative for , diarrhea, constipation, blood in stool and abdominal distention.     Genitourinary: Negative.    Musculoskeletal: Negative.  Had severe right hip pain though improved. Better mobility.  Skin: Negative.    Neurological: Negative for dizziness, tremors, syncope, weakness, light-headedness and headaches.   Hematological: Does not bruise/bleed easily.   Psychiatric/Behavioral: Negative.  Anxiety improved.    Vitals:    03/07/18 1046   BP: (!) 142/93   Pulse: 81   Resp: 18   Temp: 98  F (36.7  C)   SpO2: 95%       Physical Exam:    GENERAL: no acute distress. Cooperative in conversation. Pain and anxiety better controlled.   HEENT: pupils are equal, round and reactive. Oral mucosa is moist and intact.  RESP:Chest symmetric. Regular respiratory rate. No stridor. CTA. RA.  CVS: S1S2, no murmur, rub, or gallop.  ABD: Nondistended, soft. No constipation or diarrhea.  EXTREMITIES: No lower extremity edema.  Right hip incision is intact.  NEURO: no focal deficits. Alert and oriented x3.   PSYCH: within normal limits. No depression, has anxiety.  SKIN: warm dry intact,       Labs:      HEMOGLOBIN (01/26/2018 8:07 AM)  Only the most recent of 25 results within the time period is included.    HEMOGLOBIN (01/26/2018 8:07 AM)   Component Value Ref Range   HEMOGLOBIN  8.3 (L) 12.0 - 16.0 g/dL   MCV  88 80 - 100 fL     HEMOGLOBIN  (2018 8:07 AM)   Specimen Performing Laboratory   Blood [facility] LABORATORY    SENDOUT INTERNAL ZIP [id number]    [address] [place] [address]       HEMOGLOBIN (2018 8:07 AM)   Narrative                Back to top of Results      EXTRA TUBE LAVENDER (2018 10:14 AM)  Only the most recent of 2 results within the time period is included.    EXTRA TUBE LAVENDER (2018 10:14 AM)   Specimen Performing Laboratory   Blood [facility] LABORAT[facility]    SENDOUT INTERNAL ZIP [id number]    [address] [place] [address]     Back to top of Results      TRANSFUSE RBC (NURSE COMMUNICATION ORDER) (2018 11:10 AM)  Only the most recent of 15 results within the time period is included.    TRANSFUSE RBC (NURSE COMMUNICATION ORDER) (2018 11:10 AM)   Specimen Performing Laboratory   Blood      Back to top of Results      ANTIBODY IDENTIFICATION EACH PANEL (2018 5:03 AM)  Only the most recent of 3 results within the time period is included.    ANTIBODY IDENTIFICATION EACH PANEL (2018 5:03 AM)   Component Value Ref Range   QUANTITY 1     PANEL JENA ID  Panel Antibody ID       ANTIBODY IDENTIFICATION EACH PANEL (2018 5:03 AM)   Specimen Performing Laboratory     [facility]ORAT[facility] BLOOD BANK    [address] [place] [address]     Back to top of Results      RED BLOOD CELLS EA UNIT (2018 5:03 AM)  Only the most recent of 18 results within the time period is included.    RED BLOOD CELLS EA UNIT (2018 5:03 AM)   Component Value Ref Range   CROSSMATCH Compatible Compatible   PRODUCT BLOOD TYPE O Neg     PRODUCT ID NUMBER [id number]     PRODUCT STATUS  /Released     PRODUCT DESCRIPTION  RBC AS-1 LR     PRODUCT CODE [id number]       RED BLOOD CELLS EA UNIT (2018 5:03 AM)   Specimen Performing Laboratory     [facility] BLOOD BANK    [address] [place] [address]     Back to top of  Results      RBC W TYPE AND SCREEN (01/24/2018 4:47 AM)  Only the most recent of 4 results within the time period is included.    RBC W TYPE AND SCREEN (01/24/2018 4:47 AM)   Component Value Ref Range   ABORH  O Rh Negative     ANTIBODY SCREEN Positive (A) Negative   SPECIMEN EXPIRATION DATE/TIME 01/27/18 23:59       RBC W TYPE AND SCREEN (01/24/2018 4:47 AM)   Specimen Performing Laboratory   Blood [facility] LABORATORY BLOOD BANK    [address] [place] [address]     Back to top of Results      ANTIBODY IDENTIFICATION LAB USE ONLY (01/24/2018 4:47 AM)  Only the most recent of 3 results within the time period is included.    ANTIBODY IDENTIFICATION LAB USE ONLY (01/24/2018 4:47 AM)   Component Value Ref Range   ANTIBODY IDENTIFICATION  Anti-Maite (A)       ANTIBODY IDENTIFICATION LAB USE ONLY (01/24/2018 4:47 AM)   Specimen Performing Laboratory   Blood [facility] LABORATORY BLOOD BANK    [address] [place] [address]     Back to top of Results      EXTRA TUBE GOLD/SST (01/20/2018 8:05 AM)  Only the most recent of 2 results within the time period is included.    EXTRA TUBE GOLD/SST (01/20/2018 8:05 AM)   Specimen Performing Laboratory   Blood [facility] LABORATORY    SENDOUT INTERNAL ZIP [id number]    [address] [place] [address]     Back to top of Results      XR PELVIS 1 VIEW PORTABLE (01/18/2018 10:14 AM)  Only the most recent of 3 results within the time period is included.    XR PELVIS 1 VIEW PORTABLE (01/18/2018 10:14 AM)   Narrative   XR PELVIS 1 VIEW PORTABLE    1/18/2018 10:14 AM        INDICATION: Follow-up right hip ORIF. History of right hip hemiarthroplasty.    COMPARISON: Hip radiograph, 01/17/2018        FINDINGS: There has been interval internal fixation of the right hip trochanteric fracture with good anatomic alignment of the fracture fragments. Evidence of previous right hip hemiarthroplasty. Hardware appears well seated and intact.        XR PELVIS 1 VIEW PORTABLE (01/18/2018 10:14 AM)   Procedure Note   Interface, Powerscribe - 01/18/2018 10:25 AM CST    XR PELVIS 1 VIEW PORTABLE  1/18/2018 10:14 AM    INDICATION: Follow-up right hip ORIF. History of right hip hemiarthroplasty.  COMPARISON: Hip radiograph, 01/17/2018    FINDINGS: There has been interval internal fixation of the right hip trochanteric fracture with good anatomic alignment of the fracture fragments. Evidence of previous right hip hemiarthroplasty. Hardware appears well seated and intact.     Back to top of Results      AEROBIC BACTERIAL CULTURE, STAIN (01/18/2018 8:24 AM)  Only the most recent of 2 results within the time period is included.    AEROBIC BACTERIAL CULTURE, STAIN (01/18/2018 8:24 AM)   Component Value Ref Range   CULTURE No Growth.     GRAM STAIN  1+ PMNs     GRAM STAIN  No organisms seen     GRAM STAIN  Gram stain performed by [place], [place]       AEROBIC BACTERIAL CULTURE, STAIN (01/18/2018 8:24 AM)   Specimen Performing Laboratory   Swab - Right Hip [facility]    [address] [place] [id number]     Back to top of Results      ANAEROBIC CULTURE (01/18/2018 8:24 AM)  Only the most recent of 2 results within the time period is included.    ANAEROBIC CULTURE (01/18/2018 8:24 AM)   Component Value Ref Range   CULTURE No anaerobes isolated       ANAEROBIC CULTURE (01/18/2018 8:24 AM)   Specimen Performing Laboratory   Swab - Right Hip [facility]    [address]    [place] [address]     Back to top of Results      ENDOTRACHEAL TUBE (01/18/2018 8:13 AM)  ENDOTRACHEAL TUBE (01/18/2018 8:13 AM)   Narrative   EtJenniffer bernard CRNA     1/18/2018  8:13 AM    Procedure: ETT        Patient location during procedure: OR    ETT Properties    Mask Ventilation: oral airway and easy    Final Technique: direct laryngoscopy    Type: straight    Location: oral     Cuffed: yes    Tube Size: 7.0 mm    Stylet: yes    Laryngoscope Blade: Quick    Blade Size: 2    Cormack-Lehane Grade View: 1    Insertion Attempts: 1    Placement Verification: auscultation and end tidal CO2    Assessment: pharynx clear, atraumatic and dentition unchanged    Secured at: 23    Measured From: teeth    Tooth guard used and removed: yes    Difficulty: 0 (not difficult)    Electronically signed by JENNIFFER MCFARLAND                                                                                                                                                                   ENDOTRACHEAL TUBE (01/18/2018 8:13 AM)   Procedure Note   Jenniffer Mcfarland, CRNA - 01/18/2018 8:13 AM CST    Procedure: ETT    Patient location during procedure: OR  ETT Properties  Mask Ventilation: oral airway and easy  Final Technique: direct laryngoscopy  Type: straight  Location: oral  Cuffed: yes  Tube Size: 7.0 mm  Stylet: yes  Laryngoscope Blade: Quick  Blade Size: 2  Cormack-Lehane Grade View: 1  Insertion Attempts: 1  Placement Verification: auscultation and end tidal CO2  Assessment: pharynx clear, atraumatic and dentition unchanged  Secured at: 23  Measured From: teeth  Tooth guard used and removed: yes  Difficulty: 0 (not difficult)  Electronically signed by JENNIFFER MCFARLAND                    Back to top of Results      EXTRA TUBE LIGHT GREEN (01/18/2018 5:29 AM)  EXTRA TUBE LIGHT GREEN (01/18/2018 5:29 AM)   Specimen Performing Laboratory   Blood [facility] LABORAT[facility]    SENDOUT INTERNAL ZIP [id number]    [address] [place] [address]     Back to top of Results      NUCLEATED RED BLOOD CELLS AS PERCENT OF BLOOD LEUKOCYTES (01/18/2018 5:29 AM)  Only the most recent of 4 results within the time period is included.    NUCLEATED RED BLOOD CELLS AS PERCENT OF BLOOD LEUKOCYTES (01/18/2018 5:29 AM)   Component Value Ref Range   NRBC  0.0 %   ABS NRBC 0.0 thou /cu mm     NUCLEATED RED BLOOD CELLS AS PERCENT OF  BLOOD LEUKOCYTES (01/18/2018 5:29 AM)   Specimen Performing Laboratory   Blood [facility] LABORATORY    SENDOUT INTERNAL ZIP [id number]    [address] [place] [address]       NUCLEATED RED BLOOD CELLS AS PERCENT OF BLOOD LEUKOCYTES (01/18/2018 5:29 AM)   Narrative                Back to top of Results      CBC with Platelets no Differential (01/18/2018 5:29 AM)  Only the most recent of 2 results within the time period is included.    CBC with Platelets no Differential (01/18/2018 5:29 AM)   Component Value Ref Range   WHITE BLOOD COUNT  6.1 4.5 - 11.0 thou/cu mm   RED BLOOD COUNT  2.77 (L) 4.00 - 5.20 mil/cu mm   HEMOGLOBIN  7.8 (L) 12.0 - 16.0 g/dL   HEMATOCRIT  24.1 (L) 33.0 - 51.0 %   MCV  87 80 - 100 fL   MCH  28.2 26.0 - 34.0 pg   MCHC  32.4 32.0 - 36.0 g/dL   RDW  15.4 11.5 - 15.5 %   PLATELET COUNT  240 140 - 440 thou/cu mm   MPV  10.0 6.5 - 11.0 fL     CBC with Platelets no Differential (01/18/2018 5:29 AM)   Specimen Performing Laboratory   Blood [facility] LABORATORY    SENDOUT INTERNAL ZIP [id number]    [address] [place] [address]       Recent Results (from the past 240 hour(s))   HM2(CBC w/o Differential)    Collection Time: 03/06/18  6:35 AM   Result Value Ref Range    WBC 3.3 (L) 4.0 - 11.0 thou/uL    RBC 4.14 3.80 - 5.40 mill/uL    Hemoglobin 10.7 (L) 12.0 - 16.0 g/dL    Hematocrit 36.6 35.0 - 47.0 %    MCV 88 80 - 100 fL    MCH 25.8 (L) 27.0 - 34.0 pg    MCHC 29.2 (L) 32.0 - 36.0 g/dL    RDW 14.4 11.0 - 14.5 %    Platelets 192 140 - 440 thou/uL    MPV 11.1 8.5 - 12.5 fL         Assessment/Plan:    Right hip fracture status post right hip hemiarthroplasty subsequently patient represented with a periprosthetic fx: ORIF of right femur with revision hemiarthroplasty of the right hip by Dr. Magaña on 1/11/18. EBL 2000ml. On 1/17/2018, she developed a draining hematoma right hip and was noted to have interval displacement of greater trochanter on Xray. She received 2  units of pRBC 1/17 and underwent right hip irrigation and debridement with ORIF of the right greater trochanter. She has been discharged and currently with WBAT.    Nondisplaced fracture of the radial head:   Course complicated by nonunion currently orthopedics has recommended a sling for her.    Pain management:  seen by the pain clinic and started on Dilaudid for pain management along with Tylenol which has been changed to scheduled, unable to receive dilaudid per low BPs, continue tramadol 25mg QID, dc flexeril and dilaudid per non use. She also has an underlying history of chronic pain. Tramadol has been effective, though will supplement with an additional 25mg q6h PRN and monitor response.     DVT prophylaxis: on aspirin 81mg.    [id number] and recheck hemoglobins.  Patient underwent hemorrhagic check in the hospital she required several units of blood transfusion the hospital discharge hemoglobin remained low. Decrease ferrous sulfate 325 mg to daily, recheck Hgb was 10.7.     Recent history of coronary artery disease: status post stent placement on 3/17, she was unable to tolerate Brilinta 90 mg twice daily due to bleeding complications, currently on aspirin 81 mg as well.      Postoperative hypotension: initially felt to be due to anemia secondary to hemorrhagic shock and blood loss she was given multiple units of blood products.  Subsequently she was given fluid resuscitation as her hemoglobin was stable. Last Hgb 10.7.  Was taken off metoprolol it has been recommended her dosage be increased if her blood pressures rebound. -140. No concern currently.    Anxiety disorder:  she is refusing to ambulate due to severe anxiety. Started lorazepam 0.5mg q6h PRN, used 1-2x daily and now has scheduled 0.5mg BID. Ambulating better per less anxiety.    HLD-Lipitor, stable.    History of movement disorder: Sinemet 3 times daily.  She is also on Mirapex.    Vit D deficiency: last 30.3, increased to 4000U  daily with recheck in 3 wks.    B12 deficiency: on supplement, recheck 468.    Overactive bladder: Vesicare but also had an indwelling Molina catheter. This was primarily given due to her lack of mobility for comfort. Removed. No issue.     Depression with insomnia:  takes trazodone at bedtime along with Effexor.     Profound debilitation sent from the nursing home to the TCU.        The care plan has been reviewed and all orders signed. Changes to care plan, if any, as noted. Otherwise, continue care plan of care.      Electronically signed by: Dexter Rico NP    .

## 2021-06-16 NOTE — PROGRESS NOTES
[facility]      Code Status:  FULL CODE  Visit Type: Review Of Multiple Medical Conditions     Facility:  [facility] [[id number]]           History of Present Illness: Alison Bashir is a 67 y.o. female who is currently admitted as a transfer from [facility] to the TCU.  This is a elderly 67-year-old resident of a group home with underlying history of a traumatic brain injury along with hypertension and coronary artery disease who presented to the emergency room after she fell on 12/17.  She was diagnosed with a right hip fracture and underwent right hip hemiarthroplasty she was also noted to have a UTI and subsequently discharged to TCU.  She readmitted to the hospital with ongoing pain in her right leg and imaging revealed a periprosthetic fractures orthopedics was consulted.  They recommended repeat surgical intervention.  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  She underwent open-reduction and internal fixation of right femur with revision hemiarthroplasty of the right hip by Dr. Magaña on 1/11/18. EBL 2000ml.      She had hypotension intraop and in PACU requiring multiple blood products. She was admitted to ICU postop given hypotension and blood loss. Hemorraghic shock resolved . On 1/17/2018, she developed a draining hematoma right hip and was noted to have interval displacement of greater trochanter on Xray. She received 2 units of pRBC 1/17 and underwent right hip irrigation and debridement with ORIF of the right greater trochante hemoglobin , had been stable 7.5 - 8.0. Brilinta was on hold, resumed 1/23/2018 with okay from Orthopedics and decreased ASA to 81mg daily from 325mg daily.      On 1/24/2018, she had hypotension overnight and received 1U Packed red blood cells. Brilinta stopped on 1/25/18 per Dr. Magaña for the foreseeable future, at least until her follow up appointment with  Orthopedics in 10-14 days. Hypotension again 1/25/2018 overnight, received 1L fluid bolus. Hemoglobin stable. Hypotension was felt to be related to narcotics. Raised parameters to hold Dilaudid for SBP <110.      She completed a 7 day course of Ceftriaxone for Morganella morganii UTI. Molina in place due to lack of mobility, and she was discharged with this in place. This should be removed when mobility has improved.      Pain clinic following for pain med management. Stopped as needed Dilaudid on 1/24/2018 due to hypotension, decreased frequency of scheduled Dilaudid to every 6 hours on 1/25/2018, and added parameters to scheduled Dilaudid to hold for sedation or SBP < 110. She does have significant anxiety when transferring, fear of falling. She would benefit from seeing Psychiatry as an outpatient, refused this admission. Her pain medications may be adjusted as long as blood pressure tolerates.   , her Brilinta is on hold until follow up with Orthopedics, and she was discharged on ASA 325mg daily, which can be decreased to 81mg daily when Brilinta restarted. Hemoglobin should be rechecked in 2 days. Her Metoprolol was stopped and should be restarted when blood pressure tolerates.      She was also found to have non-displaced radial head fracture. Ortho recommended sling. .  Care plan reviewed both with nursing as well as therapy.  Her transfers remain erratic.  They feel she can do better but she chooses not to do so and is limited by anxiety. Seen walking today  Recheck hemoglobin has been stable.   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  She is not voicing any symptoms including dysuria currently.wts are stable      Past Medical History:   Diagnosis Date     Acute blood loss anemia      Alzheimer disease      CAD (coronary artery disease)      Chronic pain syndrome      Dementia      Depression      Hip fracture, right  2017     HTN (hypertension)      Kidney stone      PMB (postmenopausal bleeding)      RLS (restless legs syndrome)      Stented coronary artery      Traumatic brain injury      Unsteady gait     uses walker     UTI (lower urinary tract infection)     on antibiotic course     Past Surgical History:   Procedure Laterality Date      SECTION       CHOLECYSTECTOMY  May 2014     COMBINED HYSTEROSCOPY DIAGNOSTIC / D&C N/A 2014    Procedure: DILATION AND CURETTAGE WITH HYSTEROSCOPY;  Surgeon: Karime Cifuentes MD;  Location: [facility] [place];  Service:      CORONARY STENT PLACEMENT       HEMIARTHROPLASTY HIP Right 2017     INCISION AND DRAINAGE HIP Right 2018    ORIF REVISION      LUNG REMOVAL, PARTIAL       REVISION TOTAL HIP ARTHROPLASTY Right 01/10/2018     TONSILLECTOMY AND ADENOIDECTOMY       TOTAL KNEE ARTHROPLASTY Right 2016     TOTAL KNEE ARTHROPLASTY Left 2015     No family history on file.  Social History     Social History     Marital status: Single     Spouse name: N/A     Number of children: N/A     Years of education: N/A     Occupational History     Not on file.     Social History Main Topics     Smoking status: Never Smoker     Smokeless tobacco: Never Used     Alcohol use No     Drug use: No     Sexual activity: Not on file     Other Topics Concern     Not on file     Social History Narrative     Current Outpatient Prescriptions   Medication Sig Dispense Refill     acetaminophen (TYLENOL) 325 MG tablet Take 650 mg by mouth 4 (four) times a day.       aspirin 325 MG EC tablet Take 325 mg by mouth daily.       atorvastatin (LIPITOR) 40 MG tablet Take 40 mg by mouth at bedtime.       carbidopa-levodopa (SINEMET)  mg per tablet Take 2 tablets by mouth 3 (three) times a day.       cholecalciferol, vitamin D3, 5,000 unit Tab Take 5,000 Units by mouth Daily at 8:00 am..       cranberry 500 mg cap Take 500 mg by mouth 2 (two) times a day.       cyanocobalamin (VITAMIN B-12)  100 MCG tablet Take 100 mcg by mouth daily.       ferrous sulfate 325 (65 FE) MG tablet Take 1 tablet by mouth 2 (two) times a day with meals.       lidocaine (LIDODERM) 5 % Place 1 patch on the skin daily. Remove & Discard patch within 12 hours or as directed by MD       LORazepam (ATIVAN) 0.5 MG tablet Take 0.5 mg by mouth every 6 (six) hours as needed for anxiety.       LORazepam (ATIVAN) 0.5 MG tablet Take 0.5 mg by mouth daily before breakfast.       multivitamin (MULTIVITAMIN) per tablet Take 1 tablet by mouth daily.       nitroglycerin (NITROSTAT) 0.4 MG SL tablet Place 0.4 mg under the tongue every 5 (five) minutes as needed.        nystatin (MYCOSTATIN) powder Apply 1 application topically daily.       polyethylene glycol (MIRALAX) 17 gram packet Take 17 g by mouth daily.       pramipexole (MIRAPEX) 0.5 MG tablet Take 0.5 mg by mouth bedtime.       psyllium (METAMUCIL) powder Take by mouth every morning. 1 PKT       senna-docusate (PERICOLACE) 8.6-50 mg tablet Take 2 tablets by mouth 2 (two) times a day.       solifenacin (VESICARE) 10 MG tablet Take 10 mg by mouth daily.       sorbitol 70 % solution Take 30 mL by mouth daily.       traMADol (ULTRAM) 50 mg tablet Take 50 mg by mouth 4 (four) times a day.       traZODone (DESYREL) 150 MG tablet Take 1 tablet (150 mg total) by mouth bedtime. 30 tablet 0     venlafaxine (EFFEXOR-XR) 75 MG 24 hr capsule Take 5 capsules (375 mg total) by mouth daily. 150 capsule 3     No current facility-administered medications for this visit.      Allergies   Allergen Reactions     Blood-Group Specific Substance Other (See Comments)     Patient has anti-K. Blood product orders maybe delayed. Draw one red top and 2 purple top tubes for all Type and Screen/Red blood Cell product orders     Fd And C No.5 (Tartrazine)      GI UPSET     Ibuprofen Nausea And Vomiting     Morphine Rash     swelling         Review of Systems:    Constitutional: Negative.  Negative for fever, chills,  HAS activity change, appetite change and fatigue.   HENT: Negative for congestion and facial swelling.    Eyes: Negative for photophobia, redness and visual disturbance.   Respiratory: Negative for cough and chest tightness.    Cardiovascular: Negative for chest pain, palpitations and leg swelling.   Gastrointestinal: Negative for , diarrhea, constipation, blood in stool and abdominal distention.   Complaining of some nausea  Genitourinary: Negative.    Musculoskeletal: Negative.    Has severe right hip pain  Skin: Negative.    Neurological: Negative for dizziness, tremors, syncope, weakness, light-headedness and headaches.   Hematological: Does not bruise/bleed easily.   Psychiatric/Behavioral: Negative.  Very anxious she is reporting severe pain in her leg when she ambulates and refusing to get out of bed    Vitals:    02/19/18 1118   BP: 121/76   Pulse: 91   Temp: 98  F (36.7  C)       Physical Exam:    GENERAL: no acute distress. Cooperative in conversation.   HEENT: pupils are equal, round and reactive. Oral mucosa is moist and intact.  RESP:Chest symmetric. Regular respiratory rate. No stridor.  CVS: S1S2  ABD: Nondistended, soft.  EXTREMITIES: No lower extremity edema.  Right hip incision is intact.  NEURO: non focal. Alert and oriented x3.   PSYCH: within normal limits. No depression or anxiety.  SKIN: warm dry intact     Labs:        Assessment/Plan:    Right hip fracture status post right hip hemiarthroplasty subsequently patient represented with a periprosthetic fracture  She underwent open-reduction and internal fixation of right femur with revision hemiarthroplasty of the right hip by Dr. Magaña on 1/11/18. EBL 2000ml.   . On 1/17/2018, she developed a draining hematoma right hip and was noted to have interval displacement of greater trochanter on Xray. She received 2 units of pRBC 1/17 and underwent right hip irrigation and debridement with ORIF of the right greater trochante  She has been discharged  currently with [facility]  Postoperative course was complicated by hemorrhagic shock requiring several units of blood products  Nondisplaced fracture of the radial head .  Course complicated by nonunion currently orthopedics has recommended a sling for her  Pain management seen by the pain clinic and started on Dilaudid for pain management along with Tylenol which has been scheduled for her  She also has an underlying history of chronic pain.  She was on scheduled Dilaudid in the hospital now is on as needed.  She also has Flexeril available 3 times a day as needed  DVT prophylaxis on aspirin 325  [id number] and recheck hemoglobins.  Patient underwent hemorrhagic check in the hospital she required several units of blood transfusion the hospital discharge hemoglobin remained low  Recheck low but stable.  On oral fe.  Recent history of coronary artery disease status post stent placement on 3/17 she was unable to tolerate Brilinta 90 mg twice daily due to bleeding complications  .  She is currently on aspirin 325 daily which was increased from her home regimen of 81 mg and she can restart Brilinta when okayed by orthopedics  Postoperative hypotension -initially felt to be due to anemia secondary to hemorrhagic shock and blood loss she was given multiple units of blood products.  Subsequently she was given fluid resuscitation as her hemoglobin was stable  taken off metoprolol it has been recommended her dosage be increased if her blood pressures rebound.  Blood pressures remain low.    Anxiety disorder she is refusing to ambulate due to severe anxiety as per my assessment  HLD-she is on Lipitor  History of movement disorder she takes Sinemet 3 times daily.  She is also on Mirapex  B12 deficiency on supplement  Overactive bladder currently is on Vesicare   She is currently in a voiding trial and a catheter has been removed she no longer complains of urinary frequency and urgency    UTI in a patient who had an indwelling  Molina catheter which was subsequently removed she is not having any symptoms currently of burning frequency and urgency culture grew the same organisms which she had before  Cultures grow Morganella which is what she had previously in the hospital for which she was treated.  I suspect this is most likely colonization from the indwelling Molina catheter as she is asymptomatic will push fluids and monitor her she has grown a multidrug resistant strain    Depression with insomnia takes trazodone at bedtime along with Effexor  Remains anxious will see how Vistaril helps that if not effective he may need to see psychiatric    Profound debilitation sent from the nursing home to the TCU.  She continues to remain off metoprolol due to postoperative hypertension.    Blood pressures remain low but stable.  Care plan reviewed with therapy.  Transfers remain erratic due to anxiety.  Monitor hemoglobins closely.  sHe also continues to be leukopenic r/c cbc ordered ; suspected this may be medication related but if she does not have an improvement she may benefit from seeing hematology CPT 4.5/6  Continue with her current care plan      Electronically signed by: NERY Sosa    .

## 2021-06-16 NOTE — PROGRESS NOTES
[facility]      Code Status:  FULL CODE  Visit Type: Review Of Multiple Medical Conditions     Facility:  [facility] [[id number]]           History of Present Illness: Alison Bashir is a 67 y.o. female who is currently admitted as a transfer from [facility] to the TCU.  This is a elderly 67-year-old resident of a group home with underlying history of a traumatic brain injury along with hypertension and coronary artery disease who presented to the emergency room after she fell on 12/17.  She was diagnosed with a right hip fracture and underwent right hip hemiarthroplasty she was also noted to have a UTI and subsequently discharged to TCU.  She readmitted to the hospital with ongoing pain in her right leg and imaging revealed a periprosthetic fractures orthopedics was consulted.  They recommended repeat surgical intervention.  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  She underwent open-reduction and internal fixation of right femur with revision hemiarthroplasty of the right hip by Dr. Magaña on 1/11/18. EBL 2000ml.      She had hypotension intraop and in PACU requiring multiple blood products. She was admitted to ICU postop given hypotension and blood loss. Hemorraghic shock resolved . On 1/17/2018, she developed a draining hematoma right hip and was noted to have interval displacement of greater trochanter on Xray. She received 2 units of pRBC 1/17 and underwent right hip irrigation and debridement with ORIF of the right greater trochante hemoglobin , had been stable 7.5 - 8.0. Brilinta was on hold, resumed 1/23/2018 with okay from Orthopedics and decreased ASA to 81mg daily from 325mg daily.      On 1/24/2018, she had hypotension overnight and received 1U Packed red blood cells. Brilinta stopped on 1/25/18 per Dr. Magaña for the foreseeable future, at least until her follow up appointment with  Orthopedics in 10-14 days. Hypotension again 1/25/2018 overnight, received 1L fluid bolus. Hemoglobin stable. Hypotension was felt to be related to narcotics. Raised parameters to hold Dilaudid for SBP <110.      She completed a 7 day course of Ceftriaxone for Morganella morganii UTI. Molina in place due to lack of mobility, and she was discharged with this in place. This should be removed when mobility has improved.      Pain clinic following for pain med management. Stopped as needed Dilaudid on 1/24/2018 due to hypotension, decreased frequency of scheduled Dilaudid to every 6 hours on 1/25/2018, and added parameters to scheduled Dilaudid to hold for sedation or SBP < 110. She does have significant anxiety when transferring, fear of falling. She would benefit from seeing Psychiatry as an outpatient, refused this admission. Her pain medications may be adjusted as long as blood pressure tolerates.   , her Brilinta is on hold until follow up with Orthopedics, and she was discharged on ASA 325mg daily, which can be decreased to 81mg daily when Brilinta restarted. Hemoglobin should be rechecked in 2 days. Her Metoprolol was stopped and should be restarted when blood pressure tolerates.      She was also found to have non-displaced radial head fracture. Ortho recommended sling.   She had significant blood loss anemia secondary to blood loss and intraoperative hypotension.  Pain management was consulted for pain management optimization and she is currently discharged on oral Dilaudid.  She is still not moving in bed and cannot lift her right lower extremity at all and remains extremely mobile she has a open area in her right buttock from her suspected fall.  She is currently bedbound with difficulty in even lifting her right lower extremity she reports significant anxiety and pain.  Care plan reviewed both with nursing as well as therapy.  Her transfers remain erratic.  They feel she can do better but she chooses not to  do so and is limited by anxiety.  Recheck hemoglobin has been stable.   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  She is not voicing any symptoms including dysuria currently.      Past Medical History:   Diagnosis Date     Acute blood loss anemia      Alzheimer disease      CAD (coronary artery disease)      Chronic pain syndrome      Dementia      Depression      Hip fracture, right 2017     HTN (hypertension)      Kidney stone      PMB (postmenopausal bleeding)      RLS (restless legs syndrome)      Stented coronary artery      Traumatic brain injury      Unsteady gait     uses walker     UTI (lower urinary tract infection)     on antibiotic course     Past Surgical History:   Procedure Laterality Date      SECTION       CHOLECYSTECTOMY  May 2014     COMBINED HYSTEROSCOPY DIAGNOSTIC / D&C N/A 2014    Procedure: DILATION AND CURETTAGE WITH HYSTEROSCOPY;  Surgeon: Karime Cifuentes MD;  Location: [facility];  Service:      CORONARY STENT PLACEMENT       HEMIARTHROPLASTY HIP Right 2017     INCISION AND DRAINAGE HIP Right 2018    ORIF REVISION      LUNG REMOVAL, PARTIAL       REVISION TOTAL HIP ARTHROPLASTY Right 01/10/2018     TONSILLECTOMY AND ADENOIDECTOMY       TOTAL KNEE ARTHROPLASTY Right 2016     TOTAL KNEE ARTHROPLASTY Left 2015     No family history on file.  Social History     Social History     Marital status: Single     Spouse name: N/A     Number of children: N/A     Years of education: N/A     Occupational History     Not on file.     Social History Main Topics     Smoking status: Never Smoker     Smokeless tobacco: Never Used     Alcohol use No     Drug use: No     Sexual activity: Not on file     Other Topics Concern     Not on file     Social History Narrative     Current Outpatient Prescriptions   Medication Sig Dispense Refill     acetaminophen (TYLENOL) 325 MG tablet  Take 650 mg by mouth 4 (four) times a day.       aspirin 325 MG EC tablet Take 325 mg by mouth daily.       atorvastatin (LIPITOR) 40 MG tablet Take 40 mg by mouth at bedtime.       carbidopa-levodopa (SINEMET)  mg per tablet Take 2 tablets by mouth 3 (three) times a day.       cholecalciferol, vitamin D3, 5,000 unit Tab Take 5,000 Units by mouth Daily at 8:00 am..       cranberry 500 mg cap Take 500 mg by mouth 2 (two) times a day.       cyanocobalamin (VITAMIN B-12) 100 MCG tablet Take 100 mcg by mouth daily.       ferrous sulfate 325 (65 FE) MG tablet Take 1 tablet by mouth 2 (two) times a day with meals.       lidocaine (LIDODERM) 5 % Place 1 patch on the skin daily. Remove & Discard patch within 12 hours or as directed by MD       LORazepam (ATIVAN) 0.5 MG tablet Take 0.5 mg by mouth every 6 (six) hours as needed for anxiety.       multivitamin (MULTIVITAMIN) per tablet Take 1 tablet by mouth daily.       nitroglycerin (NITROSTAT) 0.4 MG SL tablet Place 0.4 mg under the tongue every 5 (five) minutes as needed.        nystatin (MYCOSTATIN) powder Apply 1 application topically daily.       polyethylene glycol (MIRALAX) 17 gram packet Take 17 g by mouth daily.       pramipexole (MIRAPEX) 0.5 MG tablet Take 0.5 mg by mouth bedtime.       psyllium (METAMUCIL) powder Take by mouth every morning. 1 PKT       senna-docusate (PERICOLACE) 8.6-50 mg tablet Take 2 tablets by mouth 2 (two) times a day.       solifenacin (VESICARE) 10 MG tablet Take 10 mg by mouth daily.       sorbitol 70 % solution Take 30 mL by mouth daily.       traMADol (ULTRAM) 50 mg tablet Take 50 mg by mouth 4 (four) times a day.       traZODone (DESYREL) 150 MG tablet Take 1 tablet (150 mg total) by mouth bedtime. 30 tablet 0     venlafaxine (EFFEXOR-XR) 75 MG 24 hr capsule Take 5 capsules (375 mg total) by mouth daily. 150 capsule 3     No current facility-administered medications for this visit.      Allergies   Allergen Reactions      Blood-Group Specific Substance Other (See Comments)     Patient has anti-K. Blood product orders maybe delayed. Draw one red top and 2 purple top tubes for all Type and Screen/Red blood Cell product orders     Fd And C No.5 (Tartrazine)      GI UPSET     Ibuprofen Nausea And Vomiting     Morphine Rash     swelling         Review of Systems:    Constitutional: Negative.  Negative for fever, chills, HAS activity change, appetite change and fatigue.   HENT: Negative for congestion and facial swelling.    Eyes: Negative for photophobia, redness and visual disturbance.   Respiratory: Negative for cough and chest tightness.    Cardiovascular: Negative for chest pain, palpitations and leg swelling.   Gastrointestinal: Negative for , diarrhea, constipation, blood in stool and abdominal distention.   Complaining of some nausea  Genitourinary: Negative.    Musculoskeletal: Negative.    Has severe right hip pain  Skin: Negative.    Neurological: Negative for dizziness, tremors, syncope, weakness, light-headedness and headaches.   Hematological: Does not bruise/bleed easily.   Psychiatric/Behavioral: Negative.  Very anxious she is reporting severe pain in her leg when she ambulates and refusing to get out of bed    Vitals:    02/12/18 1043   BP: 96/62   Pulse: 78   Temp: 98  F (36.7  C)       Physical Exam:    GENERAL: no acute distress. Cooperative in conversation.   HEENT: pupils are equal, round and reactive. Oral mucosa is moist and intact.  RESP:Chest symmetric. Regular respiratory rate. No stridor.  CVS: S1S2  ABD: Nondistended, soft.  EXTREMITIES: No lower extremity edema.  Right hip incision is intact.  Patient cannot move her right lower extremity at all  Open area on her right buttock  NEURO: non focal. Alert and oriented x3.   PSYCH: within normal limits. No depression or anxiety.  SKIN: warm dry intact     Labs:    Recent Results (from the past 240 hour(s))   HM2(CBC w/o Differential)   Result Value Ref Range    WBC  2.9 (L) 4.0 - 11.0 thou/uL    RBC 3.11 (L) 3.80 - 5.40 mill/uL    Hemoglobin 8.2 (L) 12.0 - 16.0 g/dL    Hematocrit 29.1 (L) 35.0 - 47.0 %    MCV 94 80 - 100 fL    MCH 26.4 (L) 27.0 - 34.0 pg    MCHC 28.2 (L) 32.0 - 36.0 g/dL    RDW 16.8 (H) 11.0 - 14.5 %    Platelets 216 140 - 440 thou/uL    MPV 10.5 8.5 - 12.5 fL   Urinalysis   Result Value Ref Range    Color, UA Yellow Colorless, Yellow, Straw, Light Yellow    Clarity, UA Cloudy (!) Clear    Glucose, UA Negative Negative    Bilirubin, UA Negative Negative    Ketones, UA Negative Negative    Specific Gravity, UA 1.017 1.001 - 1.030    Blood, UA Trace (!) Negative    pH, UA 6.5 4.5 - 8.0    Protein, UA Trace (!) Negative mg/dL    Urobilinogen, UA <2.0 E.U./dL <2.0 E.U./dL, 2.0 E.U./dL    Nitrite, UA Negative Negative    Leukocytes, UA Large (!) Negative    Bacteria, UA None Seen None Seen hpf    RBC, UA 0-2 None Seen, 0-2 hpf    WBC, UA >100 (!) None Seen, 0-5 hpf    Squam Epithel, UA 25-50 (!) None Seen, 0-5 lpf    WBC Clumps Present (!) None Seen    Mucus, UA Few (!) None Seen lpf   Culture, Urine   Result Value Ref Range    Culture Mixture of urogenital organisms with     Culture >100,000 col/ml Morganella morganii (!)        Susceptibility    Morganella morganii - KULDEEP     Amoxicillin + Clavulanate >16/8 Resistant      Ampicillin >16 Resistant      Cefazolin >16 Resistant      Cefepime <=1 Sensitive      Ceftriaxone <=1 Sensitive      Ciprofloxacin >2 Resistant      Gentamicin <=2 Sensitive      Levofloxacin >4 Resistant      Meropenem <=0.25 Sensitive      Nitrofurantoin 64 Resistant      Tetracycline <=2 Resistant      Tobramycin <=2 Sensitive      Trimethoprim + Sulfamethoxazole >2/38 Resistant        Assessment/Plan:    Right hip fracture status post right hip hemiarthroplasty subsequently patient represented with a periprosthetic fracture  She underwent open-reduction and internal fixation of right femur with revision hemiarthroplasty of the right hip by  Dr. Magaña on 1/11/18. EBL 2000ml.   . On 1/17/2018, she developed a draining hematoma right hip and was noted to have interval displacement of greater trochanter on Xray. She received 2 units of pRBC 1/17 and underwent right hip irrigation and debridement with ORIF of the right greater trochante  She has been discharged currently with TTWB  Postoperative course was complicated by hemorrhagic shock requiring several units of blood products  Nondisplaced fracture of the radial head .  Course complicated by nonunion currently orthopedics has recommended a sling for her  Pain management seen by the pain clinic and started on Dilaudid for pain management along with Tylenol which has been scheduled for her  She also has an underlying history of chronic pain.  She was on scheduled Dilaudid in the hospital now is on as needed.  She also has Flexeril available 3 times a day as needed  DVT prophylaxis on aspirin 325  JGEO-edvnia-vd and recheck hemoglobins.  Patient underwent hemorrhagic check in the hospital she required several units of blood transfusion the hospital discharge hemoglobin remained low  Recheck low but stable.  On oral fe.  Recent history of coronary artery disease status post stent placement on 3/17 she was unable to tolerate Brilinta 90 mg twice daily due to bleeding complications  .  She is currently on aspirin 325 daily which was increased from her home regimen of 81 mg and she can restart Brilinta when okayed by orthopedics  Postoperative hypotension -initially felt to be due to anemia secondary to hemorrhagic shock and blood loss she was given multiple units of blood products.  Subsequently she was given fluid resuscitation as her hemoglobin was stable  taken off metoprolol it has been recommended her dosage be increased if her blood pressures rebound.  Blood pressures remain low.    Anxiety disorder she is refusing to ambulate due to severe anxiety as per my assessment  HLD-she is on Lipitor  History  of movement disorder she takes Sinemet 3 times daily.  She is also on Mirapex  B12 deficiency on supplement  Overactive bladder currently is on Vesicare   She is currently in a voiding trial and a catheter has been removed she no longer complains of urinary frequency and urgency    UTI in a patient who had an indwelling Molina catheter which was subsequently removed she is not having any symptoms currently of burning frequency and urgency culture grew the same organisms which she had before  Cultures grow Morganella which is what she had previously in the hospital for which she was treated.  I suspect this is most likely colonization from the indwelling Molina catheter as she is asymptomatic will push fluids and monitor her she has grown a multidrug resistant strain    Depression with insomnia takes trazodone at bedtime along with Effexor  Remains anxious will see how Vistaril helps that if not effective he may need to see psychiatric    Profound debilitation sent from the nursing home to the TCU.  She continues to remain off metoprolol due to postoperative hypertension.    Blood pressures remain low but stable.  Care plan reviewed with therapy.  Transfers remain erratic due to anxiety.  Monitor hemoglobins closely.  sHe also continues to be leukopenic r/c cbc ordered ; suspected this may be medication related but if she does not have an improvement she may benefit from seeing hematology CPT 4.5/6    Total time spent was 35 minutes, more than half of it was in face-to-face counseling regarding disease state, treatment, side effects, documentation, review of clinical data and coordination of care  Electronically signed by: NERY Sosa    .

## 2021-06-16 NOTE — PROGRESS NOTES
[facility]      Code Status:  FULL CODE  Visit Type: Review Of Multiple Medical Conditions     Facility:  [facility] [[id number]]           History of Present Illness: Alison Bashir is a 67 y.o. female who is currently admitted as a transfer from [facility] to the TCU.  This is a elderly 67-year-old resident of a group home with underlying history of a traumatic brain injury along with hypertension and coronary artery disease who presented to the emergency room after she fell on 12/17.  She was diagnosed with a right hip fracture and underwent right hip hemiarthroplasty she was also noted to have a UTI and subsequently discharged to TCU.  She readmitted to the hospital with ongoing pain in her right leg and imaging revealed a periprosthetic fractures orthopedics was consulted.  They recommended repeat surgical intervention.  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  She underwent open-reduction and internal fixation of right femur with revision hemiarthroplasty of the right hip by Dr. Magaña on 1/11/18. EBL 2000ml.      She had hypotension intraop and in PACU requiring multiple blood products. She was admitted to ICU postop given hypotension and blood loss. Hemorraghic shock resolved . On 1/17/2018, she developed a draining hematoma right hip and was noted to have interval displacement of greater trochanter on Xray. She received 2 units of pRBC 1/17 and underwent right hip irrigation and debridement with ORIF of the right greater trochante hemoglobin , had been stable 7.5 - 8.0. Brilinta was on hold, resumed 1/23/2018 with okay from Orthopedics and decreased ASA to 81mg daily from 325mg daily.      On 1/24/2018, she had hypotension overnight and received 1U Packed red blood cells. Brilinta stopped on 1/25/18 per Dr. Magaña for the foreseeable future, at least until her follow up appointment with  Orthopedics in 10-14 days. Hypotension again 2018 overnight, received 1L fluid bolus. Hemoglobin stable. Hypotension was felt to be related to narcotics. Raised parameters to hold Dilaudid for SBP <110.      She completed a 7 day course of Ceftriaxone for Morganella morganii UTI.  Her retention is resolved and she is voiding.  . She does have significant anxiety when transferring, fear of falling. She would benefit from seeing Psychiatry as an outpatient, refused this admission. Her pain medications may be adjusted as long as blood pressure tolerates.   , her Brilinta is on hold until follow up with Orthopedics, and she was discharged on ASA 325mg daily, which can be decreased to 81mg daily when Brilinta restarted. . Her Metoprolol was stopped and should be restarted when blood pressure tolerates. .   Her Brilinta is still so far not been restarted in order to follow-up has been done with no change her recheck hemoglobin has not be greater than 11 and she has no more any bleeding concerns.r/c HG was ordered but daughter was never carried out so recheck hemoglobin is still pending.  She denies any bleeding.  She is seeing psychiatric today for anxiety      Past Medical History:   Diagnosis Date     Acute blood loss anemia      Alzheimer disease      CAD (coronary artery disease)      Chronic pain syndrome      Dementia      Depression      Hip fracture, right 2017     HTN (hypertension)      Kidney stone      PMB (postmenopausal bleeding)      RLS (restless legs syndrome)      Stented coronary artery      Traumatic brain injury      Unsteady gait     uses walker     UTI (lower urinary tract infection)     on antibiotic course     Past Surgical History:   Procedure Laterality Date      SECTION       CHOLECYSTECTOMY  May 2014     COMBINED HYSTEROSCOPY DIAGNOSTIC / D&C N/A 2014    Procedure: DILATION AND CURETTAGE WITH HYSTEROSCOPY;  Surgeon: Karime Cifuentes MD;  Location: [facility] OR;   Service:      CORONARY STENT PLACEMENT       HEMIARTHROPLASTY HIP Right 12/22/2017     INCISION AND DRAINAGE HIP Right 01/18/2018    ORIF REVISION      LUNG REMOVAL, PARTIAL       REVISION TOTAL HIP ARTHROPLASTY Right 01/10/2018     TONSILLECTOMY AND ADENOIDECTOMY       TOTAL KNEE ARTHROPLASTY Right 09/30/2016     TOTAL KNEE ARTHROPLASTY Left 11/2015     No family history on file.  Social History     Social History     Marital status: Single     Spouse name: N/A     Number of children: N/A     Years of education: N/A     Occupational History     Not on file.     Social History Main Topics     Smoking status: Never Smoker     Smokeless tobacco: Never Used     Alcohol use No     Drug use: No     Sexual activity: Not on file     Other Topics Concern     Not on file     Social History Narrative     Current Outpatient Prescriptions   Medication Sig Dispense Refill     acetaminophen (TYLENOL) 325 MG tablet Take 650 mg by mouth 4 (four) times a day.       aspirin 81 mg chewable tablet Chew 81 mg daily.       atorvastatin (LIPITOR) 40 MG tablet Take 40 mg by mouth at bedtime.       carbidopa-levodopa (SINEMET)  mg per tablet Take 2 tablets by mouth 3 (three) times a day.       cholecalciferol, vitamin D3, 5,000 unit Tab Take 4,000 Units by mouth Daily at 8:00 am..        cranberry 500 mg cap Take 500 mg by mouth 2 (two) times a day.       cyanocobalamin (VITAMIN B-12) 100 MCG tablet Take 100 mcg by mouth daily.       ferrous sulfate 325 (65 FE) MG tablet Take 1 tablet by mouth daily with breakfast.        lidocaine (LIDODERM) 5 % Place 1 patch on the skin daily. Remove & Discard patch within 12 hours or as directed by MD       LORazepam (ATIVAN) 0.5 MG tablet Take 0.5 mg by mouth 2 (two) times a day.        magnesium oxide (MAG-OX) 400 mg tablet Take 400 mg by mouth 2 (two) times a day.       multivitamin (MULTIVITAMIN) per tablet Take 1 tablet by mouth daily.       nitroglycerin (NITROSTAT) 0.4 MG SL tablet Place 0.4  mg under the tongue every 5 (five) minutes as needed.        polyethylene glycol (MIRALAX) 17 gram packet Take 17 g by mouth daily.       pramipexole (MIRAPEX) 0.5 MG tablet Take 0.5 mg by mouth bedtime.       psyllium (METAMUCIL) powder Take by mouth every morning. 1 PKT       senna-docusate (PERICOLACE) 8.6-50 mg tablet Take 2 tablets by mouth 2 (two) times a day.       solifenacin (VESICARE) 10 MG tablet Take 10 mg by mouth daily.       sorbitol 70 % solution Take 30 mL by mouth daily.       ticagrelor (BRILINTA) 90 mg Tab Take 90 mg by mouth 2 (two) times a day.       traMADol (ULTRAM) 50 mg tablet Take 50 mg by mouth every 6 (six) hours as needed.        traZODone (DESYREL) 150 MG tablet Take 1 tablet (150 mg total) by mouth bedtime. 30 tablet 0     venlafaxine (EFFEXOR-XR) 75 MG 24 hr capsule Take 5 capsules (375 mg total) by mouth daily. 150 capsule 3     No current facility-administered medications for this visit.      Allergies   Allergen Reactions     Blood-Group Specific Substance Other (See Comments)     Patient has anti-K. Blood product orders maybe delayed. Draw one red top and 2 purple top tubes for all Type and Screen/Red blood Cell product orders     Fd And C No.5 (Tartrazine)      GI UPSET     Ibuprofen Nausea And Vomiting     Morphine Rash     swelling         Review of Systems:    Constitutional: Negative.  Negative for fever, chills, HAS activity change, appetite change and fatigue.   HENT: Negative for congestion and facial swelling.    Eyes: Negative for photophobia, redness and visual disturbance.   Respiratory: Negative for cough and chest tightness.    Cardiovascular: Negative for chest pain, palpitations and leg swelling.   Gastrointestinal: Negative for , diarrhea, constipation, blood in stool and abdominal distention.     Genitourinary: Negative.    Musculoskeletal: Negative.    Has  right hip pain  Skin: Negative.    Neurological: Negative for dizziness, tremors, syncope, weakness,  light-headedness and headaches.   Hematological: Does not bruise/bleed easily.   Psychiatric/Behavioral: Negative.     Vitals:    03/12/18 1057   BP: 117/70   Pulse: 81   Temp: 98  F (36.7  C)       Physical Exam:    GENERAL: no acute distress. Cooperative in conversation.   HEENT: pupils are equal, round and reactive. Oral mucosa is moist and intact.  RESP:Chest symmetric. Regular respiratory rate. No stridor.  CVS: S1S2  ABD: Nondistended, soft.  EXTREMITIES: No lower extremity edema.  Right hip incision is healed.  NEURO: non focal. Alert and oriented x3.   PSYCH: within normal limits. No depression or anxiety.  SKIN: warm dry intact     Labs:    Recent Results (from the past 240 hour(s))   HM2(CBC w/o Differential)   Result Value Ref Range    WBC 3.3 (L) 4.0 - 11.0 thou/uL    RBC 4.14 3.80 - 5.40 mill/uL    Hemoglobin 10.7 (L) 12.0 - 16.0 g/dL    Hematocrit 36.6 35.0 - 47.0 %    MCV 88 80 - 100 fL    MCH 25.8 (L) 27.0 - 34.0 pg    MCHC 29.2 (L) 32.0 - 36.0 g/dL    RDW 14.4 11.0 - 14.5 %    Platelets 192 140 - 440 thou/uL    MPV 11.1 8.5 - 12.5 fL       Assessment/Plan:    Right hip fracture status post right hip hemiarthroplasty subsequently patient represented with a periprosthetic fracture  She underwent open-reduction and internal fixation of right femur with revision hemiarthroplasty of the right hip by Dr. Magaña on 1/11/18. EBL 2000ml.   . On 1/17/2018, she developed a draining hematoma right hip and was noted to have interval displacement of greater trochanter on Xray. She received 2 units of pRBC 1/17 and underwent right hip irrigation and debridement with ORIF of the right greater trochante  Saw orthopedics and currently she is weightbearing as tolerated    Nondisplaced fracture of the radial head .  Course complicated by nonunion currently orthopedics has recommended a sling for her  HAS NO PAIN.    Pain management -now on tylenol and tramdol  DC Flexeril  Tramadol reduced to 25 mg 3 times daily and  as needed    DVT prophylaxis on aspirin 325.switch to asa 81mg    [id number] and recheck hemoglobins.  Patient underwent hemorrhagic check in the hospital she required several units of blood transfusion the hospital.  R/C 11.3    Recent history of coronary artery disease status post stent placement on 3/17 she was unable to tolerate Brilinta 90 mg twice daily due to bleeding complications  She has been switched back to her Brilinta.  Unfortunately CBC which was ordered on 3 9/18 remains pending.    Postoperative hypotension -initially felt to be due to anemia secondary to hemorrhagic shock and blood loss she was given multiple units of blood products.  Subsequently she was given fluid resuscitation as her hemoglobin was stable  taken off metoprolol it has been recommended her dosage be increased if her blood pressures rebound.  Blood pressures remain low.    Anxiety disorder she is now on no Ativan 0.5 twice daily and taken OFF as needed.  She is seeing psychiatry due to significant anxiety issues.  HLD-she is on Lipitor  History of movement disorder she takes Sinemet 3 times daily.  She is also on Mirapex  B12 deficiency on supplement  Overactive bladder currently is on Vesicare     UTI in a patient who had an indwelling Molina catheter which was subsequently removed she is not having any symptoms currently of burning frequency and urgency culture grew the same organisms which she had before  Cultures grow Morganella which is what she had previously in the hospital for which she was treated.  I suspect this is most likely colonization from the indwelling Molina catheter  NO dysuria or any other symptoms reported by patient today    Depression with insomnia takes trazodone at bedtime along with Effexor  Remains anxious will see how Vistaril helps that if not effective he may need to see psychiatric    Profound debilitation sent from the nursing home to the TCU.  She continues to remain off metoprolol due to  postoperative hypertension.    Blood pressures remain low but stable.  Care plan reviewed with therapy.  Transfers remain erratic due to anxiety. CPT 4.5-5/6  Continue with her current care plan.  PT is reporting anxiety continues to hold back her progress she is making slow progress walking 150 feet with contact-guard assistance transfers still requires mod assist.ANDI 11/28      Electronically signed by: NERY Sosa    .

## 2021-06-16 NOTE — PROGRESS NOTES
[facility]      Code Status:  FULL CODE  Visit Type: Review Of Multiple Medical Conditions     Facility:  [facility] [[id number]]           History of Present Illness: Alison Bashir is a 67 y.o. female who is currently admitted as a transfer from [facility] to the TCU.  She is a resident of a group home with underlying history of a traumatic brain injury, hypertension, coronary artery disease who presented to the emergency room after she fell on 12/17.  She was diagnosed with a right hip fracture and underwent right hip hemiarthroplasty. She was also noted to have a UTI and subsequently discharged to TCU.  She readmitted to the hospital with ongoing pain in her right leg and imaging revealed a periprosthetic fractures orthopedics was consulted.  They recommended repeat surgical intervention.  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  She subsequently underwent an ORIF of right femur with revision hemiarthroplasty of the right hip by Dr. Magaña on 1/11/18. She had an EBL 2000ml. She had developed hypotension intraop and in PACU requiring multiple blood products. She was admitted to ICU postop given hypotension and blood loss and on 1/17/2018, she developed a draining hematoma on her right hip and was noted to have interval displacement of greater trochanter on Xray. She received 2 units of pRBC 1/17 and underwent right hip irrigation and debridement with ORIF of the right greater trochanter. Her Hgb had been stable 7.5 - 8.0. Brilinta was on hold, resumed on 1/23/2018 with okay from Orthopedics and decreased ASA to 81mg daily from 325mg daily. Then on 1/24/2018, she had developed hypotension overnight and received 1U PRBCs. Brilinta stopped on 1/25/18 per Dr. Magaña for the foreseeable future, at least until her follow up appointment with Orthopedics in 10-14 days. Hypotension again developed on  1/25/2018 overnight, received 1L fluid bolus. Hemoglobin was stable. Hypotension was felt to be related to narcotics. Raised parameters to hold Dilaudid for SBP <110. Pain clinic following for pain med management. Stopped as needed Dilaudid on 1/24/2018 due to hypotension, decreased frequency of scheduled Dilaudid to every 6 hours on 1/25/2018, and added parameters to scheduled Dilaudid to hold for sedation or SBP < 110. She does have significant anxiety when transferring, fear of falling. She would benefit from seeing Psychiatry as an outpatient, refused this admission. Her pain medications may be adjusted as long as blood pressure tolerates. Her Brilinta is on hold until follow up with Orthopedics, and she was discharged on ASA 325mg daily, which can be decreased to 81mg daily when Brilinta restarted. Hemoglobin should be rechecked in 2 days. Her Metoprolol was stopped and should be restarted when blood pressure tolerates.      TCU: Alison is a 66 yo old with various concerns today. She had significant blood loss anemia secondary to blood loss and intraoperative hypotension and Hgb recheck is 8.2. No obvious signs of bleeding noted. Maintain ferrous sulfate. Pain management was consulted for pain management optimization and she is currently discharged on oral Dilaudid, though per low BPs switched to tramadol QID, effective. Previously she was still not moving in bed and had difficulty moving her right lower extremity at all. Xray ordered per nursing request, no issue identified and is now mobility improved.  She had high anxiety with transfers, started lorazepam PRN and recently schedule low dose BID, which is reported to be effective.  Now BPs have been normalized.  Last Hgb 11.2, wean Fe. Attempt to wean tramadol next visit.    Patient denies pain, headache, chest pain, numbness or tingling, shortest of breath, eating or swallowing concerns, nausea or vomiting, diarrhea or bowel abnormalities, or no new  integumentary concerns today.    Past Medical History:   Diagnosis Date     Acute blood loss anemia      Alzheimer disease      CAD (coronary artery disease)      Chronic pain syndrome      Dementia      Depression      Hip fracture, right 2017     HTN (hypertension)      Kidney stone      PMB (postmenopausal bleeding)      RLS (restless legs syndrome)      Stented coronary artery      Traumatic brain injury      Unsteady gait     uses walker     UTI (lower urinary tract infection)     on antibiotic course     Past Surgical History:   Procedure Laterality Date      SECTION       CHOLECYSTECTOMY  May 2014     COMBINED HYSTEROSCOPY DIAGNOSTIC / D&C N/A 2014    Procedure: DILATION AND CURETTAGE WITH HYSTEROSCOPY;  Surgeon: Karime Cifuetnes MD;  Location: [facility];  Service:      CORONARY STENT PLACEMENT       HEMIARTHROPLASTY HIP Right 2017     INCISION AND DRAINAGE HIP Right 2018    ORIF REVISION      LUNG REMOVAL, PARTIAL       REVISION TOTAL HIP ARTHROPLASTY Right 01/10/2018     TONSILLECTOMY AND ADENOIDECTOMY       TOTAL KNEE ARTHROPLASTY Right 2016     TOTAL KNEE ARTHROPLASTY Left 2015     No family history on file.  Social History     Social History     Marital status: Single     Spouse name: N/A     Number of children: N/A     Years of education: N/A     Occupational History     Not on file.     Social History Main Topics     Smoking status: Never Smoker     Smokeless tobacco: Never Used     Alcohol use No     Drug use: No     Sexual activity: Not on file     Other Topics Concern     Not on file     Social History Narrative     Current Outpatient Prescriptions   Medication Sig Dispense Refill     acetaminophen (TYLENOL) 325 MG tablet Take 650 mg by mouth 4 (four) times a day.       aspirin 325 MG EC tablet Take 325 mg by mouth daily.       atorvastatin (LIPITOR) 40 MG tablet Take 40 mg by mouth at bedtime.       carbidopa-levodopa (SINEMET)  mg per tablet Take 2  tablets by mouth 3 (three) times a day.       cholecalciferol, vitamin D3, 5,000 unit Tab Take 5,000 Units by mouth Daily at 8:00 am..       cranberry 500 mg cap Take 500 mg by mouth 2 (two) times a day.       cyanocobalamin (VITAMIN B-12) 100 MCG tablet Take 100 mcg by mouth daily.       ferrous sulfate 325 (65 FE) MG tablet Take 1 tablet by mouth daily with breakfast.        lidocaine (LIDODERM) 5 % Place 1 patch on the skin daily. Remove & Discard patch within 12 hours or as directed by MD       LORazepam (ATIVAN) 0.5 MG tablet Take 0.5 mg by mouth every 6 (six) hours as needed for anxiety.       LORazepam (ATIVAN) 0.5 MG tablet Take 0.5 mg by mouth 2 (two) times a day.        multivitamin (MULTIVITAMIN) per tablet Take 1 tablet by mouth daily.       nitroglycerin (NITROSTAT) 0.4 MG SL tablet Place 0.4 mg under the tongue every 5 (five) minutes as needed.        nystatin (MYCOSTATIN) powder Apply 1 application topically daily.       polyethylene glycol (MIRALAX) 17 gram packet Take 17 g by mouth daily.       pramipexole (MIRAPEX) 0.5 MG tablet Take 0.5 mg by mouth bedtime.       psyllium (METAMUCIL) powder Take by mouth every morning. 1 PKT       senna-docusate (PERICOLACE) 8.6-50 mg tablet Take 2 tablets by mouth 2 (two) times a day.       solifenacin (VESICARE) 10 MG tablet Take 10 mg by mouth daily.       sorbitol 70 % solution Take 30 mL by mouth daily.       traMADol (ULTRAM) 50 mg tablet Take 50 mg by mouth 4 (four) times a day.       traZODone (DESYREL) 150 MG tablet Take 1 tablet (150 mg total) by mouth bedtime. 30 tablet 0     venlafaxine (EFFEXOR-XR) 75 MG 24 hr capsule Take 5 capsules (375 mg total) by mouth daily. 150 capsule 3     No current facility-administered medications for this visit.      Allergies   Allergen Reactions     Blood-Group Specific Substance Other (See Comments)     Patient has anti-K. Blood product orders maybe delayed. Draw one red top and 2 purple top tubes for all Type and  Screen/Red blood Cell product orders     Fd And C No.5 (Tartrazine)      GI UPSET     Ibuprofen Nausea And Vomiting     Morphine Rash     swelling         Review of Systems:    Constitutional: Negative.  Negative for fever, chills, has activity change, appetite change and fatigue.  Anxiety with mobility.  HENT: Negative for congestion and facial swelling.    Eyes: Negative for photophobia, redness and visual disturbance.   Respiratory: Negative for cough and chest tightness.    Cardiovascular: Negative for chest pain, palpitations and leg swelling.   Gastrointestinal: Negative for , diarrhea, constipation, blood in stool and abdominal distention.     Genitourinary: Negative.    Musculoskeletal: Negative.  Had severe right hip pain though improved. Better mobility.  Skin: Negative.    Neurological: Negative for dizziness, tremors, syncope, weakness, light-headedness and headaches.   Hematological: Does not bruise/bleed easily.   Psychiatric/Behavioral: Negative.  Anxiety improved.    Vitals:    02/28/18 1321   BP: 110/70   Pulse: 79   Resp: 18   Temp: 97  F (36.1  C)   SpO2: 95%       Physical Exam:    GENERAL: no acute distress. Cooperative in conversation. Pain and anxiety better controlled.   HEENT: pupils are equal, round and reactive. Oral mucosa is moist and intact.  RESP:Chest symmetric. Regular respiratory rate. No stridor. CTA. RA.  CVS: S1S2, no murmur, rub, or gallop.  ABD: Nondistended, soft. No constipation or diarrhea.  EXTREMITIES: No lower extremity edema.  Right hip incision is intact.  NEURO: no focal deficits. Alert and oriented x3.   PSYCH: within normal limits. No depression, has anxiety.  SKIN: warm dry intact,       Labs:    .     HEMOGLOBIN (01/26/2018 8:07 AM)  Only the most recent of 25 results within the time period is included.    HEMOGLOBIN (01/26/2018 8:07 AM)   Component Value Ref Range   HEMOGLOBIN  8.3 (L) 12.0 - 16.0 g/dL   MCV  88 80 - 100 fL     HEMOGLOBIN (01/26/2018 8:07 AM)    Specimen Performing Laboratory   Blood [facility] LABORATORY    SENDOUT INTERNAL ZIP [id number]    [address] [place] [address]       HEMOGLOBIN (2018 8:07 AM)   Narrative                Back to top of Results      EXTRA TUBE LAVENDER (2018 10:14 AM)  Only the most recent of 2 results within the time period is included.    EXTRA TUBE LAVENDER (2018 10:14 AM)   Specimen Performing Laboratory   Blood [facility] LABORATORY    SENDOUT INTERNAL ZIP [id number]    [address] [place] [address]     Back to top of Results      TRANSFUSE RBC (NURSE COMMUNICATION ORDER) (2018 11:10 AM)  Only the most recent of 15 results within the time period is included.    TRANSFUSE RBC (NURSE COMMUNICATION ORDER) (2018 11:10 AM)   Specimen Performing Laboratory   Blood      Back to top of Results      ANTIBODY IDENTIFICATION EACH PANEL (2018 5:03 AM)  Only the most recent of 3 results within the time period is included.    ANTIBODY IDENTIFICATION EACH PANEL (2018 5:03 AM)   Component Value Ref Range   QUANTITY 1     PANEL JENA ID  Panel Antibody ID       ANTIBODY IDENTIFICATION EACH PANEL (2018 5:03 AM)   Specimen Performing Laboratory     [facility] LABORATORY BLOOD BANK    [address] [place] [address]     Back to top of Results      RED BLOOD CELLS EA UNIT (2018 5:03 AM)  Only the most recent of 18 results within the time period is included.    RED BLOOD CELLS EA UNIT (2018 5:03 AM)   Component Value Ref Range   CROSSMATCH Compatible Compatible   PRODUCT BLOOD TYPE O Neg     PRODUCT ID NUMBER [id number]     PRODUCT STATUS  /Released     PRODUCT DESCRIPTION  RBC AS-1 LR     PRODUCT CODE [id number]       RED BLOOD CELLS EA UNIT (2018 5:03 AM)   Specimen Performing Laboratory     [facility] LABORAT[facility] BLOOD BANK    [address] [place] [address]     Back to top of Results      RBC W TYPE  AND SCREEN (01/24/2018 4:47 AM)  Only the most recent of 4 results within the time period is included.    RBC W TYPE AND SCREEN (01/24/2018 4:47 AM)   Component Value Ref Range   ABORH  O Rh Negative     ANTIBODY SCREEN Positive (A) Negative   SPECIMEN EXPIRATION DATE/TIME 01/27/18 23:59       RBC W TYPE AND SCREEN (01/24/2018 4:47 AM)   Specimen Performing Laboratory   Blood [facility] LABORATORY BLOOD BANK    [address] [place] [address]     Back to top of Results      ANTIBODY IDENTIFICATION LAB USE ONLY (01/24/2018 4:47 AM)  Only the most recent of 3 results within the time period is included.    ANTIBODY IDENTIFICATION LAB USE ONLY (01/24/2018 4:47 AM)   Component Value Ref Range   ANTIBODY IDENTIFICATION  Anti-Maite (A)       ANTIBODY IDENTIFICATION LAB USE ONLY (01/24/2018 4:47 AM)   Specimen Performing Laboratory   Blood [facility] LABORATORY BLOOD BANK    [address] [place] [address]     Back to top of Results      EXTRA TUBE GOLD/SST (01/20/2018 8:05 AM)  Only the most recent of 2 results within the time period is included.    EXTRA TUBE GOLD/SST (01/20/2018 8:05 AM)   Specimen Performing Laboratory   Blood [facility] LABORATORY    SENDOUT INTERNAL ZIP [id number]    [address] [place] [address]     Back to top of Results      XR PELVIS 1 VIEW PORTABLE (01/18/2018 10:14 AM)  Only the most recent of 3 results within the time period is included.    XR PELVIS 1 VIEW PORTABLE (01/18/2018 10:14 AM)   Narrative   XR PELVIS 1 VIEW PORTABLE    1/18/2018 10:14 AM        INDICATION: Follow-up right hip ORIF. History of right hip hemiarthroplasty.    COMPARISON: Hip radiograph, 01/17/2018        FINDINGS: There has been interval internal fixation of the right hip trochanteric fracture with good anatomic alignment of the fracture fragments. Evidence of previous right hip hemiarthroplasty. Hardware appears well seated and intact.       XR PELVIS 1 VIEW  PORTABLE (01/18/2018 10:14 AM)   Procedure Note   Interface, Powerscribe - 01/18/2018 10:25 AM CST    XR PELVIS 1 VIEW PORTABLE  1/18/2018 10:14 AM    INDICATION: Follow-up right hip ORIF. History of right hip hemiarthroplasty.  COMPARISON: Hip radiograph, 01/17/2018    FINDINGS: There has been interval internal fixation of the right hip trochanteric fracture with good anatomic alignment of the fracture fragments. Evidence of previous right hip hemiarthroplasty. Hardware appears well seated and intact.     Back to top of Results      AEROBIC BACTERIAL CULTURE, STAIN (01/18/2018 8:24 AM)  Only the most recent of 2 results within the time period is included.    AEROBIC BACTERIAL CULTURE, STAIN (01/18/2018 8:24 AM)   Component Value Ref Range   CULTURE No Growth.     GRAM STAIN  1+ PMNs     GRAM STAIN  No organisms seen     GRAM STAIN  Gram stain performed by [place], [place]       AEROBIC BACTERIAL CULTURE, STAIN (01/18/2018 8:24 AM)   Specimen Performing Laboratory   Swab - Right Hip [facility]ORAT[facility]AT[facility]    [address]    [place] [address]     Back to top of Results      ANAEROBIC CULTURE (01/18/2018 8:24 AM)  Only the most recent of 2 results within the time period is included.    ANAEROBIC CULTURE (01/18/2018 8:24 AM)   Component Value Ref Range   CULTURE No anaerobes isolated       ANAEROBIC CULTURE (01/18/2018 8:24 AM)   Specimen Performing Laboratory   Swab - Right Hip [facility] LABORATORY-[facility] LABORATORY    [address]    [place] [address]     Back to top of Results      ENDOTRACHEAL TUBE (01/18/2018 8:13 AM)  ENDOTRACHEAL TUBE (01/18/2018 8:13 AM)   Narrative   EtJenniffer bernard CRNA     1/18/2018  8:13 AM    Procedure: ETT        Patient location during procedure: OR    ETT Properties    Mask Ventilation: oral airway and easy    Final Technique: direct laryngoscopy    Type: straight    Location: oral    Cuffed: yes    Tube Size:  7.0 mm    Stylet: yes    Laryngoscope Blade: Quick    Blade Size: 2    Cormack-Lehane Grade View: 1    Insertion Attempts: 1    Placement Verification: auscultation and end tidal CO2    Assessment: pharynx clear, atraumatic and dentition unchanged    Secured at: 23    Measured From: teeth    Tooth guard used and removed: yes    Difficulty: 0 (not difficult)    Electronically signed by JENNIFFER MCFARLAND                                                                                                                                                                   ENDOTRACHEAL TUBE (01/18/2018 8:13 AM)   Procedure Note   Jenniffer Mcfarland, CRNA - 01/18/2018 8:13 AM CST    Procedure: ETT    Patient location during procedure: OR  ETT Properties  Mask Ventilation: oral airway and easy  Final Technique: direct laryngoscopy  Type: straight  Location: oral  Cuffed: yes  Tube Size: 7.0 mm  Stylet: yes  Laryngoscope Blade: Quick  Blade Size: 2  Cormack-Lehane Grade View: 1  Insertion Attempts: 1  Placement Verification: auscultation and end tidal CO2  Assessment: pharynx clear, atraumatic and dentition unchanged  Secured at: 23  Measured From: teeth  Tooth guard used and removed: yes  Difficulty: 0 (not difficult)  Electronically signed by JENNIFFER MCFARLAND                    Back to top of Results      EXTRA TUBE LIGHT GREEN (01/18/2018 5:29 AM)  EXTRA TUBE LIGHT GREEN (01/18/2018 5:29 AM)   Specimen Performing Laboratory   Blood [facility] LABORATORY    SENDOUT INTERNAL ZIP [id number]    [address] [place] [address]     Back to top of Results      NUCLEATED RED BLOOD CELLS AS PERCENT OF BLOOD LEUKOCYTES (01/18/2018 5:29 AM)  Only the most recent of 4 results within the time period is included.    NUCLEATED RED BLOOD CELLS AS PERCENT OF BLOOD LEUKOCYTES (01/18/2018 5:29 AM)   Component Value Ref Range   NRBC  0.0 %   ABS NRBC 0.0 thou /cu mm     NUCLEATED RED BLOOD CELLS AS PERCENT OF BLOOD LEUKOCYTES  (01/18/2018 5:29 AM)   Specimen Performing Laboratory   Blood [facility] LABORATORY    SENDOUT INTERNAL ZIP [id number]    [address] [place] [address]       NUCLEATED RED BLOOD CELLS AS PERCENT OF BLOOD LEUKOCYTES (01/18/2018 5:29 AM)   Narrative                Back to top of Results      CBC with Platelets no Differential (01/18/2018 5:29 AM)  Only the most recent of 2 results within the time period is included.    CBC with Platelets no Differential (01/18/2018 5:29 AM)   Component Value Ref Range   WHITE BLOOD COUNT  6.1 4.5 - 11.0 thou/cu mm   RED BLOOD COUNT  2.77 (L) 4.00 - 5.20 mil/cu mm   HEMOGLOBIN  7.8 (L) 12.0 - 16.0 g/dL   HEMATOCRIT  24.1 (L) 33.0 - 51.0 %   MCV  87 80 - 100 fL   MCH  28.2 26.0 - 34.0 pg   MCHC  32.4 32.0 - 36.0 g/dL   RDW  15.4 11.5 - 15.5 %   PLATELET COUNT  240 140 - 440 thou/cu mm   MPV  10.0 6.5 - 11.0 fL     CBC with Platelets no Differential (01/18/2018 5:29 AM)   Specimen Performing Laboratory   Blood [facility] LABORATORY    SENDOUT INTERNAL ZIP [id number]    [address] [place] [address]       Recent Results (from the past 240 hour(s))   HM2(CBC w/o Differential)    Collection Time: 02/19/18  8:24 AM   Result Value Ref Range    WBC 4.4 4.0 - 11.0 thou/uL    RBC 4.30 3.80 - 5.40 mill/uL    Hemoglobin 11.3 (L) 12.0 - 16.0 g/dL    Hematocrit 39.0 35.0 - 47.0 %    MCV 91 80 - 100 fL    MCH 26.3 (L) 27.0 - 34.0 pg    MCHC 29.0 (L) 32.0 - 36.0 g/dL    RDW 15.4 (H) 11.0 - 14.5 %    Platelets 224 140 - 440 thou/uL    MPV 10.7 8.5 - 12.5 fL         Assessment/Plan:    Right hip fracture status post right hip hemiarthroplasty subsequently patient represented with a periprosthetic fx: ORIF of right femur with revision hemiarthroplasty of the right hip by Dr. Magaña on 1/11/18. EBL 2000ml. On 1/17/2018, she developed a draining hematoma right hip and was noted to have interval displacement of greater trochanter on Xray. She received 2 units of pRBC 1/17  and underwent right hip irrigation and debridement with ORIF of the right greater trochanter. She has been discharged and currently with WBAT.    Nondisplaced fracture of the radial head:   Course complicated by nonunion currently orthopedics has recommended a sling for her.    Pain management:  seen by the pain clinic and started on Dilaudid for pain management along with Tylenol which has been changed to scheduled, unable to receive dilaudid per low BPs, start tramadol 50mg QID, dc flexeril and dilaudid per non use. She also has an underlying history of chronic pain. Tramadol has been effective.  Vistaril has been discontinued.    DVT prophylaxis: on aspirin 325 daily.    BBMY-vacmbp-be and recheck hemoglobins.  Patient underwent hemorrhagic check in the hospital she required several units of blood transfusion the hospital discharge hemoglobin remained low. Decrease ferrous sulfate 325 mg to daily, recheck CBC was 11.2.    Recent history of coronary artery disease: status post stent placement on 3/17, she was unable to tolerate Brilinta 90 mg twice daily due to bleeding complications, currently on aspirin 325 daily which was increased from her home regimen of 81 mg. She can restart Brilinta when okayed by orthopedics.    Postoperative hypotension: initially felt to be due to anemia secondary to hemorrhagic shock and blood loss she was given multiple units of blood products.  Subsequently she was given fluid resuscitation as her hemoglobin was stable. Last Hgb 8.1, see above.  Was taken off metoprolol it has been recommended her dosage be increased if her blood pressures rebound. SBP , not orthostatic. No concern currently.    Anxiety disorder:  she is refusing to ambulate due to severe anxiety. Started lorazepam 0.5mg q6h PRN, used 1-2x daily and now has scheduled 0.5mg BID. Some improvement.    HLD-Lipitor, stable.    History of movement disorder: Sinemet 3 times daily.  She is also on Mirapex.    Vit D  deficiency: last 30.3, increased to 4000U daily with recheck in 4 wks    B12 deficiency: on supplement, no current level, will recheck 468.    Overactive bladder: Vesicare but also had an indwelling Molina catheter. This was primarily given due to her lack of mobility for comfort. Removed. No issue.     Depression with insomnia:  takes trazodone at bedtime along with Effexor.     Profound debilitation sent from the nursing home to the TCU.        The care plan has been reviewed and all orders signed. Changes to care plan, if any, as noted. Otherwise, continue care plan of care.      Electronically signed by: Dexter Rico NP    .

## 2021-06-16 NOTE — PROGRESS NOTES
[facility]      Code Status:  FULL CODE  Visit Type: Problem Visit     Facility:  [facility] [[id number]]           History of Present Illness: Alison Bashir is a 67 y.o. female who is currently admitted as a transfer from [facility] to the TCU.  This is a elderly 67-year-old resident of a group home with underlying history of a traumatic brain injury along with hypertension and coronary artery disease who presented to the emergency room after she fell on 12/17.  She was diagnosed with a right hip fracture and underwent right hip hemiarthroplasty she was also noted to have a UTI and subsequently discharged to TCU.  She readmitted to the hospital with ongoing pain in her right leg and imaging revealed a periprosthetic fractures orthopedics was consulted.  They recommended repeat surgical intervention.  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    Patient is now walking slowly but unfortunately she will not progress beyond wheelchair level.  I saw her walking today and she is not able to lift her lower extremity up for proper ambulation.  She will be able to maneuver herself independently for small distances in her room.  Nursing requested me to see her.  She is complaining of hematuria  In addition her group home called requesting an update they are wondering if they can discontinue her cardiac appointment as they cannot transfer her.  Her anxiety and other chronic medical conditions are at baseline           Past Medical History:   Diagnosis Date     Acute blood loss anemia      Alzheimer disease      CAD (coronary artery disease)      Chronic pain syndrome      Dementia      Depression      Hip fracture, right 12/21/2017     HTN (hypertension)      Kidney stone      PMB (postmenopausal bleeding)      RLS (restless legs syndrome)      Stented coronary artery      Traumatic brain injury      Unsteady gait      uses walker     UTI (lower urinary tract infection)     on antibiotic course     Past Surgical History:   Procedure Laterality Date      SECTION       CHOLECYSTECTOMY  May 2014     COMBINED HYSTEROSCOPY DIAGNOSTIC / D&C N/A 2014    Procedure: DILATION AND CURETTAGE WITH HYSTEROSCOPY;  Surgeon: Karime Cifuentes MD;  Location: [facility] [place];  Service:      CORONARY STENT PLACEMENT       HEMIARTHROPLASTY HIP Right 2017     INCISION AND DRAINAGE HIP Right 2018    ORIF REVISION      LUNG REMOVAL, PARTIAL       REVISION TOTAL HIP ARTHROPLASTY Right 01/10/2018     TONSILLECTOMY AND ADENOIDECTOMY       TOTAL KNEE ARTHROPLASTY Right 2016     TOTAL KNEE ARTHROPLASTY Left 2015     No family history on file.  Social History     Social History     Marital status: Single     Spouse name: N/A     Number of children: N/A     Years of education: N/A     Occupational History     Not on file.     Social History Main Topics     Smoking status: Never Smoker     Smokeless tobacco: Never Used     Alcohol use No     Drug use: No     Sexual activity: Not on file     Other Topics Concern     Not on file     Social History Narrative     Current Outpatient Prescriptions   Medication Sig Dispense Refill     acetaminophen (TYLENOL) 325 MG tablet Take 650 mg by mouth 4 (four) times a day.       aspirin 81 mg chewable tablet Chew 81 mg daily.       atorvastatin (LIPITOR) 40 MG tablet Take 40 mg by mouth at bedtime.       carbidopa-levodopa (SINEMET)  mg per tablet Take 2 tablets by mouth 3 (three) times a day.       cholecalciferol, vitamin D3, 5,000 unit Tab Take 4,000 Units by mouth Daily at 8:00 am..        cranberry 500 mg cap Take 500 mg by mouth 2 (two) times a day.       cyanocobalamin (VITAMIN B-12) 100 MCG tablet Take 100 mcg by mouth daily.       lidocaine (LIDODERM) 5 % Place 1 patch on the skin daily. Remove & Discard patch within 12 hours or as directed by MD       LORazepam (ATIVAN) 0.5 MG  tablet Take 0.5 mg by mouth 2 (two) times a day.        magnesium oxide (MAG-OX) 400 mg tablet Take 400 mg by mouth 2 (two) times a day.       multivitamin (MULTIVITAMIN) per tablet Take 1 tablet by mouth daily.       nitroglycerin (NITROSTAT) 0.4 MG SL tablet Place 0.4 mg under the tongue every 5 (five) minutes as needed.        polyethylene glycol (MIRALAX) 17 gram packet Take 17 g by mouth daily.       pramipexole (MIRAPEX) 0.5 MG tablet Take 0.5 mg by mouth bedtime.       psyllium (METAMUCIL) powder Take by mouth every morning. 1 PKT       senna-docusate (PERICOLACE) 8.6-50 mg tablet Take 2 tablets by mouth 2 (two) times a day.       solifenacin (VESICARE) 10 MG tablet Take 10 mg by mouth daily.       sorbitol 70 % solution Take 30 mL by mouth daily.       ticagrelor (BRILINTA) 90 mg Tab Take 90 mg by mouth 2 (two) times a day.       traMADol (ULTRAM) 50 mg tablet Take 25 mg by mouth every 4 (four) hours as needed for pain.        traZODone (DESYREL) 150 MG tablet Take 1 tablet (150 mg total) by mouth bedtime. 30 tablet 0     venlafaxine (EFFEXOR-XR) 75 MG 24 hr capsule Take 5 capsules (375 mg total) by mouth daily. 150 capsule 3     No current facility-administered medications for this visit.      Allergies   Allergen Reactions     Blood-Group Specific Substance Other (See Comments)     Patient has anti-K. Blood product orders maybe delayed. Draw one red top and 2 purple top tubes for all Type and Screen/Red blood Cell product orders     Fd And C No.5 (Tartrazine)      GI UPSET     Ibuprofen Nausea And Vomiting     Morphine Rash     swelling         Review of Systems:    Constitutional: Negative.  Negative for fever, chills, HAS activity change, appetite change and fatigue.   HENT: Negative for congestion and facial swelling.    Eyes: Negative for photophobia, redness and visual disturbance.   Respiratory: Negative for cough and chest tightness.    Cardiovascular: Negative for chest pain, palpitations and leg  swelling.   Gastrointestinal: Negative for , diarrhea, constipation, blood in stool and abdominal distention.     Genitourinary: Negative.    Musculoskeletal: Negative.    Has  right hip pain  Skin: Negative.    Neurological: Negative for dizziness, tremors, syncope, weakness, light-headedness and headaches.   Hematological: Does not bruise/bleed easily.   Psychiatric/Behavioral: Negative.     Vitals:    03/19/18 1621   BP: 112/68   Pulse: 75   Resp: 17   Temp: 98  F (36.7  C)       Physical Exam:    GENERAL: no acute distress. Cooperative in conversation.   HEENT: pupils are equal, round and reactive. Oral mucosa is moist and intact.  RESP:Chest symmetric. Regular respiratory rate. No stridor.  CVS: S1S2  ABD: Nondistended, soft.  EXTREMITIES: No lower extremity edema.  Right hip incision is healed.  NEURO: non focal. Alert and oriented x3.   PSYCH: within normal limits. No depression or anxiety.  SKIN: warm dry intact     Labs:    Recent Results (from the past 240 hour(s))   HM2(CBC w/o Differential)   Result Value Ref Range    WBC 4.5 4.0 - 11.0 thou/uL    RBC 4.67 3.80 - 5.40 mill/uL    Hemoglobin 12.0 12.0 - 16.0 g/dL    Hematocrit 41.9 35.0 - 47.0 %    MCV 90 80 - 100 fL    MCH 25.7 (L) 27.0 - 34.0 pg    MCHC 28.6 (L) 32.0 - 36.0 g/dL    RDW 14.5 11.0 - 14.5 %    Platelets 211 140 - 440 thou/uL    MPV 11.4 8.5 - 12.5 fL   Vitamin D, Total (25-Hydroxy)   Result Value Ref Range    Vitamin D, Total (25-Hydroxy) 43.9 30.0 - 80.0 ng/mL       Assessment/Plan:    Right hip fracture status post right hip hemiarthroplasty subsequently patient represented with a periprosthetic fracture  She underwent open-reduction and internal fixation of right femur with revision hemiarthroplasty of the right hip by Dr. Magaña on 1/11/18. EBL 2000ml.   . On 1/17/2018, she developed a draining hematoma right hip and was noted to have interval displacement of greater trochanter on Xray. She received 2 units of pRBC 1/17 and underwent  right hip irrigation and debridement with ORIF of the right greater trochante  Saw orthopedics and currently she is weightbearing as tolerated.  Due to lack of progress she is going to discharge at wheelchair level and unable to ambulate properly in spite of using a walker tremors hold her back to.    Nondisplaced fracture of the radial head .  Course complicated by nonunion currently orthopedics has recommended a sling for her  HAS NO PAIN.    Pain management -reporting no pain at rest    DVT prophylaxis on aspirin 325.switch to asa 81mg    [id number] and recheck hemoglobins.  Patient underwent hemorrhagic check in the hospital she required several units of blood transfusion the hospital.  R/C 11.3.  Taken off oral iron supplementation    Recent history of coronary artery disease status post stent placement on 3/17 she was unable to tolerate Brilinta 90 mg twice daily due to bleeding complications  She has been switched back to her Brilinta.     Postoperative hypotension -initially felt to be due to anemia secondary to hemorrhagic shock and blood loss she was given multiple units of blood products.  Subsequently she was given fluid resuscitation as her hemoglobin was stable  taken off metoprolol it has been recommended her dosage be increased if her blood pressures rebound.  Blood pressures remain low.    Anxiety disorder she is now on no Ativan 0.5 twice daily and taken OFF as needed.  She is seeing psychiatry due to significant anxiety issues.  HLD-she is on Lipitor  History of movement disorder she takes Sinemet 3 times daily.  She is also on Mirapex  B12 deficiency on supplement  Overactive bladder currently is on Vesicare     Hematuria in a patient who appears to be colonized.  Plan is to check another set of UA UC urgently on her prior to her discharge  In the past we had elected not to treat her and she has been stable    Depression with insomnia takes trazodone at bedtime along with Effexor  Remains  anxious will see how Vistaril helps that if not effective he may need to see psychiatric    Profound debilitation sent from the nursing home to the TCU.  She continues to remain off metoprolol due to postoperative hypertension.    Blood pressures remain low but stable.  Care plan reviewed with therapy.  Transfers remain erratic due to anxiety. CPT 4.5-5/6  Continue with her current care plan.  PT is reporting anxiety continues to hold back her progress she is making slow progress walking 150 feet with contact-guard assistance transfers still requires mod assist.ANDI 11/28  Discharge plan reviewed with  as well as her group home.  The requesting her cardiac appointment be canceled.  Did talk to them that she needs to have a medication review since he restarted on Brilinta with cardiology.  Hopefully she can keep that appointment.   is aware and will assist in transportation needs.  Total time spent was 35 minutes, more than half of it was in face-to-face counseling regarding disease state, treatment, side effects, documentation, review of clinical data and coordination of care    Electronically signed by: NERY Sosa    .

## 2021-06-16 NOTE — PROGRESS NOTES
[facility]      Code Status:  FULL CODE  Visit Type: Review Of Multiple Medical Conditions     Facility:  [facility] [[id number]]           History of Present Illness: Alison Bashir is a 67 y.o. female who is currently admitted as a transfer from [facility] to the TCU.  This is a elderly 67-year-old resident of a group home with underlying history of a traumatic brain injury along with hypertension and coronary artery disease who presented to the emergency room after she fell on 12/17.  She was diagnosed with a right hip fracture and underwent right hip hemiarthroplasty she was also noted to have a UTI and subsequently discharged to TCU.  She readmitted to the hospital with ongoing pain in her right leg and imaging revealed a periprosthetic fractures orthopedics was consulted.  They recommended repeat surgical intervention.  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  She underwent open-reduction and internal fixation of right femur with revision hemiarthroplasty of the right hip by Dr. Magaña on 1/11/18. EBL 2000ml.      She had hypotension intraop and in PACU requiring multiple blood products. She was admitted to ICU postop given hypotension and blood loss. Hemorraghic shock resolved . On 1/17/2018, she developed a draining hematoma right hip and was noted to have interval displacement of greater trochanter on Xray. She received 2 units of pRBC 1/17 and underwent right hip irrigation and debridement with ORIF of the right greater trochante hemoglobin , had been stable 7.5 - 8.0. Brilinta was on hold, resumed 1/23/2018 with okay from Orthopedics and decreased ASA to 81mg daily from 325mg daily.      On 1/24/2018, she had hypotension overnight and received 1U Packed red blood cells. Brilinta stopped on 1/25/18 per Dr. Magaña for the foreseeable future, at least until her follow up appointment with  Orthopedics in 10-14 days. Hypotension again 1/25/2018 overnight, received 1L fluid bolus. Hemoglobin stable. Hypotension was felt to be related to narcotics. Raised parameters to hold Dilaudid for SBP <110.      She completed a 7 day course of Ceftriaxone for Morganella morganii UTI. Molina in place due to lack of mobility, and she was discharged with this in place. This should be removed when mobility has improved.      Pain clinic following for pain med management. Stopped as needed Dilaudid on 1/24/2018 due to hypotension, decreased frequency of scheduled Dilaudid to every 6 hours on 1/25/2018, and added parameters to scheduled Dilaudid to hold for sedation or SBP < 110. She does have significant anxiety when transferring, fear of falling. She would benefit from seeing Psychiatry as an outpatient, refused this admission. Her pain medications may be adjusted as long as blood pressure tolerates.   , her Brilinta is on hold until follow up with Orthopedics, and she was discharged on ASA 325mg daily, which can be decreased to 81mg daily when Brilinta restarted. . Her Metoprolol was stopped and should be restarted when blood pressure tolerates.      She was also found to have non-displaced radial head fracture. Ortho recommended sling. .  Care plan reviewed both with nursing as well as therapy.  Her transfers remain erratic.  They feel she can do better but she chooses not to do so and is limited by anxiety. Seen walking today  Recheck hemoglobin has been stable.   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  She is not voicing any symptoms including dysuria currently.wts are stable. NO NEW CONCERNS      Past Medical History:   Diagnosis Date     Acute blood loss anemia      Alzheimer disease      CAD (coronary artery disease)      Chronic pain syndrome      Dementia      Depression      Hip fracture, right 12/21/2017     HTN  (hypertension)      Kidney stone      PMB (postmenopausal bleeding)      RLS (restless legs syndrome)      Stented coronary artery      Traumatic brain injury      Unsteady gait     uses walker     UTI (lower urinary tract infection)     on antibiotic course     Past Surgical History:   Procedure Laterality Date      SECTION       CHOLECYSTECTOMY  May 2014     COMBINED HYSTEROSCOPY DIAGNOSTIC / D&C N/A 2014    Procedure: DILATION AND CURETTAGE WITH HYSTEROSCOPY;  Surgeon: Karime Cifuentes MD;  [place]: [facility] [place];  Service:      CORONARY STENT PLACEMENT       HEMIARTHROPLASTY HIP Right 2017     INCISION AND DRAINAGE HIP Right 2018    ORIF REVISION      LUNG REMOVAL, PARTIAL       REVISION TOTAL HIP ARTHROPLASTY Right 01/10/2018     TONSILLECTOMY AND ADENOIDECTOMY       TOTAL KNEE ARTHROPLASTY Right 2016     TOTAL KNEE ARTHROPLASTY Left 2015     No family history on file.  Social History     Social History     Marital status: Single     Spouse name: N/A     Number of children: N/A     Years of education: N/A     Occupational History     Not on file.     Social History Main Topics     Smoking status: Never Smoker     Smokeless tobacco: Never Used     Alcohol use No     Drug use: No     Sexual activity: Not on file     Other Topics Concern     Not on file     Social History Narrative     Current Outpatient Prescriptions   Medication Sig Dispense Refill     acetaminophen (TYLENOL) 325 MG tablet Take 650 mg by mouth 4 (four) times a day.       aspirin 325 MG EC tablet Take 325 mg by mouth daily.       atorvastatin (LIPITOR) 40 MG tablet Take 40 mg by mouth at bedtime.       carbidopa-levodopa (SINEMET)  mg per tablet Take 2 tablets by mouth 3 (three) times a day.       cholecalciferol, vitamin D3, 5,000 unit Tab Take 5,000 Units by mouth Daily at 8:00 am..       cranberry 500 mg cap Take 500 mg by mouth 2 (two) times a day.       cyanocobalamin (VITAMIN B-12) 100 MCG tablet  Take 100 mcg by mouth daily.       ferrous sulfate 325 (65 FE) MG tablet Take 1 tablet by mouth daily with breakfast.        lidocaine (LIDODERM) 5 % Place 1 patch on the skin daily. Remove & Discard patch within 12 hours or as directed by MD       LORazepam (ATIVAN) 0.5 MG tablet Take 0.5 mg by mouth every 6 (six) hours as needed for anxiety.       LORazepam (ATIVAN) 0.5 MG tablet Take 0.5 mg by mouth 2 (two) times a day.        multivitamin (MULTIVITAMIN) per tablet Take 1 tablet by mouth daily.       nitroglycerin (NITROSTAT) 0.4 MG SL tablet Place 0.4 mg under the tongue every 5 (five) minutes as needed.        nystatin (MYCOSTATIN) powder Apply 1 application topically daily.       polyethylene glycol (MIRALAX) 17 gram packet Take 17 g by mouth daily.       pramipexole (MIRAPEX) 0.5 MG tablet Take 0.5 mg by mouth bedtime.       psyllium (METAMUCIL) powder Take by mouth every morning. 1 PKT       senna-docusate (PERICOLACE) 8.6-50 mg tablet Take 2 tablets by mouth 2 (two) times a day.       solifenacin (VESICARE) 10 MG tablet Take 10 mg by mouth daily.       sorbitol 70 % solution Take 30 mL by mouth daily.       traMADol (ULTRAM) 50 mg tablet Take 50 mg by mouth 4 (four) times a day.       traZODone (DESYREL) 150 MG tablet Take 1 tablet (150 mg total) by mouth bedtime. 30 tablet 0     venlafaxine (EFFEXOR-XR) 75 MG 24 hr capsule Take 5 capsules (375 mg total) by mouth daily. 150 capsule 3     No current facility-administered medications for this visit.      Allergies   Allergen Reactions     Blood-Group Specific Substance Other (See Comments)     Patient has anti-K. Blood product orders maybe delayed. Draw one red top and 2 purple top tubes for all Type and Screen/Red blood Cell product orders     Fd And C No.5 (Tartrazine)      GI UPSET     Ibuprofen Nausea And Vomiting     Morphine Rash     swelling         Review of Systems:    Constitutional: Negative.  Negative for fever, chills, HAS activity change,  appetite change and fatigue.   HENT: Negative for congestion and facial swelling.    Eyes: Negative for photophobia, redness and visual disturbance.   Respiratory: Negative for cough and chest tightness.    Cardiovascular: Negative for chest pain, palpitations and leg swelling.   Gastrointestinal: Negative for , diarrhea, constipation, blood in stool and abdominal distention.     Genitourinary: Negative.    Musculoskeletal: Negative.    Has  right hip pain  Skin: Negative.    Neurological: Negative for dizziness, tremors, syncope, weakness, light-headedness and headaches.   Hematological: Does not bruise/bleed easily.   Psychiatric/Behavioral: Negative.     Vitals:    02/26/18 1022   BP: 130/81   Pulse: 72   Temp: 98  F (36.7  C)       Physical Exam:    GENERAL: no acute distress. Cooperative in conversation.   HEENT: pupils are equal, round and reactive. Oral mucosa is moist and intact.  RESP:Chest symmetric. Regular respiratory rate. No stridor.  CVS: S1S2  ABD: Nondistended, soft.  EXTREMITIES: No lower extremity edema.  Right hip incision is intact.  NEURO: non focal. Alert and oriented x3.   PSYCH: within normal limits. No depression or anxiety.  SKIN: warm dry intact     Labs:    Lab Results   Component Value Date    WBC 4.4 02/19/2018    HGB 11.3 (L) 02/19/2018    HCT 39.0 02/19/2018    MCV 91 02/19/2018     02/19/2018       Assessment/Plan:    Right hip fracture status post right hip hemiarthroplasty subsequently patient represented with a periprosthetic fracture  She underwent open-reduction and internal fixation of right femur with revision hemiarthroplasty of the right hip by Dr. Magaña on 1/11/18. EBL 2000ml.   . On 1/17/2018, she developed a draining hematoma right hip and was noted to have interval displacement of greater trochanter on Xray. She received 2 units of pRBC 1/17 and underwent right hip irrigation and debridement with ORIF of the right greater trochante  Saw orthopedics and  currently she is weightbearing as tolerated but advised to avoid active abduction.    Nondisplaced fracture of the radial head .  Course complicated by nonunion currently orthopedics has recommended a sling for her  HAS NO PAIN.  Pain management seen by the pain clinic and started on Dilaudid for pain management along with Tylenol which has been scheduled for her  She also has an underlying history of chronic pain.  She was on Tylenol with tramadol available as needed only  DVT prophylaxis on aspirin 325  [id number] and recheck hemoglobins.  Patient underwent hemorrhagic check in the hospital she required several units of blood transfusion the hospital discharge hemoglobin remained low  R/C 11.3  Recent history of coronary artery disease status post stent placement on 3/17 she was unable to tolerate Brilinta 90 mg twice daily due to bleeding complications  .  She is currently on aspirin 325 daily which was increased from her home regimen of 81 mg and she can restart Brilinta when okayed by orthopedics  Postoperative hypotension -initially felt to be due to anemia secondary to hemorrhagic shock and blood loss she was given multiple units of blood products.  Subsequently she was given fluid resuscitation as her hemoglobin was stable  taken off metoprolol it has been recommended her dosage be increased if her blood pressures rebound.  Blood pressures remain low.    Anxiety disorder she is now on no Ativan 0.5 twice daily and taken OFF as needed.  HLD-she is on Lipitor  History of movement disorder she takes Sinemet 3 times daily.  She is also on Mirapex  B12 deficiency on supplement  Overactive bladder currently is on Vesicare   She is currently in a voiding trial and a catheter has been removed she no longer complains of urinary frequency and urgency    UTI in a patient who had an indwelling Molina catheter which was subsequently removed she is not having any symptoms currently of burning frequency and urgency  culture grew the same organisms which she had before  Cultures grow Morganella which is what she had previously in the hospital for which she was treated.  I suspect this is most likely colonization from the indwelling Molina catheter  NO dysuria or any other symptoms reported by patient today    Depression with insomnia takes trazodone at bedtime along with Effexor  Remains anxious will see how Vistaril helps that if not effective he may need to see psychiatric    Profound debilitation sent from the nursing home to the TCU.  She continues to remain off metoprolol due to postoperative hypertension.    Blood pressures remain low but stable.  Care plan reviewed with therapy.  Transfers remain erratic due to anxiety. CPT 4.5/6  Continue with her current care plan.  PT is reporting anxiety continues to hold back her progress she is making slow progress walking 50 feet with contact-guard assistance transfers still requires mod assist      Electronically signed by: NERY Sosa    .

## 2021-06-16 NOTE — PROGRESS NOTES
[facility]      Code Status:  FULL CODE  Visit Type: Review Of Multiple Medical Conditions     Facility:  [facility] [[id number]]           History of Present Illness: Alison Bashir is a 67 y.o. female who is currently admitted as a transfer from [facility] to the TCU.  She is a resident of a group home with underlying history of a traumatic brain injury, hypertension, coronary artery disease who presented to the emergency room after she fell on 12/17.  She was diagnosed with a right hip fracture and underwent right hip hemiarthroplasty. She was also noted to have a UTI and subsequently discharged to TCU.  She readmitted to the hospital with ongoing pain in her right leg and imaging revealed a periprosthetic fractures orthopedics was consulted.  They recommended repeat surgical intervention.  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  She subsequently underwent an ORIF of right femur with revision hemiarthroplasty of the right hip by Dr. Magaña on 1/11/18. She had an EBL 2000ml. She had developed hypotension intraop and in PACU requiring multiple blood products. She was admitted to ICU postop given hypotension and blood loss and on 1/17/2018, she developed a draining hematoma on her right hip and was noted to have interval displacement of greater trochanter on Xray. She received 2 units of pRBC 1/17 and underwent right hip irrigation and debridement with ORIF of the right greater trochanter. Her Hgb had been stable 7.5 - 8.0. Brilinta was on hold, resumed on 1/23/2018 with okay from Orthopedics and decreased ASA to 81mg daily from 325mg daily. Then on 1/24/2018, she had developed hypotension overnight and received 1U PRBCs. Brilinta stopped on 1/25/18 per Dr. Magaña for the foreseeable future, at least until her follow up appointment with Orthopedics in 10-14 days. Hypotension again developed on  1/25/2018 overnight, received 1L fluid bolus. Hemoglobin was stable. Hypotension was felt to be related to narcotics. Raised parameters to hold Dilaudid for SBP <110. Pain clinic following for pain med management. Stopped as needed Dilaudid on 1/24/2018 due to hypotension, decreased frequency of scheduled Dilaudid to every 6 hours on 1/25/2018, and added parameters to scheduled Dilaudid to hold for sedation or SBP < 110. She does have significant anxiety when transferring, fear of falling. She would benefit from seeing Psychiatry as an outpatient, refused this admission. Her pain medications may be adjusted as long as blood pressure tolerates. Her Brilinta is on hold until follow up with Orthopedics, and she was discharged on ASA 325mg daily, which can be decreased to 81mg daily when Brilinta restarted. Hemoglobin should be rechecked in 2 days. Her Metoprolol was stopped and should be restarted when blood pressure tolerates.      TCU: Alison is a 68 yo old with various concerns today. She had significant blood loss anemia secondary to blood loss and intraoperative hypotension and Hgb recheck is 8.2. No obvious signs of bleeding noted. Maintain ferrous sulfate. Pain management was consulted for pain management optimization and she is currently discharged on oral Dilaudid, though per low BPs switched to tramadol QID, effective. Previously she was still not moving in bed and had difficulty moving her right lower extremity at all. Xray ordered per nursing request, no issue identified and is now mobility improved.  She had high anxiety with transfers, started lorazepam PRN and recently schedule low dose BID, which is reported to be effective.  Now BPs have been normalized.  Last Hgb 10.7. Reporting more pain, will increase tramadol dosing, which has been received well.    Patient denies pain, headache, chest pain, numbness or tingling, shortest of breath, eating or swallowing concerns, nausea or vomiting, diarrhea or  bowel abnormalities, or no new integumentary concerns today.    Past Medical History:   Diagnosis Date     Acute blood loss anemia      Alzheimer disease      CAD (coronary artery disease)      Chronic pain syndrome      Dementia      Depression      Hip fracture, right 2017     HTN (hypertension)      Kidney stone      PMB (postmenopausal bleeding)      RLS (restless legs syndrome)      Stented coronary artery      Traumatic brain injury      Unsteady gait     uses walker     UTI (lower urinary tract infection)     on antibiotic course     Past Surgical History:   Procedure Laterality Date      SECTION       CHOLECYSTECTOMY  May 2014     COMBINED HYSTEROSCOPY DIAGNOSTIC / D&C N/A 2014    Procedure: DILATION AND CURETTAGE WITH HYSTEROSCOPY;  Surgeon: Karime Cifuentes MD;  Location: [facility];  Service:      CORONARY STENT PLACEMENT       HEMIARTHROPLASTY HIP Right 2017     INCISION AND DRAINAGE HIP Right 2018    ORIF REVISION      LUNG REMOVAL, PARTIAL       REVISION TOTAL HIP ARTHROPLASTY Right 01/10/2018     TONSILLECTOMY AND ADENOIDECTOMY       TOTAL KNEE ARTHROPLASTY Right 2016     TOTAL KNEE ARTHROPLASTY Left 2015     No family history on file.  Social History     Social History     Marital status: Single     Spouse name: N/A     Number of children: N/A     Years of education: N/A     Occupational History     Not on file.     Social History Main Topics     Smoking status: Never Smoker     Smokeless tobacco: Never Used     Alcohol use No     Drug use: No     Sexual activity: Not on file     Other Topics Concern     Not on file     Social History Narrative     Current Outpatient Prescriptions   Medication Sig Dispense Refill     acetaminophen (TYLENOL) 325 MG tablet Take 650 mg by mouth 4 (four) times a day.       aspirin 81 mg chewable tablet Chew 81 mg daily.       atorvastatin (LIPITOR) 40 MG tablet Take 40 mg by mouth at bedtime.       carbidopa-levodopa (SINEMET)   mg per tablet Take 2 tablets by mouth 3 (three) times a day.       cholecalciferol, vitamin D3, 5,000 unit Tab Take 4,000 Units by mouth Daily at 8:00 am..        cranberry 500 mg cap Take 500 mg by mouth 2 (two) times a day.       cyanocobalamin (VITAMIN B-12) 100 MCG tablet Take 100 mcg by mouth daily.       ferrous sulfate 325 (65 FE) MG tablet Take 1 tablet by mouth daily with breakfast.        lidocaine (LIDODERM) 5 % Place 1 patch on the skin daily. Remove & Discard patch within 12 hours or as directed by MD       LORazepam (ATIVAN) 0.5 MG tablet Take 0.5 mg by mouth 2 (two) times a day.        magnesium oxide (MAG-OX) 400 mg tablet Take 400 mg by mouth 2 (two) times a day.       multivitamin (MULTIVITAMIN) per tablet Take 1 tablet by mouth daily.       nitroglycerin (NITROSTAT) 0.4 MG SL tablet Place 0.4 mg under the tongue every 5 (five) minutes as needed.        polyethylene glycol (MIRALAX) 17 gram packet Take 17 g by mouth daily.       pramipexole (MIRAPEX) 0.5 MG tablet Take 0.5 mg by mouth bedtime.       psyllium (METAMUCIL) powder Take by mouth every morning. 1 PKT       senna-docusate (PERICOLACE) 8.6-50 mg tablet Take 2 tablets by mouth 2 (two) times a day.       solifenacin (VESICARE) 10 MG tablet Take 10 mg by mouth daily.       sorbitol 70 % solution Take 30 mL by mouth daily.       ticagrelor (BRILINTA) 90 mg Tab Take 90 mg by mouth 2 (two) times a day.       traMADol (ULTRAM) 50 mg tablet Take 50 mg by mouth every 6 (six) hours as needed.        traZODone (DESYREL) 150 MG tablet Take 1 tablet (150 mg total) by mouth bedtime. 30 tablet 0     venlafaxine (EFFEXOR-XR) 75 MG 24 hr capsule Take 5 capsules (375 mg total) by mouth daily. 150 capsule 3     No current facility-administered medications for this visit.      Allergies   Allergen Reactions     Blood-Group Specific Substance Other (See Comments)     Patient has anti-K. Blood product orders maybe delayed. Draw one red top and 2 purple  top tubes for all Type and Screen/Red blood Cell product orders     Fd And C No.5 (Tartrazine)      GI UPSET     Ibuprofen Nausea And Vomiting     Morphine Rash     swelling         Review of Systems:    Constitutional: Negative.  Negative for fever, chills, has activity change, appetite change and fatigue.  Anxiety with mobility.  HENT: Negative for congestion and facial swelling.    Eyes: Negative for photophobia, redness and visual disturbance.   Respiratory: Negative for cough and chest tightness.    Cardiovascular: Negative for chest pain, palpitations and leg swelling.   Gastrointestinal: Negative for , diarrhea, constipation, blood in stool and abdominal distention.     Genitourinary: Negative.    Musculoskeletal: Negative.  Had severe right hip pain though improved. Better mobility.  Skin: Negative.    Neurological: Negative for dizziness, tremors, syncope, weakness, light-headedness and headaches.   Hematological: Does not bruise/bleed easily.   Psychiatric/Behavioral: Negative.  Anxiety improved.    Vitals:    03/14/18 1244   BP: 117/76   Pulse: 80   Resp: 18   Temp: 97.8  F (36.6  C)   SpO2: 95%       Physical Exam:    GENERAL: no acute distress. Cooperative in conversation. Pain and anxiety better controlled.   HEENT: pupils are equal, round and reactive. Oral mucosa is moist and intact.  RESP:Chest symmetric. Regular respiratory rate. No stridor. CTA. RA.  CVS: S1S2, no murmur, rub, or gallop.  ABD: Nondistended, soft. No constipation or diarrhea.  EXTREMITIES: No lower extremity edema.  Right hip incision is intact.  NEURO: no focal deficits. Alert and oriented x3.   PSYCH: within normal limits. No depression, has anxiety.  SKIN: warm dry intact,       Labs:      HEMOGLOBIN (01/26/2018 8:07 AM)  Only the most recent of 25 results within the time period is included.    HEMOGLOBIN (01/26/2018 8:07 AM)   Component Value Ref Range   HEMOGLOBIN  8.3 (L) 12.0 - 16.0 g/dL   MCV  88 80 - 100 fL     HEMOGLOBIN  (2018 8:07 AM)   Specimen Performing Laboratory   Blood [facility]OR[facility]    SENDOUT INTERNAL ZIP [id number]    [address] [place] [address]       HEMOGLOBIN (2018 8:07 AM)   Narrative                Back to top of Results      EXTRA TUBE LAVENDER (2018 10:14 AM)  Only the most recent of 2 results within the time period is included.    EXTRA TUBE LAVENDER (2018 10:14 AM)   Specimen Performing Laboratory   Blood [facility]OR[facility]    SENDOUT INTERNAL ZIP [id number]    [address] [place] [address]     Back to top of Results      TRANSFUSE RBC (NURSE COMMUNICATION ORDER) (2018 11:10 AM)  Only the most recent of 15 results within the time period is included.    TRANSFUSE RBC (NURSE COMMUNICATION ORDER) (2018 11:10 AM)   Specimen Performing Laboratory   Blood      Back to top of Results      ANTIBODY IDENTIFICATION EACH PANEL (2018 5:03 AM)  Only the most recent of 3 results within the time period is included.    ANTIBODY IDENTIFICATION EACH PANEL (2018 5:03 AM)   Component Value Ref Range   QUANTITY 1     PANEL JENA ID  Panel Antibody ID       ANTIBODY IDENTIFICATION EACH PANEL (2018 5:03 AM)   Specimen Performing Laboratory     [facility] BLOOD BANK    [address] [place] [address]     Back to top of Results      RED BLOOD CELLS EA UNIT (2018 5:03 AM)  Only the most recent of 18 results within the time period is included.    RED BLOOD CELLS EA UNIT (2018 5:03 AM)   Component Value Ref Range   CROSSMATCH Compatible Compatible   PRODUCT BLOOD TYPE O Neg     PRODUCT ID NUMBER [id number]     PRODUCT STATUS  /Released     PRODUCT DESCRIPTION  RBC AS-1 LR     PRODUCT CODE [id number]       RED BLOOD CELLS EA UNIT (2018 5:03 AM)   Specimen Performing Laboratory     [facility] BLOOD BANK    [address] [place] [address]     Back to top of  Results      RBC W TYPE AND SCREEN (01/24/2018 4:47 AM)  Only the most recent of 4 results within the time period is included.    RBC W TYPE AND SCREEN (01/24/2018 4:47 AM)   Component Value Ref Range   ABORH  O Rh Negative     ANTIBODY SCREEN Positive (A) Negative   SPECIMEN EXPIRATION DATE/TIME 01/27/18 23:59       RBC W TYPE AND SCREEN (01/24/2018 4:47 AM)   Specimen Performing Laboratory   Blood [facility]ORAT[facility] BLOOD BANK    [address] [place] [address]     Back to top of Results      ANTIBODY IDENTIFICATION LAB USE ONLY (01/24/2018 4:47 AM)  Only the most recent of 3 results within the time period is included.    ANTIBODY IDENTIFICATION LAB USE ONLY (01/24/2018 4:47 AM)   Component Value Ref Range   ANTIBODY IDENTIFICATION  Anti-Maite (A)       ANTIBODY IDENTIFICATION LAB USE ONLY (01/24/2018 4:47 AM)   Specimen Performing Laboratory   Blood [facility]ORAT[facility] BLOOD BANK    [address] [place] [address]     Back to top of Results      EXTRA TUBE GOLD/SST (01/20/2018 8:05 AM)  Only the most recent of 2 results within the time period is included.    EXTRA TUBE GOLD/SST (01/20/2018 8:05 AM)   Specimen Performing Laboratory   Blood [facility] LABORAT[facility]    SENDOUT INTERNAL ZIP [id number]    [address] [place] [address]     Back to top of Results      XR PELVIS 1 VIEW PORTABLE (01/18/2018 10:14 AM)  Only the most recent of 3 results within the time period is included.    XR PELVIS 1 VIEW PORTABLE (01/18/2018 10:14 AM)   Narrative   XR PELVIS 1 VIEW PORTABLE    1/18/2018 10:14 AM        INDICATION: Follow-up right hip ORIF. History of right hip hemiarthroplasty.    COMPARISON: Hip radiograph, 01/17/2018        FINDINGS: There has been interval internal fixation of the right hip trochanteric fracture with good anatomic alignment of the fracture fragments. Evidence of previous right hip hemiarthroplasty. Hardware appears well seated and intact.        XR PELVIS 1 VIEW PORTABLE (01/18/2018 10:14 AM)   Procedure Note   Interface, Powerscribe - 01/18/2018 10:25 AM CST    XR PELVIS 1 VIEW PORTABLE  1/18/2018 10:14 AM    INDICATION: Follow-up right hip ORIF. History of right hip hemiarthroplasty.  COMPARISON: Hip radiograph, 01/17/2018    FINDINGS: There has been interval internal fixation of the right hip trochanteric fracture with good anatomic alignment of the fracture fragments. Evidence of previous right hip hemiarthroplasty. Hardware appears well seated and intact.     Back to top of Results      AEROBIC BACTERIAL CULTURE, STAIN (01/18/2018 8:24 AM)  Only the most recent of 2 results within the time period is included.    AEROBIC BACTERIAL CULTURE, STAIN (01/18/2018 8:24 AM)   Component Value Ref Range   CULTURE No Growth.     GRAM STAIN  1+ PMNs     GRAM STAIN  No organisms seen     GRAM STAIN  Gram stain performed by [place], [place]       AEROBIC BACTERIAL CULTURE, STAIN (01/18/2018 8:24 AM)   Specimen Performing Laboratory   Swab - Right Hip [facility]    [address] [place] [id number]     Back to top of Results      ANAEROBIC CULTURE (01/18/2018 8:24 AM)  Only the most recent of 2 results within the time period is included.    ANAEROBIC CULTURE (01/18/2018 8:24 AM)   Component Value Ref Range   CULTURE No anaerobes isolated       ANAEROBIC CULTURE (01/18/2018 8:24 AM)   Specimen Performing Laboratory   Swab - Right Hip [facility]AT[facility] LAB[facility]    [address]    [place] [address]     Back to top of Results      ENDOTRACHEAL TUBE (01/18/2018 8:13 AM)  ENDOTRACHEAL TUBE (01/18/2018 8:13 AM)   Narrative   EtJenniffer bernard CRNA     1/18/2018  8:13 AM    Procedure: ETT        Patient location during procedure: OR    ETT Properties    Mask Ventilation: oral airway and easy    Final Technique: direct laryngoscopy    Type: straight    Location: oral     Cuffed: yes    Tube Size: 7.0 mm    Stylet: yes    Laryngoscope Blade: Quick    Blade Size: 2    Cormack-Lehane Grade View: 1    Insertion Attempts: 1    Placement Verification: auscultation and end tidal CO2    Assessment: pharynx clear, atraumatic and dentition unchanged    Secured at: 23    Measured From: teeth    Tooth guard used and removed: yes    Difficulty: 0 (not difficult)    Electronically signed by JENNIFFER MCFARLAND                                                                                                                                                                   ENDOTRACHEAL TUBE (01/18/2018 8:13 AM)   Procedure Note   Jenniffer Mcfarland, CRNA - 01/18/2018 8:13 AM CST    Procedure: ETT    Patient location during procedure: OR  ETT Properties  Mask Ventilation: oral airway and easy  Final Technique: direct laryngoscopy  Type: straight  Location: oral  Cuffed: yes  Tube Size: 7.0 mm  Stylet: yes  Laryngoscope Blade: Quick  Blade Size: 2  Cormack-Lehane Grade View: 1  Insertion Attempts: 1  Placement Verification: auscultation and end tidal CO2  Assessment: pharynx clear, atraumatic and dentition unchanged  Secured at: 23  Measured From: teeth  Tooth guard used and removed: yes  Difficulty: 0 (not difficult)  Electronically signed by JENNIFFER MCFARLAND                    Back to top of Results      EXTRA TUBE LIGHT GREEN (01/18/2018 5:29 AM)  EXTRA TUBE LIGHT GREEN (01/18/2018 5:29 AM)   Specimen Performing Laboratory   Blood [facility] LABORATORY    SENDOUT INTERNAL ZIP [id number]    [address] [place] [address]     Back to top of Results      NUCLEATED RED BLOOD CELLS AS PERCENT OF BLOOD LEUKOCYTES (01/18/2018 5:29 AM)  Only the most recent of 4 results within the time period is included.    NUCLEATED RED BLOOD CELLS AS PERCENT OF BLOOD LEUKOCYTES (01/18/2018 5:29 AM)   Component Value Ref Range   NRBC  0.0 %   ABS NRBC 0.0 thou /cu mm     NUCLEATED RED BLOOD CELLS AS PERCENT OF  BLOOD LEUKOCYTES (01/18/2018 5:29 AM)   Specimen Performing Laboratory   Blood [facility] LABORATORY    SENDOUT INTERNAL ZIP [id number]    [address] [place] [address]       NUCLEATED RED BLOOD CELLS AS PERCENT OF BLOOD LEUKOCYTES (01/18/2018 5:29 AM)   Narrative                Back to top of Results      CBC with Platelets no Differential (01/18/2018 5:29 AM)  Only the most recent of 2 results within the time period is included.    CBC with Platelets no Differential (01/18/2018 5:29 AM)   Component Value Ref Range   WHITE BLOOD COUNT  6.1 4.5 - 11.0 thou/cu mm   RED BLOOD COUNT  2.77 (L) 4.00 - 5.20 mil/cu mm   HEMOGLOBIN  7.8 (L) 12.0 - 16.0 g/dL   HEMATOCRIT  24.1 (L) 33.0 - 51.0 %   MCV  87 80 - 100 fL   MCH  28.2 26.0 - 34.0 pg   MCHC  32.4 32.0 - 36.0 g/dL   RDW  15.4 11.5 - 15.5 %   PLATELET COUNT  240 140 - 440 thou/cu mm   MPV  10.0 6.5 - 11.0 fL     CBC with Platelets no Differential (01/18/2018 5:29 AM)   Specimen Performing Laboratory   Blood [facility] LABORATORY    SENDOUT INTERNAL ZIP [id number]    [address] [place] [address]       Recent Results (from the past 240 hour(s))   HM2(CBC w/o Differential)    Collection Time: 03/06/18  6:35 AM   Result Value Ref Range    WBC 3.3 (L) 4.0 - 11.0 thou/uL    RBC 4.14 3.80 - 5.40 mill/uL    Hemoglobin 10.7 (L) 12.0 - 16.0 g/dL    Hematocrit 36.6 35.0 - 47.0 %    MCV 88 80 - 100 fL    MCH 25.8 (L) 27.0 - 34.0 pg    MCHC 29.2 (L) 32.0 - 36.0 g/dL    RDW 14.4 11.0 - 14.5 %    Platelets 192 140 - 440 thou/uL    MPV 11.1 8.5 - 12.5 fL   HM2(CBC w/o Differential)    Collection Time: 03/13/18  8:10 AM   Result Value Ref Range    WBC 4.5 4.0 - 11.0 thou/uL    RBC 4.67 3.80 - 5.40 mill/uL    Hemoglobin 12.0 12.0 - 16.0 g/dL    Hematocrit 41.9 35.0 - 47.0 %    MCV 90 80 - 100 fL    MCH 25.7 (L) 27.0 - 34.0 pg    MCHC 28.6 (L) 32.0 - 36.0 g/dL    RDW 14.5 11.0 - 14.5 %    Platelets 211 140 - 440 thou/uL    MPV 11.4 8.5 - 12.5 fL          Assessment/Plan:    Right hip fracture status post right hip hemiarthroplasty subsequently patient represented with a periprosthetic fx: ORIF of right femur with revision hemiarthroplasty of the right hip by Dr. Magaña on 1/11/18. EBL 2000ml. On 1/17/2018, she developed a draining hematoma right hip and was noted to have interval displacement of greater trochanter on Xray. She received 2 units of pRBC 1/17 and underwent right hip irrigation and debridement with ORIF of the right greater trochanter. She has been discharged and currently with WBAT.    Nondisplaced fracture of the radial head:   Course complicated by nonunion currently orthopedics has recommended a sling for her.    Pain management:  seen by the pain clinic and started on Dilaudid for pain management along with Tylenol which has been changed to scheduled, unable to receive dilaudid per low BPs, continue tramadol 25mg QID, dc flexeril and dilaudid per non use. She also has an underlying history of chronic pain. Tramadol has been effective, though will supplement with an additional 25mg q6h PRN and monitor response. Effective.    DVT prophylaxis: on aspirin 81mg.    [id number]-pe and recheck hemoglobins.  Patient underwent hemorrhagic check in the hospital she required several units of blood transfusion the hospital discharge hemoglobin remained low. Decrease ferrous sulfate 325 mg to daily, recheck Hgb was 12.0.     Recent history of coronary artery disease: status post stent placement on 3/17, she was unable to tolerate Brilinta 90 mg twice daily due to bleeding complications, currently on aspirin 81 mg as well.  No issues per bleeding.    Postoperative hypotension: initially felt to be due to anemia secondary to hemorrhagic shock and blood loss she was given multiple units of blood products.  Subsequently she was given fluid resuscitation as her hemoglobin was stable. Last Hgb 10.7.  Was taken off metoprolol it has been recommended her  dosage be increased if her blood pressures rebound. -140. No concern currently.     Anxiety disorder:  she is refusing to ambulate due to severe anxiety. Started lorazepam 0.5mg q6h PRN, used 1-2x daily and now has scheduled 0.5mg BID. Ambulating better per less anxiety.    HLD-Lipitor, stable.    History of movement disorder: Sinemet 3 times daily.  She is also on Mirapex.    Vit D deficiency: last 30.3, increased to 4000U daily with recheck in 2 wks.    B12 deficiency: on supplement, recheck 468.    Overactive bladder: Vesicare but also had an indwelling Molina catheter. This was primarily given due to her lack of mobility for comfort. Removed. No issue.     Depression with insomnia:  takes trazodone at bedtime along with Effexor.     Profound debilitation sent from the nursing home to the TCU.        The care plan has been reviewed and all orders signed. Changes to care plan, if any, as noted. Otherwise, continue care plan of care.      Electronically signed by: Dexter Rico NP    .

## 2021-06-16 NOTE — TELEPHONE ENCOUNTER
Telephone Encounter by Maria Victoria Prieto RN at 8/15/2019 10:32 AM     Author: Maria Victoria Prieto RN Service: Behavioral Author Type: Registered Nurse    Filed: 8/15/2019 10:38 AM Encounter Date: 8/15/2019 Status: Signed    : Maria Victoria Prieto RN (Registered Nurse)       Date of Last Office Visit: 4/22/19  Date of Next Office Visit: 8/19/19  No shows since last visit: 0  Cancellations since last visit: 0  ED visits since last visit:  0    Medication mirtazapine 30 mg date last ordered: 4/22/19  Qty: 30  Refills: 3  Medication venlafaxine 75 mg date last ordered: 4/22/19  Qty: 90  Refills: 3    Lapse in therapy greater than 7 days: No  Medication refill request verified as identical to current order: Yes  Result of Last DAM, VPA, Li+ Level, CBC, or Carbamazepine Level (at or since last visit): N/A     [] Medication refilled per [facility] M-1.   [x] Medication unable to be refilled by RN due to criteria not met as indicated below:     []Eligibility - not seen in last year    []Supervision - no future appointment    []Compliance     []Verification - order discrepancy    []Controlled Medication    []Medication not included in RN Protocol    [x]90 - day supply request    []Other     Current Medication list:    Medication Plan of Care at last office visit with MD/CNP:

## 2021-06-17 ENCOUNTER — COMMUNICATION - HEALTHEAST (OUTPATIENT)
Dept: BEHAVIORAL HEALTH | Facility: CLINIC | Age: 71
End: 2021-06-17

## 2021-06-17 DIAGNOSIS — F33.1 MDD (MAJOR DEPRESSIVE DISORDER), RECURRENT EPISODE, MODERATE (H): ICD-10-CM

## 2021-06-17 NOTE — PROGRESS NOTES
Outpatient Followup Psychiatric Evaluation      Pertinent History: Patient presents today for the purposes of medication management.  She has a history of a mood disorder and also with prior psychotic symptoms.  Per the patient's request we have been tapering the Zyprexa and that was discontinued in 2017.    She was restarted on the Remeron earlier in 2018.  I saw the patient in the summer 2018 and did not make any medication changes.  In September 2018 we did increase the patient's Remeron to 45 mg at night.    I saw the patient in January 2019.  At that time she was continuing to struggle with isolation and hoarding behaviors and was extremely anxious.  She was having some bedwetting issues that have been falling more seeming perhaps to be overmedicated.  There had not been any improvement with the increase in Remeron.  At that visit we did decrease the Effexor and the Remeron.    I saw the patient in the spring 2019 and at that time she was doing fairly well.  We elected to try a medication taper and we decreased her Effexor XR to 225 mg a day and decrease the Remeron down to 30 mg at night.    I saw the patient in December 2019.  She was having increased depression with some worsening behavioral issues.  She had been refusing day programming and there was an increase in her SIBs.  She was more defiant and eating less.  She had a decline in her hygiene.  We did increase both the Remeron and Effexor at that time.     I saw the patient in February 2020. At that time she was more flattered depressed in the context of staff turnover. We did add some low-dose Wellbutrin at that time. Over the course of the summer the patient has had multiple falls at her living place. The coronavirus is led to much more isolation. The patient is reportedly more depressed according to staff much of this apparently has been related to frequent hospitalizations for falls and other medical issues.    I saw the patient for a medication  check via a phone conference on September 28 of 2020.  2 staff member attended that interview and reported the patient seemed more withdrawn and depressed over the few weeks prior.  She had had multiple stressors including some medical hospitalizations as well as the stress of isolation due to the coronavirus.  She was less motivated and less interactive.  She felt that she was perhaps having a bit more difficulty with balance.  She was not dizzy but stated she was having a harder time negotiating furniture walking.  She appeared to be more tired and flat.  We did decrease her trazodone to 75 mg at night at that interview.    I saw the patient for a virtual visit December 21 of 2020.  She was a vague historian but felt she was doing a bit better.  It was unclear whether her unsteadiness was a bit better but she had no significant depression or anxiety.  She was sleeping well.  The staff reported she still had periods of irritability and depression.  They felt she was still unsteady.  The team was looking at other placement options for her.  She had been in rolled in a Parkinson's movement physical therapy class.  At that visit we did decrease her Remeron to 30 mg at at bedtime.    I saw the patient for return visit on February 8, 2021.  She was extremely vague but she minimized concerns or complaints.  The staff however reported the patient was more depressed and frustrated.  She had been declining physical therapy which has been offered to her.  She was flat slow and disinterested.  I did increase her Effexor XR to 337.5 mg a day and we decreased her Remeron at that visit.  The patient had a fall on March 1 of 2021 and apparently was seen in the emergency room with a laceration on her head.  Apparently she had no sequela from that.      Current Symptoms:   Again, on my interview with the patient she was an extremely vague historian.  She minimizes any concerns or complaints.  She thought she was doing the same with  no significant changes.  She admitted being a little bit depressed but denied having any desire to be dead or thoughts of suicide.  She has had no hallucinations or delusions.  She did report to me that she hears a choir singing at times.  She told me she was sleeping well and eating well.  She denied having any pain.    Review of the record shows that the patient apparently had a fall about 2 months ago.  She does not appear to have had any sequela or problems related to that.    I had an opportunity to speak at length with the patient staff member Janeen.  She reports that the patient actually is doing much better with regard to her mood due to the increase in the Effexor.  She states it is helped out quite a bit.  She did report that the patient occasionally hears a choir that is been going on for some time but more recently she has been stating that she sees people in her room at times.  This is not bothersome and it has not led to any behavioral issues.  The staff would very much like to continue with the medications as ordered.  The patient is otherwise tolerating the medication with no other new issues or concerns.  Her cognitive issues continue.      Current Medications: Please see chart    Medication Compliance: Yes    Side Effects to Medications: No      Vitals:  Wt Readings from Last 3 Encounters:   03/01/21 155 lb (70.3 kg)   09/30/20 168 lb (76.2 kg)   09/25/20 168 lb 12.8 oz (76.6 kg)     Temp Readings from Last 3 Encounters:   03/01/21 97.9  F (36.6  C) (Oral)   10/02/20 98.4  F (36.9  C) (Oral)   09/25/20 98.5  F (36.9  C)     BP Readings from Last 3 Encounters:   03/01/21 126/73   10/02/20 126/59   09/25/20 128/76     Pulse Readings from Last 3 Encounters:   03/01/21 76   10/02/20 73   09/25/20 (!) 57         Mental Status Exam:   Appearance: I interviewed the patient as well as staff by phone.  Behavior: The staff reports the patient is doing a bit better with regard to participation and level of  alertness.  They believe she is less depressed.  Speech: Slow and vague.  She needs occasional prompts.  Not pressured or rambling.  Mood/Affect: Continues flat and slow.  No significant change.  No irritability or lability.  Thought content: The patient admits that she occasionally hears a choir singing.  This apparently has been going on for quite some time but recently has been a bit more pronounced.  She also is reporting that she occasionally sees people in her room but she is not particularly bothered by this..  Suicidal or Homicidal Thoughts: Patient denies.  The staff do not report any suicidality.  Thought Process/Formulation: [place], vague and slow.  No recent changes.  Associations: Baseline impaired.  No recent changes.  Fund of Knowledge: Needing prompts.  Limited participation.  Continues impaired.  Attention/Concentration: Vague and slow.  Not disorganized but quite concrete and vague..  Insight: Impaired with no reports of any significant recent change.  Judgement:  Limited effort.  Continues impaired.  Memory:  Limited participation. Slow.  Limited effort again.  Motor Status:  Limited participation. No current tremor according to the patient.  Orientation: No reports of any recent change.  Limited effort at this time.      Diagnosis managed and treated at today's visit :      Major depressive disorder   History of psychosis NOS    Plan:  Medication Adjustment:  I have increased the patient's Effexor XR to 337.5 mg a day.  I hav will continue with the recent medication changes.  The patient seems to improved a bit with her mood.    Other: Patient will return to clinic in 3 months for further assessment and treatment the staff and patient agreed to return sooner or call if questions concerns or problems arise.  The patient will continue with medical follow-up through her primary care clinic.    Continue with the support of the clinic, reassurance, and redirection. Staff monitoring and ongoing  assessments per team plan. Current psychotropic medication appears to represent the minimum effective dosage and appears medically necessary. We will continue to monitor and reassess. This team will utilize appropriate emergency services if necessary. I will make myself available if concerns or problems arise.     I saw the patient for 24 minutes which included patient and staff interview, chart review and documentation.      Norberto Johnson MD

## 2021-06-17 NOTE — PATIENT INSTRUCTIONS - HE
Staff report the patient seems to have improved with her mood with the last increase in the Effexor.  She is tolerating that medication.  She is continuing to have some auditory hallucinations but they are not bothersome and the patient seems to be doing well from a behavior standpoint.  The staff is requesting no medication changes.  We will continue with the current medication regime and the patient will return to this clinic in 3 months for medication check.  The staff agreed to call before then with any questions or concerns.

## 2021-06-17 NOTE — PROGRESS NOTES
This video/telephone visit will be conducted via a call between you and your physician/provider. We have found that certain health care needs can be provided without the need for an in-person physical exam. This service lets us provide the care you need with a video /telephone conversation. If a prescription is necessary we can send it directly to your pharmacy. If lab work is needed we can place an order for that and you can then stop by a lab to have the test done at a later time.    Just as we bill insurance for in-person visits, we also bill insurance for video/telephone visits. If you have questions about your insurance coverage, we recommend that you speak with your insurance company.    Patient has given verbal consent for Telephone visit? Yes   Patient would like telephone visit please call: [phone] Janeen staff  LYDIA/YANELY TALBERT CMA   No new concerns, behaviors are within her baseline.   No medication refills are needed at this time.   Patient verified allergies, medications and pharmacy via phone.  Patient states she is ready for visit. [place]  to be reviewed by provider.

## 2021-06-18 NOTE — PROGRESS NOTES
Pt is here for psychiatric med management follow up. Client has had two hip fractures on the same right side. Client says she is doing fine mentally.    [employer] [place] Date: 18  Query Report Page#: 1  Patient Rx History Report  LORI ORR  Search Criteria: Last Name 'Lori' and First Name 'Alison' and  = ' and Request Period =  to  ' - 8 out of 8 Recipients Selected.  Fill Date Product, Str, Form Qty Days Pt ID Prescriber Written RX# N/R* Pharm **MED+  ---------- -------------------------------- ------ ---- --------- ---------- ---------- ------------ ----- --------- ------  05/10/2018 LORAZEPAM 0.5 MG TABLET 60.00 30 [id number] 05/10/2018 [id number] N [id number] 00.0  2018 LORAZEPAM 0.5 MG TABLET 60.00 30 [id number] 2018 [id number] N [id number] [facility]  2018 LORAZEPAM 0.5 MG TABLET 20.00 10 [id number] 2018 [id number] N [id number] [facility]  2018 TRAMADOL HCL 50 MG TABLET 20.00 7 [id number] 2018 [id number] N [id number] 14.29  2018 LORAZEPAM 0.5 MG TABLET 30.00 15 [id number] 2018 [id number] N [id number] UN  2018 TRAMADOL HCL 50 MG TABLET 15.00 5 [id number] 2018 [id number] N [id number] 15.0  2018 TRAMADOL HCL 50 MG TABLET 15.00 7 [id number] 2018 [id number] R [id number] 10.71  2018 TRAMADOL HCL 50 MG TABLET 15.00 7 [id number] 2018 [id number] N [id number] 10.71  2018 TRAMADOL HCL 50 MG TABLET 30.00 8 [id number] 2018 [id number] R [id number] 18.75  02/15/2018 TRAMADOL HCL 50 MG TABLET 30.00 8 [id number] 2018 [id number] R [id number] 18.75  2018 LORAZEPAM 0.5 MG TABLET 30.00 5 [id number] 2018 [id number] N [id number] UNK  2018 TRAMADOL HCL 50 MG TABLET 31.00 8 [id number] 2018 [id number] N [id number] 19.38  2018 LORAZEPAM 0.5 MG TABLET 15.00 4 [id number] 2018 [id number] N [id number]  UNK  01/30/2018 TRAMADOL HCL 50 MG TABLET 30.00 8 [id number] 01/30/2018 [id number] N [id number] 18.75  01/26/2018 HYDROMORPHONE 2 MG TABLET 30.00 7 [id number] 01/26/2018 [id number] N [id number] 34.29  01/06/2018 OXYCODONE HCL 10 MG TABLET 30.00 5 [id number] 01/01/2018 [id number] N [id number] 90.0  01/01/2018 OXYCODONE HCL 10 MG TABLET 30.00 5 [id number] 01/01/2018 [id number] N [id number] 90.0  12/29/2017 HYDROMORPHONE 2 MG TABLET 90.00 6 [id number] 12/29/2017 [id number] N [id number] 120.0  *N/R N=New R=Refill  +MED Daily  Prescribers for prescriptions listed  ----------------------------------------------------------------------------------------------------------------------------------  [id number] YOLY RIDLEY; [facility], [address], [place] [address]  [id number] CA PEREZ MD; [facility] , [facility]/[id number] [facility] LUBNA  [id number] JAMARCUS HERNANDEZ DO; [facility], [address] [facility], [address] [address]  [id number] JOHNSON, MICHAEL DUANE NP; [facility], [address], [place] [address]  [id number] ASTER GOODRICH; [address], [place] [address]  Pharmacies that dispensed prescriptions listed  ----------------------------------------------------------------------------------------------------------------------------------  [id number] [facility]; [address] [facility], [place] [address],  [id number] [facility]; [address] [address] [address] [facility] [address],  [id number] [facility]; [address] [facility] [facility] [address], [address], [place] [address],  **Per CDC guidance, the conversion factors and associated daily morphine milligram equivalents for drugs prescribed as part of  medication-assisted treatment for opioid use disorder should not be used to benchmark against dosage thresholds meant for opioids  prescribed  for pain.    Correct pharmacy verified with patient and confirmed in snapshot? [x] yes []no    Charge captured ? [x] yes  [] no    Medications Phoned  to Pharmacy [] yes [x]no  Name of Pharmacist:  List Medications, including dose, quantity and instructions      Medication Prescriptions given to patient   [] yes  [x] no   List the name of the drug the prescription was written for.       Medications ordered this visit were e-scribed.  Verified by order class [] yes  [x] no    Medication changes or discontinuations were communicated to patient's pharmacy: [] yes  [x] no    UA collected [] yes  [x] no    [place] Prescription Monitoring Program Reviewed? [x] yes  [] no    Referrals were made to:  none    Future appointment was made: [x] yes  [] no  9/10/18  Dictation completed at time of chart check: [x] yes  [] no    I have checked the documentation for today s encounters and the above information has been reviewed and completed.

## 2021-06-18 NOTE — PROGRESS NOTES
Outpatient Followup Psychiatric Evaluation      Pertinent History: Patient presents today for the purposes of medication management.  She has a history of a mood disorder and also with prior psychotic symptoms.  Per the patient's request we have been tapering the Zyprexa and that was discontinued in 2017.  We did increase Remeron at that time.  Made no recent medication changes.  Apparently during the patient's recent hospitalization she was changed back to Remeron although I do not know the details.    Current Symptoms:   Since the last visit the patient has had 2 fractured hips.  She has been in the hospital in a couple of different TCU since January only recently back to her current placement.  They report that the patient has been doing fairly well but she does have some verbal outbursts when she gets frustrated.  This happened when she did not have any milk in her cereal every other day she pushed her cereal bowl.  Otherwise she has been fairly manageable.  Patient denies having any depression.  She denies feeling hopeless or helpless or worthless.  She denies having any desire to be dead or thoughts of suicide.  She minimizes any recent depression or behavioral issues.  She tells me she sleeping well.  She denies change in her appetite.  She reports physically she is feeling better now.  She denies side effects to the medication.  She does not want any medication changes at this time.    Current Medications: Please see chart    Medication Compliance: Yes    Side Effects to Medications: No      Vitals:  Wt Readings from Last 3 Encounters:   03/16/18 197 lb (89.4 kg)   03/14/18 197 lb (89.4 kg)   03/12/18 198 lb (89.8 kg)     Temp Readings from Last 3 Encounters:   06/11/18 97.8  F (36.6  C) (Oral)   03/19/18 98  F (36.7  C)   03/16/18 97.8  F (36.6  C)     BP Readings from Last 3 Encounters:   06/11/18 104/60   03/19/18 112/68   03/16/18 117/68     Pulse Readings from Last 3 Encounters:   06/11/18 75   03/19/18  75   03/16/18 80         Mental Status Exam:   Appearance: Patient appears with limited effort in participation.  Limited eye contact.  Slow and flat.  No obvious pain or shortness of breath.  Behavior: Flat and disinterested with limited initiation.  Not currently agitated.  No current restlessness.  Speech: Soft-spoken monotone and vague.  Answers are consistent however.  [place].  Mood/Affect: Depressed and flat.  Slow with limited participation.  Not currently labile, anxious or agitated.  Limited effort however.  Thought Content: No reports of psychosis recently.  No current evidence of psychosis but limited participation.  Suicidal or Homicidal Thoughts:  None apparent or reported.   Thought Process/Formulation: Limited effort.  Slow with a delay.  No obvious racing thoughts.  Associations: [place] and slow with limited effort.  No obvious racing thoughts.  Please see the therapy notes.  Fund of Knowledge: Please see the therapy notes.  Limited effort in participation at this time.  Attention/Concentration: Limited effort.  Limited attention and participation.    Insight:  Limited effort.  Continues impaired.  Judgement:  Limited effort.  Please see the nursing notes as well as the therapy notes.  Memory:  Limited participation. Slow. Disinterested.    Motor Status:  Limited participation. No current tremor.  Orientation: No reports of any recent change.  Limited effort at this time.     Diagnosis managed and treated at today's visit :      Major depressive disorder   History of psychosis NOS    Plan:  Medication Adjustment:  Continue with current medication regime.    Other: Patient will return to clinic in 3-4 months for further assessment and treatment the staff and patient agreed to return sooner or call if questions concerns or problems arise.    Continue with the support of the clinic, reassurance, and redirection. Staff monitoring and ongoing assessments per team plan. Current psychotropic medication  appears to represent the minimum effective dosage and appears medically necessary. We will continue to monitor and reassess. This team will utilize appropriate emergency services if necessary. I will make myself available if concerns or problems arise.    Norberto Johnson

## 2021-06-20 NOTE — LETTER
Letter by Dexter Rico NP at      Author: Dexter Rico NP Service: -- Author Type: --    Filed:  Encounter Date: 2020 Status: (Other)         Patient: Alison Bashir   MR Number: [id number]   YOB: 1950   Date of Visit: 2020     [facility]      Facility:    [facility] [[id number]]  Code Status: DNR      Chief Complaint/Reason for Visit:   Chief Complaint   Patient presents with   ? Review Of Multiple Medical Conditions     hypotension       HPI:   Alison is a 69 y.o. female who is a transfer from [facility] with an admission on 20 and discharge on 6/15/20. She has a PMH of Parkinsons dx, Htn, frequent UTIs, depression, CAD, GERD, leukopenia who presented to the hospital chest pain shortness of breath.  Had a negative work-up through cardiology.  Noted to have severe hypotension despite holding PTA medications.  Therefore midodrine was started at discharge, likely due to Parkinson's per neurology evaluation.  She was then discharged to TCU for additional rehab.    Today will assess vitals, labs and therapy progress.    Past Medical History:  Past Medical History:   Diagnosis Date   ? Acute blood loss anemia    ? Alzheimer disease (H)    ? CAD (coronary artery disease)    ? Chronic pain syndrome    ? Dementia (H)    ? Depression    ? Depression    ? Hip fracture, right (H) 2017   ? HTN (hypertension)    ? Kidney stone    ? Overactive bladder    ? PMB (postmenopausal bleeding)    ? RLS (restless legs syndrome)    ? Spine pain    ? Stented coronary artery    ? Traumatic brain injury (H)    ? Unsteady gait     uses walker   ? UTI (lower urinary tract infection)     on antibiotic course           Surgical History:  Past Surgical History:   Procedure Laterality Date   ?  SECTION     ? CHOLECYSTECTOMY  May 2014   ? COMBINED HYSTEROSCOPY DIAGNOSTIC / D&C N/A 2014    Procedure: DILATION AND CURETTAGE WITH  HYSTEROSCOPY;  Surgeon: Karime Cifuentes MD;  Location: [facility] Main OR;  Service:    ? CORONARY STENT PLACEMENT     ? HEMIARTHROPLASTY HIP Right 12/22/2017   ? INCISION AND DRAINAGE HIP Right 01/18/2018    ORIF REVISION    ? LUNG REMOVAL, PARTIAL     ? REVISION TOTAL HIP ARTHROPLASTY Right 01/10/2018   ? TONSILLECTOMY AND ADENOIDECTOMY     ? TOTAL KNEE ARTHROPLASTY Right 09/30/2016   ? TOTAL KNEE ARTHROPLASTY Left 11/2015       Family History:   Family History   Problem Relation Age of Onset   ? Cancer Mother    ? Heart attack Father    ? Coronary artery disease Father    ? Heart failure Father        Social History:    Social History     Socioeconomic History   ? Marital status: Single     Spouse name: Not on file   ? Number of children: Not on file   ? Years of education: Not on file   ? Highest education level: Not on file   Occupational History   ? Not on file   Social Needs   ? Financial resource strain: Not on file   ? Food insecurity     Worry: Not on file     Inability: Not on file   ? Transportation needs     Medical: Not on file     Non-medical: Not on file   Tobacco Use   ? Smoking status: Never Smoker   ? Smokeless tobacco: Never Used   Substance and Sexual Activity   ? Alcohol use: No   ? Drug use: No   ? Sexual activity: Not on file   Lifestyle   ? Physical activity     Days per week: Not on file     Minutes per session: Not on file   ? Stress: Not on file   Relationships   ? Social connections     Talks on phone: Not on file     Gets together: Not on file     Attends [employer] service: Not on file     Active member of club or organization: Not on file     Attends meetings of clubs or organizations: Not on file     Relationship status: Not on file   ? Intimate partner violence     Fear of current or ex partner: Not on file     Emotionally abused: Not on file     Physically abused: Not on file     Forced sexual activity: Not on file   Other Topics Concern   ? Not on file   Social History Narrative    She  "lives in a group home right now. She can make her own medical decisions. Patient is not on supplemental O2 at home. Patient uses walker and wheelchair for ambulation. Please update sister Chelo.           Review of Systems   Constitutional: Positive for activity change and fatigue. Negative for appetite change, chills and diaphoresis.        No concerns   HENT: Negative for congestion and hearing loss.    Eyes: Negative.    Cardiovascular: Negative.    Gastrointestinal: Negative for abdominal distention, abdominal pain, blood in stool, constipation, diarrhea and nausea.   Endocrine: Negative.    Genitourinary: Negative for difficulty urinating.   Musculoskeletal: Positive for gait problem.        Denies pain   Skin:        intact   Allergic/Immunologic: Negative.    Neurological: Positive for tremors. Negative for dizziness.   Psychiatric/Behavioral: Positive for confusion. Negative for agitation, hallucinations and sleep disturbance. The patient is not nervous/anxious.        Vitals:    07/03/20 1034   BP: 124/67   Pulse: 64   Resp: 16   Temp: 98  F (36.7  C)   SpO2: 95%   Weight: 157 lb (71.2 kg)   Height: 5' 10\" (1.778 m)       Physical Exam  Constitutional:       Comments: hypotension   HENT:      Head: Normocephalic.      Right Ear: External ear normal.      Left Ear: External ear normal.      Nose: No congestion or rhinorrhea.      Mouth/Throat:      Pharynx: No oropharyngeal exudate.   Eyes:      General:         Right eye: No discharge.         Left eye: No discharge.   Cardiovascular:      Rate and Rhythm: Normal rate.   Pulmonary:      Effort: Pulmonary effort is normal. No respiratory distress.      Comments: Room air, no auscultation per COVID-19 recommendations  Abdominal:      General: There is no distension.      Comments: No constipation diarrhea reported   Genitourinary:     Comments: Incontinent  Musculoskeletal:      Right lower leg: No edema.      Left lower leg: No edema.      Comments: History " of Parkinson's, denies pain   Skin:     General: Skin is warm and dry.      Comments: intact   Neurological:      Mental Status: She is alert. She is disoriented.      Motor: Weakness present.      Comments: A/O x2   Psychiatric:         Mood and Affect: Mood normal.      Comments: No behaviors reported         Medication List:  Current Outpatient Medications   Medication Sig   ? acetaminophen (TYLENOL) 500 MG tablet Take 500 mg by mouth every 6 (six) hours as needed for pain.   ? aspirin 81 mg chewable tablet Chew 81 mg daily.   ? atorvastatin (LIPITOR) 40 MG tablet Take 40 mg by mouth every evening.    ? buPROPion (WELLBUTRIN XL) 300 MG 24 hr tablet Take 300 mg by mouth every morning.    ? carbidopa-levodopa (SINEMET)  mg per tablet Take 2 tablets by mouth 3 (three) times a day.   ? cholecalciferol, vitamin D3, 1,000 unit tablet Take 4,000 Units by mouth daily.    ? cyanocobalamin (VITAMIN B-12) 100 MCG tablet Take 100 mcg by mouth daily.    ? furosemide (LASIX) 20 MG tablet 20 mg daily.    ? lansoprazole (PREVACID) 30 MG capsule Take 30 mg by mouth daily.    ? magnesium oxide (MAG-OX) 400 mg tablet Take 400 mg by mouth 2 (two) times a day.   ? metoprolol succinate (TOPROL-XL) 12.5 MG Take 12.5 mg by mouth daily.   ? midodrine (PROAMATINE) 5 MG tablet Take 1 tablet (5 mg total) by mouth 3 (three) times a day with meals. (Patient taking differently: Take 7.5 mg by mouth 3 (three) times a day with meals. )   ? mirtazapine (REMERON) 45 MG tablet TAKE 1 TABLET BY MOUTH AT BEDTIME  *1 TOTAL FILL*   ? multivitamin (MULTIVITAMIN) per tablet Take 1 tablet by mouth daily.   ? nitroglycerin (NITROSTAT) 0.4 MG SL tablet Place 0.4 mg under the tongue every 5 (five) minutes as needed.    ? polyethylene glycol (MIRALAX) 17 gram packet Take 17 g by mouth daily.   ? pramipexole (MIRAPEX) 0.25 MG tablet Take 1 tablet (0.25 mg total) by mouth at bedtime.   ? solifenacin (VESICARE) 10 MG tablet Take 10 mg by mouth daily.   ?  traZODone (DESYREL) 150 MG tablet TAKE 1 TABLET BY MOUTH AT BEDTIME. *1 TOTAL FILL*   ? venlafaxine (EFFEXOR-XR) 150 MG 24 hr capsule TAKE 2 CAPSULES (300MG) BY MOUTH ONCE DAILY *1 TOTAL FILL*       Labs:  Results for orders placed or performed in visit on 06/30/20   Basic Metabolic Panel   Result Value Ref Range    Sodium 144 136 - 145 mmol/L    Potassium 4.0 3.5 - 5.0 mmol/L    Chloride 105 98 - 107 mmol/L    CO2 33 (H) 22 - 31 mmol/L    Anion Gap, Calculation 6 5 - 18 mmol/L    Glucose 85 70 - 125 mg/dL    Calcium 8.4 (L) 8.5 - 10.5 mg/dL    BUN 33 (H) 8 - 28 mg/dL    Creatinine 0.76 0.60 - 1.10 mg/dL    GFR MDRD Af Amer >60 >60 mL/min/1.73m2    GFR MDRD Non Af Amer >60 >60 mL/min/1.73m2     Lab Results   Component Value Date    WBC 3.5 (L) 06/30/2020    HGB 10.2 (L) 06/30/2020    HCT 34.1 (L) 06/30/2020    MCV 94 06/30/2020     06/30/2020     Lab Results   Component Value Date    TSH 1.09 07/28/2015     Vitamin D, Total (25-Hydroxy)   Date Value Ref Range Status   03/15/2018 43.9 30.0 - 80.0 ng/mL Final         Assessment/Plan:    Chest pain with a history of CAD: Continue aspirin 81 mg daily and atorvastatin 40 mg every evening, denies chest pain    Depression: Continue Wellbutrin 300 mg in AM, Remeron 45 mg at bedtime and Effexor 300 mg daily    Parkinson's: Continue Sinemet 25/100 mg 2 tabs 3 times daily    Vitamin D deficiency: Continue D3 4000 units daily    Vitamin B12 deficiency: continue cyanocobalamin 100mcg daily    Hypomagnesia: continue mag oxide 400mg two times a day    Htn: continue lasix 20mg daily and metoprolol succinate 12.5 mg daily, SBP <110    Orthostatic hypotension: Probably due to Parkinson's dysfunction, increase midodrine to 7.5 mg 3 times daily    GERD: Continue Prevacid 30 mg daily    Constipation: continue miralax daily    Insomnia: continue trazodone 150mg at HS    Disposition: TBD. SLUMS 16/30      Electronically signed by: Dexter Rico NP

## 2021-06-20 NOTE — LETTER
Letter by Noy Goodrich MBBS at      Author: Noy Goodrich MBBS Service: -- Author Type: --    Filed:  Encounter Date: 10/7/2020 Status: (Other)         Patient: Alison Bashir   MR Number: [id number]   YOB: 1950   Date of Visit: 10/7/2020       [facility] Admission note      Patient: Alison Bashir  MRN: [id number]  Date of Service: 10/7/2020      [facility] [[id number]]  Reason for Visit     Chief Complaint   Patient presents with   ? Review Of Multiple Medical Conditions       Code Status     DNI    Assessment     -History of a mechanical fall in the setting of recurrent hospitalizations related to falls.  -Right hip pain with underlying history of right hip arthroplasty  -Recent history of a fall related right hip thigh hematoma  -History of recurrent UTIs; urine culture again showing Morganella morganii species  - Parkinson's disease  -Cognitive impairment/dementia  - HTN  -Depression with anxiety  Plan     Patient readmitted to the TCU within days of discharge.  Acute fall in the setting of recurrent falls reported.  Unfortunately she is declining quite rapidly and may require a higher level of care.  Currently she is wheelchair-bound but has been ambulating somewhat with therapy but remains a very high fall risk.  Pain management was reviewed with her.  She is now on scheduled Tylenol.  She has also been using tramadol as needed which she feels is effective.  I have again talked to her about trying some different modalities for pain including diathermy heat ice or ultrasound she refuses all of them.  I did offer Voltaren gel or Aspercreme to her right hip area which is her most painful area she does not want to use any of those either.  I have asked therapy to offer them to her and see if she will except those.  She has a history of recurrent UTI with cultures mostly growing Morganella.  We are electing not to treat her and she remains asymptomatic.  She also has a  history of orthostatic hypotension and blood pressures recently have been stable  Discharge plan is to go back to the group home if able however with her recent falls and multiple hospitalizations she may need a higher level of care    History     Patient is a very pleasant 70 y.o. female who is admitted to TCU  Patient was recently in a TCU and was discharged home at baseline she has profound debilitation with a high fall risk and has had recurrent hospitalizations related to falls.  She presented after she fell again at home within days of discharge.  She was complaining of right-sided chest pain as well as right hip pain.  She recently was admitted with a right hip pain with resultant right hip thigh hematoma.  She has been discharged to the TCU for strengthening and rehab at baseline she remains a very high fall risk.  She has been noted to be ambulating with therapy using a walker but unfortunately has profound debility.  Pain management again reviewed with her she is on scheduled Tylenol now which has been workable for her in addition she does not want to discontinue her tramadol.  She told me that at that regimen works she does not want to try anything else.  Of tried to get her to use ice or heat or any diathermy she refuses all of those interventions  She also had a UTI with cultures growing E. coli and Morganella.  They have elected not to treat as she is not symptomatic.  She will require monitoring in the TCU.  She has underlying history of Parkinson's disease is been declining neurology has seen her and no new med changes have been evaluated by them.  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      Past Medical History     Active Ambulatory (Non-Hospital) Problems    Diagnosis   ? Accident due to mechanical fall without injury,  initial encounter   ? Hematoma of right thigh, subsequent encounter   ? Right hip pain   ? Hematoma of right hip   ? Urinary tract infection   ? Inability to walk   ? Parkinsonism, unspecified Parkinsonism type (H)   ? Fall, initial encounter   ? Magnesium deficiency   ? Adjustment insomnia   ? Acute hypotension   ? RBBB   ? Chest pain, unspecified   ? Leukopenia   ? Gastroesophageal reflux disease with esophagitis   ? Personal history of fall   ? Chest pain   ? Chronic pain syndrome   ? Traumatic brain injury (H)   ? Stented coronary artery   ? RLS (restless legs syndrome)   ? Overactive bladder   ? HTN (hypertension)   ? Depression   ? CAD (coronary artery disease)   ? Alzheimer disease (H)   ? Orthostatic hypotension   ? Anemia   ? Cognitive impairment   ? Hip fracture (H)   ? ACP (advance care planning)   ? Constipation   ? Essential hypertension   ? Neuroleptic signed 8/29/16     Past Medical History:   Diagnosis Date   ? Acute blood loss anemia    ? Alzheimer disease (H)    ? CAD (coronary artery disease)    ? Chronic pain syndrome    ? Dementia (H)    ? Depression    ? Depression    ? Hip fracture, right (H) 12/21/2017   ? HTN (hypertension)    ? Kidney stone    ? Overactive bladder    ? PMB (postmenopausal bleeding)    ? RLS (restless legs syndrome)    ? Spine pain    ? Stented coronary artery    ? Traumatic brain injury (H)    ? Unsteady gait    ? UTI (lower urinary tract infection)        Past Social History     Reviewed, and she  reports that she has never smoked. She has never used smokeless tobacco. She reports that she does not drink alcohol or use drugs.    Family History     Reviewed, and family history includes Cancer in her mother; Coronary artery disease in her father; Heart attack in her father; Heart failure in her father.    Medication List     Current Outpatient Medications on File Prior to Visit   Medication Sig Dispense Refill   ? acetaminophen (TYLENOL) 650 MG CR tablet Take 650 mg by mouth  4 (four) times a day.     ? aspirin 81 mg chewable tablet Chew 81 mg daily.     ? atorvastatin (LIPITOR) 40 MG tablet Take 40 mg by mouth every evening.      ? buPROPion (WELLBUTRIN XL) 300 MG 24 hr tablet TAKE 1 TABLET BY MOUTH ONCE DAILY. 31 tablet 0   ? carbidopa-levodopa (SINEMET)  mg per tablet Take 2 tablets by mouth 3 (three) times a day.     ? cholecalciferol, vitamin D3, 1,000 unit tablet Take 4,000 Units by mouth daily.      ? cyanocobalamin (VITAMIN B-12) 100 MCG tablet Take 100 mcg by mouth daily.      ? furosemide (LASIX) 20 MG tablet Take 20 mg by mouth daily.     ? lansoprazole (PREVACID) 30 MG capsule Take 30 mg by mouth daily before breakfast.      ? magnesium oxide (MAG-OX) 400 mg tablet Take 400 mg by mouth 2 (two) times a day.     ? melatonin 1 mg Tab tablet Take 5 mg by mouth at bedtime.     ? metoprolol succinate (TOPROL-XL) 12.5 MG Take 12.5 mg by mouth daily.     ? midodrine (PROAMATINE) 2.5 MG tablet Take 7.5 mg by mouth 3 (three) times a day with meals.     ? mirtazapine (REMERON) 45 MG tablet TAKE 1 TABLET BY MOUTH AT BEDTIME  *1 TOTAL FILL* 30 tablet 3   ? multivitamin (MULTIVITAMIN) per tablet Take 1 tablet by mouth daily.     ? nitroglycerin (NITROSTAT) 0.4 MG SL tablet Place 0.4 mg under the tongue every 5 (five) minutes as needed.      ? nystatin (MYCOSTATIN) powder Apply topically 2 (two) times a day as needed. Fungal infection     ? polyethylene glycol (MIRALAX) 17 gram packet Take 17 g by mouth daily.     ? pramipexole (MIRAPEX) 0.5 MG tablet Take 0.5 mg by mouth at bedtime.     ? solifenacin (VESICARE) 10 MG tablet Take 10 mg by mouth daily.     ? traMADoL (ULTRAM) 50 mg tablet Take 1 tablet (50 mg total) by mouth every 6 (six) hours as needed for pain. 12 tablet 0   ? traZODone (DESYREL) 50 MG tablet Take 50 mg by mouth at bedtime.     ? venlafaxine (EFFEXOR-XR) 150 MG 24 hr capsule TAKE 2 CAPSULES (300MG) BY MOUTH ONCE DAILY *1 TOTAL FILL* 60 capsule 3     No current  facility-administered medications on file prior to visit.        Allergies     Allergies   Allergen Reactions   ? Blood-Group Specific Substance Other (See Comments)     Patient has anti-K. Blood product orders maybe delayed. Draw one red top and 2 purple top tubes for all Type and Screen/Red blood Cell product orders   ? Fd And C No.5 (Tartrazine)      GI UPSET   ? Ibuprofen Nausea And Vomiting   ? Morphine Rash     swelling       Review of Systems   A comprehensive review of 14 systems was done. Pertinent findings noted here and in history of present illness. All the rest negative.  Constitutional: Negative.  Negative for fever, chills, she has activity change, appetite change and fatigue.   HENT: Negative for congestion and facial swelling.    Eyes: Negative for photophobia, redness and visual disturbance.   Respiratory: Negative for cough and chest tightness.    Cardiovascular: Negative for chest pain, palpitations and leg swelling.   Gastrointestinal: Negative for nausea, diarrhea, constipation, blood in stool and abdominal distention.   Genitourinary: Negative.    Musculoskeletal: Reporting back and hip pain.  She has had multiple falls  Skin: Negative.    Neurological: Negative for dizziness, tremors, syncope, weakness, light-headedness and headaches.   Hematological: Does not bruise/bleed easily.   Psychiatric/Behavioral: Negative.  Anxious about pain management      Physical Exam     Recent Vitals 10/2/2020   Height -   Weight -   BSA (m2) -   /59   Pulse 73   Temp 98.4   Temp src 1   SpO2 98   Some recent data might be hidden       Constitutional: Oriented to person, place, and time and appears well-developed.   HEENT:  Normocephalic and atraumatic.  Eyes: Conjunctivae and EOM are normal. Pupils are equal, round, and reactive to light. No discharge.  No scleral icterus. Nose normal. Mouth/Throat: Oropharynx is clear and moist. No oropharyngeal exudate.    NECK: Normal range of motion. Neck supple. No  JVD present. No tracheal deviation present. No thyromegaly present.   CARDIOVASCULAR: Normal rate, regular rhythm and intact distal pulses.  Exam reveals no gallop and no friction rub.  Systolic murmur present.  PULMONARY: Effort normal and breath sounds normal. No respiratory distress.No Wheezing or rales.  ABDOMEN: Soft. Bowel sounds are normal. No distension and no mass.  There is no tenderness. There is no rebound and no guarding. No HSM.  MUSCULOSKELETAL: Normal range of motion. No edema and no tenderness. Mild kyphosis with asymmetry of the chest noted more predominant on the right side,  Tenderness to palpation over her right thigh.  Very ataxic gait noted with patient noted to be deviating on the right side  LYMPH NODES: Has no cervical, supraclavicular, axillary and groin adenopathy.   NEUROLOGICAL: Alert and oriented to person, place, and time. No cranial nerve deficit.  Normal muscle tone. Coordination normal.   GENITOURINARY: Deferred exam.  SKIN: Skin is warm and dry. No rash noted. No erythema. No pallor.   EXTREMITIES: No cyanosis, no clubbing, no edema. No Deformity.  PSYCHIATRIC: Normal mood, affect and behavior.      Lab Results     Recent Results (from the past 240 hour(s))   COVID-19 Virus PCR MRF    Collection Time: 09/30/20  3:06 PM    Specimen: Respiratory   Result Value Ref Range    COVID-19 VIRUS SPECIMEN SOURCE Nasopharyngeal     2019-nCOV       Test received-See reflex to IDDL test SARS CoV2 (COVID-19) Virus RT-PCR   SARS-CoV-2 (COVID-19) RT-PCR-IDDL    Collection Time: 09/30/20  3:06 PM    Specimen: Respiratory   Result Value Ref Range    SARS-CoV-2 Virus Specimen Source Nasopharyngeal     SARS-CoV-2 PCR Result NEGATIVE     SARS-COV-2 PCR COMMENT       Testing was performed using the Aptima SARS-CoV-2 Assay on the [employer]   Hemogram with PLT    Collection Time: 09/30/20  7:10 PM   Result Value Ref Range    WBC 5.0 4.0 - 11.0 thou/uL    RBC 3.69 (L) 3.80 - 5.40 mill/uL    Hemoglobin 10.5  (L) 12.0 - 16.0 g/dL    Hematocrit 34.7 (L) 35.0 - 47.0 %    MCV 94 80 - 100 fL    MCH 28.5 27.0 - 34.0 pg    MCHC 30.3 (L) 32.0 - 36.0 g/dL    RDW 13.5 11.0 - 14.5 %    Platelets 160 140 - 440 thou/uL    MPV 11.3 8.5 - 12.5 fL   Protime-INR    Collection Time: 09/30/20  7:10 PM   Result Value Ref Range    INR 1.07 0.90 - 1.10   APTT    Collection Time: 09/30/20  7:10 PM   Result Value Ref Range    PTT 33 24 - 37 seconds   Basic metabolic panel    Collection Time: 09/30/20  7:45 PM   Result Value Ref Range    Sodium 143 136 - 145 mmol/L    Potassium 4.0 3.5 - 5.0 mmol/L    Chloride 105 98 - 107 mmol/L    CO2 29 22 - 31 mmol/L    Anion Gap, Calculation 9 5 - 18 mmol/L    Glucose 106 70 - 125 mg/dL    Calcium 9.2 8.5 - 10.5 mg/dL    BUN 32 (H) 8 - 28 mg/dL    Creatinine 0.73 0.60 - 1.10 mg/dL    GFR MDRD Af Amer >60 >60 mL/min/1.73m2    GFR MDRD Non Af Amer >60 >60 mL/min/1.73m2   Urinalysis-UC if Indicated    Collection Time: 10/01/20  4:33 AM   Result Value Ref Range    Color, UA Yellow Colorless, Yellow, Straw, Light Yellow    Clarity, UA Cloudy (!) Clear    Glucose, UA Negative Negative    Bilirubin, UA Negative Negative    Ketones, UA Negative Negative, 60 mg/dL    Specific Gravity, UA 1.012 1.001 - 1.030    Blood, UA Negative Negative    pH, UA 6.5 4.5 - 8.0    Protein, UA Negative Negative mg/dL    Urobilinogen, UA <2.0 E.U./dL <2.0 E.U./dL, 2.0 E.U./dL    Nitrite, UA Negative Negative    Leukocytes, UA Large (!) Negative    Bacteria, UA Many (!) None Seen hpf    RBC, UA 0-2 None Seen, 0-2 hpf    WBC, UA  (!) None Seen, 0-5 hpf    Squam Epithel, UA 0-5 None Seen, 0-5 lpf    Mucus, UA Few (!) None Seen lpf   Culture, Urine    Collection Time: 10/01/20  4:33 AM    Specimen: Urine   Result Value Ref Range    Culture >100,000 col/ml Morganella morganii (!)        Susceptibility    Morganella morganii - KULDEEP     Amoxicillin + Clavulanate >16/8 Resistant      Ampicillin >16 Resistant      Cefazolin >16 Resistant       Cefepime <=1 Sensitive      Ceftriaxone <=1 Sensitive      Ciprofloxacin >2 Resistant      Gentamicin <=2 Sensitive      Levofloxacin >4 Resistant      Meropenem <=0.25 Sensitive      Nitrofurantoin 64 Resistant      Tetracycline <=2 Sensitive      Tobramycin <=2 Sensitive      Trimethoprim + Sulfamethoxazole >2/38 Resistant    Troponin I    Collection Time: 10/01/20  8:36 AM   Result Value Ref Range    Troponin I <0.01 0.00 - 0.29 ng/mL   ECG 12 lead MUSE    Collection Time: 10/01/20  9:15 AM   Result Value Ref Range    SYSTOLIC BLOOD PRESSURE      DIASTOLIC BLOOD PRESSURE      VENTRICULAR RATE 67 BPM    ATRIAL RATE 67 BPM    P-R INTERVAL 156 ms    QRS DURATION 168 ms    Q-T INTERVAL 458 ms    QTC CALCULATION (BEZET) 483 ms    P Axis 28 degrees    R AXIS -21 degrees    T AXIS 0 degrees    MUSE DIAGNOSIS       Normal sinus rhythm  Right bundle branch block  Abnormal ECG  When compared with ECG of 25-AUG-2020 21:04,  T wave inversion now evident in Anterior leads  Confirmed by MIREILLE JOSEPH MD LOC:[facility] ([id number]) on 10/1/2020 2:41:42 PM     Troponin I    Collection Time: 10/01/20  2:01 PM   Result Value Ref Range    Troponin I <0.01 0.00 - 0.29 ng/mL   HM2(CBC W/O DIFF)    Collection Time: 10/02/20  6:39 AM   Result Value Ref Range    WBC 3.8 (L) 4.0 - 11.0 thou/uL    RBC 3.39 (L) 3.80 - 5.40 mill/uL    Hemoglobin 9.6 (L) 12.0 - 16.0 g/dL    Hematocrit 32.3 (L) 35.0 - 47.0 %    MCV 95 80 - 100 fL    MCH 28.3 27.0 - 34.0 pg    MCHC 29.7 (L) 32.0 - 36.0 g/dL    RDW 13.7 11.0 - 14.5 %    Platelets 136 (L) 140 - 440 thou/uL    MPV 11.0 8.5 - 12.5 fL              NERY Sosa

## 2021-06-20 NOTE — LETTER
Letter by Noy Goodrich MBBS at      Author: Noy Goodrich MBBS Service: -- Author Type: --    Filed:  Encounter Date: 10/5/2020 Status: (Other)         Patient: Alison Bashir   MR Number: [id number]   YOB: 1950   Date of Visit: 10/5/2020       [facility] Admission note      Patient: Alison Bashir  MRN: [id number]  Date of Service: 10/5/2020      [facility] [[id number]]  Reason for Visit     Chief Complaint   Patient presents with   ? H & P       Code Status     DNI    Assessment     -History of a mechanical fall in the setting of recurrent hospitalizations related to falls.  -Right hip pain with underlying history of right hip arthroplasty  -Recent history of a fall related right hip thigh hematoma  -History of recurrent UTIs; urine culture again showing Morganella morganii species  - Parkinson's disease  -Cognitive impairment/dementia  - HTN  -Depression with anxiety  Plan     Patient readmitted to the TCU within days of discharge.  Acute fall in the setting of recurrent falls reported.  Unfortunately she is declining quite rapidly and may require a higher level of care.  In the past she has elected to go back to her group home but now is requiring probably long-term care placement.  MRI negative for any periprosthetic fracture in her right hip thigh hematoma was felt to be stable.  Discharge back to the TCU for strengthening rehab and placement evaluation  Pain management continued with Tylenol and tramadol  Plan is to put her on scheduled Tylenol 650 mg 4 times daily  Continue with PRN tramadol as needed for additional pain relief  I did talk to the patient about trying some topical pain gels which we had tried during her prior hospitalization and subsequent TCU stay for pain management patient reports those were not very effective she is happy with tramadol though.  Continue with her current pain treatment  The hospital  Deferred her UTI treatment.  I did review  her urine culture and it is again growing Morganella Aydin night.  Going back to 6/10/2020 she was growing Morganella morganii and it is gradually becoming more drug-resistant  I suspect she is colonized as she remains asymptomatic.  She is afebrile and denies any dysuria I would like to monitor her clinically before starting her on anti-biotics for UTI treatment  Recheck urine culture if she becomes symptomatic  Blood pressures are stable she is on midodrine for orthostatic hypotension.  Continue midodrine they suspect this is autonomic dysfunction related to Parkinson's  She is on several medications at bedtime for insomnia  Plan is to reduce her trazodone 50 mg  ADD melatonin 5 mg  Sleep log to be kept by patient' well as nursing and update me if she is not sleeping well but will try to reduce trazodone as much as possible as she gets several psychotropic meds at bedtime which could be contributing to her fall  Mood and behaviors have been stable CODE STATUS is DNR/DNI she may be looking at alternative placement and does not seem to want to go back to her prior group home      History     Patient is a very pleasant 70 y.o. female who is admitted to TCU  Patient was recently in a TCU and was discharged home at baseline she has profound debilitation with a high fall risk and has had recurrent hospitalizations related to falls.  She presented after she fell again at home within days of discharge.  She was complaining of right-sided chest pain as well as right hip pain.  She recently was admitted with a right hip pain with resultant right hip thigh hematoma.  Imaging was negative for any periprostatic fracture MRI report was reviewed.  For pain management she has been discharged on tramadol she also has Tylenol she will continue with the same.  She also had a UTI with cultures growing E. coli and Morganella.  They have elected not to treat as she is not symptomatic.  She will require monitoring in the TCU.  She has  underlying history of Parkinson's disease is been declining neurology has seen her and no new med changes have been evaluated by them.  She also has cognitive impairment with underlying history of dementia which has been progressive but overall mood and behaviors have been stable      Past Medical History     Active Ambulatory (Non-Hospital) Problems    Diagnosis   ? Accident due to mechanical fall without injury, initial encounter   ? Hematoma of right thigh, subsequent encounter   ? Right hip pain   ? Hematoma of right hip   ? Urinary tract infection   ? Inability to walk   ? Parkinsonism, unspecified Parkinsonism type (H)   ? Fall, initial encounter   ? Magnesium deficiency   ? Adjustment insomnia   ? Acute hypotension   ? RBBB   ? Chest pain, unspecified   ? Leukopenia   ? Gastroesophageal reflux disease with esophagitis   ? Personal history of fall   ? Chest pain   ? Chronic pain syndrome   ? Traumatic brain injury (H)   ? Stented coronary artery   ? RLS (restless legs syndrome)   ? Overactive bladder   ? HTN (hypertension)   ? Depression   ? CAD (coronary artery disease)   ? Alzheimer disease (H)   ? Orthostatic hypotension   ? Anemia   ? Cognitive impairment   ? Hip fracture (H)   ? ACP (advance care planning)   ? Constipation   ? Essential hypertension   ? Neuroleptic signed 8/29/16     Past Medical History:   Diagnosis Date   ? Acute blood loss anemia    ? Alzheimer disease (H)    ? CAD (coronary artery disease)    ? Chronic pain syndrome    ? Dementia (H)    ? Depression    ? Depression    ? Hip fracture, right (H) 12/21/2017   ? HTN (hypertension)    ? Kidney stone    ? Overactive bladder    ? PMB (postmenopausal bleeding)    ? RLS (restless legs syndrome)    ? Spine pain    ? Stented coronary artery    ? Traumatic brain injury (H)    ? Unsteady gait    ? UTI (lower urinary tract infection)        Past Social History     Reviewed, and she  reports that she has never smoked. She has never used smokeless  tobacco. She reports that she does not drink alcohol or use drugs.    Family History     Reviewed, and family history includes Cancer in her mother; Coronary artery disease in her father; Heart attack in her father; Heart failure in her father.    Medication List     Current Outpatient Medications on File Prior to Visit   Medication Sig Dispense Refill   ? acetaminophen (TYLENOL) 500 MG tablet Take 500 mg by mouth every 6 (six) hours as needed for pain.     ? aspirin 81 mg chewable tablet Chew 81 mg daily.     ? atorvastatin (LIPITOR) 40 MG tablet Take 40 mg by mouth every evening.      ? buPROPion (WELLBUTRIN XL) 300 MG 24 hr tablet TAKE 1 TABLET BY MOUTH ONCE DAILY. 31 tablet 0   ? carbidopa-levodopa (SINEMET)  mg per tablet Take 2 tablets by mouth 3 (three) times a day.     ? cholecalciferol, vitamin D3, 1,000 unit tablet Take 4,000 Units by mouth daily.      ? cyanocobalamin (VITAMIN B-12) 100 MCG tablet Take 100 mcg by mouth daily.      ? furosemide (LASIX) 20 MG tablet Take 20 mg by mouth daily.     ? lansoprazole (PREVACID) 30 MG capsule Take 30 mg by mouth daily before breakfast.      ? magnesium oxide (MAG-OX) 400 mg tablet Take 400 mg by mouth 2 (two) times a day.     ? metoprolol succinate (TOPROL-XL) 12.5 MG Take 12.5 mg by mouth daily.     ? midodrine (PROAMATINE) 2.5 MG tablet Take 7.5 mg by mouth 3 (three) times a day with meals.     ? mirtazapine (REMERON) 45 MG tablet TAKE 1 TABLET BY MOUTH AT BEDTIME  *1 TOTAL FILL* 30 tablet 3   ? multivitamin (MULTIVITAMIN) per tablet Take 1 tablet by mouth daily.     ? nitroglycerin (NITROSTAT) 0.4 MG SL tablet Place 0.4 mg under the tongue every 5 (five) minutes as needed.      ? nystatin (MYCOSTATIN) powder Apply topically 2 (two) times a day as needed. Fungal infection     ? polyethylene glycol (MIRALAX) 17 gram packet Take 17 g by mouth daily.     ? pramipexole (MIRAPEX) 0.5 MG tablet Take 0.5 mg by mouth at bedtime.     ? solifenacin (VESICARE) 10 MG  tablet Take 10 mg by mouth daily.     ? traMADoL (ULTRAM) 50 mg tablet Take 1 tablet (50 mg total) by mouth every 6 (six) hours as needed for pain. 12 tablet 0   ? traZODone (DESYREL) 50 MG tablet Take 1.5 tablets (75 mg total) by mouth at bedtime. 45 tablet 3   ? venlafaxine (EFFEXOR-XR) 150 MG 24 hr capsule TAKE 2 CAPSULES (300MG) BY MOUTH ONCE DAILY *1 TOTAL FILL* 60 capsule 3     No current facility-administered medications on file prior to visit.        Allergies     Allergies   Allergen Reactions   ? Blood-Group Specific Substance Other (See Comments)     Patient has anti-K. Blood product orders maybe delayed. Draw one red top and 2 purple top tubes for all Type and Screen/Red blood Cell product orders   ? Fd And C No.5 (Tartrazine)      GI UPSET   ? Ibuprofen Nausea And Vomiting   ? Morphine Rash     swelling       Review of Systems   A comprehensive review of 14 systems was done. Pertinent findings noted here and in history of present illness. All the rest negative.  Constitutional: Negative.  Negative for fever, chills, she has activity change, appetite change and fatigue.   HENT: Negative for congestion and facial swelling.    Eyes: Negative for photophobia, redness and visual disturbance.   Respiratory: Negative for cough and chest tightness.    Cardiovascular: Negative for chest pain, palpitations and leg swelling.   Gastrointestinal: Negative for nausea, diarrhea, constipation, blood in stool and abdominal distention.   Genitourinary: Negative.    Musculoskeletal: Reporting back and hip pain.  She has had multiple falls  Skin: Negative.    Neurological: Negative for dizziness, tremors, syncope, weakness, light-headedness and headaches.   Hematological: Does not bruise/bleed easily.   Psychiatric/Behavioral: Negative.  Anxious about pain management      Physical Exam     Recent Vitals 10/2/2020   Height -   Weight -   BSA (m2) -   /59   Pulse 73   Temp 98.4   Temp src 1   SpO2 98   Some recent data  might be hidden       Constitutional: Oriented to person, place, and time and appears well-developed.   HEENT:  Normocephalic and atraumatic.  Eyes: Conjunctivae and EOM are normal. Pupils are equal, round, and reactive to light. No discharge.  No scleral icterus. Nose normal. Mouth/Throat: Oropharynx is clear and moist. No oropharyngeal exudate.    NECK: Normal range of motion. Neck supple. No JVD present. No tracheal deviation present. No thyromegaly present.   CARDIOVASCULAR: Normal rate, regular rhythm and intact distal pulses.  Exam reveals no gallop and no friction rub.  Systolic murmur present.  PULMONARY: Effort normal and breath sounds normal. No respiratory distress.No Wheezing or rales.  ABDOMEN: Soft. Bowel sounds are normal. No distension and no mass.  There is no tenderness. There is no rebound and no guarding. No HSM.  MUSCULOSKELETAL: Normal range of motion. No edema and no tenderness. Mild kyphosis with asymmetry of the chest noted more predominant on the right side,  Tenderness to palpation over her right thigh.  Very ataxic gait noted with patient noted to be deviating on the right side  LYMPH NODES: Has no cervical, supraclavicular, axillary and groin adenopathy.   NEUROLOGICAL: Alert and oriented to person, place, and time. No cranial nerve deficit.  Normal muscle tone. Coordination normal.   GENITOURINARY: Deferred exam.  SKIN: Skin is warm and dry. No rash noted. No erythema. No pallor.   EXTREMITIES: No cyanosis, no clubbing, no edema. No Deformity.  PSYCHIATRIC: Normal mood, affect and behavior.      Lab Results     Recent Results (from the past 240 hour(s))   COVID-19 Virus PCR [facility]    Collection Time: 09/30/20  3:06 PM    Specimen: Respiratory   Result Value Ref Range    COVID-19 VIRUS SPECIMEN SOURCE Nasopharyngeal     2019-nCOV       Test received-See reflex to IDDL test SARS CoV2 (COVID-19) Virus RT-PCR   SARS-CoV-2 (COVID-19) RT-PCR-IDDL    Collection Time: 09/30/20  3:06 PM     Specimen: Respiratory   Result Value Ref Range    SARS-CoV-2 Virus Specimen Source Nasopharyngeal     SARS-CoV-2 PCR Result NEGATIVE     SARS-COV-2 PCR COMMENT       Testing was performed using the Aptima SARS-CoV-2 Assay on the [facility]   Hemogram with PLT    Collection Time: 09/30/20  7:10 PM   Result Value Ref Range    WBC 5.0 4.0 - 11.0 thou/uL    RBC 3.69 (L) 3.80 - 5.40 mill/uL    Hemoglobin 10.5 (L) 12.0 - 16.0 g/dL    Hematocrit 34.7 (L) 35.0 - 47.0 %    MCV 94 80 - 100 fL    MCH 28.5 27.0 - 34.0 pg    MCHC 30.3 (L) 32.0 - 36.0 g/dL    RDW 13.5 11.0 - 14.5 %    Platelets 160 140 - 440 thou/uL    MPV 11.3 8.5 - 12.5 fL   Protime-INR    Collection Time: 09/30/20  7:10 PM   Result Value Ref Range    INR 1.07 0.90 - 1.10   APTT    Collection Time: 09/30/20  7:10 PM   Result Value Ref Range    PTT 33 24 - 37 seconds   Basic metabolic panel    Collection Time: 09/30/20  7:45 PM   Result Value Ref Range    Sodium 143 136 - 145 mmol/L    Potassium 4.0 3.5 - 5.0 mmol/L    Chloride 105 98 - 107 mmol/L    CO2 29 22 - 31 mmol/L    Anion Gap, Calculation 9 5 - 18 mmol/L    Glucose 106 70 - 125 mg/dL    Calcium 9.2 8.5 - 10.5 mg/dL    BUN 32 (H) 8 - 28 mg/dL    Creatinine 0.73 0.60 - 1.10 mg/dL    GFR MDRD Af Amer >60 >60 mL/min/1.73m2    GFR MDRD Non Af Amer >60 >60 mL/min/1.73m2   Urinalysis-UC if Indicated    Collection Time: 10/01/20  4:33 AM   Result Value Ref Range    Color, UA Yellow Colorless, Yellow, Straw, Light Yellow    Clarity, UA Cloudy (!) Clear    Glucose, UA Negative Negative    Bilirubin, UA Negative Negative    Ketones, UA Negative Negative, 60 mg/dL    Specific Gravity, UA 1.012 1.001 - 1.030    Blood, UA Negative Negative    pH, UA 6.5 4.5 - 8.0    Protein, UA Negative Negative mg/dL    Urobilinogen, UA <2.0 E.U./dL <2.0 E.U./dL, 2.0 E.U./dL    Nitrite, UA Negative Negative    Leukocytes, UA Large (!) Negative    Bacteria, UA Many (!) None Seen hpf    RBC, UA 0-2 None Seen, 0-2 hpf    WBC, UA   (!) None Seen, 0-5 hpf    Squam Epithel, UA 0-5 None Seen, 0-5 lpf    Mucus, UA Few (!) None Seen lpf   Culture, Urine    Collection Time: 10/01/20  4:33 AM    Specimen: Urine   Result Value Ref Range    Culture >100,000 col/ml Gram Negative Rods (!)    Troponin I    Collection Time: 10/01/20  8:36 AM   Result Value Ref Range    Troponin I <0.01 0.00 - 0.29 ng/mL   ECG 12 lead MUSE    Collection Time: 10/01/20  9:15 AM   Result Value Ref Range    SYSTOLIC BLOOD PRESSURE      DIASTOLIC BLOOD PRESSURE      VENTRICULAR RATE 67 BPM    ATRIAL RATE 67 BPM    P-R INTERVAL 156 ms    QRS DURATION 168 ms    Q-T INTERVAL 458 ms    QTC CALCULATION (BEZET) 483 ms    P Axis 28 degrees    R AXIS -21 degrees    T AXIS 0 degrees    MUSE DIAGNOSIS       Normal sinus rhythm  Right bundle branch block  Abnormal ECG  When compared with ECG of 25-AUG-2020 21:04,  T wave inversion now evident in Anterior leads  Confirmed by MIREILLE JOSEPH MD LOC:[facility] ([id number]) on 10/1/2020 2:41:42 PM     Troponin I    Collection Time: 10/01/20  2:01 PM   Result Value Ref Range    Troponin I <0.01 0.00 - 0.29 ng/mL   HM2(CBC W/O DIFF)    Collection Time: 10/02/20  6:39 AM   Result Value Ref Range    WBC 3.8 (L) 4.0 - 11.0 thou/uL    RBC 3.39 (L) 3.80 - 5.40 mill/uL    Hemoglobin 9.6 (L) 12.0 - 16.0 g/dL    Hematocrit 32.3 (L) 35.0 - 47.0 %    MCV 95 80 - 100 fL    MCH 28.3 27.0 - 34.0 pg    MCHC 29.7 (L) 32.0 - 36.0 g/dL    RDW 13.7 11.0 - 14.5 %    Platelets 136 (L) 140 - 440 thou/uL    MPV 11.0 8.5 - 12.5 fL            Imaging Results     Xr Ribs Right W Pa Chest    Result Date: 9/30/2020  EXAM: XR RIBS RIGHT W PA CHEST LOCATION: [facility] DATE/TIME: 9/30/2020 12:14 PM INDICATION: fall, right posterolateral chest wall pain COMPARISON: 06/08/2020     Stable surgical clips in the subcarinal region. There are multiple old left rib fractures. No displaced rib fractures. Possible nondisplaced right 10th rib fracture.    Mr Pelvis Without  Contrast    Result Date: 9/30/2020  EXAM: MR PELVIS WO CONTRAST LOCATION: [facility] DATE/TIME: 9/30/2020 1:28 PM INDICATION: Fall. Concern for occult fracture. COMPARISON: Pelvis and right hip radiographic study 09/30/2020. Pelvis MRI 08/26/2020. TECHNIQUE: Unenhanced. FINDINGS: Image quality is degraded by patient motion and artifact. HIPS: No acute fracture or appreciable bone contusion. Right hip arthroplasty with cerclage wire fixation of the proximal femur. Moderate-sized areas of heterotopic ossification arising from the anterior and medial portions of the proximal femur. There is also a small area of heterotopic ossification arising from the lateral margin of the right supra-acetabular ilium. Moderate degenerative changes in the left hip. No sizable hip joint effusion. TENDONS: Gluteal minimus and medius tendons intact. No tendinopathy. No trochanteric bursitis. Proximal hamstring tendons intact. No tendinopathy. No iliopsoas tendon tear or tendinopathy. BONES: No marrow edema. No evidence of a marrow replacing lesion. Symmetric degenerative SI joints. Multilevel lumbar spondylosis, incompletely assessed. SOFT TISSUES: Large sized lobulated subcutaneous hematoma along the right lateral hip and proximal thigh measuring approximately 24 cm craniocaudal by maximum 4 cm transverse by approximately 5 cm anteroposterior. Asymmetric right gluteal muscle atrophy compared with the contralateral left gluteal musculature. Asymmetric atrophy of the right piriformis. No high-grade pelvic muscle strain. INTRA-PELVIC CONTENTS: Trace free fluid. No space-occupying soft tissue mass. No bulky adenopathy.     1.  No definitive acute lower lumbar spine, sacral, pelvic or proximal femur fracture identified. 2.  Right hip arthroplasty remains in place with cerclage wire fixation with a moderate amount of adjacent heterotopic ossification, unchanged. 3.  Redemonstrated is a large deep subcutaneous hematoma along the  lateral margin of the right hip and right proximal thigh. 4.  Unchanged moderate contralateral left hip osteoarthritis.     Xr Pelvis W 2 Vw Hip Right    Result Date: 9/30/2020  EXAM: XR PELVIS WITH 2 VIEW HIP RIGHT LOCATION: [facility] DATE/TIME: 09/30/2020, 12:15 PM INDICATION: Fall, hip pain. COMPARISON: None.     Postoperative changes of right bipolar hip arthroplasty with longstem femoral component and cerclage wire fixation. Heterotopic ossification about the greater trochanter and superolateral margin of the hip. No evidence for acute fracture. Degenerative change left hip joint. No evidence for fracture. Pelvis negative for fracture.             NERY Sosa

## 2021-06-20 NOTE — LETTER
Letter by Dexter Rico NP at      Author: Dexter Rico NP Service: -- Author Type: --    Filed:  Encounter Date: 2020 Status: (Other)         Patient: Alison Bashir   MR Number: [id number]   YOB: 1950   Date of Visit: 2020     [facility]      Facility:    [facility] [[id number]]  Code Status: DNR      Chief Complaint/Reason for Visit:   Chief Complaint   Patient presents with   ? Review Of Multiple Medical Conditions     hypotension       HPI:   Alison is a 69 y.o. female who is a transfer from [facility] with an admission on 20 and discharge on 6/15/20. She has a PMH of Parkinsons dx, Htn, frequent UTIs, depression, CAD, GERD, leukopenia who presented to the hospital chest pain shortness of breath.  Had a negative work-up through cardiology.  Noted to have severe hypotension despite holding PTA medications.  Therefore midodrine was started at discharge, likely due to Parkinson's per neurology evaluation.  She was then discharged to TCU for additional rehab.    Today will assess vitals and therapy progress.    Past Medical History:  Past Medical History:   Diagnosis Date   ? Acute blood loss anemia    ? Alzheimer disease (H)    ? CAD (coronary artery disease)    ? Chronic pain syndrome    ? Dementia (H)    ? Depression    ? Depression    ? Hip fracture, right (H) 2017   ? HTN (hypertension)    ? Kidney stone    ? Overactive bladder    ? PMB (postmenopausal bleeding)    ? RLS (restless legs syndrome)    ? Spine pain    ? Stented coronary artery    ? Traumatic brain injury (H)    ? Unsteady gait     uses walker   ? UTI (lower urinary tract infection)     on antibiotic course           Surgical History:  Past Surgical History:   Procedure Laterality Date   ?  SECTION     ? CHOLECYSTECTOMY  May 2014   ? COMBINED HYSTEROSCOPY DIAGNOSTIC / D&C N/A 2014    Procedure: DILATION AND CURETTAGE WITH  HYSTEROSCOPY;  Surgeon: Karime Cifuentes MD;  Location: [facility] Main OR;  Service:    ? CORONARY STENT PLACEMENT     ? HEMIARTHROPLASTY HIP Right 12/22/2017   ? INCISION AND DRAINAGE HIP Right 01/18/2018    ORIF REVISION    ? LUNG REMOVAL, PARTIAL     ? REVISION TOTAL HIP ARTHROPLASTY Right 01/10/2018   ? TONSILLECTOMY AND ADENOIDECTOMY     ? TOTAL KNEE ARTHROPLASTY Right 09/30/2016   ? TOTAL KNEE ARTHROPLASTY Left 11/2015       Family History:   Family History   Problem Relation Age of Onset   ? Cancer Mother    ? Heart attack Father    ? Coronary artery disease Father    ? Heart failure Father        Social History:    Social History     Socioeconomic History   ? Marital status: Single     Spouse name: Not on file   ? Number of children: Not on file   ? Years of education: Not on file   ? Highest education level: Not on file   Occupational History   ? Not on file   Social Needs   ? Financial resource strain: Not on file   ? Food insecurity     Worry: Not on file     Inability: Not on file   ? Transportation needs     Medical: Not on file     Non-medical: Not on file   Tobacco Use   ? Smoking status: Never Smoker   ? Smokeless tobacco: Never Used   Substance and Sexual Activity   ? Alcohol use: No   ? Drug use: No   ? Sexual activity: Not on file   Lifestyle   ? Physical activity     Days per week: Not on file     Minutes per session: Not on file   ? Stress: Not on file   Relationships   ? Social connections     Talks on phone: Not on file     Gets together: Not on file     Attends Evangelical service: Not on file     Active member of club or organization: Not on file     Attends meetings of clubs or organizations: Not on file     Relationship status: Not on file   ? Intimate partner violence     Fear of current or ex partner: Not on file     Emotionally abused: Not on file     Physically abused: Not on file     Forced sexual activity: Not on file   Other Topics Concern   ? Not on file   Social History Narrative    She  lives in a group home right now. She can make her own medical decisions. Patient is not on supplemental O2 at home. Patient uses walker and wheelchair for ambulation. Please update sister Chelo.           Review of Systems   Constitutional: Positive for activity change and fatigue. Negative for appetite change, chills and diaphoresis.        No concerns   HENT: Negative for congestion and hearing loss.    Eyes: Negative.    Cardiovascular: Negative.    Gastrointestinal: Negative for abdominal distention, abdominal pain, blood in stool, constipation, diarrhea and nausea.   Endocrine: Negative.    Genitourinary: Negative for difficulty urinating.   Musculoskeletal: Positive for gait problem.        Denies pain   Skin:        intact   Allergic/Immunologic: Negative.    Neurological: Positive for tremors. Negative for dizziness.   Psychiatric/Behavioral: Positive for confusion. Negative for agitation, hallucinations and sleep disturbance. The patient is not nervous/anxious.        Vitals:    07/09/20 1048   BP: 109/70   Pulse: 63   Resp: 18   Temp: 97  F (36.1  C)   SpO2: 94%   Weight: 144 lb (65.3 kg)       Physical Exam  Vitals signs reviewed.   Constitutional:       Comments: Hypotension improved   HENT:      Head: Normocephalic.      Right Ear: External ear normal.      Left Ear: External ear normal.      Nose: No congestion or rhinorrhea.      Mouth/Throat:      Pharynx: No oropharyngeal exudate.   Eyes:      General:         Right eye: No discharge.         Left eye: No discharge.   Cardiovascular:      Rate and Rhythm: Normal rate.   Pulmonary:      Effort: Pulmonary effort is normal. No respiratory distress.      Comments: Room air, no auscultation per COVID-19 recommendations  Abdominal:      General: There is no distension.      Comments: No constipation diarrhea reported   Genitourinary:     Comments: Incontinent  Musculoskeletal:      Right lower leg: No edema.      Left lower leg: No edema.      Comments:  History of Parkinson's, denies pain   Skin:     General: Skin is warm and dry.      Comments: intact   Neurological:      Mental Status: She is alert. She is disoriented.      Motor: Weakness present.      Comments: A/O x2   Psychiatric:         Mood and Affect: Mood normal.      Comments: No behaviors reported         Medication List:  Current Outpatient Medications   Medication Sig   ? acetaminophen (TYLENOL) 500 MG tablet Take 500 mg by mouth every 6 (six) hours as needed for pain.   ? aspirin 81 mg chewable tablet Chew 81 mg daily.   ? atorvastatin (LIPITOR) 40 MG tablet Take 40 mg by mouth every evening.    ? buPROPion (WELLBUTRIN XL) 300 MG 24 hr tablet Take 300 mg by mouth every morning.    ? carbidopa-levodopa (SINEMET)  mg per tablet Take 2 tablets by mouth 3 (three) times a day.   ? cholecalciferol, vitamin D3, 1,000 unit tablet Take 4,000 Units by mouth daily.    ? cyanocobalamin (VITAMIN B-12) 100 MCG tablet Take 100 mcg by mouth daily.    ? furosemide (LASIX) 20 MG tablet 20 mg daily.    ? lansoprazole (PREVACID) 30 MG capsule Take 30 mg by mouth daily.    ? magnesium oxide (MAG-OX) 400 mg tablet Take 400 mg by mouth 2 (two) times a day.   ? metoprolol succinate (TOPROL-XL) 12.5 MG Take 12.5 mg by mouth daily.   ? midodrine (PROAMATINE) 5 MG tablet Take 1 tablet (5 mg total) by mouth 3 (three) times a day with meals. (Patient taking differently: Take 7.5 mg by mouth 3 (three) times a day with meals. )   ? mirtazapine (REMERON) 45 MG tablet TAKE 1 TABLET BY MOUTH AT BEDTIME  *1 TOTAL FILL*   ? multivitamin (MULTIVITAMIN) per tablet Take 1 tablet by mouth daily.   ? nitroglycerin (NITROSTAT) 0.4 MG SL tablet Place 0.4 mg under the tongue every 5 (five) minutes as needed.    ? polyethylene glycol (MIRALAX) 17 gram packet Take 17 g by mouth daily.   ? pramipexole (MIRAPEX) 0.25 MG tablet Take 1 tablet (0.25 mg total) by mouth at bedtime.   ? solifenacin (VESICARE) 10 MG tablet Take 10 mg by mouth  daily.   ? traZODone (DESYREL) 150 MG tablet TAKE 1 TABLET BY MOUTH AT BEDTIME. *1 TOTAL FILL*   ? venlafaxine (EFFEXOR-XR) 150 MG 24 hr capsule TAKE 2 CAPSULES (300MG) BY MOUTH ONCE DAILY *1 TOTAL FILL*       Labs:  Results for orders placed or performed in visit on 06/30/20   Basic Metabolic Panel   Result Value Ref Range    Sodium 144 136 - 145 mmol/L    Potassium 4.0 3.5 - 5.0 mmol/L    Chloride 105 98 - 107 mmol/L    CO2 33 (H) 22 - 31 mmol/L    Anion Gap, Calculation 6 5 - 18 mmol/L    Glucose 85 70 - 125 mg/dL    Calcium 8.4 (L) 8.5 - 10.5 mg/dL    BUN 33 (H) 8 - 28 mg/dL    Creatinine 0.76 0.60 - 1.10 mg/dL    GFR MDRD Af Amer >60 >60 mL/min/1.73m2    GFR MDRD Non Af Amer >60 >60 mL/min/1.73m2     Lab Results   Component Value Date    WBC 3.5 (L) 06/30/2020    HGB 10.2 (L) 06/30/2020    HCT 34.1 (L) 06/30/2020    MCV 94 06/30/2020     06/30/2020     Lab Results   Component Value Date    TSH 1.09 07/28/2015     Vitamin D, Total (25-Hydroxy)   Date Value Ref Range Status   03/15/2018 43.9 30.0 - 80.0 ng/mL Final         Assessment/Plan:    Chest pain with a history of CAD: Continue aspirin 81 mg daily and atorvastatin 40 mg every evening, denies chest pain    Depression: Continue Wellbutrin 300 mg in AM, Remeron 45 mg at bedtime and Effexor 300 mg daily    Parkinson's: Continue Sinemet 25/100 mg 2 tabs 3 times daily    Vitamin D deficiency: Continue D3 4000 units daily    Vitamin B12 deficiency: continue cyanocobalamin 100mcg daily    Hypomagnesia: continue mag oxide 400mg two times a day    Htn: continue lasix 20mg daily and metoprolol succinate 12.5 mg daily, SBP <110    CAD: continue metoprolol succinate 12.5mg daily, HR <70    Orthostatic hypotension: Probably due to Parkinson's dysfunction, prior increased midodrine to 7.5 mg 3 times daily, improved    GERD: Continue Prevacid 30 mg daily    Constipation: continue miralax daily    Insomnia: continue trazodone 150mg at HS    Disposition: JEYSON ALFORD  16/30   CPT 4.9      Electronically signed by: Dexter Rico NP

## 2021-06-20 NOTE — LETTER
Letter by Arcahna Siddiqui CNP at      Author: Archana Siddiqui CNP Service: -- Author Type: --    Filed:  Encounter Date: 2020 Status: (Other)         Patient: Alison Bashir   MR Number: [id number]   YOB: 1950   Date of Visit: 2020     [facility]      NAME:  Alison Bashir             :  1950  MRN: [id number]  CODE STATUS:  DNR/DNI    VISIT TYPE: DISCHARGE SUMMARY  FACILYTY: [facility] [[id number]]    HOSPITALIZATION:     TO 2020               PRIMARY CARE PROVIDER: Ramon Echeverria MD    DISCHARGE DIAGNOSIS:      1. Cognitive impairment    2. Fall, initial encounter    3. Inability to walk         DISCHARGE MEDICATIONS:         Medication List          Accurate as of 2020 11:59 PM. If you have any questions, ask your nurse or doctor.            CONTINUE taking these medications    acetaminophen 500 MG tablet  Commonly known as:  TYLENOL  Take 2 tablets (1,000 mg total) by mouth 3 (three) times a day.     aspirin 81 mg chewable tablet     atorvastatin 40 MG tablet  Commonly known as:  LIPITOR     buPROPion 300 MG 24 hr tablet  Commonly known as:  WELLBUTRIN XL  TAKE 1 TABLET BY MOUTH ONCE DAILY.     carbidopa-levodopa  mg per tablet  Commonly known as:  SINEMET     cholecalciferol (vitamin D3) 1,000 unit (25 mcg) tablet     lansoprazole 30 MG capsule  Commonly known as:  PREVACID     magnesium oxide 400 mg (241.3 mg magnesium) tablet  Commonly known as:  MAG-OX     metoprolol succinate 12.5 MG  Commonly known as:  TOPROL-XL     midodrine 2.5 MG tablet  Commonly known as:  PROAMATINE     mirtazapine 45 MG tablet  Commonly known as:  REMERON  TAKE 1 TABLET BY MOUTH AT BEDTIME  *1 TOTAL FILL*     multivitamin per tablet  Generic drug:  multivitamin     nitroglycerin 0.4 MG SL tablet  Commonly known as:  NITROSTAT     polyethylene glycol 17 gram packet  Commonly known as:  MIRALAX     pramipexole 0.5 MG  "tablet  Commonly known as:  MIRAPEX     solifenacin 10 MG tablet  Commonly known as:  VESICARE     traZODone 150 MG tablet  Commonly known as:  DESYREL  TAKE 1 TABLET BY MOUTH AT BEDTIME. *1 TOTAL FILL*     venlafaxine 150 MG 24 hr capsule  Commonly known as:  EFFEXOR-XR  TAKE 2 CAPSULES (300MG) BY MOUTH ONCE DAILY *1 TOTAL FILL*     vitamin B-12 100 MCG tablet  Generic drug:  cyanocobalamin          Post Discharge Medication Reconciliation Status: discharge medications reconciled, continue medications without change    HISTORY OF PRESENT ILLNESS: Alison Bashir is a 70 y.o. female is being seen for a face to face dc home to her group home. She will require ongoing home health PT/OT. She comes to the TCU from [facility]. Per his EMR \"70 y.o. old female with a history of frequent falls and unsteady gait.  She fell at the group home while she was trying to use her walker.  No head injuries and no chest pain prior to fall.  She had some slight pain in the hip and was unable to get up.       Generalized weakness with difficulty moving.    Recurrent falls.  Suspect due to symptoms of parkinsonism.  Infection possibly adding to the weakness/ UA looks infected.    Patient was seen by neurology, no change in Parkinson medications recommended.  PT/OT recommended TCU.     Recent fall with no apparent injuries.  X-ray pelvis and right femur are revealing for fractures.    MRI pelvis negative for fractures, showed moderate size acute subcutaneous hematoma along the lateral margin of the right hip.  Holben 10.7-9.0.  Patient's baseline hemoglobin 10.5.  Pain management with Tylenol.     Parkinson's disease with ongoing bilateral upper extremity tremors.    Resumed home medications.'  Alison reports she is happy to go to her group home. She is able to ambulate with SBA and a walker.     SKILLED NURSING FACILITY COURSE:  During this TCU stay, patient completed all anticipated goals of therapy.      PHYSICAL " EXAMINATION:    Vitals:    09/25/20 1809   BP: 128/76   Pulse: (!) 57   Temp: 98.5  F (36.9  C)   Weight: 168 lb 12.8 oz (76.6 kg)         GENERAL: Awake, Alert, oriented x3, not in any form of acute distress, answers questions appropriately, follows simple commands, conversant  HEENT: Head is normocephalic with normal hair distribution. No evidence of trauma. Ears: No acute purulent discharge. Eyes: Conjunctivae pink with no scleral jaundice. Nose: Normal mucosa and septum. NECK: Supple with no cervical or supraclavicular lymphadenopathy. Trachea is midline.SKIN: Warm and dry, no erythema noted, no rashes or lesions.  NEUROLOGICAL: The patient is oriented to person, place and time. Strength and sensation are grossly intact. Face is symmetric.    Social Distancing and PPE utulized during assessment due to Covid 19    LABS:  All labs reviewed in the nursing home record.        DISCHARGE PLAN: I certify that this patient is under Dr. Goodrich's care, seen by the NP, and had a face-to-face encounter that meets the physician face-to-face encounter requirements.  The encounter was in whole, or part related to the primary reason for home health.  The Patient is homebound due to: Fall and Parkinsons and it is , taxing and it will take a considerable amount of effort for patient to leave the home.  She is dependent on others for transportation.  The patient is confined to her home and needs intermittent skilled nursing, PT,OT,   The patient has been under the care of Dr. Goodrich/NP and Dr. Goodrich  initiated the establishment of the plan of care.        Patient to be followed by home care for physical therapy to eval and treat for strengthening, balance, endurance, and safety with mobility, and ambulation.  Patient to be followed by home care for occupational therapy to eval and treat for strengthening, ADL needs, adaptive equipment, and safety.  Patient to be followed by home care for nursing services for medication set up and  teaching, symptom and disease processes monitoring and education.    Patient to be followed by home care for home health aid services for bathing and ADL needs.  Planned discharge.  All therapy goals have been met.  Family will assist with discharge and transportation.      Patient will follow up with PCP within 7- days after discharge for medication mangagment and appropriate lab studies.          Electronically signed by:  Archana Siddiqui CNP  This progress note was completed using Dragon software and there may be grammatical errors.      For documentation purposes, chart review, medication management, and discharge coordination of care was greater than 35 minutes     Attestation signed by Noy Goodrich MBBS at 9/28/2020  1:13 PM:  Agree with discharge note

## 2021-06-20 NOTE — PROGRESS NOTES
Correct pharmacy verified with patient and confirmed in snapshot? [x] yes []no    Charge captured ? [x] yes  [] no    Medications Phoned  to Pharmacy [] yes [x]no  Name of Pharmacist:  List Medications, including dose, quantity and instructions      Medication Prescriptions given to patient   [] yes  [x] no   List the name of the drug the prescription was written for.       Medications ordered this visit were e-scribed.  Verified by order class [x] yes  [] no  Mirtazapine 45 mg   Medication changes or discontinuations were communicated to patient's pharmacy: [] yes  [x] no    UA collected [] yes  [x] no    [place] Prescription Monitoring Program Reviewed? [] yes  [x] no    Referrals were made to:  None    Future appointment was made: [x] yes  [] no   12/10/18  Dictation completed at time of chart check: [x] yes  [] no    I have checked the documentation for today s encounters and the above information has been reviewed and completed.

## 2021-06-20 NOTE — LETTER
Letter by Dexter Rico NP at      Author: Dexter Rico NP Service: -- Author Type: --    Filed:  Encounter Date: 2020 Status: (Other)         Patient: Alison Bashir   MR Number: [id number]   YOB: 1950   Date of Visit: 2020     [facility]      Facility:    [facility] [[id number]]  Code Status: DNR      Chief Complaint/Reason for Visit:   Chief Complaint   Patient presents with   ? Review Of Multiple Medical Conditions       HPI:   Alison is a 69 y.o. female who is a transfer from [facility] with an admission on 20 and discharge on 6/15/20. She has a PMH of Parkinsons dx, Htn, frequent UTIs, depression, CAD, GERD, leukopenia who presented to the hospital chest pain shortness of breath.  Had a negative work-up through cardiology.  Noted to have severe hypotension despite holding PTA medications.  Therefore Middaugh drain was started at discharge, likely due to Parkinson's per neurology evaluation.  She was then discharged to TCU for additional rehab.    Past Medical History:  Past Medical History:   Diagnosis Date   ? Acute blood loss anemia    ? Alzheimer disease (H)    ? CAD (coronary artery disease)    ? Chronic pain syndrome    ? Dementia (H)    ? Depression    ? Depression    ? Hip fracture, right (H) 2017   ? HTN (hypertension)    ? Kidney stone    ? Overactive bladder    ? PMB (postmenopausal bleeding)    ? RLS (restless legs syndrome)    ? Spine pain    ? Stented coronary artery    ? Traumatic brain injury (H)    ? Unsteady gait     uses walker   ? UTI (lower urinary tract infection)     on antibiotic course           Surgical History:  Past Surgical History:   Procedure Laterality Date   ?  SECTION     ? CHOLECYSTECTOMY  May 2014   ? COMBINED HYSTEROSCOPY DIAGNOSTIC / D&C N/A 2014    Procedure: DILATION AND CURETTAGE WITH HYSTEROSCOPY;  Surgeon: Karime Cifuentes MD;  Location: [facility]  OR;  Service:    ? CORONARY STENT PLACEMENT     ? HEMIARTHROPLASTY HIP Right 12/22/2017   ? INCISION AND DRAINAGE HIP Right 01/18/2018    ORIF REVISION    ? LUNG REMOVAL, PARTIAL     ? REVISION TOTAL HIP ARTHROPLASTY Right 01/10/2018   ? TONSILLECTOMY AND ADENOIDECTOMY     ? TOTAL KNEE ARTHROPLASTY Right 09/30/2016   ? TOTAL KNEE ARTHROPLASTY Left 11/2015       Family History:   Family History   Problem Relation Age of Onset   ? Cancer Mother    ? Heart attack Father    ? Coronary artery disease Father    ? Heart failure Father        Social History:    Social History     Socioeconomic History   ? Marital status: Single     Spouse name: Not on file   ? Number of children: Not on file   ? Years of education: Not on file   ? Highest education level: Not on file   Occupational History   ? Not on file   Social Needs   ? Financial resource strain: Not on file   ? Food insecurity     Worry: Not on file     Inability: Not on file   ? Transportation needs     Medical: Not on file     Non-medical: Not on file   Tobacco Use   ? Smoking status: Never Smoker   ? Smokeless tobacco: Never Used   Substance and Sexual Activity   ? Alcohol use: No   ? Drug use: No   ? Sexual activity: Not on file   Lifestyle   ? Physical activity     Days per week: Not on file     Minutes per session: Not on file   ? Stress: Not on file   Relationships   ? Social connections     Talks on phone: Not on file     Gets together: Not on file     Attends Je[employer]'s witness service: Not on file     Active member of club or organization: Not on file     Attends meetings of clubs or organizations: Not on file     Relationship status: Not on file   ? Intimate partner violence     Fear of current or ex partner: Not on file     Emotionally abused: Not on file     Physically abused: Not on file     Forced sexual activity: Not on file   Other Topics Concern   ? Not on file   Social History Narrative    She lives in a group home right now. She can make her own medical  "decisions. Patient is not on supplemental O2 at home. Patient uses walker and wheelchair for ambulation. Please update sister Chelo.           Review of Systems   Constitutional: Positive for activity change and fatigue. Negative for appetite change, chills and diaphoresis.   HENT: Negative for congestion and hearing loss.    Eyes: Negative.    Cardiovascular: Negative.    Gastrointestinal: Negative for abdominal distention, abdominal pain, blood in stool, constipation, diarrhea and nausea.   Endocrine: Negative.    Genitourinary: Negative for difficulty urinating.   Musculoskeletal: Positive for gait problem.        Denies pain   Skin:        intact   Allergic/Immunologic: Negative.    Neurological: Positive for tremors. Negative for dizziness.   Psychiatric/Behavioral: Positive for confusion. Negative for agitation, hallucinations and sleep disturbance. The patient is not nervous/anxious.        Vitals:    06/22/20 1057   BP: 115/67   Pulse: 73   Resp: 16   Temp: 97  F (36.1  C)   SpO2: 99%   Weight: 157 lb (71.2 kg)   Height: 5' 10\" (1.778 m)       Physical Exam  HENT:      Head: Normocephalic.      Right Ear: External ear normal.      Left Ear: External ear normal.      Nose: No congestion or rhinorrhea.      Mouth/Throat:      Pharynx: No oropharyngeal exudate.   Eyes:      General:         Right eye: No discharge.         Left eye: No discharge.   Cardiovascular:      Rate and Rhythm: Normal rate.   Pulmonary:      Effort: Pulmonary effort is normal. No respiratory distress.      Comments: Room air, no auscultation per COVID-19 recommendations  Abdominal:      General: There is no distension.      Comments: No constipation diarrhea reported   Genitourinary:     Comments: Incontinent  Musculoskeletal:      Right lower leg: No edema.      Left lower leg: No edema.      Comments: History of Parkinson's, denies pain   Skin:     General: Skin is warm and dry.      Comments: intact   Neurological:      Mental " Status: She is alert. She is disoriented.      Motor: Weakness present.      Comments: A/O x2   Psychiatric:         Mood and Affect: Mood normal.      Comments: No behaviors reported         Medication List:  Current Outpatient Medications   Medication Sig   ? metoprolol succinate (TOPROL-XL) 12.5 MG Take 12.5 mg by mouth daily.   ? acetaminophen (TYLENOL) 500 MG tablet Take 500 mg by mouth every 6 (six) hours as needed for pain.   ? aspirin 81 mg chewable tablet Chew 81 mg daily.   ? atorvastatin (LIPITOR) 40 MG tablet Take 40 mg by mouth every evening.    ? buPROPion (WELLBUTRIN XL) 300 MG 24 hr tablet Take 300 mg by mouth every morning.    ? carbidopa-levodopa (SINEMET)  mg per tablet Take 2 tablets by mouth 3 (three) times a day.   ? cholecalciferol, vitamin D3, 1,000 unit tablet Take 4,000 Units by mouth daily.    ? cyanocobalamin (VITAMIN B-12) 100 MCG tablet Take 100 mcg by mouth daily.    ? furosemide (LASIX) 20 MG tablet 20 mg daily.    ? lansoprazole (PREVACID) 30 MG capsule Take 30 mg by mouth daily.    ? magnesium oxide (MAG-OX) 400 mg tablet Take 400 mg by mouth 2 (two) times a day.   ? midodrine (PROAMATINE) 5 MG tablet Take 1 tablet (5 mg total) by mouth 3 (three) times a day with meals.   ? mirtazapine (REMERON) 45 MG tablet TAKE 1 TABLET BY MOUTH AT BEDTIME  *1 TOTAL FILL*   ? multivitamin (MULTIVITAMIN) per tablet Take 1 tablet by mouth daily.   ? nitroglycerin (NITROSTAT) 0.4 MG SL tablet Place 0.4 mg under the tongue every 5 (five) minutes as needed.    ? polyethylene glycol (MIRALAX) 17 gram packet Take 17 g by mouth daily.   ? pramipexole (MIRAPEX) 0.25 MG tablet Take 1 tablet (0.25 mg total) by mouth at bedtime.   ? solifenacin (VESICARE) 10 MG tablet Take 10 mg by mouth daily.   ? traZODone (DESYREL) 150 MG tablet TAKE 1 TABLET BY MOUTH AT BEDTIME. *1 TOTAL FILL*   ? venlafaxine (EFFEXOR-XR) 150 MG 24 hr capsule TAKE 2 CAPSULES (300MG) BY MOUTH ONCE DAILY *1 TOTAL FILL*        Labs:  Results for orders placed or performed during the hospital encounter of 06/08/20   Basic Metabolic Panel   Result Value Ref Range    Sodium 142 136 - 145 mmol/L    Potassium 4.0 3.5 - 5.0 mmol/L    Chloride 106 98 - 107 mmol/L    CO2 32 (H) 22 - 31 mmol/L    Anion Gap, Calculation 4 (L) 5 - 18 mmol/L    Glucose 90 70 - 125 mg/dL    Calcium 8.0 (L) 8.5 - 10.5 mg/dL    BUN 25 8 - 28 mg/dL    Creatinine 0.69 0.60 - 1.10 mg/dL    GFR MDRD Af Amer >60 >60 mL/min/1.73m2    GFR MDRD Non Af Amer >60 >60 mL/min/1.73m2     Lab Results   Component Value Date    WBC 3.6 (L) 06/13/2020    HGB 9.4 (L) 06/13/2020    HCT 31.4 (L) 06/13/2020    MCV 94 06/13/2020     (L) 06/13/2020     Lab Results   Component Value Date    TSH 1.09 07/28/2015     Vitamin D, Total (25-Hydroxy)   Date Value Ref Range Status   03/15/2018 43.9 30.0 - 80.0 ng/mL Final         Assessment/Plan:    Chest pain with a history of CAD: Continue aspirin 81 mg daily and atorvastatin 40 mg every evening, denies chest pain    Depression: Continue Wellbutrin 300 mg in AM, Remeron 45 mg at bedtime and Effexor 300 mg daily    Parkinson's: Continue Sinemet 25/100 mg 2 tabs 3 times daily    Vitamin D deficiency: Continue D3 4000 units daily    Vitamin B12 deficiency: continue cyanocobalamin 100mcg daily    Hypomagnesia: continue mag oxide 400mg two times a day    Htn: continue lasix 20mg daily and metoprolol succinate 12.5 mg daily, SBP <110    Hypotension: Continue midodrine 5 mg 3 times daily    GERD: Continue Prevacid 30 mg daily    Constipation: continue miralax daily    Insomnia: continue trazodone 150mg at HS    Disposition: TBD. [facility] 16/30     The care plan has been reviewed and all orders signed. Changes to care plan, if any, as noted. Otherwise, continue care plan of care.  The total time spent with this patient was 35 minutes, with greater than 50% spent in counseling and coordination of care that included multiple issues per CP, BP and  therapy, which lasted 18 minutes.    Post Discharge Medication Reconciliation Status: discharge medications reconciled and changed, per note/orders (see AVS)        Electronically signed by: Dexter Rico NP

## 2021-06-20 NOTE — PROGRESS NOTES
Outpatient Followup Psychiatric Evaluation      Pertinent History: Patient presents today for the purposes of medication management.  She has a history of a mood disorder and also with prior psychotic symptoms.  Per the patient's request we have been tapering the Zyprexa and that was discontinued in 2017.  She recently was restarted on the Remeron.  We did not make any changes at the last visit.    Current Symptoms:   F report the patient has been picking at her face scalp and arms.  They also reports she has had increased bedwetting.  This has seemed to coincide with moving her portable commode from the room.  They are considering placing them back in there.  At night she is often found wandering around the house and she seems to be collecting and possibly hoarding wet wipes and other cleansing items.  During the day she often isolates and seems to avoid peers.  Was an extremely vague historian but she admits the above history.  She is unable to tell me why she collects the wet wipes but admits that she is a bit more anxious.  She denies having any desire to be dead or thoughts of suicide.  No psychosis.  She denies having any change in cognition or change in appetite.  No new medical issues and she denies having any side effects to the medication.  We did discuss the possibility of increasing the patient's Remeron and she is in agreement with that.    Current Medications: Please see chart    Medication Compliance: Yes    Side Effects to Medications: No      Vitals:  Wt Readings from Last 3 Encounters:   03/16/18 197 lb (89.4 kg)   03/14/18 197 lb (89.4 kg)   03/12/18 198 lb (89.8 kg)     Temp Readings from Last 3 Encounters:   06/11/18 97.8  F (36.6  C) (Oral)   03/19/18 98  F (36.7  C)   03/16/18 97.8  F (36.6  C)     BP Readings from Last 3 Encounters:   06/11/18 104/60   03/19/18 112/68   03/16/18 117/68     Pulse Readings from Last 3 Encounters:   06/11/18 75   03/19/18 75   03/16/18 80         Mental Status  Exam:   Appearance: Patient presents appearing relatively comfortable.  Low and flat however.  No obvious distress.  Not short of breath.  No obvious pain.  Behavior: Some impulsivity  and compulsive behaviors.  Please see above.  Vague.  Flat and slow.  Possibly depressed.  He is currently not restless or agitated.  Speech: No obvious recent change.  Simple answers.  Fairly concrete.  He continues to need structure and prompts.  Mood/Affect: Not significantly depressed or anxious.  No lability or agitation.  Thought Content: No reports of any recent psychosis.  No evidence of any psychosis.  Suicidal or Homicidal Thoughts:  None apparent or reported. The patient denies any suicidal or homicidal ideation.   Thought Process/Formulation: Slow.  Somewhat concrete and vague.  Perhaps disinterested.  He is able to track and follow some conversation.  No racing thoughts.  Associations: No obvious recent change.  Somewhat concrete.  Able to process some simple information.  Fund of Knowledge: No significant recent change.  He continues somewhat impaired.  Attention/Concentration: Slow.  Somewhat concrete.  Still with impaired concentration.  Insight:  Grossly unchanged.  Continues impaired  Judgement:  Grossly unchanged.  Continues impaired  Memory:  Grossly fair.  Needing prompts and structure.    Motor Status:  No recent change reported. No current tremor.   Orientation: No reports of any recent change.    Diagnosis managed and treated at today's visit :      Major depressive disorder   History of psychosis NOS    Plan:  Medication Adjustment:  I am going to increase the patient's Remeron to 45 mg at night.    Other: Patient will return to clinic in 3 months for further assessment and treatment the staff and patient agreed to return sooner or call if questions concerns or problems arise.    Continue with the support of the clinic, reassurance, and redirection. Staff monitoring and ongoing assessments per team plan.  Current psychotropic medication appears to represent the minimum effective dosage and appears medically necessary. We will continue to monitor and reassess. This team will utilize appropriate emergency services if necessary. I will make myself available if concerns or problems arise.    Norberto Johnson MD

## 2021-06-20 NOTE — LETTER
Letter by Noy Goodrich MBBS at      Author: Noy Goodrich MBBS Service: -- Author Type: --    Filed:  Encounter Date: 10/14/2020 Status: (Other)         Patient: Alison Bashir   MR Number: [id number]   YOB: 1950   Date of Visit: 10/14/2020       [facility] Admission note      Patient: Alison Bashir  MRN: [id number]  Date of Service: 10/14/2020      [facility] [[id number]]  Reason for Visit     Chief Complaint   Patient presents with   ? Review Of Multiple Medical Conditions     F/u low bp and pain  Code Status     DNI    Assessment     -History of a mechanical fall in the setting of recurrent hospitalizations related to falls.  -Right hip pain with underlying history of right hip arthroplasty  -Recent history of a fall related right hip thigh hematoma  -History of recurrent UTIs; urine culture again showing Morganella morganii species  -Hypotension with underlying history of orthostatic hypotension  - Parkinson's disease  -Cognitive impairment/dementia  - HTN  -Depression with anxiety  Plan     Patient readmitted to the TCU within days of discharge.  Acute fall in the setting of recurrent falls reported.  Unfortunately she is declining quite rapidly and may require a higher level of care.   and multiple hospitalizations she may need a higher level of care  However patient now reports that she would like to discharge back to her group home.  At baseline she has significant gait instability she remains wheelchair-bound and is declining with underlying history of Parkinson's disease  Pain management reviewed and she is been using tramadol still intermittently she is not ready for a taper.  Blood pressures are stable today we did hold her metoprolol based on parameters.  Improvement noted.  She has underlying history of orthostatic hypotension and remains on midodrine.  Fluid intake appears to be adequate.  Has chronic dependent lymphedema bilateral lower extremities being  managed with Tubigrip stockings.  Weights have been stable        History     Patient is a very pleasant 70 y.o. female who is admitted to TCU  Patient was recently in a TCU and was discharged home at baseline she has profound debilitation with a high fall risk and has had recurrent hospitalizations related to falls.  She presented after she fell again at home within days of discharge.  She was complaining of right-sided chest pain as well as right hip pain.  She recently was admitted with a right hip pain with resultant right hip thigh hematoma.  She has been discharged to the TCU.  At baseline she remains quite debilitated wheelchair-bound and poorly ambulatory.  She is still wheelchair-bound and remains poorly ambulatory.  She requires significant assistance but can ambulate using a walker.  Unfortunately she has advancing Parkinson's disease  She understands that she is declining from that and does not anticipate that she will be walking.  She knows that she probably will need a higher level of care soon but wants to return back to the group home if possible  Recently had some increased pain related to her fall she has been using tramadol told me she is not ready for a taper as yet  Blood pressure review reveals that she has had some low blood pressures with resultant symptomatic issues.  She has had a prior history of orthostatic hypotension.  She gets midodrine scheduled and also remains on metoprolol  Toprol is being held on parameters and in addition she also gets her midodrine with no recent evidence of orthostatic low blood pressures   Overall has been stable   she has chronic lymphedema bilateral lower extremities with no changes either        Past Medical History     Active Ambulatory (Non-Hospital) Problems    Diagnosis   ? Accident due to mechanical fall without injury, initial encounter   ? Hematoma of right thigh, subsequent encounter   ? Right hip pain   ? Hematoma of right hip   ? Urinary tract  infection   ? Inability to walk   ? Parkinsonism, unspecified Parkinsonism type (H)   ? Fall, initial encounter   ? Magnesium deficiency   ? Adjustment insomnia   ? Acute hypotension   ? RBBB   ? Chest pain, unspecified   ? Leukopenia   ? Gastroesophageal reflux disease with esophagitis   ? Personal history of fall   ? Chest pain   ? Chronic pain syndrome   ? Traumatic brain injury (H)   ? Stented coronary artery   ? RLS (restless legs syndrome)   ? Overactive bladder   ? HTN (hypertension)   ? Depression   ? CAD (coronary artery disease)   ? Alzheimer disease (H)   ? Orthostatic hypotension   ? Anemia   ? Cognitive impairment   ? Hip fracture (H)   ? ACP (advance care planning)   ? Constipation   ? Essential hypertension   ? Neuroleptic signed 8/29/16     Past Medical History:   Diagnosis Date   ? Acute blood loss anemia    ? Alzheimer disease (H)    ? CAD (coronary artery disease)    ? Chronic pain syndrome    ? Dementia (H)    ? Depression    ? Depression    ? Hip fracture, right (H) 12/21/2017   ? HTN (hypertension)    ? Kidney stone    ? Overactive bladder    ? PMB (postmenopausal bleeding)    ? RLS (restless legs syndrome)    ? Spine pain    ? Stented coronary artery    ? Traumatic brain injury (H)    ? Unsteady gait    ? UTI (lower urinary tract infection)        Past Social History     Reviewed, and she  reports that she has never smoked. She has never used smokeless tobacco. She reports that she does not drink alcohol or use drugs.    Family History     Reviewed, and family history includes Cancer in her mother; Coronary artery disease in her father; Heart attack in her father; Heart failure in her father.    Medication List     Current Outpatient Medications on File Prior to Visit   Medication Sig Dispense Refill   ? acetaminophen (TYLENOL) 650 MG CR tablet Take 650 mg by mouth 4 (four) times a day.     ? aspirin 81 mg chewable tablet Chew 81 mg daily.     ? atorvastatin (LIPITOR) 40 MG tablet Take 40 mg  by mouth every evening.      ? buPROPion (WELLBUTRIN XL) 300 MG 24 hr tablet TAKE 1 TABLET BY MOUTH ONCE DAILY. 31 tablet 0   ? carbidopa-levodopa (SINEMET)  mg per tablet Take 2 tablets by mouth 3 (three) times a day.     ? cholecalciferol, vitamin D3, 1,000 unit tablet Take 4,000 Units by mouth daily.      ? cyanocobalamin (VITAMIN B-12) 100 MCG tablet Take 100 mcg by mouth daily.      ? furosemide (LASIX) 20 MG tablet Take 20 mg by mouth daily.     ? lansoprazole (PREVACID) 30 MG capsule Take 30 mg by mouth daily before breakfast.      ? magnesium oxide (MAG-OX) 400 mg tablet Take 400 mg by mouth 2 (two) times a day.     ? melatonin 1 mg Tab tablet Take 5 mg by mouth at bedtime.     ? metoprolol succinate (TOPROL-XL) 12.5 MG Take 12.5 mg by mouth daily.     ? midodrine (PROAMATINE) 2.5 MG tablet Take 7.5 mg by mouth 3 (three) times a day with meals.     ? mirtazapine (REMERON) 45 MG tablet TAKE 1 TABLET BY MOUTH AT BEDTIME  *1 TOTAL FILL* 30 tablet 3   ? multivitamin (MULTIVITAMIN) per tablet Take 1 tablet by mouth daily.     ? nitroglycerin (NITROSTAT) 0.4 MG SL tablet Place 0.4 mg under the tongue every 5 (five) minutes as needed.      ? nystatin (MYCOSTATIN) powder Apply topically 2 (two) times a day as needed. Fungal infection     ? polyethylene glycol (MIRALAX) 17 gram packet Take 17 g by mouth daily.     ? pramipexole (MIRAPEX) 0.5 MG tablet Take 0.5 mg by mouth at bedtime.     ? solifenacin (VESICARE) 10 MG tablet Take 10 mg by mouth daily.     ? traMADoL (ULTRAM) 50 mg tablet Take 1 tablet (50 mg total) by mouth every 6 (six) hours as needed for pain. 12 tablet 0   ? traZODone (DESYREL) 50 MG tablet Take 50 mg by mouth at bedtime.     ? venlafaxine (EFFEXOR-XR) 150 MG 24 hr capsule TAKE 2 CAPSULES (300MG) BY MOUTH ONCE DAILY *1 TOTAL FILL* 60 capsule 3     No current facility-administered medications on file prior to visit.        Allergies     Allergies   Allergen Reactions   ? Blood-Group Specific  Substance Other (See Comments)     Patient has anti-K. Blood product orders maybe delayed. Draw one red top and 2 purple top tubes for all Type and Screen/Red blood Cell product orders   ? Fd And C No.5 (Tartrazine)      GI UPSET   ? Ibuprofen Nausea And Vomiting   ? Morphine Rash     swelling       Review of Systems   A comprehensive review of 14 systems was done. Pertinent findings noted here and in history of present illness. All the rest negative.  Constitutional: Negative.  Negative for fever, chills, she has activity change, appetite change and fatigue.   HENT: Negative for congestion and facial swelling.    Eyes: Negative for photophobia, redness and visual disturbance.   Respiratory: Negative for cough and chest tightness.    Cardiovascular: Negative for chest pain, palpitations and leg swelling.   Gastrointestinal: Negative for nausea, diarrhea, constipation, blood in stool and abdominal distention.   Genitourinary: Negative.    Musculoskeletal: Reporting back and hip pain.  She has had multiple falls  Skin: Negative.    Neurological: Negative for dizziness, tremors, syncope, weakness, light-headedness and headaches.   Hematological: Does not bruise/bleed easily.   Psychiatric/Behavioral: Negative.  Anxious about pain management      Physical Exam     Recent Vitals 10/2/2020   Height -   Weight -   BSA (m2) -   /59   Pulse 73   Temp 98.4   Temp src 1   SpO2 98   Some recent data might be hidden   Recheck blood pressure 107/55    Constitutional: Oriented to person, place, and time and appears well-developed.   HEENT:  Normocephalic and atraumatic.  Eyes: Conjunctivae and EOM are normal. Pupils are equal, round, and reactive to light. No discharge.  No scleral icterus. Nose normal. Mouth/Throat: Oropharynx is clear and moist. No oropharyngeal exudate.    NECK: Normal range of motion. Neck supple. No JVD present. No tracheal deviation present. No thyromegaly present.   CARDIOVASCULAR: Normal rate,  regular rhythm and intact distal pulses.  Exam reveals no gallop and no friction rub.  Systolic murmur present.  PULMONARY: Effort normal and breath sounds normal. No respiratory distress.No Wheezing or rales.  ABDOMEN: Soft. Bowel sounds are normal. No distension and no mass.  There is no tenderness. There is no rebound and no guarding. No HSM.  MUSCULOSKELETAL: Normal range of motion. 1+ le edema and no tenderness. Mild kyphosis with asymmetry of the chest noted more predominant on the right side,  Tenderness to palpation over her right thigh.  Very ataxic gait noted with patient noted to be deviating on the right side  LYMPH NODES: Has no cervical, supraclavicular, axillary and groin adenopathy.   NEUROLOGICAL: Alert and oriented to person, place, and time. No cranial nerve deficit.  Normal muscle tone. Coordination normal.   GENITOURINARY: Deferred exam.  SKIN: Skin is warm and dry. No rash noted. No erythema. No pallor.   EXTREMITIES: No cyanosis, no clubbing, has chronic lower extremity 1+ edema. No Deformity.  PSYCHIATRIC: Normal mood, affect and behavior.      Lab Results     Recent Results (from the past 240 hour(s))   COVID-19 Virus PCR MRF    Collection Time: 10/07/20  8:00 AM    Specimen: Respiratory   Result Value Ref Range    COVID-19 VIRUS SPECIMEN SOURCE Nasopharyngeal     2019-nCOV Not Detected       Results for orders placed or performed during the hospital encounter of 09/30/20   Basic metabolic panel   Result Value Ref Range    Sodium 143 136 - 145 mmol/L    Potassium 4.0 3.5 - 5.0 mmol/L    Chloride 105 98 - 107 mmol/L    CO2 29 22 - 31 mmol/L    Anion Gap, Calculation 9 5 - 18 mmol/L    Glucose 106 70 - 125 mg/dL    Calcium 9.2 8.5 - 10.5 mg/dL    BUN 32 (H) 8 - 28 mg/dL    Creatinine 0.73 0.60 - 1.10 mg/dL    GFR MDRD Af Amer >60 >60 mL/min/1.73m2    GFR MDRD Non Af Amer >60 >60 mL/min/1.73m2             NERY Sosa

## 2021-06-20 NOTE — LETTER
"Letter by Noy Goodrich MBBS at      Author: Noy Goodrich MBBS Service: -- Author Type: --    Filed:  Encounter Date: 6/26/2020 Status: (Other)         Patient: Alison Bashir   MR Number: [id number]   YOB: 1950   Date of Visit: 6/26/2020     [facility]   Video Visit    Code Status: DNR    Alison Bashir is a 69 y.o. female who is being evaluated via a billable video visit.      The patient has been notified of following:     \"This video visit will be conducted via a call between you and your physician/provider. We have found that certain health care needs can be provided without the need for an in-person physical exam.  This service lets us provide the care you need with a video conversation.  If a prescription is necessary we can send it to the facility team.  If lab work is needed we can place an order through the facility team to have that test done at a later time.    If during the course of the call the physician/provider feels a video visit is not appropriate, you will not be charged for this service.\"     Physician/Provider has received verbal consent for a Video Visit from the patient? Yes    Patient would like the video invitation sent by: Send to: [employer]    Video Start Time: 9.25 am    Chief Complaint/Reason for Visit:  Chief Complaint   Patient presents with   ? Review Of Multiple Medical Conditions     Evaluation for acute hypertension and Parkinson's  HPI:   Alison is a 69 y.o. female who is admitted to the TCU.  Patient was admitted in the hospital with chest pain.  Cardiology was consulted.  Due to underlying history of CAD and stenting she underwent a nuclear stress test which was unrevealing for any reversible ischemia  She has been discharged on medical management.  Currently denying any chest pain and plans to follow-up with cardiology as an outpatient    She was noted to have profound hypotension with bradycardia.  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  She is also has some cognitive decline but overall mood and behaviors has been stable    I have reviewed and updated the patient's Past Medical History, including history of traumatic brain injury hypertension and coronary artery disease    Social History,lives in a group home; no smoking or etoh     Family History includes cancer in her mother; father had cad and chf   and Medication List.    ALLERGIES  Blood-group specific substance; Fd and c no.5 (tartrazine); Ibuprofen; and Morphine    Review of Systems  Constitutional: Negative.  Negative for fever, chills, she has activity change, appetite change and fatigue.   Loss of 50lb in last few months  HENT: Negative for congestion and facial swelling.    Eyes: Negative for photophobia, redness and visual disturbance.   Respiratory: Negative for cough and chest tightness.    Cardiovascular: Negative for chest pain, palpitations and leg swelling.   Gastrointestinal: Negative for nausea, diarrhea, constipation, blood in stool and abdominal distention.   Genitourinary: Has chronic incontinence due to overactive bladder    Musculoskeletal: Has gait instability and falls requires assistance with ADLs  Skin: Negative.    Neurological: Negative for dizziness, tremors, syncope, weakness, light-headedness and headaches.   Hematological: Does not bruise/bleed easily.    Psychiatric/Behavioral: Negative.          Physical exam  Wt 157 lb  Bp 90 / 58  t 98 p70  GENERAL: no acute distress. Cooperative in conversation.   Appears very frail and weak  HEENT: pupils are equal, round and reactive. Oral mucosa is moist and intact.  RESP:Chest symmetric. Regular respiratory rate. No stridor.  ABD: Nondistended, soft.  EXTREMITIES: No lower extremity edema.  Range of movement in the legs especially right lower extremity is quite limited  NEURO: non focal. Alert and oriented x3.  Recall is impaired  PSYCH: within normal limits. No depression or anxiety.  SKIN: warm dry intact         Medication List:  Current Outpatient Medications   Medication Sig   ? acetaminophen (TYLENOL) 500 MG tablet Take 500 mg by mouth every 6 (six) hours as needed for pain.   ? aspirin 81 mg chewable tablet Chew 81 mg daily.   ? atorvastatin (LIPITOR) 40 MG tablet Take 40 mg by mouth every evening.    ? buPROPion (WELLBUTRIN XL) 300 MG 24 hr tablet Take 300 mg by mouth every morning.    ? carbidopa-levodopa (SINEMET)  mg per tablet Take 2 tablets by mouth 3 (three) times a day.   ? cholecalciferol, vitamin D3, 1,000 unit tablet Take 4,000 Units by mouth daily.    ? cyanocobalamin (VITAMIN B-12) 100 MCG tablet Take 100 mcg by mouth daily.    ? furosemide (LASIX) 20 MG tablet 20 mg daily.    ? lansoprazole (PREVACID) 30 MG capsule Take 30 mg by mouth daily.    ? magnesium oxide (MAG-OX) 400 mg tablet Take 400 mg by mouth 2 (two) times a day.   ? metoprolol succinate (TOPROL-XL) 12.5 MG Take 12.5 mg by mouth daily.   ? midodrine (PROAMATINE) 5 MG tablet Take 1 tablet (5 mg total) by mouth 3 (three) times a day with meals.   ? mirtazapine (REMERON) 45 MG tablet TAKE 1 TABLET BY MOUTH AT BEDTIME  *1 TOTAL FILL*   ? multivitamin (MULTIVITAMIN) per tablet Take 1 tablet by mouth daily.   ? nitroglycerin (NITROSTAT) 0.4 MG SL tablet Place 0.4 mg under the tongue every 5 (five) minutes as needed.    ? polyethylene  glycol (MIRALAX) 17 gram packet Take 17 g by mouth daily.   ? pramipexole (MIRAPEX) 0.25 MG tablet Take 1 tablet (0.25 mg total) by mouth at bedtime.   ? solifenacin (VESICARE) 10 MG tablet Take 10 mg by mouth daily.   ? traZODone (DESYREL) 150 MG tablet TAKE 1 TABLET BY MOUTH AT BEDTIME. *1 TOTAL FILL*   ? 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  She was admitted to the hospital and had extensive evaluation both by cardiology and neurology.  Noted to be hypotensive suspect this is partly related to poor oral intake and declining weights.  She has lost greater than 40 pounds in the last 1 year.  She is currently on midodrine for support.  Nursing given hold parameters for her metoprolol due to persistent hypotension noted.  She is continuing with therapy but unfortunately is wheelchair-bound and probably will discharge at the level back to her group home.  She is also noticing cognitive decline and significant autonomic instability.  Encourage oral intake.  Continue with her PT OT and rehab remains a high fall risk        Video-Visit Details    Type of service:  Video Visit    Video End Time (time video stopped): 9.40am    Originating Location (pt.  Location):[facility] [[id number]]    Distant Location (provider location):  [facility]     Mode of Communication:  Zoom Video Conference        NERY Sosa

## 2021-06-20 NOTE — LETTER
"Letter by Archana Siddiqui CNP at      Author: Archana Siddiqui CNP Service: -- Author Type: --    Filed:  Encounter Date: 2020 Status: (Other)         Patient: Alison Bashir   MR Number: [id number]   YOB: 1950   Date of Visit: 2020       [facility]      NAME:  Alison Bashir             :  1950    MRN: [id number]    CODE STATUS:  DNR/DNI    FACILITY: [facility] [[id number]]         CHIEF COMPLAIN/REASON FOR VISIT:  Chief Complaint   Patient presents with   ? Review Of Multiple Medical Conditions     rehab progress       HISTORY OF PRESENT ILLNESS: Alison Bashir is a 70 y.o. female being seen t for review of multiple medical conditions.  She is admitted to the TCU s/p fall  From [facility]. Per herEMR \" with a history of frequent falls and unsteady gait.  She fell at the group home while she was trying to use her walker.  No head injuries and no chest pain prior to fall.  She had some slight pain in the hip and was unable to get up.  Generalized weakness with difficulty moving.    Recurrent falls.  Suspect due to symptoms of parkinsonism.  Infection possibly adding to the weakness/ UA looks infected.    Patient was seen by neurology, no change in Parkinson medications recommended. PT/OT recommended TCU.\"  She reports stable pain, bowels moving an sleeping well. We reviewed her med regimE.  I did ask patient about her eating habits specifically as per her medical records it appears she has had a 10 pound weight loss.  Possible erroneous admission weight.  Denies any nausea vomiting or decrease in appetite.    Allergies   Allergen Reactions   ? Blood-Group Specific Substance Other (See Comments)     Patient has anti-K. Blood product orders maybe delayed. Draw one red top and 2 purple top tubes for all Type and Screen/Red blood Cell product orders   ? Fd And C No.5 (Tartrazine)      GI UPSET   ? Ibuprofen Nausea And Vomiting   ? Morphine " Rash     swelling   :     Current Outpatient Medications   Medication Sig   ? acetaminophen (TYLENOL) 500 MG tablet Take 2 tablets (1,000 mg total) by mouth 3 (three) times a day.   ? aspirin 81 mg chewable tablet Chew 81 mg daily.   ? atorvastatin (LIPITOR) 40 MG tablet Take 40 mg by mouth every evening.    ? buPROPion (WELLBUTRIN XL) 300 MG 24 hr tablet TAKE 1 TABLET BY MOUTH ONCE DAILY.   ? carbidopa-levodopa (SINEMET)  mg per tablet Take 2 tablets by mouth 3 (three) times a day.   ? cholecalciferol, vitamin D3, 1,000 unit tablet Take 4,000 Units by mouth daily.    ? cyanocobalamin (VITAMIN B-12) 100 MCG tablet Take 100 mcg by mouth daily.    ? lansoprazole (PREVACID) 30 MG capsule Take 30 mg by mouth daily.    ? magnesium oxide (MAG-OX) 400 mg tablet Take 400 mg by mouth 2 (two) times a day.   ? metoprolol succinate (TOPROL-XL) 12.5 MG Take 12.5 mg by mouth daily.   ? midodrine (PROAMATINE) 2.5 MG tablet Take 7.5 mg by mouth 3 (three) times a day with meals.   ? mirtazapine (REMERON) 45 MG tablet TAKE 1 TABLET BY MOUTH AT BEDTIME  *1 TOTAL FILL*   ? multivitamin (MULTIVITAMIN) per tablet Take 1 tablet by mouth daily.   ? nitroglycerin (NITROSTAT) 0.4 MG SL tablet Place 0.4 mg under the tongue every 5 (five) minutes as needed.    ? polyethylene glycol (MIRALAX) 17 gram packet Take 17 g by mouth daily.   ? pramipexole (MIRAPEX) 0.5 MG tablet Take 0.5 mg by mouth at bedtime.   ? solifenacin (VESICARE) 10 MG tablet Take 10 mg by mouth daily.   ? traZODone (DESYREL) 150 MG tablet TAKE 1 TABLET BY MOUTH AT BEDTIME. *1 TOTAL FILL*   ? venlafaxine (EFFEXOR-XR) 150 MG 24 hr capsule TAKE 2 CAPSULES (300MG) BY MOUTH ONCE DAILY *1 TOTAL FILL*         REVIEW OF SYSTEMS:    Currently, no fever, chills, or rigors. Does not have any visual or hearing problems. Denies any chest pain, headaches, palpitations, lightheadedness, dizziness, shortness of breath, or cough. Appetite is good. Denies any GERD symptoms. Denies any  difficulty with swallowing, nausea, or vomiting.  Denies any abdominal pain, diarrhea or constipation. Denies any urinary symptoms. No insomnia. No active bleeding. No rash.       PHYSICAL EXAMINATION:  Vitals:    09/15/20 0624   BP: 122/68   Pulse: 65   Temp: 97.8  F (36.6  C)   Weight: 166 lb 6.4 oz (75.5 kg)         GENERAL: Awake, Alert, oriented x3, not in any form of acute distress, answers questions appropriately, follows simple commands, conversant [place]  HEENT: Head is normocephalic with normal hair distribution. No evidence of trauma. Ears: No acute purulent discharge. Eyes: Conjunctivae pink with no scleral jaundice. Nose: Normal mucosa and septum. NECK: Supple with no cervical or supraclavicular lymphadenopathy. Trachea is midline.   SKIN: Warm and dry, no erythema noted, no rashes or lesions.  NEUROLOGICAL: The patient is oriented to person, place and time. Strength and sensation are grossly intact. Face is symmetric.      Due to C 19 Social distancing performed during assessment       LABS:    Lab Results   Component Value Date    WBC 2.9 (L) 08/26/2020    HGB 9.9 (L) 08/31/2020    HCT 29.6 (L) 08/26/2020    MCV 95 08/26/2020     (L) 08/26/2020       Results for orders placed or performed in visit on 08/31/20   Basic Metabolic Panel   Result Value Ref Range    Sodium 145 136 - 145 mmol/L    Potassium 3.8 3.5 - 5.0 mmol/L    Chloride 107 98 - 107 mmol/L    CO2 31 22 - 31 mmol/L    Anion Gap, Calculation 7 5 - 18 mmol/L    Glucose 80 70 - 125 mg/dL    Calcium 8.5 8.5 - 10.5 mg/dL    BUN 30 (H) 8 - 28 mg/dL    Creatinine 0.72 0.60 - 1.10 mg/dL    GFR MDRD Af Amer >60 >60 mL/min/1.73m2    GFR MDRD Non Af Amer >60 >60 mL/min/1.73m2           Lab Results   Component Value Date    HGBA1C 5.4 09/29/2011     Vitamin D, Total (25-Hydroxy)   Date Value Ref Range Status   03/15/2018 43.9 30.0 - 80.0 ng/mL Final     Lab Results   Component Value Date    [id number] 468 02/02/2018       ASSESSMENT/PLAN:  1.  Inability to walk    2. Personal history of fall    3. Parkinsonism, unspecified Parkinsonism type (H)      1.  Inability to walk: Patient uses wheelchair for mobility.  She is in therapy for ambulation and strengthening.  Resides in a group home we will continue with therapies as many comorbidities history of falls, recent UTI, and Parkinson's.    2. Falls: Weakened state has UTI and Parkinsons which is a contrectation to fallsmContinues with PT/OT and SN for management of chronic medical conditions.. Reports improvement in transfers and strength    3. Parkinsons: Neuro saw in acute care, no changes in her Parkinson's meds, see above med list., sinemet TID    Of note patient appears to have had a 10 pound weight loss according to EMR, I am requesting a re-weight today discussed weight loss with patient he did not feel like she haS  had any weight loss and her appetite remains stable.  We will recheck her weight today it could be an error in the recording.      Electronically signed by:  Archana Siddiqui CNP  This progress note was completed using Dragon software and there may be grammatical errors.

## 2021-06-20 NOTE — LETTER
"Letter by Noy Goodrich MBBS at      Author: Noy Goodrich MBBS Service: -- Author Type: --    Filed:  Encounter Date: 6/16/2020 Status: (Other)         Patient: Alison Bashir   MR Number: [id number]   YOB: 1950   Date of Visit: 6/16/2020     [facility]   Video Visit    Code Status: DNR    Alison Bashir is a 69 y.o. female who is being evaluated via a billable video visit.      The patient has been notified of following:     \"This video visit will be conducted via a call between you and your physician/provider. We have found that certain health care needs can be provided without the need for an in-person physical exam.  This service lets us provide the care you need with a video conversation.  If a prescription is necessary we can send it to the facility team.  If lab work is needed we can place an order through the facility team to have that test done at a later time.    If during the course of the call the physician/provider feels a video visit is not appropriate, you will not be charged for this service.\"     Physician/Provider has received verbal consent for a Video Visit from the patient? Yes    Patient would like the video invitation sent by: Send to: [employer]    Video Start Time: 10.10am    Chief Complaint/Reason for Visit:  Chief Complaint   Patient presents with   ? H & P       HPI:   Alison is a 69 y.o. female who is admitted to the TCU.  Patient was admitted in the hospital with chest pain.  Cardiology was consulted.  Due to underlying history of CAD and stenting she underwent a nuclear stress test which was unrevealing for any reversible ischemia  She has been discharged on medical management.    She was noted to have profound hypotension with bradycardia.  She has been started on midodrine for blood pressure management.  Unfortunately blood pressures remain low.  They recommended outpatient follow-up and her beta-blockers were held in the hospital.    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    She also has a history of falls and has been discharged to the TCU for strengthening and rehab    I have reviewed and updated the patient's Past Medical History, including history of traumatic brain injury hypertension and coronary artery disease  Social History,lives in a group home; no smoking or etoh   Family History includes cancer in her mother; father had cad and chf   and Medication List.    ALLERGIES  Blood-group specific substance; Fd and c no.5 (tartrazine); Ibuprofen; and Morphine    Review of Systems  Constitutional: Negative.  Negative for fever, chills, she has activity change, appetite change and fatigue.   Loss of 50lb in last few months  HENT: Negative for congestion and facial swelling.    Eyes: Negative for photophobia, redness and visual disturbance.   Respiratory: Negative for cough and chest tightness.    Cardiovascular: Negative for chest pain, palpitations and leg swelling.   Gastrointestinal: Negative for nausea, diarrhea, constipation, blood in stool and abdominal distention.   Genitourinary: Has chronic incontinence due to overactive bladder    Musculoskeletal: Has gait instability and falls requires assistance with ADLs  Skin: Negative.    Neurological: Negative for dizziness, tremors, syncope, weakness, light-headedness and headaches.   Hematological: Does not bruise/bleed easily.   Psychiatric/Behavioral: Negative.          Physical exam  Wt 197lb  Bp113/60 t98 p70  GENERAL: no acute distress. Cooperative in conversation.   Appears very frail and weak  HEENT: pupils are equal, round and reactive. Oral mucosa is moist and intact.  RESP:Chest symmetric. Regular  respiratory rate. No stridor.  ABD: Nondistended, soft.  EXTREMITIES: No lower extremity edema.  Range of movement in the legs especially right lower extremity is quite limited  NEURO: non focal. Alert and oriented x3.  Recall is impaired  PSYCH: within normal limits. No depression or anxiety.  SKIN: warm dry intact         Medication List:  Current Outpatient Medications   Medication Sig   ? acetaminophen (TYLENOL) 500 MG tablet Take 500 mg by mouth every 6 (six) hours as needed for pain.   ? aspirin 81 mg chewable tablet Chew 81 mg daily.   ? atorvastatin (LIPITOR) 40 MG tablet Take 40 mg by mouth every evening.    ? bacitracin 500 unit/gram ointment Apply 1 application topically as needed.   ? buPROPion (WELLBUTRIN XL) 300 MG 24 hr tablet Take 300 mg by mouth every morning.    ? carbidopa-levodopa (SINEMET)  mg per tablet Take 2 tablets by mouth 3 (three) times a day.   ? cholecalciferol, vitamin D3, 1,000 unit tablet Take 4,000 Units by mouth daily.    ? cranberry 500 mg cap Take 500 mg by mouth 2 (two) times a day.   ? cyanocobalamin (VITAMIN B-12) 100 MCG tablet Take 100 mcg by mouth daily.    ? doxycycline (VIBRA-TABS) 100 MG tablet Take 1 tablet (100 mg total) by mouth 2 (two) times a day for 7 days.   ? furosemide (LASIX) 20 MG tablet 20 mg daily.    ? ketoconazole (NIZORAL) 2 % cream 1 application daily. Apply to clean and dry stomach fold   ? lansoprazole (PREVACID) 30 MG capsule Take 30 mg by mouth daily.    ? magnesium oxide (MAG-OX) 400 mg tablet Take 400 mg by mouth 2 (two) times a day.   ? midodrine (PROAMATINE) 5 MG tablet Take 1 tablet (5 mg total) by mouth 3 (three) times a day with meals.   ? mirtazapine (REMERON) 45 MG tablet TAKE 1 TABLET BY MOUTH AT BEDTIME  *1 TOTAL FILL*   ? multivitamin (MULTIVITAMIN) per tablet Take 1 tablet by mouth daily.   ? nitroglycerin (NITROSTAT) 0.4 MG SL tablet Place 0.4 mg under the tongue every 5 (five) minutes as needed.    ? nystatin (MYCOSTATIN) powder  Apply 1 application topically daily.   ? nystatin (MYCOSTATIN) powder Apply 1 application topically 2 (two) times a day as needed.   ? polyethylene glycol (MIRALAX) 17 gram packet Take 17 g by mouth daily.   ? pramipexole (MIRAPEX) 0.25 MG tablet Take 1 tablet (0.25 mg total) by mouth at bedtime.   ? solifenacin (VESICARE) 10 MG tablet Take 10 mg by mouth daily.   ? traZODone (DESYREL) 150 MG tablet TAKE 1 TABLET BY MOUTH AT BEDTIME. *1 TOTAL FILL*   ? venlafaxine (EFFEXOR-XR) 150 MG 24 hr capsule TAKE 2 CAPSULES (300MG) BY MOUTH ONCE DAILY *1 TOTAL FILL*       Labs:  Recent Results (from the past 240 hour(s))   ECG 12 lead nursing unit performed   Result Value Ref Range    SYSTOLIC BLOOD PRESSURE 110 mmHg    DIASTOLIC BLOOD PRESSURE 58 mmHg    VENTRICULAR RATE 71 BPM    ATRIAL RATE 71 BPM    P-R INTERVAL 162 ms    QRS DURATION 160 ms    Q-T INTERVAL 448 ms    QTC CALCULATION (BEZET) 486 ms    P Axis 64 degrees    R AXIS 12 degrees    T AXIS 46 degrees    MUSE DIAGNOSIS       Normal sinus rhythm  Right bundle branch block  Abnormal ECG  When compared with ECG of 05-MAY-2014 10:14,  Right bundle branch block is now Present  Confirmed by SEE ED PROVIDER NOTE FOR, ECG INTERPRETATION ([id number]),  DELISA LANDEROS ([id number]) on 6/9/2020 7:24:34 AM     Basic metabolic panel   Result Value Ref Range    Sodium 144 136 - 145 mmol/L    Potassium 4.7 3.5 - 5.0 mmol/L    Chloride 104 98 - 107 mmol/L    CO2 31 22 - 31 mmol/L    Anion Gap, Calculation 9 5 - 18 mmol/L    Glucose 98 70 - 125 mg/dL    Calcium 9.1 8.5 - 10.5 mg/dL    BUN 29 (H) 8 - 28 mg/dL    Creatinine 0.78 0.60 - 1.10 mg/dL    GFR MDRD Af Amer >60 >60 mL/min/1.73m2    GFR MDRD Non Af Amer >60 >60 mL/min/1.73m2   B-type natriuretic peptide   Result Value Ref Range    BNP 80 0 - 120 pg/mL   Troponin I   Result Value Ref Range    Troponin I 0.01 0.00 - 0.29 ng/mL   D-dimer, Quantitative   Result Value Ref Range    D-Dimer, Quant 0.37 <=0.50 FEU ug/mL   APTT   Result  Value Ref Range    PTT 29 24 - 37 seconds   Protime-INR   Result Value Ref Range    INR 1.10 0.90 - 1.10   CBC   Result Value Ref Range    WBC 3.7 (L) 4.0 - 11.0 thou/uL    RBC 4.00 3.80 - 5.40 mill/uL    Hemoglobin 11.4 (L) 12.0 - 16.0 g/dL    Hematocrit 36.2 35.0 - 47.0 %    MCV 91 80 - 100 fL    MCH 28.5 27.0 - 34.0 pg    MCHC 31.5 (L) 32.0 - 36.0 g/dL    RDW 14.3 11.0 - 14.5 %    Platelets 146 140 - 440 thou/uL    MPV 10.7 8.5 - 12.5 fL   Lactic Acid   Result Value Ref Range    Lactic Acid 0.6 0.5 - 2.2 mmol/L   COVID-19 Virus PCR MRF    Specimen: Respiratory   Result Value Ref Range    COVID-19 VIRUS SPECIMEN SOURCE Nasopharyngeal     2019-nCOV       Test received-See reflex to IDDL test SARS CoV2 (COVID-19) Virus RT-PCR   SARS-CoV-2 (COVID-19) RT-PCR-IDDL    Specimen: Respiratory   Result Value Ref Range    SARS-CoV-2 Virus Specimen Source Nasopharyngeal     SARS-CoV-2 PCR Result NEGATIVE     SARS-COV-2 PCR COMMENT       This automated, real-time RT-PCR assay by [employer] on the [employer] Instrument   Troponin I   Result Value Ref Range    Troponin I <0.01 0.00 - 0.29 ng/mL   Troponin I   Result Value Ref Range    Troponin I <0.01 0.00 - 0.29 ng/mL   Comprehensive Metabolic Panel   Result Value Ref Range    Sodium 142 136 - 145 mmol/L    Potassium 4.1 3.5 - 5.0 mmol/L    Chloride 105 98 - 107 mmol/L    CO2 34 (H) 22 - 31 mmol/L    Anion Gap, Calculation 3 (L) 5 - 18 mmol/L    Glucose 92 70 - 125 mg/dL    BUN 27 8 - 28 mg/dL    Creatinine 0.75 0.60 - 1.10 mg/dL    GFR MDRD Af Amer >60 >60 mL/min/1.73m2    GFR MDRD Non Af Amer >60 >60 mL/min/1.73m2    Bilirubin, Total 0.3 0.0 - 1.0 mg/dL    Calcium 8.2 (L) 8.5 - 10.5 mg/dL    Protein, Total 5.2 (L) 6.0 - 8.0 g/dL    Albumin 3.0 (L) 3.5 - 5.0 g/dL    Alkaline Phosphatase 79 45 - 120 U/L    AST 12 0 - 40 U/L    ALT <9 0 - 45 U/L   HM1 (CBC with Diff)   Result Value Ref Range    WBC 4.2 4.0 - 11.0 thou/uL    RBC 3.68 (L) 3.80 - 5.40 mill/uL    Hemoglobin 10.4 (L)  12.0 - 16.0 g/dL    Hematocrit 33.7 (L) 35.0 - 47.0 %    MCV 92 80 - 100 fL    MCH 28.3 27.0 - 34.0 pg    MCHC 30.9 (L) 32.0 - 36.0 g/dL    RDW 14.6 (H) 11.0 - 14.5 %    Platelets 127 (L) 140 - 440 thou/uL    MPV 10.6 8.5 - 12.5 fL    Neutrophils % 50 50 - 70 %    Lymphocytes % 40 20 - 40 %    Monocytes % 7 2 - 10 %    Eosinophils % 3 0 - 6 %    Basophils % 0 0 - 2 %    Neutrophils Absolute 2.1 2.0 - 7.7 thou/uL    Lymphocytes Absolute 1.7 0.8 - 4.4 thou/uL    Monocytes Absolute 0.3 0.0 - 0.9 thou/uL    Eosinophils Absolute 0.1 0.0 - 0.4 thou/uL    Basophils Absolute 0.0 0.0 - 0.2 thou/uL   Echo Complete   Result Value Ref Range    LV volume diastolic 89.3 46 - 106 cm3    LV volume systolic 31.3 14 - 42 cm3    HR 79 bpm    IVSd 1.43 (!) 0.6 - 0.9 cm    LVIDd 4.68 3.8 - 5.2 cm    LVIDs 2.48 2.2 - 3.5 cm    LVOT diam 2.1 cm    LVOT mean gradient 1 mmHg    LVOT peak VTI 19.5 cm    LVOT mean tali 54 cm/s    LVOT peak tali 73.9 cm/s    LVOT peak gradient 2 mmHg    LV PWd 1.36 (!) 0.6 - 0.9 cm    MV E' lat tali 10.5 cm/s    MV E' med tali 7.54 cm/s    AR decel slope 2,260 mm/s2    AR p 1/2 time 467 ms    AR peak tali 358 cm/s    AO root 3.6 cm    LA size 3.8 cm    LA length 3.7 cm    MV decel slope 4,520 mm/s2    MV decel time 201 ms    MV P 1/2 time 59 ms    MV peak A tali 96 cm/s    MV peak E tali 90.7 cm/s    RVIDd 3.29 cm    TR peak tali 238 cm/s    LA area 2 21.4 cm2    LA area 1 14.8 cm2    BSA 1.89 m2    Hieght 71 in    Weight 2,505.6 lbs    BP 90/71 mmHg    IVS/PW ratio 1.1     TR peak gradent 22.7 mmHg    LV FS 47.0 28 - 44 %    Echo LVEF calculated 65 55 - 75 %    LA volume 72.8 mL    LV mass 262.1 g    MV area p 1/2 time 3.7 cm2    MV E/A Ratio 0.9     LVOT area 3.46 cm2    LVOT SV 67.5 cm3    LV systolic volume index 16.6 8 - 24 cm3/m2    LV diastolic volume index 47.2 29 - 61 cm3/m2    LA volume index 38.5 mL/m2    LV mass index 138.7 g/m2    LV SVi 35.7 ml/m2    TAPSE 2.1 cm    MV med E/e' ratio 12.0     MV lat E/e'  ratio 8.6     LV CO 5.3 l/min    LV Ci 2.8 l/min/m2    Height 71.0 in    Weight 157 lbs    MV Avg E/e' Ratio 10.1 cm/s    AR peak gradient 51.3 mmHg   Comprehensive Metabolic Panel   Result Value Ref Range    Sodium 141 136 - 145 mmol/L    Potassium 4.2 3.5 - 5.0 mmol/L    Chloride 105 98 - 107 mmol/L    CO2 34 (H) 22 - 31 mmol/L    Anion Gap, Calculation 2 (L) 5 - 18 mmol/L    Glucose 99 70 - 125 mg/dL    BUN 24 8 - 28 mg/dL    Creatinine 0.74 0.60 - 1.10 mg/dL    GFR MDRD Af Amer >60 >60 mL/min/1.73m2    GFR MDRD Non Af Amer >60 >60 mL/min/1.73m2    Bilirubin, Total 0.3 0.0 - 1.0 mg/dL    Calcium 7.9 (L) 8.5 - 10.5 mg/dL    Protein, Total 5.1 (L) 6.0 - 8.0 g/dL    Albumin 2.8 (L) 3.5 - 5.0 g/dL    Alkaline Phosphatase 77 45 - 120 U/L    AST 11 0 - 40 U/L    ALT <9 0 - 45 U/L   HM1 (CBC with Diff)   Result Value Ref Range    WBC 4.0 4.0 - 11.0 thou/uL    RBC 3.85 3.80 - 5.40 mill/uL    Hemoglobin 10.8 (L) 12.0 - 16.0 g/dL    Hematocrit 35.6 35.0 - 47.0 %    MCV 93 80 - 100 fL    MCH 28.1 27.0 - 34.0 pg    MCHC 30.3 (L) 32.0 - 36.0 g/dL    RDW 14.6 (H) 11.0 - 14.5 %    Platelets 124 (L) 140 - 440 thou/uL    MPV 10.7 8.5 - 12.5 fL    Neutrophils % 57 50 - 70 %    Lymphocytes % 33 20 - 40 %    Monocytes % 6 2 - 10 %    Eosinophils % 3 0 - 6 %    Basophils % 0 0 - 2 %    Neutrophils Absolute 2.3 2.0 - 7.7 thou/uL    Lymphocytes Absolute 1.3 0.8 - 4.4 thou/uL    Monocytes Absolute 0.3 0.0 - 0.9 thou/uL    Eosinophils Absolute 0.1 0.0 - 0.4 thou/uL    Basophils Absolute 0.0 0.0 - 0.2 thou/uL   Urine culture    Specimen: Urine   Result Value Ref Range    Culture >100,000 col/ml Morganella morganii (!)     Culture 10,000-50,000 col/ml Escherichia coli (!)        Susceptibility    Escherichia coli - KULDEEP     Amoxicillin + Clavulanate <=4/2 Sensitive      Ampicillin <=4 Sensitive      Cefazolin <=1 Sensitive      Cefepime <=1 Sensitive      Ceftriaxone <=1 Sensitive      Ciprofloxacin >2 Resistant      Gentamicin <=2  Sensitive      Levofloxacin >4 Resistant      Meropenem <=0.25 Sensitive      Nitrofurantoin <=16 Sensitive      Tetracycline <=2 Sensitive      Tobramycin <=2 Sensitive      Trimethoprim + Sulfamethoxazole <=0.5 Sensitive     Morganella morganii - KULDEEP     Amoxicillin + Clavulanate >16/8 Resistant      Ampicillin >16 Resistant      Cefazolin >16 Resistant      Cefepime <=1 Sensitive      Ceftriaxone <=1 Sensitive      Ciprofloxacin >2 Resistant      Gentamicin <=2 Sensitive      Levofloxacin >4 Resistant      Meropenem <=0.25 Sensitive      Nitrofurantoin 64 Resistant      Tetracycline <=2 Sensitive      Tobramycin <=2 Sensitive      Trimethoprim + Sulfamethoxazole >2/38 Resistant    Comprehensive Metabolic Panel   Result Value Ref Range    Sodium 142 136 - 145 mmol/L    Potassium 3.8 3.5 - 5.0 mmol/L    Chloride 108 (H) 98 - 107 mmol/L    CO2 31 22 - 31 mmol/L    Anion Gap, Calculation 3 (L) 5 - 18 mmol/L    Glucose 99 70 - 125 mg/dL    BUN 25 8 - 28 mg/dL    Creatinine 0.77 0.60 - 1.10 mg/dL    GFR MDRD Af Amer >60 >60 mL/min/1.73m2    GFR MDRD Non Af Amer >60 >60 mL/min/1.73m2    Bilirubin, Total 0.3 0.0 - 1.0 mg/dL    Calcium 7.8 (L) 8.5 - 10.5 mg/dL    Protein, Total 4.9 (L) 6.0 - 8.0 g/dL    Albumin 2.7 (L) 3.5 - 5.0 g/dL    Alkaline Phosphatase 82 45 - 120 U/L    AST 10 0 - 40 U/L    ALT <9 0 - 45 U/L   HM1 (CBC with Diff)   Result Value Ref Range    WBC 4.1 4.0 - 11.0 thou/uL    RBC 3.64 (L) 3.80 - 5.40 mill/uL    Hemoglobin 10.4 (L) 12.0 - 16.0 g/dL    Hematocrit 34.0 (L) 35.0 - 47.0 %    MCV 93 80 - 100 fL    MCH 28.6 27.0 - 34.0 pg    MCHC 30.6 (L) 32.0 - 36.0 g/dL    RDW 14.6 (H) 11.0 - 14.5 %    Platelets 127 (L) 140 - 440 thou/uL    MPV 10.8 8.5 - 12.5 fL    Neutrophils % 48 (L) 50 - 70 %    Lymphocytes % 40 20 - 40 %    Monocytes % 8 2 - 10 %    Eosinophils % 3 0 - 6 %    Basophils % 0 0 - 2 %    Neutrophils Absolute 2.0 2.0 - 7.7 thou/uL    Lymphocytes Absolute 1.7 0.8 - 4.4 thou/uL    Monocytes  Absolute 0.3 0.0 - 0.9 thou/uL    Eosinophils Absolute 0.1 0.0 - 0.4 thou/uL    Basophils Absolute 0.0 0.0 - 0.2 thou/uL   ECG 12 lead MUSE   Result Value Ref Range    SYSTOLIC BLOOD PRESSURE      DIASTOLIC BLOOD PRESSURE      VENTRICULAR RATE 70 BPM    ATRIAL RATE 70 BPM    P-R INTERVAL 162 ms    QRS DURATION 162 ms    Q-T INTERVAL 438 ms    QTC CALCULATION (BEZET) 473 ms    P Axis 51 degrees    R AXIS -11 degrees    T AXIS 26 degrees    MUSE DIAGNOSIS       Sinus rhythm with occasional Premature ventricular complexes  Right bundle branch block  Abnormal ECG  When compared with ECG of 08-JUN-2020 10:29,  Premature ventricular complexes are now Present  Confirmed by TAWNYA BEAULIEU MD LOC:[facility] ([id number]) on 6/11/2020 1:28:16 PM     NM Pharmacologic Stress Test   Result Value Ref Range    Pharmacologic Protocol  Lexiscan     Test Type Pharmacological     Baseline HR 81     Baseline Systolic      Baseline Diastolic BP 53     Last Stress HR 87     Last Stress Systolic      Last Stress Diastolic BP 53     Target      PERCENT HR 85%     ST Deviation Elevation II 0.1mm     Deviation Time V2 -0.6mm     ST Elevation Amount II 2.0mm     ST Deviation Amount he V1 -2.1mm     Final Resting /55     Final Resting HR 86     Max Treadmill Speed 0.0     Max Treadmill Grade 0.0     Peak Systolic /44     Peak Diastolic /55     Max HR 89     Stress Phase Resting     Stress Resting Pt Position SUPINE     Current HR 82     Current /53     Stress Phase Stress     Stage Minute EXE 00:00     Exercise Stage STAGE 2     Current HR 81     Current /53     Stress Phase Stress     Stage Minute EXE 01:00     Exercise Stage STAGE 3     Current HR 80     Current /53     Stress Phase Stress     Stage Minute EXE 01:36     Exercise Stage STAGE 3     Current HR 85     Current /44     Stress Phase Stress     Stage Minute EXE 02:00     Exercise Stage STAGE 4     Current HR 85     Current /44      Stress Phase Stress     Stage Minute EXE 02:42     Exercise Stage STAGE 4     Current HR 86     Current BP 98/52     Stress Phase Stress     Stage Minute EXE 03:00     Exercise Stage STAGE 5     Current HR 87     Current BP 98/52     Stress Phase Stress     Stage Minute EXE 03:25     Exercise Stage STAGE 5     Current HR 87     Current /53     Stress Phase Stress     Stage Minute EXE 04:00     Exercise Stage STAGE 6     Current HR 87     Current /53     Stress Phase Stress     Stage Minute EXE 04:00     Exercise Stage STAGE 6     Current HR 87     Current /53     Stress Phase Recovery     Stage Minute REC 00:31     Exercise Stage Recovery     Current HR 88     Current /51     Stress Phase Recovery     Stage Minute REC 00:59     Exercise Stage Recovery     Current HR 87     Current /51     Stress Phase Recovery     Stage Minute REC 01:59     Exercise Stage Recovery     Current HR 86     Current /51     Stress Phase Recovery     Stage Minute REC 02:06     Exercise Stage Recovery     Current HR 86     Current /55     Stress Phase Recovery     Stage Minute REC 02:59     Exercise Stage Recovery     Current HR 88     Current /55     Stress Phase Recovery     Stage Minute REC 03:59     Exercise Stage Recovery     Current HR 86     Current /55     Stress Phase Recovery     Stage Minute REC 04:01     Exercise Stage Recovery     Current HR 86     Current /55     Calculated Percent HR 59 %    Rate Pressure Product 8,989.0     Left Ventricular EF 69 %   Basic Metabolic Panel   Result Value Ref Range    Sodium 142 136 - 145 mmol/L    Potassium 4.0 3.5 - 5.0 mmol/L    Chloride 106 98 - 107 mmol/L    CO2 32 (H) 22 - 31 mmol/L    Anion Gap, Calculation 4 (L) 5 - 18 mmol/L    Glucose 90 70 - 125 mg/dL    Calcium 8.0 (L) 8.5 - 10.5 mg/dL    BUN 25 8 - 28 mg/dL    Creatinine 0.69 0.60 - 1.10 mg/dL    GFR MDRD Af Amer >60 >60 mL/min/1.73m2    GFR MDRD Non Af Amer >60 >60  mL/min/1.73m2   HM2(CBC w/o Differential)   Result Value Ref Range    WBC 3.6 (L) 4.0 - 11.0 thou/uL    RBC 3.36 (L) 3.80 - 5.40 mill/uL    Hemoglobin 9.4 (L) 12.0 - 16.0 g/dL    Hematocrit 31.4 (L) 35.0 - 47.0 %    MCV 94 80 - 100 fL    MCH 28.0 27.0 - 34.0 pg    MCHC 29.9 (L) 32.0 - 36.0 g/dL    RDW 14.7 (H) 11.0 - 14.5 %    Platelets 112 (L) 140 - 440 thou/uL    MPV 11.0 8.5 - 12.5 fL   COVID-19 Virus PCR MRF    Specimen: Respiratory   Result Value Ref Range    COVID-19 VIRUS SPECIMEN SOURCE Nasopharyngeal     2019-nCOV       Test received-See reflex to IDDL test SARS CoV2 (COVID-19) Virus RT-PCR   SARS-CoV-2 (COVID-19) RT-PCR-IDDL    Specimen: Respiratory   Result Value Ref Range    SARS-CoV-2 Virus Specimen Source Nasopharyngeal     SARS-CoV-2 PCR Result NEGATIVE     SARS-COV-2 PCR COMMENT       The [employer] COVID-19 direct PCR assay by [employer] on the [employer] instrument       Assessment/Plan:  -History of chest pain/shortness of breath currently resolved  -Underlying history of coronary artery disease with stenting on medical management  -Profound hypotension with orthostatic hypotension  - Bradycardia  -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  She was admitted to the hospital and had extensive evaluation both by cardiology and neurology.  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  Mood and behaviors have been stable.  She is currently on Effexor Wellbutrin will monitor mood closely.  She is also had progressive weight loss and will be monitored for any dysphagia she is on Remeron.  Overall she is bedridden and remains a high fall risk.  She does remain low and she is quite on a low-dose of metoprolol currently.  We will monitor her blood pressures and intake if she is continuing to shows  Continue with her PT OT and rehab  Neurology has recommended palliative approach in light of her overall guarded prognosis  Video-Visit Details    Type of service:  Video Visit    Video End Time (time video stopped): 10.35am    Originating Location (pt. Location):[facility] [[id number]]    Distant Location (provider location):  [facility]     Mode of Communication:  Zoom Video Conference        NERY Sosa

## 2021-06-20 NOTE — LETTER
Letter by Dorina Escalera RN at      Author: Dorina Escalera RN Service: -- Author Type: --    Filed:  Encounter Date: 6/15/2020 Status: (Other)       6/15/2020        Alison Bsahir  [address]  [place] [address]    This letter provides a written record that you were tested for COVID-19 on 6/15/20.     Your result was negative.    This means that we didnt find the virus that causes COVID-19 in your sample. A test may show negative when you do actually have the virus. This can happen when the virus is in the early stages of infection, before you feel illness symptoms.    Even if you dont have symptoms, they may still appear. For safety, its very important to follow these rules.    Keep yourself away from others (self-isolation):      Stay home. Dont go to work, school or anywhere else.     Stay in your own room (and use your own bathroom), if you can.    Stay away from others in your home. No hugging, kissing or shaking hands. No visitors.    Clean high touch surfaces often (doorknobs, counters, handles, etc.). Use a household cleaning spray or wipes.    Cover your mouth and nose with a mask, tissue or washcloth to avoid spreading germs.    Wash your hands and face often with soap and water.    Stay in self-isolation until you meet ALL of the guidelines below:    1. You have had no fever for at least 72 hours (that is 3 full days of no fever without the use of medicine that reduces fevers), AND  2. other symptoms (such as cough, shortness of breath) have gotten better, AND  3. at least 10 days have passed since your symptoms first appeared.    Going back to work  Check with your employer for any guidelines to follow for going back to work.    Employers: This document serves as formal notice that your employee tested negative for COVID-19, as of the testing date shown above.    For questions regarding this letter or your Negative COVID-19 result, call [phone] between 8A to 6:30P (M-F) and 10A to  6:30P (weekends).

## 2021-06-20 NOTE — LETTER
Letter by Noy Goodrich MBBS at      Author: Noy Goodrich MBBS Service: -- Author Type: --    Filed:  Encounter Date: 9/22/2020 Status: (Other)         Patient: Alison Bashir   MR Number: [id number]   YOB: 1950   Date of Visit: 9/22/2020       [facility] Admission note      Patient: Alison Bashir  MRN: [id number]  Date of Service: 9/22/2020      [facility] [[id number]]  Reason for Visit     Chief Complaint   Patient presents with   ? Review Of Multiple Medical Conditions   F/U ONWEAKNESS/ WT LOSS    Code Status     DNR /DNI    Assessment     -Acute fall in the group home  - Acute subcutaneous hematoma along the lateral margin of the right hip secondary to fall  -Pain management with patient requesting better management  -Underlying history of Parkinson's with bilateral upper extremity tremors  -Underlying history of Alzheimer's disease  -History of hypertension with hypotension  -CAD  -Depression  -History of overactive bladder  -Acute UTI cultures growing E. coli and Morganella treated now with cefdinir  -Generalized debilitation with recurrent falls requiring lengthy TCU stays  -History of TBI      Plan     Patient has been admitted to the TCU.  She has a history of recurrent falls and recently had a prolonged lengthy stay in the TCU and was discharged back to her group home.  Unfortunately she readmitted within a week with another fall.  At baseline she is profoundly debilitated and wheelchair bound and poorly ambulatory.  Unfortunately not much progress noted and she remains wheelchair-bound.  She is also high fall risk for multiple falls though none reported recently.  She has underlying history of Parkinson's disease.  Pain management reviewed with her with recent history of hematoma for which she has been using tramadol pretty much every 6 hours.  She is still rating her pain is high  Recheck hemoglobin has been stable with a last hemoglobin being 9.9  Recheck BMP  has been stable though her sodium is slightly elevated at 145 she was again advised to push some more fluids  Continue with the PT OT discharge plan is to go back to her group home    History     Patient is a very pleasant 70 y.o. female who is admitted to TCU  Patient presented to the hospital after she fell.  At baseline she has frequent falls in her group home.  Unfortunately she continues to have gait instability and remains a falls risk.  She is wheelchair-bound currently    She also has underlying history of Parkinson's which has been slowly getting worse.  Neurology was consulted during hospitalization they feel that this is a natural progression of the disease they have not recommended any medication changes as yet  At baseline she is at a significant decline with underlying history of Parkinson's disease.  She has seen neurology with no new recommendations.  She continues to have significant resting tremors and hypertonicity noted    She continues to complain of right thigh pain unfortunately that is not responsive to topical treatments.  MRI was negative for any fractures but did show a subcutaneous hematoma.  She has been taken off Voltaren gel at her request.  She has been icing her hip.  Currently she has been requesting tramadol around-the-clock she is not ready for any weaning she told me her pain in the right hip is still quite high    She also has baseline cognitive impairment due to Alzheimer's dementia with some intermittent agitation today however she appears to be quite clear   SLUMS 18/30  Mood has been pleasant and stable with no behavioral issues reported by staff    Had UTI in the hospital cultures grew Morganella and E. coli she was given ceftriaxone and is on oral cefdinir in the TCU  She also has a history of orthostatic hypotension and has been on midodrine recently blood pressures have been stable but staff did report when they tried to stand her up she did immediately drops her blood  pressure she has been given JAMIA hose for lymphedema concerns  Overall has been doing well    Past Medical History     Active Ambulatory (Non-Hospital) Problems    Diagnosis   ? Hematoma of right hip   ? Urinary tract infection   ? Inability to walk   ? Parkinsonism, unspecified Parkinsonism type (H)   ? Fall, initial encounter   ? Magnesium deficiency   ? Adjustment insomnia   ? Acute hypotension   ? RBBB   ? Chest pain, unspecified   ? Leukopenia   ? Gastroesophageal reflux disease with esophagitis   ? Personal history of fall   ? Chest pain   ? Chronic pain syndrome   ? Traumatic brain injury (H)   ? Stented coronary artery   ? RLS (restless legs syndrome)   ? Overactive bladder   ? HTN (hypertension)   ? Depression   ? CAD (coronary artery disease)   ? Alzheimer disease (H)   ? Orthostatic hypotension   ? Anemia   ? Cognitive impairment   ? Hip fracture (H)   ? ACP (advance care planning)   ? Constipation   ? Essential hypertension   ? Neuroleptic signed 8/29/16     Past Medical History:   Diagnosis Date   ? Acute blood loss anemia    ? Alzheimer disease (H)    ? CAD (coronary artery disease)    ? Chronic pain syndrome    ? Dementia (H)    ? Depression    ? Depression    ? Hip fracture, right (H) 12/21/2017   ? HTN (hypertension)    ? Kidney stone    ? Overactive bladder    ? PMB (postmenopausal bleeding)    ? RLS (restless legs syndrome)    ? Spine pain    ? Stented coronary artery    ? Traumatic brain injury (H)    ? Unsteady gait    ? UTI (lower urinary tract infection)        Past Social History     Reviewed, and she  reports that she has never smoked. She has never used smokeless tobacco. She reports that she does not drink alcohol or use drugs.    Family History     Reviewed, and family history includes Cancer in her mother; Coronary artery disease in her father; Heart attack in her father; Heart failure in her father.    Medication List   Post Discharge Medication Reconciliation Status: discharge  medications reconciled and changed, per note/orders   Current Outpatient Medications on File Prior to Visit   Medication Sig Dispense Refill   ? acetaminophen (TYLENOL) 500 MG tablet Take 2 tablets (1,000 mg total) by mouth 3 (three) times a day.  0   ? aspirin 81 mg chewable tablet Chew 81 mg daily.     ? atorvastatin (LIPITOR) 40 MG tablet Take 40 mg by mouth every evening.      ? buPROPion (WELLBUTRIN XL) 300 MG 24 hr tablet TAKE 1 TABLET BY MOUTH ONCE DAILY. 31 tablet 0   ? carbidopa-levodopa (SINEMET)  mg per tablet Take 2 tablets by mouth 3 (three) times a day.     ? cholecalciferol, vitamin D3, 1,000 unit tablet Take 4,000 Units by mouth daily.      ? cyanocobalamin (VITAMIN B-12) 100 MCG tablet Take 100 mcg by mouth daily.      ? lansoprazole (PREVACID) 30 MG capsule Take 30 mg by mouth daily.      ? magnesium oxide (MAG-OX) 400 mg tablet Take 400 mg by mouth 2 (two) times a day.     ? metoprolol succinate (TOPROL-XL) 12.5 MG Take 12.5 mg by mouth daily.     ? midodrine (PROAMATINE) 2.5 MG tablet Take 7.5 mg by mouth 3 (three) times a day with meals.     ? mirtazapine (REMERON) 45 MG tablet TAKE 1 TABLET BY MOUTH AT BEDTIME  *1 TOTAL FILL* 30 tablet 3   ? multivitamin (MULTIVITAMIN) per tablet Take 1 tablet by mouth daily.     ? nitroglycerin (NITROSTAT) 0.4 MG SL tablet Place 0.4 mg under the tongue every 5 (five) minutes as needed.      ? polyethylene glycol (MIRALAX) 17 gram packet Take 17 g by mouth daily.     ? pramipexole (MIRAPEX) 0.5 MG tablet Take 0.5 mg by mouth at bedtime.     ? solifenacin (VESICARE) 10 MG tablet Take 10 mg by mouth daily.     ? traZODone (DESYREL) 150 MG tablet TAKE 1 TABLET BY MOUTH AT BEDTIME. *1 TOTAL FILL* 31 tablet 3   ? venlafaxine (EFFEXOR-XR) 150 MG 24 hr capsule TAKE 2 CAPSULES (300MG) BY MOUTH ONCE DAILY *1 TOTAL FILL* 60 capsule 3     No current facility-administered medications on file prior to visit.        Allergies     Allergies   Allergen Reactions   ?  Blood-Group Specific Substance Other (See Comments)     Patient has anti-K. Blood product orders maybe delayed. Draw one red top and 2 purple top tubes for all Type and Screen/Red blood Cell product orders   ? Fd And C No.5 (Tartrazine)      GI UPSET   ? Ibuprofen Nausea And Vomiting   ? Morphine Rash     swelling       Review of Systems   A comprehensive review of 14 systems was done. Pertinent findings noted here and in history of present illness. All the rest negative.  Constitutional: Negative.  Negative for fever, chills, she has  activity change, appetite change and fatigue.   Patient has had significant weight loss recently also  HENT: Negative for congestion and facial swelling.    Eyes: Negative for photophobia, redness and visual disturbance.   Respiratory: Negative for cough and chest tightness.    Cardiovascular: Negative for chest pain, palpitations and has some leg swelling.   Gastrointestinal: Negative for nausea, diarrhea, constipation, blood in stool and abdominal distention.   Genitourinary: Negative.    Musculoskeletal: Negative.  She is reporting multiple falls pain in her right thigh  Skin: Negative.    Neurological: Negative for dizziness, tremors, syncope, weakness, light-headedness and headaches.   Hematological: Does not bruise/bleed easily.   Psychiatric/Behavioral: Negative.  Recall is impaired but patient is quite pleasant today      Physical Exam     Recent Vitals 9/17/2020   Height -   Weight 166 lbs 6 oz   BSA (m2) 1.93 m2   /79   Pulse 61   Temp 96.4   Temp src -   SpO2 -   Some recent data might be hidden       Constitutional: Oriented to person, place, and time and appears well-developed.   Appears frail and weak  HEENT:  Normocephalic and atraumatic.  Eyes: Conjunctivae and EOM are normal. Pupils are equal, round, and reactive to light. No discharge.  No scleral icterus. Nose normal. Mouth/Throat: Oropharynx is clear and moist. No oropharyngeal exudate.    NECK: Normal range  of motion. Neck supple. No JVD present. No tracheal deviation present. No thyromegaly present.   CARDIOVASCULAR: Normal rate, regular rhythm and intact distal pulses.  Exam reveals no gallop and no friction rub.  Systolic murmur present.  PULMONARY: Effort normal and breath sounds normal. No respiratory distress.No Wheezing or rales.  ABDOMEN: Soft. Bowel sounds are normal. No distension and no mass.  There is no tenderness. There is no rebound and no guarding. No HSM.  MUSCULOSKELETAL: Normal range of motion. 1+ edema and no tenderness. Mild kyphosis, tenderness.  On her right thigh even to light touch  LYMPH NODES: Has no cervical, supraclavicular, axillary and groin adenopathy.   NEUROLOGICAL: Alert and oriented to person, place, and time. No cranial nerve deficit.  Normal muscle tone. Coordination normal  Resting tremors bilateral upper extremity noted.   GENITOURINARY: Deferred exam.  SKIN: Skin is warm and dry. No rash noted. No erythema. No pallor.   EXTREMITIES: No cyanosis, no clubbing, 1+ edema. No Deformity.  PSYCHIATRIC: Normal mood, affect and behavior.  Recall is impaired      Lab Results     Results for orders placed or performed in visit on 08/31/20   Basic Metabolic Panel   Result Value Ref Range    Sodium 145 136 - 145 mmol/L    Potassium 3.8 3.5 - 5.0 mmol/L    Chloride 107 98 - 107 mmol/L    CO2 31 22 - 31 mmol/L    Anion Gap, Calculation 7 5 - 18 mmol/L    Glucose 80 70 - 125 mg/dL    Calcium 8.5 8.5 - 10.5 mg/dL    BUN 30 (H) 8 - 28 mg/dL    Creatinine 0.72 0.60 - 1.10 mg/dL    GFR MDRD Af Amer >60 >60 mL/min/1.73m2    GFR MDRD Non Af Amer >60 >60 mL/min/1.73m2              NERY Sosa

## 2021-06-20 NOTE — LETTER
"Letter by Archana Siddiqui CNP at      Author: Archana Siddiqui CNP Service: -- Author Type: --    Filed:  Encounter Date: 2020 Status: (Other)         Patient: Alison Bashir   MR Number: [id number]   YOB: 1950   Date of Visit: 2020       [facility]      NAME:  Alison Bashir             :  1950    MRN: [id number]    CODE STATUS:  DNR/DNI    FACILITY: [facility] [[id number]]         CHIEF COMPLAIN/REASON FOR VISIT:  Chief Complaint   Patient presents with   ? Review Of Multiple Medical Conditions     initiate care       HISTORY OF PRESENT ILLNESS: Alison Bashir is a 70 y.o. female being seen today to initiate care with MCS. She is admitted to the TCU s/p fall  [facility]. Per herEMR \" with a history of frequent falls and unsteady gait.  She fell at the group home while she was trying to use her walker.  No head injuries and no chest pain prior to fall.  She had some slight pain in the hip and was unable to get up.  Generalized weakness with difficulty moving.    Recurrent falls.  Suspect due to symptoms of parkinsonism.  Infection possibly adding to the weakness/ UA looks infected.    Patient was seen by neurology, no change in Parkinson medications recommended. PT/OT recommended TCU.\"  She reports stable pain, bowels moving an sleeping well. We reviewed her med regime, TCU routines as well as ACP today.    Allergies   Allergen Reactions   ? Blood-Group Specific Substance Other (See Comments)     Patient has anti-K. Blood product orders maybe delayed. Draw one red top and 2 purple top tubes for all Type and Screen/Red blood Cell product orders   ? Fd And C No.5 (Tartrazine)      GI UPSET   ? Ibuprofen Nausea And Vomiting   ? Morphine Rash     swelling   :     Current Outpatient Medications   Medication Sig   ? acetaminophen (TYLENOL) 500 MG tablet Take 2 tablets (1,000 mg total) by mouth 3 (three) times a day.   ? aspirin 81 mg " chewable tablet Chew 81 mg daily.   ? atorvastatin (LIPITOR) 40 MG tablet Take 40 mg by mouth every evening.    ? buPROPion (WELLBUTRIN XL) 300 MG 24 hr tablet Take 300 mg by mouth every morning.    ? carbidopa-levodopa (SINEMET)  mg per tablet Take 2 tablets by mouth 3 (three) times a day.   ? cefdinir (OMNICEF) 300 MG capsule Take 1 capsule (300 mg total) by mouth 2 times a day at 6:00 am and 4:00 pm for 6 days.   ? cholecalciferol, vitamin D3, 1,000 unit tablet Take 4,000 Units by mouth daily.    ? cyanocobalamin (VITAMIN B-12) 100 MCG tablet Take 100 mcg by mouth daily.    ? lansoprazole (PREVACID) 30 MG capsule Take 30 mg by mouth daily.    ? magnesium oxide (MAG-OX) 400 mg tablet Take 400 mg by mouth 2 (two) times a day.   ? metoprolol succinate (TOPROL-XL) 12.5 MG Take 12.5 mg by mouth daily.   ? midodrine (PROAMATINE) 2.5 MG tablet Take 7.5 mg by mouth 3 (three) times a day with meals.   ? mirtazapine (REMERON) 45 MG tablet TAKE 1 TABLET BY MOUTH AT BEDTIME  *1 TOTAL FILL*   ? multivitamin (MULTIVITAMIN) per tablet Take 1 tablet by mouth daily.   ? nitroglycerin (NITROSTAT) 0.4 MG SL tablet Place 0.4 mg under the tongue every 5 (five) minutes as needed.    ? polyethylene glycol (MIRALAX) 17 gram packet Take 17 g by mouth daily.   ? pramipexole (MIRAPEX) 0.5 MG tablet Take 0.5 mg by mouth at bedtime.   ? solifenacin (VESICARE) 10 MG tablet Take 10 mg by mouth daily.   ? traZODone (DESYREL) 150 MG tablet TAKE 1 TABLET BY MOUTH AT BEDTIME. *1 TOTAL FILL*   ? venlafaxine (EFFEXOR-XR) 150 MG 24 hr capsule TAKE 2 CAPSULES (300MG) BY MOUTH ONCE DAILY *1 TOTAL FILL*         REVIEW OF SYSTEMS:    Currently, no fever, chills, or rigors. Does not have any visual or hearing problems. Denies any chest pain, headaches, palpitations, lightheadedness, dizziness, shortness of breath, or cough. Appetite is good. Denies any GERD symptoms. Denies any difficulty with swallowing, nausea, or vomiting.  Denies any abdominal  pain, diarrhea or constipation. Denies any urinary symptoms. No insomnia. No active bleeding. No rash.       PHYSICAL EXAMINATION:  Vitals:    08/31/20 0644   BP: 99/63   Pulse: 80   Temp: 99  F (37.2  C)   Weight: 174 lb 8 oz (79.2 kg)         GENERAL: Awake, Alert, oriented x3, not in any form of acute distress, answers questions appropriately, follows simple commands, conversant [place]  HEENT: Head is normocephalic with normal hair distribution. No evidence of trauma. Ears: No acute purulent discharge. Eyes: Conjunctivae pink with no scleral jaundice. Nose: Normal mucosa and septum. NECK: Supple with no cervical or supraclavicular lymphadenopathy. Trachea is midline.   SKIN: Warm and dry, no erythema noted, no rashes or lesions.  NEUROLOGICAL: The patient is oriented to person, place and time. Strength and sensation are grossly intact. Face is symmetric.      Due to C 19 Social distancing performed during assessment       LABS:    Lab Results   Component Value Date    WBC 2.9 (L) 08/26/2020    HGB 9.3 (L) 08/27/2020    HCT 29.6 (L) 08/26/2020    MCV 95 08/26/2020     (L) 08/26/2020       Results for orders placed or performed during the hospital encounter of 08/25/20   Basic Metabolic Panel   Result Value Ref Range    Sodium 144 136 - 145 mmol/L    Potassium 4.2 3.5 - 5.0 mmol/L    Chloride 108 (H) 98 - 107 mmol/L    CO2 32 (H) 22 - 31 mmol/L    Anion Gap, Calculation 4 (L) 5 - 18 mmol/L    Glucose 96 70 - 125 mg/dL    Calcium 8.0 (L) 8.5 - 10.5 mg/dL    BUN 29 (H) 8 - 28 mg/dL    Creatinine 0.72 0.60 - 1.10 mg/dL    GFR MDRD Af Amer >60 >60 mL/min/1.73m2    GFR MDRD Non Af Amer >60 >60 mL/min/1.73m2           Lab Results   Component Value Date    HGBA1C 5.4 09/29/2011     Vitamin D, Total (25-Hydroxy)   Date Value Ref Range Status   03/15/2018 43.9 30.0 - 80.0 ng/mL Final     Lab Results   Component Value Date    YRYOXIVW83 468 02/02/2018       ASSESSMENT/PLAN:  1. Parkinsonism, unspecified Parkinsonism type  (H)    2. Personal history of fall    3. Cognitive impairment    4. ACP (advance care planning)      1. Parkinsons: Neuro saw in acute care, no changes in her Parkinson's meds, see above med list.    2. Falls: Weakened state has UTI and Parkinsons which is a contrectation to falls. [place] 6 day oral abx, therapies for PT/OT and SN for management of chronic medical conditions.    3. Cognitive impairment: Therapy will complete cognition testing. She is alert and oriented to PPT and would appreciate therapies assessment.    4. ACP: DNR/DNI discussion with pt as well as signing POLST today.      Electronically signed by:  Archana Siddiqui CNP  This progress note was completed using Dragon software and there may be grammatical errors.      45  minutes spent of which greater than 55 % was face to face communication with the patient about above plan of care including MCS role in her care,med regime, TCU routines as well as ACP today.

## 2021-06-20 NOTE — LETTER
Letter by Noy Goodrich MBBS at      Author: oNy Goodrich MBBS Service: -- Author Type: --    Filed:  Encounter Date: 9/8/2020 Status: (Other)         Patient: Alison Bashir   MR Number: [id number]   YOB: 1950   Date of Visit: 9/8/2020       [facility] Admission note      Patient: Alison Bashir  MRN: [id number]  Date of Service: 9/8/2020      [facility] [[id number]]  Reason for Visit     Chief Complaint   Patient presents with   ? Review Of Multiple Medical Conditions   F/U ON PAIN; LEG SWELLING    Code Status     DNR /DNI    Assessment     -Acute fall in the group home  - Acute subcutaneous hematoma along the lateral margin of the right hip secondary to fall  -Pain management with patient requesting better management  -Underlying history of Parkinson's with bilateral upper extremity tremors  -Underlying history of Alzheimer's disease  -History of hypertension with hypotension  -CAD  -Depression  -History of overactive bladder  -Acute UTI cultures growing E. coli and Morganella treated now with cefdinir  -Generalized debilitation with recurrent falls requiring lengthy TCU stays  -History of TBI      Plan     Patient has been admitted to the TCU.  She has a history of recurrent falls and recently had a prolonged lengthy stay in the TCU and was discharged back to her group home.  Unfortunately she readmitted within a week with another fall.  At baseline she remains poorly ambulatory and wheelchair-bound.  In light of her multiple falls suspect she may be going home at a wheelchair level again.  Pain concerns with her hematoma reviewed she is doing better.  Her pain is now down to a 3 score out of 10.  Voltaren was discontinued at her request she is on tramadol as needed along with Tylenol.  Blood pressures have been stable with underlying history of orthostatic hypotension but again she is not ambulating very well.  UTI concerns have resolved  Monitor lymphedema concerns so  far weights appear to be stable.  Suspect this is dependent edema continue with her compression hose    History     Patient is a very pleasant 70 y.o. female who is admitted to TCU  Patient presented to the hospital after she fell.  At baseline she has frequent falls in her group home.  She had recently a prolonged lengthy stay at the [facility] because of her falls and was discharged home.  Unfortunately she presented after a fall and has been discharged back to the TCU.  Unfortunately with profound gait instability she remains wheelchair-bound and poorly ambulatory as yet  She also has underlying history of Parkinson's which has been slowly getting worse.  Neurology was consulted during hospitalization they feel that this is a natural progression of the disease they have not recommended any medication changes as yet    She continues to complain of right thigh pain unfortunately that is not responsive to topical treatments.  MRI was negative for any fractures but did show a subcutaneous hematoma.  She has been taken off Voltaren gel at her request.  She has been icing her hip.  Reporting pain is improved and slowly trended down to a 3/10 recently.  She is on Tylenol tramadol was added as needed    She also has baseline cognitive impairment due to Alzheimer's dementia with some intermittent agitation today however she appears to be quite clear and cognitively alert with good recall of recent events  SLUMS 18/30  Mood and behaviors have been pleasant she is cooperative with her cares.  She is overall stable with no significant issue    Had UTI in the hospital cultures grew Morganella and E. coli she was given ceftriaxone and is on oral cefdinir in the TCU  She has a history of orthostatic hypotension is on midodrine however had also hypertension and is on metoprolol currently.  Blood pressures have been stable but again she is not ambulating either.  She has noticed to have developed leg swelling and edema  this is something new she is not had it before weights however are stable at 169 pounds her Tubigrip stockings have been ordered for her    Past Medical History     Active Ambulatory (Non-Hospital) Problems    Diagnosis   ? Hematoma of right hip   ? Urinary tract infection   ? Inability to walk   ? Parkinsonism, unspecified Parkinsonism type (H)   ? Fall, initial encounter   ? Magnesium deficiency   ? Adjustment insomnia   ? Acute hypotension   ? RBBB   ? Chest pain, unspecified   ? Leukopenia   ? Gastroesophageal reflux disease with esophagitis   ? Personal history of fall   ? Chest pain   ? Chronic pain syndrome   ? Traumatic brain injury (H)   ? Stented coronary artery   ? RLS (restless legs syndrome)   ? Overactive bladder   ? HTN (hypertension)   ? Depression   ? CAD (coronary artery disease)   ? Alzheimer disease (H)   ? Orthostatic hypotension   ? Anemia   ? Cognitive impairment   ? Hip fracture (H)   ? ACP (advance care planning)   ? Constipation   ? Essential hypertension   ? Neuroleptic signed 8/29/16     Past Medical History:   Diagnosis Date   ? Acute blood loss anemia    ? Alzheimer disease (H)    ? CAD (coronary artery disease)    ? Chronic pain syndrome    ? Dementia (H)    ? Depression    ? Depression    ? Hip fracture, right (H) 12/21/2017   ? HTN (hypertension)    ? Kidney stone    ? Overactive bladder    ? PMB (postmenopausal bleeding)    ? RLS (restless legs syndrome)    ? Spine pain    ? Stented coronary artery    ? Traumatic brain injury (H)    ? Unsteady gait    ? UTI (lower urinary tract infection)        Past Social History     Reviewed, and she  reports that she has never smoked. She has never used smokeless tobacco. She reports that she does not drink alcohol or use drugs.    Family History     Reviewed, and family history includes Cancer in her mother; Coronary artery disease in her father; Heart attack in her father; Heart failure in her father.    Medication List   Post Discharge  Medication Reconciliation Status: discharge medications reconciled and changed, per note/orders   Current Outpatient Medications on File Prior to Visit   Medication Sig Dispense Refill   ? acetaminophen (TYLENOL) 500 MG tablet Take 2 tablets (1,000 mg total) by mouth 3 (three) times a day.  0   ? aspirin 81 mg chewable tablet Chew 81 mg daily.     ? atorvastatin (LIPITOR) 40 MG tablet Take 40 mg by mouth every evening.      ? buPROPion (WELLBUTRIN XL) 300 MG 24 hr tablet Take 300 mg by mouth every morning.      ? carbidopa-levodopa (SINEMET)  mg per tablet Take 2 tablets by mouth 3 (three) times a day.     ? cholecalciferol, vitamin D3, 1,000 unit tablet Take 4,000 Units by mouth daily.      ? cyanocobalamin (VITAMIN B-12) 100 MCG tablet Take 100 mcg by mouth daily.      ? lansoprazole (PREVACID) 30 MG capsule Take 30 mg by mouth daily.      ? magnesium oxide (MAG-OX) 400 mg tablet Take 400 mg by mouth 2 (two) times a day.     ? metoprolol succinate (TOPROL-XL) 12.5 MG Take 12.5 mg by mouth daily.     ? midodrine (PROAMATINE) 2.5 MG tablet Take 7.5 mg by mouth 3 (three) times a day with meals.     ? mirtazapine (REMERON) 45 MG tablet TAKE 1 TABLET BY MOUTH AT BEDTIME  *1 TOTAL FILL* 30 tablet 3   ? multivitamin (MULTIVITAMIN) per tablet Take 1 tablet by mouth daily.     ? nitroglycerin (NITROSTAT) 0.4 MG SL tablet Place 0.4 mg under the tongue every 5 (five) minutes as needed.      ? polyethylene glycol (MIRALAX) 17 gram packet Take 17 g by mouth daily.     ? pramipexole (MIRAPEX) 0.5 MG tablet Take 0.5 mg by mouth at bedtime.     ? solifenacin (VESICARE) 10 MG tablet Take 10 mg by mouth daily.     ? traZODone (DESYREL) 150 MG tablet TAKE 1 TABLET BY MOUTH AT BEDTIME. *1 TOTAL FILL* 31 tablet 3   ? venlafaxine (EFFEXOR-XR) 150 MG 24 hr capsule TAKE 2 CAPSULES (300MG) BY MOUTH ONCE DAILY *1 TOTAL FILL* 60 capsule 3     No current facility-administered medications on file prior to visit.        Allergies      Allergies   Allergen Reactions   ? Blood-Group Specific Substance Other (See Comments)     Patient has anti-K. Blood product orders maybe delayed. Draw one red top and 2 purple top tubes for all Type and Screen/Red blood Cell product orders   ? Fd And C No.5 (Tartrazine)      GI UPSET   ? Ibuprofen Nausea And Vomiting   ? Morphine Rash     swelling       Review of Systems   A comprehensive review of 14 systems was done. Pertinent findings noted here and in history of present illness. All the rest negative.  Constitutional: Negative.  Negative for fever, chills, she has  activity change, appetite change and fatigue.   Patient has had significant weight loss recently also  HENT: Negative for congestion and facial swelling.    Eyes: Negative for photophobia, redness and visual disturbance.   Respiratory: Negative for cough and chest tightness.    Cardiovascular: Negative for chest pain, palpitations and has some leg swelling.   Gastrointestinal: Negative for nausea, diarrhea, constipation, blood in stool and abdominal distention.   Genitourinary: Negative.    Musculoskeletal: Negative.  She is reporting multiple falls pain in her right thigh  Skin: Negative.    Neurological: Negative for dizziness, tremors, syncope, weakness, light-headedness and headaches.   Hematological: Does not bruise/bleed easily.   Psychiatric/Behavioral: Negative.  Recall is impaired but patient is quite pleasant today      Physical Exam     Recent Vitals 8/31/2020   Height -   Weight 174 lbs 8 oz   BSA (m2) 1.98 m2   BP 99/63   Pulse 80   Temp 99   Temp src -   SpO2 -   Some recent data might be hidden     Recheck weight is 169 pounds  Constitutional: Oriented to person, place, and time and appears well-developed.   Appears frail and weak  HEENT:  Normocephalic and atraumatic.  Eyes: Conjunctivae and EOM are normal. Pupils are equal, round, and reactive to light. No discharge.  No scleral icterus. Nose normal. Mouth/Throat: Oropharynx is  clear and moist. No oropharyngeal exudate.    NECK: Normal range of motion. Neck supple. No JVD present. No tracheal deviation present. No thyromegaly present.   CARDIOVASCULAR: Normal rate, regular rhythm and intact distal pulses.  Exam reveals no gallop and no friction rub.  Systolic murmur present.  PULMONARY: Effort normal and breath sounds normal. No respiratory distress.No Wheezing or rales.  ABDOMEN: Soft. Bowel sounds are normal. No distension and no mass.  There is no tenderness. There is no rebound and no guarding. No HSM.  MUSCULOSKELETAL: Normal range of motion. 1+ edema and no tenderness. Mild kyphosis, tenderness.  On her right thigh even to light touch  LYMPH NODES: Has no cervical, supraclavicular, axillary and groin adenopathy.   NEUROLOGICAL: Alert and oriented to person, place, and time. No cranial nerve deficit.  Normal muscle tone. Coordination normal  Resting tremors bilateral upper extremity noted.   GENITOURINARY: Deferred exam.  SKIN: Skin is warm and dry. No rash noted. No erythema. No pallor.   EXTREMITIES: No cyanosis, no clubbing, 1+ edema. No Deformity.  PSYCHIATRIC: Normal mood, affect and behavior.  Recall is impaired      Lab Results     Recent Results (from the past 240 hour(s))   Basic Metabolic Panel    Collection Time: 08/31/20  9:48 AM   Result Value Ref Range    Sodium 145 136 - 145 mmol/L    Potassium 3.8 3.5 - 5.0 mmol/L    Chloride 107 98 - 107 mmol/L    CO2 31 22 - 31 mmol/L    Anion Gap, Calculation 7 5 - 18 mmol/L    Glucose 80 70 - 125 mg/dL    Calcium 8.5 8.5 - 10.5 mg/dL    BUN 30 (H) 8 - 28 mg/dL    Creatinine 0.72 0.60 - 1.10 mg/dL    GFR MDRD Af Amer >60 >60 mL/min/1.73m2    GFR MDRD Non Af Amer >60 >60 mL/min/1.73m2   Hemoglobin    Collection Time: 08/31/20  9:48 AM   Result Value Ref Range    Hemoglobin 9.9 (L) 12.0 - 16.0 g/dL            NERY Sosa

## 2021-06-20 NOTE — LETTER
Letter by Noy Goodrich MBBS at      Author: Noy Goodrich MBBS Service: -- Author Type: --    Filed:  Encounter Date: 10/12/2020 Status: (Other)         Patient: Alison Bashir   MR Number: [id number]   YOB: 1950   Date of Visit: 10/12/2020       [facility] Admission note      Patient: Alison Bashir  MRN: [id number]  Date of Service: 10/12/2020      [facility] [[id number]]  Reason for Visit     Chief Complaint   Patient presents with   ? Review Of Multiple Medical Conditions     F/u low bp and pain  Code Status     DNI    Assessment     -History of a mechanical fall in the setting of recurrent hospitalizations related to falls.  -Right hip pain with underlying history of right hip arthroplasty  -Recent history of a fall related right hip thigh hematoma  -History of recurrent UTIs; urine culture again showing Morganella morganii species  -Hypotension with underlying history of orthostatic hypotension  - Parkinson's disease  -Cognitive impairment/dementia  - HTN  -Depression with anxiety  Plan     Patient readmitted to the TCU within days of discharge.  Acute fall in the setting of recurrent falls reported.  Unfortunately she is declining quite rapidly and may require a higher level of care.   and multiple hospitalizations she may need a higher level of care  However patient now reports that she would like to discharge back to her group home.  She continues to fluctuate in her decision earlier she had requested she did not want to discharge to the group home.  Staff is working on her discharge planning.  Blood pressure reviewed and she has been running low blood pressures overnight.  Highest blood pressure was 107/55.  She has an underlying history of orthostatic hypotension and remains on midodrine 7.5 mg 3 times daily.  She also gets metoprolol XL 12.5 daily.  We will plan on putting hold parameters for her metoprolol based on her blood pressure numbers and will give  her some permissive hypertension.  Pain management again reviewed with her she has been using her tramadol intermittently but does not want to discontinue it she is resistant to the idea of trying any other medication for pain management including topical gels as she feels this is working well.  Continue with the rehab      History     Patient is a very pleasant 70 y.o. female who is admitted to TCU  Patient was recently in a TCU and was discharged home at baseline she has profound debilitation with a high fall risk and has had recurrent hospitalizations related to falls.  She presented after she fell again at home within days of discharge.  She was complaining of right-sided chest pain as well as right hip pain.  She recently was admitted with a right hip pain with resultant right hip thigh hematoma.  She has been discharged to the TCU.  At baseline she remains quite debilitated wheelchair-bound and poorly ambulatory.  She will remain a very high fall risk and probably will not ambulate much in future either  Pain management reviewed with her recently she has been using some tramadol over the weekend intermittently 1 or 2 tabs a day she is also on scheduled Tylenol she does not want to taper her tramadol.  We have offered various other modalities including diathermy and topical pain gels she has refused all of them as she feels they are not effective for her  Blood pressure review reveals that she has had some low blood pressures with resultant symptomatic issues.  She has had a prior history of orthostatic hypotension.  She gets midodrine scheduled and also remains on metoprolol.  She also had a UTI with cultures growing E. coli and Morganella.  They have elected not to treat as she is not symptomatic.  She will require monitoring in the TCU.  She has no dysuria symptoms and is being monitored clinically also remains afebrile mood has been stable  She has underlying history of Parkinson's disease is been declining  neurology has seen her and no new med changes have been evaluated by them.  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  No new concerns voiced by patient or nursing      Past Medical History     Active Ambulatory (Non-Hospital) Problems    Diagnosis   ? Accident due to mechanical fall without injury, initial encounter   ? Hematoma of right thigh, subsequent encounter   ? Right hip pain   ? Hematoma of right hip   ? Urinary tract infection   ? Inability to walk   ? Parkinsonism, unspecified Parkinsonism type (H)   ? Fall, initial encounter   ? Magnesium deficiency   ? Adjustment insomnia   ? Acute hypotension   ? RBBB   ? Chest pain, unspecified   ? Leukopenia   ? Gastroesophageal reflux disease with esophagitis   ? Personal history of fall   ? Chest pain   ? Chronic pain syndrome   ? Traumatic brain injury (H)   ? Stented coronary artery   ? RLS (restless legs syndrome)   ? Overactive bladder   ? HTN (hypertension)   ? Depression   ? CAD (coronary artery disease)   ? Alzheimer disease (H)   ? Orthostatic hypotension   ? Anemia   ? Cognitive impairment   ? Hip fracture (H)   ? ACP (advance care planning)   ? Constipation   ? Essential hypertension   ? Neuroleptic signed 8/29/16     Past Medical History:   Diagnosis Date   ? Acute blood loss anemia    ? Alzheimer disease (H)    ? CAD (coronary artery disease)    ? Chronic pain syndrome    ? Dementia (H)    ? Depression    ? Depression    ? Hip fracture, right (H) 12/21/2017   ? HTN (hypertension)    ? Kidney stone    ? Overactive bladder    ? PMB (postmenopausal bleeding)    ? RLS (restless legs syndrome)    ? Spine pain    ? Stented coronary artery    ? Traumatic brain injury (H)    ? Unsteady gait    ? UTI (lower urinary tract infection)        Past Social History     Reviewed, and she   reports that she has never smoked. She has never used smokeless tobacco. She reports that she does not drink alcohol or use drugs.    Family History     Reviewed, and family history includes Cancer in her mother; Coronary artery disease in her father; Heart attack in her father; Heart failure in her father.    Medication List     Current Outpatient Medications on File Prior to Visit   Medication Sig Dispense Refill   ? acetaminophen (TYLENOL) 650 MG CR tablet Take 650 mg by mouth 4 (four) times a day.     ? aspirin 81 mg chewable tablet Chew 81 mg daily.     ? atorvastatin (LIPITOR) 40 MG tablet Take 40 mg by mouth every evening.      ? buPROPion (WELLBUTRIN XL) 300 MG 24 hr tablet TAKE 1 TABLET BY MOUTH ONCE DAILY. 31 tablet 0   ? carbidopa-levodopa (SINEMET)  mg per tablet Take 2 tablets by mouth 3 (three) times a day.     ? cholecalciferol, vitamin D3, 1,000 unit tablet Take 4,000 Units by mouth daily.      ? cyanocobalamin (VITAMIN B-12) 100 MCG tablet Take 100 mcg by mouth daily.      ? furosemide (LASIX) 20 MG tablet Take 20 mg by mouth daily.     ? lansoprazole (PREVACID) 30 MG capsule Take 30 mg by mouth daily before breakfast.      ? magnesium oxide (MAG-OX) 400 mg tablet Take 400 mg by mouth 2 (two) times a day.     ? melatonin 1 mg Tab tablet Take 5 mg by mouth at bedtime.     ? metoprolol succinate (TOPROL-XL) 12.5 MG Take 12.5 mg by mouth daily.     ? midodrine (PROAMATINE) 2.5 MG tablet Take 7.5 mg by mouth 3 (three) times a day with meals.     ? mirtazapine (REMERON) 45 MG tablet TAKE 1 TABLET BY MOUTH AT BEDTIME  *1 TOTAL FILL* 30 tablet 3   ? multivitamin (MULTIVITAMIN) per tablet Take 1 tablet by mouth daily.     ? nitroglycerin (NITROSTAT) 0.4 MG SL tablet Place 0.4 mg under the tongue every 5 (five) minutes as needed.      ? nystatin (MYCOSTATIN) powder Apply topically 2 (two) times a day as needed. Fungal infection     ? polyethylene glycol (MIRALAX) 17 gram packet Take 17 g by mouth  daily.     ? pramipexole (MIRAPEX) 0.5 MG tablet Take 0.5 mg by mouth at bedtime.     ? solifenacin (VESICARE) 10 MG tablet Take 10 mg by mouth daily.     ? traMADoL (ULTRAM) 50 mg tablet Take 1 tablet (50 mg total) by mouth every 6 (six) hours as needed for pain. 12 tablet 0   ? traZODone (DESYREL) 50 MG tablet Take 50 mg by mouth at bedtime.     ? venlafaxine (EFFEXOR-XR) 150 MG 24 hr capsule TAKE 2 CAPSULES (300MG) BY MOUTH ONCE DAILY *1 TOTAL FILL* 60 capsule 3     No current facility-administered medications on file prior to visit.        Allergies     Allergies   Allergen Reactions   ? Blood-Group Specific Substance Other (See Comments)     Patient has anti-K. Blood product orders maybe delayed. Draw one red top and 2 purple top tubes for all Type and Screen/Red blood Cell product orders   ? Fd And C No.5 (Tartrazine)      GI UPSET   ? Ibuprofen Nausea And Vomiting   ? Morphine Rash     swelling       Review of Systems   A comprehensive review of 14 systems was done. Pertinent findings noted here and in history of present illness. All the rest negative.  Constitutional: Negative.  Negative for fever, chills, she has activity change, appetite change and fatigue.   HENT: Negative for congestion and facial swelling.    Eyes: Negative for photophobia, redness and visual disturbance.   Respiratory: Negative for cough and chest tightness.    Cardiovascular: Negative for chest pain, palpitations and leg swelling.   Gastrointestinal: Negative for nausea, diarrhea, constipation, blood in stool and abdominal distention.   Genitourinary: Negative.    Musculoskeletal: Reporting back and hip pain.  She has had multiple falls  Skin: Negative.    Neurological: Negative for dizziness, tremors, syncope, weakness, light-headedness and headaches.   Hematological: Does not bruise/bleed easily.   Psychiatric/Behavioral: Negative.  Anxious about pain management      Physical Exam     Recent Vitals 10/2/2020   Height -   Weight -    BSA (m2) -   /59   Pulse 73   Temp 98.4   Temp src 1   SpO2 98   Some recent data might be hidden   Recheck blood pressure 107/55    Constitutional: Oriented to person, place, and time and appears well-developed.   HEENT:  Normocephalic and atraumatic.  Eyes: Conjunctivae and EOM are normal. Pupils are equal, round, and reactive to light. No discharge.  No scleral icterus. Nose normal. Mouth/Throat: Oropharynx is clear and moist. No oropharyngeal exudate.    NECK: Normal range of motion. Neck supple. No JVD present. No tracheal deviation present. No thyromegaly present.   CARDIOVASCULAR: Normal rate, regular rhythm and intact distal pulses.  Exam reveals no gallop and no friction rub.  Systolic murmur present.  PULMONARY: Effort normal and breath sounds normal. No respiratory distress.No Wheezing or rales.  ABDOMEN: Soft. Bowel sounds are normal. No distension and no mass.  There is no tenderness. There is no rebound and no guarding. No HSM.  MUSCULOSKELETAL: Normal range of motion. No edema and no tenderness. Mild kyphosis with asymmetry of the chest noted more predominant on the right side,  Tenderness to palpation over her right thigh.  Very ataxic gait noted with patient noted to be deviating on the right side  LYMPH NODES: Has no cervical, supraclavicular, axillary and groin adenopathy.   NEUROLOGICAL: Alert and oriented to person, place, and time. No cranial nerve deficit.  Normal muscle tone. Coordination normal.   GENITOURINARY: Deferred exam.  SKIN: Skin is warm and dry. No rash noted. No erythema. No pallor.   EXTREMITIES: No cyanosis, no clubbing, no edema. No Deformity.  PSYCHIATRIC: Normal mood, affect and behavior.      Lab Results     Recent Results (from the past 240 hour(s))   COVID-19 Virus PCR MRF    Collection Time: 10/07/20  8:00 AM    Specimen: Respiratory   Result Value Ref Range    COVID-19 VIRUS SPECIMEN SOURCE Nasopharyngeal     2019-nCOV Not Detected       Results for orders placed  or performed during the hospital encounter of 09/30/20   Basic metabolic panel   Result Value Ref Range    Sodium 143 136 - 145 mmol/L    Potassium 4.0 3.5 - 5.0 mmol/L    Chloride 105 98 - 107 mmol/L    CO2 29 22 - 31 mmol/L    Anion Gap, Calculation 9 5 - 18 mmol/L    Glucose 106 70 - 125 mg/dL    Calcium 9.2 8.5 - 10.5 mg/dL    BUN 32 (H) 8 - 28 mg/dL    Creatinine 0.73 0.60 - 1.10 mg/dL    GFR MDRD Af Amer >60 >60 mL/min/1.73m2    GFR MDRD Non Af Amer >60 >60 mL/min/1.73m2             NERY Sosa

## 2021-06-20 NOTE — LETTER
"Letter by Noy Goodrich MBBS at      Author: Noy Goodrich MBBS Service: -- Author Type: --    Filed:  Encounter Date: 6/30/2020 Status: (Other)         Patient: Alison Bashir   MR Number: [id number]   YOB: 1950   Date of Visit: 6/30/2020     [facility]   Video Visit    Code Status: DNR    Alison Bashir is a 69 y.o. female who is being evaluated via a billable video visit.      The patient has been notified of following:     \"This video visit will be conducted via a call between you and your physician/provider. We have found that certain health care needs can be provided without the need for an in-person physical exam.  This service lets us provide the care you need with a video conversation.  If a prescription is necessary we can send it to the facility team.  If lab work is needed we can place an order through the facility team to have that test done at a later time.    If during the course of the call the physician/provider feels a video visit is not appropriate, you will not be charged for this service.\"     Physician/Provider has received verbal consent for a Video Visit from the patient? Yes    Patient would like the video invitation sent by: Send to: [employer]    Video Start Time: 10.15 am    Chief Complaint/Reason for Visit:  Chief Complaint   Patient presents with   ? Review Of Multiple Medical Conditions     Evaluation for acute hypertension and Parkinson's  HPI:   Alison is a 69 y.o. female who is admitted to the TCU.  Patient was admitted in the hospital with chest pain.  Cardiology was consulted.  Due to underlying history of CAD and stenting she underwent a nuclear stress test which was unrevealing for any reversible ischemia  She remains on medical management currently has been chest pain-free since admission with no concerns    She was noted to have profound hypotension with bradycardia.  Currently discharged on oral midodrine  Blood pressures in general have " trended on the low side and she has had her medications held quite often because of low blood pressures.  She also is on metoprolol.    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  She is also has some cognitive decline but overall mood and behaviors has been stable  She is requesting today a discharge back to her memory care group home    I have reviewed and updated the patient's Past Medical History, including history of traumatic brain injury hypertension and coronary artery disease    Social History,lives in a group home; no smoking or etoh     Family History includes cancer in her mother; father had cad and chf   and Medication List.    ALLERGIES  Blood-group specific substance; Fd and c no.5 (tartrazine); Ibuprofen; and Morphine    Review of Systems  Constitutional: Negative.  Negative for fever, chills, she has activity change, appetite change and fatigue.   Loss of 50lb in last few months  HENT: Negative for congestion and facial swelling.    Eyes: Negative for photophobia, redness and visual disturbance.   Respiratory: Negative for cough and chest tightness.    Cardiovascular: Negative for chest pain, palpitations and leg swelling.   Gastrointestinal: Negative for nausea, diarrhea, constipation, blood in stool and abdominal distention.   Genitourinary: Has chronic incontinence due to overactive bladder    Musculoskeletal: Has gait instability and falls requires assistance with ADLs  Skin: Negative.    Neurological: Negative for dizziness, tremors, syncope, weakness, light-headedness and headaches.   Hematological: Does not bruise/bleed  easily.   Psychiatric/Behavioral: Negative.          Physical exam  Wt 157 lb  Bp  109 / 65  t 98 p78  GENERAL: no acute distress. Cooperative in conversation.   Appears very frail and weak  HEENT: pupils are equal, round and reactive. Oral mucosa is moist and intact.  RESP:Chest symmetric. Regular respiratory rate. No stridor.  ABD: Nondistended, soft.  EXTREMITIES: No lower extremity edema.  Range of movement in the legs especially right lower extremity is quite limited  NEURO: non focal. Alert and oriented x3.  Recall is impaired  PSYCH: within normal limits. No depression or anxiety.  SKIN: warm dry intact         Medication List:  Current Outpatient Medications   Medication Sig   ? acetaminophen (TYLENOL) 500 MG tablet Take 500 mg by mouth every 6 (six) hours as needed for pain.   ? aspirin 81 mg chewable tablet Chew 81 mg daily.   ? atorvastatin (LIPITOR) 40 MG tablet Take 40 mg by mouth every evening.    ? buPROPion (WELLBUTRIN XL) 300 MG 24 hr tablet Take 300 mg by mouth every morning.    ? carbidopa-levodopa (SINEMET)  mg per tablet Take 2 tablets by mouth 3 (three) times a day.   ? cholecalciferol, vitamin D3, 1,000 unit tablet Take 4,000 Units by mouth daily.    ? cyanocobalamin (VITAMIN B-12) 100 MCG tablet Take 100 mcg by mouth daily.    ? furosemide (LASIX) 20 MG tablet 20 mg daily.    ? lansoprazole (PREVACID) 30 MG capsule Take 30 mg by mouth daily.    ? magnesium oxide (MAG-OX) 400 mg tablet Take 400 mg by mouth 2 (two) times a day.   ? metoprolol succinate (TOPROL-XL) 12.5 MG Take 12.5 mg by mouth daily.   ? midodrine (PROAMATINE) 5 MG tablet Take 1 tablet (5 mg total) by mouth 3 (three) times a day with meals.   ? mirtazapine (REMERON) 45 MG tablet TAKE 1 TABLET BY MOUTH AT BEDTIME  *1 TOTAL FILL*   ? multivitamin (MULTIVITAMIN) per tablet Take 1 tablet by mouth daily.   ? nitroglycerin (NITROSTAT) 0.4 MG SL tablet Place 0.4 mg under the tongue every 5 (five) minutes as needed.    ?  polyethylene glycol (MIRALAX) 17 gram packet Take 17 g by mouth daily.   ? pramipexole (MIRAPEX) 0.25 MG tablet Take 1 tablet (0.25 mg total) by mouth at bedtime.   ? solifenacin (VESICARE) 10 MG tablet Take 10 mg by mouth daily.   ? traZODone (DESYREL) 150 MG tablet TAKE 1 TABLET BY MOUTH AT BEDTIME. *1 TOTAL FILL*   ? 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  Monitor blood pressures remain frequently low  Metoprolol is being held and she also is on midodrine.  Compression hoses have been recommended for her lower extremity and aggressive therapy because of concern that deconditioning may be contributing to her low blood pressures.  OveraLL prognosis remains guarded because of suspected Parkinson's versus multisystem atrophy with cognitive impairment also noted.  Patient is aware of some of these deficits.  She has become stronger in therapy now ambulating better and is requesting a discharge plan.   will be consulted and they will work with her to get this taken care of.  Weights have recently been stable.  Mood and behaviors have been stable to  [facility] 16/30       Video-Visit Details    Type of service:  Video Visit    Video End Time (time video  stopped): 10.27 am    Originating Location (pt. Location):[facility] [[id number]]    Distant Location (provider location):  [facility]     Mode of Communication:  Zoom Video Conference        NERY Sosa

## 2021-06-20 NOTE — LETTER
"Letter by Archana Siddiqui CNP at      Author: Archana Siddiqui CNP Service: -- Author Type: --    Filed:  Encounter Date: 2020 Status: (Other)         Patient: Alison Bashir   MR Number: [id number]   YOB: 1950   Date of Visit: 2020       [facility]      NAME:  Alison Bashir             :  1950    MRN: [id number]    CODE STATUS:  DNR/DNI    FACILITY: [facility] [[id number]]         CHIEF COMPLAIN/REASON FOR VISIT:  Chief Complaint   Patient presents with   ? Problem Visit     pain       HISTORY OF PRESENT ILLNESS: Alison Bashir is a 70 y.o. female being seen t for review of pain management. She .  She is admitted to the TCU s/p fall  [facility]. Per herEMR \" with a history of frequent falls and unsteady gait.  She fell at the group home while she was trying to use her walker.  No head injuries and no chest pain prior to fall.  She had some slight pain in the hip and was unable to get up.  Generalized weakness with difficulty moving.    Recurrent falls.  Suspect due to symptoms of parkinsonism.  Infection possibly adding to the weakness/ UA looks infected.    Patient was seen by neurology, no change in Parkinson medications recommended. PT/OT recommended TCU.\"  She reports stable pain, bowels moving an sleeping well. We reviewed her med regimE.  We reviewed pain management as well as HTN managment    Allergies   Allergen Reactions   ? Blood-Group Specific Substance Other (See Comments)     Patient has anti-K. Blood product orders maybe delayed. Draw one red top and 2 purple top tubes for all Type and Screen/Red blood Cell product orders   ? Fd And C No.5 (Tartrazine)      GI UPSET   ? Ibuprofen Nausea And Vomiting   ? Morphine Rash     swelling   :     Current Outpatient Medications   Medication Sig   ? acetaminophen (TYLENOL) 500 MG tablet Take 2 tablets (1,000 mg total) by mouth 3 (three) times a day.   ? aspirin 81 mg chewable " tablet Chew 81 mg daily.   ? atorvastatin (LIPITOR) 40 MG tablet Take 40 mg by mouth every evening.    ? buPROPion (WELLBUTRIN XL) 300 MG 24 hr tablet TAKE 1 TABLET BY MOUTH ONCE DAILY.   ? carbidopa-levodopa (SINEMET)  mg per tablet Take 2 tablets by mouth 3 (three) times a day.   ? cholecalciferol, vitamin D3, 1,000 unit tablet Take 4,000 Units by mouth daily.    ? cyanocobalamin (VITAMIN B-12) 100 MCG tablet Take 100 mcg by mouth daily.    ? lansoprazole (PREVACID) 30 MG capsule Take 30 mg by mouth daily.    ? magnesium oxide (MAG-OX) 400 mg tablet Take 400 mg by mouth 2 (two) times a day.   ? metoprolol succinate (TOPROL-XL) 12.5 MG Take 12.5 mg by mouth daily.   ? midodrine (PROAMATINE) 2.5 MG tablet Take 7.5 mg by mouth 3 (three) times a day with meals.   ? mirtazapine (REMERON) 45 MG tablet TAKE 1 TABLET BY MOUTH AT BEDTIME  *1 TOTAL FILL*   ? multivitamin (MULTIVITAMIN) per tablet Take 1 tablet by mouth daily.   ? nitroglycerin (NITROSTAT) 0.4 MG SL tablet Place 0.4 mg under the tongue every 5 (five) minutes as needed.    ? polyethylene glycol (MIRALAX) 17 gram packet Take 17 g by mouth daily.   ? pramipexole (MIRAPEX) 0.5 MG tablet Take 0.5 mg by mouth at bedtime.   ? solifenacin (VESICARE) 10 MG tablet Take 10 mg by mouth daily.   ? traZODone (DESYREL) 150 MG tablet TAKE 1 TABLET BY MOUTH AT BEDTIME. *1 TOTAL FILL*   ? venlafaxine (EFFEXOR-XR) 150 MG 24 hr capsule TAKE 2 CAPSULES (300MG) BY MOUTH ONCE DAILY *1 TOTAL FILL*         REVIEW OF SYSTEMS:    Currently, no fever, chills, or rigors. Does not have any visual or hearing problems. Denies any chest pain, headaches, palpitations, lightheadedness, dizziness, shortness of breath, or cough. Appetite is good. Denies any GERD symptoms. Denies any difficulty with swallowing, nausea, or vomiting.  Denies any abdominal pain, diarrhea or constipation. Denies any urinary symptoms. No insomnia. No active bleeding. No rash.       PHYSICAL  EXAMINATION:  Vitals:    09/17/20 0821   BP: 128/79   Pulse: 61   Temp: (!) 96.4  F (35.8  C)   Weight: 166 lb 6.4 oz (75.5 kg)         GENERAL: Awake, Alert, oriented x3, not in any form of acute distress, answers questions appropriately, follows simple commands, conversant [place]  HEENT: Head is normocephalic with normal hair distribution. No evidence of trauma. Ears: No acute purulent discharge. Eyes: Conjunctivae pink with no scleral jaundice. Nose: Normal mucosa and septum. NECK: Supple with no cervical or supraclavicular lymphadenopathy. Trachea is midline.   SKIN: Warm and dry, no erythema noted, no rashes or lesions.  NEUROLOGICAL: The patient is oriented to person, place and time. Strength and sensation are grossly intact. Face is symmetric.      Due to C 19 Social distancing performed during assessment       LABS:    Lab Results   Component Value Date    WBC 2.9 (L) 08/26/2020    HGB 9.9 (L) 08/31/2020    HCT 29.6 (L) 08/26/2020    MCV 95 08/26/2020     (L) 08/26/2020       Results for orders placed or performed in visit on 08/31/20   Basic Metabolic Panel   Result Value Ref Range    Sodium 145 136 - 145 mmol/L    Potassium 3.8 3.5 - 5.0 mmol/L    Chloride 107 98 - 107 mmol/L    CO2 31 22 - 31 mmol/L    Anion Gap, Calculation 7 5 - 18 mmol/L    Glucose 80 70 - 125 mg/dL    Calcium 8.5 8.5 - 10.5 mg/dL    BUN 30 (H) 8 - 28 mg/dL    Creatinine 0.72 0.60 - 1.10 mg/dL    GFR MDRD Af Amer >60 >60 mL/min/1.73m2    GFR MDRD Non Af Amer >60 >60 mL/min/1.73m2           Lab Results   Component Value Date    HGBA1C 5.4 09/29/2011     Vitamin D, Total (25-Hydroxy)   Date Value Ref Range Status   03/15/2018 43.9 30.0 - 80.0 ng/mL Final     Lab Results   Component Value Date    GLNJKLNP61 468 02/02/2018       ASSESSMENT/PLAN:  1. Closed fracture of right hip, sequela    2. Chronic pain syndrome    3. Essential hypertension      1.  Pain: Has dx of chronic pain and body with generalized pain due to habitus. Due to  chronic pain we will re order her tramadol 50 mg po Q 6 prn    2. HTN; Metaprel 12.5 mg po daily and bp doing well. We discussed exercise, which is limited due to being wc bound and up with assist and we reviewed healthye eating habits as well.       Electronically signed by:  Archana Siddiqui CNP  This progress note was completed using Dragon software and there may be grammatical errors.

## 2021-06-20 NOTE — LETTER
Letter by Dexter Rico NP at      Author: Dexter Rico NP Service: -- Author Type: --    Filed:  Encounter Date: 2020 Status: (Other)         Patient: Alison Bashir   MR Number: [id number]   YOB: 1950   Date of Visit: 2020     [facility]      Facility:    [facility] [[id number]]  Code Status: DNR      Chief Complaint/Reason for Visit:   Chief Complaint   Patient presents with   ? Review Of Multiple Medical Conditions     Parkinsons dx       HPI:   Alison is a 69 y.o. female who is a transfer from [facility] with an admission on 20 and discharge on 6/15/20. She has a PMH of Parkinsons dx, Htn, frequent UTIs, depression, CAD, GERD, leukopenia who presented to the hospital chest pain shortness of breath.  Had a negative work-up through cardiology.  Noted to have severe hypotension despite holding PTA medications.  Therefore midodrine was started at discharge, likely due to Parkinson's per neurology evaluation.  She was then discharged to TCU for additional rehab.    Today will assess vitals, hypotension tx and therapy progress.    Past Medical History:  Past Medical History:   Diagnosis Date   ? Acute blood loss anemia    ? Alzheimer disease (H)    ? CAD (coronary artery disease)    ? Chronic pain syndrome    ? Dementia (H)    ? Depression    ? Depression    ? Hip fracture, right (H) 2017   ? HTN (hypertension)    ? Kidney stone    ? Overactive bladder    ? PMB (postmenopausal bleeding)    ? RLS (restless legs syndrome)    ? Spine pain    ? Stented coronary artery    ? Traumatic brain injury (H)    ? Unsteady gait     uses walker   ? UTI (lower urinary tract infection)     on antibiotic course           Surgical History:  Past Surgical History:   Procedure Laterality Date   ?  SECTION     ? CHOLECYSTECTOMY  May 2014   ? COMBINED HYSTEROSCOPY DIAGNOSTIC / D&C N/A 2014    Procedure: DILATION AND  CURETTAGE WITH HYSTEROSCOPY;  Surgeon: Karime Cifuentes MD;  Location: [facility] OR;  Service:    ? CORONARY STENT PLACEMENT     ? HEMIARTHROPLASTY HIP Right 12/22/2017   ? INCISION AND DRAINAGE HIP Right 01/18/2018    ORIF REVISION    ? LUNG REMOVAL, PARTIAL     ? REVISION TOTAL HIP ARTHROPLASTY Right 01/10/2018   ? TONSILLECTOMY AND ADENOIDECTOMY     ? TOTAL KNEE ARTHROPLASTY Right 09/30/2016   ? TOTAL KNEE ARTHROPLASTY Left 11/2015       Family History:   Family History   Problem Relation Age of Onset   ? Cancer Mother    ? Heart attack Father    ? Coronary artery disease Father    ? Heart failure Father        Social History:    Social History     Socioeconomic History   ? Marital status: Single     Spouse name: Not on file   ? Number of children: Not on file   ? Years of education: Not on file   ? Highest education level: Not on file   Occupational History   ? Not on file   Social Needs   ? Financial resource strain: Not on file   ? Food insecurity     Worry: Not on file     Inability: Not on file   ? Transportation needs     Medical: Not on file     Non-medical: Not on file   Tobacco Use   ? Smoking status: Never Smoker   ? Smokeless tobacco: Never Used   Substance and Sexual Activity   ? Alcohol use: No   ? Drug use: No   ? Sexual activity: Not on file   Lifestyle   ? Physical activity     Days per week: Not on file     Minutes per session: Not on file   ? Stress: Not on file   Relationships   ? Social connections     Talks on phone: Not on file     Gets together: Not on file     Attends Sikh service: Not on file     Active member of club or organization: Not on file     Attends meetings of clubs or organizations: Not on file     Relationship status: Not on file   ? Intimate partner violence     Fear of current or ex partner: Not on file     Emotionally abused: Not on file     Physically abused: Not on file     Forced sexual activity: Not on file   Other Topics Concern   ? Not on file   Social History  Narrative    She lives in a group home right now. She can make her own medical decisions. Patient is not on supplemental O2 at home. Patient uses walker and wheelchair for ambulation. Please update sister Chelo.           Review of Systems   Constitutional: Positive for activity change. Negative for appetite change, chills, diaphoresis and fatigue.        No concerns   HENT: Negative for congestion and hearing loss.    Eyes: Negative.    Cardiovascular: Negative.    Gastrointestinal: Negative for abdominal distention, abdominal pain, blood in stool, constipation, diarrhea and nausea.   Endocrine: Negative.    Genitourinary: Negative for difficulty urinating.   Musculoskeletal: Negative for gait problem.        Denies pain   Skin:        intact   Allergic/Immunologic: Negative.    Neurological: Positive for tremors. Negative for dizziness.   Psychiatric/Behavioral: Positive for confusion. Negative for agitation, hallucinations and sleep disturbance. The patient is not nervous/anxious.        There were no vitals filed for this visit.    Physical Exam  Vitals signs reviewed.   Constitutional:       Comments: Hypotension improved   HENT:      Head: Normocephalic.      Right Ear: External ear normal.      Left Ear: External ear normal.      Nose: No congestion or rhinorrhea.      Mouth/Throat:      Pharynx: No oropharyngeal exudate.   Eyes:      General:         Right eye: No discharge.         Left eye: No discharge.   Cardiovascular:      Rate and Rhythm: Normal rate.   Pulmonary:      Effort: Pulmonary effort is normal. No respiratory distress.      Comments: Room air, no auscultation per COVID-19 recommendations  Abdominal:      General: There is no distension.      Comments: No constipation diarrhea reported   Genitourinary:     Comments: Incontinent  Musculoskeletal:      Right lower leg: No edema.      Left lower leg: No edema.      Comments: History of Parkinson's, denies pain   Skin:     General: Skin is warm  and dry.      Comments: intact   Neurological:      Mental Status: She is alert. She is disoriented.      Motor: Weakness present.      Comments: A/O x2   Psychiatric:         Mood and Affect: Mood normal.      Comments: No behaviors reported         Medication List:  Current Outpatient Medications   Medication Sig   ? acetaminophen (TYLENOL) 500 MG tablet Take 500 mg by mouth every 6 (six) hours as needed for pain.   ? aspirin 81 mg chewable tablet Chew 81 mg daily.   ? atorvastatin (LIPITOR) 40 MG tablet Take 40 mg by mouth every evening.    ? buPROPion (WELLBUTRIN XL) 300 MG 24 hr tablet Take 300 mg by mouth every morning.    ? carbidopa-levodopa (SINEMET)  mg per tablet Take 2 tablets by mouth 3 (three) times a day.   ? cholecalciferol, vitamin D3, 1,000 unit tablet Take 4,000 Units by mouth daily.    ? cyanocobalamin (VITAMIN B-12) 100 MCG tablet Take 100 mcg by mouth daily.    ? furosemide (LASIX) 20 MG tablet 20 mg daily.    ? lansoprazole (PREVACID) 30 MG capsule Take 30 mg by mouth daily.    ? magnesium oxide (MAG-OX) 400 mg tablet Take 400 mg by mouth 2 (two) times a day.   ? metoprolol succinate (TOPROL-XL) 12.5 MG Take 12.5 mg by mouth daily.   ? midodrine (PROAMATINE) 5 MG tablet Take 1 tablet (5 mg total) by mouth 3 (three) times a day with meals. (Patient taking differently: Take 7.5 mg by mouth 3 (three) times a day with meals. )   ? mirtazapine (REMERON) 45 MG tablet TAKE 1 TABLET BY MOUTH AT BEDTIME  *1 TOTAL FILL*   ? multivitamin (MULTIVITAMIN) per tablet Take 1 tablet by mouth daily.   ? nitroglycerin (NITROSTAT) 0.4 MG SL tablet Place 0.4 mg under the tongue every 5 (five) minutes as needed.    ? polyethylene glycol (MIRALAX) 17 gram packet Take 17 g by mouth daily.   ? pramipexole (MIRAPEX) 0.25 MG tablet Take 1 tablet (0.25 mg total) by mouth at bedtime.   ? solifenacin (VESICARE) 10 MG tablet Take 10 mg by mouth daily.   ? traZODone (DESYREL) 150 MG tablet TAKE 1 TABLET BY MOUTH AT  BEDTIME. *1 TOTAL FILL*   ? venlafaxine (EFFEXOR-XR) 150 MG 24 hr capsule TAKE 2 CAPSULES (300MG) BY MOUTH ONCE DAILY *1 TOTAL FILL*       Labs:  Results for orders placed or performed in visit on 06/30/20   Basic Metabolic Panel   Result Value Ref Range    Sodium 144 136 - 145 mmol/L    Potassium 4.0 3.5 - 5.0 mmol/L    Chloride 105 98 - 107 mmol/L    CO2 33 (H) 22 - 31 mmol/L    Anion Gap, Calculation 6 5 - 18 mmol/L    Glucose 85 70 - 125 mg/dL    Calcium 8.4 (L) 8.5 - 10.5 mg/dL    BUN 33 (H) 8 - 28 mg/dL    Creatinine 0.76 0.60 - 1.10 mg/dL    GFR MDRD Af Amer >60 >60 mL/min/1.73m2    GFR MDRD Non Af Amer >60 >60 mL/min/1.73m2     Lab Results   Component Value Date    WBC 3.5 (L) 06/30/2020    HGB 10.2 (L) 06/30/2020    HCT 34.1 (L) 06/30/2020    MCV 94 06/30/2020     06/30/2020     Lab Results   Component Value Date    TSH 1.09 07/28/2015     Vitamin D, Total (25-Hydroxy)   Date Value Ref Range Status   03/15/2018 43.9 30.0 - 80.0 ng/mL Final         Assessment/Plan:    Chest pain with a history of CAD: Continue aspirin 81 mg daily and atorvastatin 40 mg every evening, denies chest pain    Depression: Continue Wellbutrin 300 mg in AM, Remeron 45 mg at bedtime and Effexor 300 mg daily    Parkinson's: Continue Sinemet 25/100 mg 2 tabs 3 times daily    Vitamin D deficiency: Continue D3 4000 units daily    Vitamin B12 deficiency: continue cyanocobalamin 100mcg daily    Hypomagnesia: continue mag oxide 400mg two times a day    Htn: continue lasix 20mg daily and metoprolol succinate 12.5 mg daily, SBP <110    CAD: continue metoprolol succinate 12.5mg daily, HR <70    Orthostatic hypotension: Probably due to Parkinson's dysfunction, prior increased midodrine to 7.5 mg 3 times daily, improved    GERD: Continue Prevacid 30 mg daily    Constipation: continue miralax daily    Insomnia: continue trazodone 150mg at HS    Therapy: has plateau'd, will be ending next week    Disposition: Group home is not taking her  back, TBD. [facility] 16/30   CPT 4.9      Electronically signed by: Dexter Rico NP

## 2021-06-20 NOTE — LETTER
Letter by Noy Goodrich MBBS at      Author: Noy Goodrich [employer] Service: -- Author Type: --    Filed:  Encounter Date: 7/14/2020 Status: (Other)         Patient: Alison Bashir   MR Number: [id number]   YOB: 1950   Date of Visit: 7/14/2020       [facility] Admission note      Patient: Alison Bashir  MRN: [id number]  Date of Service: 7/14/2020      [facility] [[id number]]  Reason for Visit     Chief Complaint   Patient presents with   ? Review Of Multiple Medical Conditions   Evaluation for weakness and low blood pressures    Code Status     dnr    Assessment     -Persistent hypotension  -Underlying history of Parkinson's disease  -Cognitive impairment  --TBI  - Generalized weakness quite profound with a history of falls  -Failure to thrive    Plan     Admitted to the TCU.  She is admitted with significant gait instability and weakness and unfortunately she remains poorly ambulatory  She is primarily using a wheelchair  Blood pressures today appear to be stable  Nursing holds her metoprolol often and gives her midodrine.  Cognitively mood and behaviors have been stable  Recheck [facility] is 16/30  Recheck weights were done because of 10 pound weight loss and her actual weights are stable at 160 pounds with no further weight loss noted.  Patient oral intake appears to be adequate as per her  Continue with her PT OT and rehab discharge plan is to go back to group home if able  Per staff discharge planning has been initiated her accepting facility has requested she be covered negative before they will accept her and she is pending the results of those tests    History     Patient is a very pleasant 69 y.o. female who is admitted to TCU  Patient was evaluated due to persistent low blood pressure she was noted to have profound hypotension with bradycardia in the hospital and remains on midodrine for supplementation.  She continues to have several episodes of low blood pressure.   She is on low-dose of metoprolol nursing has been holding it quite often.  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  She is currently compliant with her restrictions    She also has a history of profound cognitive decline and is testing much worse cognitively than last time she was in the TCU as per my discussion with OT  [facility] 16/30    Past Medical History     Active Ambulatory (Non-Hospital) Problems    Diagnosis   ? Adjustment insomnia   ? Acute hypotension   ? RBBB   ? Chest pain, unspecified   ? Leukopenia   ? Gastroesophageal reflux disease with esophagitis   ? Personal history of fall   ? Chest pain   ? Chronic pain syndrome   ? Traumatic brain injury (H)   ? Stented coronary artery   ? RLS (restless legs syndrome)   ? Overactive bladder   ? HTN (hypertension)   ? Depression   ? CAD (coronary artery disease)   ? Alzheimer disease (H)   ? Orthostatic hypotension   ? Anemia   ? Cognitive impairment   ? Hip fracture (H)   ? Pain management   ? Constipation   ? Essential hypertension   ? Neuroleptic signed 8/29/16     Past Medical History:   Diagnosis Date   ? Acute blood loss anemia    ? Alzheimer disease (H)    ? CAD (coronary artery disease)    ? Chronic pain syndrome    ? Dementia (H)    ? Depression    ? Depression    ? Hip fracture, right (H) 12/21/2017   ? HTN (hypertension)    ? Kidney stone    ? Overactive bladder    ? PMB (postmenopausal bleeding)    ? RLS (restless legs syndrome)    ? Spine pain    ? Stented coronary artery    ? Traumatic brain injury (H)    ? Unsteady gait    ? UTI (lower urinary tract infection)        Past Social History      Reviewed, and she  reports that she has never smoked. She has never used smokeless tobacco. She reports that she does not drink alcohol or use drugs.    Family History     Reviewed, and family history includes Cancer in her mother; Coronary artery disease in her father; Heart attack in her father; Heart failure in her father.    Medication List   Post Discharge Medication Reconciliation Status: discharge medications reconciled, continue medications without change   Current Outpatient Medications on File Prior to Visit   Medication Sig Dispense Refill   ? acetaminophen (TYLENOL) 500 MG tablet Take 500 mg by mouth every 6 (six) hours as needed for pain.     ? aspirin 81 mg chewable tablet Chew 81 mg daily.     ? atorvastatin (LIPITOR) 40 MG tablet Take 40 mg by mouth every evening.      ? buPROPion (WELLBUTRIN XL) 300 MG 24 hr tablet Take 300 mg by mouth every morning.      ? carbidopa-levodopa (SINEMET)  mg per tablet Take 2 tablets by mouth 3 (three) times a day.     ? cholecalciferol, vitamin D3, 1,000 unit tablet Take 4,000 Units by mouth daily.      ? cyanocobalamin (VITAMIN B-12) 100 MCG tablet Take 100 mcg by mouth daily.      ? furosemide (LASIX) 20 MG tablet 20 mg daily.      ? lansoprazole (PREVACID) 30 MG capsule Take 30 mg by mouth daily.      ? magnesium oxide (MAG-OX) 400 mg tablet Take 400 mg by mouth 2 (two) times a day.     ? metoprolol succinate (TOPROL-XL) 12.5 MG Take 12.5 mg by mouth daily.     ? midodrine (PROAMATINE) 5 MG tablet Take 1 tablet (5 mg total) by mouth 3 (three) times a day with meals. (Patient taking differently: Take 7.5 mg by mouth 3 (three) times a day with meals. ) 30 tablet 0   ? mirtazapine (REMERON) 45 MG tablet TAKE 1 TABLET BY MOUTH AT BEDTIME  *1 TOTAL FILL* 30 tablet 3   ? multivitamin (MULTIVITAMIN) per tablet Take 1 tablet by mouth daily.     ? nitroglycerin (NITROSTAT) 0.4 MG SL tablet Place 0.4 mg under the tongue every 5 (five) minutes as needed.      ?  polyethylene glycol (MIRALAX) 17 gram packet Take 17 g by mouth daily.     ? pramipexole (MIRAPEX) 0.25 MG tablet Take 1 tablet (0.25 mg total) by mouth at bedtime. 30 tablet 0   ? solifenacin (VESICARE) 10 MG tablet Take 10 mg by mouth daily.     ? traZODone (DESYREL) 150 MG tablet TAKE 1 TABLET BY MOUTH AT BEDTIME. *1 TOTAL FILL* 31 tablet 3   ? venlafaxine (EFFEXOR-XR) 150 MG 24 hr capsule TAKE 2 CAPSULES (300MG) BY MOUTH ONCE DAILY *1 TOTAL FILL* 60 capsule 3     No current facility-administered medications on file prior to visit.        Allergies     Allergies   Allergen Reactions   ? Blood-Group Specific Substance Other (See Comments)     Patient has anti-K. Blood product orders maybe delayed. Draw one red top and 2 purple top tubes for all Type and Screen/Red blood Cell product orders   ? Fd And C No.5 (Tartrazine)      GI UPSET   ? Ibuprofen Nausea And Vomiting   ? Morphine Rash     swelling       Review of Systems   A comprehensive review of 14 systems was done. Pertinent findings noted here and in history of present illness. All the rest negative.  Constitutional: Negative.  Negative for fever, chills, she has activity change, appetite change and fatigue.   HENT: Negative for congestion and facial swelling.    Eyes: Negative for photophobia, redness and visual disturbance.   Respiratory: Negative for cough and chest tightness.    Cardiovascular: Negative for chest pain, palpitations and leg swelling.   Gastrointestinal: Negative for nausea, diarrhea, constipation, blood in stool and abdominal distention.   Genitourinary: Negative.    Musculoskeletal: Negative.  Very weak  Skin: Negative.    Neurological: Negative for dizziness, tremors, syncope, weakness, light-headedness and headaches.   Hematological: Does not bruise/bleed easily.   Psychiatric/Behavioral: Negative.  Gets anxious with any therapy cognitively worse than before      Physical Exam     Recent Vitals 7/9/2020   Height -   Weight 144 lbs   BSA  (m2) 1.8 m2   /70   Pulse 63   Temp 97   Temp src -   SpO2 94   Some recent data might be hidden     Recheck WT 160LB /79  Constitutional: Oriented to person, and appears well-developed.   HEENT:  Normocephalic and atraumatic.  Eyes: Conjunctivae and EOM are normal. Pupils are equal, round, and reactive to light. No discharge.  No scleral icterus. Nose normal. Mouth/Throat: Oropharynx is clear and moist. No oropharyngeal exudate.    NECK: Normal range of motion. Neck supple. No JVD present. No tracheal deviation present. No thyromegaly present.   CARDIOVASCULAR: Normal rate, regular rhythm and intact distal pulses.  Exam reveals no gallop and no friction rub.  Systolic murmur present.  PULMONARY: Effort normal and breath sounds normal. No respiratory distress.No Wheezing or rales.  ABDOMEN: Soft. Bowel sounds are normal. No distension and no mass.  There is no tenderness. There is no rebound and no guarding. No HSM.  MUSCULOSKELETAL: Normal range of motion. No edema and no tenderness. Mild kyphosis, no tenderness.  LYMPH NODES: Has no cervical, supraclavicular, axillary and groin adenopathy.   NEUROLOGICAL: Alert and oriented to person. No cranial nerve deficit.  Normal muscle tone. Coordination normal.   GENITOURINARY: Deferred exam.  SKIN: Skin is warm and dry. No rash noted. No erythema. No pallor.   EXTREMITIES: No cyanosis, no clubbing, no edema. No Deformity.  PSYCHIATRIC: Normal mood, affect and behavior.  However exhibits anxiety with movement      Lab Results     Results for orders placed or performed in visit on 06/30/20   Basic Metabolic Panel   Result Value Ref Range    Sodium 144 136 - 145 mmol/L    Potassium 4.0 3.5 - 5.0 mmol/L    Chloride 105 98 - 107 mmol/L    CO2 33 (H) 22 - 31 mmol/L    Anion Gap, Calculation 6 5 - 18 mmol/L    Glucose 85 70 - 125 mg/dL    Calcium 8.4 (L) 8.5 - 10.5 mg/dL    BUN 33 (H) 8 - 28 mg/dL    Creatinine 0.76 0.60 - 1.10 mg/dL    GFR MDRD Af Amer >60 >60  mL/min/1.73m2    GFR MDRD Non Af Amer >60 >60 mL/min/1.73m2             NERY Sosa

## 2021-06-20 NOTE — LETTER
"Letter by Noy Goodrich MBBS at      Author: Noy Goodrich MBBS Service: -- Author Type: --    Filed:  Encounter Date: 7/10/2020 Status: (Other)         Patient: Alison Bashir   MR Number: [id number]   YOB: 1950   Date of Visit: 7/10/2020     [facility]   Video Visit    Code Status: DNR    Alison Bashir is a 69 y.o. female who is being evaluated via a billable video visit.      The patient has been notified of following:     \"This video visit will be conducted via a call between you and your physician/provider. We have found that certain health care needs can be provided without the need for an in-person physical exam.  This service lets us provide the care you need with a video conversation.  If a prescription is necessary we can send it to the facility team.  If lab work is needed we can place an order through the facility team to have that test done at a later time.    If during the course of the call the physician/provider feels a video visit is not appropriate, you will not be charged for this service.\"     Physician/Provider has received verbal consent for a Video Visit from the patient? Yes    Patient would like the video invitation sent by: Send to: [employer]    Video Start Time: 9.22 am    Chief Complaint/Reason for Visit:  Chief Complaint   Patient presents with   ? Review Of Multiple Medical Conditions     Evaluation for wt loss and Parkinson's  HPI:   Alison is a 69 y.o. female who is admitted to the TCU.  Patient was admitted in the hospital with chest pain.  Cardiology was consulted.  Due to underlying history of CAD and stenting she underwent a nuclear stress test which was unrevealing for any reversible ischemia  Patient is currently chest pain-free she is on medical management    Blood pressures continue to fluctuate widely she is on Midodrin for additional support for blood pressures and frequently will have low blood pressures.  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  She is also has some cognitive decline but overall mood and behaviors has been stable  She is requesting today a discharge back to her memory care group home    She has an ongoing concern for weight loss today her weights are down further to 144 pounds.  She denies any concerns she told me she has been eating 100% of the offered meal    I have reviewed and updated the patient's Past Medical History, including history of traumatic brain injury hypertension and coronary artery disease    Social History,lives in a group home; no smoking or etoh     Family History includes cancer in her mother; father had cad and chf   and Medication List.    ALLERGIES  Blood-group specific substance; Fd and c no.5 (tartrazine); Ibuprofen; and Morphine    Review of Systems  Constitutional: Negative.  Negative for fever, chills, she has activity change, appetite change and fatigue.   Loss of 50lb in last few months  HENT: Negative for congestion and facial swelling.    Eyes: Negative for photophobia, redness and visual disturbance.   Respiratory: Negative for cough and chest tightness.    Cardiovascular: Negative for chest pain, palpitations and leg swelling.   Gastrointestinal: Negative for nausea, diarrhea, constipation, blood in stool and abdominal distention.   Genitourinary: Has chronic incontinence due to overactive bladder    Musculoskeletal: Has gait instability and falls requires assistance with ADLs  Skin: Negative.    Neurological:  Negative for dizziness, tremors, syncope, weakness, light-headedness and headaches.   Hematological: Does not bruise/bleed easily.   Psychiatric/Behavioral: Negative.          Physical exam  Wt 144 lb  Bp  109 / 65  t 98 p78  GENERAL: no acute distress. Cooperative in conversation.   Appears very frail and weak  HEENT: pupils are equal, round and reactive. Oral mucosa is moist and intact.  RESP:Chest symmetric. Regular respiratory rate. No stridor.  ABD: Nondistended, soft.  EXTREMITIES: No lower extremity edema.  Range of movement in the legs especially right lower extremity is quite limited  NEURO: non focal. Alert and oriented x3.  Recall is impaired  PSYCH: within normal limits. No depression or anxiety.  SKIN: warm dry intact         Medication List:  Current Outpatient Medications   Medication Sig   ? acetaminophen (TYLENOL) 500 MG tablet Take 500 mg by mouth every 6 (six) hours as needed for pain.   ? aspirin 81 mg chewable tablet Chew 81 mg daily.   ? atorvastatin (LIPITOR) 40 MG tablet Take 40 mg by mouth every evening.    ? buPROPion (WELLBUTRIN XL) 300 MG 24 hr tablet Take 300 mg by mouth every morning.    ? carbidopa-levodopa (SINEMET)  mg per tablet Take 2 tablets by mouth 3 (three) times a day.   ? cholecalciferol, vitamin D3, 1,000 unit tablet Take 4,000 Units by mouth daily.    ? cyanocobalamin (VITAMIN B-12) 100 MCG tablet Take 100 mcg by mouth daily.    ? furosemide (LASIX) 20 MG tablet 20 mg daily.    ? lansoprazole (PREVACID) 30 MG capsule Take 30 mg by mouth daily.    ? magnesium oxide (MAG-OX) 400 mg tablet Take 400 mg by mouth 2 (two) times a day.   ? metoprolol succinate (TOPROL-XL) 12.5 MG Take 12.5 mg by mouth daily.   ? midodrine (PROAMATINE) 5 MG tablet Take 1 tablet (5 mg total) by mouth 3 (three) times a day with meals. (Patient taking differently: Take 7.5 mg by mouth 3 (three) times a day with meals. )   ? mirtazapine (REMERON) 45 MG tablet TAKE 1 TABLET BY MOUTH AT BEDTIME  *1  TOTAL FILL*   ? multivitamin (MULTIVITAMIN) per tablet Take 1 tablet by mouth daily.   ? nitroglycerin (NITROSTAT) 0.4 MG SL tablet Place 0.4 mg under the tongue every 5 (five) minutes as needed.    ? polyethylene glycol (MIRALAX) 17 gram packet Take 17 g by mouth daily.   ? pramipexole (MIRAPEX) 0.25 MG tablet Take 1 tablet (0.25 mg total) by mouth at bedtime.   ? solifenacin (VESICARE) 10 MG tablet Take 10 mg by mouth daily.   ? traZODone (DESYREL) 150 MG tablet TAKE 1 TABLET BY MOUTH AT BEDTIME. *1 TOTAL FILL*   ? venlafaxine (EFFEXOR-XR) 150 MG 24 hr capsule TAKE 2 CAPSULES (300MG) BY MOUTH ONCE DAILY *1 TOTAL FILL*       Labs:  Results for orders placed or performed in visit on 06/30/20   Basic Metabolic Panel   Result Value Ref Range    Sodium 144 136 - 145 mmol/L    Potassium 4.0 3.5 - 5.0 mmol/L    Chloride 105 98 - 107 mmol/L    CO2 33 (H) 22 - 31 mmol/L    Anion Gap, Calculation 6 5 - 18 mmol/L    Glucose 85 70 - 125 mg/dL    Calcium 8.4 (L) 8.5 - 10.5 mg/dL    BUN 33 (H) 8 - 28 mg/dL    Creatinine 0.76 0.60 - 1.10 mg/dL    GFR MDRD Af Amer >60 >60 mL/min/1.73m2    GFR MDRD Non Af Amer >60 >60 mL/min/1.73m2         Assessment/Plan:  -Weight loss of 10 pounds  -History of chest pain/shortness of breath currently resolved  -Underlying history of coronary artery disease with stenting on medical management  -Profound hypotension with orthostatic hypotension  - Bradycardia  -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  Monitor blood pressures remain frequently low  Metoprolol is being held and she also is on midodrine.  Continue with her compression hoses  Cognitive impairment is present with impaired testing scores including  SLUMS 16/30   Mood and behavior however been stable with no significant  issues.  Physically she has made some progress.  She is now able to ambulate using a walker in her room.  Unfortunately she has had progressive weight loss with a weight loss of 10 pounds in over she is down to 144 pounds we will have staff urgently reweigh her if this is a real weight loss will have dietary involved to ensure she is eating well  Staff will be updating me regarding new weights    Video-Visit Details    Type of service:  Video Visit    Video End Time (time video stopped): 9.34am    Originating Location (pt. Location):[facility] [[id number]]    Distant Location (provider location):  [facility]     Mode of Communication:  Zoom Video Conference        NERY Sosa

## 2021-06-20 NOTE — LETTER
Letter by Noy Goodrich MBBS at      Author: Noy Goodrich MBBS Service: -- Author Type: --    Filed:  Encounter Date: 7/21/2020 Status: (Other)         Patient: Alison Bashir   MR Number: [id number]   YOB: 1950   Date of Visit: 7/21/2020       [facility] Admission note      Patient: Alison Bashir  MRN: [id number]  Date of Service: 7/21/2020      [facility] [[id number]]  Reason for Visit     Chief Complaint   Patient presents with   ? Discharge Summary   Evaluation for weakness and low blood pressures    Code Status     dnr    Assessment     -Persistent hypotension  -Underlying history of Parkinson's disease  -Cognitive impairment  --TBI  - Generalized weakness quite profound with a history of falls  -Failure to thrive        History     Patient is a very pleasant 69 y.o. female who is admitted to TCU  Patient was evaluated due to persistent low blood pressure she was noted to have profound hypotension with bradycardia in the hospital and remains on midodrine for supplementation.  Unfortunately blood pressures have not improved and she will have frequent episodes of low blood pressure in spite of being on midodrine for supplementation.  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  She is currently compliant with her restrictions    She also has a history of profound cognitive decline and is testing much worse cognitively than last time she was in the TCU as per my discussion with ZAHRA ALFORD 16/30  Unfortunately she has not done a huge amount of progress.  She has been  refusing to ambulate and some degree of anxiety and falls risk also contributes to that.  She continues to require a higher level of care.  There is also ongoing concern of progressive weight loss in the last couple of months.  This is secondary to declining intake.  In the nursing home due to aggressive supplementation weights have gone slightly up  pounds      Past Medical History     Active Ambulatory (Non-Hospital) Problems    Diagnosis   ? Magnesium deficiency   ? Adjustment insomnia   ? Acute hypotension   ? RBBB   ? Chest pain, unspecified   ? Leukopenia   ? Gastroesophageal reflux disease with esophagitis   ? Personal history of fall   ? Chest pain   ? Chronic pain syndrome   ? Traumatic brain injury (H)   ? Stented coronary artery   ? RLS (restless legs syndrome)   ? Overactive bladder   ? HTN (hypertension)   ? Depression   ? CAD (coronary artery disease)   ? Alzheimer disease (H)   ? Orthostatic hypotension   ? Anemia   ? Cognitive impairment   ? Hip fracture (H)   ? Pain management   ? Constipation   ? Essential hypertension   ? Neuroleptic signed 8/29/16     Past Medical History:   Diagnosis Date   ? Acute blood loss anemia    ? Alzheimer disease (H)    ? CAD (coronary artery disease)    ? Chronic pain syndrome    ? Dementia (H)    ? Depression    ? Depression    ? Hip fracture, right (H) 12/21/2017   ? HTN (hypertension)    ? Kidney stone    ? Overactive bladder    ? PMB (postmenopausal bleeding)    ? RLS (restless legs syndrome)    ? Spine pain    ? Stented coronary artery    ? Traumatic brain injury (H)    ? Unsteady gait    ? UTI (lower urinary tract infection)        Past Social History     Reviewed, and she  reports that she has never smoked. She has never used smokeless tobacco. She reports that she does not drink alcohol or use drugs.    Family History     Reviewed, and family history includes Cancer in her mother; Coronary artery disease in her father; Heart attack in her father; Heart  failure in her father.    Medication List   Post Discharge Medication Reconciliation Status: discharge medications reconciled, continue medications without change   Current Outpatient Medications on File Prior to Visit   Medication Sig Dispense Refill   ? acetaminophen (TYLENOL) 500 MG tablet Take 500 mg by mouth every 6 (six) hours as needed for pain.     ? aspirin 81 mg chewable tablet Chew 81 mg daily.     ? atorvastatin (LIPITOR) 40 MG tablet Take 40 mg by mouth every evening.      ? buPROPion (WELLBUTRIN XL) 300 MG 24 hr tablet Take 300 mg by mouth every morning.      ? carbidopa-levodopa (SINEMET)  mg per tablet Take 2 tablets by mouth 3 (three) times a day.     ? cholecalciferol, vitamin D3, 1,000 unit tablet Take 4,000 Units by mouth daily.      ? cyanocobalamin (VITAMIN B-12) 100 MCG tablet Take 100 mcg by mouth daily.      ? furosemide (LASIX) 20 MG tablet 20 mg daily.      ? lansoprazole (PREVACID) 30 MG capsule Take 30 mg by mouth daily.      ? magnesium oxide (MAG-OX) 400 mg tablet Take 400 mg by mouth 2 (two) times a day.     ? metoprolol succinate (TOPROL-XL) 12.5 MG Take 12.5 mg by mouth daily.     ? midodrine (PROAMATINE) 5 MG tablet Take 1 tablet (5 mg total) by mouth 3 (three) times a day with meals. (Patient taking differently: Take 7.5 mg by mouth 3 (three) times a day with meals. ) 30 tablet 0   ? mirtazapine (REMERON) 45 MG tablet TAKE 1 TABLET BY MOUTH AT BEDTIME  *1 TOTAL FILL* 30 tablet 3   ? multivitamin (MULTIVITAMIN) per tablet Take 1 tablet by mouth daily.     ? nitroglycerin (NITROSTAT) 0.4 MG SL tablet Place 0.4 mg under the tongue every 5 (five) minutes as needed.      ? polyethylene glycol (MIRALAX) 17 gram packet Take 17 g by mouth daily.     ? pramipexole (MIRAPEX) 0.25 MG tablet Take 1 tablet (0.25 mg total) by mouth at bedtime. 30 tablet 0   ? solifenacin (VESICARE) 10 MG tablet Take 10 mg by mouth daily.     ? traZODone (DESYREL) 150 MG tablet TAKE 1 TABLET BY MOUTH AT  BEDTIME. *1 TOTAL FILL* 31 tablet 3   ? venlafaxine (EFFEXOR-XR) 150 MG 24 hr capsule TAKE 2 CAPSULES (300MG) BY MOUTH ONCE DAILY *1 TOTAL FILL* 60 capsule 3     No current facility-administered medications on file prior to visit.        Allergies     Allergies   Allergen Reactions   ? Blood-Group Specific Substance Other (See Comments)     Patient has anti-K. Blood product orders maybe delayed. Draw one red top and 2 purple top tubes for all Type and Screen/Red blood Cell product orders   ? Fd And C No.5 (Tartrazine)      GI UPSET   ? Ibuprofen Nausea And Vomiting   ? Morphine Rash     swelling       Review of Systems   A comprehensive review of 14 systems was done. Pertinent findings noted here and in history of present illness. All the rest negative.  Constitutional: Negative.  Negative for fever, chills, she has activity change, appetite change and fatigue.   HENT: Negative for congestion and facial swelling.    Eyes: Negative for photophobia, redness and visual disturbance.   Respiratory: Negative for cough and chest tightness.    Cardiovascular: Negative for chest pain, palpitations and leg swelling.   Gastrointestinal: Negative for nausea, diarrhea, constipation, blood in stool and abdominal distention.   Genitourinary: Negative.    Musculoskeletal: Negative.  Very weak  Skin: Negative.    Neurological: Negative for dizziness, tremors, syncope, weakness, light-headedness and headaches.   Hematological: Does not bruise/bleed easily.   Psychiatric/Behavioral: Negative.  Gets anxious with any therapy cognitively worse than before      Physical Exam     Recent Vitals 7/16/2020   Height -   Weight 160 lbs   BSA (m2) 1.89 m2   /70   Pulse 67   Temp 97   Temp src -   SpO2 94   Some recent data might be hidden     Recheck WT 160LB /79  GENERAL: no acute distress. Cooperative in conversation.   HEENT: pupils are equal, round and reactive. Oral mucosa is moist and intact.  RESP:Chest symmetric. Regular  respiratory rate. No stridor.  ABD: Nondistended, soft.  EXTREMITIES: No lower extremity edema.   NEURO: non focal. Alert and oriented x3.   PSYCH: within normal limits. No depression or anxiety.  SKIN: warm dry intact           Lab Results     Results for orders placed or performed in visit on 06/30/20   Basic Metabolic Panel   Result Value Ref Range    Sodium 144 136 - 145 mmol/L    Potassium 4.0 3.5 - 5.0 mmol/L    Chloride 105 98 - 107 mmol/L    CO2 33 (H) 22 - 31 mmol/L    Anion Gap, Calculation 6 5 - 18 mmol/L    Glucose 85 70 - 125 mg/dL    Calcium 8.4 (L) 8.5 - 10.5 mg/dL    BUN 33 (H) 8 - 28 mg/dL    Creatinine 0.76 0.60 - 1.10 mg/dL    GFR MDRD Af Amer >60 >60 mL/min/1.73m2    GFR MDRD Non Af Amer >60 >60 mL/min/1.73m2       MEDICAL EQUIPMENT NEEDS:  WALKER     DISCHARGE PLAN/FACE TO FACE:  I certify that services are/were furnished while this patient was under the care of a physician and that a physician or an allowed non-physician practitioner (NPP), had a face-to-face encounter that meets the physician face-to-face encounter requirements. The encounter was in whole, or in part, related to the primary reason for home health. The patient is confined to his/her home and needs intermittent skilled nursing, physical therapy, speech-language pathology, or the continued need for occupational therapy. A plan of care has been established by a physician and is periodically reviewed by a physician.  Date of Face-to-Face Encounter: 7/21/20     I certify that, based on my findings, the following services are medically necessary home health services: [facility] HHA/RN and PT/OT to evaluate and treat    My clinical findings support the need for the above skilled services because: patient will be discharging to home. Patient will need assistance with medication management and performing IADLs and ADLs effectively and safely.     Patient to re-establish plan of care with their PCP within 7 days after leaving TCU.   Patient  examined using a video encounter.  Informed consent of the patient taken prior to the start of encounter  [facility] of the [facility]  [facility]  Start time 8:45 AM  End time is 8.55 AM    NERY Sosa

## 2021-06-20 NOTE — LETTER
Letter by Noy Goodrich MBBS at      Author: Noy Goodrich MBBS Service: -- Author Type: --    Filed:  Encounter Date: 9/1/2020 Status: (Other)         Patient: Alison Bashir   MR Number: [id number]   YOB: 1950   Date of Visit: 9/1/2020       [facility] Admission note      Patient: Alison Bashir  MRN: [id number]  Date of Service: 9/1/2020      [facility] [[id number]]  Reason for Visit     Chief Complaint   Patient presents with   ? H & P       Code Status     DNR /DNI    Assessment     -Acute fall in the group home  - Acute subcutaneous hematoma along the lateral margin of the right hip secondary to fall  -Pain management with patient requesting better management  -Underlying history of Parkinson's with bilateral upper extremity tremors  -Underlying history of Alzheimer's disease  -History of hypertension with hypotension  -CAD  -Depression  -History of overactive bladder  -Acute UTI cultures growing E. coli and Morganella treated now with cefdinir  -Generalized debilitation with recurrent falls requiring lengthy TCU stays  -History of TBI    Plan     Patient has been admitted to the TCU.  She has a history of recurrent falls and recently had a prolonged lengthy stay in the TCU and was discharged back to her group home.  Unfortunately she readmitted within a week with another fall.  Her group home has refused accept her back till she receives her prior level of functioning.  Suspect she may need long-term care if she does not improve.  Neurology was involved in her care because of underlying history of Parkinson's disease and dementia.  No new recommendations for medication obtained.  They have recommended continuing with her current medication regimen and Sinemet.  She will be participating in PT and OT.  Alison's main concern is pain management of her right thigh hematoma.  She has been given only Tylenol.  Advised her to do some icing.  Also topical pain gel Voltaren  gel has been added twice a day to her regimen to see if that is effective.  Tramadol 50 mg every 6 hours as needed will be added for additional pain relief.  Celebrex 200 mg daily to be added.  Therapy to try some alternative modalities also.  Recheck labs including hemoglobin due to anemia concerns.  Denies any dysuria and is currently on antibiotics.  She has a history of orthostatic hypotension with hypertension noted she was hypotensive in the hospital.  Currently she is on midodrine as well as low-dose of metoprolol.  We will monitor blood pressure trends  She also has a history of mood disorder and sees psychiatry she is on multiple medications will monitor mood closely.  She also has a recent history of significant weight loss of more than 30 pounds.  She will be monitored further  Recheck routine labs  Her overall prognosis has been guarded with recent progressive decline noted both cognitively and physically with recurrent falls.  Patient has chosen a DNR/DNI CODE STATUS    History     Patient is a very pleasant 70 y.o. female who is admitted to TCU  Patient presented to the hospital after she fell.  At baseline she has frequent falls in her group home.  She had recently a prolonged lengthy stay at the [facility] because of her falls and was discharged home.  Unfortunately she presented after a fall and has been discharged back to the TCU.  She also has underlying history of Parkinson's which has been slowly getting worse.  She was evaluated by neurology.  They have recommended PT OT no change in medications.  She continues to complain of right thigh pain unfortunately that is not responsive to topical treatments.  MRI was negative for any fractures but did show a subcutaneous hematoma.  She has been given only Tylenol for pain management and wondering if she can have anything stronger.  She also has baseline cognitive impairment due to Alzheimer's dementia with some intermittent agitation today  however she appears to be quite clear and cognitively alert with good recall of recent events  Had UTI in the hospital cultures grew Morganella and E. coli she was given ceftriaxone and is on oral cefdinir in the TCU  She has a history of orthostatic hypotension is on midodrine however had also hypertension and is on metoprolol she has some low blood pressures especially in the evening in the hospital    Past Medical History     Active Ambulatory (Non-Hospital) Problems    Diagnosis   ? Hematoma of right hip   ? Urinary tract infection   ? Inability to walk   ? Parkinsonism, unspecified Parkinsonism type (H)   ? Fall, initial encounter   ? Magnesium deficiency   ? Adjustment insomnia   ? Acute hypotension   ? RBBB   ? Chest pain, unspecified   ? Leukopenia   ? Gastroesophageal reflux disease with esophagitis   ? Personal history of fall   ? Chest pain   ? Chronic pain syndrome   ? Traumatic brain injury (H)   ? Stented coronary artery   ? RLS (restless legs syndrome)   ? Overactive bladder   ? HTN (hypertension)   ? Depression   ? CAD (coronary artery disease)   ? Alzheimer disease (H)   ? Orthostatic hypotension   ? Anemia   ? Cognitive impairment   ? Hip fracture (H)   ? ACP (advance care planning)   ? Constipation   ? Essential hypertension   ? Neuroleptic signed 8/29/16     Past Medical History:   Diagnosis Date   ? Acute blood loss anemia    ? Alzheimer disease (H)    ? CAD (coronary artery disease)    ? Chronic pain syndrome    ? Dementia (H)    ? Depression    ? Depression    ? Hip fracture, right (H) 12/21/2017   ? HTN (hypertension)    ? Kidney stone    ? Overactive bladder    ? PMB (postmenopausal bleeding)    ? RLS (restless legs syndrome)    ? Spine pain    ? Stented coronary artery    ? Traumatic brain injury (H)    ? Unsteady gait    ? UTI (lower urinary tract infection)        Past Social History     Reviewed, and she  reports that she has never smoked. She has never used smokeless tobacco. She  reports that she does not drink alcohol or use drugs.    Family History     Reviewed, and family history includes Cancer in her mother; Coronary artery disease in her father; Heart attack in her father; Heart failure in her father.    Medication List   Post Discharge Medication Reconciliation Status: discharge medications reconciled and changed, per note/orders   Current Outpatient Medications on File Prior to Visit   Medication Sig Dispense Refill   ? acetaminophen (TYLENOL) 500 MG tablet Take 2 tablets (1,000 mg total) by mouth 3 (three) times a day.  0   ? aspirin 81 mg chewable tablet Chew 81 mg daily.     ? atorvastatin (LIPITOR) 40 MG tablet Take 40 mg by mouth every evening.      ? buPROPion (WELLBUTRIN XL) 300 MG 24 hr tablet Take 300 mg by mouth every morning.      ? carbidopa-levodopa (SINEMET)  mg per tablet Take 2 tablets by mouth 3 (three) times a day.     ? cefdinir (OMNICEF) 300 MG capsule Take 1 capsule (300 mg total) by mouth 2 times a day at 6:00 am and 4:00 pm for 6 days. 12 capsule 0   ? cholecalciferol, vitamin D3, 1,000 unit tablet Take 4,000 Units by mouth daily.      ? cyanocobalamin (VITAMIN B-12) 100 MCG tablet Take 100 mcg by mouth daily.      ? lansoprazole (PREVACID) 30 MG capsule Take 30 mg by mouth daily.      ? magnesium oxide (MAG-OX) 400 mg tablet Take 400 mg by mouth 2 (two) times a day.     ? metoprolol succinate (TOPROL-XL) 12.5 MG Take 12.5 mg by mouth daily.     ? midodrine (PROAMATINE) 2.5 MG tablet Take 7.5 mg by mouth 3 (three) times a day with meals.     ? mirtazapine (REMERON) 45 MG tablet TAKE 1 TABLET BY MOUTH AT BEDTIME  *1 TOTAL FILL* 30 tablet 3   ? multivitamin (MULTIVITAMIN) per tablet Take 1 tablet by mouth daily.     ? nitroglycerin (NITROSTAT) 0.4 MG SL tablet Place 0.4 mg under the tongue every 5 (five) minutes as needed.      ? polyethylene glycol (MIRALAX) 17 gram packet Take 17 g by mouth daily.     ? pramipexole (MIRAPEX) 0.5 MG tablet Take 0.5 mg by  mouth at bedtime.     ? solifenacin (VESICARE) 10 MG tablet Take 10 mg by mouth daily.     ? traZODone (DESYREL) 150 MG tablet TAKE 1 TABLET BY MOUTH AT BEDTIME. *1 TOTAL FILL* 31 tablet 3   ? venlafaxine (EFFEXOR-XR) 150 MG 24 hr capsule TAKE 2 CAPSULES (300MG) BY MOUTH ONCE DAILY *1 TOTAL FILL* 60 capsule 3     No current facility-administered medications on file prior to visit.        Allergies     Allergies   Allergen Reactions   ? Blood-Group Specific Substance Other (See Comments)     Patient has anti-K. Blood product orders maybe delayed. Draw one red top and 2 purple top tubes for all Type and Screen/Red blood Cell product orders   ? Fd And C No.5 (Tartrazine)      GI UPSET   ? Ibuprofen Nausea And Vomiting   ? Morphine Rash     swelling       Review of Systems   A comprehensive review of 14 systems was done. Pertinent findings noted here and in history of present illness. All the rest negative.  Constitutional: Negative.  Negative for fever, chills, she has  activity change, appetite change and fatigue.   Patient has had significant weight loss recently also  HENT: Negative for congestion and facial swelling.    Eyes: Negative for photophobia, redness and visual disturbance.   Respiratory: Negative for cough and chest tightness.    Cardiovascular: Negative for chest pain, palpitations and leg swelling.   Gastrointestinal: Negative for nausea, diarrhea, constipation, blood in stool and abdominal distention.   Genitourinary: Negative.    Musculoskeletal: Negative.  She is reporting multiple falls pain in her right thigh  Skin: Negative.    Neurological: Negative for dizziness, tremors, syncope, weakness, light-headedness and headaches.   Hematological: Does not bruise/bleed easily.   Psychiatric/Behavioral: Negative.  Recall is impaired but patient is quite pleasant today      Physical Exam     Recent Vitals 8/31/2020   Height -   Weight 174 lbs 8 oz   BSA (m2) 1.98 m2   BP 99/63   Pulse 80   Temp 99   Temp  src -   SpO2 -   Some recent data might be hidden       Constitutional: Oriented to person, place, and time and appears well-developed.   Appears frail and weak  HEENT:  Normocephalic and atraumatic.  Eyes: Conjunctivae and EOM are normal. Pupils are equal, round, and reactive to light. No discharge.  No scleral icterus. Nose normal. Mouth/Throat: Oropharynx is clear and moist. No oropharyngeal exudate.    NECK: Normal range of motion. Neck supple. No JVD present. No tracheal deviation present. No thyromegaly present.   CARDIOVASCULAR: Normal rate, regular rhythm and intact distal pulses.  Exam reveals no gallop and no friction rub.  Systolic murmur present.  PULMONARY: Effort normal and breath sounds normal. No respiratory distress.No Wheezing or rales.  ABDOMEN: Soft. Bowel sounds are normal. No distension and no mass.  There is no tenderness. There is no rebound and no guarding. No HSM.  MUSCULOSKELETAL: Normal range of motion. No edema and no tenderness. Mild kyphosis, tenderness.  On her right thigh even to light touch  LYMPH NODES: Has no cervical, supraclavicular, axillary and groin adenopathy.   NEUROLOGICAL: Alert and oriented to person, place, and time. No cranial nerve deficit.  Normal muscle tone. Coordination normal  Resting tremors bilateral upper extremity noted.   GENITOURINARY: Deferred exam.  SKIN: Skin is warm and dry. No rash noted. No erythema. No pallor.   EXTREMITIES: No cyanosis, no clubbing, no edema. No Deformity.  PSYCHIATRIC: Normal mood, affect and behavior.  Recall is impaired      Lab Results     Recent Results (from the past 240 hour(s))   Magnesium    Collection Time: 08/25/20  4:54 PM   Result Value Ref Range    Magnesium 2.2 1.8 - 2.6 mg/dL   Hepatic Profile    Collection Time: 08/25/20  4:54 PM   Result Value Ref Range    Bilirubin, Total 0.3 0.0 - 1.0 mg/dL    Bilirubin, Direct 0.2 <=0.5 mg/dL    Protein, Total 6.6 6.0 - 8.0 g/dL    Albumin 3.6 3.5 - 5.0 g/dL    Alkaline  Phosphatase 105 45 - 120 U/L    AST 12 0 - 40 U/L    ALT 16 0 - 45 U/L   Basic Metabolic Panel    Collection Time: 08/25/20  4:54 PM   Result Value Ref Range    Sodium 144 136 - 145 mmol/L    Potassium 4.1 3.5 - 5.0 mmol/L    Chloride 105 98 - 107 mmol/L    CO2 33 (H) 22 - 31 mmol/L    Anion Gap, Calculation 6 5 - 18 mmol/L    Glucose 93 70 - 125 mg/dL    Calcium 8.7 8.5 - 10.5 mg/dL    BUN 28 8 - 28 mg/dL    Creatinine 0.72 0.60 - 1.10 mg/dL    GFR MDRD Af Amer >60 >60 mL/min/1.73m2    GFR MDRD Non Af Amer >60 >60 mL/min/1.73m2   Thyroid Cascade    Collection Time: 08/25/20  4:54 PM   Result Value Ref Range    TSH 1.09 0.30 - 5.00 uIU/mL   HM1 (CBC with Diff)    Collection Time: 08/25/20  4:54 PM   Result Value Ref Range    WBC 4.5 4.0 - 11.0 thou/uL    RBC 3.76 (L) 3.80 - 5.40 mill/uL    Hemoglobin 10.7 (L) 12.0 - 16.0 g/dL    Hematocrit 35.5 35.0 - 47.0 %    MCV 94 80 - 100 fL    MCH 28.5 27.0 - 34.0 pg    MCHC 30.1 (L) 32.0 - 36.0 g/dL    RDW 13.4 11.0 - 14.5 %    Platelets 153 140 - 440 thou/uL    MPV 10.8 8.5 - 12.5 fL    Neutrophils % 67 50 - 70 %    Lymphocytes % 24 20 - 40 %    Monocytes % 7 2 - 10 %    Eosinophils % 2 0 - 6 %    Basophils % 0 0 - 2 %    Immature Granulocyte % 0 <=0 %    Neutrophils Absolute 3.0 2.0 - 7.7 thou/uL    Lymphocytes Absolute 1.1 0.8 - 4.4 thou/uL    Monocytes Absolute 0.3 0.0 - 0.9 thou/uL    Eosinophils Absolute 0.1 0.0 - 0.4 thou/uL    Basophils Absolute 0.0 0.0 - 0.2 thou/uL    Immature Granulocyte Absolute 0.0 <=0.0 thou/uL   COVID-19 Virus PCR MRF    Collection Time: 08/25/20  7:22 PM    Specimen: Respiratory   Result Value Ref Range    COVID-19 VIRUS SPECIMEN SOURCE Nasopharyngeal     2019-nCOV Not Detected    ECG 12 lead MUSE    Collection Time: 08/25/20  9:04 PM   Result Value Ref Range    SYSTOLIC BLOOD PRESSURE      DIASTOLIC BLOOD PRESSURE      VENTRICULAR RATE 62 BPM    ATRIAL RATE 62 BPM    P-R INTERVAL 166 ms    QRS DURATION 162 ms    Q-T INTERVAL 466 ms    QTC  CALCULATION (BEZET) 472 ms    P Axis 40 degrees    R AXIS -25 degrees    T AXIS 16 degrees    MUSE DIAGNOSIS       Normal sinus rhythm  Right bundle branch block  Abnormal ECG  When compared with ECG of 23-JUL-2020 18:53,  No significant change was found  Confirmed by JOSR BELL MD LOC:[facility] ([id number]) on 8/26/2020 1:44:04 PM     Basic Metabolic Panel    Collection Time: 08/26/20  5:59 AM   Result Value Ref Range    Sodium 144 136 - 145 mmol/L    Potassium 4.2 3.5 - 5.0 mmol/L    Chloride 108 (H) 98 - 107 mmol/L    CO2 32 (H) 22 - 31 mmol/L    Anion Gap, Calculation 4 (L) 5 - 18 mmol/L    Glucose 96 70 - 125 mg/dL    Calcium 8.0 (L) 8.5 - 10.5 mg/dL    BUN 29 (H) 8 - 28 mg/dL    Creatinine 0.72 0.60 - 1.10 mg/dL    GFR MDRD Af Amer >60 >60 mL/min/1.73m2    GFR MDRD Non Af Amer >60 >60 mL/min/1.73m2   HM2(CBC w/o Differential)    Collection Time: 08/26/20  5:59 AM   Result Value Ref Range    WBC 2.9 (L) 4.0 - 11.0 thou/uL    RBC 3.11 (L) 3.80 - 5.40 mill/uL    Hemoglobin 9.0 (L) 12.0 - 16.0 g/dL    Hematocrit 29.6 (L) 35.0 - 47.0 %    MCV 95 80 - 100 fL    MCH 28.9 27.0 - 34.0 pg    MCHC 30.4 (L) 32.0 - 36.0 g/dL    RDW 13.7 11.0 - 14.5 %    Platelets 119 (L) 140 - 440 thou/uL    MPV 10.8 8.5 - 12.5 fL   CK Total    Collection Time: 08/26/20  5:59 AM   Result Value Ref Range    CK, Total 27 (L) 30 - 190 U/L   Lactic Acid    Collection Time: 08/26/20  6:55 AM   Result Value Ref Range    Lactic Acid 0.4 (L) 0.7 - 2.0 mmol/L   Urinalysis-UC if Indicated    Collection Time: 08/26/20  7:21 PM   Result Value Ref Range    Color, UA Yellow Colorless, Yellow, Straw, Light Yellow    Clarity, UA Cloudy (!) Clear    Glucose, UA Negative Negative    Bilirubin, UA Negative Negative    Ketones, UA Negative Negative, 60 mg/dL    Specific Gravity, UA 1.017 1.001 - 1.030    Blood, UA Moderate (!) Negative    pH, UA 6.0 4.5 - 8.0    Protein, UA Trace (!) Negative mg/dL    Urobilinogen, UA <2.0 E.U./dL <2.0 E.U./dL, 2.0 E.U./dL    Nitrite,  UA Negative Negative    Leukocytes, UA Large (!) Negative    Bacteria, UA None Seen None Seen hpf    RBC, UA >100 (!) None Seen, 0-2 hpf    WBC, UA >100 (!) None Seen, 0-5 hpf    Squam Epithel, UA 0-5 None Seen, 0-5 lpf    Trans Epithel, UA 5-10 (!) None Seen lpf   Culture, Urine    Collection Time: 08/26/20  7:21 PM    Specimen: Urine   Result Value Ref Range    Culture >100,000 col/ml Gram Negative Rods (!)    Hemoglobin    Collection Time: 08/27/20  4:48 PM   Result Value Ref Range    Hemoglobin 9.3 (L) 12.0 - 16.0 g/dL   Basic Metabolic Panel    Collection Time: 08/31/20  9:48 AM   Result Value Ref Range    Sodium 145 136 - 145 mmol/L    Potassium 3.8 3.5 - 5.0 mmol/L    Chloride 107 98 - 107 mmol/L    CO2 31 22 - 31 mmol/L    Anion Gap, Calculation 7 5 - 18 mmol/L    Glucose 80 70 - 125 mg/dL    Calcium 8.5 8.5 - 10.5 mg/dL    BUN 30 (H) 8 - 28 mg/dL    Creatinine 0.72 0.60 - 1.10 mg/dL    GFR MDRD Af Amer >60 >60 mL/min/1.73m2    GFR MDRD Non Af Amer >60 >60 mL/min/1.73m2   Hemoglobin    Collection Time: 08/31/20  9:48 AM   Result Value Ref Range    Hemoglobin 9.9 (L) 12.0 - 16.0 g/dL            Imaging Results     Xr Femur Right 2 Vws    Result Date: 8/25/2020  EXAM: XR PELVIS AP, XR FEMUR RIGHT 2 VWS LOCATION: [facility] DATE/TIME: 8/25/2020 3:47 PM INDICATION: fall, hip and thigh pain COMPARISON: 07/23/2020 and 02/08/2019     Pelvis: No fracture or dislocation. There is mild sclerosis at the left hip which is mild osteoarthritic change. Right hip and femur: Right hip arthroplasty with a longstem prosthesis and cerclage wires. Lucency at the lesser trochanter has not changed. No new periprosthetic lucency or fracture. Right total knee arthroplasty noted. No periprosthetic fracture or lucency identified on this limited view of the knee. No suprapatellar effusion appreciated.     Mr Pelvis Without Contrast    Result Date: 8/26/2020  EXAM: MR PELVIS WO CONTRAST LOCATION: [facility] DATE/TIME:  8/26/2020 4:27 PM INDICATION: 70-year-old female with Parkinsonism with fall today with right hip/pelvis pain. Negative plain films. COMPARISON: 08/25/2020 radiographs. TECHNIQUE: Unenhanced. FINDINGS: HIPS: No acute fracture or contusion. Right bipolar femoral endoprosthesis with cerclage wire fixation of the proximal femur. Moderate-sized areas of heterotopic ossification arising from the anterior and medial portions of the proximal femur. There is also a small area of heterotopic ossification arising from the lateral margin of the right acetabulum. Moderate degenerative changes in the left hip. TENDONS: Gluteal minimus and medius tendons intact. No tendinopathy. No trochanteric bursitis. Proximal hamstring tendons intact. No tendinopathy. No iliopsoas tendon tear or tendinopathy. BONES: No marrow edema. No evidence of a marrow replacing lesion. SI joints are normal. Visualized lower lumbar spine is unremarkable. SOFT TISSUES: Moderate-sized acute subcutaneous hematoma along the lateral margin of the hip and proximal thigh as seen on series 5 image 8. This is incompletely visualized on this study. INTRA-PELVIC CONTENTS: No free fluid.     1.  No acute fracture. 2.  Right bipolar femoral endoprosthesis with cerclage wire fixation with a moderate amount of adjacent heterotopic ossification. 3.  Moderate-sized acute subcutaneous hematoma along the lateral margin of the right hip. 4.  Moderate degenerative changes in the left hip.     Xr Finger Right 2 Or More Vws    Result Date: 8/25/2020  EXAM: XR FINGER RIGHT 2 OR MORE VWS LOCATION: [facility] DATE/TIME: 8/25/2020 4:50 PM INDICATION: fall, PIP bruising COMPARISON: None.     Normal joint spaces and alignment. No fracture. Soft tissue swelling about the PIP joint.     Xr Pelvis Ap    Result Date: 8/25/2020  EXAM: XR PELVIS AP, XR FEMUR RIGHT 2 VWS LOCATION: [facility] DATE/TIME: 8/25/2020 3:47 PM INDICATION: fall, hip and thigh pain COMPARISON:  07/23/2020 and 02/08/2019     Pelvis: No fracture or dislocation. There is mild sclerosis at the left hip which is mild osteoarthritic change. Right hip and femur: Right hip arthroplasty with a longstem prosthesis and cerclage wires. Lucency at the lesser trochanter has not changed. No new periprosthetic lucency or fracture. Right total knee arthroplasty noted. No periprosthetic fracture or lucency identified on this limited view of the knee. No suprapatellar effusion appreciated.             NERY Sosa

## 2021-06-20 NOTE — LETTER
Letter by Noy Goodrich MBBS at      Author: Noy Goodrich [employer] Service: -- Author Type: --    Filed:  Encounter Date: 9/10/2020 Status: (Other)         Patient: Alison Bashir   MR Number: [id number]   YOB: 1950   Date of Visit: 9/10/2020       [facility] Admission note      Patient: Alison Bashir  MRN: [id number]  Date of Service: 9/10/2020      [facility] [[id number]]  Reason for Visit     Chief Complaint   Patient presents with   ? Review Of Multiple Medical Conditions   F/U ON PAIN; LEG SWELLING/ WT LOSS    Code Status     DNR /DNI    Assessment     -Acute fall in the group home  - Acute subcutaneous hematoma along the lateral margin of the right hip secondary to fall  -Pain management with patient requesting better management  -Underlying history of Parkinson's with bilateral upper extremity tremors  -Underlying history of Alzheimer's disease  -History of hypertension with hypotension  -CAD  -Depression  -History of overactive bladder  -Acute UTI cultures growing E. coli and Morganella treated now with cefdinir  -Generalized debilitation with recurrent falls requiring lengthy TCU stays  -History of TBI      Plan     Patient has been admitted to the TCU.  She has a history of recurrent falls and recently had a prolonged lengthy stay in the TCU and was discharged back to her group home.  Unfortunately she readmitted within a week with another fall.  At baseline she is profoundly debilitated and wheelchair bound and poorly ambulatory.  Her right thigh hematoma is improving slowly.  She has been using tramadol effectively in addition to ice and scheduled Tylenol for pain management.  Weights are being watched closely.  She has had a 2 pound weight loss but overall review weight reveals quite a bit of fluctuation with weights ranging from 161 to 171 pounds wondering if this is an error  Dietary consultation for adequate intake.  ReWEIGH  requested for the patient  also  TEDS ordered for swelling of the legs and monitor weights    History     Patient is a very pleasant 70 y.o. female who is admitted to TCU  Patient presented to the hospital after she fell.  At baseline she has frequent falls in her group home.  She is currently wheelchair-bound and remains a high fall risk    She also has underlying history of Parkinson's which has been slowly getting worse.  Neurology was consulted during hospitalization they feel that this is a natural progression of the disease they have not recommended any medication changes as yet  At baseline she is at a significant decline with underlying history of Parkinson's disease.  She is wheelchair-bound currently and has some resting tremors noted    She continues to complain of right thigh pain unfortunately that is not responsive to topical treatments.  MRI was negative for any fractures but did show a subcutaneous hematoma.  She has been taken off Voltaren gel at her request.  She has been icing her hip.  She is also been using 2 tramadol with quite a good response she has not had any confusion episodes with this and overall is quite happy with tramadol    She also has baseline cognitive impairment due to Alzheimer's dementia with some intermittent agitation today however she appears to be quite clear   Will currently is reasonably not impaired.  ROCÍO 18/30    Had UTI in the hospital cultures grew Morganella and E. coli she was given ceftriaxone and is on oral cefdinir in the TCU  She also has a history of orthostatic hypotension and has been on midodrine recently blood pressures have been stable but staff did report when they tried to stand her up she did immediately drops her blood pressure she has been given JAMIA hose for lymphedema concerns  Nursing wanted to review because of weight loss however on review it seems her weights are fluctuating widely with gains and losses so have asked them to check her weights again she told me she is  eating well    Past Medical History     Active Ambulatory (Non-Hospital) Problems    Diagnosis   ? Hematoma of right hip   ? Urinary tract infection   ? Inability to walk   ? Parkinsonism, unspecified Parkinsonism type (H)   ? Fall, initial encounter   ? Magnesium deficiency   ? Adjustment insomnia   ? Acute hypotension   ? RBBB   ? Chest pain, unspecified   ? Leukopenia   ? Gastroesophageal reflux disease with esophagitis   ? Personal history of fall   ? Chest pain   ? Chronic pain syndrome   ? Traumatic brain injury (H)   ? Stented coronary artery   ? RLS (restless legs syndrome)   ? Overactive bladder   ? HTN (hypertension)   ? Depression   ? CAD (coronary artery disease)   ? Alzheimer disease (H)   ? Orthostatic hypotension   ? Anemia   ? Cognitive impairment   ? Hip fracture (H)   ? ACP (advance care planning)   ? Constipation   ? Essential hypertension   ? Neuroleptic signed 8/29/16     Past Medical History:   Diagnosis Date   ? Acute blood loss anemia    ? Alzheimer disease (H)    ? CAD (coronary artery disease)    ? Chronic pain syndrome    ? Dementia (H)    ? Depression    ? Depression    ? Hip fracture, right (H) 12/21/2017   ? HTN (hypertension)    ? Kidney stone    ? Overactive bladder    ? PMB (postmenopausal bleeding)    ? RLS (restless legs syndrome)    ? Spine pain    ? Stented coronary artery    ? Traumatic brain injury (H)    ? Unsteady gait    ? UTI (lower urinary tract infection)        Past Social History     Reviewed, and she  reports that she has never smoked. She has never used smokeless tobacco. She reports that she does not drink alcohol or use drugs.    Family History     Reviewed, and family history includes Cancer in her mother; Coronary artery disease in her father; Heart attack in her father; Heart failure in her father.    Medication List   Post Discharge Medication Reconciliation Status: discharge medications reconciled and changed, per note/orders   Current Outpatient Medications on  File Prior to Visit   Medication Sig Dispense Refill   ? acetaminophen (TYLENOL) 500 MG tablet Take 2 tablets (1,000 mg total) by mouth 3 (three) times a day.  0   ? aspirin 81 mg chewable tablet Chew 81 mg daily.     ? atorvastatin (LIPITOR) 40 MG tablet Take 40 mg by mouth every evening.      ? buPROPion (WELLBUTRIN XL) 300 MG 24 hr tablet Take 300 mg by mouth every morning.      ? carbidopa-levodopa (SINEMET)  mg per tablet Take 2 tablets by mouth 3 (three) times a day.     ? cholecalciferol, vitamin D3, 1,000 unit tablet Take 4,000 Units by mouth daily.      ? cyanocobalamin (VITAMIN B-12) 100 MCG tablet Take 100 mcg by mouth daily.      ? lansoprazole (PREVACID) 30 MG capsule Take 30 mg by mouth daily.      ? magnesium oxide (MAG-OX) 400 mg tablet Take 400 mg by mouth 2 (two) times a day.     ? metoprolol succinate (TOPROL-XL) 12.5 MG Take 12.5 mg by mouth daily.     ? midodrine (PROAMATINE) 2.5 MG tablet Take 7.5 mg by mouth 3 (three) times a day with meals.     ? mirtazapine (REMERON) 45 MG tablet TAKE 1 TABLET BY MOUTH AT BEDTIME  *1 TOTAL FILL* 30 tablet 3   ? multivitamin (MULTIVITAMIN) per tablet Take 1 tablet by mouth daily.     ? nitroglycerin (NITROSTAT) 0.4 MG SL tablet Place 0.4 mg under the tongue every 5 (five) minutes as needed.      ? polyethylene glycol (MIRALAX) 17 gram packet Take 17 g by mouth daily.     ? pramipexole (MIRAPEX) 0.5 MG tablet Take 0.5 mg by mouth at bedtime.     ? solifenacin (VESICARE) 10 MG tablet Take 10 mg by mouth daily.     ? traZODone (DESYREL) 150 MG tablet TAKE 1 TABLET BY MOUTH AT BEDTIME. *1 TOTAL FILL* 31 tablet 3   ? venlafaxine (EFFEXOR-XR) 150 MG 24 hr capsule TAKE 2 CAPSULES (300MG) BY MOUTH ONCE DAILY *1 TOTAL FILL* 60 capsule 3     No current facility-administered medications on file prior to visit.        Allergies     Allergies   Allergen Reactions   ? Blood-Group Specific Substance Other (See Comments)     Patient has anti-K. Blood product orders  maybe delayed. Draw one red top and 2 purple top tubes for all Type and Screen/Red blood Cell product orders   ? Fd And C No.5 (Tartrazine)      GI UPSET   ? Ibuprofen Nausea And Vomiting   ? Morphine Rash     swelling       Review of Systems   A comprehensive review of 14 systems was done. Pertinent findings noted here and in history of present illness. All the rest negative.  Constitutional: Negative.  Negative for fever, chills, she has  activity change, appetite change and fatigue.   Patient has had significant weight loss recently also  HENT: Negative for congestion and facial swelling.    Eyes: Negative for photophobia, redness and visual disturbance.   Respiratory: Negative for cough and chest tightness.    Cardiovascular: Negative for chest pain, palpitations and has some leg swelling.   Gastrointestinal: Negative for nausea, diarrhea, constipation, blood in stool and abdominal distention.   Genitourinary: Negative.    Musculoskeletal: Negative.  She is reporting multiple falls pain in her right thigh  Skin: Negative.    Neurological: Negative for dizziness, tremors, syncope, weakness, light-headedness and headaches.   Hematological: Does not bruise/bleed easily.   Psychiatric/Behavioral: Negative.  Recall is impaired but patient is quite pleasant today      Physical Exam     Recent Vitals 8/31/2020   Height -   Weight 174 lbs 8 oz   BSA (m2) 1.98 m2   BP 99/63   Pulse 80   Temp 99   Temp src -   SpO2 -   Some recent data might be hidden     Recheck weight is 169 pounds was 161 pounds and 167 pounds also  Constitutional: Oriented to person, place, and time and appears well-developed.   Appears frail and weak  HEENT:  Normocephalic and atraumatic.  Eyes: Conjunctivae and EOM are normal. Pupils are equal, round, and reactive to light. No discharge.  No scleral icterus. Nose normal. Mouth/Throat: Oropharynx is clear and moist. No oropharyngeal exudate.    NECK: Normal range of motion. Neck supple. No JVD  present. No tracheal deviation present. No thyromegaly present.   CARDIOVASCULAR: Normal rate, regular rhythm and intact distal pulses.  Exam reveals no gallop and no friction rub.  Systolic murmur present.  PULMONARY: Effort normal and breath sounds normal. No respiratory distress.No Wheezing or rales.  ABDOMEN: Soft. Bowel sounds are normal. No distension and no mass.  There is no tenderness. There is no rebound and no guarding. No HSM.  MUSCULOSKELETAL: Normal range of motion. 1+ edema and no tenderness. Mild kyphosis, tenderness.  On her right thigh even to light touch  LYMPH NODES: Has no cervical, supraclavicular, axillary and groin adenopathy.   NEUROLOGICAL: Alert and oriented to person, place, and time. No cranial nerve deficit.  Normal muscle tone. Coordination normal  Resting tremors bilateral upper extremity noted.   GENITOURINARY: Deferred exam.  SKIN: Skin is warm and dry. No rash noted. No erythema. No pallor.   EXTREMITIES: No cyanosis, no clubbing, 1+ edema. No Deformity.  PSYCHIATRIC: Normal mood, affect and behavior.  Recall is impaired      Lab Results     Results for orders placed or performed in visit on 08/31/20   Basic Metabolic Panel   Result Value Ref Range    Sodium 145 136 - 145 mmol/L    Potassium 3.8 3.5 - 5.0 mmol/L    Chloride 107 98 - 107 mmol/L    CO2 31 22 - 31 mmol/L    Anion Gap, Calculation 7 5 - 18 mmol/L    Glucose 80 70 - 125 mg/dL    Calcium 8.5 8.5 - 10.5 mg/dL    BUN 30 (H) 8 - 28 mg/dL    Creatinine 0.72 0.60 - 1.10 mg/dL    GFR MDRD Af Amer >60 >60 mL/min/1.73m2    GFR MDRD Non Af Amer >60 >60 mL/min/1.73m2            NERY Sosa

## 2021-06-20 NOTE — LETTER
Letter by Noy Goodrich MBBS at      Author: Noy Goodrich MBBS Service: -- Author Type: --    Filed:  Encounter Date: 9/3/2020 Status: (Other)         Patient: Alison Bashir   MR Number: [id number]   YOB: 1950   Date of Visit: 9/3/2020       [facility] Admission note      Patient: Alison Bashir  MRN: [id number]  Date of Service: 9/3/2020      [facility] [[id number]]  Reason for Visit     Chief Complaint   Patient presents with   ? Review Of Multiple Medical Conditions   F/U ON PAIN    Code Status     DNR /DNI    Assessment     -Acute fall in the group home  - Acute subcutaneous hematoma along the lateral margin of the right hip secondary to fall  -Pain management with patient requesting better management  -Underlying history of Parkinson's with bilateral upper extremity tremors  -Underlying history of Alzheimer's disease  -History of hypertension with hypotension  -CAD  -Depression  -History of overactive bladder  -Acute UTI cultures growing E. coli and Morganella treated now with cefdinir  -Generalized debilitation with recurrent falls requiring lengthy TCU stays  -History of TBI      Plan     Patient has been admitted to the TCU.  She has a history of recurrent falls and recently had a prolonged lengthy stay in the TCU and was discharged back to her group home.  Unfortunately she readmitted within a week with another fall.  Her group home has refused accept her back till she receives her prior level of functioning.  Unfortunately she currently remains wheelchair-bound and poorly ambulatory.  Pain management reviewed with her.  She did not get any tramadol.  She was asked to ask for extra tramadol to see if that is effective for her pain.  We will discontinue her Voltaren at her request as she is believes that is not effective.  Also advise aggressive icing.  Therapy can try different modalities also  Blood pressures remain low with underlying history of orthostatic  hypotension continue to monitor closely.  Dysuria is resolved with no UTI symptoms.  Continue with the PT OT and rehab discharge planning continued on her ability to function.  Suspect she may be becoming long-term care appropriate soon if she continues to fall and have multiple ER and TCU stays    History     Patient is a very pleasant 70 y.o. female who is admitted to TCU  Patient presented to the hospital after she fell.  At baseline she has frequent falls in her group home.  She had recently a prolonged lengthy stay at the [facility] because of her falls and was discharged home.  Unfortunately she presented after a fall and has been discharged back to the TCU.  She also has underlying history of Parkinson's which has been slowly getting worse.  She was evaluated by neurology.  They have recommended PT OT no change in medications.  Unfortunately she is limited still by pain.  Not much progress noted she remains wheelchair-bound.  She is not ambulating as yet  She continues to complain of right thigh pain unfortunately that is not responsive to topical treatments.  MRI was negative for any fractures but did show a subcutaneous hematoma.  She has been given only Tylenol for pain managemenT  Unfortunately I had given her tramadol but nursing did not verify the order so she has not received any dosages yet.  She wants to Voltaren gel discontinued as it is not effective for her as per her.  She has not tried any icing either    She also has baseline cognitive impairment due to Alzheimer's dementia with some intermittent agitation today however she appears to be quite clear and cognitively alert with good recall of recent events  SLUMS 18/30  Mood and behaviors have been stable and patient is pleasant  Had UTI in the hospital cultures grew Morganella and E. coli she was given ceftriaxone and is on oral cefdinir in the TCU  She has a history of orthostatic hypotension is on midodrine however had also  hypertension and is on metoprolol she has some low blood pressures especially in the evening in the hospital  Blood pressures continue to be on the low side in the TCU.  She is asymptomatic since she is not ambulating    Past Medical History     Active Ambulatory (Non-Hospital) Problems    Diagnosis   ? Hematoma of right hip   ? Urinary tract infection   ? Inability to walk   ? Parkinsonism, unspecified Parkinsonism type (H)   ? Fall, initial encounter   ? Magnesium deficiency   ? Adjustment insomnia   ? Acute hypotension   ? RBBB   ? Chest pain, unspecified   ? Leukopenia   ? Gastroesophageal reflux disease with esophagitis   ? Personal history of fall   ? Chest pain   ? Chronic pain syndrome   ? Traumatic brain injury (H)   ? Stented coronary artery   ? RLS (restless legs syndrome)   ? Overactive bladder   ? HTN (hypertension)   ? Depression   ? CAD (coronary artery disease)   ? Alzheimer disease (H)   ? Orthostatic hypotension   ? Anemia   ? Cognitive impairment   ? Hip fracture (H)   ? ACP (advance care planning)   ? Constipation   ? Essential hypertension   ? Neuroleptic signed 8/29/16     Past Medical History:   Diagnosis Date   ? Acute blood loss anemia    ? Alzheimer disease (H)    ? CAD (coronary artery disease)    ? Chronic pain syndrome    ? Dementia (H)    ? Depression    ? Depression    ? Hip fracture, right (H) 12/21/2017   ? HTN (hypertension)    ? Kidney stone    ? Overactive bladder    ? PMB (postmenopausal bleeding)    ? RLS (restless legs syndrome)    ? Spine pain    ? Stented coronary artery    ? Traumatic brain injury (H)    ? Unsteady gait    ? UTI (lower urinary tract infection)        Past Social History     Reviewed, and she  reports that she has never smoked. She has never used smokeless tobacco. She reports that she does not drink alcohol or use drugs.    Family History     Reviewed, and family history includes Cancer in her mother; Coronary artery disease in her father; Heart attack in  her father; Heart failure in her father.    Medication List   Post Discharge Medication Reconciliation Status: discharge medications reconciled and changed, per note/orders   Current Outpatient Medications on File Prior to Visit   Medication Sig Dispense Refill   ? acetaminophen (TYLENOL) 500 MG tablet Take 2 tablets (1,000 mg total) by mouth 3 (three) times a day.  0   ? aspirin 81 mg chewable tablet Chew 81 mg daily.     ? atorvastatin (LIPITOR) 40 MG tablet Take 40 mg by mouth every evening.      ? buPROPion (WELLBUTRIN XL) 300 MG 24 hr tablet Take 300 mg by mouth every morning.      ? carbidopa-levodopa (SINEMET)  mg per tablet Take 2 tablets by mouth 3 (three) times a day.     ? cefdinir (OMNICEF) 300 MG capsule Take 1 capsule (300 mg total) by mouth 2 times a day at 6:00 am and 4:00 pm for 6 days. 12 capsule 0   ? cholecalciferol, vitamin D3, 1,000 unit tablet Take 4,000 Units by mouth daily.      ? cyanocobalamin (VITAMIN B-12) 100 MCG tablet Take 100 mcg by mouth daily.      ? lansoprazole (PREVACID) 30 MG capsule Take 30 mg by mouth daily.      ? magnesium oxide (MAG-OX) 400 mg tablet Take 400 mg by mouth 2 (two) times a day.     ? metoprolol succinate (TOPROL-XL) 12.5 MG Take 12.5 mg by mouth daily.     ? midodrine (PROAMATINE) 2.5 MG tablet Take 7.5 mg by mouth 3 (three) times a day with meals.     ? mirtazapine (REMERON) 45 MG tablet TAKE 1 TABLET BY MOUTH AT BEDTIME  *1 TOTAL FILL* 30 tablet 3   ? multivitamin (MULTIVITAMIN) per tablet Take 1 tablet by mouth daily.     ? nitroglycerin (NITROSTAT) 0.4 MG SL tablet Place 0.4 mg under the tongue every 5 (five) minutes as needed.      ? polyethylene glycol (MIRALAX) 17 gram packet Take 17 g by mouth daily.     ? pramipexole (MIRAPEX) 0.5 MG tablet Take 0.5 mg by mouth at bedtime.     ? solifenacin (VESICARE) 10 MG tablet Take 10 mg by mouth daily.     ? traZODone (DESYREL) 150 MG tablet TAKE 1 TABLET BY MOUTH AT BEDTIME. *1 TOTAL FILL* 31 tablet 3   ?  venlafaxine (EFFEXOR-XR) 150 MG 24 hr capsule TAKE 2 CAPSULES (300MG) BY MOUTH ONCE DAILY *1 TOTAL FILL* 60 capsule 3     No current facility-administered medications on file prior to visit.        Allergies     Allergies   Allergen Reactions   ? Blood-Group Specific Substance Other (See Comments)     Patient has anti-K. Blood product orders maybe delayed. Draw one red top and 2 purple top tubes for all Type and Screen/Red blood Cell product orders   ? Fd And C No.5 (Tartrazine)      GI UPSET   ? Ibuprofen Nausea And Vomiting   ? Morphine Rash     swelling       Review of Systems   A comprehensive review of 14 systems was done. Pertinent findings noted here and in history of present illness. All the rest negative.  Constitutional: Negative.  Negative for fever, chills, she has  activity change, appetite change and fatigue.   Patient has had significant weight loss recently also  HENT: Negative for congestion and facial swelling.    Eyes: Negative for photophobia, redness and visual disturbance.   Respiratory: Negative for cough and chest tightness.    Cardiovascular: Negative for chest pain, palpitations and leg swelling.   Gastrointestinal: Negative for nausea, diarrhea, constipation, blood in stool and abdominal distention.   Genitourinary: Negative.    Musculoskeletal: Negative.  She is reporting multiple falls pain in her right thigh  Skin: Negative.    Neurological: Negative for dizziness, tremors, syncope, weakness, light-headedness and headaches.   Hematological: Does not bruise/bleed easily.   Psychiatric/Behavioral: Negative.  Recall is impaired but patient is quite pleasant today      Physical Exam     Recent Vitals 8/31/2020   Height -   Weight 174 lbs 8 oz   BSA (m2) 1.98 m2   BP 99/63   Pulse 80   Temp 99   Temp src -   SpO2 -   Some recent data might be hidden       Constitutional: Oriented to person, place, and time and appears well-developed.   Appears frail and weak  HEENT:  Normocephalic and  atraumatic.  Eyes: Conjunctivae and EOM are normal. Pupils are equal, round, and reactive to light. No discharge.  No scleral icterus. Nose normal. Mouth/Throat: Oropharynx is clear and moist. No oropharyngeal exudate.    NECK: Normal range of motion. Neck supple. No JVD present. No tracheal deviation present. No thyromegaly present.   CARDIOVASCULAR: Normal rate, regular rhythm and intact distal pulses.  Exam reveals no gallop and no friction rub.  Systolic murmur present.  PULMONARY: Effort normal and breath sounds normal. No respiratory distress.No Wheezing or rales.  ABDOMEN: Soft. Bowel sounds are normal. No distension and no mass.  There is no tenderness. There is no rebound and no guarding. No HSM.  MUSCULOSKELETAL: Normal range of motion. No edema and no tenderness. Mild kyphosis, tenderness.  On her right thigh even to light touch  LYMPH NODES: Has no cervical, supraclavicular, axillary and groin adenopathy.   NEUROLOGICAL: Alert and oriented to person, place, and time. No cranial nerve deficit.  Normal muscle tone. Coordination normal  Resting tremors bilateral upper extremity noted.   GENITOURINARY: Deferred exam.  SKIN: Skin is warm and dry. No rash noted. No erythema. No pallor.   EXTREMITIES: No cyanosis, no clubbing, no edema. No Deformity.  PSYCHIATRIC: Normal mood, affect and behavior.  Recall is impaired      Lab Results     Recent Results (from the past 240 hour(s))   Magnesium    Collection Time: 08/25/20  4:54 PM   Result Value Ref Range    Magnesium 2.2 1.8 - 2.6 mg/dL   Hepatic Profile    Collection Time: 08/25/20  4:54 PM   Result Value Ref Range    Bilirubin, Total 0.3 0.0 - 1.0 mg/dL    Bilirubin, Direct 0.2 <=0.5 mg/dL    Protein, Total 6.6 6.0 - 8.0 g/dL    Albumin 3.6 3.5 - 5.0 g/dL    Alkaline Phosphatase 105 45 - 120 U/L    AST 12 0 - 40 U/L    ALT 16 0 - 45 U/L   Basic Metabolic Panel    Collection Time: 08/25/20  4:54 PM   Result Value Ref Range    Sodium 144 136 - 145 mmol/L     Potassium 4.1 3.5 - 5.0 mmol/L    Chloride 105 98 - 107 mmol/L    CO2 33 (H) 22 - 31 mmol/L    Anion Gap, Calculation 6 5 - 18 mmol/L    Glucose 93 70 - 125 mg/dL    Calcium 8.7 8.5 - 10.5 mg/dL    BUN 28 8 - 28 mg/dL    Creatinine 0.72 0.60 - 1.10 mg/dL    GFR MDRD Af Amer >60 >60 mL/min/1.73m2    GFR MDRD Non Af Amer >60 >60 mL/min/1.73m2   Thyroid Cascade    Collection Time: 08/25/20  4:54 PM   Result Value Ref Range    TSH 1.09 0.30 - 5.00 uIU/mL   HM1 (CBC with Diff)    Collection Time: 08/25/20  4:54 PM   Result Value Ref Range    WBC 4.5 4.0 - 11.0 thou/uL    RBC 3.76 (L) 3.80 - 5.40 mill/uL    Hemoglobin 10.7 (L) 12.0 - 16.0 g/dL    Hematocrit 35.5 35.0 - 47.0 %    MCV 94 80 - 100 fL    MCH 28.5 27.0 - 34.0 pg    MCHC 30.1 (L) 32.0 - 36.0 g/dL    RDW 13.4 11.0 - 14.5 %    Platelets 153 140 - 440 thou/uL    MPV 10.8 8.5 - 12.5 fL    Neutrophils % 67 50 - 70 %    Lymphocytes % 24 20 - 40 %    Monocytes % 7 2 - 10 %    Eosinophils % 2 0 - 6 %    Basophils % 0 0 - 2 %    Immature Granulocyte % 0 <=0 %    Neutrophils Absolute 3.0 2.0 - 7.7 thou/uL    Lymphocytes Absolute 1.1 0.8 - 4.4 thou/uL    Monocytes Absolute 0.3 0.0 - 0.9 thou/uL    Eosinophils Absolute 0.1 0.0 - 0.4 thou/uL    Basophils Absolute 0.0 0.0 - 0.2 thou/uL    Immature Granulocyte Absolute 0.0 <=0.0 thou/uL   COVID-19 Virus PCR MRF    Collection Time: 08/25/20  7:22 PM    Specimen: Respiratory   Result Value Ref Range    COVID-19 VIRUS SPECIMEN SOURCE Nasopharyngeal     2019-nCOV Not Detected    ECG 12 lead MUSE    Collection Time: 08/25/20  9:04 PM   Result Value Ref Range    SYSTOLIC BLOOD PRESSURE      DIASTOLIC BLOOD PRESSURE      VENTRICULAR RATE 62 BPM    ATRIAL RATE 62 BPM    P-R INTERVAL 166 ms    QRS DURATION 162 ms    Q-T INTERVAL 466 ms    QTC CALCULATION (BEZET) 472 ms    P Axis 40 degrees    R AXIS -25 degrees    T AXIS 16 degrees    MUSE DIAGNOSIS       Normal sinus rhythm  Right bundle branch block  Abnormal ECG  When compared with  ECG of 23-JUL-2020 18:53,  No significant change was found  Confirmed by JOSR BELL MD LOC: ([id number]) on 8/26/2020 1:44:04 PM     Basic Metabolic Panel    Collection Time: 08/26/20  5:59 AM   Result Value Ref Range    Sodium 144 136 - 145 mmol/L    Potassium 4.2 3.5 - 5.0 mmol/L    Chloride 108 (H) 98 - 107 mmol/L    CO2 32 (H) 22 - 31 mmol/L    Anion Gap, Calculation 4 (L) 5 - 18 mmol/L    Glucose 96 70 - 125 mg/dL    Calcium 8.0 (L) 8.5 - 10.5 mg/dL    BUN 29 (H) 8 - 28 mg/dL    Creatinine 0.72 0.60 - 1.10 mg/dL    GFR MDRD Af Amer >60 >60 mL/min/1.73m2    GFR MDRD Non Af Amer >60 >60 mL/min/1.73m2   HM2(CBC w/o Differential)    Collection Time: 08/26/20  5:59 AM   Result Value Ref Range    WBC 2.9 (L) 4.0 - 11.0 thou/uL    RBC 3.11 (L) 3.80 - 5.40 mill/uL    Hemoglobin 9.0 (L) 12.0 - 16.0 g/dL    Hematocrit 29.6 (L) 35.0 - 47.0 %    MCV 95 80 - 100 fL    MCH 28.9 27.0 - 34.0 pg    MCHC 30.4 (L) 32.0 - 36.0 g/dL    RDW 13.7 11.0 - 14.5 %    Platelets 119 (L) 140 - 440 thou/uL    MPV 10.8 8.5 - 12.5 fL   CK Total    Collection Time: 08/26/20  5:59 AM   Result Value Ref Range    CK, Total 27 (L) 30 - 190 U/L   Lactic Acid    Collection Time: 08/26/20  6:55 AM   Result Value Ref Range    Lactic Acid 0.4 (L) 0.7 - 2.0 mmol/L   Urinalysis-UC if Indicated    Collection Time: 08/26/20  7:21 PM   Result Value Ref Range    Color, UA Yellow Colorless, Yellow, Straw, Light Yellow    Clarity, UA Cloudy (!) Clear    Glucose, UA Negative Negative    Bilirubin, UA Negative Negative    Ketones, UA Negative Negative, 60 mg/dL    Specific Gravity, UA 1.017 1.001 - 1.030    Blood, UA Moderate (!) Negative    pH, UA 6.0 4.5 - 8.0    Protein, UA Trace (!) Negative mg/dL    Urobilinogen, UA <2.0 E.U./dL <2.0 E.U./dL, 2.0 E.U./dL    Nitrite, UA Negative Negative    Leukocytes, UA Large (!) Negative    Bacteria, UA None Seen None Seen hpf    RBC, UA >100 (!) None Seen, 0-2 hpf    WBC, UA >100 (!) None Seen, 0-5 hpf    Squam Epithel,  UA 0-5 None Seen, 0-5 lpf    Trans Epithel, UA 5-10 (!) None Seen lpf   Culture, Urine    Collection Time: 08/26/20  7:21 PM    Specimen: Urine   Result Value Ref Range    Culture >100,000 col/ml Gram Negative Rods (!)    Organism ID culture    Collection Time: 08/26/20  7:21 PM    Specimen: Other; Urine   Result Value Ref Range    Specimen Description Unspecified Urine (!)     Isolate SEE NOTES (!)     ISO Susceptibility Morganella (Proteus) morganii (!)     ISO 2 Susceptibility  Strain 2 Morganella (Proteus) morganii (!)    Hemoglobin    Collection Time: 08/27/20  4:48 PM   Result Value Ref Range    Hemoglobin 9.3 (L) 12.0 - 16.0 g/dL   Basic Metabolic Panel    Collection Time: 08/31/20  9:48 AM   Result Value Ref Range    Sodium 145 136 - 145 mmol/L    Potassium 3.8 3.5 - 5.0 mmol/L    Chloride 107 98 - 107 mmol/L    CO2 31 22 - 31 mmol/L    Anion Gap, Calculation 7 5 - 18 mmol/L    Glucose 80 70 - 125 mg/dL    Calcium 8.5 8.5 - 10.5 mg/dL    BUN 30 (H) 8 - 28 mg/dL    Creatinine 0.72 0.60 - 1.10 mg/dL    GFR MDRD Af Amer >60 >60 mL/min/1.73m2    GFR MDRD Non Af Amer >60 >60 mL/min/1.73m2   Hemoglobin    Collection Time: 08/31/20  9:48 AM   Result Value Ref Range    Hemoglobin 9.9 (L) 12.0 - 16.0 g/dL                  NERY Sosa

## 2021-06-20 NOTE — PROGRESS NOTES
Pt here for follow up.  Arrived late due to parking issues.    Several concerns regarding patient are as follows: Increase in bed wetting since the removal of her portable commode, patient will lay in the wetness until staff notices and clean her up. Picking at her skin and scalp has increased. Pt will retire to her room for several hours to watch [employer] TV till dinner time. Been seen at night taking tissues and wet wipes from common areas of there house. Seen at different times dumping her Poly meds out.     Denies SI/HI thoughts at this time.

## 2021-06-21 NOTE — LETTER
Letter by Noy Goodrich MBBS at      Author: Noy Goodrich MBBS Service: -- Author Type: --    Filed:  Encounter Date: 10/21/2020 Status: (Other)         Patient: Alison Bashir   MR Number: [id number]   YOB: 1950   Date of Visit: 10/21/2020       [facility] Admission note      Patient: Alison Bashir  MRN: [id number]  Date of Service: 10/21/2020      [facility] [[id number]]  Reason for Visit     Chief Complaint   Patient presents with   ? Review Of Multiple Medical Conditions     F/u low bp and pain  Code Status     DNI    Assessment     -History of a mechanical fall in the setting of recurrent hospitalizations related to falls.  -Right hip pain with underlying history of right hip arthroplasty  -Recent history of a fall related right hip thigh hematoma  -History of recurrent UTIs; urine culture again showing Morganella morganii species  -Hypotension with underlying history of orthostatic hypotension  - Parkinson's disease  -Cognitive impairment/dementia  - HTN  -Depression with anxiety  Plan     Patient readmitted to the TCU within days of discharge.  Acute fall in the setting of recurrent falls reported.  Has significant  Gait instability and  Walking issues  Using a walker  Pain control is still an issue and using tramadol  Refill of tramadol given  discharge planning discussed with SW . - wants to go back to her group home  However per [facility] group home is refusing acceptance  If she cannot go back she may need to go to LTC  BP stable and improved  Metoprolol held based on parameters  Cont with PT/OT      History     Patient is a very pleasant 70 y.o. female who is admitted to TCU  Patient was recently in a TCU and was discharged home at baseline she has profound debilitation with a high fall risk and has had recurrent hospitalizations related to falls.  She presented after she fell again at home within days of discharge.  She was complaining of right-sided chest pain as  well as right hip pain.  She recently was admitted with a right hip pain with resultant right hip thigh hematoma.  Pain concerns addressed   Requesting refill on tramadol  Has refused topical pain gels and other modalities for pain manageemnt  Has been asking for it once or twice a day    Unfortunately she has advancing Parkinson's disease   unfortunately patient has had significant physical and cognitive decline related to this.  Seen by neurology and now no new changes recommended by them  She is on sinemet    Has a h/o of orthostatic Hypotension and is on midodrine  She has metoprolol and med held based on parameters  BP are stable    discharge plan is contingent on group home acceptance  She needs a higher level of care and has a h/o of frequent fall s        Past Medical History     Active Ambulatory (Non-Hospital) Problems    Diagnosis   ? Accident due to mechanical fall without injury, initial encounter   ? Hematoma of right thigh, subsequent encounter   ? Right hip pain   ? Hematoma of right hip   ? Urinary tract infection   ? Inability to walk   ? Parkinsonism, unspecified Parkinsonism type (H)   ? Fall, initial encounter   ? Magnesium deficiency   ? Adjustment insomnia   ? Acute hypotension   ? RBBB   ? Chest pain, unspecified   ? Leukopenia   ? Gastroesophageal reflux disease with esophagitis   ? Personal history of fall   ? Chest pain   ? Chronic pain syndrome   ? Traumatic brain injury (H)   ? Stented coronary artery   ? RLS (restless legs syndrome)   ? Overactive bladder   ? HTN (hypertension)   ? Depression   ? CAD (coronary artery disease)   ? Alzheimer disease (H)   ? Orthostatic hypotension   ? Anemia   ? Cognitive impairment   ? Hip fracture (H)   ? ACP (advance care planning)   ? Constipation   ? Essential hypertension   ? Neuroleptic signed 8/29/16     Past Medical History:   Diagnosis Date   ? Acute blood loss anemia    ? Alzheimer disease (H)    ? CAD (coronary artery disease)    ? Chronic  pain syndrome    ? Dementia (H)    ? Depression    ? Depression    ? Hip fracture, right (H) 12/21/2017   ? HTN (hypertension)    ? Kidney stone    ? Overactive bladder    ? PMB (postmenopausal bleeding)    ? RLS (restless legs syndrome)    ? Spine pain    ? Stented coronary artery    ? Traumatic brain injury (H)    ? Unsteady gait    ? UTI (lower urinary tract infection)        Past Social History     Reviewed, and she  reports that she has never smoked. She has never used smokeless tobacco. She reports that she does not drink alcohol or use drugs.    Family History     Reviewed, and family history includes Cancer in her mother; Coronary artery disease in her father; Heart attack in her father; Heart failure in her father.    Medication List     Current Outpatient Medications on File Prior to Visit   Medication Sig Dispense Refill   ? acetaminophen (TYLENOL) 650 MG CR tablet Take 650 mg by mouth 4 (four) times a day.     ? aspirin 81 mg chewable tablet Chew 81 mg daily.     ? atorvastatin (LIPITOR) 40 MG tablet Take 40 mg by mouth every evening.      ? buPROPion (WELLBUTRIN XL) 300 MG 24 hr tablet TAKE 1 TABLET BY MOUTH ONCE DAILY. 31 tablet 0   ? carbidopa-levodopa (SINEMET)  mg per tablet Take 2 tablets by mouth 3 (three) times a day.     ? cholecalciferol, vitamin D3, 1,000 unit tablet Take 4,000 Units by mouth daily.      ? cyanocobalamin (VITAMIN B-12) 100 MCG tablet Take 100 mcg by mouth daily.      ? furosemide (LASIX) 20 MG tablet Take 20 mg by mouth daily.     ? lansoprazole (PREVACID) 30 MG capsule Take 30 mg by mouth daily before breakfast.      ? magnesium oxide (MAG-OX) 400 mg tablet Take 400 mg by mouth 2 (two) times a day.     ? melatonin 1 mg Tab tablet Take 5 mg by mouth at bedtime.     ? metoprolol succinate (TOPROL-XL) 12.5 MG Take 12.5 mg by mouth daily.     ? midodrine (PROAMATINE) 2.5 MG tablet Take 7.5 mg by mouth 3 (three) times a day with meals.     ? mirtazapine (REMERON) 45 MG  tablet TAKE 1 TABLET BY MOUTH AT BEDTIME  *1 TOTAL FILL* 30 tablet 3   ? multivitamin (MULTIVITAMIN) per tablet Take 1 tablet by mouth daily.     ? nitroglycerin (NITROSTAT) 0.4 MG SL tablet Place 0.4 mg under the tongue every 5 (five) minutes as needed.      ? nystatin (MYCOSTATIN) powder Apply topically 2 (two) times a day as needed. Fungal infection     ? polyethylene glycol (MIRALAX) 17 gram packet Take 17 g by mouth daily.     ? pramipexole (MIRAPEX) 0.5 MG tablet Take 0.5 mg by mouth at bedtime.     ? solifenacin (VESICARE) 10 MG tablet Take 10 mg by mouth daily.     ? traMADoL (ULTRAM) 50 mg tablet Take 1 tablet (50 mg total) by mouth every 6 (six) hours as needed for pain. 12 tablet 0   ? traZODone (DESYREL) 50 MG tablet Take 50 mg by mouth at bedtime.     ? venlafaxine (EFFEXOR-XR) 150 MG 24 hr capsule TAKE 2 CAPSULES (300MG) BY MOUTH ONCE DAILY *1 TOTAL FILL* 60 capsule 3     No current facility-administered medications on file prior to visit.        Allergies     Allergies   Allergen Reactions   ? Blood-Group Specific Substance Other (See Comments)     Patient has anti-K. Blood product orders maybe delayed. Draw one red top and 2 purple top tubes for all Type and Screen/Red blood Cell product orders   ? Fd And C No.5 (Tartrazine)      GI UPSET   ? Ibuprofen Nausea And Vomiting   ? Morphine Rash     swelling       Review of Systems   A comprehensive review of 14 systems was done. Pertinent findings noted here and in history of present illness. All the rest negative.  Constitutional: Negative.  Negative for fever, chills, she has activity change, appetite change and fatigue.   HENT: Negative for congestion and facial swelling.    Eyes: Negative for photophobia, redness and visual disturbance.   Respiratory: Negative for cough and chest tightness.    Cardiovascular: Negative for chest pain, palpitations and leg swelling.   Gastrointestinal: Negative for nausea, diarrhea, constipation, blood in stool and  abdominal distention.   Genitourinary: Negative.    Musculoskeletal: Reporting back and hip pain.  She has had multiple falls  Skin: Negative.    Neurological: Negative for dizziness, tremors, syncope, weakness, light-headedness and headaches.   Hematological: Does not bruise/bleed easily.   Psychiatric/Behavioral: Negative.  Anxious about pain management      Physical Exam     Recent Vitals 10/2/2020   Height -   Weight -   BSA (m2) -   /59   Pulse 73   Temp 98.4   Temp src 1   SpO2 98   Some recent data might be hidden   Recheck blood pressure 147/55  Weight is 158 pound    Constitutional: Oriented to person, place, and time and appears well-developed.   HEENT:  Normocephalic and atraumatic.  Eyes: Conjunctivae and EOM are normal. Pupils are equal, round, and reactive to light. No discharge.  No scleral icterus. Nose normal. Mouth/Throat: Oropharynx is clear and moist. No oropharyngeal exudate.    NECK: Normal range of motion. Neck supple. No JVD present. No tracheal deviation present. No thyromegaly present.   CARDIOVASCULAR: Normal rate, regular rhythm and intact distal pulses.  Exam reveals no gallop and no friction rub.  Systolic murmur present.  PULMONARY: Effort normal and breath sounds normal. No respiratory distress.No Wheezing or rales.  ABDOMEN: Soft. Bowel sounds are normal. No distension and no mass.  There is no tenderness. There is no rebound and no guarding. No HSM.  MUSCULOSKELETAL: Normal range of motion. 1+ le edema and no tenderness. Mild kyphosis with asymmetry of the chest noted more predominant on the right side,  Tenderness to palpation over her right thigh.  Very ataxic gait noted with patient noted to be deviating on the right side  LYMPH NODES: Has no cervical, supraclavicular, axillary and groin adenopathy.   NEUROLOGICAL: Alert and oriented to person, place, and time. No cranial nerve deficit.  Normal muscle tone. Coordination normal.   GENITOURINARY: Deferred exam.  SKIN: Skin is  warm and dry. No rash noted. No erythema. No pallor.   EXTREMITIES: No cyanosis, no clubbing, has chronic lower extremity 1+ edema. No Deformity.  PSYCHIATRIC: Normal mood, affect and behavior.      Lab Results     Recent Results (from the past 240 hour(s))   COVID-19 Virus PCR MRF    Collection Time: 10/14/20  8:00 AM    Specimen: Respiratory   Result Value Ref Range    COVID-19 VIRUS SPECIMEN SOURCE Nasopharyngeal     2019-nCOV Not Detected       Results for orders placed or performed during the hospital encounter of 09/30/20   Basic metabolic panel   Result Value Ref Range    Sodium 143 136 - 145 mmol/L    Potassium 4.0 3.5 - 5.0 mmol/L    Chloride 105 98 - 107 mmol/L    CO2 29 22 - 31 mmol/L    Anion Gap, Calculation 9 5 - 18 mmol/L    Glucose 106 70 - 125 mg/dL    Calcium 9.2 8.5 - 10.5 mg/dL    BUN 32 (H) 8 - 28 mg/dL    Creatinine 0.73 0.60 - 1.10 mg/dL    GFR MDRD Af Amer >60 >60 mL/min/1.73m2    GFR MDRD Non Af Amer >60 >60 mL/min/1.73m2             NERY Sosa

## 2021-06-21 NOTE — LETTER
Letter by Noy Goodrich MBBS at      Author: Noy Goodrich MBBS Service: -- Author Type: --    Filed:  Encounter Date: 10/26/2020 Status: (Other)         Patient: Alison Bashir   MR Number: [id number]   YOB: 1950   Date of Visit: 10/26/2020       [facility] Admission note      Patient: Alison Bashir  MRN: [id number]  Date of Service: 10/26/2020      [facility] [[id number]]  Reason for Visit     Chief Complaint   Patient presents with   ? Discharge Summary       Code Status     DNI    Assessment     -History of a mechanical fall in the setting of recurrent hospitalizations related to falls.  -Right hip pain with underlying history of right hip arthroplasty  -Recent history of a fall related right hip thigh hematoma  -History of recurrent UTIs; urine culture again showing Morganella morganii species  -Hypotension with underlying history of orthostatic hypotension  - Parkinson's disease  -Cognitive impairment/dementia  - HTN  -Depression with anxiety        History     Patient is a very pleasant 70 y.o. female who is admitted to TCU  Patient was recently in a TCU and was discharged home at baseline she has profound debilitation with a high fall risk and has had recurrent hospitalizations related to falls.  She presented after she fell again at home within days of discharge.  She was complaining of right-sided chest pain as well as right hip pain.  She recently was admitted with a right hip pain with resultant right hip thigh hematoma.  Pain has been her biggest challenge.  She has refused any topical modality for pain.  She remains on scheduled Tylenol we will switch to as needed.  She has been using her tramadol and refusing to wean today at discharge she will be discontinued off her tramadol which was given primarily for right hip hematoma pain issues.    Unfortunately she has advancing Parkinson's disease   unfortunately patient has had significant physical and  cognitive decline related to this.  Seen by neurology and now no new changes recommended by them  She is on sinemet  At baseline she is frail and debilitated with significant ongoing issue of weight loss and physical debilitation secondary to that .advised to follow-up with neurology as an outpatient     Has a h/o of orthostatic Hypotension and is on midodrine  She has metoprolol and med held based on parameters  BP are stable  She has a history of hypertension and frequently her blood pressures are low and metoprolol is being held accordingly    discharge plan is contingent on group home acceptance  She needs a higher level of care and has a h/o of frequent fall s  However patient has now again electing to go back to her group home.  She understands that if she feels again she may need to move to long-term care      Past Medical History     Active Ambulatory (Non-Hospital) Problems    Diagnosis   ? Accident due to mechanical fall without injury, initial encounter   ? Hematoma of right thigh, subsequent encounter   ? Right hip pain   ? Hematoma of right hip   ? Urinary tract infection   ? Inability to walk   ? Parkinsonism, unspecified Parkinsonism type (H)   ? Fall, initial encounter   ? Magnesium deficiency   ? Adjustment insomnia   ? Acute hypotension   ? RBBB   ? Chest pain, unspecified   ? Leukopenia   ? Gastroesophageal reflux disease with esophagitis   ? Personal history of fall   ? Chest pain   ? Chronic pain syndrome   ? Traumatic brain injury (H)   ? Stented coronary artery   ? RLS (restless legs syndrome)   ? Overactive bladder   ? HTN (hypertension)   ? Depression   ? CAD (coronary artery disease)   ? Alzheimer disease (H)   ? Orthostatic hypotension   ? Anemia   ? Cognitive impairment   ? Hip fracture (H)   ? ACP (advance care planning)   ? Constipation   ? Essential hypertension   ? Neuroleptic signed 8/29/16     Past Medical History:   Diagnosis Date   ? Acute blood loss anemia    ? Alzheimer disease  (H)    ? CAD (coronary artery disease)    ? Chronic pain syndrome    ? Dementia (H)    ? Depression    ? Depression    ? Hip fracture, right (H) 12/21/2017   ? HTN (hypertension)    ? Kidney stone    ? Overactive bladder    ? PMB (postmenopausal bleeding)    ? RLS (restless legs syndrome)    ? Spine pain    ? Stented coronary artery    ? Traumatic brain injury (H)    ? Unsteady gait    ? UTI (lower urinary tract infection)        Past Social History     Reviewed, and she  reports that she has never smoked. She has never used smokeless tobacco. She reports that she does not drink alcohol or use drugs.    Family History     Reviewed, and family history includes Cancer in her mother; Coronary artery disease in her father; Heart attack in her father; Heart failure in her father.    Medication List     Current Outpatient Medications on File Prior to Visit   Medication Sig Dispense Refill   ? acetaminophen (TYLENOL) 650 MG CR tablet Take 650 mg by mouth 4 (four) times a day.     ? aspirin 81 mg chewable tablet Chew 81 mg daily.     ? atorvastatin (LIPITOR) 40 MG tablet Take 40 mg by mouth every evening.      ? buPROPion (WELLBUTRIN XL) 300 MG 24 hr tablet TAKE 1 TABLET BY MOUTH ONCE DAILY. 31 tablet 0   ? carbidopa-levodopa (SINEMET)  mg per tablet Take 2 tablets by mouth 3 (three) times a day.     ? cholecalciferol, vitamin D3, 1,000 unit tablet Take 4,000 Units by mouth daily.      ? cyanocobalamin (VITAMIN B-12) 100 MCG tablet Take 100 mcg by mouth daily.      ? furosemide (LASIX) 20 MG tablet Take 20 mg by mouth daily.     ? lansoprazole (PREVACID) 30 MG capsule Take 30 mg by mouth daily before breakfast.      ? magnesium oxide (MAG-OX) 400 mg tablet Take 400 mg by mouth 2 (two) times a day.     ? melatonin 1 mg Tab tablet Take 5 mg by mouth at bedtime.     ? metoprolol succinate (TOPROL-XL) 12.5 MG Take 12.5 mg by mouth daily.     ? midodrine (PROAMATINE) 2.5 MG tablet Take 7.5 mg by mouth 3 (three) times a day  with meals.     ? mirtazapine (REMERON) 45 MG tablet TAKE 1 TABLET BY MOUTH AT BEDTIME  *1 TOTAL FILL* 30 tablet 3   ? multivitamin (MULTIVITAMIN) per tablet Take 1 tablet by mouth daily.     ? nitroglycerin (NITROSTAT) 0.4 MG SL tablet Place 0.4 mg under the tongue every 5 (five) minutes as needed.      ? nystatin (MYCOSTATIN) powder Apply topically 2 (two) times a day as needed. Fungal infection     ? polyethylene glycol (MIRALAX) 17 gram packet Take 17 g by mouth daily.     ? pramipexole (MIRAPEX) 0.5 MG tablet Take 0.5 mg by mouth at bedtime.     ? solifenacin (VESICARE) 10 MG tablet Take 10 mg by mouth daily.     ? traZODone (DESYREL) 50 MG tablet Take 50 mg by mouth at bedtime.     ? venlafaxine (EFFEXOR-XR) 150 MG 24 hr capsule TAKE 2 CAPSULES (300MG) BY MOUTH ONCE DAILY *1 TOTAL FILL* 60 capsule 3   ? [DISCONTINUED] traMADoL (ULTRAM) 50 mg tablet Take 1 tablet (50 mg total) by mouth every 6 (six) hours as needed for pain. 12 tablet 0     No current facility-administered medications on file prior to visit.        Allergies     Allergies   Allergen Reactions   ? Blood-Group Specific Substance Other (See Comments)     Patient has anti-K. Blood product orders maybe delayed. Draw one red top and 2 purple top tubes for all Type and Screen/Red blood Cell product orders   ? Fd And C No.5 (Tartrazine)      GI UPSET   ? Ibuprofen Nausea And Vomiting   ? Morphine Rash     swelling       Review of Systems   A comprehensive review of 14 systems was done. Pertinent findings noted here and in history of present illness. All the rest negative.  Constitutional: Negative.  Negative for fever, chills, she has activity change, appetite change and fatigue.   HENT: Negative for congestion and facial swelling.    Eyes: Negative for photophobia, redness and visual disturbance.   Respiratory: Negative for cough and chest tightness.    Cardiovascular: Negative for chest pain, palpitations and leg swelling.   Gastrointestinal:  Negative for nausea, diarrhea, constipation, blood in stool and abdominal distention.   Genitourinary: Negative.    Musculoskeletal: Reporting back and hip pain.  She has had multiple falls  Skin: Negative.    Neurological: Negative for dizziness, tremors, syncope, weakness, light-headedness and headaches.   Hematological: Does not bruise/bleed easily.   Psychiatric/Behavioral: Negative.  Anxious about pain management      Physical Exam     Recent Vitals 10/2/2020   Height -   Weight -   BSA (m2) -   /59   Pulse 73   Temp 98.4   Temp src 1   SpO2 98   Some recent data might be hidden   Recheck blood pressure 147/55  Weight is 158 pound    Constitutional: Oriented to person, place, and time and appears well-developed.   HEENT:  Normocephalic and atraumatic.  Eyes: Conjunctivae and EOM are normal. Pupils are equal, round, and reactive to light. No discharge.  No scleral icterus. Nose normal. Mouth/Throat: Oropharynx is clear and moist. No oropharyngeal exudate.    NECK: Normal range of motion. Neck supple. No JVD present. No tracheal deviation present. No thyromegaly present.   CARDIOVASCULAR: Normal rate, regular rhythm and intact distal pulses.  Exam reveals no gallop and no friction rub.  Systolic murmur present.  PULMONARY: Effort normal and breath sounds normal. No respiratory distress.No Wheezing or rales.  ABDOMEN: Soft. Bowel sounds are normal. No distension and no mass.  There is no tenderness. There is no rebound and no guarding. No HSM.  MUSCULOSKELETAL: Normal range of motion. 1+ le edema and no tenderness. Mild kyphosis with asymmetry of the chest noted more predominant on the right side,  Tenderness to palpation over her right thigh.  Very ataxic gait noted with patient noted to be deviating on the right side  LYMPH NODES: Has no cervical, supraclavicular, axillary and groin adenopathy.   NEUROLOGICAL: Alert and oriented to person, place, and time. No cranial nerve deficit.  Normal muscle tone.  Coordination normal.   GENITOURINARY: Deferred exam.  SKIN: Skin is warm and dry. No rash noted. No erythema. No pallor.   EXTREMITIES: No cyanosis, no clubbing, has chronic lower extremity 1+ edema. No Deformity.  PSYCHIATRIC: Normal mood, affect and behavior.      Lab Results     Recent Results (from the past 240 hour(s))   COVID-19 Virus PCR MRF    Collection Time: 10/21/20  8:00 AM    Specimen: Respiratory   Result Value Ref Range    COVID-19 VIRUS SPECIMEN SOURCE Nasopharyngeal     2019-nCOV Not Detected       Results for orders placed or performed during the hospital encounter of 09/30/20   Basic metabolic panel   Result Value Ref Range    Sodium 143 136 - 145 mmol/L    Potassium 4.0 3.5 - 5.0 mmol/L    Chloride 105 98 - 107 mmol/L    CO2 29 22 - 31 mmol/L    Anion Gap, Calculation 9 5 - 18 mmol/L    Glucose 106 70 - 125 mg/dL    Calcium 9.2 8.5 - 10.5 mg/dL    BUN 32 (H) 8 - 28 mg/dL    Creatinine 0.73 0.60 - 1.10 mg/dL    GFR MDRD Af Amer >60 >60 mL/min/1.73m2    GFR MDRD Non Af Amer >60 >60 mL/min/1.73m2     MEDICAL EQUIPMENT NEEDS:  WALKER / WC     DISCHARGE PLAN/FACE TO FACE:  I certify that services are/were furnished while this patient was under the care of a physician and that a physician or an allowed non-physician practitioner (NPP), had a face-to-face encounter that meets the physician face-to-face encounter requirements. The encounter was in whole, or in part, related to the primary reason for home health. The patient is confined to his/her home and needs intermittent skilled nursing, physical therapy, speech-language pathology, or the continued need for occupational therapy. A plan of care has been established by a physician and is periodically reviewed by a physician.  Date of Face-to-Face Encounter: 10/26/20     I certify that, based on my findings, the following services are medically necessary home health services: HHC HHA/RN and PT/OT to evaluate and treat    My clinical findings support the  need for the above skilled services because: patient will be discharging to home. Patient will need assistance with medication management and performing IADLs and ADLs effectively and safely.     Patient to re-establish plan of care with their PCP within 7 days after leaving TCU.         NERY Sosa

## 2021-06-21 NOTE — LETTER
Letter by Noy Goodrich MBBS at      Author: Noy Goodrich MBBS Service: -- Author Type: --    Filed:  Encounter Date: 10/19/2020 Status: (Other)         Patient: Alison Bashir   MR Number: [id number]   YOB: 1950   Date of Visit: 10/19/2020       [facility] Admission note      Patient: Alison Bashir  MRN: [id number]  Date of Service: 10/19/2020      [facility] [[id number]]  Reason for Visit     Chief Complaint   Patient presents with   ? Review Of Multiple Medical Conditions     F/u low bp and pain  Code Status     DNI    Assessment     -History of a mechanical fall in the setting of recurrent hospitalizations related to falls.  -Right hip pain with underlying history of right hip arthroplasty  -Recent history of a fall related right hip thigh hematoma  -History of recurrent UTIs; urine culture again showing Morganella morganii species  -Hypotension with underlying history of orthostatic hypotension  - Parkinson's disease  -Cognitive impairment/dementia  - HTN  -Depression with anxiety  Plan     Patient readmitted to the TCU within days of discharge.  Acute fall in the setting of recurrent falls reported.  Today she remains wheelchair-bound she has been ambulating a little bit with a walker with assistance but is not progressing to independent ambulation.  At baseline she remains a very high fall risk.  She wants to discharge back to her group home.  She understands that she will need to enhance her level of care and if she fails again she may need to move to a different facility.  Blood pressure review was done metoprolol is being held quite often and her blood pressures appear to have improved she has underlying history of orthostatic hypotension.  She has Parkinson's disease which has been progressive recently saw neurologist with no change made in her medication.  Mood and behaviors have been stable  Continue with her PT OT and rehab  Revisit pain control at my next  visit again and encourage patient to consider getting off tramadol she has refused all topical pain gels  Continue with her current medication regimen        History     Patient is a very pleasant 70 y.o. female who is admitted to TCU  Patient was recently in a TCU and was discharged home at baseline she has profound debilitation with a high fall risk and has had recurrent hospitalizations related to falls.  She presented after she fell again at home within days of discharge.  She was complaining of right-sided chest pain as well as right hip pain.  She recently was admitted with a right hip pain with resultant right hip thigh hematoma.  Today she has no pain concerns addressed.  She remains on scheduled Tylenol.  She has however been asking for tramadol intermittently but less frequently now.  She told me she still has significant pain and wants to use tramadol occasionally      Unfortunately she has advancing Parkinson's disease   unfortunately patient has had significant physical and cognitive decline related to this.  She remains on Sinemet and recently was seen by neurology who had elected not to make any changes in her medication.  At baseline she is wheelchair-bound she is ambulatory using a walker but has profound gait instability and remains a very high fall risk    Blood pressure review reveals that she has had some low blood pressures with resultant symptomatic issues.  She has had a prior history of orthostatic hypotension.  She gets midodrine scheduled and also remains on metoprolol  Hold parameters have been given for her midodrine with improvement noted in blood pressures.  She denies any symptoms of orthostatic hypotension today either.    She has some cognitive impairment issues but overall mood and behaviors have been stable      Past Medical History     Active Ambulatory (Non-Hospital) Problems    Diagnosis   ? Accident due to mechanical fall without injury, initial encounter   ? Hematoma of right  thigh, subsequent encounter   ? Right hip pain   ? Hematoma of right hip   ? Urinary tract infection   ? Inability to walk   ? Parkinsonism, unspecified Parkinsonism type (H)   ? Fall, initial encounter   ? Magnesium deficiency   ? Adjustment insomnia   ? Acute hypotension   ? RBBB   ? Chest pain, unspecified   ? Leukopenia   ? Gastroesophageal reflux disease with esophagitis   ? Personal history of fall   ? Chest pain   ? Chronic pain syndrome   ? Traumatic brain injury (H)   ? Stented coronary artery   ? RLS (restless legs syndrome)   ? Overactive bladder   ? HTN (hypertension)   ? Depression   ? CAD (coronary artery disease)   ? Alzheimer disease (H)   ? Orthostatic hypotension   ? Anemia   ? Cognitive impairment   ? Hip fracture (H)   ? ACP (advance care planning)   ? Constipation   ? Essential hypertension   ? Neuroleptic signed 8/29/16     Past Medical History:   Diagnosis Date   ? Acute blood loss anemia    ? Alzheimer disease (H)    ? CAD (coronary artery disease)    ? Chronic pain syndrome    ? Dementia (H)    ? Depression    ? Depression    ? Hip fracture, right (H) 12/21/2017   ? HTN (hypertension)    ? Kidney stone    ? Overactive bladder    ? PMB (postmenopausal bleeding)    ? RLS (restless legs syndrome)    ? Spine pain    ? Stented coronary artery    ? Traumatic brain injury (H)    ? Unsteady gait    ? UTI (lower urinary tract infection)        Past Social History     Reviewed, and she  reports that she has never smoked. She has never used smokeless tobacco. She reports that she does not drink alcohol or use drugs.    Family History     Reviewed, and family history includes Cancer in her mother; Coronary artery disease in her father; Heart attack in her father; Heart failure in her father.    Medication List     Current Outpatient Medications on File Prior to Visit   Medication Sig Dispense Refill   ? acetaminophen (TYLENOL) 650 MG CR tablet Take 650 mg by mouth 4 (four) times a day.     ? aspirin 81  mg chewable tablet Chew 81 mg daily.     ? atorvastatin (LIPITOR) 40 MG tablet Take 40 mg by mouth every evening.      ? buPROPion (WELLBUTRIN XL) 300 MG 24 hr tablet TAKE 1 TABLET BY MOUTH ONCE DAILY. 31 tablet 0   ? carbidopa-levodopa (SINEMET)  mg per tablet Take 2 tablets by mouth 3 (three) times a day.     ? cholecalciferol, vitamin D3, 1,000 unit tablet Take 4,000 Units by mouth daily.      ? cyanocobalamin (VITAMIN B-12) 100 MCG tablet Take 100 mcg by mouth daily.      ? furosemide (LASIX) 20 MG tablet Take 20 mg by mouth daily.     ? lansoprazole (PREVACID) 30 MG capsule Take 30 mg by mouth daily before breakfast.      ? magnesium oxide (MAG-OX) 400 mg tablet Take 400 mg by mouth 2 (two) times a day.     ? melatonin 1 mg Tab tablet Take 5 mg by mouth at bedtime.     ? metoprolol succinate (TOPROL-XL) 12.5 MG Take 12.5 mg by mouth daily.     ? midodrine (PROAMATINE) 2.5 MG tablet Take 7.5 mg by mouth 3 (three) times a day with meals.     ? mirtazapine (REMERON) 45 MG tablet TAKE 1 TABLET BY MOUTH AT BEDTIME  *1 TOTAL FILL* 30 tablet 3   ? multivitamin (MULTIVITAMIN) per tablet Take 1 tablet by mouth daily.     ? nitroglycerin (NITROSTAT) 0.4 MG SL tablet Place 0.4 mg under the tongue every 5 (five) minutes as needed.      ? nystatin (MYCOSTATIN) powder Apply topically 2 (two) times a day as needed. Fungal infection     ? polyethylene glycol (MIRALAX) 17 gram packet Take 17 g by mouth daily.     ? pramipexole (MIRAPEX) 0.5 MG tablet Take 0.5 mg by mouth at bedtime.     ? solifenacin (VESICARE) 10 MG tablet Take 10 mg by mouth daily.     ? traMADoL (ULTRAM) 50 mg tablet Take 1 tablet (50 mg total) by mouth every 6 (six) hours as needed for pain. 12 tablet 0   ? traZODone (DESYREL) 50 MG tablet Take 50 mg by mouth at bedtime.     ? venlafaxine (EFFEXOR-XR) 150 MG 24 hr capsule TAKE 2 CAPSULES (300MG) BY MOUTH ONCE DAILY *1 TOTAL FILL* 60 capsule 3     No current facility-administered medications on file  prior to visit.        Allergies     Allergies   Allergen Reactions   ? Blood-Group Specific Substance Other (See Comments)     Patient has anti-K. Blood product orders maybe delayed. Draw one red top and 2 purple top tubes for all Type and Screen/Red blood Cell product orders   ? Fd And C No.5 (Tartrazine)      GI UPSET   ? Ibuprofen Nausea And Vomiting   ? Morphine Rash     swelling       Review of Systems   A comprehensive review of 14 systems was done. Pertinent findings noted here and in history of present illness. All the rest negative.  Constitutional: Negative.  Negative for fever, chills, she has activity change, appetite change and fatigue.   HENT: Negative for congestion and facial swelling.    Eyes: Negative for photophobia, redness and visual disturbance.   Respiratory: Negative for cough and chest tightness.    Cardiovascular: Negative for chest pain, palpitations and leg swelling.   Gastrointestinal: Negative for nausea, diarrhea, constipation, blood in stool and abdominal distention.   Genitourinary: Negative.    Musculoskeletal: Reporting back and hip pain.  She has had multiple falls  Skin: Negative.    Neurological: Negative for dizziness, tremors, syncope, weakness, light-headedness and headaches.   Hematological: Does not bruise/bleed easily.   Psychiatric/Behavioral: Negative.  Anxious about pain management      Physical Exam     Recent Vitals 10/2/2020   Height -   Weight -   BSA (m2) -   /59   Pulse 73   Temp 98.4   Temp src 1   SpO2 98   Some recent data might be hidden   Recheck blood pressure 147/55  Weight is 158 pound    Constitutional: Oriented to person, place, and time and appears well-developed.   HEENT:  Normocephalic and atraumatic.  Eyes: Conjunctivae and EOM are normal. Pupils are equal, round, and reactive to light. No discharge.  No scleral icterus. Nose normal. Mouth/Throat: Oropharynx is clear and moist. No oropharyngeal exudate.    NECK: Normal range of motion. Neck  supple. No JVD present. No tracheal deviation present. No thyromegaly present.   CARDIOVASCULAR: Normal rate, regular rhythm and intact distal pulses.  Exam reveals no gallop and no friction rub.  Systolic murmur present.  PULMONARY: Effort normal and breath sounds normal. No respiratory distress.No Wheezing or rales.  ABDOMEN: Soft. Bowel sounds are normal. No distension and no mass.  There is no tenderness. There is no rebound and no guarding. No HSM.  MUSCULOSKELETAL: Normal range of motion. 1+ le edema and no tenderness. Mild kyphosis with asymmetry of the chest noted more predominant on the right side,  Tenderness to palpation over her right thigh.  Very ataxic gait noted with patient noted to be deviating on the right side  LYMPH NODES: Has no cervical, supraclavicular, axillary and groin adenopathy.   NEUROLOGICAL: Alert and oriented to person, place, and time. No cranial nerve deficit.  Normal muscle tone. Coordination normal.   GENITOURINARY: Deferred exam.  SKIN: Skin is warm and dry. No rash noted. No erythema. No pallor.   EXTREMITIES: No cyanosis, no clubbing, has chronic lower extremity 1+ edema. No Deformity.  PSYCHIATRIC: Normal mood, affect and behavior.      Lab Results     Recent Results (from the past 240 hour(s))   COVID-19 Virus PCR MRF    Collection Time: 10/14/20  8:00 AM    Specimen: Respiratory   Result Value Ref Range    COVID-19 VIRUS SPECIMEN SOURCE Nasopharyngeal     2019-nCOV Not Detected       Results for orders placed or performed during the hospital encounter of 09/30/20   Basic metabolic panel   Result Value Ref Range    Sodium 143 136 - 145 mmol/L    Potassium 4.0 3.5 - 5.0 mmol/L    Chloride 105 98 - 107 mmol/L    CO2 29 22 - 31 mmol/L    Anion Gap, Calculation 9 5 - 18 mmol/L    Glucose 106 70 - 125 mg/dL    Calcium 9.2 8.5 - 10.5 mg/dL    BUN 32 (H) 8 - 28 mg/dL    Creatinine 0.73 0.60 - 1.10 mg/dL    GFR MDRD Af Amer >60 >60 mL/min/1.73m2    GFR MDRD Non Af Amer >60 >60  mL/min/1.73m2             NERY Sosa

## 2021-06-23 NOTE — PROGRESS NOTES
Correct pharmacy verified with patient and confirmed in snapshot? [x] yes []no    Charge captured ? [x] yes  [] no    Medications Phoned  to Pharmacy [] yes [x]no  Name of Pharmacist:  List Medications, including dose, quantity and instructions      Medication Prescriptions given to patient   [] yes  [x] no   List the name of the drug the prescription was written for.       Medications ordered this visit were e-scribed.  Verified by order class [x] yes  [] no  Effexor-XR 75 mg; Remeron 30 mg; Trazodone 150 mg   Medication changes or discontinuations were communicated to patient's pharmacy: [] yes  [x] no    UA collected [] yes  [x] no    [place] Prescription Monitoring Program Reviewed? [] yes  [x] no    Referrals were made to:  None    Future appointment was made: [x] yes  [] no  4/22/2019  Dictation completed at time of chart check: [x] yes  [] no    I have checked the documentation for today s encounters and the above information has been reviewed and completed.

## 2021-06-23 NOTE — PROGRESS NOTES
Outpatient Followup Psychiatric Evaluation      Pertinent History: Patient presents today for the purposes of medication management.  She has a history of a mood disorder and also with prior psychotic symptoms.  Per the patient's request we have been tapering the Zyprexa and that was discontinued in 2017.    She was restarted on the Remeron earlier in 2018.  I saw the patient in the summer 2018 and did not make any medication changes.  In September 2018 we did increase the patient's Remeron to 45 mg at night.    Current Symptoms:   The patient continues to struggle with isolation and hoarding behaviors.  She is described as extremely anxious.  She does have urinary incontinence at night in bed and there is some concern this may be behavioral.  The patient continues to be an extremely poor historian.  Staff report the patient's bedwetting changed significantly.  They do not do not believe that there is been any significant change in the patient other than she has been falling more.  She did fracture a bone.  She has limited interest.  She is disinterested in day programming and is refused to go there and the patient's sister reports the patient should not have to do anything she does not want to do.  Due to this there are limited activities for the patient during the day.  They do offer things.  There is been no change in cognition.  No hallucinations or delusions.  No change in sleep with the increase in Remeron.  Been no obvious psychotic symptoms.  She continues to appear depressed flat and disinterested.  There have been no other new medical issues and no side effects to the medication.    Current Medications: Please see chart    Medication Compliance: Yes    Side Effects to Medications: No      Vitals:  Wt Readings from Last 3 Encounters:   03/16/18 197 lb (89.4 kg)   03/14/18 197 lb (89.4 kg)   03/12/18 198 lb (89.8 kg)     Temp Readings from Last 3 Encounters:   06/11/18 97.8  F (36.6  C) (Oral)   03/19/18 98   F (36.7  C)   03/16/18 97.8  F (36.6  C)     BP Readings from Last 3 Encounters:   01/21/19 117/62   09/10/18 119/60   06/11/18 104/60     Pulse Readings from Last 3 Encounters:   01/21/19 74   09/10/18 66   06/11/18 75         Mental Status Exam:   Appearance: Patient appears with limited effort in participation.  He sitting in a chair.  Limited eye contact.  Slow and flat.  No obvious pain or shortness of breath.  Behavior: Flat and disinterested with limited initiation.  Vague responses.  Not currently agitated.  No current restlessness.  Speech: Rarely shaking and nodding her head.  No initiation.  Mood/Affect: Depressed and flat.  Slow with limited participation.  Not currently labile, anxious or agitated.  Limited effort however.  Thought Content: No reports of psychosis recently.  No current evidence of psychosis but limited participation.  Suicidal or Homicidal Thoughts:  None apparent or reported.   Thought Process/Formulation: Limited effort.  Slow with a delay.  No obvious racing thoughts.  [place].  Associations: [place] and slow with limited effort.  Impaired.  No obvious racing thoughts.    Fund of Knowledge: Please see the therapy notes.  Limited effort in participation at this time.  Impaired.  Attention/Concentration: Limited effort.  Impaired.  Limited attention and participation.    Insight:  Limited effort.  Continues impaired.  Please see therapy and nursing notes notes.  Judgement:  Limited effort.  Continues impaired.  Memory:  Limited participation. Slow. Disinterested.    Motor Status:  Limited participation. No current tremor.  Orientation: No reports of any recent change.  Limited effort at this time.      Diagnosis managed and treated at today's visit :      Major depressive disorder   History of psychosis NOS    Plan:  Medication Adjustment:  We will decrease the patient's Remeron down to 30 mg a day.  We are going to decrease the patient's Effexor XR to 225 mg a day.  Staff will  monitor for any changes in in 1-2 months will call us for possible further reduction in these medications and attempt to see how the patient does as well as consideration for alternative medication options.    Other: Patient will return to clinic in 3 months for further assessment and treatment the staff and patient agreed to return sooner or call if questions concerns or problems arise.    Continue with the support of the clinic, reassurance, and redirection. Staff monitoring and ongoing assessments per team plan. Current psychotropic medication appears to represent the minimum effective dosage and appears medically necessary. We will continue to monitor and reassess. This team will utilize appropriate emergency services if necessary. I will make myself available if concerns or problems arise.    Norberto Johnson MD

## 2021-06-23 NOTE — PROGRESS NOTES
Pt is here for psychiatric follow up.    Per staff patient has fallen twice in the last 30 days, unsure why.  Anxiety has increased along with picking behaviors: scalp, face and arms. Cream was prescribed by PCP. Will hide in room to pick per staff  Pt at times will decline food, will agree to 1 meal on those days.   Most days patient does not do her laps for physical exercise  Still wetting herself at night. Declines getting staff to help her.            Denies SI/HI thoughts at this time.

## 2021-06-25 NOTE — TELEPHONE ENCOUNTER
Date of Last Office Visit: 5-10-21  Date of Next Office Visit: none  No shows since last visit: 0  Cancellations since last visit: 0    Medication requested:  effexor # 62  With 3 refills 2-8-21 and remeron # 15 with 3 refills 2-8-21       Lapse in medication adherence greater than 5 days?: dyes    Medication refill request verified as identical to current order?: yes  Result of Last DAM, VPA, Li+ Level, CBC, or Carbamazepine Level (at or since last visit): N/A    []Medication refilled per  Medication Refill in Ambulatory Care  policy.  [x]Medication unable to be refilled by RN due to criteria not met as indicated below:    []Eligibility - not seen in the last year   [x]Supervision - no future appointment   []Compliance - no shows, cancellations or lapse in therapy   []Verification - order discrepancy   []Controlled medication   []Medication not included in policy   []90-day supply request   []Other

## 2021-06-25 NOTE — TELEPHONE ENCOUNTER
Date of Last Office Visit: 5/20/21  Date of Next Office Visit: None - schedulers will attempt to contact  No shows since last visit: none  Cancellations since last visit: none    Medication requested: venlafaxine 150mg Date last ordered: 2/8/21 Qty: 62 Refills: 3     Lapse in medication adherence greater than 5 days?: no  If yes, call patient and gather details:    Medication refill request verified as identical to current order?: yes  Result of Last DAM, VPA, Li+ Level, CBC, or Carbamazepine Level (at or since last visit): N/A    []Medication refilled per  Medication Refill in Ambulatory Care  policy.  [x]Medication unable to be refilled by RN due to criteria not met as indicated below:    []Eligibility - not seen in the last year   [x]Supervision - no future appointment   []Compliance - no shows, cancellations or lapse in therapy   []Verification - order discrepancy   []Controlled medication   []Medication not included in policy   []90-day supply request   []Other

## 2021-06-26 NOTE — TELEPHONE ENCOUNTER
[facility], please schedule this patient for a future visit. Patient is requesting a refill.     Date of Last Office Visit: 5/10/21 RTN in 3 month  Date of Next Office Visit: [facility] attempting to contact pt  No shows since last visit: 0  Cancellations since last visit: 0    Medication requested: Both Date last ordered: 5/27/21 Qty: 31 Refills: 0     Review of [place] ?: NA    Lapse in medication adherence greater than 5 days?: no  If yes, call patient and gather details: NA  Medication refill request verified as identical to current order?: yes  Result of Last DAM, VPA, Li+ Level, CBC, or Carbamazepine Level (at or since last visit): NA    []Medication refilled per  Medication Refill in Ambulatory Care  policy.  [x]Medication unable to be refilled by RN due to criteria not met as indicated below:    []Eligibility - not seen in the last year   [x]Supervision - no future appointment   []Compliance - no shows, cancellations or lapse in therapy   []Verification - order discrepancy   []Controlled medication   []Medication not included in policy   []90-day supply request   [x]Other: LPN is processing request

## 2021-06-26 NOTE — PROGRESS NOTES
Progress Notes by Dexter Rico NP at 3/16/2018  8:24 AM     Author: Dexter Rico NP Service: -- Author Type: Nurse Practitioner    Filed: 3/16/2018 12:51 PM Encounter Date: 3/16/2018 Status: Attested    : Dexter Rico NP (Nurse Practitioner) Cosigner: Noy Goodrich MBBS at 3/20/2018  7:45 PM    Attestation signed by Noy Goodrich MBBS at 3/20/2018  7:45 PM    Agree with [facility]      Code Status:  FULL CODE  Visit Type: Discharge Summary     Facility:  [facility] [[id number]]           History of Present Illness: Alison Bashir is a 67 y.o. female who is currently admitted as a transfer from [facility] to the TCU.  She is a resident of a group home with underlying history of a traumatic brain injury, hypertension, coronary artery disease who presented to the emergency room after she fell on 12/17.  She was diagnosed with a right hip fracture and underwent right hip hemiarthroplasty. She was also noted to have a UTI and subsequently discharged to TCU.  She readmitted to the hospital with ongoing pain in her right leg and imaging revealed a periprosthetic fractures orthopedics was consulted.  They recommended repeat surgical intervention.  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  She subsequently underwent an ORIF of right femur with revision hemiarthroplasty of the right hip by Dr. Magaña on 1/11/18. She had an EBL 2000ml. She had developed hypotension intraop and in PACU requiring multiple blood products. She was admitted to ICU postop given hypotension and blood loss and on 1/17/2018, she developed a draining hematoma on her right hip and was noted to have interval displacement of greater trochanter on Xray. She received 2 units of pRBC 1/17 and underwent right hip irrigation and debridement with ORIF of the right greater trochanter. Her Hgb had  been stable 7.5 - 8.0. Brilinta was on hold, resumed on 1/23/2018 with okay from Orthopedics and decreased ASA to 81mg daily from 325mg daily. Then on 1/24/2018, she had developed hypotension overnight and received 1U PRBCs. Brilinta stopped on 1/25/18 per Dr. Magaña for the foreseeable future, at least until her follow up appointment with Orthopedics in 10-14 days. Hypotension again developed on 1/25/2018 overnight, received 1L fluid bolus. Hemoglobin was stable. Hypotension was felt to be related to narcotics. Raised parameters to hold Dilaudid for SBP <110. Pain clinic following for pain med management. Stopped as needed Dilaudid on 1/24/2018 due to hypotension, decreased frequency of scheduled Dilaudid to every 6 hours on 1/25/2018, and added parameters to scheduled Dilaudid to hold for sedation or SBP < 110. She does have significant anxiety when transferring, fear of falling. She would benefit from seeing Psychiatry as an outpatient, refused this admission. Her pain medications may be adjusted as long as blood pressure tolerates. Her Brilinta is on hold until follow up with Orthopedics, and she was discharged on ASA 325mg daily, which can be decreased to 81mg daily when Brilinta restarted. Hemoglobin should be rechecked in 2 days. Her Metoprolol was stopped and should be restarted when blood pressure tolerates.      TCU: Alison is a 68 yo old with various concerns today. She had significant blood loss anemia secondary to blood loss and intraoperative hypotension and Hgb recheck is 8.2. No obvious signs of bleeding noted. Last Hgb 12.0. Will dc Fe.  Pain management was consulted for pain management optimization and she is currently discharged on oral Dilaudid, though per low BPs switched to tramadol QID, effective. Previously she was still not moving in bed and had difficulty moving her right lower extremity at all. Xray ordered per nursing request, no issue identified and is now mobility improved.  She had  high anxiety with transfers, started lorazepam PRN and recently schedule low dose BID, which is reported to be effective.  Now BPs have been normalized.  She will be discharged to her Group Home soon with services.    Patient denies pain, headache, chest pain, numbness or tingling, shortest of breath, eating or swallowing concerns, nausea or vomiting, diarrhea or bowel abnormalities, or no new integumentary concerns today.    Past Medical History:   Diagnosis Date   ? Acute blood loss anemia    ? Alzheimer disease    ? CAD (coronary artery disease)    ? Chronic pain syndrome    ? Dementia    ? Depression    ? Hip fracture, right 2017   ? HTN (hypertension)    ? Kidney stone    ? PMB (postmenopausal bleeding)    ? RLS (restless legs syndrome)    ? Stented coronary artery    ? Traumatic brain injury    ? Unsteady gait     uses walker   ? UTI (lower urinary tract infection)     on antibiotic course     Past Surgical History:   Procedure Laterality Date   ?  SECTION     ? CHOLECYSTECTOMY  May 2014   ? COMBINED HYSTEROSCOPY DIAGNOSTIC / D&C N/A 2014    Procedure: DILATION AND CURETTAGE WITH HYSTEROSCOPY;  Surgeon: Karime Cifuentes MD;  Location: [facility] [place], [place];  Service:    ? CORONARY STENT PLACEMENT     ? HEMIARTHROPLASTY HIP Right 2017   ? INCISION AND DRAINAGE HIP Right 2018    ORIF REVISION    ? LUNG REMOVAL, PARTIAL     ? REVISION TOTAL HIP ARTHROPLASTY Right 01/10/2018   ? TONSILLECTOMY AND ADENOIDECTOMY     ? TOTAL KNEE ARTHROPLASTY Right 2016   ? TOTAL KNEE ARTHROPLASTY Left 2015     No family history on file.  Social History     Social History   ? Marital status: Single     Spouse name: N/A   ? Number of children: N/A   ? Years of education: N/A     Occupational History   ? Not on file.     Social History Main Topics   ? Smoking status: Never Smoker   ? Smokeless tobacco: Never Used   ? Alcohol use No   ? Drug use: No   ? Sexual activity: Not on file     Other Topics  Concern   ? Not on file     Social History Narrative     Current Outpatient Prescriptions   Medication Sig Dispense Refill   ? traMADol (ULTRAM) 50 mg tablet Take 25 mg by mouth every 4 (four) hours as needed for pain.      ? acetaminophen (TYLENOL) 325 MG tablet Take 650 mg by mouth 4 (four) times a day.     ? aspirin 81 mg chewable tablet Chew 81 mg daily.     ? atorvastatin (LIPITOR) 40 MG tablet Take 40 mg by mouth at bedtime.     ? carbidopa-levodopa (SINEMET)  mg per tablet Take 2 tablets by mouth 3 (three) times a day.     ? cholecalciferol, vitamin D3, 5,000 unit Tab Take 4,000 Units by mouth Daily at 8:00 am..      ? cranberry 500 mg cap Take 500 mg by mouth 2 (two) times a day.     ? cyanocobalamin (VITAMIN B-12) 100 MCG tablet Take 100 mcg by mouth daily.     ? lidocaine (LIDODERM) 5 % Place 1 patch on the skin daily. Remove & Discard patch within 12 hours or as directed by MD     ? LORazepam (ATIVAN) 0.5 MG tablet Take 0.5 mg by mouth 2 (two) times a day.      ? magnesium oxide (MAG-OX) 400 mg tablet Take 400 mg by mouth 2 (two) times a day.     ? multivitamin (MULTIVITAMIN) per tablet Take 1 tablet by mouth daily.     ? nitroglycerin (NITROSTAT) 0.4 MG SL tablet Place 0.4 mg under the tongue every 5 (five) minutes as needed.      ? polyethylene glycol (MIRALAX) 17 gram packet Take 17 g by mouth daily.     ? pramipexole (MIRAPEX) 0.5 MG tablet Take 0.5 mg by mouth bedtime.     ? psyllium (METAMUCIL) powder Take by mouth every morning. 1 PKT     ? senna-docusate (PERICOLACE) 8.6-50 mg tablet Take 2 tablets by mouth 2 (two) times a day.     ? solifenacin (VESICARE) 10 MG tablet Take 10 mg by mouth daily.     ? sorbitol 70 % solution Take 30 mL by mouth daily.     ? ticagrelor (BRILINTA) 90 mg Tab Take 90 mg by mouth 2 (two) times a day.     ? traZODone (DESYREL) 150 MG tablet Take 1 tablet (150 mg total) by mouth bedtime. 30 tablet 0   ? venlafaxine (EFFEXOR-XR) 75 MG 24 hr capsule Take 5 capsules  (375 mg total) by mouth daily. 150 capsule 3     No current facility-administered medications for this visit.      Allergies   Allergen Reactions   ? Blood-Group Specific Substance Other (See Comments)     Patient has anti-K. Blood product orders maybe delayed. Draw one red top and 2 purple top tubes for all Type and Screen/Red blood Cell product orders   ? Fd And C No.5 (Tartrazine)      GI UPSET   ? Ibuprofen Nausea And Vomiting   ? Morphine Rash     swelling         Review of Systems:    Constitutional: Negative.  Negative for fever, chills, has activity change, appetite change and fatigue.  Anxiety with mobility.  HENT: Negative for congestion and facial swelling.    Eyes: Negative for photophobia, redness and visual disturbance.   Respiratory: Negative for cough and chest tightness.    Cardiovascular: Negative for chest pain, palpitations and leg swelling.   Gastrointestinal: Negative for , diarrhea, constipation, blood in stool and abdominal distention.     Genitourinary: Negative.    Musculoskeletal: Negative.  Had severe right hip pain though improved. Better mobility.  Skin: Negative.    Neurological: Negative for dizziness, tremors, syncope, weakness, light-headedness and headaches.   Hematological: Does not bruise/bleed easily.   Psychiatric/Behavioral: Negative.  Anxiety improved.    Vitals:    03/16/18 1131   BP: 117/68   Pulse: 80   Resp: 18   Temp: 97.8  F (36.6  C)   SpO2: 95%       Physical Exam:    GENERAL: no acute distress. Cooperative in conversation. Pain and anxiety better controlled.   HEENT: pupils are equal, round and reactive. Oral mucosa is moist and intact.  RESP:Chest symmetric. Regular respiratory rate. No stridor. CTA. RA.  CVS: S1S2, no murmur, rub, or gallop.  ABD: Nondistended, soft. No constipation or diarrhea.  EXTREMITIES: No lower extremity edema.  Right hip incision is intact.  NEURO: no focal deficits. Alert and oriented x3.   PSYCH: within normal limits. No depression, has  anxiety.  SKIN: warm dry intact,       Labs:      HEMOGLOBIN (01/26/2018 8:07 AM)  Only the most recent of 25 results within the time period is included.    HEMOGLOBIN (01/26/2018 8:07 AM)   Component Value Ref Range   HEMOGLOBIN  8.3 (L) 12.0 - 16.0 g/dL   MCV  88 80 - 100 fL     HEMOGLOBIN (01/26/2018 8:07 AM)   Specimen Performing Laboratory   Blood [facility]    SENDOUT INTERNAL ZIP [id number]    [address] [place] [address]       HEMOGLOBIN (01/26/2018 8:07 AM)   Narrative                Back to top of Results      EXTRA TUBE LAVENDER (01/25/2018 10:14 AM)  Only the most recent of 2 results within the time period is included.    EXTRA TUBE LAVENDER (01/25/2018 10:14 AM)   Specimen Performing Laboratory   Blood [facility]    SENDOUT INTERNAL ZIP [id number]    [address] [place] [address]     Back to top of Results      TRANSFUSE RBC (NURSE COMMUNICATION ORDER) (01/24/2018 11:10 AM)  Only the most recent of 15 results within the time period is included.    TRANSFUSE RBC (NURSE COMMUNICATION ORDER) (01/24/2018 11:10 AM)   Specimen Performing Laboratory   Blood      Back to top of Results      ANTIBODY IDENTIFICATION EACH PANEL (01/24/2018 5:03 AM)  Only the most recent of 3 results within the time period is included.    ANTIBODY IDENTIFICATION EACH PANEL (01/24/2018 5:03 AM)   Component Value Ref Range   QUANTITY 1     PANEL JENA ID  Panel Antibody ID       ANTIBODY IDENTIFICATION EACH PANEL (01/24/2018 5:03 AM)   Specimen Performing Laboratory     [facility]ORAT[facility] BLOOD BANK    [address] [place] [address]     Back to top of Results      RED BLOOD CELLS EA UNIT (01/24/2018 5:03 AM)  Only the most recent of 18 results within the time period is included.    RED BLOOD CELLS EA UNIT (01/24/2018 5:03 AM)   Component Value Ref Range   CROSSMATCH Compatible Compatible   PRODUCT BLOOD TYPE O Neg     PRODUCT ID NUMBER [id number]      PRODUCT STATUS  /Released     PRODUCT DESCRIPTION  RBC AS-1 LR     PRODUCT CODE [id number]       RED BLOOD CELLS EA UNIT (2018 5:03 AM)   Specimen Performing Laboratory     [facility] LABORATORY BLOOD BANK    [address] [place] [address]     Back to top of Results      RBC W TYPE AND SCREEN (2018 4:47 AM)  Only the most recent of 4 results within the time period is included.    RBC W TYPE AND SCREEN (2018 4:47 AM)   Component Value Ref Range   ABORH  O Rh Negative     ANTIBODY SCREEN Positive (A) Negative   SPECIMEN EXPIRATION DATE/TIME 18 23:59       RBC W TYPE AND SCREEN (2018 4:47 AM)   Specimen Performing Laboratory   Blood [facility] LABORAT[facility] BLOOD BANK    [address] [place] [address]     Back to top of Results      ANTIBODY IDENTIFICATION LAB USE ONLY (2018 4:47 AM)  Only the most recent of 3 results within the time period is included.    ANTIBODY IDENTIFICATION LAB USE ONLY (2018 4:47 AM)   Component Value Ref Range   ANTIBODY IDENTIFICATION  Anti-Maite (A)       ANTIBODY IDENTIFICATION LAB USE ONLY (2018 4:47 AM)   Specimen Performing Laboratory   Blood [facility] LABORAT[facility] BLOOD BANK    [address] [place] [address]     Back to top of Results      EXTRA TUBE GOLD/SST (2018 8:05 AM)  Only the most recent of 2 results within the time period is included.    EXTRA TUBE GOLD/SST (2018 8:05 AM)   Specimen Performing Laboratory   Blood [facility] LAB[facility]    SENDOUT INTERNAL ZIP [id number]    [address] [place] [address]     Back to top of Results      XR PELVIS 1 VIEW PORTABLE (2018 10:14 AM)  Only the most recent of 3 results within the time period is included.    XR PELVIS 1 VIEW PORTABLE (2018 10:14 AM)   Narrative   XR PELVIS 1 VIEW PORTABLE    2018 10:14 AM        INDICATION: Follow-up right hip ORIF. History of right hip  hemiarthroplasty.    COMPARISON: Hip radiograph, 01/17/2018        FINDINGS: There has been interval internal fixation of the right hip trochanteric fracture with good anatomic alignment of the fracture fragments. Evidence of previous right hip hemiarthroplasty. Hardware appears well seated and intact.       XR PELVIS 1 VIEW PORTABLE (01/18/2018 10:14 AM)   Procedure Note   Interface, Powerscribe - 01/18/2018 10:25 AM CST    XR PELVIS 1 VIEW PORTABLE  1/18/2018 10:14 AM    INDICATION: Follow-up right hip ORIF. History of right hip hemiarthroplasty.  COMPARISON: Hip radiograph, 01/17/2018    FINDINGS: There has been interval internal fixation of the right hip trochanteric fracture with good anatomic alignment of the fracture fragments. Evidence of previous right hip hemiarthroplasty. Hardware appears well seated and intact.     Back to top of Results      AEROBIC BACTERIAL CULTURE, STAIN (01/18/2018 8:24 AM)  Only the most recent of 2 results within the time period is included.    AEROBIC BACTERIAL CULTURE, STAIN (01/18/2018 8:24 AM)   Component Value Ref Range   CULTURE No Growth.     GRAM STAIN  1+ PMNs     GRAM STAIN  No organisms seen     GRAM STAIN  Gram stain performed by [place], [place]       AEROBIC BACTERIAL CULTURE, STAIN (01/18/2018 8:24 AM)   Specimen Performing Laboratory   Swab - Right Hip [facility]OR[facility]    [address]    [place] [address]     Back to top of Results      ANAEROBIC CULTURE (01/18/2018 8:24 AM)  Only the most recent of 2 results within the time period is included.    ANAEROBIC CULTURE (01/18/2018 8:24 AM)   Component Value Ref Range   CULTURE No anaerobes isolated       ANAEROBIC CULTURE (01/18/2018 8:24 AM)   Specimen Performing Laboratory   Swab - [facility] LABORATORY-CENTRAL LABORATORY    [address]    [place] [address]     Back to top of Results      ENDOTRACHEAL TUBE (01/18/2018 8:13  AM)  ENDOTRACHEAL TUBE (01/18/2018 8:13 AM)   Narrative   Jenniffer Mcfarland CRNA     1/18/2018  8:13 AM    Procedure: ETT        Patient location during procedure: OR    ETT Properties    Mask Ventilation: oral airway and easy    Final Technique: direct laryngoscopy    Type: straight    Location: oral    Cuffed: yes    Tube Size: 7.0 mm    Stylet: yes    Laryngoscope Blade: Quick    Blade Size: 2    Cormack-Lehane Grade View: 1    Insertion Attempts: 1    Placement Verification: auscultation and end tidal CO2    Assessment: pharynx clear, atraumatic and dentition unchanged    Secured at: 23    Measured From: teeth    Tooth guard used and removed: yes    Difficulty: 0 (not difficult)    Electronically signed by JENNIFFER MCFARLAND                                                                                                                                                                   ENDOTRACHEAL TUBE (01/18/2018 8:13 AM)   Procedure Note   Jenniffer Mcfarland CRNA - 01/18/2018 8:13 AM CST    Procedure: ETT    Patient location during procedure: OR  ETT Properties  Mask Ventilation: oral airway and easy  Final Technique: direct laryngoscopy  Type: straight  Location: oral  Cuffed: yes  Tube Size: 7.0 mm  Stylet: yes  Laryngoscope Blade: Quick  Blade Size: 2  Cormack-Lehane Grade View: 1  Insertion Attempts: 1  Placement Verification: auscultation and end tidal CO2  Assessment: pharynx clear, atraumatic and dentition unchanged  Secured at: 23  Measured From: teeth  Tooth guard used and removed: yes  Difficulty: 0 (not difficult)  Electronically signed by JENNIFFER MCFARLAND                    Back to top of Results      EXTRA TUBE LIGHT GREEN (01/18/2018 5:29 AM)  EXTRA TUBE LIGHT GREEN (01/18/2018 5:29 AM)   Specimen Performing Laboratory   Blood [facility] LABORATORY    SENDOUT INTERNAL ZIP [id number]    [address] [place] [address]     Back to top of Results      NUCLEATED RED BLOOD CELLS AS PERCENT OF  BLOOD LEUKOCYTES (01/18/2018 5:29 AM)  Only the most recent of 4 results within the time period is included.    NUCLEATED RED BLOOD CELLS AS PERCENT OF BLOOD LEUKOCYTES (01/18/2018 5:29 AM)   Component Value Ref Range   NRBC  0.0 %   ABS NRBC 0.0 thou /cu mm     NUCLEATED RED BLOOD CELLS AS PERCENT OF BLOOD LEUKOCYTES (01/18/2018 5:29 AM)   Specimen Performing Laboratory   Blood [facility] LABORATORY    SENDOUT INTERNAL ZIP [id number]    [address] [place] [address]       NUCLEATED RED BLOOD CELLS AS PERCENT OF BLOOD LEUKOCYTES (01/18/2018 5:29 AM)   Narrative                Back to top of Results      CBC with Platelets no Differential (01/18/2018 5:29 AM)  Only the most recent of 2 results within the time period is included.    CBC with Platelets no Differential (01/18/2018 5:29 AM)   Component Value Ref Range   WHITE BLOOD COUNT  6.1 4.5 - 11.0 thou/cu mm   RED BLOOD COUNT  2.77 (L) 4.00 - 5.20 mil/cu mm   HEMOGLOBIN  7.8 (L) 12.0 - 16.0 g/dL   HEMATOCRIT  24.1 (L) 33.0 - 51.0 %   MCV  87 80 - 100 fL   MCH  28.2 26.0 - 34.0 pg   MCHC  32.4 32.0 - 36.0 g/dL   RDW  15.4 11.5 - 15.5 %   PLATELET COUNT  240 140 - 440 thou/cu mm   MPV  10.0 6.5 - 11.0 fL     CBC with Platelets no Differential (01/18/2018 5:29 AM)   Specimen Performing Laboratory   Blood [facility] LABORATORY    SENDOUT INTERNAL ZIP [id number]    [address] [place] [address]       Recent Results (from the past 240 hour(s))   HM2(CBC w/o Differential)    Collection Time: 03/13/18  8:10 AM   Result Value Ref Range    WBC 4.5 4.0 - 11.0 thou/uL    RBC 4.67 3.80 - 5.40 mill/uL    Hemoglobin 12.0 12.0 - 16.0 g/dL    Hematocrit 41.9 35.0 - 47.0 %    MCV 90 80 - 100 fL    MCH 25.7 (L) 27.0 - 34.0 pg    MCHC 28.6 (L) 32.0 - 36.0 g/dL    RDW 14.5 11.0 - 14.5 %    Platelets 211 140 - 440 thou/uL    MPV 11.4 8.5 - 12.5 fL   Vitamin D, Total (25-Hydroxy)    Collection Time: 03/15/18 10:18 AM   Result Value Ref Range    Vitamin  D, Total (25-Hydroxy) 43.9 30.0 - 80.0 ng/mL         Discharge diagnoses:    Right hip fracture status post right hip hemiarthroplasty subsequently patient represented with a periprosthetic fx: ORIF of right femur with revision hemiarthroplasty of the right hip by Dr. Magaña on 1/11/18. EBL 2000ml. On 1/17/2018, she developed a draining hematoma right hip and was noted to have interval displacement of greater trochanter on Xray. She received 2 units of pRBC 1/17 and underwent right hip irrigation and debridement with ORIF of the right greater trochanter. She has been discharged and currently with WBAT.    Nondisplaced fracture of the radial head:   Course complicated by nonunion currently orthopedics has recommended a sling for her.    Pain management:  seen by the pain clinic and started on Dilaudid for pain management along with Tylenol which has been changed to scheduled, unable to receive dilaudid per low BPs, recently changed tramadol to 25mg q4h PRN, dc flexeril and dilaudid per non use previously. She also has an underlying history of chronic pain. Tramadol has been effective and will be sending with patient.     DVT prophylaxis: on aspirin 81mg.    [id number] and recheck hemoglobins.  Patient underwent hemorrhagic check in the hospital she required several units of blood transfusion the hospital discharge hemoglobin remained low. DC ferrous sulfate, recheck Hgb was 12.0.     Recent history of coronary artery disease: status post stent placement on 3/17, she was unable to tolerate Brilinta 90 mg twice daily due to bleeding complications though now restarted, currently on aspirin 81 mg as well.  No issues per bleeding. Will need to reschedule f/u appt at Group Home.    Postoperative hypotension: initially felt to be due to anemia secondary to hemorrhagic shock and blood loss she was given multiple units of blood products.  Subsequently she was not given fluid resuscitation as her hemoglobin was stable.  Last Hgb 12.0.  Was taken off metoprolol it has been recommended her dosage be increased if her blood pressures rebound. -140. No concern currently.     Anxiety disorder:  she was refusing to ambulate due to severe anxiety. Started lorazepam 0.5mg q6h PRN, used 1-2x daily and now has scheduled 0.5mg BID. Ambulating better per less anxiety.    HLD-Lipitor, stable.    History of movement disorder: Sinemet 3 times daily.  She is also on Mirapex.    Vit D deficiency: last 43.9, increased to 4000U daily.    B12 deficiency: on supplement.     Overactive bladder: Continue Vesicare but also had an indwelling Molina catheter. This was primarily given due to her lack of mobility for comfort. Removed. No issue.     Depression with insomnia:  takes trazodone at bedtime along with Effexor.          MEDICAL EQUIPMENT NEEDS:  na    DISCHARGE PLAN/FACE TO FACE:  I certify that services are/were furnished while this patient was under the care of a physician and that a physician or an allowed non-physician practitioner (NPP), had a face-to-face encounter that meets the physician face-to-face encounter requirements. The encounter was in whole, or in part, related to the primary reason for home health. The patient is confined to his/her home and needs intermittent skilled nursing, physical therapy, speech-language pathology, or the continued need for occupational therapy. A plan of care has been established by a physician and is periodically reviewed by a physician.  Date of Face-to-Face Encounter: 3/16/18    I certify that, based on my findings, the following services are medically necessary home health services: [facility] HHA/RN and PT/OT to evaluate and treat through [facility].    My clinical findings support the need for the above skilled services because: patient will be discharging to [facility]. Patient will need assistance with medication management and performing IALDs and ALDs effectively and safely.    Patient to  re-establish plan of care with their PCP within 7 days after leaving TCU.         The care plan has been reviewed and all orders signed. Changes to care plan, if any, as noted. Otherwise, continue care plan of care.      Electronically signed by: Dexter Rico NP    .

## 2021-06-28 NOTE — PROGRESS NOTES
Progress Notes by Altagracia Allan CMA at 3/9/2020  8:45 AM     Author: Altagracia Allan CMA Service: -- Author Type: Certified Medical Assistant    Filed: 3/9/2020  9:28 AM Encounter Date: 3/9/2020 Status: Signed    : Altagracia Allan CMA (Certified Medical Assistant)       Pt is here for psychiatric follow up and medication management.  Presents with 2 house staff.  Needs referral for Neuropsych eval for sort term and general no longer engaged in   Flat affect most days.            MN :

## 2021-06-28 NOTE — PROGRESS NOTES
Progress Notes by Altagracia Allan CMA at 12/16/2019 10:15 AM     Author: Altagracia Allan CMA Service: -- Author Type: Certified Medical Assistant    Filed: 12/16/2019 10:42 AM Encounter Date: 12/16/2019 Status: Signed    : Altagracia Allan CMA (Certified Medical Assistant)       Pt is here for psychiatric follow up and medication management.  Per staff increased is SIB; defiance behaviors; not eating, refusing some self care and not doing her exercises.        MN :

## 2021-06-29 NOTE — PROGRESS NOTES
Progress Notes by Archana Siddiqui CNP at 2020 11:59 PM     Author: Archana Siddiqui CNP Service: -- Author Type: Nurse Practitioner    Filed: 2020  6:28 PM Encounter Date: 2020 Status: Attested    : Archana Siddiqui CNP (Nurse Practitioner) Cosigner: Noy Goodrich MBBS at 2020  1:13 PM    Attestation signed by Noy Goodrich MBBS at 2020  1:13 PM    Agree with discharge note                [facility]      NAME:  Alison Bashir             :  1950  MRN: [id number]  CODE STATUS:  DNR/DNI    VISIT TYPE: DISCHARGE SUMMARY  FACILYTY: [facility] [[id number]]    HOSPITALIZATION:     TO 2020               PRIMARY CARE PROVIDER: Ramon Echeverria MD    DISCHARGE DIAGNOSIS:      1. Cognitive impairment    2. Fall, initial encounter    3. Inability to walk         DISCHARGE MEDICATIONS:         Medication List          Accurate as of 2020 11:59 PM. If you have any questions, ask your nurse or doctor.            CONTINUE taking these medications    acetaminophen 500 MG tablet  Commonly known as:  TYLENOL  Take 2 tablets (1,000 mg total) by mouth 3 (three) times a day.     aspirin 81 mg chewable tablet     atorvastatin 40 MG tablet  Commonly known as:  LIPITOR     buPROPion 300 MG 24 hr tablet  Commonly known as:  WELLBUTRIN XL  TAKE 1 TABLET BY MOUTH ONCE DAILY.     carbidopa-levodopa  mg per tablet  Commonly known as:  SINEMET     cholecalciferol (vitamin D3) 1,000 unit (25 mcg) tablet     lansoprazole 30 MG capsule  Commonly known as:  PREVACID     magnesium oxide 400 mg (241.3 mg magnesium) tablet  Commonly known as:  MAG-OX     metoprolol succinate 12.5 MG  Commonly known as:  TOPROL-XL     midodrine 2.5 MG tablet  Commonly known as:  PROAMATINE     mirtazapine 45 MG tablet  Commonly known as:  REMERON  TAKE 1 TABLET BY MOUTH AT BEDTIME  *1 TOTAL FILL*     multivitamin per tablet  Generic drug:  multivitamin    "  nitroglycerin 0.4 MG SL tablet  Commonly known as:  NITROSTAT     polyethylene glycol 17 gram packet  Commonly known as:  MIRALAX     pramipexole 0.5 MG tablet  Commonly known as:  MIRAPEX     solifenacin 10 MG tablet  Commonly known as:  VESICARE     traZODone 150 MG tablet  Commonly known as:  DESYREL  TAKE 1 TABLET BY MOUTH AT BEDTIME. *1 TOTAL FILL*     venlafaxine 150 MG 24 hr capsule  Commonly known as:  EFFEXOR-XR  TAKE 2 CAPSULES (300MG) BY MOUTH ONCE DAILY *1 TOTAL FILL*     vitamin B-12 100 MCG tablet  Generic drug:  cyanocobalamin          Post Discharge Medication Reconciliation Status: discharge medications reconciled, continue medications without change    HISTORY OF PRESENT ILLNESS: Alison Bashir is a 70 y.o. female is being seen for a face to face dc home to her group home. She will require ongoing home health PT/OT. She comes to the TCU from [facility]. Per his EMR \"70 y.o. old female with a history of frequent falls and unsteady gait.  She fell at the group home while she was trying to use her walker.  No head injuries and no chest pain prior to fall.  She had some slight pain in the hip and was unable to get up.       Generalized weakness with difficulty moving.    Recurrent falls.  Suspect due to symptoms of parkinsonism.  Infection possibly adding to the weakness/ UA looks infected.    Patient was seen by neurology, no change in Parkinson medications recommended.  PT/OT recommended TCU.     Recent fall with no apparent injuries.  X-ray pelvis and right femur are revealing for fractures.    MRI pelvis negative for fractures, showed moderate size acute subcutaneous hematoma along the lateral margin of the right hip.  Holben 10.7-9.0.  Patient's baseline hemoglobin 10.5.  Pain management with Tylenol.     Parkinson's disease with ongoing bilateral upper extremity tremors.    Resumed home medications.'  Alison reports she is happy to go to her group home. She is able to ambulate with SBA " and a walker.     SKILLED NURSING FACILITY COURSE:  During this TCU stay, patient completed all anticipated goals of therapy.      PHYSICAL EXAMINATION:    Vitals:    09/25/20 1809   BP: 128/76   Pulse: (!) 57   Temp: 98.5  F (36.9  C)   Weight: 168 lb 12.8 oz (76.6 kg)         GENERAL: Awake, Alert, oriented x3, not in any form of acute distress, answers questions appropriately, follows simple commands, conversant  HEENT: Head is normocephalic with normal hair distribution. No evidence of trauma. Ears: No acute purulent discharge. Eyes: Conjunctivae pink with no scleral jaundice. Nose: Normal mucosa and septum. NECK: Supple with no cervical or supraclavicular lymphadenopathy. Trachea is midline.SKIN: Warm and dry, no erythema noted, no rashes or lesions.  NEUROLOGICAL: The patient is oriented to person, place and time. Strength and sensation are grossly intact. Face is symmetric.    Social Distancing and PPE utulized during assessment due to Covid 19    LABS:  All labs reviewed in the nursing home record.        DISCHARGE PLAN: I certify that this patient is under Dr. Goodrich's care, seen by the NP, and had a face-to-face encounter that meets the physician face-to-face encounter requirements.  The encounter was in whole, or part related to the primary reason for home health.  The Patient is homebound due to: Fall and Parkinsons and it is , taxing and it will take a considerable amount of effort for patient to leave the home.  She is dependent on others for transportation.  The patient is confined to her home and needs intermittent skilled nursing, PT,OT,   The patient has been under the care of Dr. Goodrich/NP and Dr. Goodrich  initiated the establishment of the plan of care.        Patient to be followed by home care for physical therapy to eval and treat for strengthening, balance, endurance, and safety with mobility, and ambulation.  Patient to be followed by home care for occupational therapy to eval and treat for  strengthening, ADL needs, adaptive equipment, and safety.  Patient to be followed by home care for nursing services for medication set up and teaching, symptom and disease processes monitoring and education.    Patient to be followed by home care for home health aid services for bathing and ADL needs.  Planned discharge.  All therapy goals have been met.  Family will assist with discharge and transportation.      Patient will follow up with PCP within 7- days after discharge for medication mangagment and appropriate lab studies.          Electronically signed by:  Archana Siddiqui CNP  This progress note was completed using Dragon software and there may be grammatical errors.      For documentation purposes, chart review, medication management, and discharge coordination of care was greater than 35 minutes

## 2021-07-22 ENCOUNTER — RECORDS - HEALTHEAST (OUTPATIENT)
Dept: SCHEDULING | Facility: CLINIC | Age: 71
End: 2021-07-22

## 2021-07-22 DIAGNOSIS — Z12.31 OTHER SCREENING MAMMOGRAM: ICD-10-CM

## 2021-08-09 ENCOUNTER — VIRTUAL VISIT (OUTPATIENT)
Dept: BEHAVIORAL HEALTH | Facility: CLINIC | Age: 71
End: 2021-08-09
Payer: MEDICARE

## 2021-08-09 DIAGNOSIS — F33.1 MDD (MAJOR DEPRESSIVE DISORDER), RECURRENT EPISODE, MODERATE (H): Primary | ICD-10-CM

## 2021-08-09 PROCEDURE — 99443 PR PHYSICIAN TELEPHONE EVALUATION 21-30 MIN: CPT | Mod: 95 | Performed by: PSYCHIATRY & NEUROLOGY

## 2021-08-09 RX ORDER — VENLAFAXINE HYDROCHLORIDE 37.5 MG/1
37.5 CAPSULE, EXTENDED RELEASE ORAL DAILY
COMMUNITY
Start: 2021-07-28 | End: 2021-10-27

## 2021-08-09 RX ORDER — FUROSEMIDE 20 MG
20 TABLET ORAL EVERY OTHER DAY
COMMUNITY
Start: 2021-07-28

## 2021-08-09 RX ORDER — MIRTAZAPINE 15 MG/1
15 TABLET, FILM COATED ORAL AT BEDTIME
COMMUNITY
Start: 2021-06-18 | End: 2021-10-27

## 2021-08-09 RX ORDER — VENLAFAXINE HYDROCHLORIDE 150 MG/1
300 CAPSULE, EXTENDED RELEASE ORAL DAILY
COMMUNITY
Start: 2021-02-08 | End: 2021-08-27

## 2021-08-09 RX ORDER — BUPROPION HYDROCHLORIDE 300 MG/1
300 TABLET ORAL DAILY
COMMUNITY
Start: 2020-04-02 | End: 2022-01-27

## 2021-08-09 NOTE — PROGRESS NOTES
Outpatient Followup Psychiatric Evaluation      Pertinent History: Patient presents today for the purposes of medication management.  She has a history of a mood disorder and also with prior psychotic symptoms.  Per the patient's request we have been tapering the Zyprexa and that was discontinued in 2017.    She was restarted on the Remeron earlier in 2018.  I saw the patient in the summer 2018 and did not make any medication changes.  In September 2018 we did increase the patient's Remeron to 45 mg at night.    I saw the patient in January 2019.  At that time she was continuing to struggle with isolation and hoarding behaviors and was extremely anxious.  She was having some bedwetting issues that have been falling more seeming perhaps to be overmedicated.  There had not been any improvement with the increase in Remeron.  At that visit we did decrease the Effexor and the Remeron.    I saw the patient in the spring 2019 and at that time she was doing fairly well.  We elected to try a medication taper and we decreased her Effexor XR to 225 mg a day and decrease the Remeron down to 30 mg at night.    I saw the patient in December 2019.  She was having increased depression with some worsening behavioral issues.  She had been refusing day programming and there was an increase in her SIBs.  She was more defiant and eating less.  She had a decline in her hygiene.  We did increase both the Remeron and Effexor at that time.     I saw the patient in February 2020. At that time she was more flattered depressed in the context of staff turnover. We did add some low-dose Wellbutrin at that time. Over the course of the summer the patient has had multiple falls at her living place. The coronavirus is led to much more isolation. The patient is reportedly more depressed according to staff much of this apparently has been related to frequent hospitalizations for falls and other medical issues.    I saw the patient for a medication  check via a phone conference on September 28 of 2020.  2 staff member attended that interview and reported the patient seemed more withdrawn and depressed over the few weeks prior.  She had had multiple stressors including some medical hospitalizations as well as the stress of isolation due to the coronavirus.  She was less motivated and less interactive.  She felt that she was perhaps having a bit more difficulty with balance.  She was not dizzy but stated she was having a harder time negotiating furniture walking.  She appeared to be more tired and flat.  We did decrease her trazodone to 75 mg at night at that interview.    I saw the patient for a virtual visit December 21 of 2020.  She was a vague historian but felt she was doing a bit better.  It was unclear whether her unsteadiness was a bit better but she had no significant depression or anxiety.  She was sleeping well.  The staff reported she still had periods of irritability and depression.  They felt she was still unsteady.  The team was looking at other placement options for her.  She had been in rolled in a Parkinson's movement physical therapy class.  At that visit we did decrease her Remeron to 30 mg at at bedtime.    I saw the patient for return visit on February 8, 2021.  She was extremely vague but she minimized concerns or complaints.  The staff however reported the patient was more depressed and frustrated.  She had been declining physical therapy which has been offered to her.  She was flat slow and disinterested.  I did increase her Effexor XR to 337.5 mg a day and we decreased her Remeron at that visit.  The patient had a fall on March 1 of 2021 and apparently was seen in the emergency room with a laceration on her head.  Apparently she had no sequela from that.    I interviewed the patient in May 2021.  She was again quite vague but reported no significant changes.  She was continuing somewhat depressed and was occasionally hearing a choir in  the background.  I did speak with the patient's staff member as well.  We did increase her Effexor XR to 337.5 mg a day.      Current Symptoms:   The patient was interviewed by phone.  I also had an opportunity to speak with the patient's staff member Alexa.  Both reported the patient was doing better.  They felt that the increase in Effexor had helped with the patient's mood.  She denied having any desire to be dead or thoughts of harming herself.  She continues to hear the choir noises at times but states is not particularly bothersome and she does not want any medication changes.    She tells me she is sleeping better.  She reports no new medical issues or concerns.  She has had no new allergies.  No other medication changes.  She reports she has been vaccinated for the coronavirus.  She reports no other questions or concerns.  The patient staff member was very comfortable keeping the medications as they work and stated she felt they were quite helpful and the patient was doing quite well.      Current Medications: Please see chart    Medication Compliance: Yes    Side Effects to Medications: No      Vitals:  Wt Readings from Last 3 Encounters:   03/01/21 155 lb (70.3 kg)   09/30/20 168 lb (76.2 kg)   09/25/20 168 lb 12.8 oz (76.6 kg)     Temp Readings from Last 3 Encounters:   03/01/21 97.9  F (36.6  C) (Oral)   10/02/20 98.4  F (36.9  C) (Oral)   09/25/20 98.5  F (36.9  C)     BP Readings from Last 3 Encounters:   03/01/21 126/73   10/02/20 126/59   09/25/20 128/76     Pulse Readings from Last 3 Encounters:   03/01/21 76   10/02/20 73   09/25/20 (!) 57         Mental Status Exam:   Appearance: I interviewed the patient as well as staff, Alexa by phone.  Behavior: Patient was a bit brighter and quicker to respond.  She was comfortable.  No agitation or distress.   Mood/Affect: Continues overall flat but less depressed.  She is quicker to respond.  No irritability.  No trina.  Thought content: She reports she  continues to have periods where she will hear a choir in the background.  This is not particularly bothersome.  Apparently no visual hallucinations.  Suicidal or Homicidal Thoughts: Patient denies.  The staff do not report any suicidality.  Thought Process/Formulation: Participating a bit better today.  Answers were consistent and appropriate.  Limited initiation but tracking relatively well.  Associations: Fair, better participation today.  Fund of Knowledge: Needing prompts.  Limited participation.  Continues impaired.  No obvious significant changes but better participation.  Attention/Concentration: Somewhat slow and concrete but better participation.  No racing thoughts.  Not disorganized.  Still fairly concrete.  Insight: Impaired with no reports of any significant recent change.  Judgement:  Limited effort.  Continues fair.  Memory:  Limited participation. Slow.  Better effort and participation today.  Motor Status: No current tremor according to the patient and the staff.  Orientation: No reports of any recent change.  Limited effort at this time.      Diagnosis managed and treated at today's visit :      Major depressive disorder   History of psychosis NOS    Plan:  Medication Adjustment:  We will continue with current medications.    Other: Patient will return to clinic in 3 months for further assessment and treatment the staff and patient agreed to return sooner or call if questions concerns or problems arise.  The patient will continue with medical follow-up through her primary care clinic.    Continue with the support of the clinic, reassurance, and redirection. Staff monitoring and ongoing assessments per team plan. Current psychotropic medication appears to represent the minimum effective dosage and appears medically necessary. We will continue to monitor and reassess. This team will utilize appropriate emergency services if necessary. I will make myself available if concerns or problems arise.     I  saw the patient for 23minutes which included patient and staff interview, chart review and documentation.      Norberto Johnson MD

## 2021-08-09 NOTE — NURSING NOTE
This video/telephone visit will be conducted via a call between you and your physician/provider. We have found that certain health care needs can be provided without the need for an in-person physical exam. This service lets us provide the care you need with a video /telephone conversation. If a prescription is necessary we can send it directly to your pharmacy. If lab work is needed we can place an order for that and you can then stop by our lab to have the test done at a later time.    Just as we bill insurance for in-person visits, we also bill insurance for video/telephone visits. If you have questions about your insurance coverage, we recommend that you speak with your insurance company.    Patient has given verbal consent for video/Telephone visit? yes  Patient would like telephone visit, please call: [phone]  LYDIA/YANELY : Zackary LANDAVERDE LPN  E-mail AVS to: [email or web address]  Staff Pepper verified allergies, medications and pharmacy via phone.  Patient states she  is ready for visit.  ________________________________________  Medications Phoned  to Pharmacy [] yes [x]no  Name of Pharmacist:  List Medications, including dose, quantity and instructions    Medications ordered this visit were e-scribed.  Verified by order class [] yes  [x] no    Medication changes or discontinuations were communicated to patient's pharmacy: [] yes  [x] no    Dictation completed at time of chart check: [x] yes  [] no  Unable to review treatment plan - provider's note is blocked.   AVS e-mailed to   I have checked the documentation for today s encounters and the above information has been reviewed and completed.

## 2021-08-09 NOTE — PATIENT INSTRUCTIONS
Patient is doing relatively well and tolerating the medication.  We will continue with the current treatment plan.  I will make no medication changes.  The patient will return to this clinic for a follow-up appointment in 3 months.  Both she and the staff agreed to call before then with any questions or concerns.

## 2021-08-27 DIAGNOSIS — F33.1 MDD (MAJOR DEPRESSIVE DISORDER), RECURRENT EPISODE, MODERATE (H): Primary | ICD-10-CM

## 2021-08-27 RX ORDER — VENLAFAXINE HYDROCHLORIDE 150 MG/1
300 CAPSULE, EXTENDED RELEASE ORAL DAILY
Qty: 60 CAPSULE | Refills: 3 | Status: ON HOLD | OUTPATIENT
Start: 2021-08-27 | End: 2021-12-21

## 2021-08-27 NOTE — TELEPHONE ENCOUNTER
Date of Last Office Visit: 8/9/21  Date of Next Office Visit: 11/10/21  No shows since last visit: none  Cancellations since last visit: none    Medication requested: venlafaxine 150mg Date last ordered: 2/8/21 Qty: 62 Refills: 3     Lapse in medication adherence greater than 5 days?: yes  If yes, call patient and gather details: left message  Medication refill request verified as identical to current order?: yes  Result of Last DAM, VPA, Li+ Level, CBC, or Carbamazepine Level (at or since last visit): N/A    []Medication refilled per  Medication Refill in Ambulatory Care  policy.  [x]Medication unable to be refilled by RN due to criteria not met as indicated below:    []Eligibility - not seen in the last year   []Supervision - no future appointment   [x]Compliance - no shows, cancellations or lapse in therapy   []Verification - order discrepancy   []Controlled medication   []Medication not included in policy   []90-day supply request   []Other

## 2021-09-14 ENCOUNTER — LAB REQUISITION (OUTPATIENT)
Dept: LAB | Facility: CLINIC | Age: 71
End: 2021-09-14
Payer: MEDICARE

## 2021-09-14 DIAGNOSIS — R58 HEMORRHAGE, NOT ELSEWHERE CLASSIFIED: ICD-10-CM

## 2021-09-14 PROCEDURE — 87086 URINE CULTURE/COLONY COUNT: CPT | Mod: ORL | Performed by: STUDENT IN AN ORGANIZED HEALTH CARE EDUCATION/TRAINING PROGRAM

## 2021-09-15 LAB — BACTERIA UR CULT: NORMAL

## 2021-10-11 ENCOUNTER — LAB REQUISITION (OUTPATIENT)
Dept: LAB | Facility: CLINIC | Age: 71
End: 2021-10-11
Payer: MEDICARE

## 2021-10-11 DIAGNOSIS — I10 ESSENTIAL (PRIMARY) HYPERTENSION: ICD-10-CM

## 2021-10-11 LAB
ALBUMIN SERPL-MCNC: 3.5 G/DL (ref 3.5–5)
ALP SERPL-CCNC: 101 U/L (ref 45–120)
ALT SERPL W P-5'-P-CCNC: 12 U/L (ref 0–45)
ANION GAP SERPL CALCULATED.3IONS-SCNC: 8 MMOL/L (ref 5–18)
AST SERPL W P-5'-P-CCNC: 15 U/L (ref 0–40)
BILIRUB SERPL-MCNC: 0.4 MG/DL (ref 0–1)
BUN SERPL-MCNC: 34 MG/DL (ref 8–28)
CALCIUM SERPL-MCNC: 9.2 MG/DL (ref 8.5–10.5)
CHLORIDE BLD-SCNC: 109 MMOL/L (ref 98–107)
CHOLEST SERPL-MCNC: 89 MG/DL
CO2 SERPL-SCNC: 31 MMOL/L (ref 22–31)
CREAT SERPL-MCNC: 0.82 MG/DL (ref 0.6–1.1)
FASTING STATUS PATIENT QL REPORTED: NO
GFR SERPL CREATININE-BSD FRML MDRD: 72 ML/MIN/1.73M2
GLUCOSE BLD-MCNC: 96 MG/DL (ref 70–125)
HDLC SERPL-MCNC: 38 MG/DL
LDLC SERPL CALC-MCNC: 33 MG/DL
POTASSIUM BLD-SCNC: 4.1 MMOL/L (ref 3.5–5)
PROT SERPL-MCNC: 6.9 G/DL (ref 6–8)
SODIUM SERPL-SCNC: 148 MMOL/L (ref 136–145)
TRIGL SERPL-MCNC: 90 MG/DL

## 2021-10-11 PROCEDURE — 80061 LIPID PANEL: CPT | Mod: ORL | Performed by: PHYSICIAN ASSISTANT

## 2021-10-11 PROCEDURE — 80053 COMPREHEN METABOLIC PANEL: CPT | Mod: ORL | Performed by: PHYSICIAN ASSISTANT

## 2021-10-20 DIAGNOSIS — F41.1 GENERALIZED ANXIETY DISORDER: Primary | ICD-10-CM

## 2021-10-20 RX ORDER — PRAMIPEXOLE DIHYDROCHLORIDE 0.5 MG/1
0.5 TABLET ORAL DAILY
Qty: 31 TABLET | Refills: 3 | Status: SHIPPED | OUTPATIENT
Start: 2021-10-20 | End: 2022-05-18

## 2021-10-20 NOTE — TELEPHONE ENCOUNTER
Pt is scheduled for an appt on 11/10/2021.     Medication T'd for review and signature    Jeison Charles on 10/20/2021 at 3:26 PM

## 2021-10-27 DIAGNOSIS — F33.1 MDD (MAJOR DEPRESSIVE DISORDER), RECURRENT EPISODE, MODERATE (H): Primary | ICD-10-CM

## 2021-10-27 RX ORDER — MIRTAZAPINE 15 MG/1
TABLET, FILM COATED ORAL
Qty: 31 TABLET | Refills: 3 | Status: SHIPPED | OUTPATIENT
Start: 2021-10-27 | End: 2022-05-18

## 2021-10-27 RX ORDER — VENLAFAXINE HYDROCHLORIDE 37.5 MG/1
CAPSULE, EXTENDED RELEASE ORAL
Qty: 31 CAPSULE | Refills: 3 | Status: SHIPPED | OUTPATIENT
Start: 2021-10-27 | End: 2022-05-18

## 2021-10-27 NOTE — TELEPHONE ENCOUNTER
[facility] patient    Date of Last Office Visit: 8/9/21  Date of Next Office Visit: 11/10/21  No shows since last visit: 0  Cancellations since last visit: 0    Medication requested: Both Date last ordered: 6/18/21 Qty: 30 Refills: 3     Review of MN ?: NA      Lapse in medication adherence greater than 5 days?: appears yes  If yes, call patient and gather details:   Medication refill request verified as identical to current order?: yes, except asking for a 31 day supply  Result of Last DAM, VPA, Li+ Level, CBC, or Carbamazepine Level (at or since last visit): See labs    Last visit treatment plan:     Patient is doing relatively well and tolerating the medication.  We will continue with the current treatment plan.  I will make no medication changes.  The patient will return to this clinic for a follow-up appointment in 3 months.  Both she and the staff agreed to call before then with any questions or concerns.    []Medication refilled per  Medication Refill in Ambulatory Care  policy.  [x]Medication unable to be refilled by RN due to criteria not met as indicated below:    []Eligibility - not seen in the last year   []Supervision - no future appointment   [x]Compliance - no shows, cancellations or lapse in therapy   []Verification - order discrepancy   []Controlled medication   [x]Medication not included in policy   []90-day supply request   [x]Other: LPN is processing request

## 2021-11-05 ENCOUNTER — LAB REQUISITION (OUTPATIENT)
Dept: LAB | Facility: CLINIC | Age: 71
End: 2021-11-05
Payer: MEDICARE

## 2021-11-05 DIAGNOSIS — R05.9 COUGH, UNSPECIFIED: ICD-10-CM

## 2021-11-05 PROCEDURE — U0003 INFECTIOUS AGENT DETECTION BY NUCLEIC ACID (DNA OR RNA); SEVERE ACUTE RESPIRATORY SYNDROME CORONAVIRUS 2 (SARS-COV-2) (CORONAVIRUS DISEASE [COVID-19]), AMPLIFIED PROBE TECHNIQUE, MAKING USE OF HIGH THROUGHPUT TECHNOLOGIES AS DESCRIBED BY CMS-2020-01-R: HCPCS | Mod: ORL | Performed by: PHYSICIAN ASSISTANT

## 2021-11-06 LAB — SARS-COV-2 RNA RESP QL NAA+PROBE: POSITIVE

## 2021-11-17 DIAGNOSIS — F33.1 MDD (MAJOR DEPRESSIVE DISORDER), RECURRENT EPISODE, MODERATE (H): ICD-10-CM

## 2021-11-17 RX ORDER — VENLAFAXINE HYDROCHLORIDE 150 MG/1
CAPSULE, EXTENDED RELEASE ORAL
Qty: 60 CAPSULE | Refills: 11 | OUTPATIENT
Start: 2021-11-17

## 2021-11-17 NOTE — TELEPHONE ENCOUNTER
Pt should still have refills on file for venlafaxine (EFFEXOR-XR) 150 MG 24 hr capsule.     Ordered on 8/27/21 30 day supply with 3 refills should have meds through 12/27/21. Denied refill request

## 2021-12-20 ENCOUNTER — HOSPITAL ENCOUNTER (INPATIENT)
Facility: HOSPITAL | Age: 71
LOS: 18 days | Discharge: NURSING, REHAB OR LONG-TERM CARE | DRG: 580 | End: 2022-01-07
Attending: INTERNAL MEDICINE | Admitting: INTERNAL MEDICINE
Payer: MEDICARE

## 2021-12-20 ENCOUNTER — APPOINTMENT (OUTPATIENT)
Dept: ULTRASOUND IMAGING | Facility: HOSPITAL | Age: 71
DRG: 580 | End: 2021-12-20
Attending: INTERNAL MEDICINE
Payer: MEDICARE

## 2021-12-20 ENCOUNTER — APPOINTMENT (OUTPATIENT)
Dept: RADIOLOGY | Facility: HOSPITAL | Age: 71
DRG: 580 | End: 2021-12-20
Attending: INTERNAL MEDICINE
Payer: MEDICARE

## 2021-12-20 ENCOUNTER — ANESTHESIA (OUTPATIENT)
Dept: SURGERY | Facility: HOSPITAL | Age: 71
DRG: 580 | End: 2021-12-20
Payer: MEDICARE

## 2021-12-20 ENCOUNTER — APPOINTMENT (OUTPATIENT)
Dept: CT IMAGING | Facility: HOSPITAL | Age: 71
DRG: 580 | End: 2021-12-20
Attending: INTERNAL MEDICINE
Payer: MEDICARE

## 2021-12-20 ENCOUNTER — ANESTHESIA EVENT (OUTPATIENT)
Dept: SURGERY | Facility: HOSPITAL | Age: 71
DRG: 580 | End: 2021-12-20
Payer: MEDICARE

## 2021-12-20 ENCOUNTER — HOSPITAL ENCOUNTER (OUTPATIENT)
Facility: HOSPITAL | Age: 71
End: 2021-12-20
Attending: SURGERY
Payer: MEDICARE

## 2021-12-20 DIAGNOSIS — D62 ACUTE BLOOD LOSS ANEMIA: ICD-10-CM

## 2021-12-20 DIAGNOSIS — T14.8XXA HEMATOMA OF SKIN: Primary | ICD-10-CM

## 2021-12-20 DIAGNOSIS — R52 INTRACTABLE PAIN: ICD-10-CM

## 2021-12-20 DIAGNOSIS — S89.91XA KNEE INJURY, RIGHT, INITIAL ENCOUNTER: ICD-10-CM

## 2021-12-20 DIAGNOSIS — E11.69 TYPE 2 DIABETES MELLITUS WITH OTHER SPECIFIED COMPLICATION, UNSPECIFIED WHETHER LONG TERM INSULIN USE (H): ICD-10-CM

## 2021-12-20 DIAGNOSIS — T14.8XXA HEMATOMA OF SKIN: ICD-10-CM

## 2021-12-20 DIAGNOSIS — I95.1 ORTHOSTATIC HYPOTENSION: ICD-10-CM
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
## 2021-12-20 DIAGNOSIS — K59.03 DRUG-INDUCED CONSTIPATION: ICD-10-CM

## 2021-12-20 DIAGNOSIS — M79.661 PAIN OF RIGHT LOWER LEG: ICD-10-CM

## 2021-12-20 DIAGNOSIS — S80.11XA LEG HEMATOMA, RIGHT, INITIAL ENCOUNTER: ICD-10-CM

## 2021-12-20 PROBLEM — R26.2 INABILITY TO WALK: Status: ACTIVE | Noted: 2020-08-25

## 2021-12-20 PROBLEM — S52.124K: Status: ACTIVE | Noted: 2018-01-10

## 2021-12-20 PROBLEM — I25.10 CORONARY ATHEROSCLEROSIS: Status: ACTIVE | Noted: 2018-01-26

## 2021-12-20 PROBLEM — E78.00 PURE HYPERCHOLESTEROLEMIA: Status: ACTIVE | Noted: 2021-12-20

## 2021-12-20 PROBLEM — Q80.9 XERODERMA: Status: ACTIVE | Noted: 2021-12-20

## 2021-12-20 PROBLEM — F51.02 ADJUSTMENT INSOMNIA: Status: ACTIVE | Noted: 2020-07-03

## 2021-12-20 PROBLEM — G20.C PARKINSONISM, UNSPECIFIED PARKINSONISM TYPE (H): Status: ACTIVE | Noted: 2021-12-20

## 2021-12-20 PROBLEM — Z95.5 PRESENCE OF CORONARY ANGIOPLASTY IMPLANT AND GRAFT: Status: ACTIVE | Noted: 2020-06-14

## 2021-12-20 PROBLEM — M19.079 PRIMARY LOCALIZED OSTEOARTHROSIS OF ANKLE AND FOOT: Status: ACTIVE | Noted: 2021-12-20

## 2021-12-20 PROBLEM — Z96.649 PERIPROSTHETIC HIP FRACTURE, INITIAL ENCOUNTER: Status: ACTIVE | Noted: 2018-01-10

## 2021-12-20 PROBLEM — Z86.718 HISTORY OF THROMBOEMBOLISM OF VEIN: Status: ACTIVE | Noted: 2021-12-20

## 2021-12-20 PROBLEM — Z91.81 PERSONAL HISTORY OF FALL: Status: ACTIVE | Noted: 2018-01-26

## 2021-12-20 PROBLEM — E78.5 DYSLIPIDEMIA: Status: ACTIVE | Noted: 2018-03-21

## 2021-12-20 PROBLEM — Z95.5 STENTED CORONARY ARTERY: Status: ACTIVE | Noted: 2017-03-07

## 2021-12-20 PROBLEM — Z96.659 ARTIFICIAL KNEE JOINT PRESENT: Status: ACTIVE | Noted: 2021-12-20

## 2021-12-20 PROBLEM — K21.9 GASTRO-ESOPHAGEAL REFLUX DISEASE WITHOUT ESOPHAGITIS: Status: ACTIVE | Noted: 2020-10-02

## 2021-12-20 PROBLEM — F32.0 MILD MAJOR DEPRESSION, SINGLE EPISODE (H): Status: ACTIVE | Noted: 2021-12-20

## 2021-12-20 PROBLEM — F41.9 ANXIETY DISORDER, UNSPECIFIED: Status: ACTIVE | Noted: 2020-10-02

## 2021-12-20 PROBLEM — R32 URINARY INCONTINENCE: Status: ACTIVE | Noted: 2021-12-20

## 2021-12-20 PROBLEM — E53.8 VITAMIN B12 DEFICIENCY (NON ANEMIC): Status: ACTIVE | Noted: 2021-12-20

## 2021-12-20 PROBLEM — Z71.89 ACP (ADVANCE CARE PLANNING): Status: ACTIVE | Noted: 2018-01-01

## 2021-12-20 PROBLEM — N39.0 URINARY TRACT INFECTION: Status: ACTIVE | Noted: 2017-12-27

## 2021-12-20 PROBLEM — K21.00 GASTROESOPHAGEAL REFLUX DISEASE WITH ESOPHAGITIS: Status: ACTIVE | Noted: 2020-06-08

## 2021-12-20 PROBLEM — I95.9 ACUTE HYPOTENSION: Status: ACTIVE | Noted: 2020-06-11

## 2021-12-20 PROBLEM — Z09 SURGERY FOLLOW-UP: Status: ACTIVE | Noted: 2018-01-09

## 2021-12-20 PROBLEM — I25.2 OLD MYOCARDIAL INFARCTION: Status: ACTIVE | Noted: 2021-12-20

## 2021-12-20 PROBLEM — E55.9 VITAMIN D DEFICIENCY: Status: ACTIVE | Noted: 2021-12-20

## 2021-12-20 PROBLEM — S72.009A CLOSED FRACTURE OF PART OF NECK OF FEMUR (H): Status: ACTIVE | Noted: 2017-12-21

## 2021-12-20 PROBLEM — Z87.820 HISTORY OF CLOSED HEAD INJURY: Status: ACTIVE | Noted: 2020-10-02

## 2021-12-20 PROBLEM — E61.2 MAGNESIUM DEFICIENCY: Status: ACTIVE | Noted: 2020-07-16

## 2021-12-20 PROBLEM — Z86.79 HISTORY OF CARDIOVASCULAR DISORDER: Status: ACTIVE | Noted: 2021-12-20

## 2021-12-20 PROBLEM — S70.01XA HEMATOMA OF RIGHT HIP: Status: ACTIVE | Noted: 2020-08-27

## 2021-12-20 PROBLEM — R41.89 COGNITIVE IMPAIRMENT: Status: ACTIVE | Noted: 2018-01-08

## 2021-12-20 PROBLEM — D64.9 ANEMIA: Status: ACTIVE | Noted: 2018-02-01

## 2021-12-20 PROBLEM — I45.10 RBBB: Status: ACTIVE | Noted: 2020-06-11

## 2021-12-20 PROBLEM — S72.91XA FEMUR FRACTURE, RIGHT (H): Status: ACTIVE | Noted: 2018-01-09

## 2021-12-20 PROBLEM — R07.9 CHEST PAIN, UNSPECIFIED: Status: ACTIVE | Noted: 2020-06-08

## 2021-12-20 PROBLEM — D72.819 LEUKOPENIA: Status: ACTIVE | Noted: 2020-06-08

## 2021-12-20 PROBLEM — E87.6 HYPOKALEMIA: Status: ACTIVE | Noted: 2021-12-20

## 2021-12-20 PROBLEM — M97.8XXA PERIPROSTHETIC HIP FRACTURE, INITIAL ENCOUNTER: Status: ACTIVE | Noted: 2018-01-10

## 2021-12-20 PROBLEM — M1A.00X0 CHRONIC GOUTY ARTHRITIS: Status: ACTIVE | Noted: 2021-12-20

## 2021-12-20 PROBLEM — F03.90 DEMENTIA (H): Status: ACTIVE | Noted: 2021-12-20

## 2021-12-20 PROBLEM — Z96.641 HISTORY OF RIGHT HIP REPLACEMENT: Status: ACTIVE | Noted: 2018-01-26

## 2021-12-20 PROBLEM — M25.551 RIGHT HIP PAIN: Status: ACTIVE | Noted: 2020-09-30

## 2021-12-20 PROBLEM — K59.00 CONSTIPATION: Status: ACTIVE | Noted: 2018-01-01

## 2021-12-20 PROBLEM — M81.0 SENILE OSTEOPOROSIS: Status: ACTIVE | Noted: 2021-12-20

## 2021-12-20 PROBLEM — I25.10 ATHEROSCLEROSIS OF CORONARY ARTERY WITHOUT ANGINA PECTORIS: Status: ACTIVE | Noted: 2021-12-20

## 2021-12-20 LAB
ABO/RH(D): ABNORMAL
ALBUMIN SERPL-MCNC: 3.5 G/DL (ref 3.5–5)
ALBUMIN UR-MCNC: 10 MG/DL
ALP SERPL-CCNC: 90 U/L (ref 45–120)
ALT SERPL W P-5'-P-CCNC: <9 U/L (ref 0–45)
ANION GAP SERPL CALCULATED.3IONS-SCNC: 4 MMOL/L (ref 5–18)
ANTIBODY SCREEN: POSITIVE
APPEARANCE UR: ABNORMAL
APTT PPP: 34 SECONDS (ref 22–38)
AST SERPL W P-5'-P-CCNC: 19 U/L (ref 0–40)
BACTERIA #/AREA URNS HPF: ABNORMAL /HPF
BASOPHILS # BLD AUTO: 0 10E3/UL (ref 0–0.2)
BASOPHILS # BLD AUTO: 0 10E3/UL (ref 0–0.2)
BASOPHILS NFR BLD AUTO: 0 %
BASOPHILS NFR BLD AUTO: 0 %
BILIRUB SERPL-MCNC: 0.5 MG/DL (ref 0–1)
BILIRUB UR QL STRIP: NEGATIVE
BLD PROD TYP BPU: NORMAL
BLD PROD TYP BPU: NORMAL
BLOOD COMPONENT TYPE: NORMAL
BLOOD COMPONENT TYPE: NORMAL
BNP SERPL-MCNC: 118 PG/ML (ref 0–127)
BUN SERPL-MCNC: 30 MG/DL (ref 8–28)
CALCIUM SERPL-MCNC: 9.2 MG/DL (ref 8.5–10.5)
CHLORIDE BLD-SCNC: 106 MMOL/L (ref 98–107)
CK SERPL-CCNC: 46 U/L (ref 30–190)
CO2 SERPL-SCNC: 34 MMOL/L (ref 22–31)
CODING SYSTEM: NORMAL
CODING SYSTEM: NORMAL
COLOR UR AUTO: YELLOW
CREAT SERPL-MCNC: 0.83 MG/DL (ref 0.6–1.1)
CROSSMATCH: NORMAL
CROSSMATCH: NORMAL
EOSINOPHIL # BLD AUTO: 0.1 10E3/UL (ref 0–0.7)
EOSINOPHIL # BLD AUTO: 0.1 10E3/UL (ref 0–0.7)
EOSINOPHIL NFR BLD AUTO: 1 %
EOSINOPHIL NFR BLD AUTO: 2 %
ERYTHROCYTE [DISTWIDTH] IN BLOOD BY AUTOMATED COUNT: 14.1 % (ref 10–15)
ERYTHROCYTE [DISTWIDTH] IN BLOOD BY AUTOMATED COUNT: 14.3 % (ref 10–15)
GFR SERPL CREATININE-BSD FRML MDRD: 71 ML/MIN/1.73M2
GLUCOSE BLD-MCNC: 126 MG/DL (ref 70–125)
GLUCOSE UR STRIP-MCNC: NEGATIVE MG/DL
HCT VFR BLD AUTO: 33.7 % (ref 35–47)
HCT VFR BLD AUTO: 33.9 % (ref 35–47)
HGB BLD-MCNC: 10 G/DL (ref 11.7–15.7)
HGB BLD-MCNC: 10.1 G/DL (ref 11.7–15.7)
HGB BLD-MCNC: 8.2 G/DL (ref 11.7–15.7)
HGB UR QL STRIP: ABNORMAL
HOLD SPECIMEN: NORMAL
IMM GRANULOCYTES # BLD: 0 10E3/UL
IMM GRANULOCYTES # BLD: 0 10E3/UL
IMM GRANULOCYTES NFR BLD: 0 %
IMM GRANULOCYTES NFR BLD: 0 %
INR PPP: 1.09 (ref 0.85–1.15)
ISSUE DATE AND TIME: NORMAL
K AG RBC QL: NEGATIVE
KETONES UR STRIP-MCNC: NEGATIVE MG/DL
LACTATE SERPL-SCNC: 0.6 MMOL/L (ref 0.7–2)
LEUKOCYTE ESTERASE UR QL STRIP: ABNORMAL
LYMPHOCYTES # BLD AUTO: 0.8 10E3/UL (ref 0.8–5.3)
LYMPHOCYTES # BLD AUTO: 1.2 10E3/UL (ref 0.8–5.3)
LYMPHOCYTES NFR BLD AUTO: 13 %
LYMPHOCYTES NFR BLD AUTO: 19 %
MAGNESIUM SERPL-MCNC: 2.2 MG/DL (ref 1.8–2.6)
MCH RBC QN AUTO: 28 PG (ref 26.5–33)
MCH RBC QN AUTO: 28.3 PG (ref 26.5–33)
MCHC RBC AUTO-ENTMCNC: 29.7 G/DL (ref 31.5–36.5)
MCHC RBC AUTO-ENTMCNC: 29.8 G/DL (ref 31.5–36.5)
MCV RBC AUTO: 94 FL (ref 78–100)
MCV RBC AUTO: 96 FL (ref 78–100)
MONOCYTES # BLD AUTO: 0.3 10E3/UL (ref 0–1.3)
MONOCYTES # BLD AUTO: 0.4 10E3/UL (ref 0–1.3)
MONOCYTES NFR BLD AUTO: 5 %
MONOCYTES NFR BLD AUTO: 7 %
MUCOUS THREADS #/AREA URNS LPF: PRESENT /LPF
NEUTROPHILS # BLD AUTO: 4.5 10E3/UL (ref 1.6–8.3)
NEUTROPHILS # BLD AUTO: 5 10E3/UL (ref 1.6–8.3)
NEUTROPHILS NFR BLD AUTO: 72 %
NEUTROPHILS NFR BLD AUTO: 81 %
NITRATE UR QL: POSITIVE
NRBC # BLD AUTO: 0 10E3/UL
NRBC # BLD AUTO: 0 10E3/UL
NRBC BLD AUTO-RTO: 0 /100
NRBC BLD AUTO-RTO: 0 /100
PH UR STRIP: 5.5 [PH] (ref 5–7)
PLATELET # BLD AUTO: 150 10E3/UL (ref 150–450)
PLATELET # BLD AUTO: 153 10E3/UL (ref 150–450)
POTASSIUM BLD-SCNC: 4.5 MMOL/L (ref 3.5–5)
PROCALCITONIN SERPL-MCNC: 0.04 NG/ML (ref 0–0.49)
PROT SERPL-MCNC: 7.2 G/DL (ref 6–8)
RBC # BLD AUTO: 3.53 10E6/UL (ref 3.8–5.2)
RBC # BLD AUTO: 3.61 10E6/UL (ref 3.8–5.2)
RBC URINE: 3 /HPF
SARS-COV-2 RNA RESP QL NAA+PROBE: NEGATIVE
SODIUM SERPL-SCNC: 144 MMOL/L (ref 136–145)
SP GR UR STRIP: 1.04 (ref 1–1.03)
SPECIMEN EXPIRATION DATE: ABNORMAL
SPECIMEN EXPIRATION DATE: NORMAL
SQUAMOUS EPITHELIAL: 1 /HPF
UNIT ABO/RH: NORMAL
UNIT ABO/RH: NORMAL
UNIT NUMBER: NORMAL
UNIT NUMBER: NORMAL
UNIT STATUS: NORMAL
UNIT STATUS: NORMAL
UNIT TYPE ISBT: 9500
UNIT TYPE ISBT: 9500
UROBILINOGEN UR STRIP-MCNC: <2 MG/DL
WBC # BLD AUTO: 6.2 10E3/UL (ref 4–11)
WBC # BLD AUTO: 6.2 10E3/UL (ref 4–11)
WBC CLUMPS #/AREA URNS HPF: PRESENT /HPF
WBC URINE: >182 /HPF

## 2021-12-20 PROCEDURE — 96365 THER/PROPH/DIAG IV INF INIT: CPT

## 2021-12-20 PROCEDURE — 120N000001 HC R&B MED SURG/OB

## 2021-12-20 PROCEDURE — 93010 ELECTROCARDIOGRAM REPORT: CPT | Mod: HIP | Performed by: INTERNAL MEDICINE

## 2021-12-20 PROCEDURE — 84145 PROCALCITONIN (PCT): CPT | Performed by: INTERNAL MEDICINE

## 2021-12-20 PROCEDURE — 96361 HYDRATE IV INFUSION ADD-ON: CPT

## 2021-12-20 PROCEDURE — 96375 TX/PRO/DX INJ NEW DRUG ADDON: CPT

## 2021-12-20 PROCEDURE — 76856 US EXAM PELVIC COMPLETE: CPT

## 2021-12-20 PROCEDURE — 86922 COMPATIBILITY TEST ANTIGLOB: CPT | Performed by: INTERNAL MEDICINE

## 2021-12-20 PROCEDURE — 258N000003 HC RX IP 258 OP 636: Performed by: INTERNAL MEDICINE

## 2021-12-20 PROCEDURE — 36415 COLL VENOUS BLD VENIPUNCTURE: CPT | Performed by: INTERNAL MEDICINE

## 2021-12-20 PROCEDURE — 85004 AUTOMATED DIFF WBC COUNT: CPT | Performed by: INTERNAL MEDICINE

## 2021-12-20 PROCEDURE — 85018 HEMOGLOBIN: CPT | Performed by: STUDENT IN AN ORGANIZED HEALTH CARE EDUCATION/TRAINING PROGRAM

## 2021-12-20 PROCEDURE — 82550 ASSAY OF CK (CPK): CPT | Performed by: INTERNAL MEDICINE

## 2021-12-20 PROCEDURE — 86850 RBC ANTIBODY SCREEN: CPT | Performed by: INTERNAL MEDICINE

## 2021-12-20 PROCEDURE — 87635 SARS-COV-2 COVID-19 AMP PRB: CPT | Performed by: INTERNAL MEDICINE

## 2021-12-20 PROCEDURE — 36415 COLL VENOUS BLD VENIPUNCTURE: CPT | Performed by: STUDENT IN AN ORGANIZED HEALTH CARE EDUCATION/TRAINING PROGRAM

## 2021-12-20 PROCEDURE — 85730 THROMBOPLASTIN TIME PARTIAL: CPT | Performed by: INTERNAL MEDICINE

## 2021-12-20 PROCEDURE — 73560 X-RAY EXAM OF KNEE 1 OR 2: CPT | Mod: LT

## 2021-12-20 PROCEDURE — 73552 X-RAY EXAM OF FEMUR 2/>: CPT | Mod: RT

## 2021-12-20 PROCEDURE — 86902 BLOOD TYPE ANTIGEN DONOR EA: CPT | Performed by: INTERNAL MEDICINE

## 2021-12-20 PROCEDURE — 81001 URINALYSIS AUTO W/SCOPE: CPT | Performed by: INTERNAL MEDICINE

## 2021-12-20 PROCEDURE — 93005 ELECTROCARDIOGRAM TRACING: CPT

## 2021-12-20 PROCEDURE — 85610 PROTHROMBIN TIME: CPT | Performed by: INTERNAL MEDICINE

## 2021-12-20 PROCEDURE — 258N000003 HC RX IP 258 OP 636: Performed by: STUDENT IN AN ORGANIZED HEALTH CARE EDUCATION/TRAINING PROGRAM

## 2021-12-20 PROCEDURE — 250N000011 HC RX IP 250 OP 636

## 2021-12-20 PROCEDURE — 83605 ASSAY OF LACTIC ACID: CPT | Performed by: INTERNAL MEDICINE

## 2021-12-20 PROCEDURE — 250N000011 HC RX IP 250 OP 636: Performed by: INTERNAL MEDICINE

## 2021-12-20 PROCEDURE — 96376 TX/PRO/DX INJ SAME DRUG ADON: CPT

## 2021-12-20 PROCEDURE — 250N000013 HC RX MED GY IP 250 OP 250 PS 637: Performed by: INTERNAL MEDICINE

## 2021-12-20 PROCEDURE — 93005 ELECTROCARDIOGRAM TRACING: CPT | Performed by: INTERNAL MEDICINE

## 2021-12-20 PROCEDURE — 75635 CT ANGIO ABDOMINAL ARTERIES: CPT

## 2021-12-20 PROCEDURE — 83880 ASSAY OF NATRIURETIC PEPTIDE: CPT | Performed by: INTERNAL MEDICINE

## 2021-12-20 PROCEDURE — C9803 HOPD COVID-19 SPEC COLLECT: HCPCS

## 2021-12-20 PROCEDURE — 96366 THER/PROPH/DIAG IV INF ADDON: CPT

## 2021-12-20 PROCEDURE — 82040 ASSAY OF SERUM ALBUMIN: CPT | Performed by: INTERNAL MEDICINE

## 2021-12-20 PROCEDURE — 99285 EMERGENCY DEPT VISIT HI MDM: CPT | Mod: 25

## 2021-12-20 PROCEDURE — 99222 1ST HOSP IP/OBS MODERATE 55: CPT | Mod: 25 | Performed by: SURGERY

## 2021-12-20 PROCEDURE — 86905 BLOOD TYPING RBC ANTIGENS: CPT | Performed by: INTERNAL MEDICINE

## 2021-12-20 PROCEDURE — 80053 COMPREHEN METABOLIC PANEL: CPT | Performed by: INTERNAL MEDICINE

## 2021-12-20 PROCEDURE — 86870 RBC ANTIBODY IDENTIFICATION: CPT | Performed by: INTERNAL MEDICINE

## 2021-12-20 PROCEDURE — 87086 URINE CULTURE/COLONY COUNT: CPT | Performed by: INTERNAL MEDICINE

## 2021-12-20 PROCEDURE — 99223 1ST HOSP IP/OBS HIGH 75: CPT | Mod: AI | Performed by: INTERNAL MEDICINE

## 2021-12-20 PROCEDURE — 83735 ASSAY OF MAGNESIUM: CPT | Performed by: INTERNAL MEDICINE

## 2021-12-20 PROCEDURE — 73590 X-RAY EXAM OF LOWER LEG: CPT | Mod: 50

## 2021-12-20 PROCEDURE — 87040 BLOOD CULTURE FOR BACTERIA: CPT | Performed by: INTERNAL MEDICINE

## 2021-12-20 RX ORDER — ACETAMINOPHEN 325 MG/1
650 TABLET ORAL EVERY 6 HOURS PRN
Status: DISCONTINUED | OUTPATIENT
Start: 2021-12-20 | End: 2021-12-23

## 2021-12-20 RX ORDER — LIDOCAINE 40 MG/G
CREAM TOPICAL
Status: DISCONTINUED | OUTPATIENT
Start: 2021-12-20 | End: 2021-12-23

## 2021-12-20 RX ORDER — VENLAFAXINE HYDROCHLORIDE 150 MG/1
300 CAPSULE, EXTENDED RELEASE ORAL DAILY
Status: DISCONTINUED | OUTPATIENT
Start: 2021-12-21 | End: 2022-01-07 | Stop reason: HOSPADM

## 2021-12-20 RX ORDER — IOPAMIDOL 755 MG/ML
100 INJECTION, SOLUTION INTRAVASCULAR ONCE
Status: COMPLETED | OUTPATIENT
Start: 2021-12-20 | End: 2021-12-20

## 2021-12-20 RX ORDER — SODIUM CHLORIDE 9 MG/ML
INJECTION, SOLUTION INTRAVENOUS CONTINUOUS
Status: DISCONTINUED | OUTPATIENT
Start: 2021-12-20 | End: 2021-12-23 | Stop reason: ALTCHOICE

## 2021-12-20 RX ORDER — VENLAFAXINE HYDROCHLORIDE 37.5 MG/1
37.5 CAPSULE, EXTENDED RELEASE ORAL
Status: DISCONTINUED | OUTPATIENT
Start: 2021-12-21 | End: 2022-01-07 | Stop reason: HOSPADM

## 2021-12-20 RX ORDER — POLYETHYLENE GLYCOL 3350 17 G/17G
17 POWDER, FOR SOLUTION ORAL DAILY
Status: DISCONTINUED | OUTPATIENT
Start: 2021-12-21 | End: 2022-01-07 | Stop reason: HOSPADM

## 2021-12-20 RX ORDER — TRAZODONE HYDROCHLORIDE 50 MG/1
150 TABLET, FILM COATED ORAL AT BEDTIME
Status: DISCONTINUED | OUTPATIENT
Start: 2021-12-20 | End: 2022-01-07 | Stop reason: HOSPADM

## 2021-12-20 RX ORDER — NALOXONE HYDROCHLORIDE 0.4 MG/ML
0.4 INJECTION, SOLUTION INTRAMUSCULAR; INTRAVENOUS; SUBCUTANEOUS
Status: DISCONTINUED | OUTPATIENT
Start: 2021-12-20 | End: 2022-01-07 | Stop reason: HOSPADM

## 2021-12-20 RX ORDER — METOPROLOL SUCCINATE 25 MG/1
12.5 TABLET, EXTENDED RELEASE ORAL DAILY
COMMUNITY

## 2021-12-20 RX ORDER — MIRTAZAPINE 15 MG/1
15 TABLET, FILM COATED ORAL AT BEDTIME
Status: DISCONTINUED | OUTPATIENT
Start: 2021-12-20 | End: 2022-01-07 | Stop reason: HOSPADM

## 2021-12-20 RX ORDER — ATORVASTATIN CALCIUM 40 MG/1
40 TABLET, FILM COATED ORAL EVERY EVENING
Status: DISCONTINUED | OUTPATIENT
Start: 2021-12-20 | End: 2022-01-07 | Stop reason: HOSPADM

## 2021-12-20 RX ORDER — ACETAMINOPHEN 650 MG/1
650 SUPPOSITORY RECTAL EVERY 6 HOURS PRN
Status: DISCONTINUED | OUTPATIENT
Start: 2021-12-20 | End: 2021-12-30

## 2021-12-20 RX ORDER — BUPROPION HYDROCHLORIDE 300 MG/1
300 TABLET ORAL DAILY
Status: DISCONTINUED | OUTPATIENT
Start: 2021-12-21 | End: 2022-01-07 | Stop reason: HOSPADM

## 2021-12-20 RX ORDER — PIPERACILLIN SODIUM, TAZOBACTAM SODIUM 3; .375 G/15ML; G/15ML
3.38 INJECTION, POWDER, LYOPHILIZED, FOR SOLUTION INTRAVENOUS EVERY 8 HOURS
Status: COMPLETED | OUTPATIENT
Start: 2021-12-20 | End: 2021-12-25

## 2021-12-20 RX ORDER — SODIUM CHLORIDE 9 MG/ML
INJECTION, SOLUTION INTRAVENOUS ONCE
Status: COMPLETED | OUTPATIENT
Start: 2021-12-20 | End: 2021-12-20

## 2021-12-20 RX ORDER — PRAMIPEXOLE DIHYDROCHLORIDE 0.5 MG/1
0.5 TABLET ORAL AT BEDTIME
Status: DISCONTINUED | OUTPATIENT
Start: 2021-12-20 | End: 2022-01-07 | Stop reason: HOSPADM

## 2021-12-20 RX ORDER — PIPERACILLIN SODIUM, TAZOBACTAM SODIUM 3; .375 G/15ML; G/15ML
3.38 INJECTION, POWDER, LYOPHILIZED, FOR SOLUTION INTRAVENOUS ONCE
Status: COMPLETED | OUTPATIENT
Start: 2021-12-20 | End: 2021-12-20

## 2021-12-20 RX ORDER — POLYETHYLENE GLYCOL 3350 17 G/17G
1 POWDER, FOR SOLUTION ORAL DAILY
COMMUNITY

## 2021-12-20 RX ORDER — PIPERACILLIN SODIUM, TAZOBACTAM SODIUM 3; .375 G/15ML; G/15ML
3.38 INJECTION, POWDER, LYOPHILIZED, FOR SOLUTION INTRAVENOUS EVERY 8 HOURS
Status: DISCONTINUED | OUTPATIENT
Start: 2021-12-20 | End: 2021-12-20

## 2021-12-20 RX ORDER — NALOXONE HYDROCHLORIDE 0.4 MG/ML
0.2 INJECTION, SOLUTION INTRAMUSCULAR; INTRAVENOUS; SUBCUTANEOUS
Status: DISCONTINUED | OUTPATIENT
Start: 2021-12-20 | End: 2022-01-07 | Stop reason: HOSPADM

## 2021-12-20 RX ORDER — CARBIDOPA AND LEVODOPA 25; 100 MG/1; MG/1
2 TABLET ORAL 3 TIMES DAILY
Status: DISCONTINUED | OUTPATIENT
Start: 2021-12-20 | End: 2022-01-07 | Stop reason: HOSPADM

## 2021-12-20 RX ORDER — MIDODRINE HYDROCHLORIDE 2.5 MG/1
7.5 TABLET ORAL 3 TIMES DAILY
Status: ON HOLD | COMMUNITY
End: 2022-01-07

## 2021-12-20 RX ADMIN — Medication 0.5 MG: at 13:11

## 2021-12-20 RX ADMIN — SODIUM CHLORIDE: 9 INJECTION, SOLUTION INTRAVENOUS at 15:56

## 2021-12-20 RX ADMIN — Medication 0.5 MG: at 10:09

## 2021-12-20 RX ADMIN — Medication 0.5 MG: at 10:36

## 2021-12-20 RX ADMIN — TRAZODONE HYDROCHLORIDE 150 MG: 100 TABLET ORAL at 21:48

## 2021-12-20 RX ADMIN — HYDROMORPHONE HYDROCHLORIDE 0.5 MG: 1 INJECTION, SOLUTION INTRAMUSCULAR; INTRAVENOUS; SUBCUTANEOUS at 11:25

## 2021-12-20 RX ADMIN — SODIUM CHLORIDE 500 ML: 9 INJECTION, SOLUTION INTRAVENOUS at 22:54

## 2021-12-20 RX ADMIN — PIPERACILLIN AND TAZOBACTAM 3.38 G: 3; .375 INJECTION, POWDER, FOR SOLUTION INTRAVENOUS at 21:51

## 2021-12-20 RX ADMIN — SODIUM CHLORIDE: 9 INJECTION, SOLUTION INTRAVENOUS at 09:48

## 2021-12-20 RX ADMIN — ACETAMINOPHEN 650 MG: 325 TABLET ORAL at 21:48

## 2021-12-20 RX ADMIN — MIRTAZAPINE 15 MG: 15 TABLET, FILM COATED ORAL at 21:48

## 2021-12-20 RX ADMIN — HYDROMORPHONE HYDROCHLORIDE 1 MG: 1 INJECTION, SOLUTION INTRAMUSCULAR; INTRAVENOUS; SUBCUTANEOUS at 09:50

## 2021-12-20 RX ADMIN — HYDROMORPHONE HYDROCHLORIDE 0.5 MG: 1 INJECTION, SOLUTION INTRAMUSCULAR; INTRAVENOUS; SUBCUTANEOUS at 10:09

## 2021-12-20 RX ADMIN — HYDROMORPHONE HYDROCHLORIDE 0.5 MG: 1 INJECTION, SOLUTION INTRAMUSCULAR; INTRAVENOUS; SUBCUTANEOUS at 13:11

## 2021-12-20 RX ADMIN — PIPERACILLIN SODIUM AND TAZOBACTAM SODIUM 3.38 G: 3; .375 INJECTION, POWDER, LYOPHILIZED, FOR SOLUTION INTRAVENOUS at 16:18

## 2021-12-20 RX ADMIN — ATORVASTATIN CALCIUM 40 MG: 40 TABLET, FILM COATED ORAL at 22:54

## 2021-12-20 RX ADMIN — CARBIDOPA AND LEVODOPA 2 TABLET: 25; 100 TABLET ORAL at 21:48

## 2021-12-20 RX ADMIN — PRAMIPEXOLE DIHYDROCHLORIDE 0.5 MG: 0.5 TABLET ORAL at 21:48

## 2021-12-20 RX ADMIN — HYDROMORPHONE HYDROCHLORIDE 0.5 MG: 1 INJECTION, SOLUTION INTRAMUSCULAR; INTRAVENOUS; SUBCUTANEOUS at 10:36

## 2021-12-20 RX ADMIN — IOPAMIDOL 100 ML: 755 INJECTION, SOLUTION INTRAVENOUS at 14:20

## 2021-12-20 ASSESSMENT — ACTIVITIES OF DAILY LIVING (ADL)
ADLS_ACUITY_SCORE: 12
DEPENDENT_IADLS:: CLEANING;COOKING;LAUNDRY;SHOPPING;MEAL PREPARATION;MEDICATION MANAGEMENT;MONEY MANAGEMENT;TRANSPORTATION;INCONTINENCE
ADLS_ACUITY_SCORE: 9
ADLS_ACUITY_SCORE: 14
DIFFICULTY_COMMUNICATING: OTHER (SEE COMMENTS)
ADLS_ACUITY_SCORE: 14
COMMUNICATION: OTHER (SEE COMMENTS)

## 2021-12-20 ASSESSMENT — MIFFLIN-ST. JEOR: SCORE: 1416.13

## 2021-12-20 NOTE — H&P
[facility]    History and Physical - Hospitalist Service       Date of Admission:  12/20/2021    Assessment & Plan      Alison Bashir is a 71 year old female admitted on 12/20/2021. She has a hx of Parkinson, CAD, cognitive decline(but has been her own decision maker), htn, [place], hx of TBI who lives in . She was sent in for severe right leg pain    1.Severe right leg pain  -has extensive acute hematoma of lower right leg--concern   -hit leg last Thursday on door at   -pain and swelling much worse today  -not on anticoagulation  -keep npo  -CTA leg  -Surgery has seen and will take to OR  -check cbc now  -I am concerned about risk acute blood loss anemia--I got consent for blood if needed  -also concerned risk cellulitis--would give dose of iv antibiotic    2.Pre-op  -ekg now  -she certainly has risks for surgery , however would not delay with what appears to be urgent vascular issue now    3.Parkinson  -clarify meds    4.hx of TBI  -lives in , but she makes her own decisions    5.Hx of CAD  -hx of stents  -no cp now    6.hx of htn  -clarify meds--hold with lower bp now    7.[place]  -hearing aides are in now    8.Social- lives in     9.Code status- I did ask her and she states she is DNR    Updated  staff in room      Addendum--CTA showed in addition to leg issues--> Enlarged uterus with gas within the endometrium--would get ultrasound,     --also urine retention in ER, bladder scan 700cc , bedrest, najera placed       Diet: NPO for Medical/Clinical Reasons Except for: No Exceptions    DVT Prophylaxis: none bleeding in leg now  Najera Catheter: Not present  Central Lines: None  Code Status:   Dnr    Disposition Plan   Expected Discharge, days, going to OR       The patient's care was discussed with the Patient and  staff here.    Sharyn Marrufo MD  [facility]  Securely message with the Vocera Web Console (learn more here)  Text page via [employer]  Paging/Directory        ______________________________________________________________________    Chief Complaint   Leg pain    History is obtained from the patient  And  staff    History of Present Illness   Alison Bashir is a 71 year old female admitted on 12/20/2021. She has a hx of Parkinson, CAD, cognitive decline(but has been her own decision maker), htn, [place], hx of TBI who lives in . She was sent in for severe right leg pain    Per  staff--last Thursday night she was in bathroom. She was supposed to ask for help out of it(she typically uses WC and lift chair). She attempted to come out on her own and screamed for help. Staff found her leaning against doorway in an unusual position, but not on ground. She did not hit her head  She had pain over weekend, but only took Tylenol  Then today severe pain in leg and much more swelling in right lower leg so 911-- to ER    Surgery has seen her and want CTA of leg and to stay NPO for likely surgery    No fever  No chills  Had covid last amy (not hospitalized--and was fully vaccinated with booster too)     staff here notes she is her own decision maker  No guardian     Review of Systems    CONSTITUTIONAL:  negative  EYES:  negative  HEENT:  negative  RESPIRATORY:  negative  CARDIOVASCULAR:  No cp, hx of cad  GASTROINTESTINAL:  negative  GENITOURINARY:  negative  INTEGUMENT/BREAST:  Swelling of right leg, color change  HEMATOLOGIC/LYMPHATIC:  negative  ALLERGIC/IMMUNOLOGIC:  negative  ENDOCRINE:  negative  MUSCULOSKELETAL:  Uses wheel chair, new right leg pain  NEUROLOGICAL:  Parkinson, cognitive decline, hx of TBI  BEHAVIOR/PSYCH:  positive for anxiety    Past Medical History    I have reviewed this patient's medical history and updated it with pertinent information if needed.   Past Medical History:   Diagnosis Date     Acute blood loss anemia      Alzheimer disease (H)      CAD (coronary artery disease)      Chronic pain syndrome      Dementia (H)       Depression      Depression      Hip fracture, right (H) 2017     HTN (hypertension)      Kidney stone      Overactive bladder      PMB (postmenopausal bleeding)      RLS (restless legs syndrome)      Spine pain      Stented coronary artery      Traumatic brain injury (H)      Unsteady gait     uses walker     UTI (lower urinary tract infection)     on antibiotic course       Past Surgical History   I have reviewed this patient's surgical history and updated it with pertinent information if needed.  Past Surgical History:   Procedure Laterality Date     ARTHROPLASTY REVISION HIP Right 01/10/2018      SECTION       CHOLECYSTECTOMY  May 2014     CORONARY STENT PLACEMENT       DILATION AND CURETTAGE, OPERATIVE HYSTEROSCOPY, COMBINED N/A 2014    Procedure: DILATION AND CURETTAGE WITH HYSTEROSCOPY;  Surgeon: Karime Cifuentes MD;  [place] [facility] [place]  Service:      HEMIARTHROPLASTY HIP Right 2017     INCISION AND DRAINAGE HIP Right 2018    ORIF REVISION      LUNG REMOVAL, PARTIAL       TONSILLECTOMY & ADENOIDECTOMY       TOTAL KNEE ARTHROPLASTY Right 2016     TOTAL KNEE ARTHROPLASTY Left 2015       Social History   I have reviewed this patient's social history and updated it with pertinent information if needed.  Social History     Tobacco Use     Smoking status: Never Smoker     Smokeless tobacco: Never Used   Substance Use Topics     Alcohol use: No     Drug use: No       Family History   I have reviewed this patient's family history and updated it with pertinent information if needed.  Family History   Problem Relation Age of Onset     Cancer Mother      Coronary Artery Disease Father      Heart Failure Father        Prior to Admission Medications   Prior to Admission Medications   Prescriptions Last Dose Informant Patient Reported? Taking?   ASPIRIN PO   Yes No   Sig: Take 81 mg by mouth daily   Cholecalciferol (VITAMIN D3 PO)   Yes No   Sig: Take 2,000 Units by mouth daily     Cranberry 500 MG CAPS   Yes No   Sig: Take 500 mg by mouth   LORazepam (ATIVAN) 0.5 MG tablet   Yes No   Sig: Take 0.5 mg by mouth   Multiple Vitamins-Minerals (MULTIVITAMIN PO)   Yes No   Sig: Take by mouth daily   OLANZAPINE PO   Yes No   Sig: Take 2.5 mg by mouth 2 times daily   Patient not taking: Reported on 2021   Solifenacin Succinate (VESICARE PO)   Yes No   Sig: Take 10 mg by mouth daily   TRAZODONE HCL PO   Yes No   Sig: Take 150 mg by mouth At Bedtime    VENLAFAXINE HCL PO   Yes No   Sig: Take 300 mg by mouth daily   Patient not taking: Reported on 2021   acetaminophen (TYLENOL) 325 MG tablet   Yes No   Sig: Take 650 mg by mouth every 8 hours as needed    atorvastatin (LIPITOR) 40 MG tablet   Yes No   Sig: Take 40 mg by mouth every evening    bacitracin 500 UNIT/GM external ointment   Yes No   Sig: Reported on 2017   buPROPion (WELLBUTRIN XL) 300 MG 24 hr tablet   Yes No   Sig: Take 300 mg by mouth daily   carbidopa-levodopa (SINEMET)  MG per tablet   No No   Sig: Take 2 tablets by mouth 3 times daily   conjugated estrogens (PREMARIN) vaginal cream   Yes No   Sig: Place 1 g vaginally twice a week   cyanocolbalamin (VITAMIN  B-12) 100 MCG tablet   Yes No   Sig: Take 100 mcg by mouth daily   furosemide (LASIX) 20 MG tablet   Yes No   Sig: Take 20 mg by mouth daily   ketoconazole (NIZORAL) 2 % cream   Yes No   Sig: Apply topically daily   magnesium oxide (MAG-OX) 400 MG tablet   Yes No   Sig: Take 400 mg by mouth   melatonin 5 MG CAPS   Yes No   Sig: Take 5 mg by mouth At Bedtime   metoprolol tartrate (LOPRESSOR) 25 MG tablet   Yes No   Sig: Take 12.5 mg by mouth daily    mirtazapine (REMERON) 15 MG tablet   No No   Sig: TAKE 1 TABLET BY MOUTH AT BEDTIME   mirtazapine (REMERON) 45 MG tablet   Yes No   Si &1/2 tab tab daily   Patient not taking: Reported on 2021   nitroGLYcerin (NITROSTAT) 0.4 MG sublingual tablet   Yes No   Sig: Place 0.4 mg under the tongue   nystatin POWD   Yes  No   Sig: daily Once a day apply topically to stomach fold   pramipexole (MIRAPEX) 0.5 MG tablet   No No   Sig: Take 1 tablet (0.5 mg) by mouth daily   senna-docusate (SENOKOT-S/PERICOLACE) 8.6-50 MG tablet   Yes No   Sig: Take 2 tablets by mouth   Patient not taking: Reported on 8/9/2021   venlafaxine (EFFEXOR-XR) 150 MG 24 hr capsule   No No   Sig: Take 2 capsules (300 mg) by mouth daily   venlafaxine (EFFEXOR-XR) 37.5 MG 24 hr capsule   No No   Sig: TAKE 1 CAPSULE BY MOUTH ONCE DAILY (ALONG WITH 300MG FOR TOTAL DOSE = 337.5MG)      Facility-Administered Medications: None     Allergies   Allergies   Allergen Reactions     Ibuprofen      Morphine Rash     Tolerates dilaudid       Physical Exam   Vital Signs: Temp: 98.6  F (37  C) Temp src: Oral BP: 105/69 Pulse: 68   Resp: 16 SpO2: 99 % O2 Device: None (Room air) Oxygen Delivery: 2 LPM  Weight: 178 lbs 9.16 oz    Constitutional: awake, fatigued, alert, cooperative and no apparent distress  Eyes: sclera clear  ENT: normocepalic, without obvious abnormality  Respiratory: no increased work of breathing, good air exchange, no retractions, clear to auscultation   Cardiovascular: Normal apical impulse, regular rate and rhythm, normal S1 and S2, no S3 or S4, and no murmur noted  GI: normal bowel sounds, soft, non-distended and non-tender  Skin: right leg in pressure dressing--right toes cool  Musculoskeletal: swelling under pressure dressing right lateral leg distal  Neurologic: Mental Status Exam:  Level of Alertness:   Lethargic--had pain meds prior to me seeing her  Motor Exam:  Moves ext, can move toes, right less left  Neuropsychiatric: General: calm  Level of consciousness: drowsy    Data   Data reviewed today: I reviewed all medications, new labs and imaging results over the last 24 hours. I personally reviewed no images or EKG's today.--getting pre-op one now    Results for orders placed or performed during the hospital encounter of 12/20/21   XR Tibia & Fibula  Right 2 Views     Status: None    Narrative    EXAM: XR TIBIA and FIBULA RT 2 VW  LOCATION: [facility]  DATE/TIME: 12/20/2021 10:39 AM    INDICATION: Fall, large skin hematoma below knee.  COMPARISON: None.      Impression    IMPRESSION: Large soft tissue density along the anterolateral right leg extends craniocaudal approximately 23 cm and could represent the reported large hematoma in the lateral right leg. Anatomic alignment right tibia and fibula. No acute displaced right   tibia or fibula fracture identified. Diffuse bone demineralization. Partial visualization of right total knee arthroplasty. Mild-moderate tibiotalar osteoarthritis. Generalized right leg soft tissue swelling.   XR Tibia & Fibula Left 2 Views     Status: None    Narrative    EXAM: XR TIBIA and FIBULA LT 2 VW  LOCATION: [facility]  DATE/TIME: 12/20/2021 10:39 AM    INDICATION: Fall, bruising medial left knee.  COMPARISON: None.      Impression    IMPRESSION: Anatomic alignment left tibia and fibula. No acute displaced tibia or fibula fracture. Partial visualization of left knee arthroplasty. Distal left leg and bimalleolar ankle soft tissue swelling. Degenerative change in the left ankle and   visualized foot. Heel spur.   XR Femur Right 2 Views     Status: None    Narrative    EXAM: XR FEMUR RIGHT 2 VIEW  LOCATION: [facility]  DATE/TIME: 12/20/2021 10:39 AM    INDICATION: Fall, large hematoma just below right knee.  COMPARISON: 8/25/2020.      Impression    IMPRESSION: No change in the right hip arthroplasty with longstem proximal femoral component and numerous cerclage wire fixation hardware about the proximal right femur. Lateral claw plate projects in unchanged position along the base of the right   greater trochanter. Chronic healed deformity proximal right femur with heterotopic bone along the superolateral right hip, similar to prior. Right total knee  arthroplasty. No acute displaced periprosthetic fracture identified. Diffuse bone   demineralization. No sizable knee joint effusion.   XR Knee Left 1/2 Views     Status: None    Narrative    EXAM: XR KNEE LT 1/2 VW  LOCATION: [facility]  DATE/TIME: 12/20/2021 10:39 AM    INDICATION: Fall, bruising medial left knee.  COMPARISON: None.      Impression    IMPRESSION: Left total knee arthroplasty with patellar resurfacing. No acute displaced periprosthetic fracture or convincing finding for component failure. No sizable left knee joint effusion. Diffuse bone demineralization. Medial left knee soft tissue   swelling.   XR Knee Right 1/2 Views     Status: None    Narrative    EXAM: XR KNEE RT 1 /2 VW  LOCATION: [facility]  DATE/TIME: 12/20/2021 10:40 AM    INDICATION: Fall, large skin hematoma.    COMPARISON: Right femur radiographic exam 8/25/2020.      Impression    IMPRESSION: Right total knee arthroplasty redemonstrated. No acute displaced periprosthetic fracture is identified. No sizable right knee joint effusion. Diffuse bone demineralization. The distal tip of a right hip arthroplasty is noted with   indolent-appearing periosteal new bone along the posterior adjacent femoral shaft.   INR     Status: Normal   Result Value Ref Range    INR 1.09 0.85 - 1.15   PTT     Status: Normal   Result Value Ref Range    aPTT 34 22 - 38 Seconds   Comprehensive metabolic panel     Status: Abnormal   Result Value Ref Range    Sodium 144 136 - 145 mmol/L    Potassium 4.5 3.5 - 5.0 mmol/L    Chloride 106 98 - 107 mmol/L    Carbon Dioxide (CO2) 34 (H) 22 - 31 mmol/L    Anion Gap 4 (L) 5 - 18 mmol/L    Urea Nitrogen 30 (H) 8 - 28 mg/dL    Creatinine 0.83 0.60 - 1.10 mg/dL    Calcium 9.2 8.5 - 10.5 mg/dL    Glucose 126 (H) 70 - 125 mg/dL    Alkaline Phosphatase 90 45 - 120 U/L    AST 19 0 - 40 U/L    ALT <9 0 - 45 U/L    Protein Total 7.2 6.0 - 8.0 g/dL    Albumin 3.5 3.5 - 5.0 g/dL     Bilirubin Total 0.5 0.0 - 1.0 mg/dL    GFR Estimate 71 >60 mL/min/1.73m2   CBC with platelets and differential     Status: Abnormal   Result Value Ref Range    WBC Count 6.2 4.0 - 11.0 10e3/uL    RBC Count 3.61 (L) 3.80 - 5.20 10e6/uL    Hemoglobin 10.1 (L) 11.7 - 15.7 g/dL    Hematocrit 33.9 (L) 35.0 - 47.0 %    MCV 94 78 - 100 fL    MCH 28.0 26.5 - 33.0 pg    MCHC 29.8 (L) 31.5 - 36.5 g/dL    RDW 14.1 10.0 - 15.0 %    Platelet Count 153 150 - 450 10e3/uL    % Neutrophils 81 %    % Lymphocytes 13 %    % Monocytes 5 %    % Eosinophils 1 %    % Basophils 0 %    % Immature Granulocytes 0 %    NRBCs per 100 WBC 0 <1 /100    Absolute Neutrophils 5.0 1.6 - 8.3 10e3/uL    Absolute Lymphocytes 0.8 0.8 - 5.3 10e3/uL    Absolute Monocytes 0.3 0.0 - 1.3 10e3/uL    Absolute Eosinophils 0.1 0.0 - 0.7 10e3/uL    Absolute Basophils 0.0 0.0 - 0.2 10e3/uL    Absolute Immature Granulocytes 0.0 <=0.4 10e3/uL    Absolute NRBCs 0.0 10e3/uL   Asymptomatic COVID-19 Virus (Coronavirus) by PCR Nasopharyngeal     Status: Normal    Specimen: Nasopharyngeal; Swab   Result Value Ref Range    SARS CoV2 PCR Negative Negative    Narrative    Testing was performed using the beth  SARS-CoV-2 & Influenza A/B Assay on the beth  [employer]  System.  This test should be ordered for the detection of SARS-COV-2 in individuals who meet SARS-CoV-2 clinical and/or epidemiological criteria. Test performance is unknown in asymptomatic patients.  This test is for in vitro diagnostic use under the FDA EUA for laboratories certified under CLIA to perform moderate and/or high complexity testing. This test has not been FDA cleared or approved.  A negative test does not rule out the presence of PCR inhibitors in the specimen or target RNA in concentration below the limit of detection for the assay. The possibility of a false negative should be considered if the patient's recent exposure or clinical presentation suggests COVID-19.  [facility] are  certified under the Clinical Laboratory Improvement Amendments of 1988 (CLIA-88) as qualified to perform moderate and/or high complexity laboratory testing.   CK total     Status: Normal   Result Value Ref Range    CK 46 30 - 190 U/L   Lactic acid whole blood     Status: Abnormal   Result Value Ref Range    Lactic Acid 0.6 (L) 0.7 - 2.0 mmol/L   Extra Purple Top Tube     Status: None   Result Value Ref Range    Hold Specimen [facility]    Adult Type and Screen     Status: Abnormal   Result Value Ref Range    ABO/RH(D) O NEG     Antibody Screen Positive (A) Negative    SPECIMEN EXPIRATION DATE [id number]    Antibody identification     Status: None   Result Value Ref Range    Antibody Identification 3hr for more blood  Anti-[place]     SPECIMEN EXPIRATION DATE [id number]    CBC with platelets + differential     Status: Abnormal    Narrative    The following orders were created for panel order CBC with platelets + differential.  Procedure                               Abnormality         Status                     ---------                               -----------         ------                     CBC with platelets and d...[[id number]]  Abnormal            Final result                 Please view results for these tests on the individual orders.   ABO/Rh type and screen     Status: Abnormal    Narrative    The following orders were created for panel order ABO/Rh type and screen.  Procedure                               Abnormality         Status                     ---------                               -----------         ------                     Adult Type and Screen[[id number]]        Abnormal            Final result                 Please view results for these tests on the individual orders.   [place] Draw     Status: None    Narrative    The following orders were created for panel order [place] Draw.  Procedure                               Abnormality         Status                     ---------                                -----------         ------                     Extra Purple Top Tube[[id number]]                            Final result                 Please view results for these tests on the individual orders.     Rads  XR Femur Right 2 Views    Result Date: 12/20/2021  EXAM: XR FEMUR RIGHT 2 VIEW LOCATION: [facility] DATE/TIME: 12/20/2021 10:39 AM INDICATION: Fall, large hematoma just below right knee. COMPARISON: 8/25/2020.     IMPRESSION: No change in the right hip arthroplasty with longstem proximal femoral component and numerous cerclage wire fixation hardware about the proximal right femur. Lateral claw plate projects in unchanged position along the base of the right greater trochanter. Chronic healed deformity proximal right femur with heterotopic bone along the superolateral right hip, similar to prior. Right total knee arthroplasty. No acute displaced periprosthetic fracture identified. Diffuse bone demineralization. No sizable knee joint effusion.    XR Knee Left 1/2 Views    Result Date: 12/20/2021  EXAM: XR KNEE LT 1/2 VW LOCATION: [facility] DATE/TIME: 12/20/2021 10:39 AM INDICATION: Fall, bruising medial left knee. COMPARISON: None.     IMPRESSION: Left total knee arthroplasty with patellar resurfacing. No acute displaced periprosthetic fracture or convincing finding for component failure. No sizable left knee joint effusion. Diffuse bone demineralization. Medial left knee soft tissue swelling.    XR Knee Right 1/2 Views    Result Date: 12/20/2021  EXAM: XR KNEE RT 1 /2 VW LOCATION: [facility] DATE/TIME: 12/20/2021 10:40 AM INDICATION: Fall, large skin hematoma. COMPARISON: Right femur radiographic exam 8/25/2020.     IMPRESSION: Right total knee arthroplasty redemonstrated. No acute displaced periprosthetic fracture is identified. No sizable right knee joint effusion. Diffuse bone demineralization. The distal tip of a right  hip arthroplasty is noted with indolent-appearing periosteal new bone along the posterior adjacent femoral shaft.    XR Tibia & Fibula Left 2 Views    Result Date: 12/20/2021  EXAM: XR TIBIA and FIBULA LT 2 VW LOCATION: [facility] DATE/TIME: 12/20/2021 10:39 AM INDICATION: Fall, bruising medial left knee. COMPARISON: None.     IMPRESSION: Anatomic alignment left tibia and fibula. No acute displaced tibia or fibula fracture. Partial visualization of left knee arthroplasty. Distal left leg and bimalleolar ankle soft tissue swelling. Degenerative change in the left ankle and visualized foot. Heel spur.    XR Tibia & Fibula Right 2 Views    Result Date: 12/20/2021  EXAM: XR TIBIA and FIBULA RT 2 VW LOCATION: [facility] DATE/TIME: 12/20/2021 10:39 AM INDICATION: Fall, large skin hematoma below knee. COMPARISON: None.     IMPRESSION: Large soft tissue density along the anterolateral right leg extends craniocaudal approximately 23 cm and could represent the reported large hematoma in the lateral right leg. Anatomic alignment right tibia and fibula. No acute displaced right  tibia or fibula fracture identified. Diffuse bone demineralization. Partial visualization of right total knee arthroplasty. Mild-moderate tibiotalar osteoarthritis. Generalized right leg soft tissue swelling.

## 2021-12-20 NOTE — ED TRIAGE NOTES
PT HAD FALL on Fri day , 3 days ago, at group home. In bathroom, no loss of conciouosness.  Grossly swollen Lt lower leg.

## 2021-12-20 NOTE — ED NOTES
Bed: JNED-09  Expected date: 12/20/21  Expected time: 9:20 AM  Means of arrival:   Comments:  WBL, 71 YEAR OLD GROUP HOME FELL Friday RIGHT LEG PAIN

## 2021-12-20 NOTE — ED NOTES
Valentina, from lab called and requested results of ordered CBC.    Lab report tube was sent to blood bank and unable to process cbc, will need additonal sample.

## 2021-12-20 NOTE — ED PROVIDER NOTES
EMERGENCY DEPARTMENT ENCOUNTER      NAME: Alison Bashir  AGE: 71 year old female  YOB: 1950  MRN: [id number]  EVALUATION DATE & TIME: 12/20/2021  9:26 AM    PCP: Ramon Echeverria    ED PROVIDER: Jens Rolon M.D.      Chief Complaint   Patient presents with     Leg Pain           =================================================================    HPI    Patient information was obtained from: Patient, visitor  71-year-old female Alison has pain but not a lot pain gradually large hematoma leg she lives in a group home she was navigating in the bathroom by the wall or something she was she really did take a fall by her report but initially was unwitnessed somewhat involving the room to help her up in my office and on the way to the bed her right lower leg she has had a minimal amount of pain at the time of the injury prosthesis placement no swelling at the time but over the course of the weekend she developed increasing swelling overlying the lateral aspect of the right lower leg just below the knee also itself C:   Alison Bashir is a 71 year old female with a pertinent history of right lower leg pain and injury, who presents to this ED with severe pain and swelling in the lateral aspect of the right lower leg.  Patient states she fell in the bathroom Friday, striking her leg against the wall.  She was with an assistant at the time and did not have severe pain and was able to stand on her leg at that time, since that time patient has developed increasing swelling over the lateral aspect of the leg and discoloration of the overlying skin.  Patient reports marked increase in pain over the last 24 hours.  Pain is to the point where she cannot move her leg.  Patient has a history of positive COVID-19 PCR 44 days ago, she denies current symptoms including cough and shortness of breath or other cardiopulmonary symptoms today.  Patient reports no numbness or tingling in the foot on the right or significant  left leg injury.  Bruising is noted on the left knee.  3cm this point risk of bruising is still pending      REVIEW OF SYSTEMS   Review of Systems   Constitutional: Denies fever, Chills, weight changes  HENT: Without sore throat, trouble swallowing, denies earache, vertigo  Eyes:  Denies double vision, new visual disturbance, eye pain  Respiratory: Denies cough, shortness of breath, pleuritic chest pain  Cardiovascular: Denies chest pain, arm/shoulder pain, anterior neck pain  GI:Denies nausea, vomiting, diarrhea, bloody stool, black stool or black emesis, denies abdominal pain  : Denies frequency, dysuria, bloody urine   Musculoskeletal: See HPI  Integument: See HPI  Neurologic: Denies current headache, focal weakness or focal pain, denies gait disturbance or speech disturbance prior to the injury, patient's gait is currently impaired due to pain.  Psychiatric: No reported complaints.    PAST MEDICAL HISTORY:  Past Medical History:   Diagnosis Date     Acute blood loss anemia      Alzheimer disease (H)      CAD (coronary artery disease)      Chronic pain syndrome      Dementia (H)      Depression      Depression      Hip fracture, right (H) 2017     HTN (hypertension)      Kidney stone      Overactive bladder      PMB (postmenopausal bleeding)      RLS (restless legs syndrome)      Spine pain      Stented coronary artery      Traumatic brain injury (H)      Unsteady gait     uses walker     UTI (lower urinary tract infection)     on antibiotic course       PAST SURGICAL HISTORY:  Past Surgical History:   Procedure Laterality Date     ARTHROPLASTY REVISION HIP Right 01/10/2018      SECTION       CHOLECYSTECTOMY  May 2014     CORONARY STENT PLACEMENT       DILATION AND CURETTAGE, OPERATIVE HYSTEROSCOPY, COMBINED N/A 2014    Procedure: DILATION AND CURETTAGE WITH HYSTEROSCOPY;  Surgeon: Karmie Cifuentes MD;  [place] [facility] [place]  Service:      HEMIARTHROPLASTY HIP Right 2017     INCISION  "AND DRAINAGE HIP Right 01/18/2018    ORIF REVISION      LUNG REMOVAL, PARTIAL       TONSILLECTOMY & ADENOIDECTOMY       TOTAL KNEE ARTHROPLASTY Right 09/30/2016     TOTAL KNEE ARTHROPLASTY Left 11/2015           CURRENT MEDICATIONS:    Bupropion, aspirin, furosemide, lorazepam, metoprolol, trazodone, nitroglycerin as needed    ALLERGIES:  Allergies   Allergen Reactions     Ibuprofen      Morphine Rash     Tolerates dilaudid       FAMILY HISTORY:  Family History   Problem Relation Age of Onset     Cancer Mother      Coronary Artery Disease Father      Heart Failure Father        SOCIAL HISTORY:   Social History     Socioeconomic History     Marital status: Single     Spouse name: Not on file     Number of children: Not on file     Years of education: Not on file     Highest education level: Not on file   Occupational History     Not on file   Tobacco Use     Smoking status: Never Smoker     Smokeless tobacco: Never Used   Substance and Sexual Activity     Alcohol use: No     Drug use: No     Sexual activity: Not on file   Other Topics Concern     Not on file   Social History Narrative    She lives in a group home right now. She can make her own medical decisions. Patient is not on supplemental O2 at home. Patient uses walker and wheelchair for ambulation. Please update sister Chelo.      Social Determinants of Health     Financial Resource Strain: Not on file   Food Insecurity: Not on file   Transportation Needs: Not on file   Physical Activity: Not on file   Stress: Not on file   Social Connections: Not on file   Intimate Partner Violence: Not on file   Housing Stability: Not on file       VITALS:  BP 96/55   Pulse 65   Temp 98.6  F (37  C) (Oral)   Resp 18   Ht 1.803 m (5' 11\")   Wt 81 kg (178 lb 9.2 oz)   SpO2 100%   BMI 24.91 kg/m      PHYSICAL EXAM    Constitutional: Well developed, Well nourished, NAD, GCS 15, alert and oriented x3 and able to provide detailed history.  HENT: Normocephalic, Atraumatic, " Bilateral external ears normal, Oropharynx normal, mucous membranes moist, Nose normal. Neck- Normal range of motion, No tenderness, Supple, No stridor.   Eyes: PERRL, EOMI, Conjunctiva normal, No discharge.   Respiratory: Normal breath sounds, No respiratory distress, No wheezing, Speaks full sentences easily. No cough   Cardiovascular: Normal heart rate, Regular rhythm, No murmurs, No rubs, No gallops. Chest wall nontender.   GI: Moderate obesity. Bowel sounds normal, Soft, No tenderness, No masses, No flank tenderness. No rebound or guarding.   : No CVA tenderness  Musculoskeletal: 2+ DP pulses bilaterally, good coloration of the toes and feet bilaterally and good coloration of the right leg below the area of injury, 2+ bilateral foot edema. No cyanosis, No clubbing. Good range of motion in all major joints the right knee range of motion was not tested, patient has midline scarring present overlying both patellas.  marked tenderness  to palpation of the right upper leg where there is soft tissue swelling, there appears to be a likely knee effusion with overlying bruising at the knee.  Patient has a very large hematoma present on the lateral right lower leg just below the knee.  This is approximately 30 x 20 cm in vertical and horizontal dimension, is approxinately 3 CM in height at apex. Femur is nontender without deformity and with soft tissue swelling in and around the right knee it is difficult to ascertain if deformity is present in the tibia or fibula or knee.  Pertaining to the remainder of extremities, there are no major deformities noted. No tenderness of the CTLS spine.    Integument: Warm, Dry, No erythema, No rash. No petechiae.  Skin overlying right lower leg hematoma appears to be grayish in color suggesting poor perfusion.  There is minimal surrounding erythema and no signs of infection or drainage at this time.  Lymphatic: No right groin adenopathy  Neurologic: Alert & oriented x 3, Normal motor  function, Normal sensory function, No focal deficits noted.  Gait not assessed due to pain.  Psychiatric: Affect normal, Judgment normal, Mood normal. Cooperative, complaining of significant pain and crying at times.     RADIOLOGY/LAB:  All pertinent labs reviewed and interpreted.  Results for orders placed or performed during the hospital encounter of 12/20/21   XR Tibia & Fibula Right 2 Views    Impression    IMPRESSION: Large soft tissue density along the anterolateral right leg extends craniocaudal approximately 23 cm and could represent the reported large hematoma in the lateral right leg. Anatomic alignment right tibia and fibula. No acute displaced right   tibia or fibula fracture identified. Diffuse bone demineralization. Partial visualization of right total knee arthroplasty. Mild-moderate tibiotalar osteoarthritis. Generalized right leg soft tissue swelling.   XR Tibia & Fibula Left 2 Views    Impression    IMPRESSION: Anatomic alignment left tibia and fibula. No acute displaced tibia or fibula fracture. Partial visualization of left knee arthroplasty. Distal left leg and bimalleolar ankle soft tissue swelling. Degenerative change in the left ankle and   visualized foot. Heel spur.   XR Femur Right 2 Views    Impression    IMPRESSION: No change in the right hip arthroplasty with longstem proximal femoral component and numerous cerclage wire fixation hardware about the proximal right femur. Lateral claw plate projects in unchanged position along the base of the right   greater trochanter. Chronic healed deformity proximal right femur with heterotopic bone along the superolateral right hip, similar to prior. Right total knee arthroplasty. No acute displaced periprosthetic fracture identified. Diffuse bone   demineralization. No sizable knee joint effusion.   XR Knee Left 1/2 Views    Impression    IMPRESSION: Left total knee arthroplasty with patellar resurfacing. No acute displaced periprosthetic fracture  or convincing finding for component failure. No sizable left knee joint effusion. Diffuse bone demineralization. Medial left knee soft tissue   swelling.   XR Knee Right 1/2 Views    Impression    IMPRESSION: Right total knee arthroplasty redemonstrated. No acute displaced periprosthetic fracture is identified. No sizable right knee joint effusion. Diffuse bone demineralization. The distal tip of a right hip arthroplasty is noted with   indolent-appearing periosteal new bone along the posterior adjacent femoral shaft.   CTA Abdomen Pelvis Bilat Leg Runoff w Contr    Impression    CONCLUSION:  1.  Large subcutaneous hematoma the lateral superior aspect of the right calf. No contrast extravasation.  2.  Right leg: No inflow or femoropopliteal stenosis. Venous contamination at the calf station. Is considered and appears to be three-vessel runoff.  3.  Left leg: CTA is within normal limits.  4.  Enlarged uterus with gas within the endometrium. There is a nonspecific finding. Please correlate clinically to exclude infection.  5.  Bilateral nonobstructing renal calculi.  6.  Osteoarthritic changes visualized spine. Right total hip arthroplasty. Bilateral knee arthroplasties.   Result Value Ref Range    INR 1.09 0.85 - 1.15   Result Value Ref Range    aPTT 34 22 - 38 Seconds   Comprehensive metabolic panel   Result Value Ref Range    Sodium 144 136 - 145 mmol/L    Potassium 4.5 3.5 - 5.0 mmol/L    Chloride 106 98 - 107 mmol/L    Carbon Dioxide (CO2) 34 (H) 22 - 31 mmol/L    Anion Gap 4 (L) 5 - 18 mmol/L    Urea Nitrogen 30 (H) 8 - 28 mg/dL    Creatinine 0.83 0.60 - 1.10 mg/dL    Calcium 9.2 8.5 - 10.5 mg/dL    Glucose 126 (H) 70 - 125 mg/dL    Alkaline Phosphatase 90 45 - 120 U/L    AST 19 0 - 40 U/L    ALT <9 0 - 45 U/L    Protein Total 7.2 6.0 - 8.0 g/dL    Albumin 3.5 3.5 - 5.0 g/dL    Bilirubin Total 0.5 0.0 - 1.0 mg/dL    GFR Estimate 71 >60 mL/min/1.73m2   CBC with platelets and differential   Result Value Ref Range     WBC Count 6.2 4.0 - 11.0 10e3/uL    RBC Count 3.61 (L) 3.80 - 5.20 10e6/uL    Hemoglobin 10.1 (L) 11.7 - 15.7 g/dL    Hematocrit 33.9 (L) 35.0 - 47.0 %    MCV 94 78 - 100 fL    MCH 28.0 26.5 - 33.0 pg    MCHC 29.8 (L) 31.5 - 36.5 g/dL    RDW 14.1 10.0 - 15.0 %    Platelet Count 153 150 - 450 10e3/uL    % Neutrophils 81 %    % Lymphocytes 13 %    % Monocytes 5 %    % Eosinophils 1 %    % Basophils 0 %    % Immature Granulocytes 0 %    NRBCs per 100 WBC 0 <1 /100    Absolute Neutrophils 5.0 1.6 - 8.3 10e3/uL    Absolute Lymphocytes 0.8 0.8 - 5.3 10e3/uL    Absolute Monocytes 0.3 0.0 - 1.3 10e3/uL    Absolute Eosinophils 0.1 0.0 - 0.7 10e3/uL    Absolute Basophils 0.0 0.0 - 0.2 10e3/uL    Absolute Immature Granulocytes 0.0 <=0.4 10e3/uL    Absolute NRBCs 0.0 10e3/uL   Asymptomatic COVID-19 Virus (Coronavirus) by PCR Nasopharyngeal    Specimen: Nasopharyngeal; Swab   Result Value Ref Range    SARS CoV2 PCR Negative Negative   Result Value Ref Range    CK 46 30 - 190 U/L   Lactic acid whole blood   Result Value Ref Range    Lactic Acid 0.6 (L) 0.7 - 2.0 mmol/L   Extra Purple Top Tube   Result Value Ref Range    Hold Specimen [facility]    B-Type Natriuretic Peptide ([facility] Only)   Result Value Ref Range     0 - 127 pg/mL   Adult Type and Screen   Result Value Ref Range    ABO/RH(D) O NEG     Antibody Screen Positive (A) Negative    SPECIMEN EXPIRATION DATE [id number]    Antibody identification   Result Value Ref Range    Antibody Identification 3hr for more blood  Anti-[place]     SPECIMEN EXPIRATION DATE [id number]      EKG:    Performed at: 1434    Impression: Right bundle branch block left axis deviation, possible old inferior infarct, normal sinus rhythm, otherwise unremarkable and unchanged from previous EKG.      I have independently reviewed and interpreted the EKG(s) documented above.    PROCEDURES:   Initial stabilizing posterior splint was applied to reduce pain during x-ray, a single posterior element  was placed and this was tolerated well after pain medication.  This was later removed by the surgeon who placed a wrap over the hematoma.  Wrapping hematoma seems to reduce pain.      FINAL IMPRESSION:  1. Accident due to mechanical fall without injury, initial encounter    2. Hematoma of skin    3. Pain of right lower leg    4. Type 2 diabetes mellitus with other specified complication, unspecified whether long term insulin use (H)    5. Knee injury, right, initial encounter    6. Intractable pain          ED COURSE & MEDICAL DECISION MAKING:    Pertinent Labs & Imaging studies reviewed. (See chart for details)  71 year old female presents to the Emergency Department for evaluation of large hematoma in the right lower leg after striking her leg.  Patient underwent laboratory screening which is largely unremarkable though patient did have a positive antibody screen, type and screen.  Her glucose was mildly elevated.  X-rays without evidence of fracture and patient's primary problem appears to be pain management with large hematoma.  1200: Spoke with mason Justin, he referred me to general surgery. 1226: I discussed the case with Dr. Alcazar,  who agreed to see the patient in emergency department.  Patient was seen by the surgeon in the emergency department exam of the hematoma, he requested CTA to evaluate for potential arterial contributor, he plans on evacuation of the hematoma in the operating room today.  Patient remained n.p.o. throughout the remainder of her stay, case discussed with the hospitalist who requested telemetry.   1545:CMS exam normal in ED.   Spoke to Dr. Alcazar regarding disposition, no extravasation noted in Right leg on CTA, there is no plan to operate today, surgery will not occur until tomorrow.  Hospitalist was contacted given an update.  1623: Spoke to Dr. Subramanian, he is updated regarding CT results, intent is to surgically address lesion tomorrow not today, there is no evidence clinically  or by CTA for compartment syndrome at this time and Dr. Coreas concurs with me on that.  At the conclusion of the encounter I discussed the results of all of the tests and the disposition. The questions were answered. The patient or family acknowledged understanding and was agreeable with the care plan.       MEDICATIONS GIVEN IN THE EMERGENCY:  Medications   0.9% sodium chloride BOLUS (250 mLs Intravenous Not Given 12/20/21 1421)   lidocaine 1 % 0.1-1 mL (has no administration in time range)   lidocaine (LMX4) cream (has no administration in time range)   sodium chloride (PF) 0.9% PF flush 3 mL (has no administration in time range)   sodium chloride (PF) 0.9% PF flush 3 mL (has no administration in time range)   Contraindications to both pharmacological and mechanical prophylaxis (must document contraindications for both in this order) (has no administration in time range)   sodium chloride 0.9% infusion (has no administration in time range)   acetaminophen (TYLENOL) tablet 650 mg (has no administration in time range)     Or   acetaminophen (TYLENOL) Suppository 650 mg (has no administration in time range)   HYDROmorphone (DILAUDID) injection 0.5 mg (has no administration in time range)   piperacillin-tazobactam (ZOSYN) 3.375 g vial to attach to  mL bag (has no administration in time range)     Followed by   piperacillin-tazobactam (ZOSYN) 3.375 g vial to attach to  mL bag (has no administration in time range)   buPROPion (WELLBUTRIN XL) 24 hr tablet 300 mg (has no administration in time range)   carbidopa-levodopa (SINEMET)  MG per tablet 2 tablet (has no administration in time range)   midodrine (PROAMATINE) tablet 7.5 mg (has no administration in time range)   mirtazapine (REMERON) tablet 15 mg (has no administration in time range)   polyethylene glycol (MIRALAX) Packet 17 g (has no administration in time range)   pramipexole (MIRAPEX) tablet 0.5 mg (has no administration in time range)    traZODone (DESYREL) tablet 150 mg (has no administration in time range)   venlafaxine (EFFEXOR-XR) 24 hr capsule 300 mg (has no administration in time range)   venlafaxine (EFFEXOR-XR) 24 hr capsule 37.5 mg (has no administration in time range)   sodium chloride 0.9% infusion ( Intravenous Rate/Dose Change 12/20/21 1241)   HYDROmorphone (DILAUDID) injection 1 mg (1 mg Intravenous Given 12/20/21 0950)   HYDROmorphone (DILAUDID) injection 0.5 mg (0.5 mg Intravenous Given 12/20/21 1009)   HYDROmorphone (DILAUDID) injection 0.5 mg (0.5 mg Intravenous Given 12/20/21 1036)   HYDROmorphone (DILAUDID) injection 0.5 mg (0.5 mg Intravenous Given 12/20/21 1125)   HYDROmorphone (DILAUDID) injection 0.5 mg (0.5 mg Intravenous Given 12/20/21 1311)   iopamidol (ISOVUE-370) solution 100 mL (100 mLs Intravenous Given 12/20/21 1420)           Jens Rolon M.D.  Emergency Medicine  [facility] EMERGENCY DEPARTMENT  [address] [id number] [phone]  Dept: [phone]     Jens Rolon MD  12/20/21 9418       Jens Rolon MD  12/20/21 9513       Jens Rolon MD  12/20/21 0352       Jens Rolon MD  12/20/21 4396       Jens Rolon MD  12/20/21 5375

## 2021-12-20 NOTE — PROGRESS NOTES
CTA was reviewed - no active blush on the CTA.  I just spoke with the patient in the ED regarding the findings of the CTA.  Although there is no arterial bleeding identified, I still believe that the patient should undergo the RLE hematoma evacuation to prevent skin necrosis from the tension.  Maya Eli ([phone]), caregiver, present and given time to answer questions.     Physical:   - RLE with large hematoma that has not changed in size since she was last seen by me.  Tender to palpation of the right lateral area (hematoma), but no pain out of proportion on motion of the ankle and toes.  Generalized swelling of RLE with other compartments being soft on palpation.     Plan:   - unfortunately, the OR schedule is unable to accommodate the patient tonight. Given that there is no arterial bleed, I will postpone the case and take her at 730 am for the hematoma evacuation.  I have explained the situation to the patient, and she is in agreement with the plan.    - I did speak with Dr. Marrufo and Dr. Rolon regarding the swelling of the RLE.  From the clinical exam, the patient does not clinically have compartment syndrome.  Will continue to monitor for any acute changes.  - will continue with IV fluid resuscitation, which will also help renal clearance of the contrast as well.   - Since the patient's surgery will be postponed, she can eat and be NPO at [facility] for surgery tmrw.     Johan Subramanian, DO  General Surgery  Pager:

## 2021-12-20 NOTE — CONSULTS
Care Management Initial Consult    General Information  Assessment completed with: Caregiver, Alexa  for group home via phone  Type of CM/SW Visit: Initial Assessment    Primary Care Provider verified and updated as needed: Yes   Readmission within the last 30 days: no previous admission in last 30 days      Reason for Consult: discharge planning  Advance Care Planning:            Communication Assessment  Patient's communication style: spoken language (English or Bilingual)             Cognitive  Cognitive/Neuro/Behavioral:  (history of TBI)                      Living Environment:   People in home: facility resident     Current living Arrangements: group home      Able to return to prior arrangements: yes       Family/Social Support:  Care provided by: other (see comments) ([facility] staff)  Provides care for: no one, unable/limited ability to care for self     Facility resident(s)/Staff          Description of Support System: Supportive,Involved    Support Assessment: Adequate family and caregiver support,Adequate social supports    Current Resources:   Patient receiving home care services: No     Community Resources: Group Home  Equipment currently used at home: wheelchair, manual,walker, standard,glucometer  Supplies currently used at home: Diabetic Supplies,Incontinence Supplies    Employment/Financial:  Employment Status: disabled        Financial Concerns:     Referral to Financial Counselor: No       Lifestyle & Psychosocial Needs:  Social Determinants of Health     Tobacco Use: Low Risk      Smoking Tobacco Use: Never Smoker     Smokeless Tobacco Use: Never Used   Alcohol Use: Not on file   Financial Resource Strain: Not on file   Food Insecurity: Not on file   Transportation Needs: Not on file   Physical Activity: Not on file   Stress: Not on file   Social Connections: Not on file   Intimate Partner Violence: Not on file   Depression: Not on file   Housing Stability: Not on file  "      Functional Status:  Prior to admission patient needed assistance:   Dependent ADLs:: Wheelchair-with assist,Bathing,Dressing,Grooming,Incontinence,Positioning,Transfers,Toileting  Dependent IADLs:: Cleaning,Cooking,Laundry,Shopping,Meal Preparation,Medication Management,Money Management,Transportation,Incontinence  Assesssment of Functional Status: Not at baseline with ADL Functioning,Not at baseline with mobility,Not at  functional baseline    Mental Health Status:          Chemical Dependency Status:                Values/Beliefs:  Spiritual, Cultural Beliefs, Gnosticism Practices, Values that affect care:                 Additional Information:  Alison lives in a Group Home. She is becoming less mobile lately and is total cares. Per the Group Home staff, \"she normally was able to walk with the walker, but over the past few weeks she refuses and is in the wheelchair all the time. She is still able to pivot transfer, but even that is getting harder.\"     She has a history of a TBI. She is her own decision maker.    [facility]  [phone].  [facility]  for that house [phone].    Depending on timing of discharge Group Home might be able to transport or may need MA rides.    Yari Brown RN      "

## 2021-12-20 NOTE — PHARMACY-ADMISSION MEDICATION HISTORY
Pharmacy Note - Admission Medication History    Pertinent Provider Information:   -Please address directions for midodrine with patient's care facility. Per group home, directions are to hold medication if BP is <90/60, so they have only been giving this medication if the BP is higher than that. This does not seem correct, and is possible there was some miscommunication when prescription was received.  ______________________________________________________________________    Prior To Admission (PTA) med list completed and updated in EMR.       PTA Med List   Medication Sig Note Last Dose     acetaminophen (TYLENOL) 325 MG tablet Take 650 mg by mouth every 8 hours as needed        ASPIRIN PO Take 81 mg by mouth daily  12/20/2021     atorvastatin (LIPITOR) 40 MG tablet Take 40 mg by mouth every evening   12/19/2021     bacitracin 500 UNIT/GM external ointment Prn       buPROPion (WELLBUTRIN XL) 300 MG 24 hr tablet Take 300 mg by mouth daily  12/20/2021     carbidopa-levodopa (SINEMET)  MG per tablet Take 2 tablets by mouth 3 times daily  12/20/2021 at x1     Cholecalciferol (VITAMIN D3 PO) Take 4,000 Units by mouth daily   12/20/2021     Cranberry 500 MG CAPS Take 500 mg by mouth 2 times daily   12/20/2021 at am     cyanocolbalamin (VITAMIN  B-12) 100 MCG tablet Take 100 mcg by mouth daily  12/20/2021     furosemide (LASIX) 20 MG tablet Take 20 mg by mouth every other day   12/20/2021     LORazepam (ATIVAN) 0.5 MG tablet Take 0.5 mg by mouth 2 times daily as needed        magnesium oxide (MAG-OX) 400 MG tablet Take 400 mg by mouth 2 times daily   12/20/2021 at am     melatonin 5 MG CAPS Take 5 mg by mouth At Bedtime  12/19/2021     metoprolol succinate ER (TOPROL-XL) 25 MG 24 hr tablet Take 12.5 mg by mouth daily  12/20/2021     midodrine (PROAMATINE) 2.5 MG tablet Take 7.5 mg by mouth 3 times daily 12/20/2021: Medication instructions at group home state HOLD MEDICATION if BP LESS THAN 90/60. Please clarify this  with group home. Should it be hold if > 90/60?      mirtazapine (REMERON) 15 MG tablet TAKE 1 TABLET BY MOUTH AT BEDTIME  12/19/2021     Multiple Vitamins-Minerals (MULTIVITAMIN PO) Take by mouth daily  12/20/2021     nitroGLYcerin (NITROSTAT) 0.4 MG sublingual tablet Place 0.4 mg under the tongue every 5 minutes as needed        nystatin POWD daily Once a day apply topically to stomach fold  12/20/2021     polyethylene glycol (MIRALAX) 17 g packet Take 1 packet by mouth daily  12/20/2021     pramipexole (MIRAPEX) 0.5 MG tablet Take 1 tablet (0.5 mg) by mouth daily (Patient taking differently: Take 0.5 mg by mouth At Bedtime )  12/19/2021     Solifenacin Succinate (VESICARE PO) Take 10 mg by mouth daily  12/20/2021     TRAZODONE HCL PO Take 150 mg by mouth At Bedtime   12/19/2021     venlafaxine (EFFEXOR-XR) 150 MG 24 hr capsule Take 2 capsules (300 mg) by mouth daily  12/20/2021     venlafaxine (EFFEXOR-XR) 37.5 MG 24 hr capsule TAKE 1 CAPSULE BY MOUTH ONCE DAILY (ALONG WITH 300MG FOR TOTAL DOSE = 337.5MG)  12/20/2021       Information source(s): Caregiver and CareEverywhere/SureScripts  Method of interview communication: in-person    Summary of Changes to PTA Med List  New: -  Discontinued: zyprexa  Changed: metoprolol    Patient was asked about OTC/herbal products specifically.  PTA med list reflects this.    In the past week, patient estimated taking medication this percent of the time:  greater than 90%.    Allergies were reviewed, assessed, and updated with the patient.      Patient did not bring any medications to the hospital and can't retrieve from home. No multi-dose medications are available for use during hospital stay.     The information provided in this note is only as accurate as the sources available at the time of the update(s).    Thank you for the opportunity to participate in the care of this patient.    Nisha Alcala, PharmD, BCPS   12/20/21 3:34 PM

## 2021-12-20 NOTE — ED NOTES
Lab personell, report cbc specimen was received/processed incorrectly, and will call this RN back after huddling with other lab staff.

## 2021-12-20 NOTE — CONSULTS
General Surgery Consultation  Alison Bashir MRN# [id number]   Age/Sex: 71 year old female YOB: 1950     Reason for consult: 1. Hematoma of skin    2. Pain of right lower leg            Requesting physician: Dr. Rolon                   Assessment and Plan:   Assessment:  1. Hematoma of right leg      -Tense hematoma of the right lateral leg.  Measuring 22 x 10 cm.      -Swelling of the right lower leg  2. Fall      -Patient did not completely fall.  She bumped her right leg when her legs gave out..  3. Cellulitis of the right leg      Plan:  - Plan is to get a CTA to rule out an arterial component to the tense hematoma.  If there is no arterial bleed, plan is to take the patient back to the OR today in order to evacuate the hematoma.  The hematoma is very large and measures 22 cm x 10 cm.  I am concerned that if the hematoma is not evacuated, the patient could suffer skin necrosis over the site.  The risk and benefits of the procedure were explained detail to the patient as well as the patient's nursing home caretaker who is also at bedside.  Patient verbalizes understanding of the risks and benefits and would like to proceed with an OR hematoma evacuation once CTA is completed.  -Consent was obtained for right leg hematoma evacuation with the patient.  -Given the patient's debility as well as a hematoma in the right leg, it is likely that the patient will require admission after the surgery.  Recommendation is admit to medicine.  General surgery be on consult.  -Regards to the cellulitis of the right lower extremity, recommend conservative management with antibiotic therapy for now.  -Thank you for allowing us to participate this patient's medical care.          Chief Complaint:     Chief Complaint   Patient presents with     Leg Pain        History is obtained from the patient    HPI:   Alison Bashir is a 71 year old female who presents to the ED with complaints of right leg pain.  Patient states  that approximately 3 days ago she was falling during a transition.  The patient did not fall but was caught by her caregiver.  Her right leg swelling outside and bumped object.  Over the course of the last 3 days, her right lower extremity has been enlarging.  Today, the caregiver was evaluating the patient's right lower extremity when he discovered that the hematoma expanded rapidly became tense.  This caused the patient to have significant pain over the right lower extremity.  The patient also notes that she has been developing a redness of the skin below the area of the hematoma.  This is the first time patient has developed this redness.  Patient was brought in for further evaluation.    She denies any LOC and no head trauma.  Patient has no abdominal pain.  Patient has no further complaints at this time.  Discussion with the patient reveals that she is not on any anticoagulation.  Patient is on a baby aspirin daily.  On further discussion with the patient as well as the caregiver at bedside, the patient does have history of bilateral lower leg swelling in which she does take torsemide 20 mg daily.            Past Medical History:     Past Medical History:   Diagnosis Date     Acute blood loss anemia      Alzheimer disease (H)      CAD (coronary artery disease)      Chronic pain syndrome      Dementia (H)      Depression      Depression      Hip fracture, right (H) 2017     HTN (hypertension)      Kidney stone      Overactive bladder      PMB (postmenopausal bleeding)      RLS (restless legs syndrome)      Spine pain      Stented coronary artery      Traumatic brain injury (H)      Unsteady gait     uses walker     UTI (lower urinary tract infection)     on antibiotic course              Past Surgical History:     Past Surgical History:   Procedure Laterality Date     ARTHROPLASTY REVISION HIP Right 01/10/2018      SECTION       CHOLECYSTECTOMY  May 2014     CORONARY STENT PLACEMENT       DILATION  AND CURETTAGE, OPERATIVE HYSTEROSCOPY, COMBINED N/A 9/17/2014    Procedure: DILATION AND CURETTAGE WITH HYSTEROSCOPY;  Surgeon: Karime Cifuentes MD;  Location: [facility];  Service:      HEMIARTHROPLASTY HIP Right 12/22/2017     INCISION AND DRAINAGE HIP Right 01/18/2018    ORIF REVISION      LUNG REMOVAL, PARTIAL       TONSILLECTOMY & ADENOIDECTOMY       TOTAL KNEE ARTHROPLASTY Right 09/30/2016     TOTAL KNEE ARTHROPLASTY Left 11/2015             Social History:    reports that she has never smoked. She has never used smokeless tobacco. She reports that she does not drink alcohol and does not use drugs.           Family History:     Family History   Problem Relation Age of Onset     Cancer Mother      Coronary Artery Disease Father      Heart Failure Father               Allergies:     Allergies   Allergen Reactions     Ibuprofen      Morphine Rash     Tolerates dilaudid              Medications:     Prior to Admission medications    Medication Sig Start Date End Date Taking? Authorizing Provider   acetaminophen (TYLENOL) 325 MG tablet Take 650 mg by mouth every 8 hours as needed     Reported, Patient   ASPIRIN PO Take 81 mg by mouth daily    Reported, Patient   atorvastatin (LIPITOR) 40 MG tablet Take 40 mg by mouth every evening  3/8/17   Reported, Patient   bacitracin 500 UNIT/GM external ointment Reported on 5/1/2017    Reported, Patient   buPROPion (WELLBUTRIN XL) 300 MG 24 hr tablet Take 300 mg by mouth daily 4/2/20   Reported, Patient   carbidopa-levodopa (SINEMET)  MG per tablet Take 2 tablets by mouth 3 times daily 4/13/17   Saúl Cancino MD   Cholecalciferol (VITAMIN D3 PO) Take 2,000 Units by mouth daily     Reported, Patient   conjugated estrogens (PREMARIN) vaginal cream Place 1 g vaginally twice a week    Reported, Patient   Cranberry 500 MG CAPS Take 500 mg by mouth 12/27/17   Reported, Patient   cyanocolbalamin (VITAMIN  B-12) 100 MCG tablet Take 100 mcg by mouth daily    Reported,  Patient   furosemide (LASIX) 20 MG tablet Take 20 mg by mouth daily 7/28/21   Reported, Patient   ketoconazole (NIZORAL) 2 % cream Apply topically daily    Reported, Patient   LORazepam (ATIVAN) 0.5 MG tablet Take 0.5 mg by mouth    Reported, Patient   magnesium oxide (MAG-OX) 400 MG tablet Take 400 mg by mouth    Reported, Patient   melatonin 5 MG CAPS Take 5 mg by mouth At Bedtime 10/27/20   Reported, Patient   metoprolol tartrate (LOPRESSOR) 25 MG tablet Take 12.5 mg by mouth daily     Reported, Patient   mirtazapine (REMERON) 15 MG tablet TAKE 1 TABLET BY MOUTH AT BEDTIME 10/27/21   Norberto Johnson MD   mirtazapine (REMERON) 45 MG tablet 1 &1/2 tab tab daily  Patient not taking: Reported on 8/9/2021    Reported, Patient   Multiple Vitamins-Minerals (MULTIVITAMIN PO) Take by mouth daily    Reported, Patient   nitroGLYcerin (NITROSTAT) 0.4 MG sublingual tablet Place 0.4 mg under the tongue 3/8/17   Reported, Patient   nystatin POWD daily Once a day apply topically to stomach fold    Reported, Patient   OLANZAPINE PO Take 2.5 mg by mouth 2 times daily  Patient not taking: Reported on 8/9/2021    Reported, Patient   pramipexole (MIRAPEX) 0.5 MG tablet Take 1 tablet (0.5 mg) by mouth daily 10/20/21   Norberto Johnson MD   senna-docusate (SENOKOT-S/PERICOLACE) 8.6-50 MG tablet Take 2 tablets by mouth  Patient not taking: Reported on 8/9/2021 12/27/17   Reported, Patient   Solifenacin Succinate (VESICARE PO) Take 10 mg by mouth daily    Reported, Patient   TRAZODONE HCL PO Take 150 mg by mouth At Bedtime     Reported, Patient   venlafaxine (EFFEXOR-XR) 150 MG 24 hr capsule Take 2 capsules (300 mg) by mouth daily 8/27/21   Norberto Johnson MD   venlafaxine (EFFEXOR-XR) 37.5 MG 24 hr capsule TAKE 1 CAPSULE BY MOUTH ONCE DAILY (ALONG WITH 300MG FOR TOTAL DOSE = 337.5MG) 10/27/21   Norberto Johnson MD   VENLAFAXINE HCL PO Take 300 mg by mouth daily  Patient not taking: Reported on 8/9/2021    Reported, Patient  "             Review of Systems:   The +10 point Review of Systems was completed and is negative other than noted in the HPI            Physical Exam:     Patient Vitals for the past 24 hrs:   BP Temp Temp src Pulse Resp SpO2 Height Weight   12/20/21 1230 92/57 -- -- 68 -- 97 % -- --   12/20/21 1220 (!) 87/57 -- -- 71 -- 98 % -- --   12/20/21 1150 97/51 -- -- 73 -- 99 % -- --   12/20/21 1130 109/55 -- -- 73 -- 94 % -- --   12/20/21 1129 -- -- -- 75 14 91 % -- --   12/20/21 1120 113/55 -- -- 78 -- 94 % -- --   12/20/21 1030 (!) 144/68 -- -- 80 -- 95 % -- --   12/20/21 1000 (!) 142/70 -- -- 78 -- 94 % -- --   12/20/21 0928 (!) 144/71 98.6  F (37  C) Oral 89 20 97 % 1.803 m (5' 11\") 81 kg (178 lb 9.2 oz)          Intake/Output Summary (Last 24 hours) at 12/20/2021 1314  Last data filed at 12/20/2021 1241  Gross per 24 hour   Intake 250 ml   Output --   Net 250 ml      Constitutional:   awake, alert, cooperative, no apparent distress, and appears stated age       Eyes:   PERRL, conjunctiva/corneas clear, EOM's intact; no scleral edema or icterus noted        ENT:   Normocephalic, without obvious abnormality, atraumatic, Lips, mucosa, and tongue normal        Hematologic / Lymphatic:   -Large hematoma over the right lateral extremity       Lungs:   Normal respiratory effort, no accessory muscle use       Cardiovascular:   Regular rate and rhythm       Abdomen:   Soft, nontender, obese abdomen       Musculoskeletal:   No obvious swelling, bruising or deformity  Right lower extremity has palpable DP PT pulses.        Skin:   -Patient has a right lower extremity hematoma to the lateral portion.  The hematoma measures 22 x 10 cm.  Tender to palpation.  Tense hematoma.   -Patient also has erythematous skin below the hematoma formation.  I do not appreciate any crepitus.             Data:         All imaging studies reviewed by me.    Results for orders placed or performed during the hospital encounter of 12/20/21 (from the " past 24 hour(s))   ABO/Rh type and screen    Narrative    The following orders were created for panel order ABO/Rh type and screen.  Procedure                               Abnormality         Status                     ---------                               -----------         ------                     Adult Type and Screen[[id number]]                            In process                   Please view results for these tests on the individual orders.   CBC with platelets + differential    Narrative    The following orders were created for panel order CBC with platelets + differential.  Procedure                               Abnormality         Status                     ---------                               -----------         ------                     CBC with platelets and d...[[id number]]                                                   Please view results for these tests on the individual orders.   INR   Result Value Ref Range    INR 1.09 0.85 - 1.15   PTT   Result Value Ref Range    aPTT 34 22 - 38 Seconds   Comprehensive metabolic panel   Result Value Ref Range    Sodium 144 136 - 145 mmol/L    Potassium 4.5 3.5 - 5.0 mmol/L    Chloride 106 98 - 107 mmol/L    Carbon Dioxide (CO2) 34 (H) 22 - 31 mmol/L    Anion Gap 4 (L) 5 - 18 mmol/L    Urea Nitrogen 30 (H) 8 - 28 mg/dL    Creatinine 0.83 0.60 - 1.10 mg/dL    Calcium 9.2 8.5 - 10.5 mg/dL    Glucose 126 (H) 70 - 125 mg/dL    Alkaline Phosphatase 90 45 - 120 U/L    AST 19 0 - 40 U/L    ALT <9 0 - 45 U/L    Protein Total 7.2 6.0 - 8.0 g/dL    Albumin 3.5 3.5 - 5.0 g/dL    Bilirubin Total 0.5 0.0 - 1.0 mg/dL    GFR Estimate 71 >60 mL/min/1.73m2   CK total   Result Value Ref Range    CK 46 30 - 190 U/L   XR Femur Right 2 Views    Narrative    EXAM: XR FEMUR RIGHT 2 VIEW  LOCATION: [facility]  DATE/TIME: 12/20/2021 10:39 AM    INDICATION: Fall, large hematoma just below right knee.  COMPARISON: 8/25/2020.      Impression     IMPRESSION: No change in the right hip arthroplasty with longstem proximal femoral component and numerous cerclage wire fixation hardware about the proximal right femur. Lateral claw plate projects in unchanged position along the base of the right   greater trochanter. Chronic healed deformity proximal right femur with heterotopic bone along the superolateral right hip, similar to prior. Right total knee arthroplasty. No acute displaced periprosthetic fracture identified. Diffuse bone   demineralization. No sizable knee joint effusion.   XR Tibia & Fibula Left 2 Views    Narrative    EXAM: XR TIBIA and FIBULA LT 2 VW  LOCATION: [facility]  DATE/TIME: 12/20/2021 10:39 AM    INDICATION: Fall, bruising medial left knee.  COMPARISON: None.      Impression    IMPRESSION: Anatomic alignment left tibia and fibula. No acute displaced tibia or fibula fracture. Partial visualization of left knee arthroplasty. Distal left leg and bimalleolar ankle soft tissue swelling. Degenerative change in the left ankle and   visualized foot. Heel spur.   XR Knee Left 1/2 Views    Narrative    EXAM: XR KNEE LT 1/2 VW  LOCATION: [facility]  DATE/TIME: 12/20/2021 10:39 AM    INDICATION: Fall, bruising medial left knee.  COMPARISON: None.      Impression    IMPRESSION: Left total knee arthroplasty with patellar resurfacing. No acute displaced periprosthetic fracture or convincing finding for component failure. No sizable left knee joint effusion. Diffuse bone demineralization. Medial left knee soft tissue   swelling.   XR Knee Right 1/2 Views    Narrative    EXAM: XR KNEE RT 1 /2 VW  LOCATION: [facility]  DATE/TIME: 12/20/2021 10:40 AM    INDICATION: Fall, large skin hematoma.    COMPARISON: Right femur radiographic exam 8/25/2020.      Impression    IMPRESSION: Right total knee arthroplasty redemonstrated. No acute displaced periprosthetic fracture is identified. No  sizable right knee joint effusion. Diffuse bone demineralization. The distal tip of a right hip arthroplasty is noted with   indolent-appearing periosteal new bone along the posterior adjacent femoral shaft.   XR Tibia & Fibula Right 2 Views    Narrative    EXAM: XR TIBIA and FIBULA RT 2 VW  LOCATION: [facility]  DATE/TIME: 12/20/2021 10:39 AM    INDICATION: Fall, large skin hematoma below knee.  COMPARISON: None.      Impression    IMPRESSION: Large soft tissue density along the anterolateral right leg extends craniocaudal approximately 23 cm and could represent the reported large hematoma in the lateral right leg. Anatomic alignment right tibia and fibula. No acute displaced right   tibia or fibula fracture identified. Diffuse bone demineralization. Partial visualization of right total knee arthroplasty. Mild-moderate tibiotalar osteoarthritis. Generalized right leg soft tissue swelling.   Lactic acid whole blood   Result Value Ref Range    Lactic Acid 0.6 (L) 0.7 - 2.0 mmol/L   [place] Draw    Narrative    The following orders were created for panel order [place] Draw.  Procedure                               Abnormality         Status                     ---------                               -----------         ------                     Extra Purple Top Tube[[id number]]                            In process                   Please view results for these tests on the individual orders.        Johan Subramanian, DO

## 2021-12-21 ENCOUNTER — ANCILLARY PROCEDURE (OUTPATIENT)
Dept: ULTRASOUND IMAGING | Facility: HOSPITAL | Age: 71
End: 2021-12-21
Attending: ANESTHESIOLOGY
Payer: MEDICARE

## 2021-12-21 DIAGNOSIS — F33.1 MDD (MAJOR DEPRESSIVE DISORDER), RECURRENT EPISODE, MODERATE (H): ICD-10-CM

## 2021-12-21 LAB
ANION GAP SERPL CALCULATED.3IONS-SCNC: 7 MMOL/L (ref 5–18)
ATRIAL RATE - MUSE: 59 BPM
ATRIAL RATE - MUSE: 63 BPM
BUN SERPL-MCNC: 23 MG/DL (ref 8–28)
CALCIUM SERPL-MCNC: 7.5 MG/DL (ref 8.5–10.5)
CHLORIDE BLD-SCNC: 109 MMOL/L (ref 98–107)
CO2 SERPL-SCNC: 30 MMOL/L (ref 22–31)
CREAT SERPL-MCNC: 0.67 MG/DL (ref 0.6–1.1)
DIASTOLIC BLOOD PRESSURE - MUSE: NORMAL MMHG
DIASTOLIC BLOOD PRESSURE - MUSE: NORMAL MMHG
ERYTHROCYTE [DISTWIDTH] IN BLOOD BY AUTOMATED COUNT: 14.3 % (ref 10–15)
GFR SERPL CREATININE-BSD FRML MDRD: 88 ML/MIN/1.73M2
GLUCOSE BLD-MCNC: 74 MG/DL (ref 70–125)
GLUCOSE BLDC GLUCOMTR-MCNC: 100 MG/DL (ref 70–99)
GLUCOSE BLDC GLUCOMTR-MCNC: 100 MG/DL (ref 70–99)
GLUCOSE BLDC GLUCOMTR-MCNC: 104 MG/DL (ref 70–99)
GLUCOSE BLDC GLUCOMTR-MCNC: 106 MG/DL (ref 70–99)
GLUCOSE BLDC GLUCOMTR-MCNC: 112 MG/DL (ref 70–99)
GLUCOSE BLDC GLUCOMTR-MCNC: 48 MG/DL (ref 70–99)
GLUCOSE BLDC GLUCOMTR-MCNC: 73 MG/DL (ref 70–99)
HCT VFR BLD AUTO: 28 % (ref 35–47)
HGB BLD-MCNC: 7.9 G/DL (ref 11.7–15.7)
HGB BLD-MCNC: 8 G/DL (ref 11.7–15.7)
HGB BLD-MCNC: 8.2 G/DL (ref 11.7–15.7)
HOLD SPECIMEN: NORMAL
INTERPRETATION ECG - MUSE: NORMAL
INTERPRETATION ECG - MUSE: NORMAL
MCH RBC QN AUTO: 28.2 PG (ref 26.5–33)
MCHC RBC AUTO-ENTMCNC: 29.3 G/DL (ref 31.5–36.5)
MCV RBC AUTO: 96 FL (ref 78–100)
P AXIS - MUSE: 35 DEGREES
P AXIS - MUSE: 40 DEGREES
PLATELET # BLD AUTO: 119 10E3/UL (ref 150–450)
POTASSIUM BLD-SCNC: 4.3 MMOL/L (ref 3.5–5)
PR INTERVAL - MUSE: 168 MS
PR INTERVAL - MUSE: 170 MS
QRS DURATION - MUSE: 158 MS
QRS DURATION - MUSE: 162 MS
QT - MUSE: 424 MS
QT - MUSE: 466 MS
QTC - MUSE: 433 MS
QTC - MUSE: 461 MS
R AXIS - MUSE: -20 DEGREES
R AXIS - MUSE: -34 DEGREES
RBC # BLD AUTO: 2.91 10E6/UL (ref 3.8–5.2)
SODIUM SERPL-SCNC: 146 MMOL/L (ref 136–145)
SYSTOLIC BLOOD PRESSURE - MUSE: NORMAL MMHG
SYSTOLIC BLOOD PRESSURE - MUSE: NORMAL MMHG
T AXIS - MUSE: -5 DEGREES
T AXIS - MUSE: 3 DEGREES
VENTRICULAR RATE- MUSE: 59 BPM
VENTRICULAR RATE- MUSE: 63 BPM
WBC # BLD AUTO: 3.7 10E3/UL (ref 4–11)

## 2021-12-21 PROCEDURE — 250N000011 HC RX IP 250 OP 636: Performed by: ANESTHESIOLOGY

## 2021-12-21 PROCEDURE — 250N000011 HC RX IP 250 OP 636: Performed by: SURGERY

## 2021-12-21 PROCEDURE — 360N000075 HC SURGERY LEVEL 2, PER MIN: Performed by: SURGERY

## 2021-12-21 PROCEDURE — 82310 ASSAY OF CALCIUM: CPT | Performed by: INTERNAL MEDICINE

## 2021-12-21 PROCEDURE — 250N000013 HC RX MED GY IP 250 OP 250 PS 637: Performed by: SURGERY

## 2021-12-21 PROCEDURE — 10140 I&D HMTMA SEROMA/FLUID COLLJ: CPT | Performed by: SURGERY

## 2021-12-21 PROCEDURE — 0JCN0ZZ EXTIRPATION OF MATTER FROM RIGHT LOWER LEG SUBCUTANEOUS TISSUE AND FASCIA, OPEN APPROACH: ICD-10-PCS | Performed by: SURGERY

## 2021-12-21 PROCEDURE — 36415 COLL VENOUS BLD VENIPUNCTURE: CPT | Performed by: INTERNAL MEDICINE

## 2021-12-21 PROCEDURE — 250N000013 HC RX MED GY IP 250 OP 250 PS 637: Performed by: STUDENT IN AN ORGANIZED HEALTH CARE EDUCATION/TRAINING PROGRAM

## 2021-12-21 PROCEDURE — 370N000017 HC ANESTHESIA TECHNICAL FEE, PER MIN: Performed by: SURGERY

## 2021-12-21 PROCEDURE — 85018 HEMOGLOBIN: CPT | Performed by: SURGERY

## 2021-12-21 PROCEDURE — 258N000003 HC RX IP 258 OP 636: Performed by: STUDENT IN AN ORGANIZED HEALTH CARE EDUCATION/TRAINING PROGRAM

## 2021-12-21 PROCEDURE — 36415 COLL VENOUS BLD VENIPUNCTURE: CPT | Performed by: SURGERY

## 2021-12-21 PROCEDURE — 99232 SBSQ HOSP IP/OBS MODERATE 35: CPT | Performed by: INTERNAL MEDICINE

## 2021-12-21 PROCEDURE — 258N000003 HC RX IP 258 OP 636: Performed by: MASSAGE THERAPIST

## 2021-12-21 PROCEDURE — 258N000003 HC RX IP 258 OP 636: Performed by: SURGERY

## 2021-12-21 PROCEDURE — 250N000011 HC RX IP 250 OP 636: Performed by: INTERNAL MEDICINE

## 2021-12-21 PROCEDURE — 999N000141 HC STATISTIC PRE-PROCEDURE NURSING ASSESSMENT: Performed by: SURGERY

## 2021-12-21 PROCEDURE — 120N000001 HC R&B MED SURG/OB

## 2021-12-21 PROCEDURE — 250N000009 HC RX 250: Performed by: ANESTHESIOLOGY

## 2021-12-21 PROCEDURE — 710N000009 HC RECOVERY PHASE 1, LEVEL 1, PER MIN: Performed by: SURGERY

## 2021-12-21 PROCEDURE — 250N000013 HC RX MED GY IP 250 OP 250 PS 637: Performed by: INTERNAL MEDICINE

## 2021-12-21 PROCEDURE — 272N000001 HC OR GENERAL SUPPLY STERILE: Performed by: SURGERY

## 2021-12-21 PROCEDURE — 85027 COMPLETE CBC AUTOMATED: CPT | Performed by: INTERNAL MEDICINE

## 2021-12-21 PROCEDURE — 258N000001 HC RX 258: Performed by: ANESTHESIOLOGY

## 2021-12-21 PROCEDURE — 258N000003 HC RX IP 258 OP 636: Performed by: ANESTHESIOLOGY

## 2021-12-21 PROCEDURE — 250N000009 HC RX 250: Performed by: SURGERY

## 2021-12-21 RX ORDER — OXYCODONE HYDROCHLORIDE 5 MG/1
5 TABLET ORAL EVERY 4 HOURS PRN
Status: DISCONTINUED | OUTPATIENT
Start: 2021-12-21 | End: 2021-12-21 | Stop reason: HOSPADM

## 2021-12-21 RX ORDER — POLYETHYLENE GLYCOL 3350 17 G/17G
17 POWDER, FOR SOLUTION ORAL DAILY
Status: DISCONTINUED | OUTPATIENT
Start: 2021-12-22 | End: 2021-12-21

## 2021-12-21 RX ORDER — PROPOFOL 10 MG/ML
INJECTION, EMULSION INTRAVENOUS CONTINUOUS PRN
Status: DISCONTINUED | OUTPATIENT
Start: 2021-12-21 | End: 2021-12-21

## 2021-12-21 RX ORDER — EPHEDRINE SULFATE 50 MG/ML
INJECTION, SOLUTION INTRAVENOUS PRN
Status: DISCONTINUED | OUTPATIENT
Start: 2021-12-21 | End: 2021-12-21

## 2021-12-21 RX ORDER — FENTANYL CITRATE 50 UG/ML
50 INJECTION, SOLUTION INTRAMUSCULAR; INTRAVENOUS
Status: DISCONTINUED | OUTPATIENT
Start: 2021-12-21 | End: 2021-12-21 | Stop reason: HOSPADM

## 2021-12-21 RX ORDER — CALCIUM CHLORIDE 100 MG/ML
INJECTION INTRAVENOUS; INTRAVENTRICULAR PRN
Status: DISCONTINUED | OUTPATIENT
Start: 2021-12-21 | End: 2021-12-21

## 2021-12-21 RX ORDER — HYDROMORPHONE HCL IN WATER/PF 6 MG/30 ML
0.2 PATIENT CONTROLLED ANALGESIA SYRINGE INTRAVENOUS EVERY 5 MIN PRN
Status: DISCONTINUED | OUTPATIENT
Start: 2021-12-21 | End: 2021-12-21 | Stop reason: HOSPADM

## 2021-12-21 RX ORDER — LIDOCAINE HYDROCHLORIDE 10 MG/ML
INJECTION, SOLUTION EPIDURAL; INFILTRATION; INTRACAUDAL; PERINEURAL PRN
Status: DISCONTINUED | OUTPATIENT
Start: 2021-12-21 | End: 2021-12-21 | Stop reason: HOSPADM

## 2021-12-21 RX ORDER — CEFAZOLIN SODIUM 2 G/100ML
2 INJECTION, SOLUTION INTRAVENOUS SEE ADMIN INSTRUCTIONS
Status: DISCONTINUED | OUTPATIENT
Start: 2021-12-21 | End: 2021-12-21 | Stop reason: CLARIF

## 2021-12-21 RX ORDER — HYDROMORPHONE HCL IN WATER/PF 6 MG/30 ML
0.4 PATIENT CONTROLLED ANALGESIA SYRINGE INTRAVENOUS
Status: DISCONTINUED | OUTPATIENT
Start: 2021-12-21 | End: 2021-12-22 | Stop reason: ALTCHOICE

## 2021-12-21 RX ORDER — ACETAMINOPHEN 325 MG/1
975 TABLET ORAL EVERY 8 HOURS
Status: COMPLETED | OUTPATIENT
Start: 2021-12-21 | End: 2021-12-24

## 2021-12-21 RX ORDER — FENTANYL CITRATE 50 UG/ML
INJECTION, SOLUTION INTRAMUSCULAR; INTRAVENOUS PRN
Status: DISCONTINUED | OUTPATIENT
Start: 2021-12-21 | End: 2021-12-21

## 2021-12-21 RX ORDER — MIDAZOLAM HYDROCHLORIDE 1 MG/ML
1 INJECTION INTRAMUSCULAR; INTRAVENOUS
Status: DISCONTINUED | OUTPATIENT
Start: 2021-12-21 | End: 2021-12-21 | Stop reason: HOSPADM

## 2021-12-21 RX ORDER — LIDOCAINE 40 MG/G
CREAM TOPICAL
Status: DISCONTINUED | OUTPATIENT
Start: 2021-12-21 | End: 2022-01-07 | Stop reason: HOSPADM

## 2021-12-21 RX ORDER — BISACODYL 10 MG
10 SUPPOSITORY, RECTAL RECTAL DAILY PRN
Status: DISCONTINUED | OUTPATIENT
Start: 2021-12-21 | End: 2022-01-07 | Stop reason: HOSPADM

## 2021-12-21 RX ORDER — ONDANSETRON 2 MG/ML
INJECTION INTRAMUSCULAR; INTRAVENOUS PRN
Status: DISCONTINUED | OUTPATIENT
Start: 2021-12-21 | End: 2021-12-21

## 2021-12-21 RX ORDER — ACETAMINOPHEN 325 MG/1
650 TABLET ORAL EVERY 4 HOURS PRN
Status: DISCONTINUED | OUTPATIENT
Start: 2021-12-24 | End: 2022-01-07 | Stop reason: HOSPADM

## 2021-12-21 RX ORDER — PROPOFOL 10 MG/ML
INJECTION, EMULSION INTRAVENOUS PRN
Status: DISCONTINUED | OUTPATIENT
Start: 2021-12-21 | End: 2021-12-21

## 2021-12-21 RX ORDER — GLYCOPYRROLATE 0.2 MG/ML
INJECTION, SOLUTION INTRAMUSCULAR; INTRAVENOUS PRN
Status: DISCONTINUED | OUTPATIENT
Start: 2021-12-21 | End: 2021-12-21

## 2021-12-21 RX ORDER — ONDANSETRON 2 MG/ML
4 INJECTION INTRAMUSCULAR; INTRAVENOUS EVERY 6 HOURS PRN
Status: DISCONTINUED | OUTPATIENT
Start: 2021-12-21 | End: 2022-01-07 | Stop reason: HOSPADM

## 2021-12-21 RX ORDER — HYDROMORPHONE HYDROCHLORIDE 1 MG/ML
0.5 INJECTION, SOLUTION INTRAMUSCULAR; INTRAVENOUS; SUBCUTANEOUS EVERY 6 HOURS PRN
Status: DISCONTINUED | OUTPATIENT
Start: 2021-12-21 | End: 2021-12-22 | Stop reason: ALTCHOICE

## 2021-12-21 RX ORDER — SODIUM CHLORIDE, SODIUM LACTATE, POTASSIUM CHLORIDE, CALCIUM CHLORIDE 600; 310; 30; 20 MG/100ML; MG/100ML; MG/100ML; MG/100ML
INJECTION, SOLUTION INTRAVENOUS CONTINUOUS
Status: DISCONTINUED | OUTPATIENT
Start: 2021-12-21 | End: 2021-12-21 | Stop reason: HOSPADM

## 2021-12-21 RX ORDER — LIDOCAINE HYDROCHLORIDE 20 MG/ML
INJECTION, SOLUTION INFILTRATION; PERINEURAL PRN
Status: DISCONTINUED | OUTPATIENT
Start: 2021-12-21 | End: 2021-12-21

## 2021-12-21 RX ORDER — LIDOCAINE 40 MG/G
CREAM TOPICAL
Status: DISCONTINUED | OUTPATIENT
Start: 2021-12-21 | End: 2021-12-21 | Stop reason: HOSPADM

## 2021-12-21 RX ORDER — HEPARIN SODIUM 5000 [USP'U]/.5ML
5000 INJECTION, SOLUTION INTRAVENOUS; SUBCUTANEOUS EVERY 8 HOURS
Status: DISPENSED | OUTPATIENT
Start: 2021-12-22 | End: 2021-12-27

## 2021-12-21 RX ORDER — ONDANSETRON 4 MG/1
4 TABLET, ORALLY DISINTEGRATING ORAL EVERY 30 MIN PRN
Status: DISCONTINUED | OUTPATIENT
Start: 2021-12-21 | End: 2021-12-21 | Stop reason: HOSPADM

## 2021-12-21 RX ORDER — CEFAZOLIN SODIUM 2 G/100ML
2 INJECTION, SOLUTION INTRAVENOUS
Status: DISCONTINUED | OUTPATIENT
Start: 2021-12-21 | End: 2021-12-21 | Stop reason: CLARIF

## 2021-12-21 RX ORDER — ONDANSETRON 4 MG/1
4 TABLET, ORALLY DISINTEGRATING ORAL EVERY 6 HOURS PRN
Status: DISCONTINUED | OUTPATIENT
Start: 2021-12-21 | End: 2022-01-07 | Stop reason: HOSPADM

## 2021-12-21 RX ORDER — DEXTROSE MONOHYDRATE 25 G/50ML
25 INJECTION, SOLUTION INTRAVENOUS ONCE
Status: COMPLETED | OUTPATIENT
Start: 2021-12-21 | End: 2021-12-21

## 2021-12-21 RX ORDER — AMOXICILLIN 250 MG
1 CAPSULE ORAL 2 TIMES DAILY
Status: DISCONTINUED | OUTPATIENT
Start: 2021-12-21 | End: 2022-01-07 | Stop reason: HOSPADM

## 2021-12-21 RX ORDER — ONDANSETRON 2 MG/ML
4 INJECTION INTRAMUSCULAR; INTRAVENOUS EVERY 30 MIN PRN
Status: DISCONTINUED | OUTPATIENT
Start: 2021-12-21 | End: 2021-12-21 | Stop reason: HOSPADM

## 2021-12-21 RX ORDER — PROCHLORPERAZINE MALEATE 5 MG
5 TABLET ORAL EVERY 6 HOURS PRN
Status: DISCONTINUED | OUTPATIENT
Start: 2021-12-21 | End: 2022-01-07 | Stop reason: HOSPADM

## 2021-12-21 RX ORDER — FENTANYL CITRATE 50 UG/ML
25 INJECTION, SOLUTION INTRAMUSCULAR; INTRAVENOUS EVERY 5 MIN PRN
Status: DISCONTINUED | OUTPATIENT
Start: 2021-12-21 | End: 2021-12-21 | Stop reason: HOSPADM

## 2021-12-21 RX ADMIN — HYDROMORPHONE HYDROCHLORIDE 0.4 MG: 0.2 INJECTION, SOLUTION INTRAMUSCULAR; INTRAVENOUS; SUBCUTANEOUS at 20:54

## 2021-12-21 RX ADMIN — EPHEDRINE SULFATE 10 MG: 50 INJECTION INTRAMUSCULAR; INTRAVENOUS; SUBCUTANEOUS at 07:44

## 2021-12-21 RX ADMIN — PHENYLEPHRINE HYDROCHLORIDE 100 MCG: 10 INJECTION INTRAVENOUS at 07:58

## 2021-12-21 RX ADMIN — SODIUM CHLORIDE 500 ML: 9 INJECTION, SOLUTION INTRAVENOUS at 03:08

## 2021-12-21 RX ADMIN — MIDODRINE HYDROCHLORIDE 7.5 MG: 2.5 TABLET ORAL at 18:21

## 2021-12-21 RX ADMIN — PHENYLEPHRINE HYDROCHLORIDE 100 MCG: 10 INJECTION INTRAVENOUS at 07:51

## 2021-12-21 RX ADMIN — SODIUM CHLORIDE: 9 INJECTION, SOLUTION INTRAVENOUS at 18:26

## 2021-12-21 RX ADMIN — SODIUM CHLORIDE 1000 ML: 9 INJECTION, SOLUTION INTRAVENOUS at 04:38

## 2021-12-21 RX ADMIN — ATORVASTATIN CALCIUM 40 MG: 40 TABLET, FILM COATED ORAL at 20:32

## 2021-12-21 RX ADMIN — HYDROMORPHONE HYDROCHLORIDE 0.5 MG: 1 INJECTION, SOLUTION INTRAMUSCULAR; INTRAVENOUS; SUBCUTANEOUS at 10:11

## 2021-12-21 RX ADMIN — PIPERACILLIN AND TAZOBACTAM 3.38 G: 3; .375 INJECTION, POWDER, FOR SOLUTION INTRAVENOUS at 12:56

## 2021-12-21 RX ADMIN — FENTANYL CITRATE 25 MCG: 50 INJECTION, SOLUTION INTRAMUSCULAR; INTRAVENOUS at 07:23

## 2021-12-21 RX ADMIN — ACETAMINOPHEN 975 MG: 325 TABLET ORAL at 11:19

## 2021-12-21 RX ADMIN — PROPOFOL 30 MG: 10 INJECTION, EMULSION INTRAVENOUS at 07:25

## 2021-12-21 RX ADMIN — PHENYLEPHRINE HYDROCHLORIDE 100 MCG: 10 INJECTION INTRAVENOUS at 07:54

## 2021-12-21 RX ADMIN — LIDOCAINE HYDROCHLORIDE 60 MG: 20 INJECTION, SOLUTION INFILTRATION; PERINEURAL at 07:25

## 2021-12-21 RX ADMIN — CARBIDOPA AND LEVODOPA 2 TABLET: 25; 100 TABLET ORAL at 12:49

## 2021-12-21 RX ADMIN — TRAZODONE HYDROCHLORIDE 150 MG: 100 TABLET ORAL at 20:51

## 2021-12-21 RX ADMIN — DOCUSATE SODIUM 50 MG AND SENNOSIDES 8.6 MG 1 TABLET: 8.6; 5 TABLET, FILM COATED ORAL at 11:20

## 2021-12-21 RX ADMIN — EPHEDRINE SULFATE 10 MG: 50 INJECTION INTRAMUSCULAR; INTRAVENOUS; SUBCUTANEOUS at 07:45

## 2021-12-21 RX ADMIN — PIPERACILLIN AND TAZOBACTAM 3.38 G: 3; .375 INJECTION, POWDER, FOR SOLUTION INTRAVENOUS at 05:09

## 2021-12-21 RX ADMIN — PRAMIPEXOLE DIHYDROCHLORIDE 0.5 MG: 0.5 TABLET ORAL at 20:51

## 2021-12-21 RX ADMIN — CALCIUM CHLORIDE 1 G: 100 INJECTION, SOLUTION INTRAVENOUS at 07:58

## 2021-12-21 RX ADMIN — FENTANYL CITRATE 25 MCG: 50 INJECTION, SOLUTION INTRAMUSCULAR; INTRAVENOUS at 08:05

## 2021-12-21 RX ADMIN — CARBIDOPA AND LEVODOPA 2 TABLET: 25; 100 TABLET ORAL at 20:32

## 2021-12-21 RX ADMIN — MIDODRINE HYDROCHLORIDE 7.5 MG: 2.5 TABLET ORAL at 11:20

## 2021-12-21 RX ADMIN — ONDANSETRON 4 MG: 2 INJECTION INTRAMUSCULAR; INTRAVENOUS at 07:38

## 2021-12-21 RX ADMIN — HYDROMORPHONE HYDROCHLORIDE 0.5 MG: 1 INJECTION, SOLUTION INTRAMUSCULAR; INTRAVENOUS; SUBCUTANEOUS at 00:56

## 2021-12-21 RX ADMIN — HYDROMORPHONE HYDROCHLORIDE 0.5 MG: 1 INJECTION, SOLUTION INTRAMUSCULAR; INTRAVENOUS; SUBCUTANEOUS at 06:14

## 2021-12-21 RX ADMIN — PIPERACILLIN AND TAZOBACTAM 3.38 G: 3; .375 INJECTION, POWDER, FOR SOLUTION INTRAVENOUS at 20:58

## 2021-12-21 RX ADMIN — DOCUSATE SODIUM 50 MG AND SENNOSIDES 8.6 MG 1 TABLET: 8.6; 5 TABLET, FILM COATED ORAL at 20:32

## 2021-12-21 RX ADMIN — EPHEDRINE SULFATE 5 MG: 50 INJECTION INTRAMUSCULAR; INTRAVENOUS; SUBCUTANEOUS at 07:54

## 2021-12-21 RX ADMIN — HYDROMORPHONE HYDROCHLORIDE 0.4 MG: 0.2 INJECTION, SOLUTION INTRAMUSCULAR; INTRAVENOUS; SUBCUTANEOUS at 16:21

## 2021-12-21 RX ADMIN — GLYCOPYRROLATE 0.2 MG: 0.2 INJECTION, SOLUTION INTRAMUSCULAR; INTRAVENOUS at 07:51

## 2021-12-21 RX ADMIN — ACETAMINOPHEN 975 MG: 325 TABLET ORAL at 18:21

## 2021-12-21 RX ADMIN — PROPOFOL 175 MCG/KG/MIN: 10 INJECTION, EMULSION INTRAVENOUS at 07:25

## 2021-12-21 RX ADMIN — DEXTROSE MONOHYDRATE 25 ML: 500 INJECTION PARENTERAL at 08:48

## 2021-12-21 RX ADMIN — MIDODRINE HYDROCHLORIDE 7.5 MG: 2.5 TABLET ORAL at 00:36

## 2021-12-21 RX ADMIN — MIRTAZAPINE 15 MG: 15 TABLET, FILM COATED ORAL at 20:39

## 2021-12-21 ASSESSMENT — ACTIVITIES OF DAILY LIVING (ADL)
ADLS_ACUITY_SCORE: 9

## 2021-12-21 NOTE — TELEPHONE ENCOUNTER
Again, there is no pharmacy assigned to the patient so I can sign off on this prescription.  Can you take verbal order to refill the medication and what ever pharmacy the patient is using?

## 2021-12-21 NOTE — PROGRESS NOTES
Pt placed on tele & ST alarm continuing to alarm. P3 monitor tech not noting consistent ST. Pt asymptomatic. Spoke with HMitchellOMitchell Macias, EKG ordered for reference. See EKG results.     Noted later hypotension in pt w/BPs in 70's. All other VSS. Pt asymptomatic. Received APAP prior no narcotics. Ace wrap c/d/i to RLE. Pulses present, pt able to wiggle toes. Manual BP resulted in hypotension as well. See documentation. Writer paged H.O. Writer noted pt takes sched. Midodrine & unaware of last dose as order wasn't due to start until next AM. Updated H.O., Dr. Parker, of finding & received order for STAT Hbg (pt currently on q6hr hgb checks) & 500cc NS bolus (pt currently on IV zosyn & NS @ 75cc/hr). Hgb dropped from 10 to 8.2. Assigned hospitalist happened to call for update & writer updated Dr. Marrufo whom informed writer it pt to need blood transfusion throughout NOC shift pt is already consented. Writer spoke with Dr. Parker whom requested surgery to be paged regarding Hgb. Writer spoke with Dr. Davenport whom stated no need for blood transfusion with results of 8.2 currently & continue to monitor hgb q6hrs as previously ordered. Writer updated NOC shift of midodrine to be admin. Upon arrival from pharmacy. Bolus completed & pts Bps in upper 80's, see doc. NOC RN to continue to monitor.

## 2021-12-21 NOTE — SIGNIFICANT EVENT
Significant Event Note    Time of event: 9:18 PM December 20, 2021    Description of event:  Notified of possible ST elevation on cardiac telemetry monitor.        Briefly, Alison is a 71-year-old female history significant for coronary artery disease, Parkinson's, hypertension.  Admitted for acute hematoma of the lower right leg after a fall at home.    Patient asymptomatic.  Vitals within normal limits.  EKG repeated this evening and was negative for any acute ST or T wave changes.     Plan:  Continue current management plan      Diogo Macias MD PGY-1  [facility]   House officer

## 2021-12-21 NOTE — PROGRESS NOTES
Patient was seen in the preop area.  She is still complaining of some right lower extremity pain.  Otherwise patient had no further complaints.    Physical exam:  General: Alert and oriented, calm cooperative  Cardio: Regular rate  Respiratory: Shallow regular rate breathing  MSK: Right lower extremity palpable pulses.  Soft compartments.   to the lateral right leg on palpation.  On movement of the patient's right big toe and ankle flexion, patient does not show pain out of proportion on examination.    Plan:  -To OR today for right lower extremity hematoma evacuation.  -Risk benefits of procedure explained detail to the patient.  Consent was obtained.    Johan Subramanian, DO  General Surgery

## 2021-12-21 NOTE — PLAN OF CARE
Problem: Pain Acute  Goal: Acceptable Pain Control and Functional Ability  Outcome: Improving     Pt A/O 4x. Makes needs known. Pain managed with PRN Tylenol and IV Dilaudid last night. Leg wrapped and waiting for surgery in the morning.   Blood pressures were consistently low last night- 80/45. 1.5 L total bolus of NS was given. Patient sent to surgery at 0630.

## 2021-12-21 NOTE — TELEPHONE ENCOUNTER
Date of Last Office Visit: 11/10/21  Date of Next Office Visit: 1/12/22  No shows since last visit: none  Cancellations since last visit: none    Medication requested: Venlafaxine 150 mg  Date last ordered: 8/27/21 Qty: 60 Refills: 3     Review of [place] ?: NA    Lapse in medication adherence greater than 5 days?: GH report no  If yes, call patient and gather details: NA  Medication refill request verified as identical to current order?: yes  Result of Last DAM, VPA, Li+ Level, CBC, or Carbamazepine Level (at or since last visit): N/A    Last visit treatment plan: 8/9/21    Instructions  Patient is doing relatively well and tolerating the medication.  We will continue with the current treatment plan.  I will make no medication changes.  The patient will return to this clinic for a follow-up appointment in 3 months.  Both she and the staff agreed to call before then with any questions or concerns.            []Medication refilled per  Medication Refill in Ambulatory Care  policy.  [x]Medication unable to be refilled by RN due to criteria not met as indicated below:    []Eligibility - not seen in the last year   []Supervision - no future appointment   []Compliance - no shows, cancellations or lapse in therapy   []Verification - order discrepancy   []Controlled medication   []Medication not included in policy   [x]90-day supply request   []Other

## 2021-12-21 NOTE — PLAN OF CARE
Problem: Adult Inpatient Plan of Care  Goal: Optimal Comfort and Wellbeing  12/21/2021 1326 by Gauri Mcgarry, RN  Outcome: Improving    Problem: Pain Acute  Goal: Acceptable Pain Control and Functional Ability  12/21/2021 1326 by Gauri Mcgarry, RN  Outcome: Improving  Returned from PACU at 0920 via cart. Moved to bed using hover mat. Tolerated well. Right leg ace wrap has right lateral serosanguinous drainage.  Right toes warm and pale pink. Elevated on pillow for comfort. Dilaudid 0.5 mg IV once with good relief of pain. Also took scheduled tylenol well in apple sauce (pills whole) Vital signs sable. Afebrile. Alert and hungry, Ate sandwich, chips, cookie and diet lemon lime pop for lunch without problem. Both hearing aids and upper dentures at bedside.

## 2021-12-21 NOTE — PROGRESS NOTES
[facility]    PROGRESS NOTE - Hospitalist Service    Assessment and Plan    Active Problems:    Accident due to mechanical fall without injury, initial encounter    Coronary atherosclerosis    Parkinsonism, unspecified Parkinsonism type (H)    Hematoma of skin    Pain of right lower leg    Knee injury, right, initial encounter    Type 2 diabetes mellitus with other specified complication, unspecified whether long term insulin use (H)    Alison Bashir is a 71 year old old female with h/o Parkinson, CAD, cognitive decline(but has been her own decision maker), htn, [place], hx of TBI who lives in . She was sent in for severe right leg pain     1.Severe right leg pain ahd extensive acute hematoma of lower right leg  -CTA leg  - vascular took to surgery   - bed rest for today and then tomorrow can ambulate and work with therapy   - pain control but monitor due to increased confusion   - IV zosyn  - trend CBC       Parkinson  - resume home meds      H/o TBI   - lives in  but makes own decisions   - remeron      Hx of CAD/HTN  -hx of stents  - metoprolol and statin   - asa on hold     Hypotension  - midodrine     COVID-19 PCR Results    COVID-19 PCR Results 6/8/20 6/15/20 9/30/20 11/5/21 12/20/21   SARS-CoV-2 Virus Specimen Source Nasopharyngeal Nasopharyngeal Nasopharyngeal     SARS-CoV-2 PCR Result NEGATIVE NEGATIVE NEGATIVE     SARS CoV2 PCR    Positive (A) Negative   (A) Abnormal value       Comments are available for some flowsheets but are not being displayed.         COVID-19 Antibody Results, Testing for Immunity    COVID-19 Antibody Results, Testing for Immunity   No data to display.            VTE prophylaxis:  None until Wednesday subcutaneous heparin to start per vascular   DIET: Orders Placed This Encounter      Regular Diet Adult    Drains/Lines: none  Weight bearing status: bed rest until tomorrow and then can start therapy tomorrow   Disposition/Barriers to discharge: pending  pos-top course and therapy. Come from  likely needs TCU   Code Status: No CPR- Do NOT Intubate    Subjective:  sleeping    PHYSICAL EXAM  Temp:  [97.2  F (36.2  C)-98.2  F (36.8  C)] 97.2  F (36.2  C)  Pulse:  [57-85] 73  Resp:  [9-18] 16  BP: ()/(39-69) 99/52  SpO2:  [93 %-100 %] 100 %  Wt Readings from Last 1 Encounters:   12/20/21 81 kg (178 lb 9.2 oz)       Intake/Output Summary (Last 24 hours) at 12/21/2021 0806  Last data filed at 12/21/2021 0805  Gross per 24 hour   Intake 3087 ml   Output 1350 ml   Net 1737 ml      Body mass index is 24.91 kg/m .    Physical Exam  Constitutional:       Comments: Groggy and sleeping but arouses    HENT:      Head: Normocephalic and atraumatic.   Cardiovascular:      Rate and Rhythm: Normal rate and regular rhythm.      Pulses: Normal pulses.      Comments: Soft PABLO RUSB  Pulmonary:      Breath sounds: Normal breath sounds.   Abdominal:      General: Bowel sounds are normal.   Musculoskeletal:      Comments: right foot bandaged toe warm and toe perfuses well and able to wiggle and sensation intact   Skin:     General: Skin is warm and dry.      Capillary Refill: Capillary refill takes less than 2 seconds.   Neurological:      Comments: Groggy difficult to fully assess       PERTINENT LABS/IMAGING:  Results for orders placed or performed during the hospital encounter of 12/20/21   XR Tibia & Fibula Right 2 Views    Impression    IMPRESSION: Large soft tissue density along the anterolateral right leg extends craniocaudal approximately 23 cm and could represent the reported large hematoma in the lateral right leg. Anatomic alignment right tibia and fibula. No acute displaced right   tibia or fibula fracture identified. Diffuse bone demineralization. Partial visualization of right total knee arthroplasty. Mild-moderate tibiotalar osteoarthritis. Generalized right leg soft tissue swelling.   XR Tibia & Fibula Left 2 Views    Impression    IMPRESSION: Anatomic alignment left  tibia and fibula. No acute displaced tibia or fibula fracture. Partial visualization of left knee arthroplasty. Distal left leg and bimalleolar ankle soft tissue swelling. Degenerative change in the left ankle and   visualized foot. Heel spur.   XR Femur Right 2 Views    Impression    IMPRESSION: No change in the right hip arthroplasty with longstem proximal femoral component and numerous cerclage wire fixation hardware about the proximal right femur. Lateral claw plate projects in unchanged position along the base of the right   greater trochanter. Chronic healed deformity proximal right femur with heterotopic bone along the superolateral right hip, similar to prior. Right total knee arthroplasty. No acute displaced periprosthetic fracture identified. Diffuse bone   demineralization. No sizable knee joint effusion.   XR Knee Left 1/2 Views    Impression    IMPRESSION: Left total knee arthroplasty with patellar resurfacing. No acute displaced periprosthetic fracture or convincing finding for component failure. No sizable left knee joint effusion. Diffuse bone demineralization. Medial left knee soft tissue   swelling.   XR Knee Right 1/2 Views    Impression    IMPRESSION: Right total knee arthroplasty redemonstrated. No acute displaced periprosthetic fracture is identified. No sizable right knee joint effusion. Diffuse bone demineralization. The distal tip of a right hip arthroplasty is noted with   indolent-appearing periosteal new bone along the posterior adjacent femoral shaft.   CTA Abdomen Pelvis Bilat Leg Runoff w Contr    Impression    CONCLUSION:  1.  Large subcutaneous hematoma the lateral superior aspect of the right calf. No contrast extravasation.  2.  Right leg: No inflow or femoropopliteal stenosis. Venous contamination at the calf station. Is considered and appears to be three-vessel runoff.  3.  Left leg: CTA is within normal limits.  4.  Enlarged uterus with gas within the endometrium. There is a  nonspecific finding. Please correlate clinically to exclude infection.  5.  Bilateral nonobstructing renal calculi.  6.  Osteoarthritic changes visualized spine. Right total hip arthroplasty. Bilateral knee arthroplasties.   US Pelvis Complete without Transvaginal    Impression    IMPRESSION:    1.  Air within the endometrial canal as described on recent abdomen/pelvis CT.   2.  Debris noted in the urinary bladder.  3.  Adnexa obscured by bowel gas. Ovaries not identified.           Most Recent 3 CBC's:  Recent Labs   Lab Test 12/21/21  1047 12/21/21  0550 12/20/21  2315 12/20/21  1753 12/20/21  1241   WBC  --  3.7*  --  6.2 6.2   HGB 7.9* 8.2* 8.2* 10.0* 10.1*   MCV  --  96  --  96 94   PLT  --  119*  --  150 153     Most Recent 3 BMP's:  Recent Labs   Lab Test 12/21/21  0907 12/21/21  0841 12/21/21  0550 12/20/21  1831 12/20/21  1005 10/11/21  1128   NA  --   --  146*  --  144 148*   POTASSIUM  --   --  4.3  --  4.5 4.1   CHLORIDE  --   --  109*  --  106 109*   CO2  --   --  30  --  34* 31   BUN  --   --  23  --  30* 34*   CR  --   --  0.67  --  0.83 0.82   ANIONGAP  --   --  7  --  4* 8   MARY  --   --  7.5*  --  9.2 9.2   * 48* 74   < > 126* 96    < > = values in this interval not displayed.     Most Recent 2 LFT's:  Recent Labs   Lab Test 12/20/21  1005 10/11/21  1128   AST 19 15   ALT <9 12   ALKPHOS 90 101   BILITOTAL 0.5 0.4       Recent Labs   Lab Test 10/11/21  1128   CHOL 89   HDL 38*   LDL 33   TRIG 90     Recent Labs   Lab Test 10/11/21  1128 02/26/20  1647 03/22/19  1435   LDL 33 50 43     Recent Labs   Lab Test 12/21/21  0550   *   POTASSIUM 4.3   CHLORIDE 109*   CO2 30   GLC 74   BUN 23   CR 0.67   GFRESTIMATED 88   MARY 7.5*     No results for input(s): A1C in the last 58023 hours.  Recent Labs   Lab Test 12/21/21  0550 12/20/21  2315 12/20/21  1753   HGB 8.2* 8.2* 10.0*     Recent Labs   Lab Test 10/01/20  1401 10/01/20  0836 06/09/20  0116   TROPONINI <0.01 <0.01 <0.01     Recent Labs    Lab Test 12/20/21  1241 06/08/20  1110    80     Recent Labs   Lab Test 08/25/20  1654   TSH 1.09     Recent Labs   Lab Test 12/20/21  1005 09/30/20  1910 06/08/20  1110   INR 1.09 1.07 1.10       Marguerite Marc MD  [facility] Medicine [facility]  [phone]

## 2021-12-21 NOTE — TELEPHONE ENCOUNTER
Reason for Call:  Medication or medication refill: Refill     Do you use a [facility]?  Name of the pharmacy and phone number for the current request:    [facility] PH# [phone] Fax# [phone]    Name of the medication requested: Venlafaxine     Other request: rcvd call from Sofy Eli Director @ client nursing home. Ph@ [phone]   stating client needs refill of above medication.   Please contact her @ above # re: refill     Can we leave a detailed message on this number? YES    Phone number patient can be reached at: Other phone number:  [phone]  Best Time: ASAP    Call taken on 12/21/2021 at 2:19 PM by Adeel Montano

## 2021-12-21 NOTE — PLAN OF CARE
Problem: Pain Acute  Goal: Acceptable Pain Control and Functional Ability  12/21/2021 0016 by Uzma Subramanian, RN  Outcome: No Change  12/21/2021 0013 by Uzma Subramanian, RAMA  Outcome: No Change     Problem: Hyperglycemia  Goal: Blood Glucose Level Within Targeted Range  Outcome: No Change     Admit today 12/20. Unclear if pt had actually fallen last Friday. Ace wrap on RLE, clean, dry, intact. Pt to be NPO at midnight for evacuation tomorrow. Nasal cannula 2L, O2 sats stable. /58 and decreased to low 70's. Saline bolus 500cc/hr.  both times checked. [place], uses auxiliary aids bilaterally. Cooperative with cares. Oriented x4.

## 2021-12-21 NOTE — ED NOTES
Pt bladder scanned for 700cc; inserting najera per Dr. gastelum instruction to put najera if greater than 400.

## 2021-12-21 NOTE — BRIEF OP NOTE
[facility]    Brief Operative Note    Pre-operative diagnosis: Hematoma of skin [T14.8XXA]  Pain of right lower leg [M79.661]  Accident due to mechanical fall without injury, initial encounter [W19.XXXA]  Post-operative diagnosis Same as pre-operative diagnosis    Procedure: Procedure(s):  HEMATOMA EVACUATION OF RIGHT LOWER EXTREMITY  Surgeon: Surgeon(s) and Role:     * Johan Subramanian MD - Primary  Anesthesia: Combined MAC with Local   Estimated Blood Loss: 10 ml    Drains: None  Specimens: * No specimens in log *  Findings:   None.  Complications: None.  Implants: * No implants in log *      Johan Subramanian DO  General Surgery  Pager: [phone]

## 2021-12-21 NOTE — ANESTHESIA PREPROCEDURE EVALUATION
Anesthesia Pre-Procedure Evaluation    Patient: Alison Bashir   MRN: [id number] : 1950        Preoperative Diagnosis: Hematoma of skin [T14.8XXA]  Pain of right lower leg [M79.661]  Accident due to mechanical fall without injury, initial encounter [W19.XXXA]    Procedure : Procedure(s):  HEMATOMA EVACUATION OF RIGHT LOWER EXTREMITY          Past Medical History:   Diagnosis Date     Acute blood loss anemia      Alzheimer disease (H)      CAD (coronary artery disease)      Chronic pain syndrome      Dementia (H)      Depression      Depression      Hip fracture, right (H) 2017     HTN (hypertension)      Kidney stone      Overactive bladder      PMB (postmenopausal bleeding)      RLS (restless legs syndrome)      Spine pain      Stented coronary artery      Traumatic brain injury (H)      Unsteady gait     uses walker     UTI (lower urinary tract infection)     on antibiotic course      Past Surgical History:   Procedure Laterality Date     ARTHROPLASTY REVISION HIP Right 01/10/2018      SECTION       CHOLECYSTECTOMY  May 2014     CORONARY STENT PLACEMENT       DILATION AND CURETTAGE, OPERATIVE HYSTEROSCOPY, COMBINED N/A 2014    Procedure: DILATION AND CURETTAGE WITH HYSTEROSCOPY;  Surgeon: Karime Cifuentes MD;  Location: [facility] [place];  Service:      HEMIARTHROPLASTY HIP Right 2017     INCISION AND DRAINAGE HIP Right 2018    ORIF REVISION      LUNG REMOVAL, PARTIAL       TONSILLECTOMY & ADENOIDECTOMY       TOTAL KNEE ARTHROPLASTY Right 2016     TOTAL KNEE ARTHROPLASTY Left 2015      Allergies   Allergen Reactions     Ibuprofen      Morphine Rash     Tolerates dilaudid      Social History     Tobacco Use     Smoking status: Never Smoker     Smokeless tobacco: Never Used   Substance Use Topics     Alcohol use: No      Wt Readings from Last 1 Encounters:   21 81 kg (178 lb 9.2 oz)        Anesthesia Evaluation            ROS/MED HX  ENT/Pulmonary:  - neg pulmonary  ROS     Neurologic:     (+) dementia,     Cardiovascular:     (+) hypertension--CAD ---    METS/Exercise Tolerance:     Hematologic:       Musculoskeletal:       GI/Hepatic:     (+) GERD,     Renal/Genitourinary:     (+) renal disease,     Endo:     (+) type I DM, Obesity,     Psychiatric/Substance Use:     (+) other (comment) (TBI)     Infectious Disease:       Malignancy:       Other:            Physical Exam    Airway  airway exam normal      Mallampati: II   TM distance: > 3 FB   Neck ROM: full     Respiratory Devices and Support         Dental  no notable dental history         Cardiovascular   cardiovascular exam normal       Rhythm and rate: regular and normal     Pulmonary   pulmonary exam normal        breath sounds clear to auscultation       Other findings: Unable to answer questions other than nodding    OUTSIDE LABS:  CBC:   Lab Results   Component Value Date    WBC 3.7 (L) 12/21/2021    WBC 6.2 12/20/2021    HGB 8.2 (L) 12/21/2021    HGB 8.2 (L) 12/20/2021    HCT 28.0 (L) 12/21/2021    HCT 33.7 (L) 12/20/2021     (L) 12/21/2021     12/20/2021     BMP:   Lab Results   Component Value Date     (H) 12/21/2021     12/20/2021    POTASSIUM 4.3 12/21/2021    POTASSIUM 4.5 12/20/2021    CHLORIDE 109 (H) 12/21/2021    CHLORIDE 106 12/20/2021    CO2 30 12/21/2021    CO2 34 (H) 12/20/2021    BUN 23 12/21/2021    BUN 30 (H) 12/20/2021    CR 0.67 12/21/2021    CR 0.83 12/20/2021    GLC 74 12/21/2021     (H) 12/20/2021     COAGS:   Lab Results   Component Value Date    PTT 34 12/20/2021    INR 1.09 12/20/2021     POC: No results found for: BGM, HCG, HCGS  HEPATIC:   Lab Results   Component Value Date    ALBUMIN 3.5 12/20/2021    PROTTOTAL 7.2 12/20/2021    ALT <9 12/20/2021    AST 19 12/20/2021    ALKPHOS 90 12/20/2021    BILITOTAL 0.5 12/20/2021     OTHER:   Lab Results   Component Value Date    LACT 0.6 (L) 12/20/2021    A1C 5.4 09/29/2011    MARY 7.5 (L) 12/21/2021    MAG 2.2  12/20/2021    TSH 1.09 08/25/2020       Anesthesia Plan    ASA Status:  2   NPO Status:  NPO Appropriate    Anesthesia Type: MAC.   Induction: Propofol.           Consents    Anesthesia Plan(s) and associated risks, benefits, and realistic alternatives discussed. Questions answered and patient/representative(s) expressed understanding.    - Discussed:     - Discussed with:  Patient         Postoperative Care            Comments:    Other Comments: MD Beckie Tinsley MD

## 2021-12-21 NOTE — OR NURSING
Before pt left pt room to come to [facility], pt removed right hearing aide and gave to floor nurse.  Pt stated has left hearing aide also, but is not in ear.  Pt stated came to hospital with it.  Did not see in pt bed and pt crying in pain with any movement.  Used hover mat to pull pt to surgical cart, so pts bed sheet also still under pt.  Will have OR staff check for hearing aide in OR when pt transferred to OR table.

## 2021-12-21 NOTE — ANESTHESIA CARE TRANSFER NOTE
Patient: Alison Bashir    Procedure: Procedure(s):  HEMATOMA EVACUATION OF RIGHT LOWER EXTREMITY       Diagnosis: Hematoma of skin [T14.8XXA]  Pain of right lower leg [M79.661]  Accident due to mechanical fall without injury, initial encounter [W19.XXXA]  Diagnosis Additional Information: No value filed.    Anesthesia Type:   MAC     Note:      Level of Consciousness: drowsy  Oxygen Supplementation: face mask  Level of Supplemental Oxygen (L/min / FiO2): 8  Independent Airway: airway patency satisfactory and stable  Dentition: dentition unchanged  Vital Signs Stable: post-procedure vital signs reviewed and stable  Report to RN Given: handoff report given  Patient transferred to: Phase II    Handoff Report: Identifed the Patient, Identified the Reponsible Provider, Reviewed the pertinent medical history, Discussed the surgical course, Reviewed Intra-OP anesthesia mangement and issues during anesthesia, Set expectations for post-procedure period and Allowed opportunity for questions and acknowledgement of understanding      Vitals:  Vitals Value Taken Time   /61 12/21/21 0809   Temp 97.5 F    Pulse 63 12/21/21 0810   Resp 9 12/21/21 0810   SpO2 100%    Vitals shown include unvalidated device data.    Electronically Signed By: JABARI Reyes CRNA  December 21, 2021  8:11 AM

## 2021-12-21 NOTE — ANESTHESIA POSTPROCEDURE EVALUATION
Patient: Alison Bashir    Procedure: Procedure(s):  HEMATOMA EVACUATION OF RIGHT LOWER EXTREMITY       Diagnosis:Hematoma of skin [T14.8XXA]  Pain of right lower leg [M79.661]  Accident due to mechanical fall without injury, initial encounter [W19.XXXA]  Diagnosis Additional Information: No value filed.    Anesthesia Type:  MAC    Note:  Disposition: Inpatient   Postop Pain Control: Uneventful            Sign Out: Well controlled pain   PONV: No   Neuro/Psych: Uneventful            Sign Out: Acceptable/Baseline neuro status   Airway/Respiratory: Uneventful            Sign Out: Acceptable/Baseline resp. status   CV/Hemodynamics: Uneventful            Sign Out: Acceptable CV status; No obvious hypovolemia; No obvious fluid overload   Other NRE: NONE   DID A NON-ROUTINE EVENT OCCUR? No           Last vitals:  Vitals Value Taken Time   BP 94/54 12/21/21 0900   Temp 36.4  C (97.6  F) 12/21/21 0845   Pulse 63 12/21/21 0912   Resp 12 12/21/21 0911   SpO2 100 % 12/21/21 0912   Vitals shown include unvalidated device data.    Electronically Signed By: Beckie Cook MD  December 21, 2021  10:02 AM

## 2021-12-21 NOTE — OP NOTE
[facility]  Operative Note    Pre-operative diagnosis: Hematoma of skin [T14.8XXA]  Pain of right lower leg [M79.661]  Accident due to mechanical fall without injury, initial encounter [W19.XXXA]   Post-operative diagnosis Same as above   Procedure: Procedure(s):  HEMATOMA EVACUATION OF RIGHT LOWER EXTREMITY   Surgeon: Johan Subramanian DO   Assistants(s): none   Anesthesia: Combined MAC with Local    Estimated blood loss: 10 cc                Drains: None   Specimens: None       Findings: None.   Complications: None.           Description of procedure:    Patient was brought to the operating room and placed on the operative table in the supine position.  The patient's right lower extremity was then prepped and draped in usual sterile fashion.  The patient underwent sedation by the anesthesia team.  Prior to proceeding with the procedure, a timeout was completed.  All present were in agreement.    After evaluation of the hematoma, I was concerned that the skin may no longer be viable.  1% lidocaine was injected around the hematoma.  Electrocautery was used to make x2 transverse incisions approximately 3 cm in length at the proximal and distal portions of the hematomas.  Gentle massaging of the hematoma was done to evacuate the hematoma.  During the gentle massaging, the skin started to tear in a vertical fashion at the distal portion of the hematoma along the anterior edge approximately 10 cm.  The rest of the hematoma was then evacuated.  Copious amounts of sterile saline was used to clean out the wound pocket.  I did not remove any of the skin overlying the hematoma.  I want to evaluate this postoperatively to determine if any of the skin is viable.  At the end of the hematoma evacuation, the measurements measured 24 cm x 12cm x 0.5 cm.      After evacuation of the hematoma, the right lower extremity compartments were evaluated.  They were still soft.  Patient has palpable pulses.  Sterile  dressings were then applied.  Final count was completed.  I was told that all sharps, sponges, instruments were accounted for.  The patient tolerated procedure with no complications.  Patient was then transferred back to the PACU in stable condition.         Johan Subramanian DO  General Surgery  Pager: [phone]

## 2021-12-21 NOTE — PROGRESS NOTES
Pt finger stick 48 MDA notified and ordered D50 25 mls to be given stat. Patient BS rechecked-104.

## 2021-12-21 NOTE — ED NOTES
"[facility] ED Handoff Report    ED Chief Complaint:     ED Diagnosis:  (W19.XXXA) Accident due to mechanical fall without injury, initial encounter  (primary encounter diagnosis)    Plan: Case Request: Hematoma evactuation of right          LOWER EXTREMITY        (T14.8XXA) Hematoma of skin    Plan: Case Request: Hematoma evactuation of right          LOWER EXTREMITY         (M79.661) Pain of right lower leg    Plan: Case Request: Hematoma evactuation of right          LOWER EXTREMITY          (E11.69) Type 2 diabetes mellitus with other specified complication, unspecified whether long term insulin use (H)    (S89.91XA) Knee injury, right, initial encounter      (R52) Intractable pain  C       PMH:    Past Medical History:   Diagnosis Date     Acute blood loss anemia      Alzheimer disease (H)      CAD (coronary artery disease)      Chronic pain syndrome      Dementia (H)      Depression      Depression      Hip fracture, right (H) 12/21/2017     HTN (hypertension)      Kidney stone      Overactive bladder      PMB (postmenopausal bleeding)      RLS (restless legs syndrome)      Spine pain      Stented coronary artery      Traumatic brain injury (H)      Unsteady gait     uses walker     UTI (lower urinary tract infection)     on antibiotic course        Code Status:  No CPR- Do NOT Intubate     Falls Risk: Yes Band: Applied    Current Living Situation/Residence: lives in a group home     Elimination Status: Continent: Yes     Activity Level: Total assist/lift    Patients Preferred Language:  English     Needed: No    Vital Signs:  BP 95/51   Pulse 61   Temp 98.6  F (37  C) (Oral)   Resp 18   Ht 1.803 m (5' 11\")   Wt 81 kg (178 lb 9.2 oz)   SpO2 100%   BMI 24.91 kg/m       Cardiac Rhythm: NSR    Pain Score: 4/10 on R leg pain    Is the Patient Confused:  No    Last Food or Drink: this morning at breakfast.    Focused Assessment:  Pt had a fall on friday and had subsequent R leg pain with " hematoma and swollen. Pt will have surgery early in the morning. Pt is alert and pleasant. Pt was bladder scanned for 700cc and najera inserted.    Tests Performed: Done: Labs and Imaging    Treatments Provided:  IV abx and fluids    Family Dynamics/Concerns: No    Family Updated On Visitor Policy: Yes    Plan of Care Communicated to Family: Yes    Pt updated the son    Belongings Checklist Done and Signed by Patient: Yes    Covid: asymptomatic , negative    Additional Information:

## 2021-12-21 NOTE — SIGNIFICANT EVENT
Significant Event Note    Time of event: 10:46 PM December 20, 2021    Description of event:  Patient with hypotension. Tolerating po well. Given 250 ml NS bolus in ED. Here with right leg hematoma. RN reports that hematoma bandage is clean, dry, and intact, not soiled with blood.     Plan:  Hgb check  500 ml bolus  Will give PTA midodrine. Was reordered but not given as patient was boarding in ED.    Discussed with: bedside nurse    Frida Parker MD  [facility], PGY-3  Pager # [phone]    11:50 PM  Hgb drop to 8.2 from 10.0. Trend was overall stable prior to this. Given hypotension and current hematoma, will page general surgery with the hgb drop.

## 2021-12-21 NOTE — SIGNIFICANT EVENT
Significant Event Note    Time of event: 3:58 AM December 21, 2021    Description of event:  Patient with persistent hypotension. She has received 1L NS bolus so far.       Plan:  Will give another 1L. Adequate resuscitation at 30 ml/kg would be 2.4L. If persistently hypotensive after this, will consider 1 unit PRBC transfusion given hematoma.     Discussed with: bedside nurse    Frida Parker MD  [facility], PGY-3  Pager # [phone]

## 2021-12-22 ENCOUNTER — APPOINTMENT (OUTPATIENT)
Dept: PHYSICAL THERAPY | Facility: HOSPITAL | Age: 71
DRG: 580 | End: 2021-12-22
Attending: INTERNAL MEDICINE
Payer: MEDICARE

## 2021-12-22 ENCOUNTER — APPOINTMENT (OUTPATIENT)
Dept: OCCUPATIONAL THERAPY | Facility: HOSPITAL | Age: 71
DRG: 580 | End: 2021-12-22
Attending: INTERNAL MEDICINE
Payer: MEDICARE

## 2021-12-22 LAB
ANTIBODY ID: NORMAL
BACTERIA UR CULT: NORMAL
GLUCOSE BLDC GLUCOMTR-MCNC: 115 MG/DL (ref 70–99)
GLUCOSE BLDC GLUCOMTR-MCNC: 305 MG/DL (ref 70–99)
HGB BLD-MCNC: 7 G/DL (ref 11.7–15.7)
HGB BLD-MCNC: 7.2 G/DL (ref 11.7–15.7)
HGB BLD-MCNC: 7.3 G/DL (ref 11.7–15.7)
HGB BLD-MCNC: 7.5 G/DL (ref 11.7–15.7)
HGB BLD-MCNC: 7.5 G/DL (ref 11.7–15.7)
SPECIMEN EXPIRATION DATE: NORMAL

## 2021-12-22 PROCEDURE — 85018 HEMOGLOBIN: CPT | Performed by: SURGERY

## 2021-12-22 PROCEDURE — 250N000013 HC RX MED GY IP 250 OP 250 PS 637: Performed by: INTERNAL MEDICINE

## 2021-12-22 PROCEDURE — 36415 COLL VENOUS BLD VENIPUNCTURE: CPT | Performed by: INTERNAL MEDICINE

## 2021-12-22 PROCEDURE — 97162 PT EVAL MOD COMPLEX 30 MIN: CPT | Mod: GP

## 2021-12-22 PROCEDURE — 258N000003 HC RX IP 258 OP 636: Performed by: INTERNAL MEDICINE

## 2021-12-22 PROCEDURE — 250N000013 HC RX MED GY IP 250 OP 250 PS 637: Performed by: SURGERY

## 2021-12-22 PROCEDURE — 120N000001 HC R&B MED SURG/OB

## 2021-12-22 PROCEDURE — 97530 THERAPEUTIC ACTIVITIES: CPT | Mod: GP

## 2021-12-22 PROCEDURE — 99024 POSTOP FOLLOW-UP VISIT: CPT | Performed by: SURGERY

## 2021-12-22 PROCEDURE — 97166 OT EVAL MOD COMPLEX 45 MIN: CPT | Mod: GO

## 2021-12-22 PROCEDURE — 97535 SELF CARE MNGMENT TRAINING: CPT | Mod: GO

## 2021-12-22 PROCEDURE — 250N000011 HC RX IP 250 OP 636: Performed by: SURGERY

## 2021-12-22 PROCEDURE — 99232 SBSQ HOSP IP/OBS MODERATE 35: CPT | Performed by: INTERNAL MEDICINE

## 2021-12-22 PROCEDURE — 85018 HEMOGLOBIN: CPT | Performed by: INTERNAL MEDICINE

## 2021-12-22 PROCEDURE — 36415 COLL VENOUS BLD VENIPUNCTURE: CPT | Performed by: SURGERY

## 2021-12-22 RX ORDER — VENLAFAXINE HYDROCHLORIDE 150 MG/1
300 CAPSULE, EXTENDED RELEASE ORAL DAILY
Qty: 60 CAPSULE | Refills: 3 | Status: SHIPPED | OUTPATIENT
Start: 2021-12-22 | End: 2022-01-12

## 2021-12-22 RX ORDER — OXYCODONE HYDROCHLORIDE 5 MG/1
5-10 TABLET ORAL EVERY 4 HOURS PRN
Status: DISCONTINUED | OUTPATIENT
Start: 2021-12-22 | End: 2021-12-22

## 2021-12-22 RX ORDER — VENLAFAXINE HYDROCHLORIDE 150 MG/1
300 CAPSULE, EXTENDED RELEASE ORAL DAILY
Status: DISCONTINUED | OUTPATIENT
Start: 2021-12-22 | End: 2021-12-22

## 2021-12-22 RX ADMIN — OXYCODONE HYDROCHLORIDE 10 MG: 5 TABLET ORAL at 12:08

## 2021-12-22 RX ADMIN — ATORVASTATIN CALCIUM 40 MG: 40 TABLET, FILM COATED ORAL at 20:34

## 2021-12-22 RX ADMIN — MIRTAZAPINE 15 MG: 15 TABLET, FILM COATED ORAL at 20:34

## 2021-12-22 RX ADMIN — HYDROMORPHONE HYDROCHLORIDE 0.4 MG: 0.2 INJECTION, SOLUTION INTRAMUSCULAR; INTRAVENOUS; SUBCUTANEOUS at 08:33

## 2021-12-22 RX ADMIN — PRAMIPEXOLE DIHYDROCHLORIDE 0.5 MG: 0.5 TABLET ORAL at 20:34

## 2021-12-22 RX ADMIN — VENLAFAXINE HYDROCHLORIDE 37.5 MG: 37.5 CAPSULE, EXTENDED RELEASE ORAL at 08:30

## 2021-12-22 RX ADMIN — OXYCODONE HYDROCHLORIDE 5 MG: 5 TABLET ORAL at 20:34

## 2021-12-22 RX ADMIN — DOCUSATE SODIUM 50 MG AND SENNOSIDES 8.6 MG 1 TABLET: 8.6; 5 TABLET, FILM COATED ORAL at 20:34

## 2021-12-22 RX ADMIN — PIPERACILLIN AND TAZOBACTAM 3.38 G: 3; .375 INJECTION, POWDER, FOR SOLUTION INTRAVENOUS at 05:35

## 2021-12-22 RX ADMIN — HEPARIN SODIUM 5000 UNITS: 10000 INJECTION, SOLUTION INTRAVENOUS; SUBCUTANEOUS at 21:48

## 2021-12-22 RX ADMIN — MIDODRINE HYDROCHLORIDE 7.5 MG: 2.5 TABLET ORAL at 16:20

## 2021-12-22 RX ADMIN — TRAZODONE HYDROCHLORIDE 150 MG: 100 TABLET ORAL at 20:33

## 2021-12-22 RX ADMIN — MIDODRINE HYDROCHLORIDE 7.5 MG: 2.5 TABLET ORAL at 08:32

## 2021-12-22 RX ADMIN — ACETAMINOPHEN 975 MG: 325 TABLET ORAL at 09:45

## 2021-12-22 RX ADMIN — ACETAMINOPHEN 975 MG: 325 TABLET ORAL at 18:48

## 2021-12-22 RX ADMIN — CARBIDOPA AND LEVODOPA 2 TABLET: 25; 100 TABLET ORAL at 20:34

## 2021-12-22 RX ADMIN — HEPARIN SODIUM 5000 UNITS: 10000 INJECTION, SOLUTION INTRAVENOUS; SUBCUTANEOUS at 05:35

## 2021-12-22 RX ADMIN — MIDODRINE HYDROCHLORIDE 7.5 MG: 2.5 TABLET ORAL at 12:13

## 2021-12-22 RX ADMIN — VENLAFAXINE HYDROCHLORIDE 300 MG: 150 CAPSULE, EXTENDED RELEASE ORAL at 08:30

## 2021-12-22 RX ADMIN — HEPARIN SODIUM 5000 UNITS: 10000 INJECTION, SOLUTION INTRAVENOUS; SUBCUTANEOUS at 13:47

## 2021-12-22 RX ADMIN — ACETAMINOPHEN 975 MG: 325 TABLET ORAL at 02:44

## 2021-12-22 RX ADMIN — DOCUSATE SODIUM 50 MG AND SENNOSIDES 8.6 MG 1 TABLET: 8.6; 5 TABLET, FILM COATED ORAL at 08:32

## 2021-12-22 RX ADMIN — CARBIDOPA AND LEVODOPA 2 TABLET: 25; 100 TABLET ORAL at 13:47

## 2021-12-22 RX ADMIN — CARBIDOPA AND LEVODOPA 2 TABLET: 25; 100 TABLET ORAL at 08:31

## 2021-12-22 RX ADMIN — OXYCODONE HYDROCHLORIDE 5 MG: 5 TABLET ORAL at 16:21

## 2021-12-22 RX ADMIN — SODIUM CHLORIDE 500 ML: 9 INJECTION, SOLUTION INTRAVENOUS at 12:30

## 2021-12-22 RX ADMIN — BUPROPION HYDROCHLORIDE 300 MG: 300 TABLET, EXTENDED RELEASE ORAL at 08:31

## 2021-12-22 RX ADMIN — PIPERACILLIN AND TAZOBACTAM 3.38 G: 3; .375 INJECTION, POWDER, FOR SOLUTION INTRAVENOUS at 12:21

## 2021-12-22 RX ADMIN — POLYETHYLENE GLYCOL 3350 17 G: 17 POWDER, FOR SOLUTION ORAL at 08:33

## 2021-12-22 RX ADMIN — PIPERACILLIN AND TAZOBACTAM 3.38 G: 3; .375 INJECTION, POWDER, FOR SOLUTION INTRAVENOUS at 20:34

## 2021-12-22 ASSESSMENT — ACTIVITIES OF DAILY LIVING (ADL)
ADLS_ACUITY_SCORE: 12
ADLS_ACUITY_SCORE: 12
ADLS_ACUITY_SCORE: 19
ADLS_ACUITY_SCORE: 19
ADLS_ACUITY_SCORE: 9
ADLS_ACUITY_SCORE: 12
ADLS_ACUITY_SCORE: 12
ADLS_ACUITY_SCORE: 14
ADLS_ACUITY_SCORE: 19
ADLS_ACUITY_SCORE: 12
ADLS_ACUITY_SCORE: 9
ADLS_ACUITY_SCORE: 12
ADLS_ACUITY_SCORE: 14
ADLS_ACUITY_SCORE: 19
ADLS_ACUITY_SCORE: 18
ADLS_ACUITY_SCORE: 12
ADLS_ACUITY_SCORE: 12
ADLS_ACUITY_SCORE: 19
ADLS_ACUITY_SCORE: 14
ADLS_ACUITY_SCORE: 12
ADLS_ACUITY_SCORE: 18
ADLS_ACUITY_SCORE: 19

## 2021-12-22 NOTE — PROGRESS NOTES
12/22/21 1430   Quick Adds   Type of Visit Initial PT Evaluation   Living Environment   People in home other (see comments)  (Group Home staff)   Current Living Arrangements group home   Home Accessibility no concerns   Transportation Anticipated health plan transportation   Living Environment Comments pt transfers bed to chair with assist  n group home ,wheels herself I    Self-Care   Usual Activity Tolerance poor   Current Activity Tolerance poor   Equipment Currently Used at Home wheelchair, manual   Disability/Function   Walking or Climbing Stairs Difficulty yes   Mobility Management uses w/C for mobility   Dressing/Bathing Difficulty yes   Dressing/Bathing dressing difficulty, assistance 1 person   Toileting issues yes   Toileting Assistance toileting difficulty, assistance 1 person   Fall history within last six months yes   Number of times patient has fallen within last six months 3   General Information   Onset of Illness/Injury or Date of Surgery 12/20/21   Referring Physician Marguerite Marc   Patient/Family Therapy Goals Statement (PT) did not state   Pertinent History of Current Problem (include personal factors and/or comorbidities that impact the POC) fall with hematoma evacuation   Existing Precautions/Restrictions fall   Weight-Bearing Status - LLE full weight-bearing   Weight-Bearing Status - RLE nonweight-bearing   Cognition   Orientation Status (Cognition) oriented x 3;time  (day)   Pain Assessment   Patient Currently in Pain Yes, see Vital Sign flowsheet   Range of Motion (ROM)   ROM Quick Adds ROM deficits secondary to surgical procedure   ROM Comment r leg   Strength   Manual Muscle Testing Quick Adds Deficits observed during functional mobility   Bed Mobility   Rolling Left [place] (Bed Mobility) maximum assist (25% patient effort)  (with bed rail)   Rolling Right [place] (Bed Mobility) maximum assist (25% patient effort)  (with bed rail)   Supine-Sit [place] (Bed Mobility)  maximum assist (25% patient effort)   Sit-Supine [place] (Bed Mobility) maximum assist (25% patient effort)   Bed Mobility Limitations decreased ability to use arms for pushing/pulling;decreased ability to use legs for bridging/pushing;impaired ability to control trunk for mobility   Impairments Contributing to Impaired Bed Mobility pain;decreased ROM;decreased strength   Assistive Device (Bed Mobility) bed rails;draw sheet   Comment (Bed Mobility) pt needed assist with upper and lower body,total assist to scoot to EOB,,needed min /mod assist to maintain sitting balance,assist NA with rolling pt for a use of bed pan   Balance   Balance other (describe)   Sitting Balance: Static fair balance   Balance Comments needed min /mod assist to maintain upright pos   Clinical Impression   Criteria for Skilled Therapeutic Intervention yes, treatment indicated   PT Diagnosis (PT) impaired funcctional mobility and transfers   Influenced by the following impairments pain ,weakness ,orthopedic NWB  restrictions   Functional limitations due to impairments bed mobility   Clinical Presentation Stable/Uncomplicated   Clinical Presentation Rationale pt presents as med diagnosed   Clinical Decision Making (Complexity) moderate complexity   Therapy Frequency (PT) Daily   Predicted Duration of Therapy Intervention (days/wks) 7 days   Planned Therapy Interventions (PT) bed mobility training;home exercise program;postural re-education;ROM (range of motion);strengthening;transfer training   Anticipated Equipment Needs at Discharge (PT) other (see comments)  (mechanical lift,sliding board)   Risk & Benefits of therapy have been explained evaluation/treatment results reviewed;patient   PT Discharge Planning    PT Discharge Recommendation (DC Rec) Transitional Care Facility   PT Rationale for DC Rec can return to group home if staff can provide 24 hrs cares and use of tammy lift for OOB mobility   Total Evaluation Time   Total Evaluation  Time (Minutes) 15

## 2021-12-22 NOTE — PLAN OF CARE
Problem: Adult Inpatient Plan of Care  Goal: Optimal Comfort and Wellbeing  Outcome: No Change   Pain control with scheduled Tylenol.

## 2021-12-22 NOTE — PROGRESS NOTES
Care Management Follow Up    Length of Stay (days): 2    Expected Discharge Date: 12/23/2021     Concerns to be Addressed:    Pain control, Wound cares, IV abx, najera, O2 needs, elevated blood sugars, awaiting bld cx, continue with PT/OT  Patient plan of care discussed at interdisciplinary rounds: Yes    Anticipated Discharge Disposition:   with lilian and HC vs TCU/LTC     Anticipated Discharge Services:    Anticipated Discharge DME:      Patient/family educated on Medicare website which has current facility and service quality ratings:    Education Provided on the Discharge Plan:    Patient/Family in Agreement with the Plan:      Referrals Placed by CM/SW:    Private pay costs discussed: Not applicable    Additional Information:  Spoke with Mercedes with  and discussed discharge planning. Therapies stated if pt. Were to go back to , she would need a Lilian Lift until she is able to bear wt. Again on (R) leg. Mercedes is going to talk to  Manager and  to discuss her being able to come back to  vs TCU. Wanting to also know how involved the wound care/dressing change would be.       Amanda Ohara RN

## 2021-12-22 NOTE — PROGRESS NOTES
[facility]    PROGRESS NOTE - Hospitalist Service    Assessment and Plan    Active Problems:    Accident due to mechanical fall without injury, initial encounter    Coronary atherosclerosis    Parkinsonism, unspecified Parkinsonism type (H)    Traumatic brain injury (H)    Hematoma of skin    Pain of right lower leg    Knee injury, right, initial encounter    Alison Bashir is a 71 year old old female with h/o Parkinson, CAD, cognitive decline(but has been her own decision maker), htn, [place], hx of TBI who lives in . She was sent in for severe right leg pain     1.Severe right leg pain ahd extensive acute hematoma of lower right leg  -CTA leg  - s/p surgery   - can ambulate today NWB right leg  - PT/OT  - IV zosyn  - trend CBC       Parkinson  - resume home meds   - PT/OT     H/o TBI   - lives in  but makes own decisions   - remeron      Hx of CAD/HTN  -hx of stents  - metoprolol and statin   - asa on hold     Hypotension  - midodrine     Elevated BG this morning of 305, had been low normal and even hypoglycemic the last few days. Had NA recheck and after breakfast was 115. So 305 was error and not accurate     COVID-19 PCR Results    COVID-19 PCR Results 6/8/20 6/15/20 9/30/20 11/5/21 12/20/21   SARS-CoV-2 Virus Specimen Source Nasopharyngeal Nasopharyngeal Nasopharyngeal     SARS-CoV-2 PCR Result NEGATIVE NEGATIVE NEGATIVE     SARS CoV2 PCR    Positive (A) Negative   (A) Abnormal value       Comments are available for some flowsheets but are not being displayed.         COVID-19 Antibody Results, Testing for Immunity    COVID-19 Antibody Results, Testing for Immunity   No data to display.            VTE prophylaxis:  None until Wednesday subcutaneous heparin to start per vascular   DIET: Orders Placed This Encounter      Regular Diet Adult    Drains/Lines: none  Weight bearing status: NWB right leg today per surgery ok, PT/Ot   Disposition/Barriers to discharge: pending pos-top course  and therapy. Come from  likely needs TCU   Code Status: No CPR- Do NOT Intubate    Subjective:  Awake today and wanting to go home, discussed with patient she is NWB on right leg and likely needs TCU due to this and with her underlying parkinsons     PHYSICAL EXAM  Temp:  [97.2  F (36.2  C)-98.2  F (36.8  C)] 98.2  F (36.8  C)  Pulse:  [63-85] 79  Resp:  [16-20] 18  BP: ()/(42-74) 98/54  SpO2:  [90 %-100 %] 93 %  Wt Readings from Last 1 Encounters:   12/20/21 81 kg (178 lb 9.2 oz)       Intake/Output Summary (Last 24 hours) at 12/21/2021 0806  Last data filed at 12/21/2021 0805  Gross per 24 hour   Intake 3087 ml   Output 1350 ml   Net 1737 ml      Body mass index is 24.91 kg/m .  Physical Exam  Constitutional:       General: She is not in acute distress.     Appearance: Normal appearance.   HENT:      Head: Normocephalic and atraumatic.   Cardiovascular:      Rate and Rhythm: Normal rate and regular rhythm.      Pulses: Normal pulses.      Comments: Soft PABLO  Pulmonary:      Effort: Pulmonary effort is normal.      Breath sounds: Normal breath sounds.   Abdominal:      General: Bowel sounds are normal.      Palpations: Abdomen is soft.   Musculoskeletal:      Comments: Right leg wrapped and bandaged upper thigh warm and well perfused.    Neurological:      General: No focal deficit present.      Mental Status: She is alert and oriented to person, place, and time. Mental status is at baseline.      Comments: Left arm pill rolling        PERTINENT LABS/IMAGING:  Results for orders placed or performed during the hospital encounter of 12/20/21   XR Tibia & Fibula Right 2 Views    Impression    IMPRESSION: Large soft tissue density along the anterolateral right leg extends craniocaudal approximately 23 cm and could represent the reported large hematoma in the lateral right leg. Anatomic alignment right tibia and fibula. No acute displaced right   tibia or fibula fracture identified. Diffuse bone  demineralization. Partial visualization of right total knee arthroplasty. Mild-moderate tibiotalar osteoarthritis. Generalized right leg soft tissue swelling.   XR Tibia & Fibula Left 2 Views    Impression    IMPRESSION: Anatomic alignment left tibia and fibula. No acute displaced tibia or fibula fracture. Partial visualization of left knee arthroplasty. Distal left leg and bimalleolar ankle soft tissue swelling. Degenerative change in the left ankle and   visualized foot. Heel spur.   XR Femur Right 2 Views    Impression    IMPRESSION: No change in the right hip arthroplasty with longstem proximal femoral component and numerous cerclage wire fixation hardware about the proximal right femur. Lateral claw plate projects in unchanged position along the base of the right   greater trochanter. Chronic healed deformity proximal right femur with heterotopic bone along the superolateral right hip, similar to prior. Right total knee arthroplasty. No acute displaced periprosthetic fracture identified. Diffuse bone   demineralization. No sizable knee joint effusion.   XR Knee Left 1/2 Views    Impression    IMPRESSION: Left total knee arthroplasty with patellar resurfacing. No acute displaced periprosthetic fracture or convincing finding for component failure. No sizable left knee joint effusion. Diffuse bone demineralization. Medial left knee soft tissue   swelling.   XR Knee Right 1/2 Views    Impression    IMPRESSION: Right total knee arthroplasty redemonstrated. No acute displaced periprosthetic fracture is identified. No sizable right knee joint effusion. Diffuse bone demineralization. The distal tip of a right hip arthroplasty is noted with   indolent-appearing periosteal new bone along the posterior adjacent femoral shaft.   CTA Abdomen Pelvis Bilat Leg Runoff w Contr    Impression    CONCLUSION:  1.  Large subcutaneous hematoma the lateral superior aspect of the right calf. No contrast extravasation.  2.  Right leg:  No inflow or femoropopliteal stenosis. Venous contamination at the calf station. Is considered and appears to be three-vessel runoff.  3.  Left leg: CTA is within normal limits.  4.  Enlarged uterus with gas within the endometrium. There is a nonspecific finding. Please correlate clinically to exclude infection.  5.  Bilateral nonobstructing renal calculi.  6.  Osteoarthritic changes visualized spine. Right total hip arthroplasty. Bilateral knee arthroplasties.   US Pelvis Complete without Transvaginal    Impression    IMPRESSION:    1.  Air within the endometrial canal as described on recent abdomen/pelvis CT.   2.  Debris noted in the urinary bladder.  3.  Adnexa obscured by bowel gas. Ovaries not identified.           Most Recent 3 CBC's:  Recent Labs   Lab Test 12/22/21  0600 12/22/21  0028 12/21/21  1729 12/21/21  1047 12/21/21  0550 12/20/21  2315 12/20/21  1753 12/20/21  1241   WBC  --   --   --   --  3.7*  --  6.2 6.2   HGB 7.2* 7.5* 8.0*   < > 8.2*   < > 10.0* 10.1*   MCV  --   --   --   --  96  --  96 94   PLT  --   --   --   --  119*  --  150 153    < > = values in this interval not displayed.     Most Recent 3 BMP's:  Recent Labs   Lab Test 12/21/21  2055 12/21/21  1815 12/21/21  0907 12/21/21  0841 12/21/21  0550 12/20/21  1831 12/20/21  1005 10/11/21  1128   NA  --   --   --   --  146*  --  144 148*   POTASSIUM  --   --   --   --  4.3  --  4.5 4.1   CHLORIDE  --   --   --   --  109*  --  106 109*   CO2  --   --   --   --  30  --  34* 31   BUN  --   --   --   --  23  --  30* 34*   CR  --   --   --   --  0.67  --  0.83 0.82   ANIONGAP  --   --   --   --  7  --  4* 8   MARY  --   --   --   --  7.5*  --  9.2 9.2   * 73 104*   < > 74   < > 126* 96    < > = values in this interval not displayed.     Most Recent 2 LFT's:  Recent Labs   Lab Test 12/20/21  1005 10/11/21  1128   AST 19 15   ALT <9 12   ALKPHOS 90 101   BILITOTAL 0.5 0.4       Recent Labs   Lab Test 10/11/21  1128   CHOL 89   HDL 38*   LDL  33   TRIG 90     Recent Labs   Lab Test 10/11/21  1128 02/26/20  1647 03/22/19  1435   LDL 33 50 43     Recent Labs   Lab Test 12/21/21  0550   *   POTASSIUM 4.3   CHLORIDE 109*   CO2 30   GLC 74   BUN 23   CR 0.67   GFRESTIMATED 88   MARY 7.5*     No results for input(s): A1C in the last 12326 hours.  Recent Labs   Lab Test 12/21/21  0550 12/20/21  2315 12/20/21  1753   HGB 8.2* 8.2* 10.0*     Recent Labs   Lab Test 10/01/20  1401 10/01/20  0836 06/09/20  0116   TROPONINI <0.01 <0.01 <0.01     Recent Labs   Lab Test 12/20/21  1241 06/08/20  1110    80     Recent Labs   Lab Test 08/25/20  1654   TSH 1.09     Recent Labs   Lab Test 12/20/21  1005 09/30/20  1910 06/08/20  1110   INR 1.09 1.07 1.10       Marguerite Marc MD  [facility] Medicine Service  [phone]

## 2021-12-22 NOTE — PROGRESS NOTES
Occupational Therapy     12/22/21 1300   Quick Adds   Type of Visit Initial Occupational Therapy Evaluation   Living Environment   Current Living Arrangements group home   Living Environment Comments Per RN, patient primarily pivots to/from WC with Ax1, does not ambulate   Disability/Function   Use of hearing assistive devices bilateral hearing aids   Wear Glasses or Blind no   Walking or Climbing Stairs Difficulty yes   Walking or Climbing Stairs ambulation difficulty, assistance 1 person   Mobility Management Pt uses WC   Dressing/Bathing Difficulty no   Toileting issues yes   Toileting Assistance toileting difficulty, assistance 1 person   Fall history within last six months yes   Number of times patient has fallen within last six months 3   General Information   Onset of Illness/Injury or Date of Surgery 12/20/21   Referring Physician Monico   Right Lower Extremity (Weight-bearing Status) non weight-bearing (NWB)   Cognitive Status Examination   Orientation Status   (impaired at baseline)   Bed Mobility   Bed Mobility supine-sit   Supine-Sit [place] (Bed Mobility) maximum assist (25% patient effort);2 person assist   Clinical Impression   Criteria for Skilled Therapeutic Interventions Met (OT) yes;meets criteria;skilled treatment is necessary   OT Diagnosis Decreased ADL independence   OT Problem List-Impairments impacting ADL problems related to;balance;strength;pain   Assessment of Occupational Performance 3-5 Performance Deficits   Identified Performance Deficits Dressing, trsfs, toileting, bed mob   Planned Therapy Interventions (OT) ADL retraining;balance training;bed mobility training;strengthening;transfer training   Clinical Decision Making Complexity (OT) moderate complexity   Therapy Frequency (OT) Daily   Predicted Duration of Therapy 7 days   Risk & Benefits of therapy have been explained evaluation/treatment results reviewed;care plan/treatment goals reviewed   OT Discharge Planning    OT  Discharge Recommendation (DC Rec) Transitional Care Facility;Home with assist   OT Rationale for DC Rec If patient to discharge home, patient will require use of tammy lift.    Total Evaluation Time (Minutes)   Total Evaluation Time (Minutes) 10

## 2021-12-22 NOTE — PLAN OF CARE
Pt a/o. Dressing to right lower leg in place. Changed by surgery. Dressing c/d/I. Elevated on pillow. Pain 7/10 this morning. Gave 0.4 IV dilaudid. Pt drowsy when reassessed but stated pain was still 7/10. Gave Oxycodone this afternoon prior to OT. Per OT pt was not able to stand with assist of 2. RN asked PT to see Pt as well. Talked to Janine visiting from the group home who states pt has been a pivot transfer from w/c 1 person assist since September. At that time needed assist of 2 with walker. Pt wants to return to group home and be home for [place]. Left message for CM to speak with group home about discharge plans and needs.    Hypotensive 89/69. Asymptomatic. MD notified and 500ml NS bolus ordered and started. BP recheck 98/55. On Tele. NSR with BBB.

## 2021-12-22 NOTE — PROGRESS NOTES
"CLINICAL NUTRITION SERVICES  -  ASSESSMENT NOTE      RECOMMENDATIONS FOR MD/PROVIDER TO ORDER:   None     Recommendations Ordered by Registered Dietitian (RD):   Adjusted diet order to 60g CHO per meal     Future/Additional Recommendations:   Provide strawberry Glucerna if appetite falls below 75% estimated needs     Malnutrition:   Moderate in the context of social and environmental circumstances         REASON FOR ASSESSMENT  Alison Bashir is a 71 year old female seen by Registered Dietitian for Admission Nutrition Risk Screen for positive    Patient presents with DM2, Parkinson, h/o TBI, coronary atherosclerosis, mechanical fall    NUTRITION HISTORY  - Information obtained from patient and chart  - Patient is on a Regular diet at home  - Diet history patient stated having eaten about 75% usual intake for > 1 week  - Supplements vitamin d3, vitamin b12, mag-ox per PTA med list  nkfa    Willing to try Strawberry Glucerna if unable to meet estimated needs    CURRENT NUTRITION ORDERS  Diet Order:     Regular     Current Intake/Tolerance:  Patient is eating 100% per nursing records and receiving on average 3224kcals and 76g protein for 3 meals in past 24 hours      NUTRITION FOCUSED PHYSICAL ASSESSMENT FOR DIAGNOSING MALNUTRITION)  Yes         Observed:    Muscle wasting (refer to documentation in Malnutrition section), Subcutaneous fat loss (refer to documentation in Malnutrition section) and Fluid retention (refer to documentation in Malnutrition section)    Obtained from Chart/Interdisciplinary Team:  Edema 1+ and surgical incision noted    ANTHROPOMETRICS  Height: 5' 11\"  Weight: 178 lbs 9.16 oz  Body mass index is 24.91 kg/m .  Weight Status:  Normal BMI  IBW: 70.5kg  % IBW: 115%  Weight History:   Wt Readings from Last 30 Encounters:   12/20/21 81 kg (178 lb 9.2 oz)   09/25/20 76.6 kg (168 lb 12.8 oz)   09/17/20 75.5 kg (166 lb 6.4 oz)   09/15/20 75.5 kg (166 lb 6.4 oz)   08/31/20 79.2 kg (174 lb 8 oz) "   07/16/20 72.6 kg (160 lb)   07/09/20 65.3 kg (144 lb)   07/03/20 71.2 kg (157 lb)   06/22/20 71.2 kg (157 lb)   08/22/19 83.5 kg (184 lb)   03/16/18 89.4 kg (197 lb)   03/14/18 89.4 kg (197 lb)   03/12/18 89.8 kg (198 lb)   03/07/18 89.4 kg (197 lb)   03/05/18 91.2 kg (201 lb)   02/28/18 90.7 kg (200 lb)   02/26/18 89.4 kg (197 lb)   02/21/18 89.4 kg (197 lb)   02/19/18 90.3 kg (199 lb)   02/12/18 93.9 kg (207 lb)   02/07/18 93.4 kg (206 lb)   01/31/18 92.1 kg (203 lb)   01/29/18 92.1 kg (203 lb)   09/17/14 118.8 kg (262 lb)         LABS  Labs reviewed: BG , hgb 7.3    MEDICATIONS  Medications reviewed: zosyn, miralax, senokot-s/pericolace      ASSESSED NUTRITION NEEDS PER APPROVED PRACTICE GUIDELINES:    Dosing Weight 81 kg (178 lb 9.2 oz)   Estimated Energy Needs: 5628-4511 kcals ([place] [employer] Jeor)  Justification: maintenance and stress factor 1.4-1.6  Estimated Protein Needs:  grams protein (1.2-1.4 g pro/Kg)  Justification: post-op and wound healing  Estimated Fluid Needs: 0462-6180  mL (25-30 mL/kg)  Justification: maintenance    MALNUTRITION:  % Weight Loss:  Weight loss does not meet criteria as it is not within the time frame  % Intake:  No decreased intake noted  Subcutaneous Fat Loss:  Orbital region severe depletion and Upper arm region moderate depletion  Muscle Loss:  Temporal region moderate depletion, Clavicle bone region moderate to severe depletion, Dorsal hand region mild to moderate depletion and Patellar region moderate depletion  Fluid Retention:  Mild 1+    Malnutrition Diagnosis: Moderate malnutrition  In Context of:  Environmental or social circumstances    NUTRITION DIAGNOSIS:  Malnutrition related to social and environmental circumstances as evidenced by moderate to severe fat loss and moderate muscle loss    Altered nutrition related lab vaules rt DM2 as evidence by BG       NUTRITION INTERVENTIONS  Recommendations / Nutrition Prescription  Adjusted diet to 60g  Consistent Carb  .      Implementation  Nutrition education: Per Provider order if indicated   Composition of Meals and Snacks  .      Nutrition Goals  Meet estimated nutrition needs  BG       MONITORING AND EVALUATION:  Progress towards goals will be monitored and evaluated per protocol and Practice Guidelines, Diet Order, Food intake, Fluid/beverage intake, Weight, Food and Nutrition Knowledge/Skill, Biochemical data and Nutrition-focused physical findings

## 2021-12-22 NOTE — PROGRESS NOTES
General Surgery Progress Note:    Hospital Day # 2    ASSESSMENT:   1. Accident due to mechanical fall without injury, initial encounter    2. Hematoma of skin    3. Pain of right lower leg    4. Type 2 diabetes mellitus with other specified complication, unspecified whether long term insulin use (H)    5. Knee injury, right, initial encounter    6. Intractable pain        PLAN:   -The patient is status post total evacuation of the right lower extremity postop day 1.  He is recovering well from the surgical standpoint in regards to the hematoma evacuation.  Inspection of the skin flap, I am still concerned that may be sections of the skin flap that may not be viable long-term.  If this were the case, the patient would require removal of that viable skin and then undergo a graft cover up the rest of the wound.  At this time it is still too early to determine how much of the of the skin flap is viable.  We will continue with local wound care to the site and follow-up with her in clinic if she is discharged in the near future.  Otherwise we will continue to follow her in the hospital.  -There is still some erythema around the of the right lower extremity, but it is improved from previous examinations.  -Continue with medical management per primary team.      SUBJECTIVE:   Alison Bashir was seen on a.m. rounds.  Patient states that she is doing better.  She has less pain to the right lower extremity after the surgery.  The patient has no further complaints at this time in regards to her surgical issue.    Patient Vitals for the past 24 hrs:   BP Temp Temp src Pulse Resp SpO2   12/22/21 1212 (!) 82/45 -- -- 79 -- 91 %   12/22/21 0737 98/54 98.2  F (36.8  C) Oral 79 18 93 %   12/22/21 0346 98/42 -- -- -- -- --   12/22/21 0336 (!) 84/74 98.2  F (36.8  C) Oral 82 20 99 %   12/21/21 2323 90/54 98  F (36.7  C) Oral 74 18 99 %   12/21/21 2030 107/53 -- -- -- -- --   12/21/21 1715 113/70 -- -- 68 19 90 %   12/21/21 1539 107/52  98.1  F (36.7  C) Oral 69 16 92 %       Physical Exam:  General: NAD, pleasant  CV:RRR  LUNGS: Breath sounds bilaterally, shallow breathing.  EXT: Right lower extremity surgical dressings were taken down.  Patient does have palpable pulses in the right lower extremity.  Her edema is improving.  The patient's skin overlying the previous hematoma site has areas of ischemia.  The patient has areas of serosanguineous drainage from her site.  There is no crepitus that I can appreciate around the surgical site.  Still appropriately tender to palpation.  All other compartments are soft.  Patient is able to move her right ankle and toes better than previously.            Admission on 12/20/2021   Component Date Value     INR 12/20/2021 1.09      aPTT 12/20/2021 34      Sodium 12/20/2021 144      Potassium 12/20/2021 4.5      Chloride 12/20/2021 106      Carbon Dioxide (CO2) 12/20/2021 34*     Anion Gap 12/20/2021 4*     Urea Nitrogen 12/20/2021 30*     Creatinine 12/20/2021 0.83      Calcium 12/20/2021 9.2      Glucose 12/20/2021 126*     Alkaline Phosphatase 12/20/2021 90      AST 12/20/2021 19      ALT 12/20/2021 <9      Protein Total 12/20/2021 7.2      Albumin 12/20/2021 3.5      Bilirubin Total 12/20/2021 0.5      GFR Estimate 12/20/2021 71      WBC Count 12/20/2021 6.2      RBC Count 12/20/2021 3.61*     Hemoglobin 12/20/2021 10.1*     Hematocrit 12/20/2021 33.9*     MCV 12/20/2021 94      MCH 12/20/2021 28.0      MCHC 12/20/2021 29.8*     RDW 12/20/2021 14.1      Platelet Count 12/20/2021 153      % Neutrophils 12/20/2021 81      % Lymphocytes 12/20/2021 13      % Monocytes 12/20/2021 5      % Eosinophils 12/20/2021 1      % Basophils 12/20/2021 0      % Immature Granulocytes 12/20/2021 0      NRBCs per 100 WBC 12/20/2021 0      Absolute Neutrophils 12/20/2021 5.0      Absolute Lymphocytes 12/20/2021 0.8      Absolute Monocytes 12/20/2021 0.3      Absolute Eosinophils 12/20/2021 0.1      Absolute Basophils 12/20/2021  0.0      Absolute Immature Granul* 12/20/2021 0.0      Absolute NRBCs 12/20/2021 0.0      ABO/RH(D) 12/20/2021 O NEG      Antibody Screen 12/20/2021 Positive*     SPECIMEN EXPIRATION DATE 12/20/2021 [id number]      SARS CoV2 PCR 12/20/2021 Negative      CK 12/20/2021 46      Culture 12/20/2021 No growth after 1 day      Culture 12/20/2021 No growth after 1 day      Lactic Acid 12/20/2021 0.6*     Hold Specimen 12/20/2021 JIC      Ventricular Rate 12/20/2021 59      Atrial Rate 12/20/2021 59      IN Interval 12/20/2021 170      QRS Duration 12/20/2021 162      QT 12/20/2021 466      QTc 12/20/2021 461      P Axis 12/20/2021 35      R AXIS 12/20/2021 -20      T Axis 12/20/2021 3      Interpretation ECG 12/20/2021                      Value:Sinus bradycardia  Right bundle branch block  Inferior infarct (cited on or before 20-DEC-2021)  Abnormal ECG  When compared with ECG of 20-DEC-2021 14:34,  No significant change was found  Confirmed by GUANAKO HOWARD, MATT LOC:WW ([id number]) on 12/21/2021 12:49:12 PM       BNP 12/20/2021 118      Antibody Identification 12/20/2021 3hr for more blood  Anti-[place]      SPECIMEN EXPIRATION DATE 12/20/2021 [id number]      Procalcitonin 12/20/2021 0.04      Magnesium 12/20/2021 2.2      WBC Count 12/20/2021 6.2      RBC Count 12/20/2021 3.53*     Hemoglobin 12/20/2021 10.0*     Hematocrit 12/20/2021 33.7*     MCV 12/20/2021 96      MCH 12/20/2021 28.3      MCHC 12/20/2021 29.7*     RDW 12/20/2021 14.3      Platelet Count 12/20/2021 150      % Neutrophils 12/20/2021 72      % Lymphocytes 12/20/2021 19      % Monocytes 12/20/2021 7      % Eosinophils 12/20/2021 2      % Basophils 12/20/2021 0      % Immature Granulocytes 12/20/2021 0      NRBCs per 100 WBC 12/20/2021 0      Absolute Neutrophils 12/20/2021 4.5      Absolute Lymphocytes 12/20/2021 1.2      Absolute Monocytes 12/20/2021 0.4      Absolute Eosinophils 12/20/2021 0.1      Absolute Basophils 12/20/2021 0.0      Absolute  Immature Granul* 12/20/2021 0.0      Absolute NRBCs 12/20/2021 0.0      CROSSMATCH 12/20/2021 COMPATIBLE      UNIT ABO/RH 12/20/2021 O Neg      Unit Number 12/20/2021 [id number]      Unit Status 12/20/2021 Ready      Blood Component Type 12/20/2021 Red Blood Cells      Product Code 12/20/2021 [id number]      CODING SYSTEM 12/20/2021 [id number]      UNIT TYPE ISBT 12/20/2021 9500      CROSSMATCH 12/20/2021 COMPATIBLE      UNIT ABO/RH 12/20/2021 O Neg      Unit Number 12/20/2021 [id number]      Unit Status 12/20/2021 Ready      Blood Component Type 12/20/2021 Red Blood Cells      Product Code 12/20/2021 [id number]      CODING SYSTEM 12/20/2021 [id number]      UNIT TYPE ISBT 12/20/2021 9500      K Antigen Type 12/20/2021 Negative      SPECIMEN EXPIRATION DATE 12/20/2021 [id number]      Hold Specimen 12/20/2021 JIC      Color Urine 12/20/2021 Yellow      Appearance Urine 12/20/2021 Turbid*     Glucose Urine 12/20/2021 Negative      Bilirubin Urine 12/20/2021 Negative      Ketones Urine 12/20/2021 Negative      Specific Gravity Urine 12/20/2021 1.045*     Blood Urine 12/20/2021 0.2 mg/dL*     pH Urine 12/20/2021 5.5      Protein Albumin Urine 12/20/2021 10 *     Urobilinogen Urine 12/20/2021 <2.0      Nitrite Urine 12/20/2021 Positive*     Leukocyte Esterase Urine 12/20/2021 500 Wilfredo/uL*     Bacteria Urine 12/20/2021 Moderate*     WBC Clumps Urine 12/20/2021 Present*     Mucus Urine 12/20/2021 Present*     RBC Urine 12/20/2021 3*     WBC Urine 12/20/2021 >182*     Squamous Epithelials Uri* 12/20/2021 1      Culture 12/20/2021 >100,000 CFU/mL Mixture of urogenital nicholas      Ventricular Rate 12/20/2021 63      Atrial Rate 12/20/2021 63      [place] Interval 12/20/2021 168      QRS Duration 12/20/2021 158      QT 12/20/2021 424      QTc 12/20/2021 433      P Axis 12/20/2021 40      R AXIS 12/20/2021 -34      T Axis 12/20/2021 -5      Interpretation ECG 12/20/2021                      Value:Sinus rhythm  Left axis  deviation  Right bundle branch block  Inferior infarct , age undetermined  Abnormal ECG  When compared with ECG of 01-OCT-2020 09:15,  No significant change was found  Confirmed by SEE ED PROVIDER NOTE FOR, ECG INTERPRETATION (4000),  DELISA LANDEROS ([id number]) on 12/21/2021 7:02:42 AM       Hemoglobin 12/20/2021 8.2*     Sodium 12/21/2021 146*     Potassium 12/21/2021 4.3      Chloride 12/21/2021 109*     Carbon Dioxide (CO2) 12/21/2021 30      Anion Gap 12/21/2021 7      Urea Nitrogen 12/21/2021 23      Creatinine 12/21/2021 0.67      Calcium 12/21/2021 7.5*     Glucose 12/21/2021 74      GFR Estimate 12/21/2021 88      WBC Count 12/21/2021 3.7*     RBC Count 12/21/2021 2.91*     Hemoglobin 12/21/2021 8.2*     Hematocrit 12/21/2021 28.0*     MCV 12/21/2021 96      MCH 12/21/2021 28.2      MCHC 12/21/2021 29.3*     RDW 12/21/2021 14.3      Platelet Count 12/21/2021 119*     Hemoglobin 12/21/2021 7.9*     GLUCOSE BY METER POCT 12/20/2021 112*     GLUCOSE BY METER POCT 12/20/2021 100*     GLUCOSE BY METER POCT 12/20/2021 100*     GLUCOSE BY METER POCT 12/21/2021 48*     GLUCOSE BY METER POCT 12/21/2021 104*     Hemoglobin 12/21/2021 8.0*     Hemoglobin 12/22/2021 7.5*     GLUCOSE BY METER POCT 12/21/2021 73      GLUCOSE BY METER POCT 12/21/2021 106*     Hemoglobin 12/22/2021 7.2*     Hemoglobin 12/22/2021 7.5*     GLUCOSE BY METER POCT 12/22/2021 305*     GLUCOSE BY METER POCT 12/22/2021 115*        DO Johan Nicholson DO  General Surgery  Pager: [phone]

## 2021-12-22 NOTE — PROGRESS NOTES
Noted bleeding through post-op ace wrap to RLE lateral side. Paged surgery, spoke to Dr. Rivers. Ariana to reinforce ace wrap with other ace wrap provided. Applied another ace wrap to area, ice packed area & bleeding decreased. Cont. To monitor throughout shift. Dressing intact.

## 2021-12-22 NOTE — PLAN OF CARE
Problem: Pain Acute  Goal: Acceptable Pain Control and Functional Ability  12/21/2021 2255 by Uzma Subramanian RN  Outcome: No Change  12/21/2021 2252 by Uzma Subramanian RN  Outcome: No Change  Intervention: Develop Pain Management Plan  Recent Flowsheet Documentation  Taken 12/21/2021 2054 by Uzma Subramanian RN  Pain Management Interventions: medication (see MAR)  Taken 12/21/2021 1905 by Uzma Subramanian RN  Pain Management Interventions: distraction  Taken 12/21/2021 1821 by Uzma Subramanian RN  Pain Management Interventions: medication (see MAR)  Taken 12/21/2021 1710 by Uzma Subramanian RN  Pain Management Interventions: distraction  Taken 12/21/2021 1621 by Uzma Subramanian RN  Pain Management Interventions: medication (see MAR)     Problem: Risk for Delirium  Goal: Improved Sleep  Outcome: No Change from day shift     Problem: Pain Acute  Goal: Acceptable Pain Control and Functional Ability  12/21/2021 2255 by Uzma Subramanian RN  Outcome: No Change   12/21/2021 2252 by Uzma Subramanian RN  Outcome: No Change  Intervention: Develop Pain Management Plan  Recent Flowsheet Documentation  Taken 12/21/2021 2054 by Uzma Subramanian RN  Pain Management Interventions: medication (see MAR)  Taken 12/21/2021 1905 by Uzma Subramanian RN  Pain Management Interventions: distraction  Taken 12/21/2021 1821 by Uzma Subramanian RN  Pain Management Interventions: medication (see MAR)  Taken 12/21/2021 1710 by Uzma Subramanian RN  Pain Management Interventions: distraction  Taken 12/21/2021 1621 by Uzma Subramanian RN  Pain Management Interventions: medication (see MAR)      Pt calm and cooperative with cares. Ace wrap on right leg with drainage on lateral side. Leg was re-wrapped with another ace wrap on top. Provided cold therapy to the area. Likes to take meds with apple sauce. NS 75ml/hour. One low BP 85/48. Rechecked 107/53. Dilauded 0.4mg given with relief. Uses hearing aids bilaterally.

## 2021-12-23 ENCOUNTER — APPOINTMENT (OUTPATIENT)
Dept: PHYSICAL THERAPY | Facility: HOSPITAL | Age: 71
DRG: 580 | End: 2021-12-23
Payer: MEDICARE

## 2021-12-23 ENCOUNTER — APPOINTMENT (OUTPATIENT)
Dept: OCCUPATIONAL THERAPY | Facility: HOSPITAL | Age: 71
DRG: 580 | End: 2021-12-23
Payer: MEDICARE

## 2021-12-23 LAB
ABO/RH(D): ABNORMAL
ANION GAP SERPL CALCULATED.3IONS-SCNC: 5 MMOL/L (ref 5–18)
ANTIBODY SCREEN: POSITIVE
BUN SERPL-MCNC: 16 MG/DL (ref 8–28)
CALCIUM SERPL-MCNC: 7.3 MG/DL (ref 8.5–10.5)
CHLORIDE BLD-SCNC: 108 MMOL/L (ref 98–107)
CO2 SERPL-SCNC: 29 MMOL/L (ref 22–31)
CREAT SERPL-MCNC: 0.71 MG/DL (ref 0.6–1.1)
ERYTHROCYTE [DISTWIDTH] IN BLOOD BY AUTOMATED COUNT: 14.3 % (ref 10–15)
GFR SERPL CREATININE-BSD FRML MDRD: 90 ML/MIN/1.73M2
GLUCOSE BLD-MCNC: 107 MG/DL (ref 70–125)
HCT VFR BLD AUTO: 22.8 % (ref 35–47)
HGB BLD-MCNC: 6.6 G/DL (ref 11.7–15.7)
HGB BLD-MCNC: 7.3 G/DL (ref 11.7–15.7)
HGB BLD-MCNC: 8.3 G/DL (ref 11.7–15.7)
HGB BLD-MCNC: 8.5 G/DL (ref 11.7–15.7)
MCH RBC QN AUTO: 27.6 PG (ref 26.5–33)
MCHC RBC AUTO-ENTMCNC: 28.9 G/DL (ref 31.5–36.5)
MCV RBC AUTO: 95 FL (ref 78–100)
PLATELET # BLD AUTO: 103 10E3/UL (ref 150–450)
POTASSIUM BLD-SCNC: 3.9 MMOL/L (ref 3.5–5)
RBC # BLD AUTO: 2.39 10E6/UL (ref 3.8–5.2)
SODIUM SERPL-SCNC: 142 MMOL/L (ref 136–145)
SPECIMEN EXPIRATION DATE: ABNORMAL
WBC # BLD AUTO: 3.5 10E3/UL (ref 4–11)

## 2021-12-23 PROCEDURE — 250N000011 HC RX IP 250 OP 636: Performed by: SURGERY

## 2021-12-23 PROCEDURE — P9016 RBC LEUKOCYTES REDUCED: HCPCS | Performed by: INTERNAL MEDICINE

## 2021-12-23 PROCEDURE — 85018 HEMOGLOBIN: CPT | Performed by: STUDENT IN AN ORGANIZED HEALTH CARE EDUCATION/TRAINING PROGRAM

## 2021-12-23 PROCEDURE — 250N000013 HC RX MED GY IP 250 OP 250 PS 637: Performed by: SURGERY

## 2021-12-23 PROCEDURE — 250N000013 HC RX MED GY IP 250 OP 250 PS 637: Performed by: INTERNAL MEDICINE

## 2021-12-23 PROCEDURE — 36415 COLL VENOUS BLD VENIPUNCTURE: CPT | Performed by: STUDENT IN AN ORGANIZED HEALTH CARE EDUCATION/TRAINING PROGRAM

## 2021-12-23 PROCEDURE — 86850 RBC ANTIBODY SCREEN: CPT | Performed by: STUDENT IN AN ORGANIZED HEALTH CARE EDUCATION/TRAINING PROGRAM

## 2021-12-23 PROCEDURE — 97110 THERAPEUTIC EXERCISES: CPT | Mod: GO

## 2021-12-23 PROCEDURE — 97110 THERAPEUTIC EXERCISES: CPT | Mod: GP

## 2021-12-23 PROCEDURE — 85014 HEMATOCRIT: CPT | Performed by: INTERNAL MEDICINE

## 2021-12-23 PROCEDURE — 36415 COLL VENOUS BLD VENIPUNCTURE: CPT | Performed by: INTERNAL MEDICINE

## 2021-12-23 PROCEDURE — 80048 BASIC METABOLIC PNL TOTAL CA: CPT | Performed by: INTERNAL MEDICINE

## 2021-12-23 PROCEDURE — 97535 SELF CARE MNGMENT TRAINING: CPT | Mod: GO

## 2021-12-23 PROCEDURE — 99233 SBSQ HOSP IP/OBS HIGH 50: CPT | Performed by: INTERNAL MEDICINE

## 2021-12-23 PROCEDURE — 86901 BLOOD TYPING SEROLOGIC RH(D): CPT | Performed by: STUDENT IN AN ORGANIZED HEALTH CARE EDUCATION/TRAINING PROGRAM

## 2021-12-23 PROCEDURE — 97530 THERAPEUTIC ACTIVITIES: CPT | Mod: GP

## 2021-12-23 PROCEDURE — 85018 HEMOGLOBIN: CPT | Performed by: INTERNAL MEDICINE

## 2021-12-23 PROCEDURE — 99024 POSTOP FOLLOW-UP VISIT: CPT | Performed by: SURGERY

## 2021-12-23 PROCEDURE — 120N000001 HC R&B MED SURG/OB

## 2021-12-23 PROCEDURE — 258N000003 HC RX IP 258 OP 636: Performed by: INTERNAL MEDICINE

## 2021-12-23 RX ORDER — OXYCODONE HYDROCHLORIDE 5 MG/1
5-10 TABLET ORAL EVERY 4 HOURS PRN
Status: DISCONTINUED | OUTPATIENT
Start: 2021-12-23 | End: 2021-12-28

## 2021-12-23 RX ADMIN — MIRTAZAPINE 15 MG: 15 TABLET, FILM COATED ORAL at 20:03

## 2021-12-23 RX ADMIN — VENLAFAXINE HYDROCHLORIDE 300 MG: 150 CAPSULE, EXTENDED RELEASE ORAL at 09:25

## 2021-12-23 RX ADMIN — HEPARIN SODIUM 5000 UNITS: 10000 INJECTION, SOLUTION INTRAVENOUS; SUBCUTANEOUS at 05:41

## 2021-12-23 RX ADMIN — OXYCODONE HYDROCHLORIDE 5 MG: 5 TABLET ORAL at 13:07

## 2021-12-23 RX ADMIN — CARBIDOPA AND LEVODOPA 2 TABLET: 25; 100 TABLET ORAL at 13:07

## 2021-12-23 RX ADMIN — PRAMIPEXOLE DIHYDROCHLORIDE 0.5 MG: 0.5 TABLET ORAL at 20:03

## 2021-12-23 RX ADMIN — TRAZODONE HYDROCHLORIDE 150 MG: 100 TABLET ORAL at 20:10

## 2021-12-23 RX ADMIN — MIDODRINE HYDROCHLORIDE 7.5 MG: 2.5 TABLET ORAL at 17:15

## 2021-12-23 RX ADMIN — HEPARIN SODIUM 5000 UNITS: 10000 INJECTION, SOLUTION INTRAVENOUS; SUBCUTANEOUS at 15:00

## 2021-12-23 RX ADMIN — MIDODRINE HYDROCHLORIDE 7.5 MG: 2.5 TABLET ORAL at 09:24

## 2021-12-23 RX ADMIN — ATORVASTATIN CALCIUM 40 MG: 40 TABLET, FILM COATED ORAL at 20:03

## 2021-12-23 RX ADMIN — PIPERACILLIN AND TAZOBACTAM 3.38 G: 3; .375 INJECTION, POWDER, FOR SOLUTION INTRAVENOUS at 05:41

## 2021-12-23 RX ADMIN — SODIUM CHLORIDE: 9 INJECTION, SOLUTION INTRAVENOUS at 02:04

## 2021-12-23 RX ADMIN — CARBIDOPA AND LEVODOPA 2 TABLET: 25; 100 TABLET ORAL at 20:02

## 2021-12-23 RX ADMIN — VENLAFAXINE HYDROCHLORIDE 37.5 MG: 37.5 CAPSULE, EXTENDED RELEASE ORAL at 09:25

## 2021-12-23 RX ADMIN — PIPERACILLIN AND TAZOBACTAM 3.38 G: 3; .375 INJECTION, POWDER, FOR SOLUTION INTRAVENOUS at 13:20

## 2021-12-23 RX ADMIN — HEPARIN SODIUM 5000 UNITS: 10000 INJECTION, SOLUTION INTRAVENOUS; SUBCUTANEOUS at 21:18

## 2021-12-23 RX ADMIN — BUPROPION HYDROCHLORIDE 300 MG: 300 TABLET, EXTENDED RELEASE ORAL at 09:24

## 2021-12-23 RX ADMIN — ACETAMINOPHEN 975 MG: 325 TABLET ORAL at 17:16

## 2021-12-23 RX ADMIN — OXYCODONE HYDROCHLORIDE 10 MG: 5 TABLET ORAL at 18:14

## 2021-12-23 RX ADMIN — OXYCODONE HYDROCHLORIDE 2.5 MG: 5 TABLET ORAL at 09:24

## 2021-12-23 RX ADMIN — ACETAMINOPHEN 975 MG: 325 TABLET ORAL at 09:25

## 2021-12-23 RX ADMIN — ACETAMINOPHEN 975 MG: 325 TABLET ORAL at 02:10

## 2021-12-23 RX ADMIN — DOCUSATE SODIUM 50 MG AND SENNOSIDES 8.6 MG 1 TABLET: 8.6; 5 TABLET, FILM COATED ORAL at 20:03

## 2021-12-23 RX ADMIN — PIPERACILLIN AND TAZOBACTAM 3.38 G: 3; .375 INJECTION, POWDER, FOR SOLUTION INTRAVENOUS at 20:04

## 2021-12-23 RX ADMIN — DOCUSATE SODIUM 50 MG AND SENNOSIDES 8.6 MG 1 TABLET: 8.6; 5 TABLET, FILM COATED ORAL at 09:25

## 2021-12-23 RX ADMIN — CARBIDOPA AND LEVODOPA 2 TABLET: 25; 100 TABLET ORAL at 09:24

## 2021-12-23 RX ADMIN — POLYETHYLENE GLYCOL 3350 17 G: 17 POWDER, FOR SOLUTION ORAL at 09:25

## 2021-12-23 ASSESSMENT — ACTIVITIES OF DAILY LIVING (ADL)
ADLS_ACUITY_SCORE: 19
ADLS_ACUITY_SCORE: 23
ADLS_ACUITY_SCORE: 23
ADLS_ACUITY_SCORE: 19
ADLS_ACUITY_SCORE: 23
ADLS_ACUITY_SCORE: 19
ADLS_ACUITY_SCORE: 23
ADLS_ACUITY_SCORE: 23
ADLS_ACUITY_SCORE: 19

## 2021-12-23 NOTE — PROGRESS NOTES
General Surgery Progress Note:    Hospital Day # 3    ASSESSMENT:   1. Accident due to mechanical fall without injury, initial encounter    2. Hematoma of skin    3. Pain of right lower leg    4. Type 2 diabetes mellitus with other specified complication, unspecified whether long term insulin use (H)    5. Knee injury, right, initial encounter    6. Intractable pain        Alison Bashir is a 71 year old female status post hematoma evacuation right lower extremity.    PLAN:   -After evaluation of the skin flap over the hematoma, there still is an area of skin appearance of the anterior inferior flap.  There are small areas of darker colored skin representing tissue that will become nonviable.  The rest of the wound bed has good granulation tissue, and I am optimistic that parts of the skin flap will survive.  In order to service much viable skin flap as possible, plan is to continue to observe the wound for now.  The patient will require elevation of her right lower extremity in order to decrease the edema which is also hindering the healing.    -The patient received 1 PRBC transfusion due to low hemoglobin.  -General surgery team will continue to follow with you.      SUBJECTIVE:   Alison Bashir is that is post hematoma evacuation of the right lower extremity.  The patient saved 1 PRBC transfusion overnight due to hemoglobin of 6.6.  The patient states that her leg pain is improving.  She has no chest pain or shortness of breath at this time.    Patient Vitals for the past 24 hrs:   BP Temp Temp src Pulse Resp SpO2   12/23/21 0731 102/54 98.7  F (37.1  C) Oral 74 16 90 %   12/23/21 0348 90/52 98.7  F (37.1  C) Oral 78 20 92 %   12/23/21 0013 99/50 98.4  F (36.9  C) Oral 77 18 91 %   12/22/21 1921 101/51 98.4  F (36.9  C) Oral 76 18 94 %   12/22/21 1638 103/49 -- -- 78 -- --   12/22/21 1529 (!) 84/39 97.9  F (36.6  C) Oral 74 18 91 %   12/22/21 1300 98/55 98.5  F (36.9  C) Oral 87 18 91 %   12/22/21 1212 (!) 82/45  -- -- 79 -- 91 %       Physical Exam:  General: NAD, pleasant  CV: Regular rate  Respiratory: Regular rate shallow breathing  ABD: Soft, nondistended.  EXT: Right lower extremity surgical dressings were removed.  The surgical resting is revealed more of a serous> then sanguinous drainage on the dressings.  The top one third of the skin flap appears to be viable.  The lower two thirds of the skin flap has some mild duskiness. There are small areas of darker colored skin representing tissue that will become nonviable towards the lower anterior portion of the skin flap.  The rest of the wound bed has good granulation tissue.     Admission on 12/20/2021   Component Date Value     INR 12/20/2021 1.09      aPTT 12/20/2021 34      Sodium 12/20/2021 144      Potassium 12/20/2021 4.5      Chloride 12/20/2021 106      Carbon Dioxide (CO2) 12/20/2021 34*     Anion Gap 12/20/2021 4*     Urea Nitrogen 12/20/2021 30*     Creatinine 12/20/2021 0.83      Calcium 12/20/2021 9.2      Glucose 12/20/2021 126*     Alkaline Phosphatase 12/20/2021 90      AST 12/20/2021 19      ALT 12/20/2021 <9      Protein Total 12/20/2021 7.2      Albumin 12/20/2021 3.5      Bilirubin Total 12/20/2021 0.5      GFR Estimate 12/20/2021 71      WBC Count 12/20/2021 6.2      RBC Count 12/20/2021 3.61*     Hemoglobin 12/20/2021 10.1*     Hematocrit 12/20/2021 33.9*     MCV 12/20/2021 94      MCH 12/20/2021 28.0      MCHC 12/20/2021 29.8*     RDW 12/20/2021 14.1      Platelet Count 12/20/2021 153      % Neutrophils 12/20/2021 81      % Lymphocytes 12/20/2021 13      % Monocytes 12/20/2021 5      % Eosinophils 12/20/2021 1      % Basophils 12/20/2021 0      % Immature Granulocytes 12/20/2021 0      NRBCs per 100 WBC 12/20/2021 0      Absolute Neutrophils 12/20/2021 5.0      Absolute Lymphocytes 12/20/2021 0.8      Absolute Monocytes 12/20/2021 0.3      Absolute Eosinophils 12/20/2021 0.1      Absolute Basophils 12/20/2021 0.0      Absolute Immature Granul*  12/20/2021 0.0      Absolute NRBCs 12/20/2021 0.0      ABO/RH(D) 12/20/2021 O NEG      Antibody Screen 12/20/2021 Positive*     SPECIMEN EXPIRATION DATE 12/20/2021 [id number]      SARS CoV2 PCR 12/20/2021 Negative      CK 12/20/2021 46      Culture 12/20/2021 No growth after 2 days      Culture 12/20/2021 No growth after 2 days      Lactic Acid 12/20/2021 0.6*     Hold Specimen 12/20/2021 JIC      Ventricular Rate 12/20/2021 59      Atrial Rate 12/20/2021 59      [place] Interval 12/20/2021 170      QRS Duration 12/20/2021 162      QT 12/20/2021 466      QTc 12/20/2021 461      P Axis 12/20/2021 35      R AXIS 12/20/2021 -20      T Axis 12/20/2021 3      Interpretation ECG 12/20/2021                      Value:Sinus bradycardia  Right bundle branch block  Inferior infarct (cited on or before 20-DEC-2021)  Abnormal ECG  When compared with ECG of 20-DEC-2021 14:34,  No significant change was found  Confirmed by GUANAKO HOWARD, MATT LOC:WW ([id number]) on 12/21/2021 12:49:12 PM       BNP 12/20/2021 118      Antibody Identification 12/20/2021 3hr for more blood  Anti-[place]      SPECIMEN EXPIRATION DATE 12/20/2021 [id number]      Procalcitonin 12/20/2021 0.04      Magnesium 12/20/2021 2.2      WBC Count 12/20/2021 6.2      RBC Count 12/20/2021 3.53*     Hemoglobin 12/20/2021 10.0*     Hematocrit 12/20/2021 33.7*     MCV 12/20/2021 96      MCH 12/20/2021 28.3      MCHC 12/20/2021 29.7*     RDW 12/20/2021 14.3      Platelet Count 12/20/2021 150      % Neutrophils 12/20/2021 72      % Lymphocytes 12/20/2021 19      % Monocytes 12/20/2021 7      % Eosinophils 12/20/2021 2      % Basophils 12/20/2021 0      % Immature Granulocytes 12/20/2021 0      NRBCs per 100 WBC 12/20/2021 0      Absolute Neutrophils 12/20/2021 4.5      Absolute Lymphocytes 12/20/2021 1.2      Absolute Monocytes 12/20/2021 0.4      Absolute Eosinophils 12/20/2021 0.1      Absolute Basophils 12/20/2021 0.0      Absolute Immature Granul* 12/20/2021 0.0       Absolute NRBCs 12/20/2021 0.0      CROSSMATCH 12/20/2021 COMPATIBLE      UNIT ABO/RH 12/20/2021 O Neg      Unit Number 12/20/2021 [id number]      Unit Status 12/20/2021 Ready      Blood Component Type 12/20/2021 Red Blood Cells      Product Code 12/20/2021 [id number]      CODING SYSTEM 12/20/2021 [id number]      UNIT TYPE ISBT 12/20/2021 9500      CROSSMATCH 12/20/2021 COMPATIBLE      UNIT ABO/RH 12/20/2021 O Neg      Unit Number 12/20/2021 [id number]      Unit Status 12/20/2021 Ready      Blood Component Type 12/20/2021 Red Blood Cells      Product Code 12/20/2021 [id number]      CODING SYSTEM 12/20/2021 [id number]      UNIT TYPE ISBT 12/20/2021 9500      K Antigen Type 12/20/2021 Negative      SPECIMEN EXPIRATION DATE 12/20/2021 [id number]      Hold Specimen 12/20/2021 JIC      Color Urine 12/20/2021 Yellow      Appearance Urine 12/20/2021 Turbid*     Glucose Urine 12/20/2021 Negative      Bilirubin Urine 12/20/2021 Negative      Ketones Urine 12/20/2021 Negative      Specific Gravity Urine 12/20/2021 1.045*     Blood Urine 12/20/2021 0.2 mg/dL*     pH Urine 12/20/2021 5.5      Protein Albumin Urine 12/20/2021 10 *     Urobilinogen Urine 12/20/2021 <2.0      Nitrite Urine 12/20/2021 Positive*     Leukocyte Esterase Urine 12/20/2021 500 Wilfredo/uL*     Bacteria Urine 12/20/2021 Moderate*     WBC Clumps Urine 12/20/2021 Present*     Mucus Urine 12/20/2021 Present*     RBC Urine 12/20/2021 3*     WBC Urine 12/20/2021 >182*     Squamous Epithelials Uri* 12/20/2021 1      Culture 12/20/2021 >100,000 CFU/mL Mixture of urogenital nicholas      Ventricular Rate 12/20/2021 63      Atrial Rate 12/20/2021 63      [place] Interval 12/20/2021 168      QRS Duration 12/20/2021 158      QT 12/20/2021 424      QTc 12/20/2021 433      P Axis 12/20/2021 40      R AXIS 12/20/2021 -34      T Axis 12/20/2021 -5      Interpretation ECG 12/20/2021                      Value:Sinus rhythm  Left axis deviation  Right bundle branch block  Inferior  infarct , age undetermined  Abnormal ECG  When compared with ECG of 01-OCT-2020 09:15,  No significant change was found  Confirmed by SEE ED PROVIDER NOTE FOR, ECG INTERPRETATION (4000),  DELISA LANDEROS ([id number]) on 12/21/2021 7:02:42 AM       Hemoglobin 12/20/2021 8.2*     Sodium 12/21/2021 146*     Potassium 12/21/2021 4.3      Chloride 12/21/2021 109*     Carbon Dioxide (CO2) 12/21/2021 30      Anion Gap 12/21/2021 7      Urea Nitrogen 12/21/2021 23      Creatinine 12/21/2021 0.67      Calcium 12/21/2021 7.5*     Glucose 12/21/2021 74      GFR Estimate 12/21/2021 88      WBC Count 12/21/2021 3.7*     RBC Count 12/21/2021 2.91*     Hemoglobin 12/21/2021 8.2*     Hematocrit 12/21/2021 28.0*     MCV 12/21/2021 96      MCH 12/21/2021 28.2      MCHC 12/21/2021 29.3*     RDW 12/21/2021 14.3      Platelet Count 12/21/2021 119*     Hemoglobin 12/21/2021 7.9*     GLUCOSE BY METER POCT 12/20/2021 112*     GLUCOSE BY METER POCT 12/20/2021 100*     GLUCOSE BY METER POCT 12/20/2021 100*     GLUCOSE BY METER POCT 12/21/2021 48*     GLUCOSE BY METER POCT 12/21/2021 104*     Hemoglobin 12/21/2021 8.0*     Hemoglobin 12/22/2021 7.5*     GLUCOSE BY METER POCT 12/21/2021 73      GLUCOSE BY METER POCT 12/21/2021 106*     Hemoglobin 12/22/2021 7.2*     Hemoglobin 12/22/2021 7.5*     GLUCOSE BY METER POCT 12/22/2021 305*     GLUCOSE BY METER POCT 12/22/2021 115*     Hemoglobin 12/22/2021 7.3*     Hemoglobin 12/22/2021 7.0*     Sodium 12/23/2021 142      Potassium 12/23/2021 3.9      Chloride 12/23/2021 108*     Carbon Dioxide (CO2) 12/23/2021 29      Anion Gap 12/23/2021 5      Urea Nitrogen 12/23/2021 16      Creatinine 12/23/2021 0.71      Calcium 12/23/2021 7.3*     Glucose 12/23/2021 107      GFR Estimate 12/23/2021 90      WBC Count 12/23/2021 3.5*     RBC Count 12/23/2021 2.39*     Hemoglobin 12/23/2021 6.6*     Hematocrit 12/23/2021 22.8*     MCV 12/23/2021 95      MCH 12/23/2021 27.6      MCHC 12/23/2021 28.9*     RDW  12/23/2021 14.3      Platelet Count 12/23/2021 103*        DO Johan Nicholson DO  General Surgery  Pager: [phone]

## 2021-12-23 NOTE — PROGRESS NOTES
Care Management Follow Up    Length of Stay (days): 3    Expected Discharge Date: 12/25/2021     Concerns to be Addressed:     Monitor hgb with PRBC transfusion today, IV abx, awaiting bld cx, IVF, continue therapies, wound cares, Surgery following  Patient plan of care discussed at interdisciplinary rounds: Yes    Anticipated Discharge Disposition:   vs TCU     Anticipated Discharge Services:    Anticipated Discharge DME:      Patient/family educated on Medicare website which has current facility and service quality ratings:    Education Provided on the Discharge Plan:    Patient/Family in Agreement with the Plan:      Referrals Placed by CM/SW:    Private pay costs discussed: Not applicable    Additional Information:    Current wound cares bid: Please place Xeroform on the surgical wound.  Gently wrapped with Kerlix starting from the toes up to the knee.  Gently wrap with a 4 inch Ace wrap from the toes all the way up to the knee.      1220- Spoke with Alexa  Director and discussed discharge with lilian lift and explained current wound cares. She stated they should be able to accommodate her coming back to . They have a lilian lift at , would just need us to send a Lilian sling home with pt. Wound cares are going to be done by  manager Sofy and she will teach staff to do wound cares per Alexa.    1530- Call placed and message left for sister Chelo to inform of above plans for discharging back to . Left my phone # if she needs to call me back.        Amanda Ohara RN

## 2021-12-23 NOTE — SIGNIFICANT EVENT
House officer paged regarding low hemoglobin of 6.6.  Patient is already consented for blood.  Placed order for type and screen and transfusion of 1 unit PRBC.    Diogo Saldivar MD  [facility], PGY-3

## 2021-12-23 NOTE — PLAN OF CARE
Problem: Urinary Retention  Goal: Effective Urinary Elimination  Outcome: No Change   Bladder scanned 554 ml @ at 0230 and 411 ml 0650,st.cathed 500 ml 0230 and 450 ml at 0700  Problem: Urinary Incontinence  Goal: Effective Urinary Elimination  Outcome: No Change   Incontinent of urine x 1,long care done  Problem: Pain Acute  Goal: Acceptable Pain Control and Functional Ability  Outcome: Improving   given PRN oxycodone at HS with good pain control,no father complaint of pain

## 2021-12-23 NOTE — PROGRESS NOTES
[facility]    PROGRESS NOTE - Hospitalist Service    Assessment and Plan    Active Problems:    Accident due to mechanical fall without injury, initial encounter    Acute blood loss anemia    Anxiety disorder, unspecified    Coronary atherosclerosis    Orthostatic hypotension    Parkinsonism, unspecified Parkinsonism type (H)    Restless legs syndrome    Traumatic brain injury (H)    Hematoma of skin    Pain of right lower leg    Knee injury, right, initial encounter    Alison Bashir is a 71 year old old female with h/o Parkinson, CAD, cognitive decline(but has been her own decision maker), htn, [place], hx of TBI who lives in . She was sent in for severe right leg pain     Right leg extensive acute hematoma s/p evacuation   - surgery managing   - NEB right leg but otherwise elevate   - PT/OT  - IV zosyn for now  - trend CBC   - wound care per surgery  - pain not controlled will increase 5-10 mg and continue scheduled tylenol       Parkinson  - resume home meds   - PT/OT     H/o TBI   - lives in  but makes own decisions   - remeron      Hx of CAD/HTN  -hx of stents  - metoprolol and statin   - asa on hold     Hypotension chronic but pressures stable now   - midodrine   - stop IVF  - transfusing     ABL anemia  - given another unit this morning, thus far 2 units transfused   - Trend Hgb Q8hrs     RLS  - Mirapex     Anxiety continue home meds     ERROR Elevated BG 12/22 morning of 305, had been low normal and even hypoglycemic the last few days. Had NA recheck and after breakfast was 115. So 305 was error and not accurate     COVID-19 PCR Results    COVID-19 PCR Results 6/8/20 6/15/20 9/30/20 11/5/21 12/20/21   SARS-CoV-2 Virus Specimen Source Nasopharyngeal Nasopharyngeal Nasopharyngeal     SARS-CoV-2 PCR Result NEGATIVE NEGATIVE NEGATIVE     SARS CoV2 PCR    Positive (A) Negative   (A) Abnormal value       Comments are available for some flowsheets but are not being displayed.          COVID-19 Antibody Results, Testing for Immunity    COVID-19 Antibody Results, Testing for Immunity   No data to display.            VTE prophylaxis:  None until Wednesday subcutaneous heparin to start per vascular   DIET: Orders Placed This Encounter      Regular Diet Adult    Drains/Lines: none  Weight bearing status: NWB right leg today per surgery ok, PT/Ot   Disposition/Barriers to discharge: once surgery ok for discharge, GH vs TCU pending wound care and patient needs. PT/OT recomending TCU at this time    Code Status: No CPR- Do NOT Intubate    Subjective:  Complains of pain not controlled. Patient requests I speak with her sister Chelo and I did update her.     PHYSICAL EXAM  Temp:  [97.9  F (36.6  C)-98.8  F (37.1  C)] 98.6  F (37  C)  Pulse:  [74-87] 83  Resp:  [16-20] 20  BP: ()/(39-61) 125/56  SpO2:  [90 %-94 %] 92 %  Wt Readings from Last 1 Encounters:   12/20/21 81 kg (178 lb 9.2 oz)       Intake/Output Summary (Last 24 hours) at 12/21/2021 0806  Last data filed at 12/21/2021 0805  Gross per 24 hour   Intake 3087 ml   Output 1350 ml   Net 1737 ml      Body mass index is 24.91 kg/m .  Physical Exam  Constitutional:       Appearance: Normal appearance.   HENT:      Head: Normocephalic and atraumatic.      Nose: Nose normal.   Cardiovascular:      Rate and Rhythm: Normal rate and regular rhythm.      Pulses: Normal pulses.      Comments: Soft Rafaela  Pulmonary:      Effort: Pulmonary effort is normal.      Breath sounds: Normal breath sounds.   Abdominal:      General: Bowel sounds are normal.      Palpations: Abdomen is soft.   Musculoskeletal:      Comments: Right leg bandaged and toes warm with good perfusion    Neurological:      General: No focal deficit present.      Mental Status: She is alert and oriented to person, place, and time. Mental status is at baseline.           PERTINENT LABS/IMAGING:  Results for orders placed or performed during the hospital encounter of 12/20/21   XR Tibia &  Fibula Right 2 Views    Impression    IMPRESSION: Large soft tissue density along the anterolateral right leg extends craniocaudal approximately 23 cm and could represent the reported large hematoma in the lateral right leg. Anatomic alignment right tibia and fibula. No acute displaced right   tibia or fibula fracture identified. Diffuse bone demineralization. Partial visualization of right total knee arthroplasty. Mild-moderate tibiotalar osteoarthritis. Generalized right leg soft tissue swelling.   XR Tibia & Fibula Left 2 Views    Impression    IMPRESSION: Anatomic alignment left tibia and fibula. No acute displaced tibia or fibula fracture. Partial visualization of left knee arthroplasty. Distal left leg and bimalleolar ankle soft tissue swelling. Degenerative change in the left ankle and   visualized foot. Heel spur.   XR Femur Right 2 Views    Impression    IMPRESSION: No change in the right hip arthroplasty with longstem proximal femoral component and numerous cerclage wire fixation hardware about the proximal right femur. Lateral claw plate projects in unchanged position along the base of the right   greater trochanter. Chronic healed deformity proximal right femur with heterotopic bone along the superolateral right hip, similar to prior. Right total knee arthroplasty. No acute displaced periprosthetic fracture identified. Diffuse bone   demineralization. No sizable knee joint effusion.   XR Knee Left 1/2 Views    Impression    IMPRESSION: Left total knee arthroplasty with patellar resurfacing. No acute displaced periprosthetic fracture or convincing finding for component failure. No sizable left knee joint effusion. Diffuse bone demineralization. Medial left knee soft tissue   swelling.   XR Knee Right 1/2 Views    Impression    IMPRESSION: Right total knee arthroplasty redemonstrated. No acute displaced periprosthetic fracture is identified. No sizable right knee joint effusion. Diffuse bone  demineralization. The distal tip of a right hip arthroplasty is noted with   indolent-appearing periosteal new bone along the posterior adjacent femoral shaft.   CTA Abdomen Pelvis Bilat Leg Runoff w Contr    Impression    CONCLUSION:  1.  Large subcutaneous hematoma the lateral superior aspect of the right calf. No contrast extravasation.  2.  Right leg: No inflow or femoropopliteal stenosis. Venous contamination at the calf station. Is considered and appears to be three-vessel runoff.  3.  Left leg: CTA is within normal limits.  4.  Enlarged uterus with gas within the endometrium. There is a nonspecific finding. Please correlate clinically to exclude infection.  5.  Bilateral nonobstructing renal calculi.  6.  Osteoarthritic changes visualized spine. Right total hip arthroplasty. Bilateral knee arthroplasties.   US Pelvis Complete without Transvaginal    Impression    IMPRESSION:    1.  Air within the endometrial canal as described on recent abdomen/pelvis CT.   2.  Debris noted in the urinary bladder.  3.  Adnexa obscured by bowel gas. Ovaries not identified.           Most Recent 3 CBC's:  Recent Labs   Lab Test 12/23/21  1001 12/23/21  0455 12/22/21  2154 12/21/21  1047 12/21/21  0550 12/20/21  2315 12/20/21  1753   WBC  --  3.5*  --   --  3.7*  --  6.2   HGB 7.3* 6.6* 7.0*   < > 8.2*   < > 10.0*   MCV  --  95  --   --  96  --  96   PLT  --  103*  --   --  119*  --  150    < > = values in this interval not displayed.     Most Recent 3 BMP's:  Recent Labs   Lab Test 12/23/21  0455 12/22/21  1029 12/22/21  0834 12/21/21  0841 12/21/21  0550 12/20/21  1831 12/20/21  1005     --   --   --  146*  --  144   POTASSIUM 3.9  --   --   --  4.3  --  4.5   CHLORIDE 108*  --   --   --  109*  --  106   CO2 29  --   --   --  30  --  34*   BUN 16  --   --   --  23  --  30*   CR 0.71  --   --   --  0.67  --  0.83   ANIONGAP 5  --   --   --  7  --  4*   MARY 7.3*  --   --   --  7.5*  --  9.2    115* 305*   < > 74   <  > 126*    < > = values in this interval not displayed.     Most Recent 2 LFT's:  Recent Labs   Lab Test 12/20/21  1005 10/11/21  1128   AST 19 15   ALT <9 12   ALKPHOS 90 101   BILITOTAL 0.5 0.4       Recent Labs   Lab Test 10/11/21  1128   CHOL 89   HDL 38*   LDL 33   TRIG 90     Recent Labs   Lab Test 10/11/21  1128 02/26/20  1647 03/22/19  1435   LDL 33 50 43     Recent Labs   Lab Test 12/21/21  0550   *   POTASSIUM 4.3   CHLORIDE 109*   CO2 30   GLC 74   BUN 23   CR 0.67   GFRESTIMATED 88   MARY 7.5*     No results for input(s): A1C in the last 02338 hours.  Recent Labs   Lab Test 12/21/21  0550 12/20/21  2315 12/20/21  1753   HGB 8.2* 8.2* 10.0*     Recent Labs   Lab Test 10/01/20  1401 10/01/20  0836 06/09/20  0116   TROPONINI <0.01 <0.01 <0.01     Recent Labs   Lab Test 12/20/21  1241 06/08/20  1110    80     Recent Labs   Lab Test 08/25/20  1654   TSH 1.09     Recent Labs   Lab Test 12/20/21  1005 09/30/20  1910 06/08/20  1110   INR 1.09 1.07 1.10       Marguerite Marc MD  [facility] Medicine Service  [phone]

## 2021-12-23 NOTE — PLAN OF CARE
Patient was alert and oriented x 3 with some forgetfulness of time. 1 unit of PRBCs infused this shift. Vitals signs stable through infusion. Patient voiding using Purewick, last PVR was 199. Jenniffer Adams RN     Problem: Risk for Delirium  Goal: Optimal Coping  Outcome: Improving  Goal: Improved Behavioral Control  Outcome: Improving  Goal: Improved Attention and Thought Clarity  Outcome: Improving  Goal: Improved Sleep  Outcome: Improving     Problem: Pain Acute  Goal: Acceptable Pain Control and Functional Ability  Outcome: Improving     Problem: Hyperglycemia  Goal: Blood Glucose Level Within Targeted Range  Outcome: Adequate for Discharge     Problem: Adult Inpatient Plan of Care  Goal: Absence of Hospital-Acquired Illness or Injury  Intervention: Identify and Manage Fall Risk  Recent Flowsheet Documentation  Taken 12/23/2021 1000 by Jenniffer Adams, RN  Safety Promotion/Fall Prevention:   activity supervised   assistive device/personal items within reach   bed alarm on   nonskid shoes/slippers when out of bed

## 2021-12-24 ENCOUNTER — APPOINTMENT (OUTPATIENT)
Dept: OCCUPATIONAL THERAPY | Facility: HOSPITAL | Age: 71
DRG: 580 | End: 2021-12-24
Payer: MEDICARE

## 2021-12-24 LAB
ANION GAP SERPL CALCULATED.3IONS-SCNC: 2 MMOL/L (ref 5–18)
BUN SERPL-MCNC: 12 MG/DL (ref 8–28)
CALCIUM SERPL-MCNC: 7.8 MG/DL (ref 8.5–10.5)
CHLORIDE BLD-SCNC: 107 MMOL/L (ref 98–107)
CO2 SERPL-SCNC: 31 MMOL/L (ref 22–31)
CREAT SERPL-MCNC: 0.69 MG/DL (ref 0.6–1.1)
ERYTHROCYTE [DISTWIDTH] IN BLOOD BY AUTOMATED COUNT: 14.6 % (ref 10–15)
GFR SERPL CREATININE-BSD FRML MDRD: >90 ML/MIN/1.73M2
GLUCOSE BLD-MCNC: 97 MG/DL (ref 70–125)
HCT VFR BLD AUTO: 26.1 % (ref 35–47)
HGB BLD-MCNC: 8.1 G/DL (ref 11.7–15.7)
HGB BLD-MCNC: 8.4 G/DL (ref 11.7–15.7)
MCH RBC QN AUTO: 28.6 PG (ref 26.5–33)
MCHC RBC AUTO-ENTMCNC: 31 G/DL (ref 31.5–36.5)
MCV RBC AUTO: 92 FL (ref 78–100)
PLATELET # BLD AUTO: 112 10E3/UL (ref 150–450)
POTASSIUM BLD-SCNC: 3.9 MMOL/L (ref 3.5–5)
RBC # BLD AUTO: 2.83 10E6/UL (ref 3.8–5.2)
SODIUM SERPL-SCNC: 140 MMOL/L (ref 136–145)
WBC # BLD AUTO: 4 10E3/UL (ref 4–11)

## 2021-12-24 PROCEDURE — 85027 COMPLETE CBC AUTOMATED: CPT | Performed by: INTERNAL MEDICINE

## 2021-12-24 PROCEDURE — 250N000013 HC RX MED GY IP 250 OP 250 PS 637: Performed by: SURGERY

## 2021-12-24 PROCEDURE — 250N000011 HC RX IP 250 OP 636: Performed by: SURGERY

## 2021-12-24 PROCEDURE — 120N000001 HC R&B MED SURG/OB

## 2021-12-24 PROCEDURE — 82310 ASSAY OF CALCIUM: CPT | Performed by: INTERNAL MEDICINE

## 2021-12-24 PROCEDURE — 99233 SBSQ HOSP IP/OBS HIGH 50: CPT | Performed by: INTERNAL MEDICINE

## 2021-12-24 PROCEDURE — 99024 POSTOP FOLLOW-UP VISIT: CPT | Performed by: PHYSICIAN ASSISTANT

## 2021-12-24 PROCEDURE — 36415 COLL VENOUS BLD VENIPUNCTURE: CPT | Performed by: INTERNAL MEDICINE

## 2021-12-24 PROCEDURE — 250N000013 HC RX MED GY IP 250 OP 250 PS 637: Performed by: INTERNAL MEDICINE

## 2021-12-24 PROCEDURE — 97535 SELF CARE MNGMENT TRAINING: CPT | Mod: GO

## 2021-12-24 PROCEDURE — 85018 HEMOGLOBIN: CPT | Performed by: INTERNAL MEDICINE

## 2021-12-24 RX ORDER — ACETAMINOPHEN 325 MG/1
975 TABLET ORAL EVERY 8 HOURS
Status: DISCONTINUED | OUTPATIENT
Start: 2021-12-24 | End: 2021-12-25

## 2021-12-24 RX ADMIN — PIPERACILLIN AND TAZOBACTAM 3.38 G: 3; .375 INJECTION, POWDER, FOR SOLUTION INTRAVENOUS at 12:02

## 2021-12-24 RX ADMIN — MIDODRINE HYDROCHLORIDE 7.5 MG: 2.5 TABLET ORAL at 08:20

## 2021-12-24 RX ADMIN — VENLAFAXINE HYDROCHLORIDE 37.5 MG: 37.5 CAPSULE, EXTENDED RELEASE ORAL at 09:29

## 2021-12-24 RX ADMIN — HEPARIN SODIUM 5000 UNITS: 10000 INJECTION, SOLUTION INTRAVENOUS; SUBCUTANEOUS at 22:50

## 2021-12-24 RX ADMIN — PIPERACILLIN AND TAZOBACTAM 3.38 G: 3; .375 INJECTION, POWDER, FOR SOLUTION INTRAVENOUS at 20:21

## 2021-12-24 RX ADMIN — MIDODRINE HYDROCHLORIDE 7.5 MG: 2.5 TABLET ORAL at 12:01

## 2021-12-24 RX ADMIN — PRAMIPEXOLE DIHYDROCHLORIDE 0.5 MG: 0.5 TABLET ORAL at 20:21

## 2021-12-24 RX ADMIN — CARBIDOPA AND LEVODOPA 2 TABLET: 25; 100 TABLET ORAL at 20:21

## 2021-12-24 RX ADMIN — OXYCODONE HYDROCHLORIDE 10 MG: 5 TABLET ORAL at 19:20

## 2021-12-24 RX ADMIN — MAGNESIUM HYDROXIDE 30 ML: 400 SUSPENSION ORAL at 17:05

## 2021-12-24 RX ADMIN — DOCUSATE SODIUM 50 MG AND SENNOSIDES 8.6 MG 1 TABLET: 8.6; 5 TABLET, FILM COATED ORAL at 20:21

## 2021-12-24 RX ADMIN — ACETAMINOPHEN 975 MG: 325 TABLET ORAL at 02:49

## 2021-12-24 RX ADMIN — VENLAFAXINE HYDROCHLORIDE 300 MG: 150 CAPSULE, EXTENDED RELEASE ORAL at 09:29

## 2021-12-24 RX ADMIN — ATORVASTATIN CALCIUM 40 MG: 40 TABLET, FILM COATED ORAL at 20:21

## 2021-12-24 RX ADMIN — BISACODYL 10 MG: 10 SUPPOSITORY RECTAL at 16:01

## 2021-12-24 RX ADMIN — CARBIDOPA AND LEVODOPA 2 TABLET: 25; 100 TABLET ORAL at 14:08

## 2021-12-24 RX ADMIN — HEPARIN SODIUM 5000 UNITS: 10000 INJECTION, SOLUTION INTRAVENOUS; SUBCUTANEOUS at 14:08

## 2021-12-24 RX ADMIN — MIRTAZAPINE 15 MG: 15 TABLET, FILM COATED ORAL at 20:23

## 2021-12-24 RX ADMIN — TRAZODONE HYDROCHLORIDE 150 MG: 100 TABLET ORAL at 20:21

## 2021-12-24 RX ADMIN — BUPROPION HYDROCHLORIDE 300 MG: 300 TABLET, EXTENDED RELEASE ORAL at 09:29

## 2021-12-24 RX ADMIN — OXYCODONE HYDROCHLORIDE 10 MG: 5 TABLET ORAL at 02:49

## 2021-12-24 RX ADMIN — OXYCODONE HYDROCHLORIDE 10 MG: 5 TABLET ORAL at 09:35

## 2021-12-24 RX ADMIN — OXYCODONE HYDROCHLORIDE 10 MG: 5 TABLET ORAL at 14:12

## 2021-12-24 RX ADMIN — ACETAMINOPHEN 975 MG: 325 TABLET ORAL at 16:45

## 2021-12-24 RX ADMIN — DOCUSATE SODIUM 50 MG AND SENNOSIDES 8.6 MG 1 TABLET: 8.6; 5 TABLET, FILM COATED ORAL at 09:29

## 2021-12-24 RX ADMIN — POLYETHYLENE GLYCOL 3350 17 G: 17 POWDER, FOR SOLUTION ORAL at 09:30

## 2021-12-24 RX ADMIN — PIPERACILLIN AND TAZOBACTAM 3.38 G: 3; .375 INJECTION, POWDER, FOR SOLUTION INTRAVENOUS at 05:50

## 2021-12-24 RX ADMIN — MIDODRINE HYDROCHLORIDE 7.5 MG: 2.5 TABLET ORAL at 16:45

## 2021-12-24 RX ADMIN — CARBIDOPA AND LEVODOPA 2 TABLET: 25; 100 TABLET ORAL at 09:29

## 2021-12-24 RX ADMIN — HEPARIN SODIUM 5000 UNITS: 10000 INJECTION, SOLUTION INTRAVENOUS; SUBCUTANEOUS at 05:52

## 2021-12-24 ASSESSMENT — ACTIVITIES OF DAILY LIVING (ADL)
ADLS_ACUITY_SCORE: 23
ADLS_ACUITY_SCORE: 27
ADLS_ACUITY_SCORE: 23
ADLS_ACUITY_SCORE: 27
ADLS_ACUITY_SCORE: 23
ADLS_ACUITY_SCORE: 19
ADLS_ACUITY_SCORE: 23
ADLS_ACUITY_SCORE: 27
ADLS_ACUITY_SCORE: 23
ADLS_ACUITY_SCORE: 31
ADLS_ACUITY_SCORE: 23
ADLS_ACUITY_SCORE: 23
ADLS_ACUITY_SCORE: 27
ADLS_ACUITY_SCORE: 23
ADLS_ACUITY_SCORE: 27
ADLS_ACUITY_SCORE: 19
ADLS_ACUITY_SCORE: 23
ADLS_ACUITY_SCORE: 23

## 2021-12-24 ASSESSMENT — MIFFLIN-ST. JEOR: SCORE: 1567.76

## 2021-12-24 NOTE — PROGRESS NOTES
General Surgery Progress Note:    Hospital Day # 4    ASSESSMENT:   1. Accident due to mechanical fall without injury, initial encounter    2. Hematoma of skin    3. Pain of right lower leg    4. Type 2 diabetes mellitus with other specified complication, unspecified whether long term insulin use (H)    5. Knee injury, right, initial encounter    6. Intractable pain        Alison Bashir is a 71 year old female status post hematoma evacuation right lower extremity.    PLAN:   -There is a little more necrotic appearance of the posterior superior portion of the flap compared to yesterday, but will need more time to mature whether she would need grafting or not. Continue with current wound cares  -Would be ok to discharge once medically stable and f/u with Dr. Subramanian as outpatient for wound care and possible grafting     Moreno Ramsey PA-C  Pager - [phone]  Phone - [phone]   General Surgery      SUBJECTIVE:   Alison Bashir is doing ok, pain in RLE, but seems better each day    Patient Vitals for the past 24 hrs:   BP Temp Temp src Pulse Resp SpO2   12/24/21 0801 (!) 84/40 98.4  F (36.9  C) -- 78 18 92 %   12/24/21 0313 92/51 98.4  F (36.9  C) Oral 83 20 94 %   12/23/21 2312 92/42 98.8  F (37.1  C) Oral 89 20 94 %   12/1950 106/51 99  F (37.2  C) Oral 89 16 92 %   12/23/21 1609 113/54 98.6  F (37  C) Oral 80 20 94 %   12/23/21 1347 134/60 98.7  F (37.1  C) Oral 82 20 94 %   12/23/21 1159 120/57 98.9  F (37.2  C) Oral 80 20 92 %   12/23/21 1112 125/56 98.6  F (37  C) Oral 83 20 92 %   12/23/21 1052 134/57 98.8  F (37.1  C) Oral 82 20 94 %   12/23/21 1017 117/61 98.8  F (37.1  C) Oral 80 20 93 %       Physical Exam:  General: NAD, pleasant  CV: Regular rate  ABD: Soft, nondistended.  EXT: Right lower extremity surgical dressings were removed.  Serosanguinous drainage on dressing. The anterior skin flap continues to look nonviable at this time, there is some progressing of this to the superior portion of the  posterior flap. The remaining posterior flap continues to look dusky/ecchymotic, continues to have good granulation tissue in the wound bed itself    Admission on 12/20/2021   Component Date Value     INR 12/20/2021 1.09      aPTT 12/20/2021 34      Sodium 12/20/2021 144      Potassium 12/20/2021 4.5      Chloride 12/20/2021 106      Carbon Dioxide (CO2) 12/20/2021 34*     Anion Gap 12/20/2021 4*     Urea Nitrogen 12/20/2021 30*     Creatinine 12/20/2021 0.83      Calcium 12/20/2021 9.2      Glucose 12/20/2021 126*     Alkaline Phosphatase 12/20/2021 90      AST 12/20/2021 19      ALT 12/20/2021 <9      Protein Total 12/20/2021 7.2      Albumin 12/20/2021 3.5      Bilirubin Total 12/20/2021 0.5      GFR Estimate 12/20/2021 71      WBC Count 12/20/2021 6.2      RBC Count 12/20/2021 3.61*     Hemoglobin 12/20/2021 10.1*     Hematocrit 12/20/2021 33.9*     MCV 12/20/2021 94      MCH 12/20/2021 28.0      MCHC 12/20/2021 29.8*     RDW 12/20/2021 14.1      Platelet Count 12/20/2021 153      % Neutrophils 12/20/2021 81      % Lymphocytes 12/20/2021 13      % Monocytes 12/20/2021 5      % Eosinophils 12/20/2021 1      % Basophils 12/20/2021 0      % Immature Granulocytes 12/20/2021 0      NRBCs per 100 WBC 12/20/2021 0      Absolute Neutrophils 12/20/2021 5.0      Absolute Lymphocytes 12/20/2021 0.8      Absolute Monocytes 12/20/2021 0.3      Absolute Eosinophils 12/20/2021 0.1      Absolute Basophils 12/20/2021 0.0      Absolute Immature Granul* 12/20/2021 0.0      Absolute NRBCs 12/20/2021 0.0      ABO/RH(D) 12/20/2021 O NEG      Antibody Screen 12/20/2021 Positive*     SPECIMEN EXPIRATION DATE 12/20/2021 [id number]      SARS CoV2 PCR 12/20/2021 Negative      CK 12/20/2021 46      Culture 12/20/2021 No growth after 2 days      Culture 12/20/2021 No growth after 2 days      Lactic Acid 12/20/2021 0.6*     Hold Specimen 12/20/2021 JIC      Ventricular Rate 12/20/2021 59      Atrial Rate 12/20/2021 59      LA Interval  12/20/2021 170      QRS Duration 12/20/2021 162      QT 12/20/2021 466      QTc 12/20/2021 461      P Axis 12/20/2021 35      R AXIS 12/20/2021 -20      T Axis 12/20/2021 3      Interpretation ECG 12/20/2021                      Value:Sinus bradycardia  Right bundle branch block  Inferior infarct (cited on or before 20-DEC-2021)  Abnormal ECG  When compared with ECG of 20-DEC-2021 14:34,  No significant change was found  Confirmed by MATT MUJICA MD LOC:WW ([id number]) on 12/21/2021 12:49:12 PM       BNP 12/20/2021 118      Antibody Identification 12/20/2021 3hr for more blood  Anti-[place]      SPECIMEN EXPIRATION DATE 12/20/2021 [id number]      Procalcitonin 12/20/2021 0.04      Magnesium 12/20/2021 2.2      WBC Count 12/20/2021 6.2      RBC Count 12/20/2021 3.53*     Hemoglobin 12/20/2021 10.0*     Hematocrit 12/20/2021 33.7*     MCV 12/20/2021 96      MCH 12/20/2021 28.3      MCHC 12/20/2021 29.7*     RDW 12/20/2021 14.3      Platelet Count 12/20/2021 150      % Neutrophils 12/20/2021 72      % Lymphocytes 12/20/2021 19      % Monocytes 12/20/2021 7      % Eosinophils 12/20/2021 2      % Basophils 12/20/2021 0      % Immature Granulocytes 12/20/2021 0      NRBCs per 100 WBC 12/20/2021 0      Absolute Neutrophils 12/20/2021 4.5      Absolute Lymphocytes 12/20/2021 1.2      Absolute Monocytes 12/20/2021 0.4      Absolute Eosinophils 12/20/2021 0.1      Absolute Basophils 12/20/2021 0.0      Absolute Immature Granul* 12/20/2021 0.0      Absolute NRBCs 12/20/2021 0.0      CROSSMATCH 12/20/2021 COMPATIBLE      UNIT ABO/RH 12/20/2021 O Neg      Unit Number 12/20/2021 [id number]      Unit Status 12/20/2021 Ready      Blood Component Type 12/20/2021 Red Blood Cells      Product Code 12/20/2021 [id number]      CODING SYSTEM 12/20/2021 [id number]      UNIT TYPE ISBT 12/20/2021 9500      CROSSMATCH 12/20/2021 COMPATIBLE      UNIT ABO/RH 12/20/2021 O Neg      Unit Number 12/20/2021 [id number]      Unit Status 12/20/2021  Ready      Blood Component Type 12/20/2021 Red Blood Cells      Product Code 12/20/2021 [id number]      CODING SYSTEM 12/20/2021 [id number]      UNIT TYPE ISBT 12/20/2021 9500      K Antigen Type 12/20/2021 Negative      SPECIMEN EXPIRATION DATE 12/20/2021 [id number]      Hold Specimen 12/20/2021 JIC      Color Urine 12/20/2021 Yellow      Appearance Urine 12/20/2021 Turbid*     Glucose Urine 12/20/2021 Negative      Bilirubin Urine 12/20/2021 Negative      Ketones Urine 12/20/2021 Negative      Specific Gravity Urine 12/20/2021 1.045*     Blood Urine 12/20/2021 0.2 mg/dL*     pH Urine 12/20/2021 5.5      Protein Albumin Urine 12/20/2021 10 *     Urobilinogen Urine 12/20/2021 <2.0      Nitrite Urine 12/20/2021 Positive*     Leukocyte Esterase Urine 12/20/2021 500 Wilfredo/uL*     Bacteria Urine 12/20/2021 Moderate*     WBC Clumps Urine 12/20/2021 Present*     Mucus Urine 12/20/2021 Present*     RBC Urine 12/20/2021 3*     WBC Urine 12/20/2021 >182*     Squamous Epithelials Uri* 12/20/2021 1      Culture 12/20/2021 >100,000 CFU/mL Mixture of urogenital nicholas      Ventricular Rate 12/20/2021 63      Atrial Rate 12/20/2021 63      MS Interval 12/20/2021 168      QRS Duration 12/20/2021 158      QT 12/20/2021 424      QTc 12/20/2021 433      P Axis 12/20/2021 40      R AXIS 12/20/2021 -34      T Axis 12/20/2021 -5      Interpretation ECG 12/20/2021                      Value:Sinus rhythm  Left axis deviation  Right bundle branch block  Inferior infarct , age undetermined  Abnormal ECG  When compared with ECG of 01-OCT-2020 09:15,  No significant change was found  Confirmed by SEE ED PROVIDER NOTE FOR, ECG INTERPRETATION ([id number]),  DELISA LANDEROS ([id number]) on 12/21/2021 7:02:42 AM       Hemoglobin 12/20/2021 8.2*     Sodium 12/21/2021 146*     Potassium 12/21/2021 4.3      Chloride 12/21/2021 109*     Carbon Dioxide (CO2) 12/21/2021 30      Anion Gap 12/21/2021 7      Urea Nitrogen 12/21/2021 23      Creatinine 12/21/2021  0.67      Calcium 12/21/2021 7.5*     Glucose 12/21/2021 74      GFR Estimate 12/21/2021 88      WBC Count 12/21/2021 3.7*     RBC Count 12/21/2021 2.91*     Hemoglobin 12/21/2021 8.2*     Hematocrit 12/21/2021 28.0*     MCV 12/21/2021 96      MCH 12/21/2021 28.2      MCHC 12/21/2021 29.3*     RDW 12/21/2021 14.3      Platelet Count 12/21/2021 119*     Hemoglobin 12/21/2021 7.9*     GLUCOSE BY METER POCT 12/20/2021 112*     GLUCOSE BY METER POCT 12/20/2021 100*     GLUCOSE BY METER POCT 12/20/2021 100*     GLUCOSE BY METER POCT 12/21/2021 48*     GLUCOSE BY METER POCT 12/21/2021 104*     Hemoglobin 12/21/2021 8.0*     Hemoglobin 12/22/2021 7.5*     GLUCOSE BY METER POCT 12/21/2021 73      GLUCOSE BY METER POCT 12/21/2021 106*     Hemoglobin 12/22/2021 7.2*     Hemoglobin 12/22/2021 7.5*     GLUCOSE BY METER POCT 12/22/2021 305*     GLUCOSE BY METER POCT 12/22/2021 115*     Hemoglobin 12/22/2021 7.3*     Hemoglobin 12/22/2021 7.0*     Sodium 12/23/2021 142      Potassium 12/23/2021 3.9      Chloride 12/23/2021 108*     Carbon Dioxide (CO2) 12/23/2021 29      Anion Gap 12/23/2021 5      Urea Nitrogen 12/23/2021 16      Creatinine 12/23/2021 0.71      Calcium 12/23/2021 7.3*     Glucose 12/23/2021 107      GFR Estimate 12/23/2021 90      WBC Count 12/23/2021 3.5*     RBC Count 12/23/2021 2.39*     Hemoglobin 12/23/2021 6.6*     Hematocrit 12/23/2021 22.8*     MCV 12/23/2021 95      MCH 12/23/2021 27.6      MCHC 12/23/2021 28.9*     RDW 12/23/2021 14.3      Platelet Count 12/23/2021 103*        Moreno Ramsey PA-C

## 2021-12-24 NOTE — PLAN OF CARE
Problem: Risk for Delirium  Goal: Improved Behavioral Control  Outcome: Improving     Problem: Pain Acute  Goal: Acceptable Pain Control and Functional Ability  Outcome: Improving  Intervention: Develop Pain Management Plan  Recent Flowsheet Documentation  Taken 12/24/2021 0935 by Cherise Bae RN  Pain Management Interventions:   medication (see MAR)   cold applied   distraction   emotional support   repositioned   rest     Pt is pleasant and cooperative.  Purewick in place and 800 out this shift.  Continues on IV antibiotics.  Blood pressure intermittently low (see flowsheet) improved with midodrine doses.  Continues on tele monitoring, sinus rhythm with BBB.  PRN oxycodone x 2 for right leg pain rated 9/10.  Pt remains bed bound.   Dressing changed to right leg per wound care orders.  Areas of eschar tissue noted and surgery team is aware.  Continue with current wound care orders.

## 2021-12-24 NOTE — PLAN OF CARE
Problem: Adult Inpatient Plan of Care  Goal: Patient-Specific Goal (Individualized)  Outcome: Improving     Problem: Adult Inpatient Plan of Care  Goal: Absence of Hospital-Acquired Illness or Injury  Intervention: Identify and Manage Fall Risk  Recent Flowsheet Documentation  Taken 12/24/2021 0001 by Laura Friedman, RN  Safety Promotion/Fall Prevention:   bed alarm on   clutter free environment maintained   fall prevention program maintained   safety round/check completed     Problem: Adult Inpatient Plan of Care  Goal: Absence of Hospital-Acquired Illness or Injury  Intervention: Prevent Skin Injury  Recent Flowsheet Documentation  Taken 12/24/2021 0249 by Laura Friedman, RN  Body Position:   right   turned   legs elevated

## 2021-12-24 NOTE — PLAN OF CARE
Problem: Adult Inpatient Plan of Care  Goal: Plan of Care Review  Outcome: Improving   POC discussed, A&Ox3. Pt c/o RLE pain lateral leg. Wound dressing changed this late afternoon, due tonight again. Pt a turn and reposition Q2H. BUE elevated on pillows. Pt given supp for c/o no BM for a week. Pt on scheduled bowel meds and taking narcotics. Abdomen soft, bowel sounds active, passing flatus. No nausea. Supp and MOM given with medium results. Will monitor.

## 2021-12-24 NOTE — PROVIDER NOTIFICATION
Updated Dr. ALONZO CASEY regarding BUE pitting edema R>L. BLE pitting edema. Arms elevated on pillows. PTA lasix on hold due to low pressures. Pt strict I &O. Will need weight. Awaiting to get another weight. Per MD will continue to monitor and consider starting lasix again tomorrow.

## 2021-12-24 NOTE — PROGRESS NOTES
Daily Progress Note        CODE STATUS:  No CPR- Do NOT Intubate    12/24/21  Assessment/Plan:  Alison Bashir is a 71 year old old female group home resident with past medical history of parkinsonism, CAD, cognitive decline(but has been her own decision maker), HTN, [place], hx of TBI who had a fall during transitioning 3 days prior to presenting to ED for evaluation of severe right leg pain, found to have hematoma and admitted for further management    Tension hematoma of right lateral leg;  --Patient hit her leg on door at the shelter 3 days prior to admission.  --CTA abdomen/pelvis bilateral leg runoff reported large subcutaneous hematoma on lateral superior aspect of right calf, no contrast extravasation.  --On 2/21, underwent evacuation of hematoma by Dr. Subramanian.  --Continue local wound care as per general surgery recommendation.  Keep elevating right lower extremity.  --Continue PT, OT.  --On IV Zosyn, personally discussed with surgery team, reported 5 days course should be sufficient.    Acute blood loss anemia due to above;  --Patient received 2 unit packed RBC transfusion.    --Currently hemoglobin around 8 range.  Recheck hemoglobin in a.m.    Acute postoperative pain;  --Continue scheduled Tylenol.  Continue as needed oxycodone.    History of CAD;  --Currently no anginal symptoms  --Continue home statin.    --Home baby aspirin on hold due to anemia requiring blood transfusion  --Home metoprolol on hold due to intermittent low blood pressure    Chronic intermittent hypotension;  --Continue PTA midodrine.  Monitor vitals closely      History of Parkinson;  --resume home meds   --PT/OT     H/o TBI;  History of anxiety and depression;  --lives in  but makes own decisions   --PTA remeron, venlafaxine, trazodone, Wellbutrin     RLS; --Mirapex     DVT prophylaxis; subcu heparin    Disposition; anticipate group home  Barrier to discharge; postop recovery, anemia, IV antibiotics     LOS: 4 days      Subjective:  Interval History: Patient seen and examined. Notes, labs, imaging reports personally reviewed.  Patient denied feeling short of breath or chest pain or dizziness.  Reported ongoing pain on her right lower extremity.  Denied abdomen pain, nausea, vomiting.  Denied fever or chills.  Discussed with nursing staffs at bedside.  Discussed with surgery team.  Discussed with patient's sister in detail about ongoing medical issues, management and disposition plan.    Review of Systems:   As mentioned in subjective.    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)     Accident due to mechanical fall without injury, initial encounter     Acute blood loss anemia     Adjustment insomnia     Anemia     Anxiety disorder, unspecified     Artificial knee joint present     Calculus of kidney     Chest pain, unspecified     Chronic gouty arthritis     Chronic pain disorder     Closed fracture of part of neck of femur (H)     Closed nondisplaced fracture of head of right radius with nonunion     Constipation     Atherosclerosis of coronary artery without angina pectoris     Coronary atherosclerosis     Alzheimer's disease (H)     Cognitive impairment     Dementia in other diseases classified elsewhere without behavioral disturbance (H)     Dementia (H)     Depression     Disease of lung     Drug overdose     Dyslipidemia     ACP (advance care planning)     Acute hypotension     Essential hypertension     Orthostatic hypotension     Fall, initial encounter     Gastroesophageal reflux disease with esophagitis     Gastro-esophageal reflux disease without esophagitis     Hematoma of right hip     Femur fracture, right (H)     History of cardiovascular disorder     History of closed head injury     History of right hip replacement     Status post total right knee replacement     History of thromboembolism of vein     Hypokalemia      Inability to walk     Leukopenia     Magnesium deficiency     Major depressive disorder, recurrent, unspecified (H)     Mild major depression, single episode (H)     Obesity     Old myocardial infarction     Other extrapyramidal disease and abnormal movement disorders     Overactive bladder     Parkinsonism, unspecified Parkinsonism type (H)     Personal history of fall     Postmenopausal atrophic vaginitis     Presence of coronary angioplasty implant and graft     Primary localized osteoarthrosis of ankle and foot     Primary osteoarthritis of both knees     Pure hypercholesterolemia     Restless legs syndrome     Right hip pain     Senile osteoporosis     Spine pain     Periprosthetic hip fracture, initial encounter     Stented coronary artery     Surgery follow-up     Traumatic brain injury (H)     RBBB     Xeroderma     Vitamin D deficiency     Vitamin B12 deficiency (non anemic)     Urine retention     Urinary tract infection     Urinary incontinence     Hematoma of skin     Pain of right lower leg     Knee injury, right, initial encounter     Type 2 diabetes mellitus with other specified complication, unspecified whether long term insulin use (H)       Scheduled Meds:    atorvastatin  40 mg Oral QPM     buPROPion  300 mg Oral Daily     carbidopa-levodopa  2 tablet Oral TID     - MEDICATION INSTRUCTIONS -   Does not apply See Admin Instructions     heparin ANTICOAGULANT  5,000 Units Subcutaneous Q8H     [Held by provider] metoprolol succinate ER  12.5 mg Oral Daily     midodrine  7.5 mg Oral TID w/meals     mirtazapine  15 mg Oral At Bedtime     piperacillin-tazobactam  3.375 g Intravenous Q8H     polyethylene glycol  17 g Oral Daily     pramipexole  0.5 mg Oral At Bedtime     senna-docusate  1 tablet Oral BID     sodium chloride (PF)  3 mL Intracatheter Q8H     sodium chloride (PF)  3 mL Intracatheter Q8H     traZODone  150 mg Oral At Bedtime     venlafaxine  300 mg Oral Daily     venlafaxine  37.5 mg Oral  Daily with breakfast     Continuous Infusions:  PRN Meds:.sodium chloride 0.9%, [DISCONTINUED] acetaminophen **OR** acetaminophen, acetaminophen, bisacodyl, lidocaine 4%, lidocaine (buffered or not buffered), magnesium hydroxide, naloxone **OR** naloxone **OR** naloxone **OR** naloxone, ondansetron **OR** ondansetron, oxyCODONE, prochlorperazine **OR** prochlorperazine, sodium chloride (PF), sodium chloride (PF)    Objective:  Vital signs in last 24 hours:  Temp:  [98.4  F (36.9  C)-99  F (37.2  C)] 98.4  F (36.9  C)  Pulse:  [76-89] 76  Resp:  [16-20] 18  BP: ()/(40-60) 81/44  SpO2:  [92 %-94 %] 94 %      Intake/Output Summary (Last 24 hours) at 12/24/2021 1322  Last data filed at 12/24/2021 0920  Gross per 24 hour   Intake 1220.3 ml   Output 1200 ml   Net 20.3 ml       Physical Exam:  General: Not in obvious distress.  HEENT: Normocephalic, supple  Chest: Clear to auscultation bilateral anteriorly, no wheezing  Heart: S1S2 normal, regular  Abdomen: Soft. NT, ND. Bowel sounds- active.  Extremities: Right lower extremity dressing/Ace wrap in place  Neuro: alert and awake, moves all extremities    Lab Results:(I have personally reviewed the results)    Recent Results (from the past 24 hour(s))   Hemoglobin    Collection Time: 12/23/21  2:42 PM   Result Value Ref Range    Hemoglobin 8.5 (L) 11.7 - 15.7 g/dL   Hemoglobin    Collection Time: 12/23/21 10:24 PM   Result Value Ref Range    Hemoglobin 8.3 (L) 11.7 - 15.7 g/dL   Basic metabolic panel    Collection Time: 12/24/21  5:36 AM   Result Value Ref Range    Sodium 140 136 - 145 mmol/L    Potassium 3.9 3.5 - 5.0 mmol/L    Chloride 107 98 - 107 mmol/L    Carbon Dioxide (CO2) 31 22 - 31 mmol/L    Anion Gap 2 (L) 5 - 18 mmol/L    Urea Nitrogen 12 8 - 28 mg/dL    Creatinine 0.69 0.60 - 1.10 mg/dL    Calcium 7.8 (L) 8.5 - 10.5 mg/dL    Glucose 97 70 - 125 mg/dL    GFR Estimate >90 >60 mL/min/1.73m2   CBC with platelets    Collection Time: 12/24/21  5:36 AM   Result  Value Ref Range    WBC Count 4.0 4.0 - 11.0 10e3/uL    RBC Count 2.83 (L) 3.80 - 5.20 10e6/uL    Hemoglobin 8.1 (L) 11.7 - 15.7 g/dL    Hematocrit 26.1 (L) 35.0 - 47.0 %    MCV 92 78 - 100 fL    MCH 28.6 26.5 - 33.0 pg    MCHC 31.0 (L) 31.5 - 36.5 g/dL    RDW 14.6 10.0 - 15.0 %    Platelet Count 112 (L) 150 - 450 10e3/uL         Serum Glucose range:   Recent Labs   Lab 12/24/21  0536 12/23/21  0455 12/22/21  1029 12/22/21  0834   GLC 97 107 115* 305*     ABG: No lab results found in last 7 days.  CBC:   Recent Labs   Lab 12/24/21  0536 12/23/21  2224 12/23/21  1442 12/23/21  1001 12/23/21  0455 12/21/21  1047 12/21/21  0550 12/20/21  2315 12/20/21  1753 12/20/21  1241   WBC 4.0  --   --   --  3.5*  --  3.7*  --  6.2 6.2   HGB 8.1* 8.3* 8.5*   < > 6.6*   < > 8.2*   < > 10.0* 10.1*   HCT 26.1*  --   --   --  22.8*  --  28.0*  --  33.7* 33.9*   MCV 92  --   --   --  95  --  96  --  96 94   *  --   --   --  103*  --  119*  --  150 153   NEUTROPHIL  --   --   --   --   --   --   --   --  72 81   LYMPH  --   --   --   --   --   --   --   --  19 13   MONOCYTE  --   --   --   --   --   --   --   --  7 5   EOSINOPHIL  --   --   --   --   --   --   --   --  2 1    < > = values in this interval not displayed.     Chemistry:   Recent Labs   Lab 12/24/21  0536 12/23/21  0455 12/21/21  0550 12/20/21  1005    142 146* 144   POTASSIUM 3.9 3.9 4.3 4.5   CHLORIDE 107 108* 109* 106   CO2 31 29 30 34*   BUN 12 16 23 30*   CR 0.69 0.71 0.67 0.83   GFRESTIMATED >90 90 88 71   MARY 7.8* 7.3* 7.5* 9.2   MAG  --   --   --  2.2   PROTTOTAL  --   --   --  7.2   ALBUMIN  --   --   --  3.5   AST  --   --   --  19   ALT  --   --   --  <9   ALKPHOS  --   --   --  90   BILITOTAL  --   --   --  0.5     Coags:  Recent Labs   Lab 12/20/21  1005   INR 1.09   PTT 34     Cardiac Markers:  No results for input(s): CKTOTAL, TROPONINI in the last 168 hours.     US Pelvis Complete without Transvaginal    Result Date: 12/20/2021  EXAM: US PELVIC  TRANSABDOMINAL LOCATION: [facility] DATE/TIME: 12/20/2021 5:13 PM INDICATION: CT showed enlarged uterus COMPARISON: CT angiogram abdomen and pelvis 12/20/2021 TECHNIQUE: Transabdominal scans were performed. FINDINGS: UTERUS: 13.1 x 8.2 x 7.6 cm. Normal in size and position with no masses. ENDOMETRIUM: Obscured by air within the endometrial canal. RIGHT OVARY: Nonvisualized, obscured by bowel content. LEFT OVARY: Nonvisualized, obscured by bowel content. No significant free fluid. Debris noted in the urinary bladder.     IMPRESSION: 1.  Air within the endometrial canal as described on recent abdomen/pelvis CT. 2.  Debris noted in the urinary bladder. 3.  Adnexa obscured by bowel gas. Ovaries not identified.     XR Femur Right 2 Views    Result Date: 12/20/2021  EXAM: XR FEMUR RIGHT 2 VIEW LOCATION: [facility] DATE/TIME: 12/20/2021 10:39 AM INDICATION: Fall, large hematoma just below right knee. COMPARISON: 8/25/2020.     IMPRESSION: No change in the right hip arthroplasty with longstem proximal femoral component and numerous cerclage wire fixation hardware about the proximal right femur. Lateral claw plate projects in unchanged position along the base of the right greater trochanter. Chronic healed deformity proximal right femur with heterotopic bone along the superolateral right hip, similar to prior. Right total knee arthroplasty. No acute displaced periprosthetic fracture identified. Diffuse bone demineralization. No sizable knee joint effusion.    XR Knee Left 1/2 Views    Result Date: 12/20/2021  EXAM: XR KNEE LT 1/2 VW LOCATION: [facility] DATE/TIME: 12/20/2021 10:39 AM INDICATION: Fall, bruising medial left knee. COMPARISON: None.     IMPRESSION: Left total knee arthroplasty with patellar resurfacing. No acute displaced periprosthetic fracture or convincing finding for component failure. No sizable left knee joint effusion. Diffuse bone  demineralization. Medial left knee soft tissue swelling.    XR Knee Right 1/2 Views    Result Date: 12/20/2021  EXAM: XR KNEE RT 1 /2 VW LOCATION: [facility] DATE/TIME: 12/20/2021 10:40 AM INDICATION: Fall, large skin hematoma. COMPARISON: Right femur radiographic exam 8/25/2020.     IMPRESSION: Right total knee arthroplasty redemonstrated. No acute displaced periprosthetic fracture is identified. No sizable right knee joint effusion. Diffuse bone demineralization. The distal tip of a right hip arthroplasty is noted with indolent-appearing periosteal new bone along the posterior adjacent femoral shaft.    XR Tibia & Fibula Left 2 Views    Result Date: 12/20/2021  EXAM: XR TIBIA and FIBULA LT 2 VW LOCATION: [facility] DATE/TIME: 12/20/2021 10:39 AM INDICATION: Fall, bruising medial left knee. COMPARISON: None.     IMPRESSION: Anatomic alignment left tibia and fibula. No acute displaced tibia or fibula fracture. Partial visualization of left knee arthroplasty. Distal left leg and bimalleolar ankle soft tissue swelling. Degenerative change in the left ankle and visualized foot. Heel spur.    XR Tibia & Fibula Right 2 Views    Result Date: 12/20/2021  EXAM: XR TIBIA and FIBULA RT 2 VW LOCATION: [facility] DATE/TIME: 12/20/2021 10:39 AM INDICATION: Fall, large skin hematoma below knee. COMPARISON: None.     IMPRESSION: Large soft tissue density along the anterolateral right leg extends craniocaudal approximately 23 cm and could represent the reported large hematoma in the lateral right leg. Anatomic alignment right tibia and fibula. No acute displaced right  tibia or fibula fracture identified. Diffuse bone demineralization. Partial visualization of right total knee arthroplasty. Mild-moderate tibiotalar osteoarthritis. Generalized right leg soft tissue swelling.    CTA Abdomen Pelvis Bilat Leg Runoff w Contr    Result Date: 12/20/2021  EXAM: CTA  ABDOMEN PELVIS BILAT LEG RUNOFF W CONTR LOCATION: [facility] DATE/TIME: 12/20/2021 2:19 PM INDICATION: Large hematoma right leg. Evaluate for contrast extravasation. COMPARISON: None. TECHNIQUE: Helical acquisition through the abdomen, pelvis, and bilateral lower extremities was performed during the arterial phase of contrast enhancement using IV Contrast. 2D and 3D reconstructions were performed by the CT technologist. Dose reduction  techniques were used. CONTRAST: isovue 370 100ml FINDINGS: AORTA: Normal caliber, tortuous visualized descending aorta. Mild calcified atherosclerotic changes of a normal caliber abdominal aorta. 2, small right renal arteries. Single left renal artery. No significant renal artery stenosis. Number caliber celiac and superior mesenteric arteries. Patent inferior mesenteric artery. RIGHT LEG: Minimal calcified changes of the common iliac artery. No iliac stenosis. Normal caliber common femoral, profunda femoris, superficial femoral and visualized popliteal arteries. Mid aspect of the popliteal arteries obscured by streak artifact from the knee arthroplasty. High origin of the anterior tibial artery. Venous contamination at the calf station. With this consideration appears to be a three-vessel runoff. No contrast extravasation within the large, subcutaneous hematoma at the lateral  aspect of the calf. LEFT LEG: Minimal calcified changes of the common iliac artery. No iliac stenosis. Normal caliber common femoral, profunda femoris, superficial femoral and visualized popliteal artery. Mid aspect of the popliteal arteries obscured by streak artifact from  the knee arthroplasty. Three-vessel runoff. LUNG BASES: Calcified granulomas within the visualized right middle lobe, with associated linear fibrosis. Surgical clips, posterior aspect of the mid mediastinum. HEPATOBILIARY: Arterial phase images of the liver are within normal limits. PANCREAS: Normal. SPLEEN: Normal.  "ADRENAL GLANDS: Normal. KIDNEYS: Both kidneys are negative for hydronephrosis. Multiple, bilateral calyceal calculi. The largest, branching calculus at the upper pole the right kidney measures approximately 8 x 10 mm. The largest calculus, within the posterior aspect of the lower pole the left kidney measures 5 mm. No hydronephrosis. BOWEL: Normal caliber loops of small bowel. Normal caliber loops of small bowel. Moderate amount of stool throughout a normal caliber colon. LYMPH NODES: Normal. PELVIC ORGANS: Enlarged uterus with a significant amount of gas within the endometrium. Small amount of free fluid within the pelvis. MUSCULOSKELETAL: Large, 21.1 x 10.6 x 5.2 cm subcutaneous hematoma at the lateral aspect of the proximal right calf. No contrast extravasation within this. Multilevel degenerative disc disease throughout the visualized thoracic spine. Hypertrophic changes visualized thoracic and upper lumbar spine. Right total hip arthroplasty with cerclage wires around the proximal femur. Bilateral knee arthroplasties. Degenerative changes bilateral feet.     CONCLUSION: 1.  Large subcutaneous hematoma the lateral superior aspect of the right calf. No contrast extravasation. 2.  Right leg: No inflow or femoropopliteal stenosis. Venous contamination at the calf station. Is considered and appears to be three-vessel runoff. 3.  Left leg: CTA is within normal limits. 4.  Enlarged uterus with gas within the endometrium. There is a nonspecific finding. Please correlate clinically to exclude infection. 5.  Bilateral nonobstructing renal calculi. 6.  Osteoarthritic changes visualized spine. Right total hip arthroplasty. Bilateral knee arthroplasties.    POC US Guidance Needle Placement    Result Date: 12/21/2021  Ultrasound was performed as guidance to an anesthesia procedure.  Click \"PACS images\" hyperlink below to view any stored images.  For specific procedure details, view procedure note authored by " anesthesia.      Latest radiology report personally reviewed.      The total time spent is 38 minutes, >50% time spent in coordination of care data gathering, charting, record review, discussion of test/medications/plan of care as mentioned above and disposition plan with patient/family. D/w consultants, nursing staffs and /CM.    Note created using dragon voice recognition software so sounds alike errors may have escaped editing.    12/24/2021   MARIO CHAMBERLAIN MD  HOSPITALIST, [facility]  PAGER NO. [phone]

## 2021-12-25 LAB
BACTERIA BLD CULT: NO GROWTH
BACTERIA BLD CULT: NO GROWTH
HGB BLD-MCNC: 7.9 G/DL (ref 11.7–15.7)
PLATELET # BLD AUTO: 126 10E3/UL (ref 150–450)

## 2021-12-25 PROCEDURE — 85018 HEMOGLOBIN: CPT | Performed by: INTERNAL MEDICINE

## 2021-12-25 PROCEDURE — 99232 SBSQ HOSP IP/OBS MODERATE 35: CPT | Performed by: INTERNAL MEDICINE

## 2021-12-25 PROCEDURE — 250N000013 HC RX MED GY IP 250 OP 250 PS 637: Performed by: INTERNAL MEDICINE

## 2021-12-25 PROCEDURE — 250N000013 HC RX MED GY IP 250 OP 250 PS 637: Performed by: SURGERY

## 2021-12-25 PROCEDURE — 85049 AUTOMATED PLATELET COUNT: CPT | Performed by: INTERNAL MEDICINE

## 2021-12-25 PROCEDURE — 99024 POSTOP FOLLOW-UP VISIT: CPT | Performed by: PHYSICIAN ASSISTANT

## 2021-12-25 PROCEDURE — 250N000011 HC RX IP 250 OP 636

## 2021-12-25 PROCEDURE — 250N000011 HC RX IP 250 OP 636: Performed by: SURGERY

## 2021-12-25 PROCEDURE — 250N000011 HC RX IP 250 OP 636: Performed by: INTERNAL MEDICINE

## 2021-12-25 PROCEDURE — 120N000001 HC R&B MED SURG/OB

## 2021-12-25 PROCEDURE — 36415 COLL VENOUS BLD VENIPUNCTURE: CPT | Performed by: INTERNAL MEDICINE

## 2021-12-25 RX ORDER — HYDROMORPHONE HYDROCHLORIDE 1 MG/ML
0.5 INJECTION, SOLUTION INTRAMUSCULAR; INTRAVENOUS; SUBCUTANEOUS EVERY 4 HOURS PRN
Status: DISCONTINUED | OUTPATIENT
Start: 2021-12-25 | End: 2021-12-29

## 2021-12-25 RX ORDER — ASPIRIN 81 MG/1
81 TABLET ORAL
Status: DISCONTINUED | OUTPATIENT
Start: 2021-12-26 | End: 2022-01-07 | Stop reason: HOSPADM

## 2021-12-25 RX ORDER — MAGNESIUM OXIDE 400 MG/1
400 TABLET ORAL DAILY
Status: DISCONTINUED | OUTPATIENT
Start: 2021-12-25 | End: 2022-01-07 | Stop reason: HOSPADM

## 2021-12-25 RX ORDER — HYDROMORPHONE HYDROCHLORIDE 1 MG/ML
INJECTION, SOLUTION INTRAMUSCULAR; INTRAVENOUS; SUBCUTANEOUS
Status: COMPLETED
Start: 2021-12-25 | End: 2021-12-25

## 2021-12-25 RX ORDER — MIDODRINE HYDROCHLORIDE 5 MG/1
10 TABLET ORAL
Status: DISCONTINUED | OUTPATIENT
Start: 2021-12-25 | End: 2022-01-07 | Stop reason: HOSPADM

## 2021-12-25 RX ORDER — ASPIRIN 325 MG
325 TABLET ORAL DAILY
Status: DISCONTINUED | OUTPATIENT
Start: 2021-12-25 | End: 2021-12-25

## 2021-12-25 RX ORDER — ASPIRIN 81 MG/1
81 TABLET ORAL DAILY
Status: DISCONTINUED | OUTPATIENT
Start: 2021-12-26 | End: 2021-12-25

## 2021-12-25 RX ORDER — ACETAMINOPHEN 325 MG/1
975 TABLET ORAL EVERY 8 HOURS
Status: COMPLETED | OUTPATIENT
Start: 2021-12-25 | End: 2021-12-30

## 2021-12-25 RX ADMIN — OXYCODONE HYDROCHLORIDE 10 MG: 5 TABLET ORAL at 19:09

## 2021-12-25 RX ADMIN — VENLAFAXINE HYDROCHLORIDE 300 MG: 150 CAPSULE, EXTENDED RELEASE ORAL at 08:29

## 2021-12-25 RX ADMIN — CARBIDOPA AND LEVODOPA 2 TABLET: 25; 100 TABLET ORAL at 08:28

## 2021-12-25 RX ADMIN — DOCUSATE SODIUM 50 MG AND SENNOSIDES 8.6 MG 1 TABLET: 8.6; 5 TABLET, FILM COATED ORAL at 21:08

## 2021-12-25 RX ADMIN — OXYCODONE HYDROCHLORIDE 10 MG: 5 TABLET ORAL at 10:18

## 2021-12-25 RX ADMIN — OXYCODONE HYDROCHLORIDE 10 MG: 5 TABLET ORAL at 14:51

## 2021-12-25 RX ADMIN — MAGNESIUM OXIDE TAB 400 MG (241.3 MG ELEMENTAL MG) 400 MG: 400 (241.3 MG) TAB at 10:42

## 2021-12-25 RX ADMIN — MIDODRINE HYDROCHLORIDE 10 MG: 5 TABLET ORAL at 13:28

## 2021-12-25 RX ADMIN — PRAMIPEXOLE DIHYDROCHLORIDE 0.5 MG: 0.5 TABLET ORAL at 21:10

## 2021-12-25 RX ADMIN — PIPERACILLIN AND TAZOBACTAM 3.38 G: 3; .375 INJECTION, POWDER, FOR SOLUTION INTRAVENOUS at 13:24

## 2021-12-25 RX ADMIN — MIDODRINE HYDROCHLORIDE 7.5 MG: 2.5 TABLET ORAL at 08:28

## 2021-12-25 RX ADMIN — MIDODRINE HYDROCHLORIDE 10 MG: 5 TABLET ORAL at 17:04

## 2021-12-25 RX ADMIN — CARBIDOPA AND LEVODOPA 2 TABLET: 25; 100 TABLET ORAL at 21:09

## 2021-12-25 RX ADMIN — ACETAMINOPHEN 975 MG: 325 TABLET ORAL at 08:28

## 2021-12-25 RX ADMIN — HYDROMORPHONE HYDROCHLORIDE 0.5 MG: 1 INJECTION, SOLUTION INTRAMUSCULAR; INTRAVENOUS; SUBCUTANEOUS at 10:17

## 2021-12-25 RX ADMIN — DOCUSATE SODIUM 50 MG AND SENNOSIDES 8.6 MG 1 TABLET: 8.6; 5 TABLET, FILM COATED ORAL at 08:30

## 2021-12-25 RX ADMIN — BUPROPION HYDROCHLORIDE 300 MG: 300 TABLET, EXTENDED RELEASE ORAL at 08:28

## 2021-12-25 RX ADMIN — CARBIDOPA AND LEVODOPA 2 TABLET: 25; 100 TABLET ORAL at 13:31

## 2021-12-25 RX ADMIN — POLYETHYLENE GLYCOL 3350 17 G: 17 POWDER, FOR SOLUTION ORAL at 08:28

## 2021-12-25 RX ADMIN — ACETAMINOPHEN 975 MG: 325 TABLET ORAL at 01:35

## 2021-12-25 RX ADMIN — ATORVASTATIN CALCIUM 40 MG: 40 TABLET, FILM COATED ORAL at 21:08

## 2021-12-25 RX ADMIN — VENLAFAXINE HYDROCHLORIDE 37.5 MG: 37.5 CAPSULE, EXTENDED RELEASE ORAL at 08:30

## 2021-12-25 RX ADMIN — HEPARIN SODIUM 5000 UNITS: 10000 INJECTION, SOLUTION INTRAVENOUS; SUBCUTANEOUS at 05:02

## 2021-12-25 RX ADMIN — OXYCODONE HYDROCHLORIDE 10 MG: 5 TABLET ORAL at 01:35

## 2021-12-25 RX ADMIN — OXYCODONE HYDROCHLORIDE 10 MG: 5 TABLET ORAL at 06:34

## 2021-12-25 RX ADMIN — PIPERACILLIN AND TAZOBACTAM 3.38 G: 3; .375 INJECTION, POWDER, FOR SOLUTION INTRAVENOUS at 04:54

## 2021-12-25 RX ADMIN — ACETAMINOPHEN 975 MG: 325 TABLET ORAL at 17:03

## 2021-12-25 RX ADMIN — TRAZODONE HYDROCHLORIDE 150 MG: 100 TABLET ORAL at 21:09

## 2021-12-25 RX ADMIN — HEPARIN SODIUM 5000 UNITS: 10000 INJECTION, SOLUTION INTRAVENOUS; SUBCUTANEOUS at 21:08

## 2021-12-25 RX ADMIN — MIRTAZAPINE 15 MG: 15 TABLET, FILM COATED ORAL at 21:09

## 2021-12-25 ASSESSMENT — ACTIVITIES OF DAILY LIVING (ADL)
ADLS_ACUITY_SCORE: 29
ADLS_ACUITY_SCORE: 21
ADLS_ACUITY_SCORE: 21
ADLS_ACUITY_SCORE: 23
ADLS_ACUITY_SCORE: 31
ADLS_ACUITY_SCORE: 21
ADLS_ACUITY_SCORE: 23
ADLS_ACUITY_SCORE: 21
ADLS_ACUITY_SCORE: 31
ADLS_ACUITY_SCORE: 27
ADLS_ACUITY_SCORE: 27
ADLS_ACUITY_SCORE: 21
ADLS_ACUITY_SCORE: 21
ADLS_ACUITY_SCORE: 29
ADLS_ACUITY_SCORE: 29
ADLS_ACUITY_SCORE: 21
ADLS_ACUITY_SCORE: 23
ADLS_ACUITY_SCORE: 21
ADLS_ACUITY_SCORE: 21
ADLS_ACUITY_SCORE: 31
ADLS_ACUITY_SCORE: 21
ADLS_ACUITY_SCORE: 29

## 2021-12-25 NOTE — PROGRESS NOTES
Daily Progress Note        CODE STATUS:  No CPR- Do NOT Intubate    12/24/21  Assessment/Plan:  Alison Bashir is a 71 year old old female group home resident with past medical history of parkinsonism, CAD, cognitive decline(but has been her own decision maker), HTN, Kashia, hx of TBI who had a fall during transitioning 3 days prior to presenting to ED for evaluation of severe right leg pain, found to have hematoma and admitted for further management    Tension hematoma of right lateral leg;  --Patient hit her leg on door at the half-way 3 days prior to admission.  --CTA abdomen/pelvis bilateral leg runoff reported large subcutaneous hematoma on lateral superior aspect of right calf, no contrast extravasation.  --On 2/21, underwent evacuation of hematoma by Dr. Subramanian.  --Continue local wound care as per general surgery recommendation.  Keep elevating right lower extremity.  --Continue PT, OT.  --On IV Zosyn, personally discussed with surgery team, reported 5 days course should be sufficient and finishing antibiotics later today.  --Currently on NWB on RLE, need to clarify with surgery team for duration.  If remains on prolonged nonweightbearing then may need to consider DVT prophylaxis on discharge.  Addendum;  --Surgery note from today reported patient can weight-bear on her leg    Acute blood loss anemia due to above;  --Patient received 2 unit packed RBC transfusion.    --Hemoglobin around 7-8 range.  Recheck in a.m.    Acute postoperative pain;  --Continue scheduled Tylenol.  Continue as needed oxycodone.    Chronic intermittent hypotension;  --Increased home midodrine from 7.5 mg 3 times daily to 10 mg 3 times daily on 12/25.  Monitor vitals closely    History of CAD;  --Currently no anginal symptoms  --Continue home statin, baby aspirin.    --Home metoprolol on hold due to intermittent low blood pressure      History of Parkinson;  --resume home meds   --PT/OT     H/o TBI;  History of anxiety and  depression;  --lives in  but makes own decisions   --PTA remeron, venlafaxine, trazodone, Wellbutrin     RLS; --Mirapex     DVT prophylaxis; subcu heparin    Disposition; anticipate group home  Barrier to discharge; postop recovery, anemia, IV antibiotics     LOS: 4 days     Subjective:  Interval History: Patient seen and examined. Notes, labs, imaging reports personally reviewed.  Patient denied feeling short of breath or chest pain.  Reported ongoing right lower extremity pain requiring narcotic pain medications.  Denied fever or chills.  Denied abdominal pain, nausea, vomiting.  Discussed with nursing staffs.   Discussed with patient's sister in detail yesterday, did not call her today as no new information to update.    Review of Systems:   As mentioned in subjective.    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)     Accident due to mechanical fall without injury, initial encounter     Acute blood loss anemia     Adjustment insomnia     Anemia     Anxiety disorder, unspecified     Artificial knee joint present     Calculus of kidney     Chest pain, unspecified     Chronic gouty arthritis     Chronic pain disorder     Closed fracture of part of neck of femur (H)     Closed nondisplaced fracture of head of right radius with nonunion     Constipation     Atherosclerosis of coronary artery without angina pectoris     Coronary atherosclerosis     Alzheimer's disease (H)     Cognitive impairment     Dementia in other diseases classified elsewhere without behavioral disturbance (H)     Dementia (H)     Depression     Disease of lung     Drug overdose     Dyslipidemia     ACP (advance care planning)     Acute hypotension     Essential hypertension     Orthostatic hypotension     Fall, initial encounter     Gastroesophageal reflux disease with esophagitis     Gastro-esophageal reflux disease without esophagitis     Hematoma of  right hip     Femur fracture, right (H)     History of cardiovascular disorder     History of closed head injury     History of right hip replacement     Status post total right knee replacement     History of thromboembolism of vein     Hypokalemia     Inability to walk     Leukopenia     Magnesium deficiency     Major depressive disorder, recurrent, unspecified (H)     Mild major depression, single episode (H)     Obesity     Old myocardial infarction     Other extrapyramidal disease and abnormal movement disorders     Overactive bladder     Parkinsonism, unspecified Parkinsonism type (H)     Personal history of fall     Postmenopausal atrophic vaginitis     Presence of coronary angioplasty implant and graft     Primary localized osteoarthrosis of ankle and foot     Primary osteoarthritis of both knees     Pure hypercholesterolemia     Restless legs syndrome     Right hip pain     Senile osteoporosis     Spine pain     Periprosthetic hip fracture, initial encounter     Stented coronary artery     Surgery follow-up     Traumatic brain injury (H)     RBBB     Xeroderma     Vitamin D deficiency     Vitamin B12 deficiency (non anemic)     Urine retention     Urinary tract infection     Urinary incontinence     Hematoma of skin     Pain of right lower leg     Knee injury, right, initial encounter     Type 2 diabetes mellitus with other specified complication, unspecified whether long term insulin use (H)       Scheduled Meds:    acetaminophen  975 mg Oral Q8H     atorvastatin  40 mg Oral QPM     buPROPion  300 mg Oral Daily     carbidopa-levodopa  2 tablet Oral TID     - MEDICATION INSTRUCTIONS -   Does not apply See Admin Instructions     heparin ANTICOAGULANT  5,000 Units Subcutaneous Q8H     [Held by provider] metoprolol succinate ER  12.5 mg Oral Daily     midodrine  7.5 mg Oral TID w/meals     mirtazapine  15 mg Oral At Bedtime     piperacillin-tazobactam  3.375 g Intravenous Q8H     polyethylene glycol  17 g Oral  Daily     pramipexole  0.5 mg Oral At Bedtime     senna-docusate  1 tablet Oral BID     sodium chloride (PF)  3 mL Intracatheter Q8H     sodium chloride (PF)  3 mL Intracatheter Q8H     traZODone  150 mg Oral At Bedtime     venlafaxine  300 mg Oral Daily     venlafaxine  37.5 mg Oral Daily with breakfast     Continuous Infusions:  PRN Meds:.sodium chloride 0.9%, [DISCONTINUED] acetaminophen **OR** acetaminophen, acetaminophen, bisacodyl, lidocaine 4%, lidocaine (buffered or not buffered), magnesium hydroxide, naloxone **OR** naloxone **OR** naloxone **OR** naloxone, ondansetron **OR** ondansetron, oxyCODONE, prochlorperazine **OR** prochlorperazine, sodium chloride (PF), sodium chloride (PF)    Objective:  Vital signs in last 24 hours:  Temp:  [98.3  F (36.8  C)-100.2  F (37.9  C)] 99  F (37.2  C)  Pulse:  [76-94] 88  Resp:  [18-20] 18  BP: ()/(44-63) 85/47  SpO2:  [90 %-94 %] 91 %      Intake/Output Summary (Last 24 hours) at 12/24/2021 1322  Last data filed at 12/24/2021 0920  Gross per 24 hour   Intake 1220.3 ml   Output 1200 ml   Net 20.3 ml       Physical Exam:  General: Not in obvious distress.  HEENT: Normocephalic, supple  Chest: Clear to auscultation bilateral anteriorly, no wheezing  Heart: S1S2 normal, regular  Abdomen: Soft. NT, ND. Bowel sounds- active.  Extremities: Right lower extremity dressing/Ace wrap in place, noticed mild swelling on all extremities  Neuro: alert and awake, moves all extremities    Lab Results:(I have personally reviewed the results)    Recent Results (from the past 24 hour(s))   Hemoglobin    Collection Time: 12/24/21  2:59 PM   Result Value Ref Range    Hemoglobin 8.4 (L) 11.7 - 15.7 g/dL   Hemoglobin    Collection Time: 12/25/21  7:29 AM   Result Value Ref Range    Hemoglobin 7.9 (L) 11.7 - 15.7 g/dL   Platelet count    Collection Time: 12/25/21  7:29 AM   Result Value Ref Range    Platelet Count 126 (L) 150 - 450 10e3/uL         Serum Glucose range:   Recent Labs   Lab  12/24/21  0536 12/23/21  0455 12/22/21  1029 12/22/21  0834   GLC 97 107 115* 305*     ABG: No lab results found in last 7 days.  CBC:   Recent Labs   Lab 12/25/21  0729 12/24/21  1459 12/24/21  0536 12/23/21  1001 12/23/21  0455 12/21/21  1047 12/21/21  0550 12/20/21  2315 12/20/21  1753 12/20/21  1241   WBC  --   --  4.0  --  3.5*  --  3.7*  --  6.2 6.2   HGB 7.9* 8.4* 8.1*   < > 6.6*   < > 8.2*   < > 10.0* 10.1*   HCT  --   --  26.1*  --  22.8*  --  28.0*  --  33.7* 33.9*   MCV  --   --  92  --  95  --  96  --  96 94   *  --  112*  --  103*  --  119*  --  150 153   NEUTROPHIL  --   --   --   --   --   --   --   --  72 81   LYMPH  --   --   --   --   --   --   --   --  19 13   MONOCYTE  --   --   --   --   --   --   --   --  7 5   EOSINOPHIL  --   --   --   --   --   --   --   --  2 1    < > = values in this interval not displayed.     Chemistry:   Recent Labs   Lab 12/24/21  0536 12/23/21  0455 12/21/21  0550 12/20/21  1005    142 146* 144   POTASSIUM 3.9 3.9 4.3 4.5   CHLORIDE 107 108* 109* 106   CO2 31 29 30 34*   BUN 12 16 23 30*   CR 0.69 0.71 0.67 0.83   GFRESTIMATED >90 90 88 71   MARY 7.8* 7.3* 7.5* 9.2   MAG  --   --   --  2.2   PROTTOTAL  --   --   --  7.2   ALBUMIN  --   --   --  3.5   AST  --   --   --  19   ALT  --   --   --  <9   ALKPHOS  --   --   --  90   BILITOTAL  --   --   --  0.5     Coags:  Recent Labs   Lab 12/20/21  1005   INR 1.09   PTT 34     Cardiac Markers:  No results for input(s): CKTOTAL, TROPONINI in the last 168 hours.     US Pelvis Complete without Transvaginal    Result Date: 12/20/2021  EXAM: US PELVIC TRANSABDOMINAL LOCATION: [facility] DATE/TIME: 12/20/2021 5:13 PM INDICATION: CT showed enlarged uterus COMPARISON: CT angiogram abdomen and pelvis 12/20/2021 TECHNIQUE: Transabdominal scans were performed. FINDINGS: UTERUS: 13.1 x 8.2 x 7.6 cm. Normal in size and position with no masses. ENDOMETRIUM: Obscured by air within the endometrial  canal. RIGHT OVARY: Nonvisualized, obscured by bowel content. LEFT OVARY: Nonvisualized, obscured by bowel content. No significant free fluid. Debris noted in the urinary bladder.     IMPRESSION: 1.  Air within the endometrial canal as described on recent abdomen/pelvis CT. 2.  Debris noted in the urinary bladder. 3.  Adnexa obscured by bowel gas. Ovaries not identified.     XR Femur Right 2 Views    Result Date: 12/20/2021  EXAM: XR FEMUR RIGHT 2 VIEW LOCATION: [facility] DATE/TIME: 12/20/2021 10:39 AM INDICATION: Fall, large hematoma just below right knee. COMPARISON: 8/25/2020.     IMPRESSION: No change in the right hip arthroplasty with longstem proximal femoral component and numerous cerclage wire fixation hardware about the proximal right femur. Lateral claw plate projects in unchanged position along the base of the right greater trochanter. Chronic healed deformity proximal right femur with heterotopic bone along the superolateral right hip, similar to prior. Right total knee arthroplasty. No acute displaced periprosthetic fracture identified. Diffuse bone demineralization. No sizable knee joint effusion.    XR Knee Left 1/2 Views    Result Date: 12/20/2021  EXAM: XR KNEE LT 1/2 VW LOCATION: [facility] DATE/TIME: 12/20/2021 10:39 AM INDICATION: Fall, bruising medial left knee. COMPARISON: None.     IMPRESSION: Left total knee arthroplasty with patellar resurfacing. No acute displaced periprosthetic fracture or convincing finding for component failure. No sizable left knee joint effusion. Diffuse bone demineralization. Medial left knee soft tissue swelling.    XR Knee Right 1/2 Views    Result Date: 12/20/2021  EXAM: XR KNEE RT 1 /2 VW LOCATION: [facility] DATE/TIME: 12/20/2021 10:40 AM INDICATION: Fall, large skin hematoma. COMPARISON: Right femur radiographic exam 8/25/2020.     IMPRESSION: Right total knee arthroplasty  redemonstrated. No acute displaced periprosthetic fracture is identified. No sizable right knee joint effusion. Diffuse bone demineralization. The distal tip of a right hip arthroplasty is noted with indolent-appearing periosteal new bone along the posterior adjacent femoral shaft.    XR Tibia & Fibula Left 2 Views    Result Date: 12/20/2021  EXAM: XR TIBIA and FIBULA LT 2 VW LOCATION: [facility] DATE/TIME: 12/20/2021 10:39 AM INDICATION: Fall, bruising medial left knee. COMPARISON: None.     IMPRESSION: Anatomic alignment left tibia and fibula. No acute displaced tibia or fibula fracture. Partial visualization of left knee arthroplasty. Distal left leg and bimalleolar ankle soft tissue swelling. Degenerative change in the left ankle and visualized foot. Heel spur.    XR Tibia & Fibula Right 2 Views    Result Date: 12/20/2021  EXAM: XR TIBIA and FIBULA RT 2 VW LOCATION: [facility] DATE/TIME: 12/20/2021 10:39 AM INDICATION: Fall, large skin hematoma below knee. COMPARISON: None.     IMPRESSION: Large soft tissue density along the anterolateral right leg extends craniocaudal approximately 23 cm and could represent the reported large hematoma in the lateral right leg. Anatomic alignment right tibia and fibula. No acute displaced right  tibia or fibula fracture identified. Diffuse bone demineralization. Partial visualization of right total knee arthroplasty. Mild-moderate tibiotalar osteoarthritis. Generalized right leg soft tissue swelling.    CTA Abdomen Pelvis Bilat Leg Runoff w Contr    Result Date: 12/20/2021  EXAM: CTA ABDOMEN PELVIS BILAT LEG RUNOFF W CONTR LOCATION: [facility] DATE/TIME: 12/20/2021 2:19 PM INDICATION: Large hematoma right leg. Evaluate for contrast extravasation. COMPARISON: None. TECHNIQUE: Helical acquisition through the abdomen, pelvis, and bilateral lower extremities was performed during the arterial phase of contrast  enhancement using IV Contrast. 2D and 3D reconstructions were performed by the CT technologist. Dose reduction  techniques were used. CONTRAST: isovue 370 100ml FINDINGS: AORTA: Normal caliber, tortuous visualized descending aorta. Mild calcified atherosclerotic changes of a normal caliber abdominal aorta. 2, small right renal arteries. Single left renal artery. No significant renal artery stenosis. Number caliber celiac and superior mesenteric arteries. Patent inferior mesenteric artery. RIGHT LEG: Minimal calcified changes of the common iliac artery. No iliac stenosis. Normal caliber common femoral, profunda femoris, superficial femoral and visualized popliteal arteries. Mid aspect of the popliteal arteries obscured by streak artifact from the knee arthroplasty. High origin of the anterior tibial artery. Venous contamination at the calf station. With this consideration appears to be a three-vessel runoff. No contrast extravasation within the large, subcutaneous hematoma at the lateral  aspect of the calf. LEFT LEG: Minimal calcified changes of the common iliac artery. No iliac stenosis. Normal caliber common femoral, profunda femoris, superficial femoral and visualized popliteal artery. Mid aspect of the popliteal arteries obscured by streak artifact from  the knee arthroplasty. Three-vessel runoff. LUNG BASES: Calcified granulomas within the visualized right middle lobe, with associated linear fibrosis. Surgical clips, posterior aspect of the mid mediastinum. HEPATOBILIARY: Arterial phase images of the liver are within normal limits. PANCREAS: Normal. SPLEEN: Normal. ADRENAL GLANDS: Normal. KIDNEYS: Both kidneys are negative for hydronephrosis. Multiple, bilateral calyceal calculi. The largest, branching calculus at the upper pole the right kidney measures approximately 8 x 10 mm. The largest calculus, within the posterior aspect of the lower pole the left kidney measures 5 mm. No hydronephrosis. BOWEL: Normal  "caliber loops of small bowel. Normal caliber loops of small bowel. Moderate amount of stool throughout a normal caliber colon. LYMPH NODES: Normal. PELVIC ORGANS: Enlarged uterus with a significant amount of gas within the endometrium. Small amount of free fluid within the pelvis. MUSCULOSKELETAL: Large, 21.1 x 10.6 x 5.2 cm subcutaneous hematoma at the lateral aspect of the proximal right calf. No contrast extravasation within this. Multilevel degenerative disc disease throughout the visualized thoracic spine. Hypertrophic changes visualized thoracic and upper lumbar spine. Right total hip arthroplasty with cerclage wires around the proximal femur. Bilateral knee arthroplasties. Degenerative changes bilateral feet.     CONCLUSION: 1.  Large subcutaneous hematoma the lateral superior aspect of the right calf. No contrast extravasation. 2.  Right leg: No inflow or femoropopliteal stenosis. Venous contamination at the calf station. Is considered and appears to be three-vessel runoff. 3.  Left leg: CTA is within normal limits. 4.  Enlarged uterus with gas within the endometrium. There is a nonspecific finding. Please correlate clinically to exclude infection. 5.  Bilateral nonobstructing renal calculi. 6.  Osteoarthritic changes visualized spine. Right total hip arthroplasty. Bilateral knee arthroplasties.    POC US Guidance Needle Placement    Result Date: 12/21/2021  Ultrasound was performed as guidance to an anesthesia procedure.  Click \"PACS images\" hyperlink below to view any stored images.  For specific procedure details, view procedure note authored by anesthesia.      Latest radiology report personally reviewed.    Note created using dragon voice recognition software so sounds alike errors may have escaped editing.    12/25/2021   MARIO CHAMBERLAIN MD  HOSPITALIST, [facility]  PAGER NO. [phone]                "

## 2021-12-25 NOTE — PROGRESS NOTES
Care Management Follow Up    Length of Stay (days): 5    Expected Discharge Date: 12/27/2021     Concerns to be Addressed:   IV abx, continue therapies, wound cares, Surgery following  Patient plan of care discussed at interdisciplinary rounds: Yes    Anticipated Discharge Disposition:   anticipated - Group home aware of needs for WOC cares and tammy list.     Anticipated Discharge Services:  Therapy REC is TCU ; CM discussed this with group home director Danielle on 12/23 -  able to accommodate pt return  Anticipated Discharge DME:        Referrals Placed by CM/SW:    Private pay costs discussed: Not applicable    Additional Information:    Anticipate pt to return to group home, once medically cleared.  ABX plan unknown at this time. Group home transport vs. MA rides.       Patient history : Group [place] has a tammy lift BUT would need us to send a sling home with pt. Wound cares are going to be done by  manager Sofy and she will teach staff to do wound cares per Alexa...     Current wound cares bid: Please place Xeroform on the surgical wound.  Gently wrapped with Kerlix starting from the toes up to the knee.  Gently wrap with a 4 inch Ace wrap from the toes all the way up to the knee.    Pt previously at [facility].     ANGELA Malone

## 2021-12-25 NOTE — PROGRESS NOTES
ASSESSMENT:  1. Accident due to mechanical fall without injury, initial encounter    2. Hematoma of skin    3. Pain of right lower leg    4. Type 2 diabetes mellitus with other specified complication, unspecified whether long term insulin use (H)    5. Knee injury, right, initial encounter    6. Intractable pain        Alison Bashir is a 71 year old female who is s/p hematoma evacuation right lower extremity  Leg is stable and no evidence of increasing erythema, induration or bleeding    PLAN:  -Continue with dressing changes twice daily  -Elevate right leg is much as possible as well as no pressure onto the right heel  -Patient can weight-bear onto her leg.   -Discharge when medically ready.    SUBJECTIVE:   She is having more pain in her right leg and right heel as well as her lower back.  She has issues with her back in the past.  She is afebrile.  Her blood pressure is soft.  Hemoglobin has been stable with a slight drift.      Patient Vitals for the past 24 hrs:   BP Temp Temp src Pulse Resp SpO2 Weight   12/25/21 0712 (!) 85/47 99  F (37.2  C) Oral 88 18 91 % --   12/25/21 0432 93/48 98.5  F (36.9  C) Oral 85 18 90 % --   12/24/21 2355 102/52 100.2  F (37.9  C) Axillary 90 18 90 % --   12/24/21 1908 131/63 98.3  F (36.8  C) Oral 94 18 93 % --   12/24/21 1701 -- -- -- -- -- -- 96.2 kg (212 lb)   12/24/21 1547 111/51 98.4  F (36.9  C) -- 83 20 93 % --   12/24/21 1407 124/60 -- -- 80 -- -- --   12/24/21 1157 (!) 81/44 -- -- 76 18 94 % --         PHYSICAL EXAM:  GEN: No acute distress, comfortable  Right leg her wound was evaluated and actually looks fairly good.  She does has a slight superficial skin that is discolored and will die off otherwise looks well.  No induration and leg is soft.  Slight erythema but no increasing erythema peripheral pulses left dorsal pedis greater than the right left is about 2+ and right is 1+.  He will appears to be slightly irritated and does not have any signs of ulceration or  injury  Output by Drain (mL) 12/23/21 0700 - 12/23/21 1459 12/23/21 1500 - 12/23/21 2259 12/23/21 2300 - 12/24/21 0659 12/24/21 0700 - 12/24/21 1459 12/24/21 1500 - 12/24/21 2259 12/24/21 2300 - 12/25/21 0659 12/25/21 0700 - 12/25/21 1041   Requested LDAs do not have output data documented.      EXT:No cyanosis, edema or obvious abnormalities    12/24 0700 - 12/25 0659  In: 1730 [P.O.:1730]  Out: 1000 [Urine:1000]    No results displayed because visit has over 200 results.   Recent Results (from the past 24 hour(s))   Hemoglobin    Collection Time: 12/24/21  2:59 PM   Result Value Ref Range    Hemoglobin 8.4 (L) 11.7 - 15.7 g/dL   Hemoglobin    Collection Time: 12/25/21  7:29 AM   Result Value Ref Range    Hemoglobin 7.9 (L) 11.7 - 15.7 g/dL   Platelet count    Collection Time: 12/25/21  7:29 AM   Result Value Ref Range    Platelet Count 126 (L) 150 - 450 10e3/uL               Vel Moore PA-C

## 2021-12-26 ENCOUNTER — APPOINTMENT (OUTPATIENT)
Dept: OCCUPATIONAL THERAPY | Facility: HOSPITAL | Age: 71
DRG: 580 | End: 2021-12-26
Payer: MEDICARE

## 2021-12-26 LAB
GLUCOSE BLDC GLUCOMTR-MCNC: 78 MG/DL (ref 70–99)
HGB BLD-MCNC: 8.2 G/DL (ref 11.7–15.7)
HOLD SPECIMEN: NORMAL
LACTATE SERPL-SCNC: 0.7 MMOL/L (ref 0.7–2)

## 2021-12-26 PROCEDURE — 83605 ASSAY OF LACTIC ACID: CPT | Performed by: STUDENT IN AN ORGANIZED HEALTH CARE EDUCATION/TRAINING PROGRAM

## 2021-12-26 PROCEDURE — 250N000013 HC RX MED GY IP 250 OP 250 PS 637: Performed by: SURGERY

## 2021-12-26 PROCEDURE — 250N000011 HC RX IP 250 OP 636: Performed by: SURGERY

## 2021-12-26 PROCEDURE — 250N000013 HC RX MED GY IP 250 OP 250 PS 637: Performed by: INTERNAL MEDICINE

## 2021-12-26 PROCEDURE — 99232 SBSQ HOSP IP/OBS MODERATE 35: CPT | Performed by: INTERNAL MEDICINE

## 2021-12-26 PROCEDURE — 36415 COLL VENOUS BLD VENIPUNCTURE: CPT | Performed by: STUDENT IN AN ORGANIZED HEALTH CARE EDUCATION/TRAINING PROGRAM

## 2021-12-26 PROCEDURE — 85018 HEMOGLOBIN: CPT | Performed by: INTERNAL MEDICINE

## 2021-12-26 PROCEDURE — 97535 SELF CARE MNGMENT TRAINING: CPT | Mod: GO

## 2021-12-26 PROCEDURE — 97110 THERAPEUTIC EXERCISES: CPT | Mod: GO

## 2021-12-26 PROCEDURE — 120N000001 HC R&B MED SURG/OB

## 2021-12-26 RX ADMIN — MIDODRINE HYDROCHLORIDE 10 MG: 5 TABLET ORAL at 09:26

## 2021-12-26 RX ADMIN — CARBIDOPA AND LEVODOPA 2 TABLET: 25; 100 TABLET ORAL at 14:22

## 2021-12-26 RX ADMIN — VENLAFAXINE HYDROCHLORIDE 37.5 MG: 37.5 CAPSULE, EXTENDED RELEASE ORAL at 09:27

## 2021-12-26 RX ADMIN — ACETAMINOPHEN 975 MG: 325 TABLET ORAL at 09:25

## 2021-12-26 RX ADMIN — ATORVASTATIN CALCIUM 40 MG: 40 TABLET, FILM COATED ORAL at 20:46

## 2021-12-26 RX ADMIN — MIRTAZAPINE 15 MG: 15 TABLET, FILM COATED ORAL at 20:46

## 2021-12-26 RX ADMIN — OXYCODONE HYDROCHLORIDE 10 MG: 5 TABLET ORAL at 18:22

## 2021-12-26 RX ADMIN — HEPARIN SODIUM 5000 UNITS: 10000 INJECTION, SOLUTION INTRAVENOUS; SUBCUTANEOUS at 14:22

## 2021-12-26 RX ADMIN — BUPROPION HYDROCHLORIDE 300 MG: 300 TABLET, EXTENDED RELEASE ORAL at 09:26

## 2021-12-26 RX ADMIN — HEPARIN SODIUM 5000 UNITS: 10000 INJECTION, SOLUTION INTRAVENOUS; SUBCUTANEOUS at 21:00

## 2021-12-26 RX ADMIN — ASPIRIN 81 MG: 81 TABLET, COATED ORAL at 09:24

## 2021-12-26 RX ADMIN — MIDODRINE HYDROCHLORIDE 10 MG: 5 TABLET ORAL at 16:30

## 2021-12-26 RX ADMIN — MAGNESIUM OXIDE TAB 400 MG (241.3 MG ELEMENTAL MG) 400 MG: 400 (241.3 MG) TAB at 09:24

## 2021-12-26 RX ADMIN — TRAZODONE HYDROCHLORIDE 150 MG: 100 TABLET ORAL at 20:46

## 2021-12-26 RX ADMIN — MIDODRINE HYDROCHLORIDE 10 MG: 5 TABLET ORAL at 12:10

## 2021-12-26 RX ADMIN — PRAMIPEXOLE DIHYDROCHLORIDE 0.5 MG: 0.5 TABLET ORAL at 20:46

## 2021-12-26 RX ADMIN — DOCUSATE SODIUM 50 MG AND SENNOSIDES 8.6 MG 1 TABLET: 8.6; 5 TABLET, FILM COATED ORAL at 20:45

## 2021-12-26 RX ADMIN — CARBIDOPA AND LEVODOPA 2 TABLET: 25; 100 TABLET ORAL at 20:46

## 2021-12-26 RX ADMIN — OXYCODONE HYDROCHLORIDE 10 MG: 5 TABLET ORAL at 12:10

## 2021-12-26 RX ADMIN — VENLAFAXINE HYDROCHLORIDE 300 MG: 150 CAPSULE, EXTENDED RELEASE ORAL at 09:25

## 2021-12-26 RX ADMIN — ACETAMINOPHEN 975 MG: 325 TABLET ORAL at 02:47

## 2021-12-26 RX ADMIN — ACETAMINOPHEN 975 MG: 325 TABLET ORAL at 16:31

## 2021-12-26 RX ADMIN — OXYCODONE HYDROCHLORIDE 10 MG: 5 TABLET ORAL at 02:53

## 2021-12-26 RX ADMIN — CARBIDOPA AND LEVODOPA 2 TABLET: 25; 100 TABLET ORAL at 09:24

## 2021-12-26 RX ADMIN — HEPARIN SODIUM 5000 UNITS: 10000 INJECTION, SOLUTION INTRAVENOUS; SUBCUTANEOUS at 07:07

## 2021-12-26 ASSESSMENT — ACTIVITIES OF DAILY LIVING (ADL)
ADLS_ACUITY_SCORE: 31
ADLS_ACUITY_SCORE: 29
ADLS_ACUITY_SCORE: 31
ADLS_ACUITY_SCORE: 29
ADLS_ACUITY_SCORE: 29
ADLS_ACUITY_SCORE: 31
ADLS_ACUITY_SCORE: 29
ADLS_ACUITY_SCORE: 31
ADLS_ACUITY_SCORE: 29
ADLS_ACUITY_SCORE: 31
ADLS_ACUITY_SCORE: 29
ADLS_ACUITY_SCORE: 31
ADLS_ACUITY_SCORE: 31
ADLS_ACUITY_SCORE: 29
ADLS_ACUITY_SCORE: 31
ADLS_ACUITY_SCORE: 29

## 2021-12-26 NOTE — PLAN OF CARE
Problem: Risk for Delirium  Goal: Improved Behavioral Control  Outcome: Improving  Goal: Improved Sleep  Outcome: Improving     Problem: Pain (Surgery Nonspecified)  Goal: Acceptable Pain Control  Intervention: Prevent or Manage Pain  Recent Flowsheet Documentation  Taken 12/26/2021 0253 by Amanda Luis RN  Pain Management Interventions: medication (see MAR)

## 2021-12-26 NOTE — PLAN OF CARE
"Problem: Pain Acute  Goal: Acceptable Pain Control and Functional Ability  Outcome: Improving   Pt complains of pain at hematoma site on RLE. PRN oxycodone given with a small amount of relief.     Pt's dressing changed today with xeroform, ABD, kerlix and 4\" ace wrap from foot to just below the knee. Old dressing had a moderate amount of serosanguinous drainage.     ASHLEY RODRIGUEZ RN  "
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
## 2021-12-26 NOTE — PLAN OF CARE
Problem: Adult Inpatient Plan of Care  Goal: Optimal Comfort and Wellbeing  Outcome: No Change     Problem: Risk for Delirium  Goal: Optimal Coping  Outcome: No Change     Problem: Pain Acute  Goal: Acceptable Pain Control and Functional Ability  Outcome: No Change   Patient continues to rate her pain consistently 9/10. Explained to patient if her pain number doesn't change, that could indicate that the medication is not effective ( is not working). Patient then stated her pain IS getting better with the medication. Will go over the rating indicators with patient again. Patient was premedicated for her dressing change as well as distraction used and the change went very well. Monitoring patient blood pressure and it is coming up with the Midodrine. Will report and follow through with MD for parameters and possibly unholding Lasix.

## 2021-12-26 NOTE — PROGRESS NOTES
General Surgery Progress Note    POST OP DAY  # 5 s/p R leg hematoma Evacuation.    Subjective:   Pt is feeling down.  She says her back pain is better but she still having considerable discomfort along her right leg below the knee where she has the open wound from the hematoma evacuation.    Vitals:    12/25/21 2345 12/26/21 0244 12/26/21 0348 12/26/21 0724   BP: 126/59 102/50 98/50 93/47   BP Location: Left arm Left arm Left arm Left arm   Patient Position:  Left side     Pulse: 86 87 85 82   Resp: 22 20 20 20   Temp: (!) 101.3  F (38.5  C) 99.9  F (37.7  C) 99  F (37.2  C) 98.7  F (37.1  C)   TempSrc: Oral Oral Oral Oral   SpO2: 91% (!) 87% 96% 96%   Weight:       Height:           Physical Exam:  Lungs:  CTA  CV:       RRR  Ab:       Soft, + BS,   Extremities: Lower extremities dressings in place and considerable swelling of the foot and of the thigh above the dressings.    Recent Labs   Lab 12/26/21  0257 12/25/21  0729 12/24/21  1459 12/24/21  0536   WBC  --   --   --  4.0   HGB 8.2* 7.9*   < > 8.1*   HCT  --   --   --  26.1*   PLT  --  126*  --  112*    < > = values in this interval not displayed.       Recent Labs   Lab 12/24/21  0536 12/21/21  0550 12/20/21  1005      < > 144   CO2 31   < > 34*   BUN 12   < > 30*   ALBUMIN  --   --  3.5   ALKPHOS  --   --  90   ALT  --   --  <9   AST  --   --  19    < > = values in this interval not displayed.       Assessment:  Pt is stable but her hemoglobin remains low and she has had significant pain associated with this leg.  I have asked her nurse to extend the Ace wrap dressings down to include the foot as it is extended all the way up to the below-knee location.    Plan: Extend the compressive dressings down to include the foot and ankle  Continue wound care      Jeff Amaro MD  [facility]  [phone]

## 2021-12-27 ENCOUNTER — APPOINTMENT (OUTPATIENT)
Dept: OCCUPATIONAL THERAPY | Facility: HOSPITAL | Age: 71
DRG: 580 | End: 2021-12-27
Payer: MEDICARE

## 2021-12-27 LAB
HOLD SPECIMEN: NORMAL
PLATELET # BLD AUTO: 144 10E3/UL (ref 150–450)
SARS-COV-2 RNA RESP QL NAA+PROBE: NEGATIVE

## 2021-12-27 PROCEDURE — 250N000013 HC RX MED GY IP 250 OP 250 PS 637: Performed by: SURGERY

## 2021-12-27 PROCEDURE — 36415 COLL VENOUS BLD VENIPUNCTURE: CPT | Performed by: SURGERY

## 2021-12-27 PROCEDURE — 82728 ASSAY OF FERRITIN: CPT | Performed by: INTERNAL MEDICINE

## 2021-12-27 PROCEDURE — 99024 POSTOP FOLLOW-UP VISIT: CPT | Performed by: SURGERY

## 2021-12-27 PROCEDURE — G0463 HOSPITAL OUTPT CLINIC VISIT: HCPCS

## 2021-12-27 PROCEDURE — 85049 AUTOMATED PLATELET COUNT: CPT | Performed by: SURGERY

## 2021-12-27 PROCEDURE — 250N000013 HC RX MED GY IP 250 OP 250 PS 637: Performed by: INTERNAL MEDICINE

## 2021-12-27 PROCEDURE — 97110 THERAPEUTIC EXERCISES: CPT | Mod: GO

## 2021-12-27 PROCEDURE — 97535 SELF CARE MNGMENT TRAINING: CPT | Mod: GO

## 2021-12-27 PROCEDURE — 87635 SARS-COV-2 COVID-19 AMP PRB: CPT | Performed by: INTERNAL MEDICINE

## 2021-12-27 PROCEDURE — 250N000011 HC RX IP 250 OP 636: Performed by: SURGERY

## 2021-12-27 PROCEDURE — 99232 SBSQ HOSP IP/OBS MODERATE 35: CPT | Performed by: INTERNAL MEDICINE

## 2021-12-27 PROCEDURE — 120N000001 HC R&B MED SURG/OB

## 2021-12-27 PROCEDURE — 250N000011 HC RX IP 250 OP 636: Performed by: INTERNAL MEDICINE

## 2021-12-27 RX ADMIN — MIRTAZAPINE 15 MG: 15 TABLET, FILM COATED ORAL at 21:56

## 2021-12-27 RX ADMIN — MIDODRINE HYDROCHLORIDE 10 MG: 5 TABLET ORAL at 12:01

## 2021-12-27 RX ADMIN — MIDODRINE HYDROCHLORIDE 10 MG: 5 TABLET ORAL at 18:05

## 2021-12-27 RX ADMIN — PRAMIPEXOLE DIHYDROCHLORIDE 0.5 MG: 0.5 TABLET ORAL at 21:56

## 2021-12-27 RX ADMIN — HEPARIN SODIUM 5000 UNITS: 10000 INJECTION, SOLUTION INTRAVENOUS; SUBCUTANEOUS at 05:57

## 2021-12-27 RX ADMIN — VENLAFAXINE HYDROCHLORIDE 300 MG: 150 CAPSULE, EXTENDED RELEASE ORAL at 08:26

## 2021-12-27 RX ADMIN — OXYCODONE HYDROCHLORIDE 10 MG: 5 TABLET ORAL at 03:05

## 2021-12-27 RX ADMIN — ATORVASTATIN CALCIUM 40 MG: 40 TABLET, FILM COATED ORAL at 21:56

## 2021-12-27 RX ADMIN — OXYCODONE HYDROCHLORIDE 10 MG: 5 TABLET ORAL at 14:22

## 2021-12-27 RX ADMIN — BUPROPION HYDROCHLORIDE 300 MG: 300 TABLET, EXTENDED RELEASE ORAL at 08:28

## 2021-12-27 RX ADMIN — MIDODRINE HYDROCHLORIDE 10 MG: 5 TABLET ORAL at 08:27

## 2021-12-27 RX ADMIN — ACETAMINOPHEN 975 MG: 325 TABLET ORAL at 16:33

## 2021-12-27 RX ADMIN — CARBIDOPA AND LEVODOPA 2 TABLET: 25; 100 TABLET ORAL at 08:27

## 2021-12-27 RX ADMIN — HEPARIN SODIUM 5000 UNITS: 10000 INJECTION, SOLUTION INTRAVENOUS; SUBCUTANEOUS at 14:22

## 2021-12-27 RX ADMIN — ACETAMINOPHEN 975 MG: 325 TABLET ORAL at 02:07

## 2021-12-27 RX ADMIN — CARBIDOPA AND LEVODOPA 2 TABLET: 25; 100 TABLET ORAL at 14:22

## 2021-12-27 RX ADMIN — MAGNESIUM OXIDE TAB 400 MG (241.3 MG ELEMENTAL MG) 400 MG: 400 (241.3 MG) TAB at 08:27

## 2021-12-27 RX ADMIN — OXYCODONE HYDROCHLORIDE 5 MG: 5 TABLET ORAL at 08:26

## 2021-12-27 RX ADMIN — OXYCODONE HYDROCHLORIDE 10 MG: 5 TABLET ORAL at 18:29

## 2021-12-27 RX ADMIN — DOCUSATE SODIUM 50 MG AND SENNOSIDES 8.6 MG 1 TABLET: 8.6; 5 TABLET, FILM COATED ORAL at 21:55

## 2021-12-27 RX ADMIN — CARBIDOPA AND LEVODOPA 2 TABLET: 25; 100 TABLET ORAL at 21:56

## 2021-12-27 RX ADMIN — VENLAFAXINE HYDROCHLORIDE 37.5 MG: 37.5 CAPSULE, EXTENDED RELEASE ORAL at 08:27

## 2021-12-27 RX ADMIN — TRAZODONE HYDROCHLORIDE 150 MG: 100 TABLET ORAL at 21:57

## 2021-12-27 RX ADMIN — ASPIRIN 81 MG: 81 TABLET, COATED ORAL at 08:27

## 2021-12-27 RX ADMIN — ACETAMINOPHEN 975 MG: 325 TABLET ORAL at 08:27

## 2021-12-27 ASSESSMENT — ACTIVITIES OF DAILY LIVING (ADL)
ADLS_ACUITY_SCORE: 29

## 2021-12-27 ASSESSMENT — MIFFLIN-ST. JEOR: SCORE: 1588.17

## 2021-12-27 NOTE — PROGRESS NOTES
General Surgery Progress Note:    Hospital Day # 7    ASSESSMENT:   1. Accident due to mechanical fall without injury, initial encounter    2. Hematoma of skin    3. Pain of right lower leg    4. Type 2 diabetes mellitus with other specified complication, unspecified whether long term insulin use (H)    5. Knee injury, right, initial encounter    6. Intractable pain        PLAN:   -The wound of the right lower extremity has been healing well.  The exposed wound bed is showing good granulation tissue.  The skin overlying the wound has demarcated itself at this time.  Recommendation is to proceed with a sharp excisional wound debridement to remove the nonviable tissue and prepared the rest of the wound bed for possible skin graft versus continued wound care.  I have explained the procedure to the patient.  The risks and benefits have also been explained.  The patient has verbalized understanding of the risk and benefits of the procedure.  She is willing to undergo the procedure.  -Plan is to discuss with the OR time for possible availabilities.  Likely surgery will be tomorrow with the acute care surgery team.  Further recommendations to follow once we have solidified or scheduling and plan.  -If plan for surgery tomorrow, will make the patient n.p.o. tonight.  -You for allowing us to participate in this patient's medical care.        SUBJECTIVE:   Alison Bashir was seen on a.m. rounds.  Her right lower extremity still is causing her pain.  The patient denies any fevers or chills.  Patient has no further complaints at this time.    Patient Vitals for the past 24 hrs:   BP Temp Temp src Pulse Resp SpO2 Weight   12/27/21 0543 -- -- -- -- -- -- 98.2 kg (216 lb 8 oz)   12/27/21 0320 111/56 98  F (36.7  C) Oral 79 18 91 % --   12/26/21 2337 108/63 98.1  F (36.7  C) Oral 79 18 92 % --   12/26/21 1900 126/75 98.5  F (36.9  C) Oral 89 18 92 % --   12/26/21 1515 100/49 98.4  F (36.9  C) Oral 82 18 93 % --   12/26/21 1209  96/51 98.1  F (36.7  C) Oral 79 18 98 % --       Physical Exam:  General: NAD, pleasant  LUNGS: Short shallow breathing.  ABD: Soft, nontender  EXT: Right lower extremity wound evaluated.  The skin that is nonviable has demarcated itself.  The exposed wound bed shows great granulation tissue.  There is no crepitus that can be appreciated.  There is no significant drainage from the site.          No results displayed because visit has over 200 results.           DO Johan Nicholson DO  General Surgery  Pager: [phone]
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
## 2021-12-27 NOTE — PLAN OF CARE
Problem: Adult Inpatient Plan of Care  Goal: Optimal Comfort and Wellbeing  Outcome: Improving     Problem: Risk for Delirium  Goal: Optimal Coping  Outcome: No Change     Problem: Pain Acute  Goal: Acceptable Pain Control and Functional Ability  Outcome: Improving     Problem: Urinary Incontinence  Goal: Effective Urinary Elimination  Outcome: No Change     Problem: Pain Acute  Goal: Acceptable Pain Control and Functional Ability  Intervention: Develop Pain Management Plan  Recent Flowsheet Documentation  Taken 12/26/2021 1822 by Elle Randolph RN  Pain Management Interventions:   medication (see MAR)   distraction   diversional activity provided   emotional support   Patient  with less complaint of pain this shift and less overt signs of distress. Premedicated for dressing change; which patient tolerated fairly well. Needed much encouragement and reassurance Patient had a calm restful evening.

## 2021-12-27 NOTE — PLAN OF CARE
Problem: Risk for Delirium  Goal: Improved Behavioral Control  Outcome: Improving     Problem: Risk for Delirium  Goal: Improved Sleep  Outcome: Improving     Problem: Pain Acute  Goal: Acceptable Pain Control and Functional Ability  Intervention: Develop Pain Management Plan  Recent Flowsheet Documentation  Taken 12/27/2021 0207 by Amanda Luis RN  Pain Management Interventions: medication (see MAR)     Problem: Urinary Retention  Goal: Effective Urinary Elimination  Outcome: Improving    Pt had an uneventful night.  Slept between cares. Scheduled tylenol and prn Oxycodone given to manage right leg pain.  Pt incontinent of urine and stool this shift.  Pt using call light appropriately.

## 2021-12-27 NOTE — PLAN OF CARE
Problem: Adult Inpatient Plan of Care  Goal: Plan of Care Review  Outcome: Change based on patient need/priority  Flowsheets (Taken 12/27/2021 1023)  Plan of Care Reviewed With: patient     Problem: Adult Inpatient Plan of Care  Goal: Absence of Hospital-Acquired Illness or Injury  Intervention: Identify and Manage Fall Risk  Recent Flowsheet Documentation  Taken 12/27/2021 0825 by Lulu Huff, RN  Safety Promotion/Fall Prevention: assistive device/personal items within reach     Problem: Risk for Delirium  Goal: Improved Attention and Thought Clarity  Outcome: Improving[patient alert to self and able to make needs known  Problem: Pain Acute  Goal: Acceptable Pain Control and Functional Ability  Outcome: Change based on patient need/priority  Intervention: Develop Pain Management Plan  Recent Flowsheet Documentation  Taken 12/27/2021 0879 by Lulu Huff, RN  Pain Management Interventions: medication (see MAR)-understands pain scale (0-10) and medicate as per mar  Problem: Urinary Incontinence  Goal: Effective Urinary Elimination  Outcome: Change based on patient need/priority-incontinent of bowel and bladder

## 2021-12-27 NOTE — PROGRESS NOTES
[facility]    Medicine Progress Note - Hospitalist Service    Date of Admission:  12/20/2021     Assessment & Plan   SUMMARY:  71 year old female with history of parkinsonism, CAD, cognitive decline(but has been her own decision maker), HTN, [place], hx of TBI who lives in group home, had a fall 3 days prior to presenting to ED, struck right leg, came for evaluation of severe right lower lateral leg pain, found to have hematoma under significant tension and admitted for further management.    Group home will need clear and achievable patient and wound care plan in place prior to discharge.  Tried calling sister Chelo today - voice mail.    ACTIVE PROBLEM LIST  Tension hematoma of right lateral leg;  --Patient hit her leg on door at the custodial 3 days prior to admission.  --CTA abdomen/pelvis bilateral leg runoff reported large subcutaneous hematoma on lateral superior aspect of right calf, no contrast extravasation.  --On second hospital day 2/21, underwent evacuation of hematoma by Dr. Subramanian.  --Continue local wound care as per general surgery recommendation.  Keep elevating right lower extremity.  --Continue PT, OT.  --On IV Zosyn, personally discussed with surgery team, reported 5 days course should be sufficient - finished 12/25  --Surgery reassessed today 12/27 - large area of non-viable skin/subq tissue. Debridement tomorrow. Skin graft vs weeks of wound care - TBD     Acute blood loss anemia due to above;  --Patient received 2 unit packed RBC transfusion.    --Hemoglobin around 8 grams/dL     Acute postoperative pain;  --Continue scheduled Tylenol.  Continue as needed oxycodone.     Chronic intermittent hypotension;  --Increased home midodrine from 7.5 mg 3 times daily to 10 mg 3 times daily on 12/25.  Monitor vitals closely     History of CAD;  --Currently no anginal symptoms  --Continue home statin, baby aspirin.    --Home metoprolol on hold due to intermittent low blood  pressure      History of Parkinson;  --resume home meds   --PT/OT     H/o TBI;  History of anxiety and depression;  --lives in  but makes own decisions   --PTA remeron, venlafaxine, trazodone, Wellbutrin     RLS; --Mirapex      DVT prophylaxis; subcu heparin    Principal Problem:    Leg hematoma, right, initial encounter  Active Problems:    Accident due to mechanical fall without injury, initial encounter    Acute blood loss anemia    Anxiety disorder, unspecified    Coronary atherosclerosis    Orthostatic hypotension    Parkinsonism, unspecified Parkinsonism type (H)    Restless legs syndrome    Traumatic brain injury (H)    Hematoma of skin    Pain of right lower leg    Knee injury, right, initial encounter    Overweight w BMI 25-29.9: Body mass index is 29.57 kg/m .     Diet: Orders Placed This Encounter      Regular Diet Adult    Molina Catheter: Not present  Central Lines/Port-a-cath: Not present  Drains: Not present    Disposition Plan: Expected discharge: 12/30/2021   recommended to Home once wound debrided and stable.     --------------------------------------------    The patient's care was discussed with the Group home director, Patient and Care Coordinator/.    Jeison Akins MD  Hospitalist Service  [facility]  Securely message with the Vocera Web Console (learn more here)  Text page via [employer] Paging/Directory    Clinically Significant Risk Factors Present on Admission             ______________________________________________________________________    Interval History   Patient notes pain right leg, but well controlled with meds.     ROS: Denies chest pain, denies shortness of breath, Moving bowels well, passing urine well, slept well and good appetite    Data personally reviewed from the last 24 hours:   Reviewed Laboratory results, Consultant recommendations and medications     Physical Exam   /49   Pulse 87   Temp 99.7  F (37.6  C) (Oral)   Resp 16    " 1.803 m (5' 11\")   Wt 98.2 kg (216 lb 8 oz)   SpO2 92%   BMI 30.20 kg/m      Physical Exam    General Appearance:    HEENT:  Awake, Alert, Cooperative, in no distress and appears stated age   Normocephalic, atraumatic, conjunctiva clear without icterus and ears without discharge   Lungs:   Clear to auscultation bilaterally, no wheezing, good air exchange, normal work of breathing   Cardiovascular:  Regular Rate and Rythm, normal apical impulse, normal S1 and S2, no lower extremity edema Left ankle   Abdomen: Soft, non-tender and Non-distended, active bowel sounds   Skin:  Non-viable tissue over right lateral calf - see wound care pix   Neurologic:    Neuropsychiatric: Alert & Oriented X 3, Facial symmetry preserved and upper & lower extremities moving well with symmetry  Calm, normal eye contact, Affect normal     Data   Recent Labs   Lab 12/27/21  0542 12/26/21  0257 12/25/21  0729 12/24/21  1459 12/24/21  0536 12/23/21  1001 12/23/21  0455 12/22/21  1120 12/22/21  1029 12/21/21  0841 12/21/21  0550   WBC  --   --   --   --  4.0  --  3.5*  --   --   --  3.7*   HGB  --  8.2* 7.9* 8.4* 8.1*   < > 6.6*   < >  --    < > 8.2*   MCV  --   --   --   --  92  --  95  --   --   --  96   *  --  126*  --  112*  --  103*  --   --   --  119*   NA  --   --   --   --  140  --  142  --   --   --  146*   POTASSIUM  --   --   --   --  3.9  --  3.9  --   --   --  4.3   CHLORIDE  --   --   --   --  107  --  108*  --   --   --  109*   CO2  --   --   --   --  31  --  29  --   --   --  30   BUN  --   --   --   --  12  --  16  --   --   --  23   CR  --   --   --   --  0.69  --  0.71  --   --   --  0.67   ANIONGAP  --   --   --   --  2*  --  5  --   --   --  7   MARY  --   --   --   --  7.8*  --  7.3*  --   --   --  7.5*   GLC  --   --   --   --  97  --  107  --  115*   < > 74    < > = values in this interval not displayed.     No results found for this or any previous visit (from the past 24 hour(s)).    "

## 2021-12-28 ENCOUNTER — ANESTHESIA (OUTPATIENT)
Dept: SURGERY | Facility: HOSPITAL | Age: 71
DRG: 580 | End: 2021-12-28
Payer: MEDICARE

## 2021-12-28 ENCOUNTER — ANESTHESIA EVENT (OUTPATIENT)
Dept: SURGERY | Facility: HOSPITAL | Age: 71
DRG: 580 | End: 2021-12-28
Payer: MEDICARE

## 2021-12-28 LAB
HGB BLD-MCNC: 7.8 G/DL (ref 11.7–15.7)
PLATELET # BLD AUTO: 167 10E3/UL (ref 150–450)

## 2021-12-28 PROCEDURE — 250N000011 HC RX IP 250 OP 636: Performed by: SURGERY

## 2021-12-28 PROCEDURE — 370N000017 HC ANESTHESIA TECHNICAL FEE, PER MIN: Performed by: SURGERY

## 2021-12-28 PROCEDURE — 85018 HEMOGLOBIN: CPT | Performed by: INTERNAL MEDICINE

## 2021-12-28 PROCEDURE — 11042 DBRDMT SUBQ TIS 1ST 20SQCM/<: CPT | Mod: 58 | Performed by: SURGERY

## 2021-12-28 PROCEDURE — 99232 SBSQ HOSP IP/OBS MODERATE 35: CPT | Performed by: INTERNAL MEDICINE

## 2021-12-28 PROCEDURE — 250N000009 HC RX 250: Performed by: SURGERY

## 2021-12-28 PROCEDURE — 250N000009 HC RX 250: Performed by: NURSE ANESTHETIST, CERTIFIED REGISTERED

## 2021-12-28 PROCEDURE — 250N000013 HC RX MED GY IP 250 OP 250 PS 637: Performed by: INTERNAL MEDICINE

## 2021-12-28 PROCEDURE — 360N000075 HC SURGERY LEVEL 2, PER MIN: Performed by: SURGERY

## 2021-12-28 PROCEDURE — 36415 COLL VENOUS BLD VENIPUNCTURE: CPT | Performed by: INTERNAL MEDICINE

## 2021-12-28 PROCEDURE — 0HBKXZZ EXCISION OF RIGHT LOWER LEG SKIN, EXTERNAL APPROACH: ICD-10-PCS | Performed by: SURGERY

## 2021-12-28 PROCEDURE — 120N000001 HC R&B MED SURG/OB

## 2021-12-28 PROCEDURE — 250N000011 HC RX IP 250 OP 636: Performed by: NURSE ANESTHETIST, CERTIFIED REGISTERED

## 2021-12-28 PROCEDURE — 250N000013 HC RX MED GY IP 250 OP 250 PS 637: Performed by: SURGERY

## 2021-12-28 PROCEDURE — 272N000001 HC OR GENERAL SUPPLY STERILE: Performed by: SURGERY

## 2021-12-28 PROCEDURE — 999N000141 HC STATISTIC PRE-PROCEDURE NURSING ASSESSMENT: Performed by: SURGERY

## 2021-12-28 PROCEDURE — 11045 DBRDMT SUBQ TISS EACH ADDL: CPT | Mod: 58 | Performed by: SURGERY

## 2021-12-28 PROCEDURE — 258N000003 HC RX IP 258 OP 636: Performed by: ANESTHESIOLOGY

## 2021-12-28 PROCEDURE — 250N000013 HC RX MED GY IP 250 OP 250 PS 637: Performed by: NURSE PRACTITIONER

## 2021-12-28 PROCEDURE — 258N000003 HC RX IP 258 OP 636: Performed by: NURSE ANESTHETIST, CERTIFIED REGISTERED

## 2021-12-28 PROCEDURE — 85049 AUTOMATED PLATELET COUNT: CPT | Performed by: INTERNAL MEDICINE

## 2021-12-28 RX ORDER — SODIUM CHLORIDE, SODIUM LACTATE, POTASSIUM CHLORIDE, CALCIUM CHLORIDE 600; 310; 30; 20 MG/100ML; MG/100ML; MG/100ML; MG/100ML
INJECTION, SOLUTION INTRAVENOUS CONTINUOUS
Status: DISCONTINUED | OUTPATIENT
Start: 2021-12-28 | End: 2021-12-28

## 2021-12-28 RX ORDER — DEXAMETHASONE SODIUM PHOSPHATE 4 MG/ML
INJECTION, SOLUTION INTRA-ARTICULAR; INTRALESIONAL; INTRAMUSCULAR; INTRAVENOUS; SOFT TISSUE PRN
Status: DISCONTINUED | OUTPATIENT
Start: 2021-12-28 | End: 2021-12-28

## 2021-12-28 RX ORDER — CEFAZOLIN SODIUM 2 G/100ML
2 INJECTION, SOLUTION INTRAVENOUS SEE ADMIN INSTRUCTIONS
Status: DISCONTINUED | OUTPATIENT
Start: 2021-12-28 | End: 2021-12-28

## 2021-12-28 RX ORDER — LIDOCAINE HYDROCHLORIDE AND EPINEPHRINE 10; 10 MG/ML; UG/ML
INJECTION, SOLUTION INFILTRATION; PERINEURAL PRN
Status: DISCONTINUED | OUTPATIENT
Start: 2021-12-28 | End: 2021-12-28 | Stop reason: HOSPADM

## 2021-12-28 RX ORDER — PROPOFOL 10 MG/ML
INJECTION, EMULSION INTRAVENOUS PRN
Status: DISCONTINUED | OUTPATIENT
Start: 2021-12-28 | End: 2021-12-28

## 2021-12-28 RX ORDER — PROPOFOL 10 MG/ML
INJECTION, EMULSION INTRAVENOUS CONTINUOUS PRN
Status: DISCONTINUED | OUTPATIENT
Start: 2021-12-28 | End: 2021-12-28

## 2021-12-28 RX ORDER — FENTANYL CITRATE 50 UG/ML
25 INJECTION, SOLUTION INTRAMUSCULAR; INTRAVENOUS
Status: CANCELLED | OUTPATIENT
Start: 2021-12-28

## 2021-12-28 RX ORDER — ONDANSETRON 2 MG/ML
INJECTION INTRAMUSCULAR; INTRAVENOUS PRN
Status: DISCONTINUED | OUTPATIENT
Start: 2021-12-28 | End: 2021-12-28

## 2021-12-28 RX ORDER — FENTANYL CITRATE 50 UG/ML
25 INJECTION, SOLUTION INTRAMUSCULAR; INTRAVENOUS EVERY 5 MIN PRN
Status: CANCELLED | OUTPATIENT
Start: 2021-12-28

## 2021-12-28 RX ORDER — LIDOCAINE HYDROCHLORIDE 20 MG/ML
INJECTION, SOLUTION INFILTRATION; PERINEURAL PRN
Status: DISCONTINUED | OUTPATIENT
Start: 2021-12-28 | End: 2021-12-28

## 2021-12-28 RX ORDER — ONDANSETRON 2 MG/ML
4 INJECTION INTRAMUSCULAR; INTRAVENOUS EVERY 30 MIN PRN
Status: DISCONTINUED | OUTPATIENT
Start: 2021-12-28 | End: 2021-12-30

## 2021-12-28 RX ORDER — HYDROMORPHONE HCL IN WATER/PF 6 MG/30 ML
0.2 PATIENT CONTROLLED ANALGESIA SYRINGE INTRAVENOUS EVERY 5 MIN PRN
Status: CANCELLED | OUTPATIENT
Start: 2021-12-28

## 2021-12-28 RX ORDER — FENTANYL CITRATE 50 UG/ML
INJECTION, SOLUTION INTRAMUSCULAR; INTRAVENOUS PRN
Status: DISCONTINUED | OUTPATIENT
Start: 2021-12-28 | End: 2021-12-28

## 2021-12-28 RX ORDER — FUROSEMIDE 20 MG
20 TABLET ORAL EVERY OTHER DAY
Status: DISCONTINUED | OUTPATIENT
Start: 2021-12-28 | End: 2022-01-07 | Stop reason: HOSPADM

## 2021-12-28 RX ORDER — OXYCODONE HYDROCHLORIDE 5 MG/1
5 TABLET ORAL EVERY 4 HOURS PRN
Status: DISCONTINUED | OUTPATIENT
Start: 2021-12-28 | End: 2021-12-28

## 2021-12-28 RX ORDER — ONDANSETRON 4 MG/1
4 TABLET, ORALLY DISINTEGRATING ORAL EVERY 30 MIN PRN
Status: DISCONTINUED | OUTPATIENT
Start: 2021-12-28 | End: 2021-12-30

## 2021-12-28 RX ORDER — CEFAZOLIN SODIUM 2 G/100ML
2 INJECTION, SOLUTION INTRAVENOUS
Status: COMPLETED | OUTPATIENT
Start: 2021-12-28 | End: 2021-12-28

## 2021-12-28 RX ADMIN — ACETAMINOPHEN 975 MG: 325 TABLET ORAL at 18:02

## 2021-12-28 RX ADMIN — OXYCODONE HYDROCHLORIDE 10 MG: 5 TABLET ORAL at 03:19

## 2021-12-28 RX ADMIN — TRAZODONE HYDROCHLORIDE 150 MG: 100 TABLET ORAL at 20:57

## 2021-12-28 RX ADMIN — CEFAZOLIN SODIUM 2 G: 2 INJECTION, SOLUTION INTRAVENOUS at 07:32

## 2021-12-28 RX ADMIN — CARBIDOPA AND LEVODOPA 2 TABLET: 25; 100 TABLET ORAL at 08:48

## 2021-12-28 RX ADMIN — SODIUM CHLORIDE, POTASSIUM CHLORIDE, SODIUM LACTATE AND CALCIUM CHLORIDE: 600; 310; 30; 20 INJECTION, SOLUTION INTRAVENOUS at 06:52

## 2021-12-28 RX ADMIN — ATORVASTATIN CALCIUM 40 MG: 40 TABLET, FILM COATED ORAL at 20:58

## 2021-12-28 RX ADMIN — OXYCODONE HYDROCHLORIDE 5 MG: 5 TABLET ORAL at 10:46

## 2021-12-28 RX ADMIN — ACETAMINOPHEN 975 MG: 325 TABLET ORAL at 08:49

## 2021-12-28 RX ADMIN — CARBIDOPA AND LEVODOPA 2 TABLET: 25; 100 TABLET ORAL at 20:56

## 2021-12-28 RX ADMIN — MAGNESIUM OXIDE TAB 400 MG (241.3 MG ELEMENTAL MG) 400 MG: 400 (241.3 MG) TAB at 08:50

## 2021-12-28 RX ADMIN — LIDOCAINE HYDROCHLORIDE 40 MG: 20 INJECTION, SOLUTION INFILTRATION; PERINEURAL at 07:30

## 2021-12-28 RX ADMIN — OXYCODONE HYDROCHLORIDE 2.5 MG: 5 TABLET ORAL at 22:09

## 2021-12-28 RX ADMIN — CARBIDOPA AND LEVODOPA 2 TABLET: 25; 100 TABLET ORAL at 13:38

## 2021-12-28 RX ADMIN — PROPOFOL 20 MG: 10 INJECTION, EMULSION INTRAVENOUS at 07:30

## 2021-12-28 RX ADMIN — VENLAFAXINE HYDROCHLORIDE 300 MG: 150 CAPSULE, EXTENDED RELEASE ORAL at 08:59

## 2021-12-28 RX ADMIN — DOCUSATE SODIUM 50 MG AND SENNOSIDES 8.6 MG 1 TABLET: 8.6; 5 TABLET, FILM COATED ORAL at 20:56

## 2021-12-28 RX ADMIN — PRAMIPEXOLE DIHYDROCHLORIDE 0.5 MG: 0.5 TABLET ORAL at 20:56

## 2021-12-28 RX ADMIN — ONDANSETRON 4 MG: 2 INJECTION INTRAMUSCULAR; INTRAVENOUS at 07:46

## 2021-12-28 RX ADMIN — MAGNESIUM HYDROXIDE 30 ML: 400 SUSPENSION ORAL at 20:56

## 2021-12-28 RX ADMIN — OXYCODONE HYDROCHLORIDE 2.5 MG: 5 TABLET ORAL at 18:02

## 2021-12-28 RX ADMIN — DEXAMETHASONE SODIUM PHOSPHATE 4 MG: 4 INJECTION, SOLUTION INTRA-ARTICULAR; INTRALESIONAL; INTRAMUSCULAR; INTRAVENOUS; SOFT TISSUE at 07:46

## 2021-12-28 RX ADMIN — PROPOFOL 75 MCG/KG/MIN: 10 INJECTION, EMULSION INTRAVENOUS at 07:30

## 2021-12-28 RX ADMIN — FUROSEMIDE 20 MG: 20 TABLET ORAL at 14:12

## 2021-12-28 RX ADMIN — VENLAFAXINE HYDROCHLORIDE 37.5 MG: 37.5 CAPSULE, EXTENDED RELEASE ORAL at 08:59

## 2021-12-28 RX ADMIN — FENTANYL CITRATE 50 MCG: 50 INJECTION, SOLUTION INTRAMUSCULAR; INTRAVENOUS at 07:32

## 2021-12-28 RX ADMIN — ACETAMINOPHEN 975 MG: 325 TABLET ORAL at 00:46

## 2021-12-28 RX ADMIN — PHENYLEPHRINE HYDROCHLORIDE 100 MCG: 10 INJECTION INTRAVENOUS at 07:46

## 2021-12-28 RX ADMIN — MIDODRINE HYDROCHLORIDE 10 MG: 5 TABLET ORAL at 18:02

## 2021-12-28 RX ADMIN — MIDODRINE HYDROCHLORIDE 10 MG: 5 TABLET ORAL at 12:06

## 2021-12-28 RX ADMIN — MIRTAZAPINE 15 MG: 15 TABLET, FILM COATED ORAL at 20:58

## 2021-12-28 RX ADMIN — PHENYLEPHRINE HYDROCHLORIDE 100 MCG: 10 INJECTION INTRAVENOUS at 07:54

## 2021-12-28 ASSESSMENT — ACTIVITIES OF DAILY LIVING (ADL)
ADLS_ACUITY_SCORE: 29

## 2021-12-28 NOTE — PROGRESS NOTES
General Surgery Progress Note:    Hospital Day # 8    ASSESSMENT:   1. Accident due to mechanical fall without injury, initial encounter    2. Hematoma of skin    3. Pain of right lower leg    4. Type 2 diabetes mellitus with other specified complication, unspecified whether long term insulin use (H)    5. Knee injury, right, initial encounter    6. Intractable pain          PLAN:   -Taken the patient to the OR today for a sharp excisional debridement of the right lower extremity wound.  - Risk and benefits of the procedure were explained detail to the patient.  Risk include infection, bleeding, damage to surrounding structures.  We discussed in detail regarding the healing process.  I explained to the patient that with such a large wound, there will be cosmesis issues with the healing process.  The options are a possible skin graft versus using biological grafting products.  We will discussed these options further after the procedure today.  -Patient verbalized understanding the risk and benefits of the procedure and provided consent to undergo the procedure above.      SUBJECTIVE:   Patient seen in the preoperative area.  We discussed the procedure for sharp excisional debridement of her right lower extremity wound after the hematoma.  The patient was allowed time to discuss all of her patient's concerns.    Patient Vitals for the past 24 hrs:   BP Temp Temp src Pulse Resp SpO2   12/28/21 0639 -- -- -- -- -- 94 %   12/28/21 0316 100/55 98.2  F (36.8  C) Oral 73 20 93 %   12/28/21 0041 (!) 88/51 99.1  F (37.3  C) Oral 72 20 92 %   12/27/21 2020 104/52 99.2  F (37.3  C) Oral 77 16 94 %   12/27/21 1631 103/49 -- -- -- -- --   12/27/21 1531 (!) 179/78 99.7  F (37.6  C) Oral 87 16 92 %   12/27/21 0943 100/52 98.4  F (36.9  C) Oral 78 16 91 %       Physical Exam:  General: NAD, pleasant  CV:reg rate  LUNGS: non labored breathing  ABD: soft, nontender  EXT: The patient's right lower extremity has surgical dressings in  place.  Patient still is able to move the lower extremity.  Patient has appropriate tenderness to palpation over the right lower extremity wound.  No appreciable crepitus.  Palpable pulses.    No results displayed because visit has over 200 results.           DO Johan Nicholson DO  General Surgeon  [facility]  Surgery [address]  [place] [address]?  Office: [phone]  Employed by - [facility] [employer]  Pager: [phone]

## 2021-12-28 NOTE — PLAN OF CARE
Occupational Therapy Discharge Summary    Reason for therapy discharge:    No further expectations of functional progress.    Progress towards therapy goal(s). See goals on Care Plan in [facility] electronic health record for goal details.  Goals not met.  Barriers to achieving goals:   limited tolerance for therapy.    Therapy recommendation(s):    No further therapy is recommended.Pt is at PLOF with her ADLs, lives at Group Home and will care for self.

## 2021-12-28 NOTE — ANESTHESIA PREPROCEDURE EVALUATION
Anesthesia Pre-Procedure Evaluation    Patient: Alison Bashir   MRN: [id number] : 1950        Preoperative Diagnosis: Hematoma of skin [T14.8XXA]    Procedure : Procedure(s):  SHARP EXCISIONAL DEBRIDEMENT OF RIGHT  LOWER EXTREMITY WOUND          Past Medical History:   Diagnosis Date     Acute blood loss anemia      Alzheimer disease (H)      CAD (coronary artery disease)      Chronic pain syndrome      Dementia (H)      Depression      Depression      Hip fracture, right (H) 2017     HTN (hypertension)      Kidney stone      Overactive bladder      PMB (postmenopausal bleeding)      RLS (restless legs syndrome)      Spine pain      Stented coronary artery      Traumatic brain injury (H)      Unsteady gait     uses walker     UTI (lower urinary tract infection)     on antibiotic course      Past Surgical History:   Procedure Laterality Date     ARTHROPLASTY REVISION HIP Right 01/10/2018      SECTION       CHOLECYSTECTOMY  May 2014     CORONARY STENT PLACEMENT       DILATION AND CURETTAGE, OPERATIVE HYSTEROSCOPY, COMBINED N/A 2014    Procedure: DILATION AND CURETTAGE WITH HYSTEROSCOPY;  Surgeon: Karime Cifuentes MD;  [place] [facility] [place]  Service:      HEMIARTHROPLASTY HIP Right 2017     INCISION AND DRAINAGE HIP Right 2018    ORIF REVISION      INCISION AND DRAINAGE LOWER EXTREMITY, COMBINED Right 2021    Procedure: HEMATOMA EVACUATION OF RIGHT LOWER EXTREMITY;  Surgeon: Johan Subramanian MD;  Location: [facility] [place]     LUNG REMOVAL, PARTIAL       TONSILLECTOMY & ADENOIDECTOMY       TOTAL KNEE ARTHROPLASTY Right 2016     TOTAL KNEE ARTHROPLASTY Left 2015      Allergies   Allergen Reactions     Blood-Group Specific Substance Other (See Comments)     Patient has anti-K. Blood product orders maybe delayed. Draw one red top and 2 purple top tubes for all Type and Screen/Red blood Cell product orders     Ibuprofen      Morphine Rash     Tolerates dilaudid       Social History     Tobacco Use     Smoking status: Never Smoker     Smokeless tobacco: Never Used   Substance Use Topics     Alcohol use: No      Wt Readings from Last 1 Encounters:   12/27/21 98.2 kg (216 lb 8 oz)        Anesthesia Evaluation   Pt has had prior anesthetic.         ROS/MED HX  ENT/Pulmonary:  - neg pulmonary ROS     Neurologic:     (+) Parkinson's disease,     Cardiovascular: Comment: EKG 10/1/20:  Normal sinus rhythm   Right bundle branch block   Abnormal ECG   When compared with ECG of 25-AUG-2020 21:04,   T wave inversion now evident in Anterior leads   Confirmed by MIREILLE JOSEPH MD LOC:JN ([id number]) on 10/1/2020 2:41:42 PM     Echo 6/9/20:  Narrative & Impression    Mild left atrial enlargement    Left ventricle ejection fraction is normal. The calculated left ventricular ejection fraction is 65% without wall motion abnormality.    Normal right ventricular size and systolic function.    Aortic valve sclerosis with mild aortic insufficiency.    When compared to the previous study dated 9/3/2011, no significant interval change noted.    (+) hypertension--CAD --stent- (-) murmur and wheezes   METS/Exercise Tolerance: >4 METS    Hematologic:  - neg hematologic  ROS     Musculoskeletal:  - neg musculoskeletal ROS     GI/Hepatic:     (+) GERD,     Renal/Genitourinary:     (+) renal disease,     Endo:     (+) Obesity,     Psychiatric/Substance Use:  - neg psychiatric ROS     Infectious Disease:  - neg infectious disease ROS     Malignancy:  - neg malignancy ROS     Other:  - neg other ROS          Physical Exam    Airway  airway exam normal      Mallampati: III   TM distance: > 3 FB   Neck ROM: limited   Mouth opening: > 3 cm    Respiratory Devices and Support         Dental       (+) upper dentures      Cardiovascular          Rhythm and rate: regular and normal (-) no murmur    Pulmonary           breath sounds clear to auscultation   (-) no wheezes        OUTSIDE LABS:  CBC:   Lab Results   Component  Value Date    WBC 4.0 12/24/2021    WBC 3.5 (L) 12/23/2021    HGB 8.2 (L) 12/26/2021    HGB 7.9 (L) 12/25/2021    HCT 26.1 (L) 12/24/2021    HCT 22.8 (L) 12/23/2021     (L) 12/27/2021     (L) 12/25/2021     BMP:   Lab Results   Component Value Date     12/24/2021     12/23/2021    POTASSIUM 3.9 12/24/2021    POTASSIUM 3.9 12/23/2021    CHLORIDE 107 12/24/2021    CHLORIDE 108 (H) 12/23/2021    CO2 31 12/24/2021    CO2 29 12/23/2021    BUN 12 12/24/2021    BUN 16 12/23/2021    CR 0.69 12/24/2021    CR 0.71 12/23/2021    GLC 97 12/24/2021     12/23/2021     COAGS:   Lab Results   Component Value Date    PTT 34 12/20/2021    INR 1.09 12/20/2021     POC: No results found for: BGM, HCG, HCGS  HEPATIC:   Lab Results   Component Value Date    ALBUMIN 3.5 12/20/2021    PROTTOTAL 7.2 12/20/2021    ALT <9 12/20/2021    AST 19 12/20/2021    ALKPHOS 90 12/20/2021    BILITOTAL 0.5 12/20/2021     OTHER:   Lab Results   Component Value Date    LACT 0.7 12/26/2021    A1C 5.4 09/29/2011    MARY 7.8 (L) 12/24/2021    MAG 2.2 12/20/2021    TSH 1.09 08/25/2020       Anesthesia Plan    ASA Status:  3   NPO Status:  NPO Appropriate    Anesthesia Type: MAC.     - Reason for MAC: straight local not clinically adequate      Maintenance: TIVA.        Consents    Anesthesia Plan(s) and associated risks, benefits, and realistic alternatives discussed. Questions answered and patient/representative(s) expressed understanding.     - Discussed: Risks, Benefits and Alternatives for BOTH SEDATION and the PROCEDURE were discussed     - Discussed with:  Patient      - Patient is DNR/DNI Status: Yes             Suspend during perioperative period? Yes.             Agree to: chemical resuscitation, chest compression/defibrillation.   Use of blood products discussed: Yes.     - Discussed with: Patient.     - Consented: consented to blood products            Reason for refusal: other.     Postoperative Care    Pain  management: IV analgesics, Oral pain medications, Multi-modal analgesia.   PONV prophylaxis: Ondansetron (or other 5HT-3), Dexamethasone or Solumedrol, Background Propofol Infusion     Comments:                Roe Guevara MD
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
## 2021-12-28 NOTE — PROGRESS NOTES
Care Management Follow Up    Length of Stay (days): 8    Expected Discharge Date: 12/30/2021       Concerns to be Addressed:   Sharp excisional debridement of right lower extremity would performed today 12/28/21.   Patient plan of care discussed at interdisciplinary rounds: Yes    Anticipated Discharge Disposition:  Group Home.     Anticipated Discharge Services:  Total care. May need home care for wound care. However, if Sofy is taught how to do the wound care here at the hospital, she will train other group home staff.   Anticipated Discharge DME:  Per therapy (if indicated). Has a wheelchair at home.     Patient/family educated on Medicare website which has current facility and service quality ratings:  NA  Education Provided on the Discharge Plan:  Per team  Patient/Family in Agreement with the Plan:  Yes     Referrals Placed by CM/SW:  None  Private pay costs discussed: Not applicable     Additional Information:  Patient has a history of Parkinson's and a TBI but is her own decision maker. She lives in a Group Home. Per staff at the group home, patient is becoming less mobile lately and is now requiring total cares. Per the Group Home staff, she normally was able to walk with the walker, but (over the past few weeks) she has been refusing and was using her wheelchair all of the time. She is still able to pivot transfer, but even that is getting harder. *[facility] is requesting that a tammy sling be sent back to the group home with patient. Sofy is requesting to be taught how to do the wound care for patient here in the hospital.   Contacts for the group home:  [facility]  [phone].  [facility]  for that house [phone].     Depending on timing of discharge, Group Home staff might be able to transport or may need [place] ride vs [facility] transport.    Jayleen Kelley RN

## 2021-12-28 NOTE — PROGRESS NOTES
Daily Progress Note        CODE STATUS:  No CPR- Do NOT Intubate    12/24/21  Assessment/Plan:  Alison Bashir is a 71 year old old female group home resident with past medical history of parkinsonism, CAD, cognitive decline(but has been her own decision maker), HTN, [place], hx of TBI who had a fall during transitioning 3 days prior to presenting to ED for evaluation of severe right leg pain, found to have hematoma and admitted for further management    Tension hematoma of right lateral leg s/p evacuation on 12/20;  Postoperative right leg wound;  --Patient hit her leg on door at the [facility] 3 days prior to admission.  --CTA abdomen/pelvis bilateral leg runoff reported large subcutaneous hematoma on lateral superior aspect of right calf, no contrast extravasation.  --On 12/21, underwent evacuation of hematoma by Dr. Subramanian.  --On 12/28, status post right leg wound debridement  --Completed 5 days course of IV Zosyn.  --Continue postoperative care, local wound care per surgery and wound care nurse.    Acute postoperative pain;  --Continue scheduled Tylenol, as needed oxycodone.  Monitor for sedation  --Consider NSAIDs if okay with surgery team.  We will also get pain team consult as patient sister is concerned with opiates use    Acute blood loss anemia due to above;  --Patient received 2 unit pRBC transfusion.    --Hemoglobin around 7-8 range.  Monitor hemoglobin closely    Chronic intermittent hypotension;  --Increased home midodrine from 7.5 mg 3 times daily to 10 mg 3 times daily on 12/25.  Monitor vitals closely    History of CAD;  --Currently no anginal symptoms  --Continue home statin, baby aspirin.    --Home metoprolol on hold due to intermittent low blood pressure      History of Parkinson;  --resumed home meds   --PT/OT     H/o TBI;  History of anxiety and depression;  --lives in  but makes own decisions   --PTA remeron, venlafaxine, trazodone, Wellbutrin     RLS; Mirapex     DVT prophylaxis; subcu  heparin    Disposition; anticipate group home  Barrier to discharge; postop recovery, anemia, IV antibiotics     LOS: 4 days     Subjective:  Interval History: Patient seen and examined. Notes, labs, imaging reports personally reviewed.  Patient had debridement this morning.  Sitting upright on the bed, looks comfortable.  However, complaining of right leg pain.  Discussed with nursing staff at bedside.  Discussed with patient's sister Chelo about ongoing medical issues and management.  Patient sister is specifically concerned about opiate use as reported issues with opiate use in the past and requesting to minimize as possible.  Will get pain team consult.    Review of Systems:   As mentioned in subjective.    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)     Accident due to mechanical fall without injury, initial encounter     Acute blood loss anemia     Adjustment insomnia     Anemia     Anxiety disorder, unspecified     Artificial knee joint present     Calculus of kidney     Chest pain, unspecified     Chronic gouty arthritis     Chronic pain disorder     Closed fracture of part of neck of femur (H)     Closed nondisplaced fracture of head of right radius with nonunion     Constipation     Atherosclerosis of coronary artery without angina pectoris     Coronary atherosclerosis     Alzheimer's disease (H)     Cognitive impairment     Dementia in other diseases classified elsewhere without behavioral disturbance (H)     Dementia (H)     Depression     Disease of lung     Drug overdose     Dyslipidemia     ACP (advance care planning)     Acute hypotension     Essential hypertension     Orthostatic hypotension     Fall, initial encounter     Gastroesophageal reflux disease with esophagitis     Gastro-esophageal reflux disease without esophagitis     Leg hematoma, right, initial encounter     Femur fracture, right (H)      History of cardiovascular disorder     History of closed head injury     History of right hip replacement     Status post total right knee replacement     History of thromboembolism of vein     Hypokalemia     Inability to walk     Leukopenia     Magnesium deficiency     Major depressive disorder, recurrent, unspecified (H)     Mild major depression, single episode (H)     Obesity     Old myocardial infarction     Other extrapyramidal disease and abnormal movement disorders     Overactive bladder     Parkinsonism, unspecified Parkinsonism type (H)     Personal history of fall     Postmenopausal atrophic vaginitis     Presence of coronary angioplasty implant and graft     Primary localized osteoarthrosis of ankle and foot     Primary osteoarthritis of both knees     Pure hypercholesterolemia     Restless legs syndrome     Right hip pain     Senile osteoporosis     Spine pain     Periprosthetic hip fracture, initial encounter     Stented coronary artery     Surgery follow-up     Traumatic brain injury (H)     RBBB     Xeroderma     Vitamin D deficiency     Vitamin B12 deficiency (non anemic)     Urine retention     Urinary tract infection     Urinary incontinence     Hematoma of skin     Pain of right lower leg     Knee injury, right, initial encounter     Type 2 diabetes mellitus with other specified complication, unspecified whether long term insulin use (H)       Scheduled Meds:    acetaminophen  975 mg Oral Q8H     aspirin  81 mg Oral Daily with breakfast     atorvastatin  40 mg Oral QPM     buPROPion  300 mg Oral Daily     carbidopa-levodopa  2 tablet Oral TID     ceFAZolin  2 g Intravenous See Admin Instructions     magnesium oxide  400 mg Oral Daily     [Held by provider] metoprolol succinate ER  12.5 mg Oral Daily     midodrine  10 mg Oral TID w/meals     mirtazapine  15 mg Oral At Bedtime     polyethylene glycol  17 g Oral Daily     pramipexole  0.5 mg Oral At Bedtime     senna-docusate  1 tablet Oral BID      sodium chloride (PF)  3 mL Intracatheter Q8H     traZODone  150 mg Oral At Bedtime     venlafaxine  300 mg Oral Daily     venlafaxine  37.5 mg Oral Daily with breakfast     Continuous Infusions:    lactated ringers 100 mL/hr at 12/28/21 0652     PRN Meds:.sodium chloride 0.9%, [DISCONTINUED] acetaminophen **OR** acetaminophen, acetaminophen, bisacodyl, HYDROmorphone, lidocaine 4%, lidocaine (buffered or not buffered), magnesium hydroxide, naloxone **OR** naloxone **OR** naloxone **OR** naloxone, ondansetron **OR** ondansetron, ondansetron **OR** ondansetron, oxyCODONE IR, oxyCODONE, prochlorperazine **OR** prochlorperazine, sodium chloride (PF)    Objective:  Vital signs in last 24 hours:  Temp:  [98  F (36.7  C)-99.7  F (37.6  C)] 98.4  F (36.9  C)  Pulse:  [72-88] 88  Resp:  [16-20] 18  BP: ()/(49-78) 116/66  SpO2:  [90 %-94 %] 92 %      Intake/Output Summary (Last 24 hours) at 12/24/2021 1322  Last data filed at 12/24/2021 0920  Gross per 24 hour   Intake 1220.3 ml   Output 1200 ml   Net 20.3 ml       Physical Exam:  General: Not in obvious distress.  HEENT: Normocephalic, supple  Chest: Clear to auscultation bilateral anteriorly, no wheezing  Heart: S1S2 normal, regular  Abdomen: Soft. NT, ND. Bowel sounds- active.  Extremities: Right lower extremity dressing/Ace wrap in place, noticed mild swelling on all extremities  Neuro: alert and awake, moves all extremities    Lab Results:(I have personally reviewed the results)    Recent Results (from the past 24 hour(s))   Asymptomatic COVID-19 Virus (Coronavirus) by PCR Nasopharyngeal    Collection Time: 12/27/21  2:23 PM    Specimen: Nasopharyngeal; Swab   Result Value Ref Range    SARS CoV2 PCR Negative Negative         Serum Glucose range:   Recent Labs   Lab 12/24/21  0536 12/23/21  0455 12/22/21  1029 12/22/21  0834   GLC 97 107 115* 305*     ABG: No lab results found in last 7 days.  CBC:   Recent Labs   Lab 12/27/21  0542 12/26/21  0257 12/25/21  0728  12/24/21  1459 12/24/21  0536 12/23/21  1001 12/23/21  0455   WBC  --   --   --   --  4.0  --  3.5*   HGB  --  8.2* 7.9* 8.4* 8.1*   < > 6.6*   HCT  --   --   --   --  26.1*  --  22.8*   MCV  --   --   --   --  92  --  95   *  --  126*  --  112*  --  103*    < > = values in this interval not displayed.     Chemistry:   Recent Labs   Lab 12/24/21  0536 12/23/21  0455    142   POTASSIUM 3.9 3.9   CHLORIDE 107 108*   CO2 31 29   BUN 12 16   CR 0.69 0.71   GFRESTIMATED >90 90   MARY 7.8* 7.3*     Coags:  No results for input(s): INR, PROTIME, PTT in the last 168 hours.    Invalid input(s): APTT  Cardiac Markers:  No results for input(s): CKTOTAL, TROPONINI in the last 168 hours.     US Pelvis Complete without Transvaginal    Result Date: 12/20/2021  EXAM: US PELVIC TRANSABDOMINAL LOCATION: [facility] DATE/TIME: 12/20/2021 5:13 PM INDICATION: CT showed enlarged uterus COMPARISON: CT angiogram abdomen and pelvis 12/20/2021 TECHNIQUE: Transabdominal scans were performed. FINDINGS: UTERUS: 13.1 x 8.2 x 7.6 cm. Normal in size and position with no masses. ENDOMETRIUM: Obscured by air within the endometrial canal. RIGHT OVARY: Nonvisualized, obscured by bowel content. LEFT OVARY: Nonvisualized, obscured by bowel content. No significant free fluid. Debris noted in the urinary bladder.     IMPRESSION: 1.  Air within the endometrial canal as described on recent abdomen/pelvis CT. 2.  Debris noted in the urinary bladder. 3.  Adnexa obscured by bowel gas. Ovaries not identified.     XR Femur Right 2 Views    Result Date: 12/20/2021  EXAM: XR FEMUR RIGHT 2 VIEW LOCATION: [facility] DATE/TIME: 12/20/2021 10:39 AM INDICATION: Fall, large hematoma just below right knee. COMPARISON: 8/25/2020.     IMPRESSION: No change in the right hip arthroplasty with longstem proximal femoral component and numerous cerclage wire fixation hardware about the proximal right femur. Lateral  claw plate projects in unchanged position along the base of the right greater trochanter. Chronic healed deformity proximal right femur with heterotopic bone along the superolateral right hip, similar to prior. Right total knee arthroplasty. No acute displaced periprosthetic fracture identified. Diffuse bone demineralization. No sizable knee joint effusion.    XR Knee Left 1/2 Views    Result Date: 12/20/2021  EXAM: XR KNEE LT 1/2 VW LOCATION: [facility] DATE/TIME: 12/20/2021 10:39 AM INDICATION: Fall, bruising medial left knee. COMPARISON: None.     IMPRESSION: Left total knee arthroplasty with patellar resurfacing. No acute displaced periprosthetic fracture or convincing finding for component failure. No sizable left knee joint effusion. Diffuse bone demineralization. Medial left knee soft tissue swelling.    XR Knee Right 1/2 Views    Result Date: 12/20/2021  EXAM: XR KNEE RT 1 /2 VW LOCATION: [facility] DATE/TIME: 12/20/2021 10:40 AM INDICATION: Fall, large skin hematoma. COMPARISON: Right femur radiographic exam 8/25/2020.     IMPRESSION: Right total knee arthroplasty redemonstrated. No acute displaced periprosthetic fracture is identified. No sizable right knee joint effusion. Diffuse bone demineralization. The distal tip of a right hip arthroplasty is noted with indolent-appearing periosteal new bone along the posterior adjacent femoral shaft.    XR Tibia & Fibula Left 2 Views    Result Date: 12/20/2021  EXAM: XR TIBIA and FIBULA LT 2 VW LOCATION: [facility] DATE/TIME: 12/20/2021 10:39 AM INDICATION: Fall, bruising medial left knee. COMPARISON: None.     IMPRESSION: Anatomic alignment left tibia and fibula. No acute displaced tibia or fibula fracture. Partial visualization of left knee arthroplasty. Distal left leg and bimalleolar ankle soft tissue swelling. Degenerative change in the left ankle and visualized foot. Heel spur.    XR  Tibia & Fibula Right 2 Views    Result Date: 12/20/2021  EXAM: XR TIBIA and FIBULA RT 2 VW LOCATION: [facility] DATE/TIME: 12/20/2021 10:39 AM INDICATION: Fall, large skin hematoma below knee. COMPARISON: None.     IMPRESSION: Large soft tissue density along the anterolateral right leg extends craniocaudal approximately 23 cm and could represent the reported large hematoma in the lateral right leg. Anatomic alignment right tibia and fibula. No acute displaced right  tibia or fibula fracture identified. Diffuse bone demineralization. Partial visualization of right total knee arthroplasty. Mild-moderate tibiotalar osteoarthritis. Generalized right leg soft tissue swelling.    CTA Abdomen Pelvis Bilat Leg Runoff w Contr    Result Date: 12/20/2021  EXAM: CTA ABDOMEN PELVIS BILAT LEG RUNOFF W CONTR LOCATION: [facility] DATE/TIME: 12/20/2021 2:19 PM INDICATION: Large hematoma right leg. Evaluate for contrast extravasation. COMPARISON: None. TECHNIQUE: Helical acquisition through the abdomen, pelvis, and bilateral lower extremities was performed during the arterial phase of contrast enhancement using IV Contrast. 2D and 3D reconstructions were performed by the CT technologist. Dose reduction  techniques were used. CONTRAST: isovue 370 100ml FINDINGS: AORTA: Normal caliber, tortuous visualized descending aorta. Mild calcified atherosclerotic changes of a normal caliber abdominal aorta. 2, small right renal arteries. Single left renal artery. No significant renal artery stenosis. Number caliber celiac and superior mesenteric arteries. Patent inferior mesenteric artery. RIGHT LEG: Minimal calcified changes of the common iliac artery. No iliac stenosis. Normal caliber common femoral, profunda femoris, superficial femoral and visualized popliteal arteries. Mid aspect of the popliteal arteries obscured by streak artifact from the knee arthroplasty. High origin of the anterior  tibial artery. Venous contamination at the calf station. With this consideration appears to be a three-vessel runoff. No contrast extravasation within the large, subcutaneous hematoma at the lateral  aspect of the calf. LEFT LEG: Minimal calcified changes of the common iliac artery. No iliac stenosis. Normal caliber common femoral, profunda femoris, superficial femoral and visualized popliteal artery. Mid aspect of the popliteal arteries obscured by streak artifact from  the knee arthroplasty. Three-vessel runoff. LUNG BASES: Calcified granulomas within the visualized right middle lobe, with associated linear fibrosis. Surgical clips, posterior aspect of the mid mediastinum. HEPATOBILIARY: Arterial phase images of the liver are within normal limits. PANCREAS: Normal. SPLEEN: Normal. ADRENAL GLANDS: Normal. KIDNEYS: Both kidneys are negative for hydronephrosis. Multiple, bilateral calyceal calculi. The largest, branching calculus at the upper pole the right kidney measures approximately 8 x 10 mm. The largest calculus, within the posterior aspect of the lower pole the left kidney measures 5 mm. No hydronephrosis. BOWEL: Normal caliber loops of small bowel. Normal caliber loops of small bowel. Moderate amount of stool throughout a normal caliber colon. LYMPH NODES: Normal. PELVIC ORGANS: Enlarged uterus with a significant amount of gas within the endometrium. Small amount of free fluid within the pelvis. MUSCULOSKELETAL: Large, 21.1 x 10.6 x 5.2 cm subcutaneous hematoma at the lateral aspect of the proximal right calf. No contrast extravasation within this. Multilevel degenerative disc disease throughout the visualized thoracic spine. Hypertrophic changes visualized thoracic and upper lumbar spine. Right total hip arthroplasty with cerclage wires around the proximal femur. Bilateral knee arthroplasties. Degenerative changes bilateral feet.     CONCLUSION: 1.  Large subcutaneous hematoma the lateral superior aspect of  the right calf. No contrast extravasation. 2.  Right leg: No inflow or femoropopliteal stenosis. Venous contamination at the calf station. Is considered and appears to be three-vessel runoff. 3.  Left leg: CTA is within normal limits. 4.  Enlarged uterus with gas within the endometrium. There is a nonspecific finding. Please correlate clinically to exclude infection. 5.  Bilateral nonobstructing renal calculi. 6.  Osteoarthritic changes visualized spine. Right total hip arthroplasty. Bilateral knee arthroplasties.    Latest radiology report personally reviewed.    Note created using dragon voice recognition software so sounds alike errors may have escaped editing.    12/28/2021   MARIO CHAMBERLAIN MD  HOSPITALIST, [facility]  PAGER NO. [phone]

## 2021-12-28 NOTE — ANESTHESIA POSTPROCEDURE EVALUATION
Patient: Alison Bashir    Procedure: Procedure(s):  SHARP EXCISIONAL DEBRIDEMENT OF RIGHT  LOWER EXTREMITY WOUND       Diagnosis:Hematoma of skin [T14.8XXA]  Diagnosis Additional Information: No value filed.    Anesthesia Type:  MAC    Note:  Disposition: Inpatient   Postop Pain Control: Uneventful            Sign Out: Well controlled pain   PONV: No   Neuro/Psych: Uneventful            Sign Out: Acceptable/Baseline neuro status   Airway/Respiratory: Uneventful            Sign Out: Acceptable/Baseline resp. status   CV/Hemodynamics: Uneventful            Sign Out: Acceptable CV status; No obvious hypovolemia; No obvious fluid overload   Other NRE: NONE   DID A NON-ROUTINE EVENT OCCUR? No           Last vitals:  Vitals Value Taken Time   /52 12/28/21 0915   Temp 36.7  C (98.1  F) 12/28/21 0915   Pulse 80 12/28/21 0915   Resp 18 12/28/21 0915   SpO2 92 % 12/28/21 0915       Electronically Signed By: Roe Guevara MD  December 28, 2021  10:00 AM

## 2021-12-28 NOTE — PLAN OF CARE
Problem: Risk for Delirium  Goal: Optimal Coping  Outcome: No Change   Calm, cooperative.  A little forgetful but does not score for delirium.  Problem: Risk for Delirium  Goal: Improved Attention and Thought Clarity  Outcome: No Change   Alert et oriented x3; a little unsure of time.  Problem: Urinary Retention  Goal: Effective Urinary Elimination  Outcome: No Change   Incontinent urine.  Problem: Pain (Surgery Nonspecified)  Goal: Acceptable Pain Control  Outcome: No Change  Intervention: Prevent or Manage Pain  Recent Flowsheet Documentation  Taken 12/28/2021 0319 by Pepper Bhandari RN  Pain Management Interventions: medication (see MAR)   C/o pain R leg. Continues to get PRN Oxycodone. Pt sleeping after getting this mid shift. To have I&D done to R leg today. Has been NPO for this.

## 2021-12-28 NOTE — PLAN OF CARE
Problem: Pain (Surgery Nonspecified)  Goal: Acceptable Pain Control  Outcome: No Change  Intervention: Prevent or Manage Pain  Recent Flowsheet Documentation  Taken 12/27/2021 1633 by Verena Moore RN  Pain Management Interventions: medication (see MAR)   Medicated with oxycodone and tylenol.    Problem: Infection (Surgery Nonspecified)  Goal: Absence of Infection Signs and Symptoms  Outcome: No Change   Dressing changed. Pt to have I and D tomorrow

## 2021-12-28 NOTE — PROGRESS NOTES
Nurse gave patient oxycodone 5mg - dropped pill and could not find - nurse then got another and gave to patient

## 2021-12-28 NOTE — BRIEF OP NOTE
[facility]    Brief Operative Note    Pre-operative diagnosis: Hematoma of skin [T14.8XXA]  Post-operative diagnosis right leg wound    Procedure: Procedure(s):  SHARP EXCISIONAL DEBRIDEMENT OF RIGHT  LOWER EXTREMITY WOUND  Surgeon: Surgeon(s) and Role:     * Johan Subramanian DO - Primary  Anesthesia: Monitor Anesthesia Care   Estimated Blood Loss: 10 cc    Drains: None  Specimens: * No specimens in log *  Findings:   The necrotic skin was sharply excised.  The exposed wound after removal of the nonviable skin flap measured 16 cm x 7 cm x 0.5 cm.  The base of the wound revealed good granulation tissue.  Complications: None.  Implants: * No implants in log *    Johan Subramanian DO  General Surgeon  [facility]  Surgery [facility] - [address] [address]?  Office: [phone]  Employed by [facility] [employer]  Pager: [phone]

## 2021-12-28 NOTE — PLAN OF CARE
Problem: Adult Inpatient Plan of Care  Goal: Absence of Hospital-Acquired Illness or Injury  Outcome: Change based on patient need/priority  Intervention: Prevent Skin Injury  Recent Flowsheet Documentation  Taken 12/28/2021 1030 by Lulu Huff RN  Body Position: position maintained     Problem: Risk for Delirium  Goal: Optimal Coping  Outcome: Change based on patient need/priority     Problem: Urinary Retention  Goal: Effective Urinary Elimination  Outcome: Change based on patient need/priority-patient voiding, incontinence  Problem: Bowel Motility Impaired (Surgery Nonspecified)  Goal: Effective Bowel Elimination  Outcome: Change based on patient need/priority-daily bowel movements  Problem: Pain (Surgery Nonspecified)  Goal: Acceptable Pain Control  Outcome: Change based on patient need/priority-understands pain scale (0-10)medicate as per mar

## 2021-12-28 NOTE — ANESTHESIA CARE TRANSFER NOTE
Patient: Alison Bashir    Procedure: Procedure(s):  SHARP EXCISIONAL DEBRIDEMENT OF RIGHT  LOWER EXTREMITY WOUND       Diagnosis: Hematoma of skin [T14.8XXA]  Diagnosis Additional Information: No value filed.    Anesthesia Type:   MAC     Note:    Oropharynx: oropharynx clear of all foreign objects  Level of Consciousness: awake  Oxygen Supplementation: room air    Independent Airway: airway patency satisfactory and stable  Dentition: dentition unchanged  Vital Signs Stable: post-procedure vital signs reviewed and stable  Report to RN Given: handoff report given  Patient transferred to: Medical/Surgical Unit    Handoff Report: Identifed the Patient, Identified the Reponsible Provider, Reviewed the pertinent medical history, Discussed the surgical course, Reviewed Intra-OP anesthesia mangement and issues during anesthesia, Set expectations for post-procedure period and Allowed opportunity for questions and acknowledgement of understanding      Vitals:  Vitals Value Taken Time   BP     Temp     Pulse     Resp     SpO2         Electronically Signed By: JABARI Chavez CRNA  December 28, 2021  8:02 AM

## 2021-12-28 NOTE — OP NOTE
[facility]    Operative Note    Pre-operative diagnosis: Hematoma of skin [T14.8XXA]  Post-operative diagnosis right leg wound    Procedure: Procedure(s):  SHARP EXCISIONAL DEBRIDEMENT OF RIGHT  LOWER EXTREMITY WOUND  Surgeon: Surgeon(s) and Role:     * Johan Subramanian, DO - Primary  Anesthesia: Monitor Anesthesia Care   Estimated Blood Loss: 10 cc    Drains: None  Specimens: * No specimens in log *  Findings:   The necrotic skin was sharply excised.  The exposed wound after removal of the nonviable skin flap measured 16 cm x 7 cm x 0.5 cm.  The base of the wound revealed good granulation tissue.  Complications: None.  Implants: * No implants in log *    Indication:   71 year old female who presented with a large RLE hematoma.  She underwent evacuation of the RLE hematoma, and wound care was continued until the nonviable skin demarcated itself.  The patient was evaluated on 12/27/21, and a procedure of sharp excisional debridement of the right lower extremity wound was offered.  Risks and benefits explained in detail to the patient.  Risks included infection, bleeding, damage to the surrounding structures and scarring of the wound.  The patient verbalized understanding and provided consent.      Procedure:   The patient was brought back to the OR and placed in a supine position.  The RLE was padded and positioned.  The patient underwent MAC sedation by the anesthesia team. The RLE was prepped and draped in the usual sterile fashion.  Prior to initiating the procedure a timeout was completed and all present were in agreement.      A 10 blade was used to sharply excise around the perimeter of the nonviable skin flap.  Rat tooth graspers were used to gently pull the nonviable tissue off the wound bed and electrocautery was used to continue debridement of the skin flap.  Once the entire skin flap was removed, a betadine scrub was used to gently remove the thin layer of hematoma that had formed  under the flap.  The wound bed was irrigated with copious amounts of sterile saline.  The original hematoma wound measured 21i43f2.5 cm.  The skin flap that was sharply excised from this original wound measured 16x7x0.5cm.  The wound was cleaned and dried.  20 ml of 1% lidocaine with epi was injected around the periphery of the wound.  Xeroform dressing was placed on the wound and covered with x2 abd dressings. The RLE was wrapped with kurlex and an ACE wrap.  At the end of the procedure, a final count was completed.  I was told all sharps, sponges, and instruments were accounted for.  The patient was transferred back to PACU in stable conditions.       Johan Subramanian DO  General Surgeon  [facility]  Surgery [facility] - [address] [address]?  Office: [phone]  Employed by - [facility]  Pager: [phone]

## 2021-12-29 ENCOUNTER — APPOINTMENT (OUTPATIENT)
Dept: PHYSICAL THERAPY | Facility: HOSPITAL | Age: 71
DRG: 580 | End: 2021-12-29
Payer: MEDICARE

## 2021-12-29 LAB
CREAT SERPL-MCNC: 0.67 MG/DL (ref 0.6–1.1)
GFR SERPL CREATININE-BSD FRML MDRD: >90 ML/MIN/1.73M2
GLUCOSE BLDC GLUCOMTR-MCNC: 107 MG/DL (ref 70–99)
GLUCOSE BLDC GLUCOMTR-MCNC: 99 MG/DL (ref 70–99)
HGB BLD-MCNC: 7.3 G/DL (ref 11.7–15.7)
POTASSIUM BLD-SCNC: 3.8 MMOL/L (ref 3.5–5)

## 2021-12-29 PROCEDURE — 85018 HEMOGLOBIN: CPT | Performed by: INTERNAL MEDICINE

## 2021-12-29 PROCEDURE — 97530 THERAPEUTIC ACTIVITIES: CPT | Mod: GP

## 2021-12-29 PROCEDURE — 120N000001 HC R&B MED SURG/OB

## 2021-12-29 PROCEDURE — 250N000013 HC RX MED GY IP 250 OP 250 PS 637: Performed by: NURSE PRACTITIONER

## 2021-12-29 PROCEDURE — 97110 THERAPEUTIC EXERCISES: CPT | Mod: GP

## 2021-12-29 PROCEDURE — 99222 1ST HOSP IP/OBS MODERATE 55: CPT | Performed by: NURSE PRACTITIONER

## 2021-12-29 PROCEDURE — 250N000013 HC RX MED GY IP 250 OP 250 PS 637: Performed by: SURGERY

## 2021-12-29 PROCEDURE — 99024 POSTOP FOLLOW-UP VISIT: CPT | Performed by: SURGERY

## 2021-12-29 PROCEDURE — 99232 SBSQ HOSP IP/OBS MODERATE 35: CPT | Performed by: INTERNAL MEDICINE

## 2021-12-29 PROCEDURE — 82565 ASSAY OF CREATININE: CPT | Performed by: INTERNAL MEDICINE

## 2021-12-29 PROCEDURE — 36415 COLL VENOUS BLD VENIPUNCTURE: CPT | Performed by: INTERNAL MEDICINE

## 2021-12-29 PROCEDURE — 250N000013 HC RX MED GY IP 250 OP 250 PS 637: Performed by: INTERNAL MEDICINE

## 2021-12-29 PROCEDURE — 84132 ASSAY OF SERUM POTASSIUM: CPT | Performed by: INTERNAL MEDICINE

## 2021-12-29 PROCEDURE — 83540 ASSAY OF IRON: CPT | Performed by: INTERNAL MEDICINE

## 2021-12-29 RX ORDER — TRAZODONE HYDROCHLORIDE 150 MG/1
TABLET ORAL
Qty: 30 TABLET | Refills: 11 | OUTPATIENT
Start: 2021-12-29

## 2021-12-29 RX ORDER — MULTIVIT WITH MINERALS/LUTEIN
500 TABLET ORAL DAILY
Status: DISCONTINUED | OUTPATIENT
Start: 2021-12-29 | End: 2022-01-07 | Stop reason: HOSPADM

## 2021-12-29 RX ORDER — MULTIVITAMIN,THERAPEUTIC
1 TABLET ORAL DAILY
Status: DISCONTINUED | OUTPATIENT
Start: 2021-12-29 | End: 2022-01-07 | Stop reason: HOSPADM

## 2021-12-29 RX ADMIN — OXYCODONE HYDROCHLORIDE 2.5 MG: 5 TABLET ORAL at 05:35

## 2021-12-29 RX ADMIN — MAGNESIUM OXIDE TAB 400 MG (241.3 MG ELEMENTAL MG) 400 MG: 400 (241.3 MG) TAB at 08:27

## 2021-12-29 RX ADMIN — CARBIDOPA AND LEVODOPA 2 TABLET: 25; 100 TABLET ORAL at 13:37

## 2021-12-29 RX ADMIN — VENLAFAXINE HYDROCHLORIDE 37.5 MG: 37.5 CAPSULE, EXTENDED RELEASE ORAL at 08:27

## 2021-12-29 RX ADMIN — MIDODRINE HYDROCHLORIDE 10 MG: 5 TABLET ORAL at 11:31

## 2021-12-29 RX ADMIN — THERA TABS 1 TABLET: TAB at 13:37

## 2021-12-29 RX ADMIN — MIRTAZAPINE 15 MG: 15 TABLET, FILM COATED ORAL at 22:45

## 2021-12-29 RX ADMIN — ACETAMINOPHEN 975 MG: 325 TABLET ORAL at 08:26

## 2021-12-29 RX ADMIN — DOCUSATE SODIUM 50 MG AND SENNOSIDES 8.6 MG 1 TABLET: 8.6; 5 TABLET, FILM COATED ORAL at 08:26

## 2021-12-29 RX ADMIN — TRAZODONE HYDROCHLORIDE 150 MG: 100 TABLET ORAL at 22:45

## 2021-12-29 RX ADMIN — VENLAFAXINE HYDROCHLORIDE 300 MG: 150 CAPSULE, EXTENDED RELEASE ORAL at 08:28

## 2021-12-29 RX ADMIN — POLYETHYLENE GLYCOL 3350 17 G: 17 POWDER, FOR SOLUTION ORAL at 08:28

## 2021-12-29 RX ADMIN — BUPROPION HYDROCHLORIDE 300 MG: 300 TABLET, EXTENDED RELEASE ORAL at 08:28

## 2021-12-29 RX ADMIN — ACETAMINOPHEN 975 MG: 325 TABLET ORAL at 18:19

## 2021-12-29 RX ADMIN — OXYCODONE HYDROCHLORIDE 5 MG: 5 TABLET ORAL at 10:34

## 2021-12-29 RX ADMIN — OXYCODONE HYDROCHLORIDE 2.5 MG: 5 TABLET ORAL at 18:18

## 2021-12-29 RX ADMIN — PRAMIPEXOLE DIHYDROCHLORIDE 0.5 MG: 0.5 TABLET ORAL at 22:45

## 2021-12-29 RX ADMIN — ASPIRIN 81 MG: 81 TABLET, COATED ORAL at 08:27

## 2021-12-29 RX ADMIN — CARBIDOPA AND LEVODOPA 2 TABLET: 25; 100 TABLET ORAL at 08:26

## 2021-12-29 RX ADMIN — DOCUSATE SODIUM 50 MG AND SENNOSIDES 8.6 MG 1 TABLET: 8.6; 5 TABLET, FILM COATED ORAL at 22:45

## 2021-12-29 RX ADMIN — MIDODRINE HYDROCHLORIDE 10 MG: 5 TABLET ORAL at 08:26

## 2021-12-29 RX ADMIN — ACETAMINOPHEN 975 MG: 325 TABLET ORAL at 00:49

## 2021-12-29 RX ADMIN — CARBIDOPA AND LEVODOPA 2 TABLET: 25; 100 TABLET ORAL at 22:45

## 2021-12-29 RX ADMIN — Medication 500 MG: at 13:37

## 2021-12-29 RX ADMIN — ATORVASTATIN CALCIUM 40 MG: 40 TABLET, FILM COATED ORAL at 22:45

## 2021-12-29 RX ADMIN — OXYCODONE HYDROCHLORIDE 2.5 MG: 5 TABLET ORAL at 22:44

## 2021-12-29 ASSESSMENT — ACTIVITIES OF DAILY LIVING (ADL)
ADLS_ACUITY_SCORE: 29
ADLS_ACUITY_SCORE: 25
ADLS_ACUITY_SCORE: 25
ADLS_ACUITY_SCORE: 29
ADLS_ACUITY_SCORE: 25
ADLS_ACUITY_SCORE: 29
ADLS_ACUITY_SCORE: 29
ADLS_ACUITY_SCORE: 25
ADLS_ACUITY_SCORE: 29

## 2021-12-29 NOTE — PLAN OF CARE
Problem: Adult Inpatient Plan of Care  Goal: Absence of Hospital-Acquired Illness or Injury  Intervention: Identify and Manage Fall Risk  Recent Flowsheet Documentation  Taken 12/29/2021 0900 by Josh Alicia RN  Safety Promotion/Fall Prevention: assistive device/personal items within reach  Intervention: Prevent and Manage VTE (Venous Thromboembolism) Risk  Recent Flowsheet Documentation  Taken 12/29/2021 0900 by Josh Alicia RN  VTE Prevention/Management: anticoagulant therapy adjusted  Goal: Optimal Comfort and Wellbeing  Outcome: Improving  Goal: Readiness for Transition of Care  Outcome: Improving   Pt is alert and oriented x4. Pt is med complaint. Pt received pain med for back ache. Pt's v/s stable. Pt had loose stool several time today. Continue to monitor pt.   DISPLAY PLAN FREE TEXT

## 2021-12-29 NOTE — PROGRESS NOTES
Care Management Follow Up    Length of Stay (days): 9    Expected Discharge Date: 12/30/2021- 12/31/2021     Concerns to be Addressed: Pain Service consult, Surgery following, continued therapies, wound care    Patient plan of care discussed at interdisciplinary rounds: Yes    Anticipated Discharge Disposition: Group Home with home care     Anticipated Discharge Services: wound care, in-home physical therapy  Anticipated Discharge DME: per therapy (if needed)    Patient/family educated on Medicare website which has current facility and service quality ratings: other (see comments) (N/A)  Education Provided on the Discharge Plan:  Yes, per care team  Patient/Family in Agreement with the Plan: yes    Referrals Placed by CM/SW:  None at this time  Private pay costs discussed: Not applicable at this time    Additional Information:  Chart reviewed and updates with care team done.  RNCM called and spoke with Sofy from [facility] to provide updates on patient status and discharge planning.  Sofy states she spoke with Alison today but wanted to hear information from hospital staff.    Sofy states she will need to have advanced notice of patient discharge and what specific needs/cares in additional to her baseline are needed.  If teaching needs to be done for group staff she needs to have time to coordinate.  Sofy also reports that the group home now has in-house physical therapy (new).  She will get more information about this.  Care Manager to contact Sofy 12/30 after 11:00 AM with updates and further discussion of discharge plan.    Per Surgery note:  Recommendation is that patient would benefit from biological products to help with healing process of her right lower extremity wound.  Surgery is seeking wound care team recommendations.    Baseline Patient Information:  She lives in a Group Home. Per staff at the group home, patient is becoming less mobile lately and is now requiring total cares.  Per the Group Home staff, she normally was able to walk with the walker, but (over the past few weeks) she has been refusing and was using her wheelchair all of the time. She is still able to pivot transfer, but even that is getting harder. *USP is requesting that a tammy sling be sent back to the group home with patient. Sofy is requesting to be taught how to do the wound care for patient here in the hospital.   Contacts for the group home:  [facility]  [phone].  [facility]  for that house [phone].    Suha Obrien RN

## 2021-12-29 NOTE — PROGRESS NOTES
Daily Progress Note        CODE STATUS:  No CPR- Do NOT Intubate    12/24/21  Assessment/Plan:  Alison Bashir is a 71 year old old female group home resident with past medical history of parkinsonism, CAD, cognitive decline(but has been her own decision maker), HTN, [place], hx of TBI who had a fall during transitioning 3 days prior to presenting to ED for evaluation of severe right leg pain, found to have hematoma and admitted for further management    Tension hematoma of right lateral leg s/p evacuation on 12/20;  Postoperative right leg wound;  --Patient hit her leg on door at the [facility] 3 days prior to admission.  --CTA abdomen/pelvis bilateral leg runoff reported large subcutaneous hematoma on lateral superior aspect of right calf, no contrast extravasation.  --On 12/21, underwent evacuation of hematoma by Dr. Subramanian.  --On 12/28, status post right leg wound debridement  --Completed 5 days course of IV Zosyn.  --Continue postoperative care, local wound care per surgery and wound care nurse.    Acute postoperative pain;  --Continue scheduled Tylenol, as needed oxycodone.  Monitor for sedation  --Appreciated pain team input.    Acute blood loss anemia due to above;  --Patient received 2 unit pRBC transfusion.    --Hemoglobin trending down, 7.3 today.  Discussed with surgery team who is going to evaluate wound today.  Recheck in a.m.     Chronic intermittent hypotension; improved  --Increased home midodrine from 7.5 mg 3 times daily to 10 mg 3 times daily on 12/25.  Monitor vitals closely    History of CAD;  --Currently no anginal symptoms  --Continue home statin, baby aspirin.    --Home metoprolol on hold due to intermittent low blood pressure      History of Parkinson;  --resumed home meds   --PT/OT     H/o TBI;  History of anxiety and depression;  --lives in  but makes own decisions   --PTA remeron, venlafaxine, trazodone, Wellbutrin     RLS; Mirapex     DVT prophylaxis; per surgery team    Disposition;  anticipate group home  Barrier to discharge; postop recovery, anemia,      LOS: 4 days     Subjective:  Interval History: Patient seen and examined. Notes, labs, imaging reports personally reviewed.  No acute issues reported overnight.  Patient denied new complaints or concerns.  Seems right leg pain is fairly stable.  Discussed with pain team.  Discussed with surgeon who is going to evaluate wound later today.  Discussed with nursing staffs.  Discussed with care manager.  I had detailed conversation with patient's sister yesterday.    Review of Systems:   As mentioned in subjective.    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)     Accident due to mechanical fall without injury, initial encounter     Acute blood loss anemia     Adjustment insomnia     Anemia     Anxiety disorder, unspecified     Artificial knee joint present     Calculus of kidney     Chest pain, unspecified     Chronic gouty arthritis     Chronic pain disorder     Closed fracture of part of neck of femur (H)     Closed nondisplaced fracture of head of right radius with nonunion     Constipation     Atherosclerosis of coronary artery without angina pectoris     Coronary atherosclerosis     Alzheimer's disease (H)     Cognitive impairment     Dementia in other diseases classified elsewhere without behavioral disturbance (H)     Dementia (H)     Depression     Disease of lung     Drug overdose     Dyslipidemia     ACP (advance care planning)     Acute hypotension     Essential hypertension     Orthostatic hypotension     Fall, initial encounter     Gastroesophageal reflux disease with esophagitis     Gastro-esophageal reflux disease without esophagitis     Leg hematoma, right, initial encounter     Femur fracture, right (H)     History of cardiovascular disorder     History of closed head injury     History of right hip replacement     Status post total right  knee replacement     History of thromboembolism of vein     Hypokalemia     Inability to walk     Leukopenia     Magnesium deficiency     Major depressive disorder, recurrent, unspecified (H)     Mild major depression, single episode (H)     Obesity     Old myocardial infarction     Other extrapyramidal disease and abnormal movement disorders     Overactive bladder     Parkinsonism, unspecified Parkinsonism type (H)     Personal history of fall     Postmenopausal atrophic vaginitis     Presence of coronary angioplasty implant and graft     Primary localized osteoarthrosis of ankle and foot     Primary osteoarthritis of both knees     Pure hypercholesterolemia     Restless legs syndrome     Right hip pain     Senile osteoporosis     Spine pain     Periprosthetic hip fracture, initial encounter     Stented coronary artery     Surgery follow-up     Traumatic brain injury (H)     RBBB     Xeroderma     Vitamin D deficiency     Vitamin B12 deficiency (non anemic)     Urine retention     Urinary tract infection     Urinary incontinence     Hematoma of skin     Pain of right lower leg     Knee injury, right, initial encounter     Type 2 diabetes mellitus with other specified complication, unspecified whether long term insulin use (H)       Scheduled Meds:    acetaminophen  975 mg Oral Q8H     aspirin  81 mg Oral Daily with breakfast     atorvastatin  40 mg Oral QPM     buPROPion  300 mg Oral Daily     carbidopa-levodopa  2 tablet Oral TID     furosemide  20 mg Oral Every Other Day     magnesium oxide  400 mg Oral Daily     [Held by provider] metoprolol succinate ER  12.5 mg Oral Daily     midodrine  10 mg Oral TID w/meals     mirtazapine  15 mg Oral At Bedtime     polyethylene glycol  17 g Oral Daily     pramipexole  0.5 mg Oral At Bedtime     senna-docusate  1 tablet Oral BID     sodium chloride (PF)  3 mL Intracatheter Q8H     traZODone  150 mg Oral At Bedtime     venlafaxine  300 mg Oral Daily     venlafaxine  37.5 mg  Oral Daily with breakfast     Continuous Infusions:    PRN Meds:.sodium chloride 0.9%, [DISCONTINUED] acetaminophen **OR** acetaminophen, acetaminophen, bisacodyl, HYDROmorphone, lidocaine 4%, lidocaine (buffered or not buffered), magnesium hydroxide, naloxone **OR** naloxone **OR** naloxone **OR** naloxone, ondansetron **OR** ondansetron, ondansetron **OR** ondansetron, oxyCODONE IR, prochlorperazine **OR** prochlorperazine, sodium chloride (PF)    Objective:  Vital signs in last 24 hours:  Temp:  [98.2  F (36.8  C)-99.3  F (37.4  C)] 98.7  F (37.1  C)  Pulse:  [74-91] 80  Resp:  [16-20] 18  BP: ()/(52-66) 108/52  SpO2:  [90 %-94 %] 94 %      Intake/Output Summary (Last 24 hours) at 12/24/2021 1322  Last data filed at 12/24/2021 0920  Gross per 24 hour   Intake 1220.3 ml   Output 1200 ml   Net 20.3 ml       Physical Exam:  General: Not in obvious distress.  HEENT: Normocephalic, supple  Chest: Clear to auscultation bilateral anteriorly, no wheezing  Heart: S1S2 normal, regular  Abdomen: Soft. NT, ND. Bowel sounds- active.  Extremities: Right lower extremity dressing/Ace wrap in place  Neuro: alert and awake, moves all extremities    Lab Results:(I have personally reviewed the results)    Recent Results (from the past 24 hour(s))   Hemoglobin    Collection Time: 12/28/21 11:37 AM   Result Value Ref Range    Hemoglobin 7.8 (L) 11.7 - 15.7 g/dL   Platelet count    Collection Time: 12/28/21 11:37 AM   Result Value Ref Range    Platelet Count 167 150 - 450 10e3/uL   Glucose by meter    Collection Time: 12/29/21  2:27 AM   Result Value Ref Range    GLUCOSE BY METER POCT 107 (H) 70 - 99 mg/dL   Glucose by meter    Collection Time: 12/29/21  5:47 AM   Result Value Ref Range    GLUCOSE BY METER POCT 99 70 - 99 mg/dL   Hemoglobin    Collection Time: 12/29/21  5:52 AM   Result Value Ref Range    Hemoglobin 7.3 (L) 11.7 - 15.7 g/dL   Creatinine    Collection Time: 12/29/21  5:52 AM   Result Value Ref Range    Creatinine  0.67 0.60 - 1.10 mg/dL    GFR Estimate >90 >60 mL/min/1.73m2   Potassium    Collection Time: 12/29/21  5:52 AM   Result Value Ref Range    Potassium 3.8 3.5 - 5.0 mmol/L         Serum Glucose range:   Recent Labs   Lab 12/29/21  0547 12/29/21  0227 12/24/21  0536 12/23/21  0455   GLC 99 107* 97 107     ABG: No lab results found in last 7 days.  CBC:   Recent Labs   Lab 12/29/21  0552 12/28/21  1137 12/27/21  0542 12/26/21  0257 12/25/21  0729 12/24/21  1459 12/24/21  0536 12/23/21  1001 12/23/21  0455   WBC  --   --   --   --   --   --  4.0  --  3.5*   HGB 7.3* 7.8*  --  8.2* 7.9*   < > 8.1*   < > 6.6*   HCT  --   --   --   --   --   --  26.1*  --  22.8*   MCV  --   --   --   --   --   --  92  --  95   PLT  --  167 144*  --  126*  --  112*  --  103*    < > = values in this interval not displayed.     Chemistry:   Recent Labs   Lab 12/29/21  0552 12/24/21  0536 12/23/21  0455   NA  --  140 142   POTASSIUM 3.8 3.9 3.9   CHLORIDE  --  107 108*   CO2  --  31 29   BUN  --  12 16   CR 0.67 0.69 0.71   GFRESTIMATED >90 >90 90   MARY  --  7.8* 7.3*     Coags:  No results for input(s): INR, PROTIME, PTT in the last 168 hours.    Invalid input(s): APTT  Cardiac Markers:  No results for input(s): CKTOTAL, TROPONINI in the last 168 hours.     US Pelvis Complete without Transvaginal    Result Date: 12/20/2021  EXAM: US PELVIC TRANSABDOMINAL LOCATION: [facility] DATE/TIME: 12/20/2021 5:13 PM INDICATION: CT showed enlarged uterus COMPARISON: CT angiogram abdomen and pelvis 12/20/2021 TECHNIQUE: Transabdominal scans were performed. FINDINGS: UTERUS: 13.1 x 8.2 x 7.6 cm. Normal in size and position with no masses. ENDOMETRIUM: Obscured by air within the endometrial canal. RIGHT OVARY: Nonvisualized, obscured by bowel content. LEFT OVARY: Nonvisualized, obscured by bowel content. No significant free fluid. Debris noted in the urinary bladder.     IMPRESSION: 1.  Air within the endometrial canal as described on  recent abdomen/pelvis CT. 2.  Debris noted in the urinary bladder. 3.  Adnexa obscured by bowel gas. Ovaries not identified.     XR Femur Right 2 Views    Result Date: 12/20/2021  EXAM: XR FEMUR RIGHT 2 VIEW LOCATION: [facility] DATE/TIME: 12/20/2021 10:39 AM INDICATION: Fall, large hematoma just below right knee. COMPARISON: 8/25/2020.     IMPRESSION: No change in the right hip arthroplasty with longstem proximal femoral component and numerous cerclage wire fixation hardware about the proximal right femur. Lateral claw plate projects in unchanged position along the base of the right greater trochanter. Chronic healed deformity proximal right femur with heterotopic bone along the superolateral right hip, similar to prior. Right total knee arthroplasty. No acute displaced periprosthetic fracture identified. Diffuse bone demineralization. No sizable knee joint effusion.    XR Knee Left 1/2 Views    Result Date: 12/20/2021  EXAM: XR KNEE LT 1/2 VW LOCATION: [facility] DATE/TIME: 12/20/2021 10:39 AM INDICATION: Fall, bruising medial left knee. COMPARISON: None.     IMPRESSION: Left total knee arthroplasty with patellar resurfacing. No acute displaced periprosthetic fracture or convincing finding for component failure. No sizable left knee joint effusion. Diffuse bone demineralization. Medial left knee soft tissue swelling.    XR Knee Right 1/2 Views    Result Date: 12/20/2021  EXAM: XR KNEE RT 1 /2 VW LOCATION: [facility] DATE/TIME: 12/20/2021 10:40 AM INDICATION: Fall, large skin hematoma. COMPARISON: Right femur radiographic exam 8/25/2020.     IMPRESSION: Right total knee arthroplasty redemonstrated. No acute displaced periprosthetic fracture is identified. No sizable right knee joint effusion. Diffuse bone demineralization. The distal tip of a right hip arthroplasty is noted with indolent-appearing periosteal new bone along the posterior  adjacent femoral shaft.    XR Tibia & Fibula Left 2 Views    Result Date: 12/20/2021  EXAM: XR TIBIA and FIBULA LT 2 VW LOCATION: [facility] DATE/TIME: 12/20/2021 10:39 AM INDICATION: Fall, bruising medial left knee. COMPARISON: None.     IMPRESSION: Anatomic alignment left tibia and fibula. No acute displaced tibia or fibula fracture. Partial visualization of left knee arthroplasty. Distal left leg and bimalleolar ankle soft tissue swelling. Degenerative change in the left ankle and visualized foot. Heel spur.    XR Tibia & Fibula Right 2 Views    Result Date: 12/20/2021  EXAM: XR TIBIA and FIBULA RT 2 VW LOCATION: [facility] DATE/TIME: 12/20/2021 10:39 AM INDICATION: Fall, large skin hematoma below knee. COMPARISON: None.     IMPRESSION: Large soft tissue density along the anterolateral right leg extends craniocaudal approximately 23 cm and could represent the reported large hematoma in the lateral right leg. Anatomic alignment right tibia and fibula. No acute displaced right  tibia or fibula fracture identified. Diffuse bone demineralization. Partial visualization of right total knee arthroplasty. Mild-moderate tibiotalar osteoarthritis. Generalized right leg soft tissue swelling.    CTA Abdomen Pelvis Bilat Leg Runoff w Contr    Result Date: 12/20/2021  EXAM: CTA ABDOMEN PELVIS BILAT LEG RUNOFF W CONTR LOCATION: [facility] DATE/TIME: 12/20/2021 2:19 PM INDICATION: Large hematoma right leg. Evaluate for contrast extravasation. COMPARISON: None. TECHNIQUE: Helical acquisition through the abdomen, pelvis, and bilateral lower extremities was performed during the arterial phase of contrast enhancement using IV Contrast. 2D and 3D reconstructions were performed by the CT technologist. Dose reduction  techniques were used. CONTRAST: isovue 370 100ml FINDINGS: AORTA: Normal caliber, tortuous visualized descending aorta. Mild calcified  atherosclerotic changes of a normal caliber abdominal aorta. 2, small right renal arteries. Single left renal artery. No significant renal artery stenosis. Number caliber celiac and superior mesenteric arteries. Patent inferior mesenteric artery. RIGHT LEG: Minimal calcified changes of the common iliac artery. No iliac stenosis. Normal caliber common femoral, profunda femoris, superficial femoral and visualized popliteal arteries. Mid aspect of the popliteal arteries obscured by streak artifact from the knee arthroplasty. High origin of the anterior tibial artery. Venous contamination at the calf station. With this consideration appears to be a three-vessel runoff. No contrast extravasation within the large, subcutaneous hematoma at the lateral  aspect of the calf. LEFT LEG: Minimal calcified changes of the common iliac artery. No iliac stenosis. Normal caliber common femoral, profunda femoris, superficial femoral and visualized popliteal artery. Mid aspect of the popliteal arteries obscured by streak artifact from  the knee arthroplasty. Three-vessel runoff. LUNG BASES: Calcified granulomas within the visualized right middle lobe, with associated linear fibrosis. Surgical clips, posterior aspect of the mid mediastinum. HEPATOBILIARY: Arterial phase images of the liver are within normal limits. PANCREAS: Normal. SPLEEN: Normal. ADRENAL GLANDS: Normal. KIDNEYS: Both kidneys are negative for hydronephrosis. Multiple, bilateral calyceal calculi. The largest, branching calculus at the upper pole the right kidney measures approximately 8 x 10 mm. The largest calculus, within the posterior aspect of the lower pole the left kidney measures 5 mm. No hydronephrosis. BOWEL: Normal caliber loops of small bowel. Normal caliber loops of small bowel. Moderate amount of stool throughout a normal caliber colon. LYMPH NODES: Normal. PELVIC ORGANS: Enlarged uterus with a significant amount of gas within the endometrium. Small amount  of free fluid within the pelvis. MUSCULOSKELETAL: Large, 21.1 x 10.6 x 5.2 cm subcutaneous hematoma at the lateral aspect of the proximal right calf. No contrast extravasation within this. Multilevel degenerative disc disease throughout the visualized thoracic spine. Hypertrophic changes visualized thoracic and upper lumbar spine. Right total hip arthroplasty with cerclage wires around the proximal femur. Bilateral knee arthroplasties. Degenerative changes bilateral feet.     CONCLUSION: 1.  Large subcutaneous hematoma the lateral superior aspect of the right calf. No contrast extravasation. 2.  Right leg: No inflow or femoropopliteal stenosis. Venous contamination at the calf station. Is considered and appears to be three-vessel runoff. 3.  Left leg: CTA is within normal limits. 4.  Enlarged uterus with gas within the endometrium. There is a nonspecific finding. Please correlate clinically to exclude infection. 5.  Bilateral nonobstructing renal calculi. 6.  Osteoarthritic changes visualized spine. Right total hip arthroplasty. Bilateral knee arthroplasties.    Latest radiology report personally reviewed.    Note created using dragon voice recognition software so sounds alike errors may have escaped editing.    12/29/2021   MARIO CHAMBERLAIN MD  HOSPITALIST, [facility]  PAGER NO. [phone]

## 2021-12-29 NOTE — TELEPHONE ENCOUNTER
Medication requested: traZODone (DESYREL) tablet 150 mg.  Date last ordered: 12/20/21 Qty:ASHLEY  Refills: ASHLEY Cole--recently filled on 12/20/21 while she was inpatient

## 2021-12-29 NOTE — TELEPHONE ENCOUNTER
Will defer to primary team since this is a longterm medication.  Please let me know if there are any continued issues.     LV

## 2021-12-29 NOTE — PLAN OF CARE
Problem: Adult Inpatient Plan of Care  Goal: Optimal Comfort and Wellbeing  Outcome: Improving     Problem: Postoperative Urinary Retention (Surgery Nonspecified)  Goal: Effective Urinary Elimination  Outcome: Improving   Pain has been controlled with scheduled Tylenol and PRN Oxycodone. Kept bilateral lower extremities elevated on pillows. Purewick with good urine output. Patient is able to use call light and convey her needs. Also rounding and frequent checks.

## 2021-12-29 NOTE — CONSULTS
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    Hemoglobin 7.8 (L) 11.7 - 15.7 g/dL   Platelet count    Collection Time: 12/28/21 11:37 AM   Result Value Ref Range    Platelet Count 167 150 - 450 10e3/uL   Glucose by meter    Collection Time: 12/29/21  2:27 AM   Result Value Ref Range    GLUCOSE BY METER POCT 107 (H) 70 - 99 mg/dL   Glucose by meter    Collection Time: 12/29/21  5:47 AM   Result Value Ref Range    GLUCOSE BY METER POCT 99 70 - 99 mg/dL   Hemoglobin    Collection Time: 12/29/21  5:52 AM   Result Value Ref Range    Hemoglobin 7.3 (L) 11.7 - 15.7 g/dL   Creatinine    Collection Time: 12/29/21  5:52 AM   Result Value Ref Range    Creatinine 0.67 0.60 - 1.10 mg/dL    GFR Estimate >90 >60 mL/min/1.73m2   Potassium    Collection Time: 12/29/21  5:52 AM   Result Value Ref Range    Potassium 3.8 3.5 - 5.0 mmol/L       Total time spent 55 minutes with greater than 50% in consultation, education and coordination of care, examination of patient, review of medical record, lab and imaging results, completion of documentation, and discussion with RN, MD, Senior Acupuncturist, and pharmacist.      Thank you for " this consultation.    Lottie BARBOZA, FNP-C  Acute Care Pain Management Program  [facility] ([facility], [place], [place])  Monday-Friday 8a-4p   Page via online paging system or call [phone]

## 2021-12-29 NOTE — PROGRESS NOTES
"CLINICAL NUTRITION SERVICES  -  REASSESSMENT NOTE      RECOMMENDATIONS FOR MD/PROVIDER TO ORDER:   None     Recommendations Ordered by Registered Dietitian (RD):   Add ensure enlive daily = 350 kcal, 20 g protein  Add mvi with minerals and vit C 500 mg daily to promote wound healing     Future/Additional Recommendations:   Adjust supplements pending intake/acceptance/wound healing     Malnutrition:   Moderate in the context of social and environmental circumstances         EVALUATION OF PROGRESS AND GOALS    CURRENT NUTRITION ORDERS  Diet Order:     Regular     Current Intake/Tolerance:  Patient is eating 100% of meals  Documented but only 1 meal/day documented. Pt ordering 3 meals/day at 0660-5270 kcal, 63-70 g protein/day     Meeting 100% of estimated kcal and 65-70% of estimated protein needs    ANTHROPOMETRICS  Height: 5' 11\"  Weight: 216 lbs 8 oz 12/27, up 4 lb from 3 days prior. +2 generalized and +1-+3 extremity edema  Body mass index is 30.2 kg/m .  Weight Status:  Normal BMI  IBW: 70.5kg  Weight History:   Wt Readings from Last 30 Encounters:   12/20/21 81 kg (178 lb 9.2 oz)- admit   09/25/20 76.6 kg (168 lb 12.8 oz)   09/17/20 75.5 kg (166 lb 6.4 oz)   09/15/20 75.5 kg (166 lb 6.4 oz)   08/31/20 79.2 kg (174 lb 8 oz)   07/16/20 72.6 kg (160 lb)   07/09/20 65.3 kg (144 lb)   07/03/20 71.2 kg (157 lb)   06/22/20 71.2 kg (157 lb)     LABS  Labs reviewed:   BG WNL    MEDICATIONS  Medications reviewed:   Lipitor, lasix q other day, remeron, miralax daily, pericolace bid    GI  1-2 soft BM/day    ASSESSED NUTRITION NEEDS PER APPROVED PRACTICE GUIDELINES:    Dosing Weight 81 kg (178 lb 9.2 oz)   Estimated Energy Needs: 8292-3675 kcals ([place] St Jeor)  Justification: maintenance and stress factor 1.4-1.6  Estimated Protein Needs:  grams protein (1.2-1.4 g pro/Kg)  Justification: post-op and wound healing  Estimated Fluid Needs: 7747-7337  mL (25-30 mL/kg)  Justification: maintenance    MALNUTRITION:  % " Weight Loss:  Weight loss does not meet criteria as it is not within the time frame  % Intake:  No decreased intake noted  Subcutaneous Fat Loss:  Orbital region severe depletion and Upper arm region moderate depletion  Muscle Loss:  Temporal region moderate depletion, Clavicle bone region moderate to severe depletion, Dorsal hand region mild to moderate depletion and Patellar region moderate depletion  Fluid Retention:  Moderate + 1-+3    Malnutrition Diagnosis: Moderate malnutrition  In Context of:  Environmental or social circumstances    NUTRITION DIAGNOSIS:  Malnutrition related to social and environmental circumstances as evidenced by moderate to severe fat loss and moderate muscle loss    Altered nutrition related lab vaules rt DM2 as evidence by BG - resolved    Increased nutrition needs r/t wound healing evidenced by wound- new      NUTRITION INTERVENTIONS  Recommendations / Nutrition Prescription  Add Ensure enlive daily to help meet protein needs for wound healing  mvi with minerals and vit C 500 mg daily to promote wound healing    Nutrition Goals  Meet estimated nutrition needs- progressing  BG - met  Wound healing      MONITORING AND EVALUATION:  Progress towards goals will be monitored and evaluated per protocol and Practice Guidelines, Diet Order, Food intake, Fluid/beverage intake, Weight, Food and Nutrition Knowledge/Skill, Biochemical data and Nutrition-focused physical findings

## 2021-12-29 NOTE — TELEPHONE ENCOUNTER
Request: Trazodone 150 mg. TAKE 1 TABLET BY MOUTH AT BEDTIME    Douglas--Spoke to Assisted Living where the patient live and they stated that she has been in the hospital for a longtime.

## 2021-12-29 NOTE — PROGRESS NOTES
General Surgery Progress Note:    Hospital Day # 9    ASSESSMENT:   1. Accident due to mechanical fall without injury, initial encounter    2. Hematoma of skin    3. Pain of right lower leg    4. Type 2 diabetes mellitus with other specified complication, unspecified whether long term insulin use (H)    5. Knee injury, right, initial encounter    6. Intractable pain        Alison Bashir is a 71 year old female who is status post evacuation of right lower extremity hematoma as well as status post sharp excisional debridement of right lower extremity wound.    PLAN:   -From the general surgery standpoint, the patient is cleared to be discharged back to her nursing care facility as the patient is deemed stable from the medical standpoint.  -Plan is to continue with wound care.  We will also need to follow-up with the wound care team further recommendations.  Regards to the patient's right lower extremity wound, the patient would benefit from grafting over the site.  However given the patient's comorbidities and mainly being wheelchair-bound, I do not believe that doing a skin graft and creating a donor site wound would benefit the patient.  Instead the patient may benefit from biological products to help with the healing process of her right lower extremity wound.  That regard, I will be in discussion with the wound care team for their recommendations for biologic product such as ACell or epi fix.  -Team to monitor the patient's hemoglobin and transfuse per primary.      SUBJECTIVE:   Alison Bashir seen on a.m. rounds.  Patient is doing better postoperatively.  Patient states that she still has some right lower extremity pain.  The patient has no further complaints at this time.    Patient Vitals for the past 24 hrs:   BP Temp Temp src Pulse Resp SpO2   12/29/21 0821 108/52 98.7  F (37.1  C) Oral 80 18 94 %   12/29/21 0340 118/64 98.7  F (37.1  C) Oral 74 18 92 %   12/29/21 0038 104/52 98.4  F (36.9  C) Oral 77 20 93  %   12/28/21 2039 107/55 99.3  F (37.4  C) Axillary 80 18 90 %   12/28/21 1809 132/62 -- -- 91 -- --   12/28/21 1619 106/55 98.2  F (36.8  C) Oral 81 18 92 %   12/28/21 1130 107/55 98.5  F (36.9  C) Oral 79 16 90 %       Physical Exam:  General: NAD, pleasant  CV: Regular rate  LUNGS: Unlabored breathing  ABD: Soft, nondistended, nontender  EXT: Patient's right lower extremity wound was evaluated at bedside after the surgical dressings were removed.  The wound base does have improving granulation tissue.  The surrounding skin appears to be viable at this time.  Appropriate tenderness to palpation.  Please see picture below.            No results displayed because visit has over 200 results.           DO Johan Nicholson DO  General Surgeon  [facility]  Surgery [facility] - [address]  [place] [address]?  Office: [phone]  Employed by - [employer]  Pager: [phone]

## 2021-12-29 NOTE — PLAN OF CARE
Problem: Adult Inpatient Plan of Care  Goal: Optimal Comfort and Wellbeing  Outcome: Improving  Goal: Readiness for Transition of Care  Outcome: Improving     Problem: Infection (Surgery Nonspecified)  Goal: Absence of Infection Signs and Symptoms  Outcome: Improving     Problem: Ongoing Anesthesia Effects (Surgery Nonspecified)  Goal: Anesthesia/Sedation Recovery  Outcome: Improving  Intervention: Optimize Anesthesia Recovery  Recent Flowsheet Documentation  Taken 12/28/2021 1802 by Rafia Ellis RN  Safety Promotion/Fall Prevention:   bed alarm on   assistive device/personal items within reach   room door open   room near nurse's station   room organization consistent   safety round/check completed   patient and family education   clutter free environment maintained   fall prevention program maintained   toileting scheduled  Administration (IS):   instruction provided, follow-up   proper technique demonstrated   mouthpiece utilized   self-administered  Level Incentive Spirometer (mL): 1300  Incentive Spirometer Predicted Level (mL): 2700  Number of Repetitions (IS): 4  Patient Tolerance (IS): good     Problem: Postoperative Nausea and Vomiting (Surgery Nonspecified)  Goal: Nausea and Vomiting Relief  Outcome: Improving     Problem: Postoperative Urinary Retention (Surgery Nonspecified)  Goal: Effective Urinary Elimination  Outcome: Improving  Right lower leg pain tolerated with PRN Oxycodone 2.5 mg taken twice on shift. Pure wick in place with good output. Reported no BM in couple days, gave milk of mag with Senna-S & patient had large soft BM. NSR with BBB on telemetry. Received MD order to discontinue telemetry. Rash on chest noted from telemetry patches. Up to 1300 on incentive spirometer. Takes Midodrine TID, placed sticky note to ask MD for parameters.

## 2021-12-30 LAB
ABO/RH(D): ABNORMAL
ANTIBODY ID: NORMAL
ANTIBODY SCREEN: POSITIVE
BLD PROD TYP BPU: NORMAL
BLD PROD TYP BPU: NORMAL
BLOOD COMPONENT TYPE: NORMAL
BLOOD COMPONENT TYPE: NORMAL
CODING SYSTEM: NORMAL
CODING SYSTEM: NORMAL
CROSSMATCH: NORMAL
CROSSMATCH: NORMAL
FERRITIN SERPL-MCNC: 102 NG/ML (ref 10–130)
GLUCOSE BLDC GLUCOMTR-MCNC: 83 MG/DL (ref 70–99)
HGB BLD-MCNC: 7.2 G/DL (ref 11.7–15.7)
IRON SATN MFR SERPL: 11 % (ref 20–50)
IRON SERPL-MCNC: 19 UG/DL (ref 42–175)
ISSUE DATE AND TIME: NORMAL
PLATELET # BLD AUTO: 196 10E3/UL (ref 150–450)
SPECIMEN EXPIRATION DATE: ABNORMAL
SPECIMEN EXPIRATION DATE: NORMAL
TIBC SERPL-MCNC: 179 UG/DL (ref 313–563)
TRANSFERRIN SERPL-MCNC: 143 MG/DL (ref 212–360)
UNIT ABO/RH: NORMAL
UNIT ABO/RH: NORMAL
UNIT NUMBER: NORMAL
UNIT NUMBER: NORMAL
UNIT STATUS: NORMAL
UNIT STATUS: NORMAL
UNIT TYPE ISBT: 9500
UNIT TYPE ISBT: 9500

## 2021-12-30 PROCEDURE — 250N000013 HC RX MED GY IP 250 OP 250 PS 637: Performed by: NURSE PRACTITIONER

## 2021-12-30 PROCEDURE — G0463 HOSPITAL OUTPT CLINIC VISIT: HCPCS

## 2021-12-30 PROCEDURE — 250N000013 HC RX MED GY IP 250 OP 250 PS 637: Performed by: SURGERY

## 2021-12-30 PROCEDURE — 99207 PR CDG-CUT & PASTE-POTENTIAL IMPACT ON LEVEL: CPT | Performed by: NURSE PRACTITIONER

## 2021-12-30 PROCEDURE — P9040 RBC LEUKOREDUCED IRRADIATED: HCPCS | Performed by: INTERNAL MEDICINE

## 2021-12-30 PROCEDURE — 99024 POSTOP FOLLOW-UP VISIT: CPT | Performed by: SURGERY

## 2021-12-30 PROCEDURE — 85049 AUTOMATED PLATELET COUNT: CPT | Performed by: SURGERY

## 2021-12-30 PROCEDURE — 36415 COLL VENOUS BLD VENIPUNCTURE: CPT | Performed by: INTERNAL MEDICINE

## 2021-12-30 PROCEDURE — 86901 BLOOD TYPING SEROLOGIC RH(D): CPT | Performed by: INTERNAL MEDICINE

## 2021-12-30 PROCEDURE — 120N000001 HC R&B MED SURG/OB

## 2021-12-30 PROCEDURE — 86870 RBC ANTIBODY IDENTIFICATION: CPT | Performed by: INTERNAL MEDICINE

## 2021-12-30 PROCEDURE — 86922 COMPATIBILITY TEST ANTIGLOB: CPT | Performed by: INTERNAL MEDICINE

## 2021-12-30 PROCEDURE — 86902 BLOOD TYPE ANTIGEN DONOR EA: CPT | Performed by: INTERNAL MEDICINE

## 2021-12-30 PROCEDURE — 99232 SBSQ HOSP IP/OBS MODERATE 35: CPT | Performed by: NURSE PRACTITIONER

## 2021-12-30 PROCEDURE — 250N000013 HC RX MED GY IP 250 OP 250 PS 637: Performed by: INTERNAL MEDICINE

## 2021-12-30 PROCEDURE — 99232 SBSQ HOSP IP/OBS MODERATE 35: CPT | Performed by: INTERNAL MEDICINE

## 2021-12-30 PROCEDURE — 86850 RBC ANTIBODY SCREEN: CPT | Performed by: INTERNAL MEDICINE

## 2021-12-30 PROCEDURE — P9016 RBC LEUKOCYTES REDUCED: HCPCS | Performed by: INTERNAL MEDICINE

## 2021-12-30 PROCEDURE — 85018 HEMOGLOBIN: CPT | Performed by: INTERNAL MEDICINE

## 2021-12-30 RX ORDER — ACETAMINOPHEN 325 MG/1
975 TABLET ORAL EVERY 8 HOURS
Status: DISCONTINUED | OUTPATIENT
Start: 2021-12-30 | End: 2021-12-31

## 2021-12-30 RX ADMIN — BUPROPION HYDROCHLORIDE 300 MG: 300 TABLET, EXTENDED RELEASE ORAL at 09:59

## 2021-12-30 RX ADMIN — FUROSEMIDE 20 MG: 20 TABLET ORAL at 10:04

## 2021-12-30 RX ADMIN — OXYCODONE HYDROCHLORIDE 2.5 MG: 5 TABLET ORAL at 02:41

## 2021-12-30 RX ADMIN — ATORVASTATIN CALCIUM 40 MG: 40 TABLET, FILM COATED ORAL at 20:18

## 2021-12-30 RX ADMIN — PRAMIPEXOLE DIHYDROCHLORIDE 0.5 MG: 0.5 TABLET ORAL at 20:18

## 2021-12-30 RX ADMIN — CARBIDOPA AND LEVODOPA 2 TABLET: 25; 100 TABLET ORAL at 20:18

## 2021-12-30 RX ADMIN — OXYCODONE HYDROCHLORIDE 5 MG: 5 TABLET ORAL at 12:09

## 2021-12-30 RX ADMIN — ACETAMINOPHEN 975 MG: 325 TABLET ORAL at 10:02

## 2021-12-30 RX ADMIN — ACETAMINOPHEN 975 MG: 325 TABLET ORAL at 19:01

## 2021-12-30 RX ADMIN — VENLAFAXINE HYDROCHLORIDE 300 MG: 150 CAPSULE, EXTENDED RELEASE ORAL at 10:04

## 2021-12-30 RX ADMIN — THERA TABS 1 TABLET: TAB at 10:01

## 2021-12-30 RX ADMIN — MIRTAZAPINE 15 MG: 15 TABLET, FILM COATED ORAL at 20:18

## 2021-12-30 RX ADMIN — METOPROLOL SUCCINATE 12.5 MG: 25 TABLET, EXTENDED RELEASE ORAL at 13:43

## 2021-12-30 RX ADMIN — MIDODRINE HYDROCHLORIDE 10 MG: 5 TABLET ORAL at 09:58

## 2021-12-30 RX ADMIN — MIDODRINE HYDROCHLORIDE 10 MG: 5 TABLET ORAL at 16:57

## 2021-12-30 RX ADMIN — CARBIDOPA AND LEVODOPA 2 TABLET: 25; 100 TABLET ORAL at 13:43

## 2021-12-30 RX ADMIN — Medication 500 MG: at 10:00

## 2021-12-30 RX ADMIN — VENLAFAXINE HYDROCHLORIDE 37.5 MG: 37.5 CAPSULE, EXTENDED RELEASE ORAL at 10:02

## 2021-12-30 RX ADMIN — MIDODRINE HYDROCHLORIDE 10 MG: 5 TABLET ORAL at 13:43

## 2021-12-30 RX ADMIN — ASPIRIN 81 MG: 81 TABLET, COATED ORAL at 10:02

## 2021-12-30 RX ADMIN — OXYCODONE HYDROCHLORIDE 5 MG: 5 TABLET ORAL at 21:03

## 2021-12-30 RX ADMIN — DOCUSATE SODIUM 50 MG AND SENNOSIDES 8.6 MG 1 TABLET: 8.6; 5 TABLET, FILM COATED ORAL at 10:02

## 2021-12-30 RX ADMIN — ACETAMINOPHEN 975 MG: 325 TABLET ORAL at 02:20

## 2021-12-30 RX ADMIN — OXYCODONE HYDROCHLORIDE 5 MG: 5 TABLET ORAL at 16:57

## 2021-12-30 RX ADMIN — MAGNESIUM OXIDE TAB 400 MG (241.3 MG ELEMENTAL MG) 400 MG: 400 (241.3 MG) TAB at 12:09

## 2021-12-30 RX ADMIN — TRAZODONE HYDROCHLORIDE 150 MG: 100 TABLET ORAL at 20:17

## 2021-12-30 RX ADMIN — CARBIDOPA AND LEVODOPA 2 TABLET: 25; 100 TABLET ORAL at 09:59

## 2021-12-30 ASSESSMENT — ACTIVITIES OF DAILY LIVING (ADL)
ADLS_ACUITY_SCORE: 29
ADLS_ACUITY_SCORE: 31
ADLS_ACUITY_SCORE: 25
ADLS_ACUITY_SCORE: 31
ADLS_ACUITY_SCORE: 27
ADLS_ACUITY_SCORE: 31
ADLS_ACUITY_SCORE: 29
ADLS_ACUITY_SCORE: 27
ADLS_ACUITY_SCORE: 27
ADLS_ACUITY_SCORE: 29
ADLS_ACUITY_SCORE: 31
ADLS_ACUITY_SCORE: 27
ADLS_ACUITY_SCORE: 25
ADLS_ACUITY_SCORE: 29
ADLS_ACUITY_SCORE: 27
ADLS_ACUITY_SCORE: 27
ADLS_ACUITY_SCORE: 29
ADLS_ACUITY_SCORE: 27
ADLS_ACUITY_SCORE: 27
ADLS_ACUITY_SCORE: 29
ADLS_ACUITY_SCORE: 31
ADLS_ACUITY_SCORE: 29

## 2021-12-30 NOTE — PROGRESS NOTES
General Surgery Progress Note:    Hospital Day # 10    ASSESSMENT:   1. Accident due to mechanical fall without injury, initial encounter    2. Hematoma of skin    3. Pain of right lower leg    4. Type 2 diabetes mellitus with other specified complication, unspecified whether long term insulin use (H)    5. Knee injury, right, initial encounter    6. Intractable pain -improving       PLAN:   -Patient is cleared for discharge. We will have the patient follow-up with me in surgery clinic.    SUBJECTIVE:   Alison Bashir seen on a.m. rounds. Patient states that her right lower extremity pain is controlled.    Patient Vitals for the past 24 hrs:   BP Temp Temp src Pulse Resp SpO2   12/30/21 0745 114/58 98.4  F (36.9  C) Oral 74 16 97 %   12/30/21 0354 (!) 89/51 98.1  F (36.7  C) Oral 70 18 95 %   12/29/21 2342 119/56 98.4  F (36.9  C) Oral 75 16 94 %   12/29/21 2240 -- 98.4  F (36.9  C) Oral -- -- --   12/29/21 1933 113/60 98.8  F (37.1  C) Oral 80 18 94 %   12/29/21 1542 115/57 97.8  F (36.6  C) Oral 77 16 96 %       Physical Exam:  General: NAD, pleasant  CV: Regular rate  LUNGS: Unlabored breathing  ABD: Soft, nondistended, nontender  EXT: Patient would like to defer the right lower extremity examination this morning.    Johan Subramanian DO  General Surgeon  [facility]  Surgery [facility] - [address] [address]?  Office: [phone]  Employed by - [employer] [facility] [employer] [facility]  Pager: [phone]

## 2021-12-30 NOTE — PROGRESS NOTES
"[facility] ACUTE PAIN SERVICE    Daily PAIN Progress Note    Assessment/Plan:    Alison Bashir is a 71 year old female who was admitted on 12/20/2021.  Pain team was asked to see the patient for post-op pain. Admitted for tension hematoma of right lateral leg. History including parkinsonism, CAD, cognitive decline, HTN, Hx of TBI, anxiety depression, RLS, previous MI.  Patient lives in a group home and is given her pain medication by group home staff.  Patient has been rating post-op pain between 5-7/10, appears that oxycodone is helpful.  The patient does not smoke and denies chemical dependency history.      Post op 12/21 evacuation of hematoma, 12/28 right leg wound debridement     Opioid Induced Respiratory Depression Risk Assessment: Moderate, post-op, age previous opioid naive status     Note: reaction to morphine-rash, ibuprofen-not specified, but patient reported \"makes her sick to her stomach\".    In 24 hours, patient has utilized 12.5mg of oxycodone for an MME of less than 20.    PLAN:   1) Pain is consistent with post-op pain.  2)Multimodal Medication Therapy  Topical: none, incision covered   NSAID'S: CrCl 95, none, patient has intolerance to ibuprofen.  Noted hemoglobin today of 7.2.  Muscle Relaxants:  none   Adjuvants: Tylenol 975 mg po q 8 h  Antidepressants/anxiolytics: bupropion  mg daily, Remeron 15 mg po q hs, trazodone 150 mg po q hs, venlafaxine .5 mg daily  Opioids:oxycodone 2.5-5 mg po q 4 h prn   IV Pain medication: discontinued  3)Non-medication interventions: position lower extremity for comfort  Acupuncture consult, Integrative consult - if patient agreeable   4)Constipation Prophylaxis: senna/docusate 1 po bid, Miralax daily  5) Care Teams: Surgery, Hospital Medicine Service    -Opioid prescriber has been none  -[place]  pulled from system on 12/28/21. This indicates patient is not prescribed opioids.  Discharge Recommendations - We recommend prescribing the following " "at the time of discharge: Most likely small Rx of oxycodone 2.5-5 mg q 4 h prn, scripts per surgery team.  Do not recommend opioids for ongoing management, if warranted would limit to < 3 day supply at discharge. APAP.     Subjective:  Patient denies nausea, vomiting, constipation, diarrhea, chest pain, shortness of breath. Reported wound pain fairly stable on current regime. Rates pain 4-6/10 and aching. Wound covered. Wound care nurse following and will change dressing today.     Principal Problem:    Leg hematoma, right, initial encounter  Active Problems:    Accident due to mechanical fall without injury, initial encounter    Acute blood loss anemia    Anxiety disorder, unspecified    Coronary atherosclerosis    Orthostatic hypotension    Parkinsonism, unspecified Parkinsonism type (H)    Restless legs syndrome    Traumatic brain injury (H)    Hematoma of skin    Pain of right lower leg    Knee injury, right, initial encounter      Objective:  Vital signs in last 24 hours:  /58 (BP Location: Right arm)   Pulse 74   Temp 98.4  F (36.9  C) (Oral)   Resp 16   Ht 1.803 m (5' 11\")   Wt 98.2 kg (216 lb 8 oz)   SpO2 97%   BMI 30.20 kg/m    Weight:   Vitals:    12/20/21 0928 12/24/21 1701 12/27/21 0543   Weight: 81 kg (178 lb 9.2 oz) 96.2 kg (212 lb) 98.2 kg (216 lb 8 oz)      Weight change:   Body mass index is 30.2 kg/m .    Intake/Output last 3 shifts:  I/O last 3 completed shifts:  In: 240 [P.O.:240]  Out: 1900 [Urine:1900]  Intake/Output this shift:  No intake/output data recorded.    Review of Systems:   As per subjective, all others negative.    Physical Exam:  General Appearance:  Alert, cooperative, no distress   Head:  Normocephalic, without obvious abnormality, atraumatic   Eyes:  PERRL, conjunctiva/corneas clear, EOM's intact   Nose: Nares normal, septum midline   Throat: Lips, mucosa, and tongue normal; teeth and gums normal   Neck: Supple, symmetrical, trachea midline   Back:   Symmetric, no " curvature, ROM normal   Lungs:   Clear to auscultation bilaterally, respirations unlabored   Chest Wall:  No tenderness or deformity   Heart:  Regular rate and rhythm, S1, S2 normal    Abdomen:   Soft, non-tender, bowel sounds active all four quadrants,  no masses, no organomegaly    Skin: Incision covered    Neurologic: Alert and oriented X 3, Moves all 4 extremities     Imaging: Reviewed      Labs: Reviewed   Recent Results (from the past 24 hour(s))   Glucose by meter    Collection Time: 12/30/21  2:26 AM   Result Value Ref Range    GLUCOSE BY METER POCT 83 70 - 99 mg/dL   Platelet count    Collection Time: 12/30/21  5:22 AM   Result Value Ref Range    Platelet Count 196 150 - 450 10e3/uL   Hemoglobin    Collection Time: 12/30/21  5:22 AM   Result Value Ref Range    Hemoglobin 7.2 (L) 11.7 - 15.7 g/dL       Total time spent 25 minutes with greater than 50% in consultation, education and coordination of care, examination of patient, review of medical record, lab and imaging results, completion of documentation, and discussion with RN, MD, Senior Acupuncturist, and pharmacist.      DUTCH JonesP-C  Acute Care Pain Management Program  [facility] ([facility], [place], [place])  Monday-Friday 8a-4p   Page via online paging system or call [phone]
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
## 2021-12-30 NOTE — PLAN OF CARE
Problem: Adult Inpatient Plan of Care  Goal: Optimal Comfort and Wellbeing  Outcome: Improving  Goal: Readiness for Transition of Care  Outcome: Improving     Problem: Bleeding (Surgery Nonspecified)  Goal: Absence of Bleeding  Outcome: Improving     Problem: Infection (Surgery Nonspecified)  Goal: Absence of Infection Signs and Symptoms  Outcome: Improving     Problem: Ongoing Anesthesia Effects (Surgery Nonspecified)  Goal: Anesthesia/Sedation Recovery  Outcome: Improving  Intervention: Optimize Anesthesia Recovery  Recent Flowsheet Documentation  Taken 12/29/2021 1818 by Rafia Ellis RN  Safety Promotion/Fall Prevention:   bed alarm on   room organization consistent   safety round/check completed   clutter free environment maintained   room door open   room near nurse's station   patient and family education   toileting scheduled   assistive device/personal items within reach   fall prevention program maintained     Problem: Postoperative Urinary Retention (Surgery Nonspecified)  Goal: Effective Urinary Elimination  Outcome: Adequate for Discharge  Utilizing pure wick, good output. Right lower leg throbbing continuous pain tolerated with PRN Oxycodone 2.5 mg given twice on shift, lowers pain from 6/10 to 5/10. Elevating legs on pillows. Had already eaten dinner when able to give Midodrine, held.

## 2021-12-30 NOTE — PLAN OF CARE
"  Problem: Bleeding (Surgery Nonspecified)  Goal: Absence of Bleeding  Outcome: Improving   Hgb remains low, MD is aware. 1 unit of PRBC ordered.       Problem: Pain Acute  Goal: Acceptable Pain Control and Functional Ability  Intervention: Develop Pain Management Plan  Recent Flowsheet Documentation  Taken 12/30/2021 0834 by Allyssa Jordan RN  Pain Management Interventions:   medication (see MAR)   emotional support   repositioned   Scheduled and prn pain medications given, which patient reports are effective in bringing her pain to a manageable level.     Dressing to right lower extremity changed by JERALD RN today.     /61   Pulse 87   Temp 98.4  F (36.9  C) (Oral)   Resp 16   Ht 1.803 m (5' 11\")   Wt 98.2 kg (216 lb 8 oz)   SpO2 97%   BMI 30.20 kg/m      "

## 2021-12-30 NOTE — PROGRESS NOTES
Daily Progress Note        CODE STATUS:  No CPR- Do NOT Intubate    12/24/21  Assessment/Plan:  Alison Bashir is a 71 year old old female group home resident with past medical history of parkinsonism, CAD, cognitive decline(but has been her own decision maker), HTN, [place], hx of TBI who had a fall during transitioning 3 days prior to presenting to ED for evaluation of severe right leg pain, found to have hematoma and admitted for further management    Tension hematoma of right lateral leg s/p evacuation on 12/20;  Postoperative right leg wound;  --Patient hit her leg on door at the senior care 3 days prior to admission.  --CTA abdomen/pelvis bilateral leg runoff reported large subcutaneous hematoma on lateral superior aspect of right calf, no contrast extravasation.  --On 12/21, underwent evacuation of hematoma by Dr. Subramanian.  --On 12/28, status post right leg wound debridement  --Completed 5 days course of IV Zosyn.  --Continue postoperative care, local wound care per surgery and wound care nurse.    Acute postoperative pain;  --Continue scheduled Tylenol, as needed oxycodone.  Monitor for sedation  --Appreciated pain team input.    Acute blood loss anemia due to above;  --Patient received 2 unit pRBC transfusion.    --Hemoglobin continue to trending down, 8.2 --> 7.8 --> 7.2 today.  Concerned that she likely going to continue to trend down due to large wound with good hypergranulation tissue.  Discussed with surgeon, will do 1 unit packed RBC transfusion.  Discussed with patient.  --Recheck in a.m.     Chronic intermittent hypotension; improved  --Increased home midodrine from 7.5 mg 3 times daily to 10 mg 3 times daily on 12/25.  Monitor vitals closely    History of CAD;  --Currently no anginal symptoms  --Continue home statin, baby aspirin, Toprol XL  --Monitor blood pressure.      History of Parkinson;  --resumed home meds   --PT/OT     H/o TBI;  History of anxiety and depression;  --lives in  but makes own  decisions   --PTA remeron, venlafaxine, trazodone, Wellbutrin     RLS; Mirapex     DVT prophylaxis; per surgery team    Disposition; anticipate group home  Barrier to discharge; postop recovery, anemia,      LOS: 4 days     Subjective:  Interval History: Patient seen and examined. Notes, labs, imaging reports personally reviewed.  No acute issues reported overnight.  Patient denies short of breath, chest pain.  Reported wound pain fairly stable.  Discussed about hemoglobin which has been trending down, currently 7.2, discussed about blood transfusion and patient agreed.  Of note, patient did have blood transfusion during this hospitalization.  Discussed with surgeon that likely hemoglobin continue to slowly drifting down given large wound and likely end up in the hospital show on with low hemoglobin and discussed about giving blood transfusion to prevent hospitalization.   Discussed with care manager, working on placement, did inform them that patient can be discharged today after blood transfusion if bed availability.  Discussed with patient's nurse at bedside.  Called patient's sister but she did not  the phone, I did not leave message.    Review of Systems:   As mentioned in subjective.    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)     Accident due to mechanical fall without injury, initial encounter     Acute blood loss anemia     Adjustment insomnia     Anemia     Anxiety disorder, unspecified     Artificial knee joint present     Calculus of kidney     Chest pain, unspecified     Chronic gouty arthritis     Chronic pain disorder     Closed fracture of part of neck of femur (H)     Closed nondisplaced fracture of head of right radius with nonunion     Constipation     Atherosclerosis of coronary artery without angina pectoris     Coronary atherosclerosis     Alzheimer's disease (H)     Cognitive impairment      Dementia in other diseases classified elsewhere without behavioral disturbance (H)     Dementia (H)     Depression     Disease of lung     Drug overdose     Dyslipidemia     ACP (advance care planning)     Acute hypotension     Essential hypertension     Orthostatic hypotension     Fall, initial encounter     Gastroesophageal reflux disease with esophagitis     Gastro-esophageal reflux disease without esophagitis     Leg hematoma, right, initial encounter     Femur fracture, right (H)     History of cardiovascular disorder     History of closed head injury     History of right hip replacement     Status post total right knee replacement     History of thromboembolism of vein     Hypokalemia     Inability to walk     Leukopenia     Magnesium deficiency     Major depressive disorder, recurrent, unspecified (H)     Mild major depression, single episode (H)     Obesity     Old myocardial infarction     Other extrapyramidal disease and abnormal movement disorders     Overactive bladder     Parkinsonism, unspecified Parkinsonism type (H)     Personal history of fall     Postmenopausal atrophic vaginitis     Presence of coronary angioplasty implant and graft     Primary localized osteoarthrosis of ankle and foot     Primary osteoarthritis of both knees     Pure hypercholesterolemia     Restless legs syndrome     Right hip pain     Senile osteoporosis     Spine pain     Periprosthetic hip fracture, initial encounter     Stented coronary artery     Surgery follow-up     Traumatic brain injury (H)     RBBB     Xeroderma     Vitamin D deficiency     Vitamin B12 deficiency (non anemic)     Urine retention     Urinary tract infection     Urinary incontinence     Hematoma of skin     Pain of right lower leg     Knee injury, right, initial encounter     Type 2 diabetes mellitus with other specified complication, unspecified whether long term insulin use (H)       Scheduled Meds:    acetaminophen  975 mg Oral Q8H     aspirin  81 mg  Oral Daily with breakfast     atorvastatin  40 mg Oral QPM     buPROPion  300 mg Oral Daily     carbidopa-levodopa  2 tablet Oral TID     furosemide  20 mg Oral Every Other Day     magnesium oxide  400 mg Oral Daily     metoprolol succinate ER  12.5 mg Oral Daily     midodrine  10 mg Oral TID w/meals     mirtazapine  15 mg Oral At Bedtime     multivitamin, therapeutic  1 tablet Oral Daily     polyethylene glycol  17 g Oral Daily     pramipexole  0.5 mg Oral At Bedtime     senna-docusate  1 tablet Oral BID     sodium chloride (PF)  3 mL Intracatheter Q8H     traZODone  150 mg Oral At Bedtime     venlafaxine  300 mg Oral Daily     venlafaxine  37.5 mg Oral Daily with breakfast     vitamin C  500 mg Oral Daily     Continuous Infusions:    PRN Meds:.sodium chloride 0.9%, sodium chloride 0.9%, acetaminophen, bisacodyl, lidocaine 4%, lidocaine (buffered or not buffered), magnesium hydroxide, naloxone **OR** naloxone **OR** naloxone **OR** naloxone, ondansetron **OR** ondansetron, ondansetron **OR** ondansetron, oxyCODONE IR, prochlorperazine **OR** prochlorperazine, sodium chloride (PF)    Objective:  Vital signs in last 24 hours:  Temp:  [97.8  F (36.6  C)-98.8  F (37.1  C)] 98.4  F (36.9  C)  Pulse:  [70-87] 87  Resp:  [16-18] 16  BP: ()/(51-61) 120/61  SpO2:  [94 %-97 %] 97 %      Intake/Output Summary (Last 24 hours) at 12/24/2021 1322  Last data filed at 12/24/2021 0920  Gross per 24 hour   Intake 1220.3 ml   Output 1200 ml   Net 20.3 ml       Physical Exam:  General: Not in obvious distress.  HEENT: Normocephalic, supple  Chest: Clear to auscultation bilateral anteriorly, no wheezing  Heart: S1S2 normal, regular  Abdomen: Soft. NT, ND. Bowel sounds- active.  Extremities: Right lower extremity dressing/Ace wrap in place  Neuro: alert and awake, moves all extremities    Lab Results:(I have personally reviewed the results)    Recent Results (from the past 24 hour(s))   Glucose by meter    Collection Time:  12/30/21  2:26 AM   Result Value Ref Range    GLUCOSE BY METER POCT 83 70 - 99 mg/dL   Platelet count    Collection Time: 12/30/21  5:22 AM   Result Value Ref Range    Platelet Count 196 150 - 450 10e3/uL   Hemoglobin    Collection Time: 12/30/21  5:22 AM   Result Value Ref Range    Hemoglobin 7.2 (L) 11.7 - 15.7 g/dL         Serum Glucose range:   Recent Labs   Lab 12/30/21 0226 12/29/21  0547 12/29/21 0227 12/24/21  0536   GLC 83 99 107* 97     ABG: No lab results found in last 7 days.  CBC:   Recent Labs   Lab 12/30/21  0522 12/29/21  0552 12/28/21  1137 12/27/21  0542 12/24/21  1459 12/24/21  0536   WBC  --   --   --   --   --  4.0   HGB 7.2* 7.3* 7.8*  --    < > 8.1*   HCT  --   --   --   --   --  26.1*   MCV  --   --   --   --   --  92     --  167 144*   < > 112*    < > = values in this interval not displayed.     Chemistry:   Recent Labs   Lab 12/29/21 0552 12/24/21  0536   NA  --  140   POTASSIUM 3.8 3.9   CHLORIDE  --  107   CO2  --  31   BUN  --  12   CR 0.67 0.69   GFRESTIMATED >90 >90   MARY  --  7.8*     Coags:  No results for input(s): INR, PROTIME, PTT in the last 168 hours.    Invalid input(s): APTT  Cardiac Markers:  No results for input(s): CKTOTAL, TROPONINI in the last 168 hours.     US Pelvis Complete without Transvaginal    Result Date: 12/20/2021  EXAM: US PELVIC TRANSABDOMINAL LOCATION: [facility] DATE/TIME: 12/20/2021 5:13 PM INDICATION: CT showed enlarged uterus COMPARISON: CT angiogram abdomen and pelvis 12/20/2021 TECHNIQUE: Transabdominal scans were performed. FINDINGS: UTERUS: 13.1 x 8.2 x 7.6 cm. Normal in size and position with no masses. ENDOMETRIUM: Obscured by air within the endometrial canal. RIGHT OVARY: Nonvisualized, obscured by bowel content. LEFT OVARY: Nonvisualized, obscured by bowel content. No significant free fluid. Debris noted in the urinary bladder.     IMPRESSION: 1.  Air within the endometrial canal as described on recent  abdomen/pelvis CT. 2.  Debris noted in the urinary bladder. 3.  Adnexa obscured by bowel gas. Ovaries not identified.     XR Femur Right 2 Views    Result Date: 12/20/2021  EXAM: XR FEMUR RIGHT 2 VIEW LOCATION: [facility] DATE/TIME: 12/20/2021 10:39 AM INDICATION: Fall, large hematoma just below right knee. COMPARISON: 8/25/2020.     IMPRESSION: No change in the right hip arthroplasty with longstem proximal femoral component and numerous cerclage wire fixation hardware about the proximal right femur. Lateral claw plate projects in unchanged position along the base of the right greater trochanter. Chronic healed deformity proximal right femur with heterotopic bone along the superolateral right hip, similar to prior. Right total knee arthroplasty. No acute displaced periprosthetic fracture identified. Diffuse bone demineralization. No sizable knee joint effusion.    XR Knee Left 1/2 Views    Result Date: 12/20/2021  EXAM: XR KNEE LT 1/2 VW LOCATION: [facility] DATE/TIME: 12/20/2021 10:39 AM INDICATION: Fall, bruising medial left knee. COMPARISON: None.     IMPRESSION: Left total knee arthroplasty with patellar resurfacing. No acute displaced periprosthetic fracture or convincing finding for component failure. No sizable left knee joint effusion. Diffuse bone demineralization. Medial left knee soft tissue swelling.    XR Knee Right 1/2 Views    Result Date: 12/20/2021  EXAM: XR KNEE RT 1 /2 VW LOCATION: [facility] DATE/TIME: 12/20/2021 10:40 AM INDICATION: Fall, large skin hematoma. COMPARISON: Right femur radiographic exam 8/25/2020.     IMPRESSION: Right total knee arthroplasty redemonstrated. No acute displaced periprosthetic fracture is identified. No sizable right knee joint effusion. Diffuse bone demineralization. The distal tip of a right hip arthroplasty is noted with indolent-appearing periosteal new bone along the posterior adjacent  femoral shaft.    XR Tibia & Fibula Left 2 Views    Result Date: 12/20/2021  EXAM: XR TIBIA and FIBULA LT 2 VW LOCATION: [facility] DATE/TIME: 12/20/2021 10:39 AM INDICATION: Fall, bruising medial left knee. COMPARISON: None.     IMPRESSION: Anatomic alignment left tibia and fibula. No acute displaced tibia or fibula fracture. Partial visualization of left knee arthroplasty. Distal left leg and bimalleolar ankle soft tissue swelling. Degenerative change in the left ankle and visualized foot. Heel spur.    XR Tibia & Fibula Right 2 Views    Result Date: 12/20/2021  EXAM: XR TIBIA and FIBULA RT 2 VW LOCATION: [facility] DATE/TIME: 12/20/2021 10:39 AM INDICATION: Fall, large skin hematoma below knee. COMPARISON: None.     IMPRESSION: Large soft tissue density along the anterolateral right leg extends craniocaudal approximately 23 cm and could represent the reported large hematoma in the lateral right leg. Anatomic alignment right tibia and fibula. No acute displaced right  tibia or fibula fracture identified. Diffuse bone demineralization. Partial visualization of right total knee arthroplasty. Mild-moderate tibiotalar osteoarthritis. Generalized right leg soft tissue swelling.    CTA Abdomen Pelvis Bilat Leg Runoff w Contr    Result Date: 12/20/2021  EXAM: CTA ABDOMEN PELVIS BILAT LEG RUNOFF W CONTR LOCATION: [facility] DATE/TIME: 12/20/2021 2:19 PM INDICATION: Large hematoma right leg. Evaluate for contrast extravasation. COMPARISON: None. TECHNIQUE: Helical acquisition through the abdomen, pelvis, and bilateral lower extremities was performed during the arterial phase of contrast enhancement using IV Contrast. 2D and 3D reconstructions were performed by the CT technologist. Dose reduction  techniques were used. CONTRAST: isovue 370 100ml FINDINGS: AORTA: Normal caliber, tortuous visualized descending aorta. Mild calcified atherosclerotic  changes of a normal caliber abdominal aorta. 2, small right renal arteries. Single left renal artery. No significant renal artery stenosis. Number caliber celiac and superior mesenteric arteries. Patent inferior mesenteric artery. RIGHT LEG: Minimal calcified changes of the common iliac artery. No iliac stenosis. Normal caliber common femoral, profunda femoris, superficial femoral and visualized popliteal arteries. Mid aspect of the popliteal arteries obscured by streak artifact from the knee arthroplasty. High origin of the anterior tibial artery. Venous contamination at the calf station. With this consideration appears to be a three-vessel runoff. No contrast extravasation within the large, subcutaneous hematoma at the lateral  aspect of the calf. LEFT LEG: Minimal calcified changes of the common iliac artery. No iliac stenosis. Normal caliber common femoral, profunda femoris, superficial femoral and visualized popliteal artery. Mid aspect of the popliteal arteries obscured by streak artifact from  the knee arthroplasty. Three-vessel runoff. LUNG BASES: Calcified granulomas within the visualized right middle lobe, with associated linear fibrosis. Surgical clips, posterior aspect of the mid mediastinum. HEPATOBILIARY: Arterial phase images of the liver are within normal limits. PANCREAS: Normal. SPLEEN: Normal. ADRENAL GLANDS: Normal. KIDNEYS: Both kidneys are negative for hydronephrosis. Multiple, bilateral calyceal calculi. The largest, branching calculus at the upper pole the right kidney measures approximately 8 x 10 mm. The largest calculus, within the posterior aspect of the lower pole the left kidney measures 5 mm. No hydronephrosis. BOWEL: Normal caliber loops of small bowel. Normal caliber loops of small bowel. Moderate amount of stool throughout a normal caliber colon. LYMPH NODES: Normal. PELVIC ORGANS: Enlarged uterus with a significant amount of gas within the endometrium. Small amount of free fluid  within the pelvis. MUSCULOSKELETAL: Large, 21.1 x 10.6 x 5.2 cm subcutaneous hematoma at the lateral aspect of the proximal right calf. No contrast extravasation within this. Multilevel degenerative disc disease throughout the visualized thoracic spine. Hypertrophic changes visualized thoracic and upper lumbar spine. Right total hip arthroplasty with cerclage wires around the proximal femur. Bilateral knee arthroplasties. Degenerative changes bilateral feet.     CONCLUSION: 1.  Large subcutaneous hematoma the lateral superior aspect of the right calf. No contrast extravasation. 2.  Right leg: No inflow or femoropopliteal stenosis. Venous contamination at the calf station. Is considered and appears to be three-vessel runoff. 3.  Left leg: CTA is within normal limits. 4.  Enlarged uterus with gas within the endometrium. There is a nonspecific finding. Please correlate clinically to exclude infection. 5.  Bilateral nonobstructing renal calculi. 6.  Osteoarthritic changes visualized spine. Right total hip arthroplasty. Bilateral knee arthroplasties.    Latest radiology report personally reviewed.    Note created using dragon voice recognition software so sounds alike errors may have escaped editing.    12/30/2021   MARIO CHAMBERLAIN MD  HOSPITALIST, [facility]  PAGER NO. [phone]

## 2021-12-30 NOTE — PROGRESS NOTES
Care Management Follow Up    Length of Stay (days): 10    Expected Discharge Date: 01/03/2022     Concerns to be Addressed: transfuse PRBC's, monitor hgb, WOC following, continued therapy, pain control  Patient plan of care discussed at interdisciplinary rounds: Yes    Anticipated Discharge Disposition: TCU     Anticipated Discharge Services: skilled nursing, therapy services  Anticipated Discharge DME: per therapy    Patient/family educated on Medicare website which has current facility and service quality ratings:   yes  Education Provided on the Discharge Plan:  Yes, per care team  Patient/Family in Agreement with the Plan: yes    Referrals Placed by CM/SW:  TCU - [facility], [facility], [facility], [facility]  Private pay costs discussed: Not applicable    Additional Information:  Chart reviewed and updates with care team.    Patient cleared from Surgery services for discharge.  Medically patient going to receive one unit PRBC's today.  WOC RN did dressing change today and placed recommendations.    RNCM spoke with Sofy, Group Home  ([phone]) to give updates.    Sofy has many questions about patient needs (toileting, bathing, transfers, pain control) which writer has deferred to bedside RN.  CM requested bedside RN to call Sofy to answer questions.    Sofy verbalized that patient has gone to TCU in the past and that TCU might be the best plan to meet patient's needs right now.  She states the group home has a tammy lift but will need a sling and shower chair.  Sofy and possibly Alexa (House ) will need to come to hospital for wound care teaching so they can teach group home staff.if patient does not go to TCU.  Sofy reports that their group home will be contracted with an agency for in-home physical therapy after the 1st of the year but she is not sure of the agency name at this time.  Sofy states patient will need transport scheduled at  discharge.    RNCM met with patient to introduce self and discuss discharge planning.  Dicussed the complexity of care for group home staff and that they need to prepare for patient return as well as therapy recommendations that TCU for transition may be most appropriate. Alison verbalizes sadness about TCU recommendation but is agreeable.  She states she has been to [facility]), this would be her first choice.  She does not want [facility] or [place]..  TCU referrals sent to [facility], [facility], [facility], and [facility].    Updates to [facility] done.  Sofy states that she is working to get things prepared for patient return.  CM to update patient and Sofy with TCU disposition.      Suha Obrien RN

## 2021-12-31 ENCOUNTER — APPOINTMENT (OUTPATIENT)
Dept: PHYSICAL THERAPY | Facility: HOSPITAL | Age: 71
DRG: 580 | End: 2021-12-31
Payer: MEDICARE

## 2021-12-31 LAB — HGB BLD-MCNC: 8.8 G/DL (ref 11.7–15.7)

## 2021-12-31 PROCEDURE — 250N000013 HC RX MED GY IP 250 OP 250 PS 637: Performed by: SURGERY

## 2021-12-31 PROCEDURE — 250N000013 HC RX MED GY IP 250 OP 250 PS 637: Performed by: INTERNAL MEDICINE

## 2021-12-31 PROCEDURE — 97110 THERAPEUTIC EXERCISES: CPT | Mod: GP

## 2021-12-31 PROCEDURE — 120N000001 HC R&B MED SURG/OB

## 2021-12-31 PROCEDURE — 36415 COLL VENOUS BLD VENIPUNCTURE: CPT | Performed by: INTERNAL MEDICINE

## 2021-12-31 PROCEDURE — 99024 POSTOP FOLLOW-UP VISIT: CPT | Performed by: SURGERY

## 2021-12-31 PROCEDURE — 250N000011 HC RX IP 250 OP 636: Performed by: INTERNAL MEDICINE

## 2021-12-31 PROCEDURE — 85018 HEMOGLOBIN: CPT | Performed by: INTERNAL MEDICINE

## 2021-12-31 PROCEDURE — 99231 SBSQ HOSP IP/OBS SF/LOW 25: CPT | Performed by: NURSE PRACTITIONER

## 2021-12-31 PROCEDURE — 99232 SBSQ HOSP IP/OBS MODERATE 35: CPT | Performed by: INTERNAL MEDICINE

## 2021-12-31 PROCEDURE — 250N000013 HC RX MED GY IP 250 OP 250 PS 637: Performed by: NURSE PRACTITIONER

## 2021-12-31 RX ORDER — FERROUS SULFATE 325(65) MG
325 TABLET ORAL DAILY
Status: DISCONTINUED | OUTPATIENT
Start: 2021-12-31 | End: 2022-01-07 | Stop reason: HOSPADM

## 2021-12-31 RX ORDER — HEPARIN SODIUM 5000 [USP'U]/.5ML
5000 INJECTION, SOLUTION INTRAVENOUS; SUBCUTANEOUS
Status: DISCONTINUED | OUTPATIENT
Start: 2021-12-31 | End: 2022-01-07 | Stop reason: HOSPADM

## 2021-12-31 RX ORDER — ACETAMINOPHEN 325 MG/1
975 TABLET ORAL 2 TIMES DAILY
Status: DISCONTINUED | OUTPATIENT
Start: 2021-12-31 | End: 2022-01-07 | Stop reason: HOSPADM

## 2021-12-31 RX ADMIN — CARBIDOPA AND LEVODOPA 2 TABLET: 25; 100 TABLET ORAL at 13:02

## 2021-12-31 RX ADMIN — ACETAMINOPHEN 975 MG: 325 TABLET ORAL at 02:03

## 2021-12-31 RX ADMIN — MIDODRINE HYDROCHLORIDE 10 MG: 5 TABLET ORAL at 13:01

## 2021-12-31 RX ADMIN — HEPARIN SODIUM 5000 UNITS: 10000 INJECTION, SOLUTION INTRAVENOUS; SUBCUTANEOUS at 20:19

## 2021-12-31 RX ADMIN — FERROUS SULFATE TAB 325 MG (65 MG ELEMENTAL FE) 325 MG: 325 (65 FE) TAB at 08:17

## 2021-12-31 RX ADMIN — OXYCODONE HYDROCHLORIDE 5 MG: 5 TABLET ORAL at 08:17

## 2021-12-31 RX ADMIN — VENLAFAXINE HYDROCHLORIDE 300 MG: 150 CAPSULE, EXTENDED RELEASE ORAL at 08:17

## 2021-12-31 RX ADMIN — OXYCODONE HYDROCHLORIDE 2.5 MG: 5 TABLET ORAL at 02:03

## 2021-12-31 RX ADMIN — ACETAMINOPHEN 975 MG: 325 TABLET ORAL at 20:19

## 2021-12-31 RX ADMIN — ATORVASTATIN CALCIUM 40 MG: 40 TABLET, FILM COATED ORAL at 20:19

## 2021-12-31 RX ADMIN — THERA TABS 1 TABLET: TAB at 08:17

## 2021-12-31 RX ADMIN — MAGNESIUM OXIDE TAB 400 MG (241.3 MG ELEMENTAL MG) 400 MG: 400 (241.3 MG) TAB at 08:24

## 2021-12-31 RX ADMIN — METOPROLOL SUCCINATE 12.5 MG: 25 TABLET, EXTENDED RELEASE ORAL at 08:16

## 2021-12-31 RX ADMIN — VENLAFAXINE HYDROCHLORIDE 37.5 MG: 37.5 CAPSULE, EXTENDED RELEASE ORAL at 08:18

## 2021-12-31 RX ADMIN — OXYCODONE HYDROCHLORIDE 2.5 MG: 5 TABLET ORAL at 14:52

## 2021-12-31 RX ADMIN — BUPROPION HYDROCHLORIDE 300 MG: 300 TABLET, EXTENDED RELEASE ORAL at 08:17

## 2021-12-31 RX ADMIN — CARBIDOPA AND LEVODOPA 2 TABLET: 25; 100 TABLET ORAL at 20:19

## 2021-12-31 RX ADMIN — MIRTAZAPINE 15 MG: 15 TABLET, FILM COATED ORAL at 20:19

## 2021-12-31 RX ADMIN — MIDODRINE HYDROCHLORIDE 10 MG: 5 TABLET ORAL at 17:45

## 2021-12-31 RX ADMIN — OXYCODONE HYDROCHLORIDE 5 MG: 5 TABLET ORAL at 18:10

## 2021-12-31 RX ADMIN — CARBIDOPA AND LEVODOPA 2 TABLET: 25; 100 TABLET ORAL at 08:16

## 2021-12-31 RX ADMIN — ACETAMINOPHEN 975 MG: 325 TABLET ORAL at 09:32

## 2021-12-31 RX ADMIN — PRAMIPEXOLE DIHYDROCHLORIDE 0.5 MG: 0.5 TABLET ORAL at 20:18

## 2021-12-31 RX ADMIN — Medication 500 MG: at 08:17

## 2021-12-31 RX ADMIN — TRAZODONE HYDROCHLORIDE 150 MG: 100 TABLET ORAL at 20:19

## 2021-12-31 RX ADMIN — MIDODRINE HYDROCHLORIDE 10 MG: 5 TABLET ORAL at 08:17

## 2021-12-31 RX ADMIN — HEPARIN SODIUM 5000 UNITS: 10000 INJECTION, SOLUTION INTRAVENOUS; SUBCUTANEOUS at 09:42

## 2021-12-31 RX ADMIN — ASPIRIN 81 MG: 81 TABLET, COATED ORAL at 08:16

## 2021-12-31 ASSESSMENT — ACTIVITIES OF DAILY LIVING (ADL)
ADLS_ACUITY_SCORE: 25
ADLS_ACUITY_SCORE: 25
ADLS_ACUITY_SCORE: 29
ADLS_ACUITY_SCORE: 25
ADLS_ACUITY_SCORE: 27
ADLS_ACUITY_SCORE: 25
ADLS_ACUITY_SCORE: 27
ADLS_ACUITY_SCORE: 25
ADLS_ACUITY_SCORE: 27
ADLS_ACUITY_SCORE: 25

## 2021-12-31 NOTE — PROGRESS NOTES
"[facility] ACUTE PAIN SERVICE    Daily PAIN Progress Note    Assessment/Plan:  Alison Bashir is a 71 year old female who was admitted on 12/20/2021.  Pain team was asked to see the patient for post-op pain. Admitted for tension hematoma of right lateral leg. History including parkinsonism, CAD, cognitive decline, HTN, Hx of TBI, anxiety depression, RLS, previous MI.  Patient lives in a group home and is given her pain medication by group home staff.  Patient has been rating post-op pain between 4/10, appears that oxycodone is helpful.  The patient does not smoke and denies chemical dependency history.      Post op 12/21 evacuation of hematoma, 12/28 right leg wound debridement     Opioid Induced Respiratory Depression Risk Assessment: Moderate, post-op, age previous opioid naive status     Note: reaction to morphine-rash, ibuprofen-not specified, but patient reported \"makes her sick to her stomach\".    In 24 hours, patient has utilized 17.5mg of oxycodone for an MME 26.    PLAN:   1) Pain is consistent with post-op pain.  2)Multimodal Medication Therapy  Topical: none, incision covered   NSAID'S: CrCl 98, none, patient has intolerance to ibuprofen.  Noted hemoglobin today of 8.8 sp transfusion 12/30/2021.  Muscle Relaxants:  none   Adjuvants: Tylenol 975 mg po q 8 h  Antidepressants/anxiolytics: bupropion  mg daily, Remeron 15 mg po q hs, trazodone 150 mg po q hs, venlafaxine .5 mg daily  Opioids:oxycodone 2.5-5 mg po q 4 h prn   IV Pain medication: discontinued  3)Non-medication interventions: position lower extremity for comfort  Acupuncture consult, Integrative consult - if patient agreeable   4)Constipation Prophylaxis: senna/docusate 1 po bid, Miralax daily  5) Care Teams: Surgery, Hospital Medicine Service    -Opioid prescriber has been none  -[place]  pulled from system on 12/28/21. This indicates patient is not prescribed opioids.  Discharge Recommendations - We recommend prescribing the " "following at the time of discharge: Most likely small Rx of oxycodone 2.5-5 mg q 4 h prn, scripts per surgery team.  Do not recommend opioids for ongoing management, if warranted would limit to < 3 day supply at discharge. APAP. Follow up Primary Care Provider, surgery     Pain well controlled. Pain team will sign off. Discussed with Hospital Medicine Service.     Subjective:  Patient denies nausea, vomiting, constipation, diarrhea, chest pain, shortness of breath. Eating Ukrainian toast this AM. Reported wound pain fairly stable on current regime. Rates pain 4/10 and aching. Wound covered.     Principal Problem:    Leg hematoma, right, initial encounter  Active Problems:    Accident due to mechanical fall without injury, initial encounter    Acute blood loss anemia    Anxiety disorder, unspecified    Coronary atherosclerosis    Orthostatic hypotension    Parkinsonism, unspecified Parkinsonism type (H)    Restless legs syndrome    Traumatic brain injury (H)    Hematoma of skin    Pain of right lower leg    Knee injury, right, initial encounter      Objective:  Vital signs in last 24 hours:  /65 (BP Location: Left arm)   Pulse 78   Temp 98.6  F (37  C) (Oral)   Resp 18   Ht 1.803 m (5' 11\")   Wt 98.2 kg (216 lb 8 oz)   SpO2 94%   BMI 30.20 kg/m    Weight:     Vitals:    12/20/21 0928 12/24/21 1701 12/27/21 0543   Weight: 81 kg (178 lb 9.2 oz) 96.2 kg (212 lb) 98.2 kg (216 lb 8 oz)      Weight change:   Body mass index is 30.2 kg/m .    Intake/Output last 3 shifts:  I/O last 3 completed shifts:  In: 583 [P.O.:240]  Out: 1600 [Urine:1600]  Intake/Output this shift:  I/O this shift:  In: 120 [P.O.:120]  Out: 400 [Urine:400]    Review of Systems:   As per subjective, all others negative.    Physical Exam:  General Appearance:  Alert, cooperative, no distress   Head:  Normocephalic, without obvious abnormality, atraumatic   Eyes:  PERRL, conjunctiva/corneas clear, EOM's intact   Nose: Nares normal, septum " midline   Throat: Lips, mucosa, and tongue normal; teeth and gums normal   Neck: Supple, symmetrical, trachea midline   Back:   Symmetric, no curvature, ROM normal   Lungs:   Clear to auscultation bilaterally, respirations unlabored   Heart:  Regular rate and rhythm, S1, S2 normal    Abdomen:   Soft, non-tender, bowel sounds active all four quadrants   Skin: Incision covered    Neurologic: Alert and oriented X 3, Moves all 4 extremities     Imaging: Reviewed      Labs: Reviewed   Recent Results (from the past 24 hour(s))   Prepare red blood cells (unit)    Collection Time: 12/30/21 11:15 AM   Result Value Ref Range    CROSSMATCH COMPATIBLE     UNIT ABO/RH O Neg     Unit Number [id number]     Unit Status Transfused     Blood Component Type Red Blood Cells     Product Code T2765L13     CODING SYSTEM [id number]     UNIT TYPE ISBT 9500     ISSUE DATE AND TIME [id number]    Prepare red blood cells (unit)    Collection Time: 12/30/21 11:54 AM   Result Value Ref Range    CROSSMATCH COMPATIBLE     UNIT ABO/RH O Neg     Unit Number [id number]     Unit Status Ready     Blood Component Type Red Blood Cells     Product Code [id number]     CODING SYSTEM QCAI060     UNIT TYPE ISBT 9500    Hemoglobin    Collection Time: 12/31/21  5:26 AM   Result Value Ref Range    Hemoglobin 8.8 (L) 11.7 - 15.7 g/dL       Total time spent 15 minutes with greater than 50% in consultation, education and coordination of care, examination of patient, review of medical record, lab and imaging results, completion of documentation, and discussion with RN, MD, Senior Acupuncturist, and pharmacist.      HAMILTON Jones-C  Acute Care Pain Management Program  [facility] ([facility], [place], [place])  Monday-Friday 8a-4p   Page via online paging system or call [phone]

## 2021-12-31 NOTE — PROGRESS NOTES
Daily Progress Note        CODE STATUS:  No CPR- Do NOT Intubate    12/24/21  Assessment/Plan:  Alison Bashir is a 71 year old old female [facility] resident with past medical history of parkinsonism, CAD, cognitive decline(but has been her own decision maker), HTN, [place], hx of TBI who had a fall during transitioning 3 days prior to presenting to ED for evaluation of severe right leg pain, found to have hematoma and admitted for further management    Tension hematoma of right lateral leg s/p evacuation on 12/20;  Postoperative right leg wound;  --Patient hit her leg on door at the [facility] 3 days prior to admission.  --CTA abdomen/pelvis bilateral leg runoff reported large subcutaneous hematoma on lateral superior aspect of right calf, no contrast extravasation.  --On 12/21, underwent evacuation of hematoma by Dr. Subramanain.  --On 12/28, status post right leg wound debridement  --Completed 5 days course of IV Zosyn.  --Continue local wound care per surgery and wound care nurse.    Acute postoperative pain;  --Continue scheduled Tylenol, as needed oxycodone.  Monitor for sedation  --Appreciated pain team input.    Acute blood loss anemia due to above;  --Patient received 2 unit pRBC transfusion.    --Hemoglobin continue to trending down, 8.2 --> 7.8 --> 7.2 on 12/30.    --Patient received 1 more unit of pRBC transfusion on 12/30.  --Initiated on daily iron supplement  --Monitor hemoglobin intermittently.     Chronic intermittent hypotension; improved  --Increased home midodrine from 7.5 mg 3 times daily to 10 mg 3 times daily on 12/25.  Monitor vitals closely    History of CAD;  --Currently no anginal symptoms  --Continue home statin, baby aspirin, Toprol XL  --Monitor blood pressure.      History of Parkinson;  --resumed home meds. PT/OT     H/o TBI;  History of anxiety and depression;  --lives in  but makes own decisions   --PTA remeron, venlafaxine, trazodone, Wellbutrin     RLS; Mirapex     DVT prophylaxis; subcu  heparin    Disposition; prior to hospitalization patient had group home.  Anticipating TCU  Barrier to discharge; medically stable, awaiting placement     LOS: 4 days     Subjective:  Interval History: Patient seen and examined. Notes, labs, imaging reports personally reviewed.  No acute issues reported overnight.  Patient denied new concerns or complaints.  Postoperative wound pain is fairly controlled.  Discussed with nursing staffs.  Discussed with patient's sister, Chelo.    Review of Systems:   As mentioned in subjective.    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)     Accident due to mechanical fall without injury, initial encounter     Acute blood loss anemia     Adjustment insomnia     Anemia     Anxiety disorder, unspecified     Artificial knee joint present     Calculus of kidney     Chest pain, unspecified     Chronic gouty arthritis     Chronic pain disorder     Closed fracture of part of neck of femur (H)     Closed nondisplaced fracture of head of right radius with nonunion     Constipation     Atherosclerosis of coronary artery without angina pectoris     Coronary atherosclerosis     Alzheimer's disease (H)     Cognitive impairment     Dementia in other diseases classified elsewhere without behavioral disturbance (H)     Dementia (H)     Depression     Disease of lung     Drug overdose     Dyslipidemia     ACP (advance care planning)     Acute hypotension     Essential hypertension     Orthostatic hypotension     Fall, initial encounter     Gastroesophageal reflux disease with esophagitis     Gastro-esophageal reflux disease without esophagitis     Leg hematoma, right, initial encounter     Femur fracture, right (H)     History of cardiovascular disorder     History of closed head injury     History of right hip replacement     Status post total right knee replacement     History of thromboembolism of vein      Hypokalemia     Inability to walk     Leukopenia     Magnesium deficiency     Major depressive disorder, recurrent, unspecified (H)     Mild major depression, single episode (H)     Obesity     Old myocardial infarction     Other extrapyramidal disease and abnormal movement disorders     Overactive bladder     Parkinsonism, unspecified Parkinsonism type (H)     Personal history of fall     Postmenopausal atrophic vaginitis     Presence of coronary angioplasty implant and graft     Primary localized osteoarthrosis of ankle and foot     Primary osteoarthritis of both knees     Pure hypercholesterolemia     Restless legs syndrome     Right hip pain     Senile osteoporosis     Spine pain     Periprosthetic hip fracture, initial encounter     Stented coronary artery     Surgery follow-up     Traumatic brain injury (H)     RBBB     Xeroderma     Vitamin D deficiency     Vitamin B12 deficiency (non anemic)     Urine retention     Urinary tract infection     Urinary incontinence     Hematoma of skin     Pain of right lower leg     Knee injury, right, initial encounter     Type 2 diabetes mellitus with other specified complication, unspecified whether long term insulin use (H)       Scheduled Meds:    acetaminophen  975 mg Oral Q8H     aspirin  81 mg Oral Daily with breakfast     atorvastatin  40 mg Oral QPM     buPROPion  300 mg Oral Daily     carbidopa-levodopa  2 tablet Oral TID     ferrous sulfate  325 mg Oral Daily     furosemide  20 mg Oral Every Other Day     magnesium oxide  400 mg Oral Daily     metoprolol succinate ER  12.5 mg Oral Daily     midodrine  10 mg Oral TID w/meals     mirtazapine  15 mg Oral At Bedtime     multivitamin, therapeutic  1 tablet Oral Daily     polyethylene glycol  17 g Oral Daily     pramipexole  0.5 mg Oral At Bedtime     senna-docusate  1 tablet Oral BID     sodium chloride (PF)  3 mL Intracatheter Q8H     traZODone  150 mg Oral At Bedtime     venlafaxine  300 mg Oral Daily      venlafaxine  37.5 mg Oral Daily with breakfast     vitamin C  500 mg Oral Daily     Continuous Infusions:    PRN Meds:.acetaminophen, bisacodyl, lidocaine 4%, lidocaine (buffered or not buffered), magnesium hydroxide, naloxone **OR** naloxone **OR** naloxone **OR** naloxone, ondansetron **OR** ondansetron, oxyCODONE IR, prochlorperazine **OR** prochlorperazine, sodium chloride (PF)    Objective:  Vital signs in last 24 hours:  Temp:  [98.1  F (36.7  C)-98.8  F (37.1  C)] 98.8  F (37.1  C)  Pulse:  [74-87] 77  Resp:  [16-18] 18  BP: (114-140)/(58-69) 118/58  SpO2:  [92 %-97 %] 92 %      Intake/Output Summary (Last 24 hours) at 12/24/2021 1322  Last data filed at 12/24/2021 0920  Gross per 24 hour   Intake 1220.3 ml   Output 1200 ml   Net 20.3 ml       Physical Exam:  General: Not in obvious distress.  HEENT: Normocephalic, supple  Chest: Clear to auscultation bilateral anteriorly, no wheezing  Heart: S1S2 normal, regular  Abdomen: Soft. NT, ND. Bowel sounds- active.  Extremities: Right lower extremity dressing/Ace wrap in place  Neuro: alert and awake, moves all extremities    Lab Results:(I have personally reviewed the results)    Recent Results (from the past 24 hour(s))   Prepare red blood cells (unit)    Collection Time: 12/30/21 11:15 AM   Result Value Ref Range    CROSSMATCH COMPATIBLE     UNIT ABO/RH O Neg     Unit Number [id number]     Unit Status Transfused     Blood Component Type Red Blood Cells     Product Code I3899X47     CODING SYSTEM [id number]     UNIT TYPE ISBT 9500     ISSUE DATE AND TIME [id number]    Prepare red blood cells (unit)    Collection Time: 12/30/21 11:54 AM   Result Value Ref Range    CROSSMATCH COMPATIBLE     UNIT ABO/RH O Neg     Unit Number [id number]     Unit Status Ready     Blood Component Type Red Blood Cells     Product Code Q6455W37     CODING SYSTEM [id number]     UNIT TYPE ISBT 9500    Hemoglobin    Collection Time: 12/31/21  5:26 AM   Result Value Ref Range     Hemoglobin 8.8 (L) 11.7 - 15.7 g/dL         Serum Glucose range:   Recent Labs   Lab 12/30/21  0226 12/29/21  0547 12/29/21 0227   GLC 83 99 107*     ABG: No lab results found in last 7 days.  CBC:   Recent Labs   Lab 12/31/21  0526 12/30/21  0522 12/29/21  0552 12/28/21  1137 12/27/21  0542   HGB 8.8* 7.2* 7.3* 7.8*  --    PLT  --  196  --  167 144*     Chemistry:   Recent Labs   Lab 12/29/21 0552   POTASSIUM 3.8   CR 0.67   GFRESTIMATED >90     Coags:  No results for input(s): INR, PROTIME, PTT in the last 168 hours.    Invalid input(s): APTT  Cardiac Markers:  No results for input(s): CKTOTAL, TROPONINI in the last 168 hours.     US Pelvis Complete without Transvaginal    Result Date: 12/20/2021  EXAM: US PELVIC TRANSABDOMINAL LOCATION: [facility] DATE/TIME: 12/20/2021 5:13 PM INDICATION: CT showed enlarged uterus COMPARISON: CT angiogram abdomen and pelvis 12/20/2021 TECHNIQUE: Transabdominal scans were performed. FINDINGS: UTERUS: 13.1 x 8.2 x 7.6 cm. Normal in size and position with no masses. ENDOMETRIUM: Obscured by air within the endometrial canal. RIGHT OVARY: Nonvisualized, obscured by bowel content. LEFT OVARY: Nonvisualized, obscured by bowel content. No significant free fluid. Debris noted in the urinary bladder.     IMPRESSION: 1.  Air within the endometrial canal as described on recent abdomen/pelvis CT. 2.  Debris noted in the urinary bladder. 3.  Adnexa obscured by bowel gas. Ovaries not identified.     XR Femur Right 2 Views    Result Date: 12/20/2021  EXAM: XR FEMUR RIGHT 2 VIEW LOCATION: [facility] DATE/TIME: 12/20/2021 10:39 AM INDICATION: Fall, large hematoma just below right knee. COMPARISON: 8/25/2020.     IMPRESSION: No change in the right hip arthroplasty with longstem proximal femoral component and numerous cerclage wire fixation hardware about the proximal right femur. Lateral claw plate projects in unchanged position along the base of  the right greater trochanter. Chronic healed deformity proximal right femur with heterotopic bone along the superolateral right hip, similar to prior. Right total knee arthroplasty. No acute displaced periprosthetic fracture identified. Diffuse bone demineralization. No sizable knee joint effusion.    XR Knee Left 1/2 Views    Result Date: 12/20/2021  EXAM: XR KNEE LT 1/2 VW LOCATION: [facility] DATE/TIME: 12/20/2021 10:39 AM INDICATION: Fall, bruising medial left knee. COMPARISON: None.     IMPRESSION: Left total knee arthroplasty with patellar resurfacing. No acute displaced periprosthetic fracture or convincing finding for component failure. No sizable left knee joint effusion. Diffuse bone demineralization. Medial left knee soft tissue swelling.    XR Knee Right 1/2 Views    Result Date: 12/20/2021  EXAM: XR KNEE RT 1 /2 VW LOCATION: [facility] DATE/TIME: 12/20/2021 10:40 AM INDICATION: Fall, large skin hematoma. COMPARISON: Right femur radiographic exam 8/25/2020.     IMPRESSION: Right total knee arthroplasty redemonstrated. No acute displaced periprosthetic fracture is identified. No sizable right knee joint effusion. Diffuse bone demineralization. The distal tip of a right hip arthroplasty is noted with indolent-appearing periosteal new bone along the posterior adjacent femoral shaft.    XR Tibia & Fibula Left 2 Views    Result Date: 12/20/2021  EXAM: XR TIBIA and FIBULA LT 2 VW LOCATION: [facility] DATE/TIME: 12/20/2021 10:39 AM INDICATION: Fall, bruising medial left knee. COMPARISON: None.     IMPRESSION: Anatomic alignment left tibia and fibula. No acute displaced tibia or fibula fracture. Partial visualization of left knee arthroplasty. Distal left leg and bimalleolar ankle soft tissue swelling. Degenerative change in the left ankle and visualized foot. Heel spur.    XR Tibia & Fibula Right 2 Views    Result Date: 12/20/2021  EXAM:  XR TIBIA and FIBULA RT 2 VW LOCATION: [facility] DATE/TIME: 12/20/2021 10:39 AM INDICATION: Fall, large skin hematoma below knee. COMPARISON: None.     IMPRESSION: Large soft tissue density along the anterolateral right leg extends craniocaudal approximately 23 cm and could represent the reported large hematoma in the lateral right leg. Anatomic alignment right tibia and fibula. No acute displaced right  tibia or fibula fracture identified. Diffuse bone demineralization. Partial visualization of right total knee arthroplasty. Mild-moderate tibiotalar osteoarthritis. Generalized right leg soft tissue swelling.    CTA Abdomen Pelvis Bilat Leg Runoff w Contr    Result Date: 12/20/2021  EXAM: CTA ABDOMEN PELVIS BILAT LEG RUNOFF W CONTR LOCATION: [facility] DATE/TIME: 12/20/2021 2:19 PM INDICATION: Large hematoma right leg. Evaluate for contrast extravasation. COMPARISON: None. TECHNIQUE: Helical acquisition through the abdomen, pelvis, and bilateral lower extremities was performed during the arterial phase of contrast enhancement using IV Contrast. 2D and 3D reconstructions were performed by the CT technologist. Dose reduction  techniques were used. CONTRAST: isovue 370 100ml FINDINGS: AORTA: Normal caliber, tortuous visualized descending aorta. Mild calcified atherosclerotic changes of a normal caliber abdominal aorta. 2, small right renal arteries. Single left renal artery. No significant renal artery stenosis. Number caliber celiac and superior mesenteric arteries. Patent inferior mesenteric artery. RIGHT LEG: Minimal calcified changes of the common iliac artery. No iliac stenosis. Normal caliber common femoral, profunda femoris, superficial femoral and visualized popliteal arteries. Mid aspect of the popliteal arteries obscured by streak artifact from the knee arthroplasty. High origin of the anterior tibial artery. Venous contamination at the calf station. With this  consideration appears to be a three-vessel runoff. No contrast extravasation within the large, subcutaneous hematoma at the lateral  aspect of the calf. LEFT LEG: Minimal calcified changes of the common iliac artery. No iliac stenosis. Normal caliber common femoral, profunda femoris, superficial femoral and visualized popliteal artery. Mid aspect of the popliteal arteries obscured by streak artifact from  the knee arthroplasty. Three-vessel runoff. LUNG BASES: Calcified granulomas within the visualized right middle lobe, with associated linear fibrosis. Surgical clips, posterior aspect of the mid mediastinum. HEPATOBILIARY: Arterial phase images of the liver are within normal limits. PANCREAS: Normal. SPLEEN: Normal. ADRENAL GLANDS: Normal. KIDNEYS: Both kidneys are negative for hydronephrosis. Multiple, bilateral calyceal calculi. The largest, branching calculus at the upper pole the right kidney measures approximately 8 x 10 mm. The largest calculus, within the posterior aspect of the lower pole the left kidney measures 5 mm. No hydronephrosis. BOWEL: Normal caliber loops of small bowel. Normal caliber loops of small bowel. Moderate amount of stool throughout a normal caliber colon. LYMPH NODES: Normal. PELVIC ORGANS: Enlarged uterus with a significant amount of gas within the endometrium. Small amount of free fluid within the pelvis. MUSCULOSKELETAL: Large, 21.1 x 10.6 x 5.2 cm subcutaneous hematoma at the lateral aspect of the proximal right calf. No contrast extravasation within this. Multilevel degenerative disc disease throughout the visualized thoracic spine. Hypertrophic changes visualized thoracic and upper lumbar spine. Right total hip arthroplasty with cerclage wires around the proximal femur. Bilateral knee arthroplasties. Degenerative changes bilateral feet.     CONCLUSION: 1.  Large subcutaneous hematoma the lateral superior aspect of the right calf. No contrast extravasation. 2.  Right leg: No inflow  or femoropopliteal stenosis. Venous contamination at the calf station. Is considered and appears to be three-vessel runoff. 3.  Left leg: CTA is within normal limits. 4.  Enlarged uterus with gas within the endometrium. There is a nonspecific finding. Please correlate clinically to exclude infection. 5.  Bilateral nonobstructing renal calculi. 6.  Osteoarthritic changes visualized spine. Right total hip arthroplasty. Bilateral knee arthroplasties.    Latest radiology report personally reviewed.    Note created using dragon voice recognition software so sounds alike errors may have escaped editing.    12/31/2021   MARIO CHAMBERLAIN MD  HOSPITALIST, [facility]  PAGER NO. [phone]

## 2021-12-31 NOTE — PROGRESS NOTES
Care Management Follow Up    Length of Stay (days): 11    Expected Discharge Date:  Pending TCU bed.      Concerns to be Addressed:   Seeking transitional care (TCU) bed.   Patient plan of care discussed at interdisciplinary rounds: Yes    Anticipated Discharge Disposition:  Transitional care vs Group Home.     Anticipated Discharge Services:  Total care. May need home care for wound care. However, if Sofy is taught how to do the wound care here at the hospital, she will train other group home staff.   Anticipated Discharge DME:  Per therapy (if indicated). Has a wheelchair at home.     Patient/family educated on Medicare website which has current facility and service quality ratings:  NA  Education Provided on the Discharge Plan:  Per team  Patient/Family in Agreement with the Plan:  Yes     Referrals Placed by CM/SW:  None  Private pay costs discussed: Not applicable     Additional Information:  Patient has a history of Parkinson's and a TBI but is her own decision maker. She lives in a Group Home. Per staff at the group home, patient is becoming less mobile lately and is now requiring total cares. Per the Group Home staff, she normally was able to walk with the walker, but (over the past few weeks) she has been refusing and was using her wheelchair all of the time. She is still able to pivot transfer, but even that is getting harder. *intermediate is requesting that a tammy sling be sent back to the group home with patient. Sofy is requesting to be taught how to do the wound care for patient here in the hospital.   2:08 PM:  Spoke with Sofy to provide the number for the nurses station. She will call the nurses station to set up a time to come in to learn patient's wound care.   If patient needs transitional care (TCU), referrals have been made to: [facility], [facility], [facility] and [facility]. Writer has left messages for these facilities and awaiting response.   Contacts  for the group home:  [facility]  [phone].  [facility]  for that house [phone].     Depending on timing of discharge, Group Home staff might be able to transport or may need MA ride vs [facility] transport. Likely will need medical transport.     Jayleen Kelley RN

## 2021-12-31 NOTE — PROGRESS NOTES
General Surgery Progress Note:    Hospital Day # 11    ASSESSMENT:   1. Hematoma of skin    2. Pain of right lower leg    3. Accident due to mechanical fall without injury, initial encounter    4. Type 2 diabetes mellitus with other specified complication, unspecified whether long term insulin use (H)    5. Knee injury, right, initial encounter    6. Intractable pain    7.  Anemia -multifactorial secondary to an injury in the right lower extremity and disease process.    PLAN:   1. Wound care orders placed in preparation for discharge.   2.  Hemoglobin has improved to 8.8 after transfusion of x1 PRBC  3.  I am currently awaiting conversation with the vascular/wound care team.  I do believe that it would be more beneficial to the patient if we are able to use biologic products such as ACell or epifix to help with the grafting over the wound rather than creating another wound from her donor site.    4.  Thank you for allowing us to participate this patient's medical care.      SUBJECTIVE:   Alison Bashir was seen on a.m. rounds.  Patient states that her pain is currently controlled.  Patient is optimistic that she will be discharged to home.  Patient has no further complaints at this time.    Patient Vitals for the past 24 hrs:   BP Temp Temp src Pulse Resp SpO2   12/30/21 2300 118/58 98.8  F (37.1  C) Oral 77 18 92 %   12/30/21 1651 (!) 140/65 98.4  F (36.9  C) -- 81 18 97 %   12/30/21 1531 131/69 98.5  F (36.9  C) Oral 82 16 95 %   12/30/21 1430 122/65 98.2  F (36.8  C) Oral 79 18 95 %   12/30/21 1415 123/60 98.1  F (36.7  C) Oral 83 16 94 %   12/30/21 1342 128/60 98.8  F (37.1  C) Oral 84 16 96 %   12/30/21 1007 120/61 -- -- 87 -- --   12/30/21 0745 114/58 98.4  F (36.9  C) Oral 74 16 97 %       Physical Exam:  General: NAD, pleasant  EXT: Right lower extremity with surgical dressings in position.  Patient still has palpable DP pulse of the right lower extremity.  Minimal tenderness to palpation over the right  lower extremity wound.    No results displayed because visit has over 200 results.           DO Johan Nicholson DO  General Surgeon  [facility]  Surgery [facility] - [address] [address]?  Office: [phone]  Employed by - [employer]  Pager: [phone]  Cell: [phone]     Statement Selected

## 2021-12-31 NOTE — PLAN OF CARE
Problem: Pain (Surgery Nonspecified)  Goal: Acceptable Pain Control  Intervention: Prevent or Manage Pain  Recent Flowsheet Documentation  Taken 12/31/2021 0203 by Mey Gonzalez RN  Pain Management Interventions: medication (see MAR)  RLE pain controlled with prn oxycodone and scheduled Tylenol.     Patient slept well in between cares. Waiting for TCU placement.

## 2021-12-31 NOTE — PLAN OF CARE
Pt came into hospital on 12/20 after experiencing a fall at the Group Home where she resides. POD #2 following I&D of large RLE hematoma. Dressing changed this shift. Moderate amount of serosanguineous drainage present. Pt received 1 unit of packed RBCs this shift for hgb 7.2. Recheck in AM. Completed 5 day abx course. WOC consulting. Pt remains on bedrest at this time. W/C bound at baseline. Tremor noted to bilateral upper extremities. Hx of Parkinson's. A&O. Received scheduled Tylenol along with prn Oxycodone 5 mg at 1655 and 2105 for pain rated 6 out of 10. Pt is somewhat tearful this evening. Pt states that she is upset and embarrassed about bowel incontinence today. She has had several episodes of loose incontinent stools. Wears a brief for protection. Bowel meds held this shift. Pure Wick device in place for urinary incontinence. Patent and draining dark fabienne colored urine.  Appetite is adequate. Tolerating a regular diet. Prefers medications whole in applesauce.  VS stable. Receives scheduled Midodrine.     Problem: Pain Acute  Goal: Acceptable Pain Control and Functional Ability  Intervention: Develop Pain Management Plan  Recent Flowsheet Documentation  Taken 12/30/2021 2020 by Nicole Alvarado RN  Pain Management Interventions:   distraction   rest  Taken 12/30/2021 1657 by Nicole Alvarado RN  Pain Management Interventions: medication (see MAR)     Problem: Infection (Surgery Nonspecified)  Goal: Absence of Infection Signs and Symptoms  Outcome: Improving     Problem: Impaired Wound Healing  Goal: Optimal Wound Healing  Outcome: No Change  Intervention: Promote Wound Healing  Recent Flowsheet Documentation  Taken 12/30/2021 2020 by Nicole Alvarado RN  Pain Management Interventions:   distraction   rest  Taken 12/30/2021 1657 by Nicole Alvarado, RN  Pain Management Interventions: medication (see MAR)

## 2021-12-31 NOTE — PLAN OF CARE
Problem: Adult Inpatient Plan of Care  Goal: Optimal Comfort and Wellbeing  Outcome: Improving   Pain in right lower lateral leg was rated 6/10.  She was given Oxycodone 5mg for pain and it came down to 4/10.      Problem: Impaired Wound Healing  Goal: Optimal Wound Healing  Outcome: Improving  Intervention: Promote Wound Healing  Recent Flowsheet Documentation  Taken 12/31/2021 2837 by Carley Howard, RN  Activity Management: activity encouraged   Wound care done per orders for daily dressing change.  Wound cleansed, patted dry, Xeroform, ABD pads, kerlix, and ace wrap applied.

## 2022-01-01 LAB — HGB BLD-MCNC: 8.9 G/DL (ref 11.7–15.7)

## 2022-01-01 PROCEDURE — 250N000013 HC RX MED GY IP 250 OP 250 PS 637: Performed by: INTERNAL MEDICINE

## 2022-01-01 PROCEDURE — 99233 SBSQ HOSP IP/OBS HIGH 50: CPT | Performed by: INTERNAL MEDICINE

## 2022-01-01 PROCEDURE — 250N000013 HC RX MED GY IP 250 OP 250 PS 637: Performed by: SURGERY

## 2022-01-01 PROCEDURE — 250N000011 HC RX IP 250 OP 636: Performed by: INTERNAL MEDICINE

## 2022-01-01 PROCEDURE — 99024 POSTOP FOLLOW-UP VISIT: CPT | Performed by: SURGERY

## 2022-01-01 PROCEDURE — 120N000001 HC R&B MED SURG/OB

## 2022-01-01 PROCEDURE — 36415 COLL VENOUS BLD VENIPUNCTURE: CPT | Performed by: INTERNAL MEDICINE

## 2022-01-01 PROCEDURE — 85018 HEMOGLOBIN: CPT | Performed by: INTERNAL MEDICINE

## 2022-01-01 RX ADMIN — ACETAMINOPHEN 975 MG: 325 TABLET ORAL at 09:38

## 2022-01-01 RX ADMIN — OXYCODONE HYDROCHLORIDE 5 MG: 5 TABLET ORAL at 15:34

## 2022-01-01 RX ADMIN — MIRTAZAPINE 15 MG: 15 TABLET, FILM COATED ORAL at 21:35

## 2022-01-01 RX ADMIN — OXYCODONE HYDROCHLORIDE 5 MG: 5 TABLET ORAL at 00:10

## 2022-01-01 RX ADMIN — HEPARIN SODIUM 5000 UNITS: 10000 INJECTION, SOLUTION INTRAVENOUS; SUBCUTANEOUS at 09:35

## 2022-01-01 RX ADMIN — FERROUS SULFATE TAB 325 MG (65 MG ELEMENTAL FE) 325 MG: 325 (65 FE) TAB at 09:37

## 2022-01-01 RX ADMIN — ACETAMINOPHEN 975 MG: 325 TABLET ORAL at 21:36

## 2022-01-01 RX ADMIN — CARBIDOPA AND LEVODOPA 2 TABLET: 25; 100 TABLET ORAL at 14:12

## 2022-01-01 RX ADMIN — MIDODRINE HYDROCHLORIDE 10 MG: 5 TABLET ORAL at 17:13

## 2022-01-01 RX ADMIN — OXYCODONE HYDROCHLORIDE 5 MG: 5 TABLET ORAL at 06:18

## 2022-01-01 RX ADMIN — FUROSEMIDE 20 MG: 20 TABLET ORAL at 09:37

## 2022-01-01 RX ADMIN — PRAMIPEXOLE DIHYDROCHLORIDE 0.5 MG: 0.5 TABLET ORAL at 21:35

## 2022-01-01 RX ADMIN — MAGNESIUM OXIDE TAB 400 MG (241.3 MG ELEMENTAL MG) 400 MG: 400 (241.3 MG) TAB at 09:39

## 2022-01-01 RX ADMIN — ATORVASTATIN CALCIUM 40 MG: 40 TABLET, FILM COATED ORAL at 21:35

## 2022-01-01 RX ADMIN — CARBIDOPA AND LEVODOPA 2 TABLET: 25; 100 TABLET ORAL at 09:37

## 2022-01-01 RX ADMIN — VENLAFAXINE HYDROCHLORIDE 300 MG: 150 CAPSULE, EXTENDED RELEASE ORAL at 09:36

## 2022-01-01 RX ADMIN — OXYCODONE HYDROCHLORIDE 5 MG: 5 TABLET ORAL at 09:34

## 2022-01-01 RX ADMIN — THERA TABS 1 TABLET: TAB at 09:38

## 2022-01-01 RX ADMIN — VENLAFAXINE HYDROCHLORIDE 37.5 MG: 37.5 CAPSULE, EXTENDED RELEASE ORAL at 09:37

## 2022-01-01 RX ADMIN — Medication 500 MG: at 09:36

## 2022-01-01 RX ADMIN — CARBIDOPA AND LEVODOPA 2 TABLET: 25; 100 TABLET ORAL at 21:35

## 2022-01-01 RX ADMIN — MIDODRINE HYDROCHLORIDE 10 MG: 5 TABLET ORAL at 09:36

## 2022-01-01 RX ADMIN — HEPARIN SODIUM 5000 UNITS: 10000 INJECTION, SOLUTION INTRAVENOUS; SUBCUTANEOUS at 21:36

## 2022-01-01 RX ADMIN — BUPROPION HYDROCHLORIDE 300 MG: 300 TABLET, EXTENDED RELEASE ORAL at 09:37

## 2022-01-01 RX ADMIN — OXYCODONE HYDROCHLORIDE 2.5 MG: 5 TABLET ORAL at 21:36

## 2022-01-01 RX ADMIN — TRAZODONE HYDROCHLORIDE 150 MG: 100 TABLET ORAL at 21:36

## 2022-01-01 RX ADMIN — MIDODRINE HYDROCHLORIDE 10 MG: 5 TABLET ORAL at 12:56

## 2022-01-01 RX ADMIN — ASPIRIN 81 MG: 81 TABLET, COATED ORAL at 09:37

## 2022-01-01 ASSESSMENT — ACTIVITIES OF DAILY LIVING (ADL)
ADLS_ACUITY_SCORE: 25

## 2022-01-01 NOTE — PLAN OF CARE
Pt is POD #3 from I&D to hematoma on right lower leg sustained during a fall at the group home where she resides. Dressing intact to RLE with only scant amount of serosanguineous drainage noted. Leg elevated in bed. CMS intact. She is assist of 2 using a full mechanical lift for transfers. Receiving PT services. Pt is w/c bound at baseline. 2+ edema noted to LLE. Denies numbness/tingling. Remains on bedrest this shift. Left lung has crackles heard.  Pt tends to favor lying on the left side. Discussed repositioning with patient to improve pulmonary function. Pt is A&O. She did receive news today that her brother has passed away from [place]. Pt is sad but openly communicating with staff regarding her feelings and situation. Coping strategies offered and declined at this time. Pain team consulting. Received scheduled Tylenol along with prn Oxycodone 5 mg at 1810 for pain rated 5 out of 10. She is tolerating a regular diet. Appetite is adequate. Pure Wick device in place for bladder incontinence. Urine is dark fabienne. Bowel meds held today for episodes of loose stools. Wears a brief for protection. Hgb 8.8 this AM following transfusion yesterday. Discharge pending placement at TCU vs back to group home. If patient returns to group home education will need to be provided to care staff.     Problem: Pain Acute  Goal: Acceptable Pain Control and Functional Ability  Intervention: Develop Pain Management Plan  Recent Flowsheet Documentation  Taken 12/31/2021 1810 by Nicole Alvaardo RN  Pain Management Interventions: medication (see MAR)  Taken 12/31/2021 1601 by Nicole Alvarado RN  Pain Management Interventions:   declines   distraction     Problem: Bleeding (Surgery Nonspecified)  Goal: Absence of Bleeding  Outcome: Improving     Problem: Impaired Wound Healing  Goal: Optimal Wound Healing  Outcome: No Change  Intervention: Promote Wound Healing  Recent Flowsheet Documentation  Taken 12/31/2021 1810 by Nicole Alvarado  RN  Pain Management Interventions: medication (see MAR)  Taken 12/31/2021 1601 by Nicole Alvarado RN  Pain Management Interventions:   declines   distraction

## 2022-01-01 NOTE — PROGRESS NOTES
Care Management Follow Up    Length of Stay (days): 12    Expected Discharge Date:  Pending TCU bed.      Concerns to be Addressed:   Seeking transitional care (TCU) bed (vs home care).   Patient plan of care discussed at interdisciplinary rounds: Yes    Anticipated Discharge Disposition:  Transitional care vs Group Home.     Anticipated Discharge Services:  Total care. May need home care for wound care and therapy. However, if Sofy is taught how to do the wound care here at the hospital, she will train other group home staff. Transitional care is recommended.   Anticipated Discharge DME:  Per therapy (if indicated). Has a wheelchair at home.     Patient/family educated on Medicare website which has current facility and service quality ratings:  Yes  Education Provided on the Discharge Plan:  Per team  Patient/Family in Agreement with the Plan:  Yes     Referrals Placed by CM/SW:  See below  Private pay costs discussed: Not applicable     Additional Information:  Patient has a history of Parkinson's and a TBI but is her own decision maker. She lives in a Group Home. Per staff at the group home, patient is becoming less mobile lately and is now requiring total cares. Per the Group Home staff, she normally was able to walk with the walker, but (over the past few weeks) she has been refusing and was using her wheelchair all of the time. She is still able to pivot transfer, but even that is getting harder. *penitentiary is requesting that a tammy sling be sent back to the group home with patient. Sofy is requesting to be taught how to do the wound care for patient here in the hospital. On 12/31/21, writer spoke with Sofy to provide the number for the nurses station. She planned to call the nurses station to set up a time to come in to learn patient's wound care. Update provided to patient.    If patient needs transitional care (TCU), referrals have been made to: [facility], [facility], [facility] and  [facility]. Writer has left messages for these facilities and awaiting response.     Contacts for the group home:  [facility]  [phone].  [facility]  for that house [phone].     Depending on timing of discharge, Group Home staff might be able to transport or may need MA ride vs  [place] transport. Likely will need medical transport.     Jayleen Kelley RN

## 2022-01-01 NOTE — PLAN OF CARE
Problem: Adult Inpatient Plan of Care  Goal: Optimal Comfort and Wellbeing  Outcome: Improving     Problem: Bleeding (Surgery Nonspecified)  Goal: Absence of Bleeding  Outcome: Improving     Patient is pleasant and cooperative.  Encouraged to get up into the chair for supper and patient declined.  Pain to RLE managed with PRN oxycodone.  Dressing to RLE is clean, dry and intact.  Repositioned q 2 hours.  Pt requested to have a bed bath later this evening.  No other concerns reported and will continue to monitor.

## 2022-01-01 NOTE — PROGRESS NOTES
Alison Bashir is s/p right lower extremity wound debridement.  She is doing well with mild right lower extremity pain.        Vitals:    12/31/21 1614 12/31/21 2316 12/31/21 2343 01/01/22 0933   BP: 118/60 127/73 104/47 105/58   BP Location: Left arm Left arm Left arm Left arm   Patient Position:    Semi-Masterson's   Pulse: 76 78 71 75   Resp: 18 17 16 20   Temp: 97.9  F (36.6  C) 98.1  F (36.7  C) 98.6  F (37  C) 98.4  F (36.9  C)   TempSrc: Oral Oral Oral Oral   SpO2: 93% 94% 95% 94%   Weight:       Height:           PHYSICAL EXAM:  Extremity-granulation tissue forming at the right lateral aspect of the right lower extremity.  No stigmata of infection,        Recent Labs   Lab 12/31/21  0526 12/30/21  0522   HGB 8.8* 7.2*   PLT  --  196       No results for input(s): NA, CO2, BUN, CREATININE, ALBUMIN, BILITOT, ALKPHOS, ALT, AST, GLUCOSE in the last 168 hours.    Invalid input(s): K, CL, CALCIUM, LABALBU, PROT      A/P:  Alison Bashir is s/p debridement of right lower extremity wound  -Wound looks to be in the process of healing.  There is healthy granulation tissue forming.  Recommend continued wound care daily.  Wound care nursing consult if not already done.    Kristofer Rivers DO FACS  [phone]  [facility] Department of Surgery

## 2022-01-01 NOTE — PROGRESS NOTES
Progress Note    Assessment/Plan  CODE STATUS:  No CPR- Do NOT Intubate     12/24/21  Assessment/Plan:  Alison Bashir is a 71 year old old female group home resident with past medical history of parkinsonism, CAD, cognitive decline(but has been her own decision maker), HTN, [place], hx of TBI who had a fall during transitioning 3 days prior to presenting to ED for evaluation of severe right leg pain, found to have hematoma and admitted for further management     Tension hematoma of right lateral leg s/p evacuation on 12/20;  Postoperative right leg wound;  --Patient hit her leg on door at the [facility] 3 days prior to admission.  --CTA abdomen/pelvis bilateral leg runoff reported large subcutaneous hematoma on lateral superior aspect of right calf, no contrast extravasation.  --On 12/21, underwent evacuation of hematoma by Dr. Subramanian.  --On 12/28, status post right leg wound debridement  --Completed 5 days course of IV Zosyn.  --Continue local wound care per surgery and wound care nurse.     Acute postoperative pain;  --Continue scheduled Tylenol, as needed oxycodone.  Monitor for sedation  --Appreciated pain team input.     Acute blood loss anemia due to above;  --Patient received 2 unit pRBC transfusion.    --Hemoglobin continue to trending down, 8.2 --> 7.8 --> 7.2 on 12/30.    --Patient received 1 more unit of pRBC transfusion on 12/30.  --Initiated on daily iron supplement  --Hemoglobin stable at 8.9.     Chronic intermittent hypotension; improved  --Increased home midodrine from 7.5 mg 3 times daily to 10 mg 3 times daily on 12/25.  Monitor vitals closely     History of CAD;  --Currently no anginal symptoms  --Continue home statin, baby aspirin, Toprol XL  --Monitor blood pressure.      History of Parkinson;  --resumed home meds. PT/OT     H/o TBI;  History of anxiety and depression;  --lives in  but makes own decisions   --PTA remeron, venlafaxine, trazodone, Wellbutrin     RLS; Mirapex      DVT prophylaxis;  "subcu heparin     Disposition; prior to hospitalization patient had group home.  Anticipating TCU  Barrier to discharge; medically stable, awaiting placement    Updated sister Chelo        Subjective  Patient new to me.  Chart reviewed.  Awaiting placement.  Patient received narcotic dose earlier today.  Slightly sleepy today.  Continue wound care as per surgery.    Patient denies any bladder or bowel complaints.      Objective    /58 (BP Location: Left arm, Patient Position: Semi-Masterson's)   Pulse 75   Temp 98.4  F (36.9  C) (Oral)   Resp 20   Ht 1.803 m (5' 11\")   Wt 98.2 kg (216 lb 8 oz)   SpO2 94%   BMI 30.20 kg/m    Weight:   Wt Readings from Last 5 Encounters:   12/27/21 98.2 kg (216 lb 8 oz)   09/25/20 76.6 kg (168 lb 12.8 oz)   09/17/20 75.5 kg (166 lb 6.4 oz)   09/15/20 75.5 kg (166 lb 6.4 oz)   08/31/20 79.2 kg (174 lb 8 oz)       I/O last 3 completed shifts:  In: 120 [P.O.:120]  Out: 1350 [Urine:1350]  I/O this shift:  In: -   Out: 200 [Urine:200]          Physical Exam  Sleepy but not in distress  No pallor, icterus, clubbing, cyanosis  Body mass index is 30.2 kg/m .  No sinus tenderness  Moist membranes  Neck supple  CVS: S1 S2-N, no murmurs, gallops, rubs  Resp: B/L vesicular breath sounds, no wheezing, crackles  Abd: soft, No t/g/r  Neuro: no involuntary movements such as tremors  Vasc: Right leg is bandaged    Pertinent Labs  ----------------------  Recent Labs   Lab 12/30/21 0226 12/29/21  0552 12/29/21  0547 12/29/21 0227   POTASSIUM  --  3.8  --   --    CR  --  0.67  --   --    GLC 83  --  99 107*     Recent Labs   Lab 01/01/22  1123 12/31/21  0526 12/30/21  0522 12/29/21  0552 12/28/21  1137 12/27/21  0542   HGB 8.9* 8.8* 7.2*   < > 7.8*  --    PLT  --   --  196  --  167 144*    < > = values in this interval not displayed.     No results for input(s): INR in the last 168 hours.  Glucose Values Latest Ref Rng & Units 12/20/2021 12/21/2021 12/23/2021 12/24/2021   Bedside Glucose " (mg/dl )  - -- -- -- --   GLUCOSE 70 - 125 mg/dL 126(H) 74 107 97   Some recent data might be hidden         Pertinent Radiology   Radiology Results: Personally reviewed impression/s  No results found.  EKG Results: not reviewed.

## 2022-01-01 NOTE — PLAN OF CARE
Problem: Bleeding (Surgery Nonspecified)  Goal: Absence of Bleeding  Outcome: Improving   Hemoglobin is 8.9 today from 8.8 yesterday.  She has minimal serosanguinous drainage from right lower leg wound but no other signs of active bleeding.      Problem: Postoperative Nausea and Vomiting (Surgery Nonspecified)  Goal: Nausea and Vomiting Relief  Outcome: Improving   Patient denies nausea and vomiting.      Problem: Impaired Wound Healing  Goal: Optimal Wound Healing  Outcome: Improving   Wound care was done this morning and is only ordered daily per Dr. Rivers.  Wound was cleansed with saline, patted dry, xeroform, abd pads, kerlix, and ace wrap applied.  Dr. Rivers was present for the uncovering of the dressing.  Wound bed looks about the same as yesterday and dressing remains intact, clean, and dry.

## 2022-01-02 PROBLEM — E61.1 IRON DEFICIENCY: Status: ACTIVE | Noted: 2022-01-02

## 2022-01-02 PROBLEM — S80.11XA LEG HEMATOMA, RIGHT, INITIAL ENCOUNTER: Status: ACTIVE | Noted: 2020-08-27

## 2022-01-02 LAB
HGB BLD-MCNC: 8.5 G/DL (ref 11.7–15.7)
HOLD SPECIMEN: NORMAL

## 2022-01-02 PROCEDURE — 36415 COLL VENOUS BLD VENIPUNCTURE: CPT | Performed by: INTERNAL MEDICINE

## 2022-01-02 PROCEDURE — 120N000001 HC R&B MED SURG/OB

## 2022-01-02 PROCEDURE — 250N000013 HC RX MED GY IP 250 OP 250 PS 637: Performed by: SURGERY

## 2022-01-02 PROCEDURE — 85018 HEMOGLOBIN: CPT | Performed by: INTERNAL MEDICINE

## 2022-01-02 PROCEDURE — 99232 SBSQ HOSP IP/OBS MODERATE 35: CPT | Performed by: INTERNAL MEDICINE

## 2022-01-02 PROCEDURE — 250N000011 HC RX IP 250 OP 636: Performed by: INTERNAL MEDICINE

## 2022-01-02 PROCEDURE — 250N000013 HC RX MED GY IP 250 OP 250 PS 637: Performed by: INTERNAL MEDICINE

## 2022-01-02 RX ADMIN — MIDODRINE HYDROCHLORIDE 10 MG: 5 TABLET ORAL at 08:26

## 2022-01-02 RX ADMIN — VENLAFAXINE HYDROCHLORIDE 300 MG: 150 CAPSULE, EXTENDED RELEASE ORAL at 08:35

## 2022-01-02 RX ADMIN — ASPIRIN 81 MG: 81 TABLET, COATED ORAL at 08:26

## 2022-01-02 RX ADMIN — HEPARIN SODIUM 5000 UNITS: 10000 INJECTION, SOLUTION INTRAVENOUS; SUBCUTANEOUS at 08:36

## 2022-01-02 RX ADMIN — ACETAMINOPHEN 975 MG: 325 TABLET ORAL at 21:34

## 2022-01-02 RX ADMIN — OXYCODONE HYDROCHLORIDE 5 MG: 5 TABLET ORAL at 12:09

## 2022-01-02 RX ADMIN — BUPROPION HYDROCHLORIDE 300 MG: 300 TABLET, EXTENDED RELEASE ORAL at 08:35

## 2022-01-02 RX ADMIN — OXYCODONE HYDROCHLORIDE 5 MG: 5 TABLET ORAL at 18:16

## 2022-01-02 RX ADMIN — CARBIDOPA AND LEVODOPA 2 TABLET: 25; 100 TABLET ORAL at 08:35

## 2022-01-02 RX ADMIN — MIDODRINE HYDROCHLORIDE 10 MG: 5 TABLET ORAL at 16:31

## 2022-01-02 RX ADMIN — VENLAFAXINE HYDROCHLORIDE 37.5 MG: 37.5 CAPSULE, EXTENDED RELEASE ORAL at 08:35

## 2022-01-02 RX ADMIN — MIDODRINE HYDROCHLORIDE 10 MG: 5 TABLET ORAL at 12:09

## 2022-01-02 RX ADMIN — Medication 500 MG: at 08:26

## 2022-01-02 RX ADMIN — OXYCODONE HYDROCHLORIDE 5 MG: 5 TABLET ORAL at 03:49

## 2022-01-02 RX ADMIN — ACETAMINOPHEN 650 MG: 325 TABLET ORAL at 16:57

## 2022-01-02 RX ADMIN — HEPARIN SODIUM 5000 UNITS: 10000 INJECTION, SOLUTION INTRAVENOUS; SUBCUTANEOUS at 21:35

## 2022-01-02 RX ADMIN — MAGNESIUM OXIDE TAB 400 MG (241.3 MG ELEMENTAL MG) 400 MG: 400 (241.3 MG) TAB at 08:28

## 2022-01-02 RX ADMIN — ATORVASTATIN CALCIUM 40 MG: 40 TABLET, FILM COATED ORAL at 21:35

## 2022-01-02 RX ADMIN — ACETAMINOPHEN 975 MG: 325 TABLET ORAL at 08:27

## 2022-01-02 RX ADMIN — PRAMIPEXOLE DIHYDROCHLORIDE 0.5 MG: 0.5 TABLET ORAL at 21:40

## 2022-01-02 RX ADMIN — DOCUSATE SODIUM 50 MG AND SENNOSIDES 8.6 MG 1 TABLET: 8.6; 5 TABLET, FILM COATED ORAL at 21:35

## 2022-01-02 RX ADMIN — TRAZODONE HYDROCHLORIDE 150 MG: 100 TABLET ORAL at 21:36

## 2022-01-02 RX ADMIN — THERA TABS 1 TABLET: TAB at 08:26

## 2022-01-02 RX ADMIN — CARBIDOPA AND LEVODOPA 2 TABLET: 25; 100 TABLET ORAL at 21:35

## 2022-01-02 RX ADMIN — FERROUS SULFATE TAB 325 MG (65 MG ELEMENTAL FE) 325 MG: 325 (65 FE) TAB at 08:27

## 2022-01-02 RX ADMIN — METOPROLOL SUCCINATE 12.5 MG: 25 TABLET, EXTENDED RELEASE ORAL at 08:35

## 2022-01-02 RX ADMIN — CARBIDOPA AND LEVODOPA 2 TABLET: 25; 100 TABLET ORAL at 13:04

## 2022-01-02 RX ADMIN — MIRTAZAPINE 15 MG: 15 TABLET, FILM COATED ORAL at 21:35

## 2022-01-02 ASSESSMENT — ACTIVITIES OF DAILY LIVING (ADL)
ADLS_ACUITY_SCORE: 21
ADLS_ACUITY_SCORE: 25
ADLS_ACUITY_SCORE: 21
ADLS_ACUITY_SCORE: 25
ADLS_ACUITY_SCORE: 21
ADLS_ACUITY_SCORE: 25
ADLS_ACUITY_SCORE: 21

## 2022-01-02 NOTE — PROGRESS NOTES
Progress Note    Assessment/Plan  CODE STATUS:  No CPR- Do NOT Intubate     12/24/21  Assessment/Plan:  Alison Bashir is a 71 year old old female group home resident with past medical history of parkinsonism, CAD, cognitive decline(but has been her own decision maker), HTN, [place], hx of TBI who had a fall during transitioning 3 days prior to presenting to ED for evaluation of severe right leg pain, found to have hematoma and admitted for further management     Tension hematoma of right lateral leg s/p evacuation on 12/20;  Postoperative right leg wound;  --Patient hit her leg on door at [facility] 3 days prior to admission.  --CTA abdomen/pelvis bilateral leg runoff reported large subcutaneous hematoma on lateral superior aspect of right calf, no contrast extravasation.  --On 12/21, underwent evacuation of hematoma by Dr. Subramanian.  --On 12/28, status post right leg wound debridement  --Completed 5 days course of IV Zosyn.  --Continue local wound care per surgery and wound care nurse.     Acute postoperative pain;  --Continue scheduled Tylenol, as needed oxycodone.  Monitor for sedation  --Appreciated pain team input.     Acute blood loss anemia due to above;  --Patient received 2 unit pRBC transfusion.    --Hemoglobin continue to trending down, 8.2 --> 7.8 --> 7.2 on 12/30.    --Patient received 1 more unit of pRBC transfusion on 12/30.  --Initiated on daily iron supplement  --Hemoglobin stable at 8.9.     Chronic intermittent hypotension; improved  --Increased home midodrine from 7.5 mg 3 times daily to 10 mg 3 times daily on 12/25.  Monitor vitals closely     History of CAD;  --Currently no anginal symptoms  --Continue home statin, baby aspirin, Toprol XL  --Monitor blood pressure.      History of Parkinson;  --resumed home meds. PT/OT     H/o TBI;  History of anxiety and depression;  --lives in  but makes own decisions   --PTA remeron, venlafaxine, trazodone, Wellbutrin     RLS; Mirapex      DVT prophylaxis;  "subcu heparin     Disposition; prior to hospitalization patient had group home.  Anticipating TCU  Barrier to discharge; medically stable, awaiting placement    Updated sister Chelo    Subjective  Patient new to me.  Chart reviewed.  Patient seen and examined.  She is complaining of wound site pain.  Noted small amount of serosanguineous drainage.  Awaiting placement. Continue wound care as per surgery.  Patient's sister, per request, with updates.  No answer, left VM with callback number.    Objective    BP (!) 153/60 (BP Location: Left arm)   Pulse 74   Temp 98.6  F (37  C) (Oral)   Resp 20   Ht 1.803 m (5' 11\")   Wt 98.2 kg (216 lb 8 oz)   SpO2 94%   BMI 30.20 kg/m    Weight:   Wt Readings from Last 5 Encounters:   12/27/21 98.2 kg (216 lb 8 oz)   09/25/20 76.6 kg (168 lb 12.8 oz)   09/17/20 75.5 kg (166 lb 6.4 oz)   09/15/20 75.5 kg (166 lb 6.4 oz)   08/31/20 79.2 kg (174 lb 8 oz)       I/O last 3 completed shifts:  In: 680 [P.O.:680]  Out: 925 [Urine:925]  No intake/output data recorded.    Physical Exam  Sleepy but not in distress  No pallor, icterus, clubbing, cyanosis  Body mass index is 30.2 kg/m .  No sinus tenderness  Moist membranes  Neck supple  CVS: S1 S2-N, no murmurs, gallops, rubs  Resp: B/L vesicular breath sounds, no wheezing, crackles  Abd: soft, No t/g/r  Neuro: no involuntary movements such as tremors  Vasc: Right leg is bandaged    Pertinent Labs  ----------------------  Recent Labs   Lab 12/30/21 0226 12/29/21 0552 12/29/21 0547 12/29/21 0227   POTASSIUM  --  3.8  --   --    CR  --  0.67  --   --    GLC 83  --  99 107*     Recent Labs   Lab 01/02/22  0549 01/01/22  1123 12/31/21  0526 12/30/21  0522 12/29/21  0552 12/28/21  1137 12/27/21  0542   HGB 8.5* 8.9* 8.8* 7.2*   < > 7.8*  --    PLT  --   --   --  196  --  167 144*    < > = values in this interval not displayed.     No results for input(s): INR in the last 168 hours.  Glucose Values Latest Ref Rng & Units 12/20/2021 " 12/21/2021 12/23/2021 12/24/2021   Bedside Glucose (mg/dl )  - -- -- -- --   GLUCOSE 70 - 125 mg/dL 126(H) 74 107 97   Some recent data might be hidden       Pertinent Radiology   Radiology Results: Personally reviewed impression/s  No results found.  EKG Results: not reviewed.

## 2022-01-02 NOTE — PLAN OF CARE
Problem: Adult Inpatient Plan of Care  Goal: Optimal Comfort and Wellbeing  Outcome: Improving  Goal: Readiness for Transition of Care  Outcome: Improving  Flowsheets (Taken 1/2/2022 1011)  Anticipated Changes Related to Illness: inability to care for self  Concerns to be Addressed: all concerns addressed in this encounter  Intervention: Mutually Develop Transition Plan  Recent Flowsheet Documentation  Taken 1/2/2022 1011 by Jared Vasquez RN  Anticipated Changes Related to Illness: inability to care for self  Concerns to be Addressed: all concerns addressed in this encounter     Problem: Risk for Delirium  Goal: Improved Behavioral Control  Outcome: Improving  Goal: Improved Attention and Thought Clarity  Outcome: Improving  Goal: Improved Sleep  Outcome: Improving     Problem: Bleeding (Surgery Nonspecified)  Goal: Absence of Bleeding  Outcome: Improving     Problem: Infection (Surgery Nonspecified)  Goal: Absence of Infection Signs and Symptoms  Outcome: Improving     Problem: Ongoing Anesthesia Effects (Surgery Nonspecified)  Goal: Anesthesia/Sedation Recovery  Outcome: Improving  Intervention: Optimize Anesthesia Recovery  Recent Flowsheet Documentation  Taken 1/2/2022 0827 by Jared Vasquez RN  Safety Promotion/Fall Prevention:   activity supervised   assistive device/personal items within reach   bed alarm on     Problem: Postoperative Nausea and Vomiting (Surgery Nonspecified)  Goal: Nausea and Vomiting Relief  Outcome: Improving     Problem: Impaired Wound Healing  Goal: Optimal Wound Healing  Outcome: Improving  Intervention: Promote Wound Healing  Recent Flowsheet Documentation  Taken 1/2/2022 0827 by Jared Vasquez RN  Pain Management Interventions: medication (see MAR)  Activity Management: bedrest

## 2022-01-02 NOTE — PLAN OF CARE
Problem: Adult Inpatient Plan of Care  Goal: Absence of Hospital-Acquired Illness or Injury  Intervention: Identify and Manage Fall Risk  Recent Flowsheet Documentation  Taken 1/2/2022 0100 by Molly Guillermo RN  Safety Promotion/Fall Prevention:    activity supervised    assistive device/personal items within reach  Intervention: Prevent Skin Injury  Recent Flowsheet Documentation  Taken 1/2/2022 0100 by Molyl Guillermo RN  Body Position: supine, head elevated     Alert and oriented x4. Reports right knee pain, refused to take prn tylenol. Gave 5mg oxycodone with relief. Dressing clean, dry and intact.Slept on and off. Purewick in place, replaced this morning.,

## 2022-01-03 LAB — SARS-COV-2 RNA RESP QL NAA+PROBE: NEGATIVE

## 2022-01-03 PROCEDURE — 250N000013 HC RX MED GY IP 250 OP 250 PS 637: Performed by: SURGERY

## 2022-01-03 PROCEDURE — 120N000001 HC R&B MED SURG/OB

## 2022-01-03 PROCEDURE — 87635 SARS-COV-2 COVID-19 AMP PRB: CPT | Performed by: INTERNAL MEDICINE

## 2022-01-03 PROCEDURE — 99024 POSTOP FOLLOW-UP VISIT: CPT | Performed by: NURSE PRACTITIONER

## 2022-01-03 PROCEDURE — 250N000013 HC RX MED GY IP 250 OP 250 PS 637: Performed by: INTERNAL MEDICINE

## 2022-01-03 PROCEDURE — 250N000011 HC RX IP 250 OP 636: Performed by: INTERNAL MEDICINE

## 2022-01-03 PROCEDURE — 99232 SBSQ HOSP IP/OBS MODERATE 35: CPT | Performed by: INTERNAL MEDICINE

## 2022-01-03 RX ADMIN — CARBIDOPA AND LEVODOPA 2 TABLET: 25; 100 TABLET ORAL at 13:00

## 2022-01-03 RX ADMIN — OXYCODONE HYDROCHLORIDE 5 MG: 5 TABLET ORAL at 15:06

## 2022-01-03 RX ADMIN — THERA TABS 1 TABLET: TAB at 09:08

## 2022-01-03 RX ADMIN — PRAMIPEXOLE DIHYDROCHLORIDE 0.5 MG: 0.5 TABLET ORAL at 20:11

## 2022-01-03 RX ADMIN — METOPROLOL SUCCINATE 12.5 MG: 25 TABLET, EXTENDED RELEASE ORAL at 09:06

## 2022-01-03 RX ADMIN — ASPIRIN 81 MG: 81 TABLET, COATED ORAL at 09:06

## 2022-01-03 RX ADMIN — ACETAMINOPHEN 650 MG: 325 TABLET ORAL at 06:32

## 2022-01-03 RX ADMIN — MIRTAZAPINE 15 MG: 15 TABLET, FILM COATED ORAL at 20:12

## 2022-01-03 RX ADMIN — MIDODRINE HYDROCHLORIDE 10 MG: 5 TABLET ORAL at 12:20

## 2022-01-03 RX ADMIN — TRAZODONE HYDROCHLORIDE 150 MG: 100 TABLET ORAL at 20:11

## 2022-01-03 RX ADMIN — MIDODRINE HYDROCHLORIDE 10 MG: 5 TABLET ORAL at 17:09

## 2022-01-03 RX ADMIN — HEPARIN SODIUM 5000 UNITS: 10000 INJECTION, SOLUTION INTRAVENOUS; SUBCUTANEOUS at 20:12

## 2022-01-03 RX ADMIN — ACETAMINOPHEN 975 MG: 325 TABLET ORAL at 09:08

## 2022-01-03 RX ADMIN — OXYCODONE HYDROCHLORIDE 5 MG: 5 TABLET ORAL at 20:10

## 2022-01-03 RX ADMIN — VENLAFAXINE HYDROCHLORIDE 37.5 MG: 37.5 CAPSULE, EXTENDED RELEASE ORAL at 09:05

## 2022-01-03 RX ADMIN — OXYCODONE HYDROCHLORIDE 5 MG: 5 TABLET ORAL at 00:06

## 2022-01-03 RX ADMIN — MAGNESIUM OXIDE TAB 400 MG (241.3 MG ELEMENTAL MG) 400 MG: 400 (241.3 MG) TAB at 10:00

## 2022-01-03 RX ADMIN — DOCUSATE SODIUM 50 MG AND SENNOSIDES 8.6 MG 1 TABLET: 8.6; 5 TABLET, FILM COATED ORAL at 20:10

## 2022-01-03 RX ADMIN — HEPARIN SODIUM 5000 UNITS: 10000 INJECTION, SOLUTION INTRAVENOUS; SUBCUTANEOUS at 09:04

## 2022-01-03 RX ADMIN — OXYCODONE HYDROCHLORIDE 5 MG: 5 TABLET ORAL at 09:02

## 2022-01-03 RX ADMIN — CARBIDOPA AND LEVODOPA 2 TABLET: 25; 100 TABLET ORAL at 09:08

## 2022-01-03 RX ADMIN — FUROSEMIDE 20 MG: 20 TABLET ORAL at 09:09

## 2022-01-03 RX ADMIN — Medication 500 MG: at 09:05

## 2022-01-03 RX ADMIN — ATORVASTATIN CALCIUM 40 MG: 40 TABLET, FILM COATED ORAL at 20:11

## 2022-01-03 RX ADMIN — ACETAMINOPHEN 975 MG: 325 TABLET ORAL at 20:10

## 2022-01-03 RX ADMIN — VENLAFAXINE HYDROCHLORIDE 300 MG: 150 CAPSULE, EXTENDED RELEASE ORAL at 09:09

## 2022-01-03 RX ADMIN — CARBIDOPA AND LEVODOPA 2 TABLET: 25; 100 TABLET ORAL at 20:11

## 2022-01-03 RX ADMIN — FERROUS SULFATE TAB 325 MG (65 MG ELEMENTAL FE) 325 MG: 325 (65 FE) TAB at 09:06

## 2022-01-03 RX ADMIN — BUPROPION HYDROCHLORIDE 300 MG: 300 TABLET, EXTENDED RELEASE ORAL at 09:08

## 2022-01-03 RX ADMIN — MIDODRINE HYDROCHLORIDE 10 MG: 5 TABLET ORAL at 09:05

## 2022-01-03 ASSESSMENT — ACTIVITIES OF DAILY LIVING (ADL)
ADLS_ACUITY_SCORE: 23
ADLS_ACUITY_SCORE: 27
ADLS_ACUITY_SCORE: 27
ADLS_ACUITY_SCORE: 23
ADLS_ACUITY_SCORE: 27
ADLS_ACUITY_SCORE: 21
ADLS_ACUITY_SCORE: 21
ADLS_ACUITY_SCORE: 23
ADLS_ACUITY_SCORE: 27
ADLS_ACUITY_SCORE: 23
ADLS_ACUITY_SCORE: 27
ADLS_ACUITY_SCORE: 23
ADLS_ACUITY_SCORE: 27
ADLS_ACUITY_SCORE: 23
ADLS_ACUITY_SCORE: 21
ADLS_ACUITY_SCORE: 21
ADLS_ACUITY_SCORE: 27
ADLS_ACUITY_SCORE: 23
ADLS_ACUITY_SCORE: 23
ADLS_ACUITY_SCORE: 21
ADLS_ACUITY_SCORE: 27
ADLS_ACUITY_SCORE: 21

## 2022-01-03 NOTE — PROGRESS NOTES
Progress Note    Assessment/Plan  CODE STATUS:  No CPR- Do NOT Intubate     12/24/21  Assessment/Plan:  Alison Bashir is a 71 year old old female group home resident with past medical history of parkinsonism, CAD, cognitive decline(but has been her own decision maker), HTN, [place], hx of TBI who had a fall during transitioning 3 days prior to presenting to ED for evaluation of severe right leg pain, found to have hematoma and admitted for further management     Tension hematoma of right lateral leg s/p evacuation on 12/20  Accident due to mechanical fall  Postoperative right leg wound and pain  -Patient hit her leg on door at the [facility] 3 days prior to admission.  -CTA abdomen/pelvis bilateral leg runoff reported large subcutaneous hematoma on lateral superior aspect of right calf, no contrast extravasation.  -On 12/21, underwent evacuation of hematoma by Dr. Subramanian.  -On 12/28, status post right leg wound debridement  -Completed 5 days course of IV Zosyn.  -Continue local wound care per surgery and wound care nurse.  -Follow up with Dr. Subramanian one week after discharge, 1/3 surgery signed off     Acute postoperative pain;  --Continue scheduled Tylenol, as needed oxycodone.  Monitor for sedation  --Appreciated pain team input.     Acute blood loss anemia due to above;  --Patient received 2 unit pRBC transfusion.    --Hemoglobin continue to trending down, 8.2 --> 7.8 --> 7.2 -> 8.5  --Patient received 1 more unit of pRBC transfusion on 12/30.  --Initiated on daily iron supplement  --Hemoglobin stable at 8.9.     Chronic intermittent hypotension; improved  --Increased home midodrine from 7.5 mg 3 times daily to 10 mg 3 times daily on 12/25.  Monitor vitals closely     History of CAD;  --Currently no anginal symptoms  --Continue home statin, baby aspirin, Toprol XL  --Monitor blood pressure.      History of Parkinson;  --resumed home meds. PT/OT     H/o TBI;  History of anxiety and depression;  --lives in  but makes own  "decisions   --PTA remeron, venlafaxine, trazodone, Wellbutrin     RLS; Mirapex      DVT prophylaxis; subcu heparin     Disposition; prior to hospitalization patient had group home.  Anticipating TCU  Barrier to discharge; medically stable, awaiting placement    Updated sister Chelo    Subjective  Uneventful night.  Afebrile.  Right leg pain mild. Awaiting placement. Continue wound care as per surgery.  Patient's sister, per request, with updates.  Her  Jeffery answers, he stated that he is MPOA, and was updated on patient status.      Objective    /59 (BP Location: Left arm)   Pulse 66   Temp 97.7  F (36.5  C) (Oral)   Resp 18   Ht 1.803 m (5' 11\")   Wt 98.2 kg (216 lb 8 oz)   SpO2 97%   BMI 30.20 kg/m    Weight:   Wt Readings from Last 5 Encounters:   12/27/21 98.2 kg (216 lb 8 oz)   09/25/20 76.6 kg (168 lb 12.8 oz)   09/17/20 75.5 kg (166 lb 6.4 oz)   09/15/20 75.5 kg (166 lb 6.4 oz)   08/31/20 79.2 kg (174 lb 8 oz)       I/O last 3 completed shifts:  In: 220 [P.O.:220]  Out: 100 [Urine:100]  I/O this shift:  In: 720 [P.O.:720]  Out: -     Physical Exam  Sleepy but not in distress  No pallor, icterus, clubbing, cyanosis  Body mass index is 30.2 kg/m .  No sinus tenderness  Moist membranes  Neck supple  CVS: S1 S2-N, no murmurs, gallops, rubs  Resp: B/L vesicular breath sounds, no wheezing, crackles  Abd: soft, No t/g/r  Neuro: no involuntary movements such as tremors  Vasc: Right leg is bandaged    Pertinent Labs  ----------------------  Recent Labs   Lab 12/30/21 0226 12/29/21 0552 12/29/21  0547 12/29/21 0227   POTASSIUM  --  3.8  --   --    CR  --  0.67  --   --    GLC 83  --  99 107*     Recent Labs   Lab 01/02/22  0549 01/01/22  1123 12/31/21  0526 12/30/21  0522 12/29/21  0552 12/28/21  1137   HGB 8.5* 8.9* 8.8* 7.2*   < > 7.8*   PLT  --   --   --  196  --  167    < > = values in this interval not displayed.     No results for input(s): INR in the last 168 hours.  Glucose Values Latest " Ref Rng & Units 12/20/2021 12/21/2021 12/23/2021 12/24/2021   Bedside Glucose (mg/dl )  - -- -- -- --   GLUCOSE 70 - 125 mg/dL 126(H) 74 107 97   Some recent data might be hidden     Pertinent Radiology   Radiology Results: Personally reviewed impression/s  No results found.  EKG Results: not reviewed.

## 2022-01-03 NOTE — PROGRESS NOTES
Care Management Follow Up    Length of Stay (days): 14    Expected Discharge Date: 01/03/2022     Concerns to be Addressed: all concerns addressed in this encounter     Patient plan of care discussed at interdisciplinary rounds: Yes    Anticipated Discharge Disposition: TCU     Anticipated Discharge Services: None  Anticipated Discharge DME: None    Patient/family educated on Medicare website which has current facility and service quality ratings: other (see comments) (N/A)  Education Provided on the Discharge Plan:    Patient/Family in Agreement with the Plan: yes    Referrals Placed by CM/SW:  TCU- [facility], [facility], [facility], [facility].     Private pay costs discussed: Not applicable    Additional Information:  SONAM spoke to Abdiel at pt group home, she had questions about pt discharge plan. Abdiel stated they do not have aides or nursing at group [place], Abdiel thinks pt care might be more than her staff can handle. SONAM contacted TCUs and left messages regarding referrals.  SONAM spoke to [facility] admissions, needs are too great for facility at this time, and declined referral.    [facility]  [phone].  [facility]  for that [place] [phone].    SHANTI Warner

## 2022-01-03 NOTE — PLAN OF CARE
Problem: Adult Inpatient Plan of Care  Goal: Optimal Comfort and Wellbeing  Outcome: Improving  Goal: Readiness for Transition of Care  Outcome: Improving   Pt in bed. Alert x 4. Shampoo cap given and applied. Hair brushed. Pain 5/10 requested medication. Given tylenol for pain then later no improvement so given 5 mg oxycodone. Incontinent of bladder. Brief changed. Bed pan requested. Ace wrap in place on RLE. Foam dressing changed on bottom.

## 2022-01-03 NOTE — PLAN OF CARE
Problem: Impaired Wound Healing  Goal: Optimal Wound Healing  Outcome: No Change    Encourage to be out of bed into chair with lift (refused this shift- states she is too tired.  Taking oxycodone for pain.       Problem: Risk for Delirium  Goal: Improved Attention and Thought Clarity  Outcome: No Change    Encourage to participate in cares. Is feeding herself with set-up. Can turn side to side with 1 assist to change brief and use bedpan.     Ariana Guzmán RN

## 2022-01-03 NOTE — PLAN OF CARE
Problem: Risk for Delirium  Goal: Improved Attention and Thought Clarity  Outcome: Improving     Problem: Risk for Delirium  Goal: Improved Sleep  Outcome: Improving     Problem: Adult Inpatient Plan of Care  Goal: Optimal Comfort and Wellbeing  Outcome: Improving   Patient slept well in between cares. Medicated with VT Oxy x1 and with PRN Tylenol x1. Makes her needs known, call light within reach.

## 2022-01-03 NOTE — PROGRESS NOTES
"CLINICAL NUTRITION SERVICES  -  REASSESSMENT NOTE      RECOMMENDATIONS FOR MD/PROVIDER TO ORDER:   None     Recommendations Ordered by Registered Dietitian (RD):   No new     Future/Additional Recommendations:   Adjust supplements pending intake/acceptance/wound healing     Malnutrition:   Moderate in the context of social and environmental circumstances         EVALUATION OF PROGRESS AND GOALS    CURRENT NUTRITION ORDERS  Diet Order:     Regular     Current Intake/Tolerance:  % of meals that are documented.   Pt ordering 2-3 meals/day.   Estimate intake over 2 meals yesterday 891 kcal, 33 g protein. 3rd meal ordered at 770 kcal, 24 g protein but intake not documented    Pt reports she drank her ensure today, however 3 unopened on bedside table from prior days. RN encouraged pt to continue to take    ANTHROPOMETRICS  Height: 5' 11\"  Weight: 216 lbs 8 oz 12/27, up 4 lb from 3 days prior. +2 generalized and +1-+3 extremity edema  Body mass index is 30.2 kg/m .  Weight Status:  Normal BMI  IBW: 70.5kg  Weight History:   Wt Readings from Last 30 Encounters:   12/20/21 81 kg (178 lb 9.2 oz)- admit   09/25/20 76.6 kg (168 lb 12.8 oz)   09/17/20 75.5 kg (166 lb 6.4 oz)   09/15/20 75.5 kg (166 lb 6.4 oz)   08/31/20 79.2 kg (174 lb 8 oz)   07/16/20 72.6 kg (160 lb)   07/09/20 65.3 kg (144 lb)   07/03/20 71.2 kg (157 lb)   06/22/20 71.2 kg (157 lb)     LABS  No new    MEDICATIONS  Medications reviewed:   Lipitor, lasix q other day, remeron, miralax daily, pericolace bid, feso4, magox, vit C 500 mg daily    GI  Last BM 12/31    ASSESSED NUTRITION NEEDS PER APPROVED PRACTICE GUIDELINES:    Dosing Weight 81 kg (178 lb 9.2 oz)   Estimated Energy Needs: 8423-1617 kcals ([place] Jeor)  Justification: maintenance and stress factor 1.4-1.6  Estimated Protein Needs:  grams protein (1.2-1.4 g pro/Kg)  Justification: post-op and wound healing  Estimated Fluid Needs: 2329-9642  mL (25-30 mL/kg)  Justification: " maintenance      NUTRITION DIAGNOSIS:  Malnutrition related to social and environmental circumstances as evidenced by moderate to severe fat loss and moderate muscle loss-improving    Increased nutrition needs r/t wound healing evidenced by wound- progressing      NUTRITION INTERVENTIONS  Recommendations / Nutrition Prescription  No new    Nutrition Goals  Meet estimated nutrition needs- progressing  Wound healing- progressing      MONITORING AND EVALUATION:  Progress towards goals will be monitored and evaluated per protocol and Practice Guidelines, Diet Order, Food intake, Fluid/beverage intake, Weight, Food and Nutrition Knowledge/Skill, Biochemical data and Nutrition-focused physical findings

## 2022-01-03 NOTE — PROGRESS NOTES
ASSESSMENT:  1. Hematoma of skin    2. Pain of right lower leg    3. Accident due to mechanical fall without injury, initial encounter    4. Type 2 diabetes mellitus with other specified complication, unspecified whether long term insulin use (H)    5. Knee injury, right, initial encounter    6. Intractable pain        Alison Bashir is a 71 year old female who is s/p hematoma evacuation of right lower extremity.     PLAN:  Continue wound care with WOC  Follow up with Dr. Subramanian one week after discharge - whenever that may be given placement issues  Surgery will sign off at this time; please contact us with questions/concerns    SUBJECTIVE:   She is doing well. Pain with dressing changes, but managed well otherwise. No fever, chills, nausea, or vomiting.      Patient Vitals for the past 24 hrs:   BP Temp Temp src Pulse Resp SpO2   01/03/22 0649 124/59 97.7  F (36.5  C) Oral 66 18 97 %   01/02/22 2329 103/55 98.1  F (36.7  C) Oral 69 18 95 %   01/02/22 1526 131/61 97.8  F (36.6  C) Oral 76 18 95 %         PHYSICAL EXAM:  GEN: No acute distress, comfortable  LUNGS: CTA bilaterally  CV: RRR  EXT: RLE wound with 100% granulation on the wound bed and very good vascularization of tissue; surrounding tissue intact; minimal edema; dressing changed and replaced    01/02 0700 - 01/03 0659  In: 220 [P.O.:220]  Out: 100 [Urine:100]    No results displayed because visit has over 200 results.             JABARI Valdez CNP

## 2022-01-04 ENCOUNTER — APPOINTMENT (OUTPATIENT)
Dept: PHYSICAL THERAPY | Facility: HOSPITAL | Age: 72
DRG: 580 | End: 2022-01-04
Payer: MEDICARE

## 2022-01-04 LAB
ANION GAP SERPL CALCULATED.3IONS-SCNC: 4 MMOL/L (ref 5–18)
BASOPHILS # BLD AUTO: 0 10E3/UL (ref 0–0.2)
BASOPHILS NFR BLD AUTO: 0 %
BUN SERPL-MCNC: 20 MG/DL (ref 8–28)
CALCIUM SERPL-MCNC: 8.4 MG/DL (ref 8.5–10.5)
CHLORIDE BLD-SCNC: 104 MMOL/L (ref 98–107)
CO2 SERPL-SCNC: 35 MMOL/L (ref 22–31)
CREAT SERPL-MCNC: 0.67 MG/DL (ref 0.6–1.1)
EOSINOPHIL # BLD AUTO: 0.3 10E3/UL (ref 0–0.7)
EOSINOPHIL NFR BLD AUTO: 5 %
ERYTHROCYTE [DISTWIDTH] IN BLOOD BY AUTOMATED COUNT: 14.8 % (ref 10–15)
GFR SERPL CREATININE-BSD FRML MDRD: >90 ML/MIN/1.73M2
GLUCOSE BLD-MCNC: 79 MG/DL (ref 70–125)
HCT VFR BLD AUTO: 31.9 % (ref 35–47)
HGB BLD-MCNC: 9.3 G/DL (ref 11.7–15.7)
IMM GRANULOCYTES # BLD: 0 10E3/UL
IMM GRANULOCYTES NFR BLD: 1 %
LYMPHOCYTES # BLD AUTO: 1.2 10E3/UL (ref 0.8–5.3)
LYMPHOCYTES NFR BLD AUTO: 24 %
MAGNESIUM SERPL-MCNC: 1.8 MG/DL (ref 1.8–2.6)
MCH RBC QN AUTO: 28 PG (ref 26.5–33)
MCHC RBC AUTO-ENTMCNC: 29.2 G/DL (ref 31.5–36.5)
MCV RBC AUTO: 96 FL (ref 78–100)
MONOCYTES # BLD AUTO: 0.3 10E3/UL (ref 0–1.3)
MONOCYTES NFR BLD AUTO: 7 %
NEUTROPHILS # BLD AUTO: 3.2 10E3/UL (ref 1.6–8.3)
NEUTROPHILS NFR BLD AUTO: 63 %
NRBC # BLD AUTO: 0 10E3/UL
NRBC BLD AUTO-RTO: 0 /100
PLATELET # BLD AUTO: 328 10E3/UL (ref 150–450)
POTASSIUM BLD-SCNC: 4.1 MMOL/L (ref 3.5–5)
RBC # BLD AUTO: 3.32 10E6/UL (ref 3.8–5.2)
SODIUM SERPL-SCNC: 143 MMOL/L (ref 136–145)
WBC # BLD AUTO: 5.1 10E3/UL (ref 4–11)

## 2022-01-04 PROCEDURE — 36415 COLL VENOUS BLD VENIPUNCTURE: CPT | Performed by: INTERNAL MEDICINE

## 2022-01-04 PROCEDURE — 250N000011 HC RX IP 250 OP 636: Performed by: INTERNAL MEDICINE

## 2022-01-04 PROCEDURE — 99232 SBSQ HOSP IP/OBS MODERATE 35: CPT | Performed by: INTERNAL MEDICINE

## 2022-01-04 PROCEDURE — 85025 COMPLETE CBC W/AUTO DIFF WBC: CPT | Performed by: INTERNAL MEDICINE

## 2022-01-04 PROCEDURE — 250N000013 HC RX MED GY IP 250 OP 250 PS 637: Performed by: INTERNAL MEDICINE

## 2022-01-04 PROCEDURE — 97110 THERAPEUTIC EXERCISES: CPT | Mod: GP

## 2022-01-04 PROCEDURE — 250N000013 HC RX MED GY IP 250 OP 250 PS 637: Performed by: SURGERY

## 2022-01-04 PROCEDURE — 82310 ASSAY OF CALCIUM: CPT | Performed by: INTERNAL MEDICINE

## 2022-01-04 PROCEDURE — 83735 ASSAY OF MAGNESIUM: CPT | Performed by: INTERNAL MEDICINE

## 2022-01-04 PROCEDURE — 97530 THERAPEUTIC ACTIVITIES: CPT | Mod: GP

## 2022-01-04 PROCEDURE — 99207 PR CDG-CORRECTLY CODED, REVIEWED AND AGREE: CPT | Performed by: INTERNAL MEDICINE

## 2022-01-04 PROCEDURE — 120N000001 HC R&B MED SURG/OB

## 2022-01-04 RX ADMIN — OXYCODONE HYDROCHLORIDE 5 MG: 5 TABLET ORAL at 22:55

## 2022-01-04 RX ADMIN — OXYCODONE HYDROCHLORIDE 2.5 MG: 5 TABLET ORAL at 04:31

## 2022-01-04 RX ADMIN — VENLAFAXINE HYDROCHLORIDE 37.5 MG: 37.5 CAPSULE, EXTENDED RELEASE ORAL at 08:51

## 2022-01-04 RX ADMIN — ASPIRIN 81 MG: 81 TABLET, COATED ORAL at 09:14

## 2022-01-04 RX ADMIN — Medication 500 MG: at 08:52

## 2022-01-04 RX ADMIN — ATORVASTATIN CALCIUM 40 MG: 40 TABLET, FILM COATED ORAL at 20:07

## 2022-01-04 RX ADMIN — BUPROPION HYDROCHLORIDE 300 MG: 300 TABLET, EXTENDED RELEASE ORAL at 08:53

## 2022-01-04 RX ADMIN — METOPROLOL SUCCINATE 12.5 MG: 25 TABLET, EXTENDED RELEASE ORAL at 08:53

## 2022-01-04 RX ADMIN — VENLAFAXINE HYDROCHLORIDE 300 MG: 150 CAPSULE, EXTENDED RELEASE ORAL at 08:51

## 2022-01-04 RX ADMIN — DOCUSATE SODIUM 50 MG AND SENNOSIDES 8.6 MG 1 TABLET: 8.6; 5 TABLET, FILM COATED ORAL at 08:52

## 2022-01-04 RX ADMIN — POLYETHYLENE GLYCOL 3350 17 G: 17 POWDER, FOR SOLUTION ORAL at 08:50

## 2022-01-04 RX ADMIN — PRAMIPEXOLE DIHYDROCHLORIDE 0.5 MG: 0.5 TABLET ORAL at 20:07

## 2022-01-04 RX ADMIN — CARBIDOPA AND LEVODOPA 2 TABLET: 25; 100 TABLET ORAL at 20:06

## 2022-01-04 RX ADMIN — FERROUS SULFATE TAB 325 MG (65 MG ELEMENTAL FE) 325 MG: 325 (65 FE) TAB at 08:53

## 2022-01-04 RX ADMIN — THERA TABS 1 TABLET: TAB at 08:53

## 2022-01-04 RX ADMIN — HEPARIN SODIUM 5000 UNITS: 10000 INJECTION, SOLUTION INTRAVENOUS; SUBCUTANEOUS at 09:01

## 2022-01-04 RX ADMIN — MIDODRINE HYDROCHLORIDE 10 MG: 5 TABLET ORAL at 12:39

## 2022-01-04 RX ADMIN — CARBIDOPA AND LEVODOPA 2 TABLET: 25; 100 TABLET ORAL at 13:09

## 2022-01-04 RX ADMIN — MAGNESIUM OXIDE TAB 400 MG (241.3 MG ELEMENTAL MG) 400 MG: 400 (241.3 MG) TAB at 08:53

## 2022-01-04 RX ADMIN — TRAZODONE HYDROCHLORIDE 150 MG: 100 TABLET ORAL at 20:06

## 2022-01-04 RX ADMIN — MIDODRINE HYDROCHLORIDE 10 MG: 5 TABLET ORAL at 16:57

## 2022-01-04 RX ADMIN — DOCUSATE SODIUM 50 MG AND SENNOSIDES 8.6 MG 1 TABLET: 8.6; 5 TABLET, FILM COATED ORAL at 20:07

## 2022-01-04 RX ADMIN — ACETAMINOPHEN 975 MG: 325 TABLET ORAL at 08:59

## 2022-01-04 RX ADMIN — OXYCODONE HYDROCHLORIDE 5 MG: 5 TABLET ORAL at 16:56

## 2022-01-04 RX ADMIN — ACETAMINOPHEN 975 MG: 325 TABLET ORAL at 20:04

## 2022-01-04 RX ADMIN — OXYCODONE HYDROCHLORIDE 5 MG: 5 TABLET ORAL at 10:19

## 2022-01-04 RX ADMIN — MIRTAZAPINE 15 MG: 15 TABLET, FILM COATED ORAL at 20:06

## 2022-01-04 RX ADMIN — HEPARIN SODIUM 5000 UNITS: 10000 INJECTION, SOLUTION INTRAVENOUS; SUBCUTANEOUS at 20:08

## 2022-01-04 RX ADMIN — MIDODRINE HYDROCHLORIDE 10 MG: 5 TABLET ORAL at 08:48

## 2022-01-04 RX ADMIN — CARBIDOPA AND LEVODOPA 2 TABLET: 25; 100 TABLET ORAL at 08:52

## 2022-01-04 ASSESSMENT — ACTIVITIES OF DAILY LIVING (ADL)
ADLS_ACUITY_SCORE: 29
ADLS_ACUITY_SCORE: 27
ADLS_ACUITY_SCORE: 29
ADLS_ACUITY_SCORE: 27
ADLS_ACUITY_SCORE: 29
ADLS_ACUITY_SCORE: 27

## 2022-01-04 NOTE — PLAN OF CARE
Problem: Adult Inpatient Plan of Care  Goal: Optimal Comfort and Wellbeing  Outcome: Improving  Goal: Readiness for Transition of Care  Outcome: Improving     Problem: Risk for Delirium  Goal: Improved Sleep  Outcome: Improving     Problem: Bleeding (Surgery Nonspecified)  Goal: Absence of Bleeding  Outcome: Improving     Problem: Infection (Surgery Nonspecified)  Goal: Absence of Infection Signs and Symptoms  Outcome: Improving     Problem: Postoperative Nausea and Vomiting (Surgery Nonspecified)  Goal: Nausea and Vomiting Relief  Outcome: Improving     Problem: Impaired Wound Healing  Goal: Optimal Wound Healing  Outcome: Improving  Intervention: Promote Wound Healing  Recent Flowsheet Documentation  Taken 1/3/2022 1600 by Jared Vasquez, RN  Pain Management Interventions: pillow support provided  Activity Management: activity adjusted per tolerance

## 2022-01-04 NOTE — PROGRESS NOTES
[facility]    Medicine Progress Note - Hospitalist Service       Date of Admission:  12/20/2021    A/P  Alison Bashir is a 71 year old old female group home resident with past medical history of parkinsonism, CAD, cognitive decline(but has been her own decision maker), HTN, [place], hx of TBI who had a fall during transitioning 3 days prior to presenting to ED for evaluation of severe right leg pain, found to have hematoma and admitted for further management     1.Tension hematoma of right lateral leg s/p evacuation on 12/20  -Accident due to mechanical fall  -Postoperative right leg wound and pain  -Patient hit her leg on door at the [facility] 3 days prior to admission.  -CTA abdomen/pelvis bilateral leg runoff reported large subcutaneous hematoma on lateral superior aspect of right calf, no contrast extravasation.  -On 12/21, underwent evacuation of hematoma by Dr. Subramanian.  -On 12/28, status post right leg wound debridement  -Completed 5 days course of IV Zosyn.  -Continue local wound care per surgery and wound care nurse.  -Follow up with Dr. Subramanian one week after discharge, 1/3 surgery signed off     2.Acute postoperative pain;  --Continue scheduled Tylenol, as needed oxycodone.  Monitor for sedation  --Appreciated pain team input.     3.Acute blood loss anemia due to above;  --Patient received total of 3 units pRBC, last on 12/30  -today 's hgb 9.3     4.Chronic intermittent hypotension; improved  --Increased home midodrine from 7.5 mg 3 times daily to 10 mg 3 times daily on 12/25.  Monitor vitals closely     5.History of CAD;  --Currently no anginal symptoms  --Continue home statin, baby aspirin, Toprol XL  --Monitor blood pressure.      6.History of Parkinson;  --resumed home meds. PT/OT     7.H/o TBI  -lives in     8.History of anxiety and depression;  --lives in  but makes own decisions   --PTA remeron, venlafaxine, trazodone, Wellbutrin     9.RLS; Mirapex     10.Constipation  -had  stool yesterday but small  -on miralax  -on senna          Diet: Regular Diet Adult  Snacks/Supplements Adult: Ensure Enlive; With Meals    DVT Prophylaxis: Heparin SQ  Molina Catheter: Not present  Central Lines: None  Code Status: No CPR- Do NOT Intubate      Disposition Plan   Expected Discharge:needs placement  Anticipated discharge location: home;other (see comments) (return home to group home)    Delays:     Placement - TCU            The patient's care was discussed with the Care Coordinator/ and Patient.  I called Chelo at 1517 to update and left message    Sharyn Marrufo MD  Hospitalist Service  [facility]  Securely message with the Vocera Web Console (learn more here)  Text page via [facility] Paging/Directory        Clinically Significant Risk Factors Present on Admission                    ______________________________________________________________________    Interval History   She feels leg is better  Has some constipation  She notes had small stool yesterday, but did not feel it was enough  Eating ok  Lives in  but agreed to TCU    Data reviewed today: I reviewed all medications, new labs and imaging results over the last 24 hours. I personally reviewed no images or EKG's today.    Physical Exam   Vital Signs: Temp: 98.3  F (36.8  C) Temp src: Oral BP: 103/54 Pulse: 75   Resp: 16 SpO2: 93 % O2 Device: None (Room air)    Weight: 216 lbs 8 oz  Constitutional: awake, alert, cooperative and no apparent distress  Eyes: sclera clear  Respiratory: No increased work of breathing, good air exchange, clear to auscultation bilaterally, no crackles or wheezing  Cardiovascular: Normal apical impulse, regular rate and rhythm, normal S1 and S2, no S3 or S4, and no murmur noted  GI: normal bowel sounds, soft, non-distended and non-tender  Skin: no bruising or bleeding  Musculoskeletal: wrap on lower leg I did not take down--right lower leg    Neurologic: Mental Status Exam:   Level of Alertness:   Awake, speech fluent, motor moves ext  Neuropsychiatric: General: normal, calm and normal eye contact  Affect: normal and pleasant    Data   Recent Labs   Lab 01/04/22  0519 01/02/22  0549 01/01/22  1123 12/31/21  0526 12/30/21 0522 12/30/21  0226 12/29/21  0552 12/29/21  0552 12/29/21  0547   WBC 5.1  --   --   --   --   --   --   --   --    HGB 9.3* 8.5* 8.9*   < > 7.2*  --    < > 7.3*  --    MCV 96  --   --   --   --   --   --   --   --      --   --   --  196  --   --   --   --      --   --   --   --   --   --   --   --    POTASSIUM 4.1  --   --   --   --   --   --  3.8  --    CHLORIDE 104  --   --   --   --   --   --   --   --    CO2 35*  --   --   --   --   --   --   --   --    BUN 20  --   --   --   --   --   --   --   --    CR 0.67  --   --   --   --   --   --  0.67  --    ANIONGAP 4*  --   --   --   --   --   --   --   --    MARY 8.4*  --   --   --   --   --   --   --   --    GLC 79  --   --   --   --  83  --   --  99    < > = values in this interval not displayed.     No results found for this or any previous visit (from the past 24 hour(s)).

## 2022-01-04 NOTE — PLAN OF CARE
Problem: Adult Inpatient Plan of Care  Goal: Optimal Comfort and Wellbeing  Outcome: Improving  Patient alert and oriented, Did c/o some pain 6/10 in RLE, Oxycodone given x 1 with some relief noted, dressing change done over RLE, Up in chair x 2 hours, Had 2 soft Bm's, cooperative, bed and chair alarms on for safety

## 2022-01-05 ENCOUNTER — APPOINTMENT (OUTPATIENT)
Dept: PHYSICAL THERAPY | Facility: HOSPITAL | Age: 72
DRG: 580 | End: 2022-01-05
Payer: MEDICARE

## 2022-01-05 LAB
ANION GAP SERPL CALCULATED.3IONS-SCNC: 6 MMOL/L (ref 5–18)
BUN SERPL-MCNC: 23 MG/DL (ref 8–28)
CALCIUM SERPL-MCNC: 8.6 MG/DL (ref 8.5–10.5)
CHLORIDE BLD-SCNC: 102 MMOL/L (ref 98–107)
CO2 SERPL-SCNC: 32 MMOL/L (ref 22–31)
CREAT SERPL-MCNC: 0.67 MG/DL (ref 0.6–1.1)
GFR SERPL CREATININE-BSD FRML MDRD: >90 ML/MIN/1.73M2
GLUCOSE BLD-MCNC: 85 MG/DL (ref 70–125)
HGB BLD-MCNC: 9.3 G/DL (ref 11.7–15.7)
MAGNESIUM SERPL-MCNC: 1.9 MG/DL (ref 1.8–2.6)
POTASSIUM BLD-SCNC: 4.3 MMOL/L (ref 3.5–5)
SODIUM SERPL-SCNC: 140 MMOL/L (ref 136–145)

## 2022-01-05 PROCEDURE — 250N000011 HC RX IP 250 OP 636: Performed by: INTERNAL MEDICINE

## 2022-01-05 PROCEDURE — 250N000013 HC RX MED GY IP 250 OP 250 PS 637: Performed by: SURGERY

## 2022-01-05 PROCEDURE — 97110 THERAPEUTIC EXERCISES: CPT | Mod: GP

## 2022-01-05 PROCEDURE — 85018 HEMOGLOBIN: CPT | Performed by: INTERNAL MEDICINE

## 2022-01-05 PROCEDURE — 250N000013 HC RX MED GY IP 250 OP 250 PS 637: Performed by: INTERNAL MEDICINE

## 2022-01-05 PROCEDURE — 82310 ASSAY OF CALCIUM: CPT | Performed by: INTERNAL MEDICINE

## 2022-01-05 PROCEDURE — 97530 THERAPEUTIC ACTIVITIES: CPT | Mod: GP

## 2022-01-05 PROCEDURE — 120N000001 HC R&B MED SURG/OB

## 2022-01-05 PROCEDURE — 83735 ASSAY OF MAGNESIUM: CPT | Performed by: INTERNAL MEDICINE

## 2022-01-05 PROCEDURE — 36415 COLL VENOUS BLD VENIPUNCTURE: CPT | Performed by: INTERNAL MEDICINE

## 2022-01-05 PROCEDURE — 99232 SBSQ HOSP IP/OBS MODERATE 35: CPT | Performed by: INTERNAL MEDICINE

## 2022-01-05 RX ADMIN — MIDODRINE HYDROCHLORIDE 10 MG: 5 TABLET ORAL at 16:47

## 2022-01-05 RX ADMIN — ACETAMINOPHEN 650 MG: 325 TABLET ORAL at 16:47

## 2022-01-05 RX ADMIN — BUPROPION HYDROCHLORIDE 300 MG: 300 TABLET, EXTENDED RELEASE ORAL at 08:16

## 2022-01-05 RX ADMIN — FERROUS SULFATE TAB 325 MG (65 MG ELEMENTAL FE) 325 MG: 325 (65 FE) TAB at 08:14

## 2022-01-05 RX ADMIN — TRAZODONE HYDROCHLORIDE 150 MG: 100 TABLET ORAL at 20:21

## 2022-01-05 RX ADMIN — DOCUSATE SODIUM 50 MG AND SENNOSIDES 8.6 MG 1 TABLET: 8.6; 5 TABLET, FILM COATED ORAL at 20:20

## 2022-01-05 RX ADMIN — MIDODRINE HYDROCHLORIDE 10 MG: 5 TABLET ORAL at 12:21

## 2022-01-05 RX ADMIN — PRAMIPEXOLE DIHYDROCHLORIDE 0.5 MG: 0.5 TABLET ORAL at 20:20

## 2022-01-05 RX ADMIN — CARBIDOPA AND LEVODOPA 2 TABLET: 25; 100 TABLET ORAL at 20:20

## 2022-01-05 RX ADMIN — FUROSEMIDE 20 MG: 20 TABLET ORAL at 08:15

## 2022-01-05 RX ADMIN — OXYCODONE HYDROCHLORIDE 5 MG: 5 TABLET ORAL at 05:07

## 2022-01-05 RX ADMIN — MIDODRINE HYDROCHLORIDE 10 MG: 5 TABLET ORAL at 08:26

## 2022-01-05 RX ADMIN — DOCUSATE SODIUM 50 MG AND SENNOSIDES 8.6 MG 1 TABLET: 8.6; 5 TABLET, FILM COATED ORAL at 08:14

## 2022-01-05 RX ADMIN — ATORVASTATIN CALCIUM 40 MG: 40 TABLET, FILM COATED ORAL at 20:20

## 2022-01-05 RX ADMIN — THERA TABS 1 TABLET: TAB at 08:15

## 2022-01-05 RX ADMIN — OXYCODONE HYDROCHLORIDE 5 MG: 5 TABLET ORAL at 18:37

## 2022-01-05 RX ADMIN — CARBIDOPA AND LEVODOPA 2 TABLET: 25; 100 TABLET ORAL at 08:14

## 2022-01-05 RX ADMIN — Medication 500 MG: at 08:14

## 2022-01-05 RX ADMIN — HEPARIN SODIUM 5000 UNITS: 10000 INJECTION, SOLUTION INTRAVENOUS; SUBCUTANEOUS at 20:21

## 2022-01-05 RX ADMIN — ACETAMINOPHEN 975 MG: 325 TABLET ORAL at 20:20

## 2022-01-05 RX ADMIN — MIRTAZAPINE 15 MG: 15 TABLET, FILM COATED ORAL at 20:20

## 2022-01-05 RX ADMIN — VENLAFAXINE HYDROCHLORIDE 300 MG: 150 CAPSULE, EXTENDED RELEASE ORAL at 08:15

## 2022-01-05 RX ADMIN — VENLAFAXINE HYDROCHLORIDE 37.5 MG: 37.5 CAPSULE, EXTENDED RELEASE ORAL at 08:15

## 2022-01-05 RX ADMIN — OXYCODONE HYDROCHLORIDE 5 MG: 5 TABLET ORAL at 12:21

## 2022-01-05 RX ADMIN — HEPARIN SODIUM 5000 UNITS: 10000 INJECTION, SOLUTION INTRAVENOUS; SUBCUTANEOUS at 08:12

## 2022-01-05 RX ADMIN — MAGNESIUM OXIDE TAB 400 MG (241.3 MG ELEMENTAL MG) 400 MG: 400 (241.3 MG) TAB at 08:17

## 2022-01-05 RX ADMIN — ACETAMINOPHEN 975 MG: 325 TABLET ORAL at 08:16

## 2022-01-05 RX ADMIN — METOPROLOL SUCCINATE 12.5 MG: 25 TABLET, EXTENDED RELEASE ORAL at 08:14

## 2022-01-05 RX ADMIN — ASPIRIN 81 MG: 81 TABLET, COATED ORAL at 08:16

## 2022-01-05 RX ADMIN — CARBIDOPA AND LEVODOPA 2 TABLET: 25; 100 TABLET ORAL at 13:12

## 2022-01-05 ASSESSMENT — ACTIVITIES OF DAILY LIVING (ADL)
ADLS_ACUITY_SCORE: 24
ADLS_ACUITY_SCORE: 25
ADLS_ACUITY_SCORE: 29
ADLS_ACUITY_SCORE: 25
ADLS_ACUITY_SCORE: 29
ADLS_ACUITY_SCORE: 23
ADLS_ACUITY_SCORE: 24
ADLS_ACUITY_SCORE: 23
ADLS_ACUITY_SCORE: 29
ADLS_ACUITY_SCORE: 23
ADLS_ACUITY_SCORE: 25
ADLS_ACUITY_SCORE: 29
ADLS_ACUITY_SCORE: 24
ADLS_ACUITY_SCORE: 29
ADLS_ACUITY_SCORE: 29
ADLS_ACUITY_SCORE: 24
ADLS_ACUITY_SCORE: 24
ADLS_ACUITY_SCORE: 29
ADLS_ACUITY_SCORE: 24
ADLS_ACUITY_SCORE: 29

## 2022-01-05 ASSESSMENT — MIFFLIN-ST. JEOR: SCORE: 1516.05

## 2022-01-05 NOTE — PLAN OF CARE
Problem: Adult Inpatient Plan of Care  Goal: Optimal Comfort and Wellbeing  Outcome: Improving     Problem: Risk for Delirium  Goal: Improved Sleep  Outcome: Improving     Problem: Pain (Surgery Nonspecified)  Goal: Acceptable Pain Control  Intervention: Prevent or Manage Pain  Recent Flowsheet Documentation  Taken 1/4/2022 1295 by Vinny Neves, RN  Pain Management Interventions: medication (see MAR)     Patient alert and orientated, able to make needs known. Pain controlled with PRN oxycodone. CMS to right lower extremity intact. Incontinent of bladder. Repositioned.     Vinny Neves, RN

## 2022-01-05 NOTE — PROGRESS NOTES
[facility]    Medicine Progress Note - Hospitalist Service       Date of Admission:  12/20/2021    A/P    Alison Bashir is a 71 year old old female group home resident with past medical history of parkinsonism, CAD, cognitive decline(but has been her own decision maker), HTN, [place], hx of TBI who had a fall during transitioning 3 days prior to presenting to ED for evaluation of severe right leg pain, found to have hematoma and admitted for further management     1.Tension hematoma of right lateral leg s/p evacuation on 12/20  -Accident due to mechanical fall  -Postoperative right leg wound and pain  -Patient hit her leg on door at the [facility] 3 days prior to admission.  -CTA abdomen/pelvis bilateral leg runoff reported large subcutaneous hematoma on lateral superior aspect of right calf, no contrast extravasation.  -On 12/21, underwent evacuation of hematoma by Dr. Subramanian.  -On 12/28, status post right leg wound debridement  -Completed 5 days course of IV Zosyn.  -Continue local wound care per surgery and wound care nurse. She currently is requiring bid dressing changes  -Follow up with Dr. Subramanian one week after discharge, 1/3 surgery signed off     2.Acute postoperative pain;  --Continue scheduled Tylenol, as needed oxycodone.  Monitor for sedation  --Appreciated pain team input.     3.Acute blood loss anemia due to above;  --Patient received total of 3 units pRBC, last on 12/30  -today 's hgb 9.3     4.Chronic intermittent hypotension; improved  --Increased home midodrine from 7.5 mg 3 times daily to 10 mg 3 times daily on 12/25.  Monitor vitals closely     5.History of CAD;  --Currently no anginal symptoms  --Continue home statin, baby aspirin, Toprol XL  --Monitor blood pressure.      6.History of Parkinson;  --resumed home meds. PT/OT     7.H/o TBI  -lives in      8.History of anxiety and depression;  --lives in  but makes own decisions   --PTA remeron, venlafaxine, trazodone,  Wellbutrin     9.RLS; Mirapex      10.Constipation  -had stool recently   -on miralax  -on senna            Diet: Regular Diet Adult  Snacks/Supplements Adult: Ensure Enlive; With Meals    DVT Prophylaxis: Heparin SQ  Molina Catheter: Not present  Central Lines: None  Code Status: No CPR- Do NOT Intubate      Disposition Plan   Expected Discharge: 01/05/2022     Anticipated discharge location: other (see comments) (TCU)    Delays:     Placement - TCU            The patient's care was discussed with the Bedside Nurse, Care Coordinator/ and Patient. I called Chelo at [phone].     Sharyn Marrufo MD  Hospitalist Service  [facility]  Securely message with the Vocera Web Console (learn more here)  Text page via [employer] Paging/Directory        Clinically Significant Risk Factors Present on Admission                    ______________________________________________________________________    Interval History   She notes no new complaints  Leg is doing ok  Eating ok  Had stool      Data reviewed today: I reviewed all medications, new labs and imaging results over the last 24 hours. I personally reviewed no images or EKG's today.    Physical Exam   Vital Signs: Temp: 97.8  F (36.6  C) Temp src: Oral BP: 105/53 Pulse: 68   Resp: 16 SpO2: 96 % O2 Device: None (Room air)    Weight: 200 lbs 9.6 oz  Constitutional: awake, alert, cooperative, no apparent distress, and appears stated age  Eyes: sclera clear  Respiratory: No increased work of breathing, good air exchange, clear to auscultation bilaterally, no crackles or wheezing  Cardiovascular: Normal apical impulse, regular rate and rhythm, normal S1 and S2, no S3 or S4, and no murmur noted  GI: normal bowel sounds, soft, non-distended and non-tender  Skin: right leg dressing removed by RN, lower calf medial to anterior large open wound with some granulation tissue   Musculoskeletal: no lower extremity pitting edema  present  Neurologic: Mental Status Exam:  Level of Alertness:   awake  Neuropsychiatric: General: normal, calm and normal eye contact, flat affect    Data   Recent Labs   Lab 01/05/22  0530 01/04/22  0519 01/02/22  0549 12/31/21 0526 12/30/21 0522 12/30/21  0226   WBC  --  5.1  --   --   --   --    HGB 9.3* 9.3* 8.5*   < > 7.2*  --    MCV  --  96  --   --   --   --    PLT  --  328  --   --  196  --     143  --   --   --   --    POTASSIUM 4.3 4.1  --   --   --   --    CHLORIDE 102 104  --   --   --   --    CO2 32* 35*  --   --   --   --    BUN 23 20  --   --   --   --    CR 0.67 0.67  --   --   --   --    ANIONGAP 6 4*  --   --   --   --    MARY 8.6 8.4*  --   --   --   --    GLC 85 79  --   --   --  83    < > = values in this interval not displayed.     No results found for this or any previous visit (from the past 24 hour(s)).

## 2022-01-05 NOTE — PLAN OF CARE
Problem: Impaired Wound Healing  Goal: Optimal Wound Healing  Outcome: Improving  Intervention: Promote Wound Healing  Recent Flowsheet Documentation  Taken 1/5/2022 1221 by Molly Summers, RN  Pain Management Interventions:   medication (see MAR)   repositioned  Taken 1/5/2022 0816 by Molly Summers, RN  Pain Management Interventions: repositioned  RLE dressing done this AM, encouraged po intake, especially supplements and protein, Oxycodone 5 mg given x 1 for 6/10 RLE pain, with some relief noted, up in chair x 2 hours, incontinent of urine, purewick in place, bed and chair alarms on

## 2022-01-05 NOTE — PLAN OF CARE
Problem: Adult Inpatient Plan of Care  Goal: Optimal Comfort and Wellbeing  Outcome: Improving   [id number] Pt. Pleasant and cooperative, c/o pain to RLE, PRN oxy given with relief, dressing changed to RLE tolerated well, ate well for dinner, will cont to monitor.

## 2022-01-05 NOTE — PROGRESS NOTES
Care Management Follow Up    Length of Stay (days): 16    Expected Discharge Date: 01/05/2022     Concerns to be Addressed: discharge planning       Patient plan of care discussed at interdisciplinary rounds: Yes    Anticipated Discharge Disposition: Transitional Care     Anticipated Discharge Services: Other (see comment) (therapy services )    Anticipated Discharge DME: None    Patient/family educated on Medicare website which has current facility and service quality ratings: yes    Education Provided on the Discharge Plan: yes     Patient/Family in Agreement with the Plan: yes    Referrals Placed by CM/SW: Post Acute Facilities    Private pay costs discussed: Not applicable    Additional Information: SW reviewed pt s chart, and noted that plan changed and CM now pursuing TCU for pt.  Apparently group home unable to take pt back at this time?  Referrals were sent and need follow up calls.  SONAM noted that PAS needs to be completed right away, in case pt needs an OBRA Level II assessment from the [facility].  SONAM spoke with CM Manager Gauri RODRIGUEZ  Discussed that SONAM will send referral to \[facility]\"" at [place] (sent).  Transportation TBD.       SHANTI Yo, ANGELA 01/05/22 10:16 AM

## 2022-01-06 LAB
ANION GAP SERPL CALCULATED.3IONS-SCNC: 4 MMOL/L (ref 5–18)
BUN SERPL-MCNC: 26 MG/DL (ref 8–28)
CALCIUM SERPL-MCNC: 8.7 MG/DL (ref 8.5–10.5)
CHLORIDE BLD-SCNC: 104 MMOL/L (ref 98–107)
CO2 SERPL-SCNC: 35 MMOL/L (ref 22–31)
CREAT SERPL-MCNC: 0.7 MG/DL (ref 0.6–1.1)
GFR SERPL CREATININE-BSD FRML MDRD: >90 ML/MIN/1.73M2
GLUCOSE BLD-MCNC: 91 MG/DL (ref 70–125)
HGB BLD-MCNC: 9.6 G/DL (ref 11.7–15.7)
MAGNESIUM SERPL-MCNC: 2 MG/DL (ref 1.8–2.6)
POTASSIUM BLD-SCNC: 3.9 MMOL/L (ref 3.5–5)
SODIUM SERPL-SCNC: 143 MMOL/L (ref 136–145)

## 2022-01-06 PROCEDURE — 120N000001 HC R&B MED SURG/OB

## 2022-01-06 PROCEDURE — 83735 ASSAY OF MAGNESIUM: CPT | Performed by: INTERNAL MEDICINE

## 2022-01-06 PROCEDURE — 250N000013 HC RX MED GY IP 250 OP 250 PS 637: Performed by: SURGERY

## 2022-01-06 PROCEDURE — 80048 BASIC METABOLIC PNL TOTAL CA: CPT | Performed by: INTERNAL MEDICINE

## 2022-01-06 PROCEDURE — 36415 COLL VENOUS BLD VENIPUNCTURE: CPT | Performed by: INTERNAL MEDICINE

## 2022-01-06 PROCEDURE — 85018 HEMOGLOBIN: CPT | Performed by: INTERNAL MEDICINE

## 2022-01-06 PROCEDURE — 250N000013 HC RX MED GY IP 250 OP 250 PS 637: Performed by: INTERNAL MEDICINE

## 2022-01-06 PROCEDURE — 250N000011 HC RX IP 250 OP 636: Performed by: INTERNAL MEDICINE

## 2022-01-06 PROCEDURE — 99232 SBSQ HOSP IP/OBS MODERATE 35: CPT | Performed by: INTERNAL MEDICINE

## 2022-01-06 RX ADMIN — TRAZODONE HYDROCHLORIDE 150 MG: 100 TABLET ORAL at 21:02

## 2022-01-06 RX ADMIN — HEPARIN SODIUM 5000 UNITS: 10000 INJECTION, SOLUTION INTRAVENOUS; SUBCUTANEOUS at 20:58

## 2022-01-06 RX ADMIN — CARBIDOPA AND LEVODOPA 2 TABLET: 25; 100 TABLET ORAL at 14:06

## 2022-01-06 RX ADMIN — ASPIRIN 81 MG: 81 TABLET, COATED ORAL at 08:45

## 2022-01-06 RX ADMIN — Medication 500 MG: at 08:44

## 2022-01-06 RX ADMIN — MIDODRINE HYDROCHLORIDE 10 MG: 5 TABLET ORAL at 08:44

## 2022-01-06 RX ADMIN — HEPARIN SODIUM 5000 UNITS: 10000 INJECTION, SOLUTION INTRAVENOUS; SUBCUTANEOUS at 08:46

## 2022-01-06 RX ADMIN — OXYCODONE HYDROCHLORIDE 5 MG: 5 TABLET ORAL at 00:41

## 2022-01-06 RX ADMIN — MIDODRINE HYDROCHLORIDE 10 MG: 5 TABLET ORAL at 12:30

## 2022-01-06 RX ADMIN — BUPROPION HYDROCHLORIDE 300 MG: 300 TABLET, EXTENDED RELEASE ORAL at 08:45

## 2022-01-06 RX ADMIN — CARBIDOPA AND LEVODOPA 2 TABLET: 25; 100 TABLET ORAL at 08:44

## 2022-01-06 RX ADMIN — ACETAMINOPHEN 975 MG: 325 TABLET ORAL at 21:03

## 2022-01-06 RX ADMIN — ACETAMINOPHEN 975 MG: 325 TABLET ORAL at 08:46

## 2022-01-06 RX ADMIN — OXYCODONE HYDROCHLORIDE 5 MG: 5 TABLET ORAL at 17:27

## 2022-01-06 RX ADMIN — ATORVASTATIN CALCIUM 40 MG: 40 TABLET, FILM COATED ORAL at 21:03

## 2022-01-06 RX ADMIN — OXYCODONE HYDROCHLORIDE 5 MG: 5 TABLET ORAL at 11:20

## 2022-01-06 RX ADMIN — METOPROLOL SUCCINATE 12.5 MG: 25 TABLET, EXTENDED RELEASE ORAL at 08:45

## 2022-01-06 RX ADMIN — MAGNESIUM OXIDE TAB 400 MG (241.3 MG ELEMENTAL MG) 400 MG: 400 (241.3 MG) TAB at 08:46

## 2022-01-06 RX ADMIN — THERA TABS 1 TABLET: TAB at 08:44

## 2022-01-06 RX ADMIN — FERROUS SULFATE TAB 325 MG (65 MG ELEMENTAL FE) 325 MG: 325 (65 FE) TAB at 08:44

## 2022-01-06 RX ADMIN — PRAMIPEXOLE DIHYDROCHLORIDE 0.5 MG: 0.5 TABLET ORAL at 21:03

## 2022-01-06 RX ADMIN — VENLAFAXINE HYDROCHLORIDE 37.5 MG: 37.5 CAPSULE, EXTENDED RELEASE ORAL at 08:45

## 2022-01-06 RX ADMIN — MIDODRINE HYDROCHLORIDE 10 MG: 5 TABLET ORAL at 17:27

## 2022-01-06 RX ADMIN — CARBIDOPA AND LEVODOPA 2 TABLET: 25; 100 TABLET ORAL at 21:03

## 2022-01-06 RX ADMIN — DOCUSATE SODIUM 50 MG AND SENNOSIDES 8.6 MG 1 TABLET: 8.6; 5 TABLET, FILM COATED ORAL at 08:46

## 2022-01-06 RX ADMIN — VENLAFAXINE HYDROCHLORIDE 300 MG: 150 CAPSULE, EXTENDED RELEASE ORAL at 08:45

## 2022-01-06 RX ADMIN — MIRTAZAPINE 15 MG: 15 TABLET, FILM COATED ORAL at 21:03

## 2022-01-06 RX ADMIN — OXYCODONE HYDROCHLORIDE 5 MG: 5 TABLET ORAL at 23:56

## 2022-01-06 ASSESSMENT — ACTIVITIES OF DAILY LIVING (ADL)
ADLS_ACUITY_SCORE: 23
ADLS_ACUITY_SCORE: 27
ADLS_ACUITY_SCORE: 27
ADLS_ACUITY_SCORE: 23
ADLS_ACUITY_SCORE: 27
ADLS_ACUITY_SCORE: 23

## 2022-01-06 NOTE — PROGRESS NOTES
[facility]    Medicine Progress Note - Hospitalist Service       Date of Admission:  12/20/2021    A/P         Alison Bashir is a 71 year old old female group home resident with past medical history of parkinsonism, CAD, cognitive decline(but has been her own decision maker), HTN, [place], hx of TBI who had a fall during transitioning 3 days prior to presenting to ED for evaluation of severe right leg pain, found to have hematoma and admitted for further management     1.Tension hematoma of right lateral leg s/p evacuation on 12/20  -Accident due to mechanical fall  -Postoperative right leg wound and pain  -Patient hit her leg on door at the longterm 3 days prior to admission.  -CTA abdomen/pelvis bilateral leg runoff reported large subcutaneous hematoma on lateral superior aspect of right calf, no contrast extravasation.  -On 12/21, underwent evacuation of hematoma by Dr. Subramanian.  -On 12/28, status post right leg wound debridement  -Completed 5 days course of IV Zosyn.  -Continue local wound care per surgery and wound care nurse(needs bid dressing changes--this is a must for TCU). She currently is requiring bid dressing changes  -Follow up with Dr. Subramanian one week after discharge, 1/3 surgery signed off     2.Acute postoperative pain;  --Continue scheduled Tylenol, as needed oxycodone.  Monitor for sedation  --Appreciated pain team input.     3.Acute blood loss anemia due to above;  --Patient received total of 3 units pRBC, last on 12/30  -today 's hgb 9.6     4.Chronic intermittent hypotension; improved  --Increased home midodrine from 7.5 mg 3 times daily to 10 mg 3 times daily on 12/25.  Monitor vitals closely     5.History of CAD;  --Currently no anginal symptoms  --Continue home statin, baby aspirin, Toprol XL  --Monitor blood pressure.      6.History of Parkinson;  --resumed home meds. PT/OT     7.H/o TBI  -lives in      8.History of anxiety and depression;  --lives in  but makes own  decisions   --PTA remeron, venlafaxine, trazodone, Wellbutrin  --(please note sibling  recently)     9.RLS; Mirapex      10.Constipation  -had stool   -on miralax  -on senna        Diet: Regular Diet Adult  Snacks/Supplements Adult: Ensure Enlive; With Meals    DVT Prophylaxis: Heparin SQ  Molina Catheter: Not present  Central Lines: None  Code Status: No CPR- Do NOT Intubate      Disposition Plan   Expected Discharge: 2022     Anticipated discharge location: other (see comments) (TCU)    Delays:     Placement - TCU            The patient's care was discussed with the Care Coordinator/ and Patient. I called sister Chelo to update at 1356--left message    Sharyn Marrufo MD  Hospitalist Service  [facility]  Securely message with the Vocera Web Console (learn more here)  Text page via [employer] Paging/Directory        Clinically Significant Risk Factors Present on Admission                    ______________________________________________________________________    Interval History   She has no new complaints  Still waiting to hear which TCU  Leg feels about same, not pain   Eating ok  Having bm's      Data reviewed today: I reviewed all medications, new labs and imaging results over the last 24 hours. I personally reviewed no images or EKG's today.    Physical Exam   Vital Signs: Temp: 97.9  F (36.6  C) Temp src: Oral BP: 111/70 Pulse: 73   Resp: 20 SpO2: 92 % O2 Device: None (Room air)    Weight: 200 lbs 9.6 oz  Constitutional: awake, fatigued, alert, cooperative and no apparent distress  Eyes: sclera clear  Respiratory: No increased work of breathing, good air exchange, clear to auscultation bilaterally, no crackles or wheezing  Cardiovascular: Normal apical impulse, regular rate and rhythm, normal S1 and S2, no S3 or S4, and no murmur noted  GI: normal bowel sounds, soft, non-distended and non-tender  Skin: large dressing on right lower leg, not take  down  Musculoskeletal: no lower extremity pitting edema present  Neurologic: Mental Status Exam:  Level of Alertness:   awake  Neuropsychiatric: General: normal, calm, normal eye contact and quiet affect, slow to answer(baseline)    Data   Recent Labs   Lab 01/06/22  0536 01/05/22  0530 01/04/22  0519   WBC  --   --  5.1   HGB 9.6* 9.3* 9.3*   MCV  --   --  96   PLT  --   --  328    140 143   POTASSIUM 3.9 4.3 4.1   CHLORIDE 104 102 104   CO2 35* 32* 35*   BUN 26 23 20   CR 0.70 0.67 0.67   ANIONGAP 4* 6 4*   MARY 8.7 8.6 8.4*   GLC 91 85 79     No results found for this or any previous visit (from the past 24 hour(s)).

## 2022-01-06 NOTE — PLAN OF CARE
Problem: Adult Inpatient Plan of Care  Goal: Optimal Comfort and Wellbeing  Outcome: Improving  Goal: Readiness for Transition of Care  Outcome: Improving     Problem: Risk for Delirium  Goal: Improved Sleep  Outcome: Improving     Problem: Bleeding (Surgery Nonspecified)  Goal: Absence of Bleeding  Outcome: Improving     Problem: Infection (Surgery Nonspecified)  Goal: Absence of Infection Signs and Symptoms  Outcome: Improving     Problem: Postoperative Nausea and Vomiting (Surgery Nonspecified)  Goal: Nausea and Vomiting Relief  Outcome: Improving     Problem: Impaired Wound Healing  Goal: Optimal Wound Healing  Outcome: Improving   Pt in bed this evening,PRN oxycodone given once for leg pain and effective..Dressing change done and pt tolerated.Awaiting placement.

## 2022-01-06 NOTE — PLAN OF CARE
Problem: Adult Inpatient Plan of Care  Goal: Optimal Comfort and Wellbeing  1/6/2022 1445 by Molly Summers, RN  Outcome: Improving  1/6/2022 1439 by Molly Summers, RN  Outcome: Improving  Oxycodone 5 mg given x 1 for RLE pain and buttocks pain, Con't to c/o fatigue, bed alarm on

## 2022-01-06 NOTE — PLAN OF CARE
Problem: Adult Inpatient Plan of Care  Goal: Optimal Comfort and Wellbeing  Outcome: Improving  Goal: Readiness for Transition of Care  Outcome: Improving     Problem: Risk for Delirium  Goal: Improved Sleep  Outcome: Improving     Problem: Bleeding (Surgery Nonspecified)  Goal: Absence of Bleeding  Outcome: Improving     Problem: Infection (Surgery Nonspecified)  Goal: Absence of Infection Signs and Symptoms  Outcome: Improving     Problem: Postoperative Nausea and Vomiting (Surgery Nonspecified)  Goal: Nausea and Vomiting Relief  Outcome: Improving     Problem: Impaired Wound Healing  Goal: Optimal Wound Healing  Outcome: Improving  Intervention: Promote Wound Healing  Recent Flowsheet Documentation  Taken 1/6/2022 0041 by Afia Santos, RN  Pain Management Interventions:   medication (see MAR)   emotional support   pillow support provided   repositioned  Activity Management: activity adjusted per tolerance    Uneventful shift. Pt pain controlled with prn oxycodone. Pt t/r every 2-3 hours. Pt drinking well. No additional c/o. Will monitor.

## 2022-01-07 ENCOUNTER — APPOINTMENT (OUTPATIENT)
Dept: PHYSICAL THERAPY | Facility: HOSPITAL | Age: 72
DRG: 580 | End: 2022-01-07
Payer: MEDICARE

## 2022-01-07 VITALS
OXYGEN SATURATION: 98 % | BODY MASS INDEX: 28.08 KG/M2 | HEIGHT: 71 IN | RESPIRATION RATE: 20 BRPM | WEIGHT: 200.6 LBS | TEMPERATURE: 97.9 F | SYSTOLIC BLOOD PRESSURE: 115 MMHG | HEART RATE: 72 BPM | DIASTOLIC BLOOD PRESSURE: 59 MMHG

## 2022-01-07 LAB
ANION GAP SERPL CALCULATED.3IONS-SCNC: 7 MMOL/L (ref 5–18)
BUN SERPL-MCNC: 27 MG/DL (ref 8–28)
CALCIUM SERPL-MCNC: 8.6 MG/DL (ref 8.5–10.5)
CHLORIDE BLD-SCNC: 103 MMOL/L (ref 98–107)
CO2 SERPL-SCNC: 33 MMOL/L (ref 22–31)
CREAT SERPL-MCNC: 0.69 MG/DL (ref 0.6–1.1)
GFR SERPL CREATININE-BSD FRML MDRD: >90 ML/MIN/1.73M2
GLUCOSE BLD-MCNC: 85 MG/DL (ref 70–125)
HGB BLD-MCNC: 9.6 G/DL (ref 11.7–15.7)
MAGNESIUM SERPL-MCNC: 2 MG/DL (ref 1.8–2.6)
POTASSIUM BLD-SCNC: 4.5 MMOL/L (ref 3.5–5)
SODIUM SERPL-SCNC: 143 MMOL/L (ref 136–145)

## 2022-01-07 PROCEDURE — 85018 HEMOGLOBIN: CPT | Performed by: INTERNAL MEDICINE

## 2022-01-07 PROCEDURE — 250N000013 HC RX MED GY IP 250 OP 250 PS 637: Performed by: INTERNAL MEDICINE

## 2022-01-07 PROCEDURE — 250N000013 HC RX MED GY IP 250 OP 250 PS 637: Performed by: SURGERY

## 2022-01-07 PROCEDURE — G0463 HOSPITAL OUTPT CLINIC VISIT: HCPCS

## 2022-01-07 PROCEDURE — 97110 THERAPEUTIC EXERCISES: CPT | Mod: GP

## 2022-01-07 PROCEDURE — 250N000011 HC RX IP 250 OP 636: Performed by: INTERNAL MEDICINE

## 2022-01-07 PROCEDURE — 99239 HOSP IP/OBS DSCHRG MGMT >30: CPT | Performed by: FAMILY MEDICINE

## 2022-01-07 PROCEDURE — 80048 BASIC METABOLIC PNL TOTAL CA: CPT | Performed by: INTERNAL MEDICINE

## 2022-01-07 PROCEDURE — 99207 PR CDG-CORRECTLY CODED, REVIEWED AND AGREE: CPT | Performed by: FAMILY MEDICINE

## 2022-01-07 PROCEDURE — 97530 THERAPEUTIC ACTIVITIES: CPT | Mod: GP

## 2022-01-07 PROCEDURE — 83735 ASSAY OF MAGNESIUM: CPT | Performed by: INTERNAL MEDICINE

## 2022-01-07 PROCEDURE — 36415 COLL VENOUS BLD VENIPUNCTURE: CPT | Performed by: INTERNAL MEDICINE

## 2022-01-07 RX ORDER — ACETAMINOPHEN 325 MG/1
975 TABLET ORAL 2 TIMES DAILY
DISCHARGE
Start: 2022-01-07 | End: 2022-01-12

## 2022-01-07 RX ORDER — OXYCODONE HYDROCHLORIDE 5 MG/1
2.5-5 TABLET ORAL EVERY 6 HOURS PRN
Refills: 0 | Status: SHIPPED | DISCHARGE
Start: 2022-01-07 | End: 2022-01-15

## 2022-01-07 RX ORDER — AMOXICILLIN 250 MG
1 CAPSULE ORAL 2 TIMES DAILY
DISCHARGE
Start: 2022-01-07

## 2022-01-07 RX ORDER — FERROUS SULFATE 325(65) MG
325 TABLET ORAL DAILY
DISCHARGE
Start: 2022-01-08

## 2022-01-07 RX ORDER — ASCORBIC ACID 500 MG
500 TABLET ORAL DAILY
DISCHARGE
Start: 2022-01-08

## 2022-01-07 RX ORDER — MIDODRINE HYDROCHLORIDE 10 MG/1
10 TABLET ORAL
DISCHARGE
Start: 2022-01-07

## 2022-01-07 RX ORDER — ACETAMINOPHEN 325 MG/1
650 TABLET ORAL EVERY 4 HOURS PRN
DISCHARGE
Start: 2022-01-07

## 2022-01-07 RX ADMIN — DOCUSATE SODIUM 50 MG AND SENNOSIDES 8.6 MG 1 TABLET: 8.6; 5 TABLET, FILM COATED ORAL at 09:11

## 2022-01-07 RX ADMIN — CARBIDOPA AND LEVODOPA 2 TABLET: 25; 100 TABLET ORAL at 09:11

## 2022-01-07 RX ADMIN — FERROUS SULFATE TAB 325 MG (65 MG ELEMENTAL FE) 325 MG: 325 (65 FE) TAB at 09:10

## 2022-01-07 RX ADMIN — OXYCODONE HYDROCHLORIDE 5 MG: 5 TABLET ORAL at 06:13

## 2022-01-07 RX ADMIN — POLYETHYLENE GLYCOL 3350 17 G: 17 POWDER, FOR SOLUTION ORAL at 09:12

## 2022-01-07 RX ADMIN — ACETAMINOPHEN 650 MG: 325 TABLET ORAL at 03:24

## 2022-01-07 RX ADMIN — OXYCODONE HYDROCHLORIDE 5 MG: 5 TABLET ORAL at 12:06

## 2022-01-07 RX ADMIN — FUROSEMIDE 20 MG: 20 TABLET ORAL at 09:11

## 2022-01-07 RX ADMIN — METOPROLOL SUCCINATE 12.5 MG: 25 TABLET, EXTENDED RELEASE ORAL at 09:11

## 2022-01-07 RX ADMIN — MAGNESIUM OXIDE TAB 400 MG (241.3 MG ELEMENTAL MG) 400 MG: 400 (241.3 MG) TAB at 09:10

## 2022-01-07 RX ADMIN — VENLAFAXINE HYDROCHLORIDE 300 MG: 150 CAPSULE, EXTENDED RELEASE ORAL at 09:09

## 2022-01-07 RX ADMIN — CARBIDOPA AND LEVODOPA 2 TABLET: 25; 100 TABLET ORAL at 13:20

## 2022-01-07 RX ADMIN — THERA TABS 1 TABLET: TAB at 09:10

## 2022-01-07 RX ADMIN — HEPARIN SODIUM 5000 UNITS: 10000 INJECTION, SOLUTION INTRAVENOUS; SUBCUTANEOUS at 09:08

## 2022-01-07 RX ADMIN — Medication 500 MG: at 09:10

## 2022-01-07 RX ADMIN — ASPIRIN 81 MG: 81 TABLET, COATED ORAL at 09:09

## 2022-01-07 RX ADMIN — VENLAFAXINE HYDROCHLORIDE 37.5 MG: 37.5 CAPSULE, EXTENDED RELEASE ORAL at 09:11

## 2022-01-07 RX ADMIN — MIDODRINE HYDROCHLORIDE 10 MG: 5 TABLET ORAL at 12:06

## 2022-01-07 RX ADMIN — MIDODRINE HYDROCHLORIDE 10 MG: 5 TABLET ORAL at 09:09

## 2022-01-07 RX ADMIN — BUPROPION HYDROCHLORIDE 300 MG: 300 TABLET, EXTENDED RELEASE ORAL at 09:10

## 2022-01-07 RX ADMIN — ACETAMINOPHEN 975 MG: 325 TABLET ORAL at 09:10

## 2022-01-07 ASSESSMENT — ACTIVITIES OF DAILY LIVING (ADL)
ADLS_ACUITY_SCORE: 23
ADLS_ACUITY_SCORE: 27
ADLS_ACUITY_SCORE: 23
ADLS_ACUITY_SCORE: 27

## 2022-01-07 NOTE — PROGRESS NOTES
Care Management Follow Up    Length of Stay (days): 17    Expected Discharge Date: 01/07/2022     Concerns to be Addressed: discharge planning       Patient plan of care discussed at interdisciplinary rounds: Yes    Anticipated Discharge Disposition: Transitional Care     Anticipated Discharge Services: Other (see comment) (therapy services )    Anticipated Discharge DME: None    Patient/family educated on Medicare website which has current facility and service quality ratings: yes    Education Provided on the Discharge Plan:  Yes    Patient/Family in Agreement with the Plan: yes    Referrals Placed by CM/SW: Post Acute Facilities    Private pay costs discussed: Not applicable    Additional Information:  Message from Nas at [facility] - pt accepted - call him at [phone].  SW left message back.     Message from Marina at [facility] in [place] - pt declined due to extensive wound care and staffing shortage.      SONAM spoke with Alexa,  of [facility] where pt is from, regarding pt history and diagnoses in order to complete PAS on pt.  SW completed PAS and it did trigger an OBRA Level II assessment.  SONAM spoke with Chelsi at [facility] to confirm that PAS was received.  They received it but have not contacted the [facility] yet about it.      SONAM then spoke with Allyssa Cesar at [facility] regarding completed PAS triggering OBRA Level II assessment for pt and that we have an accepting facility.  She has not received anything from [facility] yet.  Please fax her the PAS and H & P for pt, along with SONAM contact information.  She will then forward the information to the pre petition screener who will be doing OBRA Level II assessment for mental health with pt.  SONAM faxed requested documentation to Allyssa.    SONAM in receipt of message from assigned OBRA Level II  Larry Gillis from [facility].  He needs to know name of potential TCU placement for pt.  SONAM called him back and left him a message  with the name of [facility].        SHANTI Yo, ROSEYSW 01/06/22 6:57 PM
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
## 2022-01-07 NOTE — DISCHARGE INSTRUCTIONS
WOC DISCHARGE INSTRUCTIONS:  Right lateral leg:   Gently cleanse and pat dry   Cover with xeroform, dry gauze, wrap with kerlix and secure

## 2022-01-07 NOTE — PLAN OF CARE
Problem: Adult Inpatient Plan of Care  Goal: Optimal Comfort and Wellbeing  Outcome: Improving  Goal: Readiness for Transition of Care  Outcome: Improving     Problem: Risk for Delirium  Goal: Improved Sleep  Outcome: Improving     Problem: Bleeding (Surgery Nonspecified)  Goal: Absence of Bleeding  Outcome: Improving     Problem: Infection (Surgery Nonspecified)  Goal: Absence of Infection Signs and Symptoms  Outcome: Improving     Problem: Postoperative Nausea and Vomiting (Surgery Nonspecified)  Goal: Nausea and Vomiting Relief  Outcome: Improving     Problem: Impaired Wound Healing  Goal: Optimal Wound Healing  Outcome: Improving     Problem: Pain Acute  Goal: Acceptable Pain Control and Functional Ability  Outcome: Improving   Pt is alert and oriented,PRN oxycodone given and effective.Pt declined to get out of bed this evening.Dressing done and pt tolerated.

## 2022-01-07 NOTE — PLAN OF CARE
Problem: Adult Inpatient Plan of Care  Goal: Optimal Comfort and Wellbeing  Outcome: Adequate for Discharge   Patient discharged to [facility] at [id number]-tammy to chair with all belongings including several rings bracelets case of pop tablet  and clothing. Patient wound care done by WOC this am. Wound was pink and clean. Hearing aides in sterile cup.

## 2022-01-07 NOTE — DISCHARGE SUMMARY
[facility] MEDICINE  DISCHARGE SUMMARY  [facility]     Primary Care Physician: [facility], [facility]  Admission Date: 12/20/2021   Discharge Provider: Binta Trinidad DO Discharge Date: 1/7/2022   Diet: regular + supplements   Code Status: No CPR- Do NOT Intubate   Activity: as tolerated         Condition at Discharge: stable      REASON FOR PRESENTATION(See Admission Note for Details)   71-year-old female with TBI, Parkinson's, mood disorder presented with an acute tension hematoma requiring evacuation and wound debridement.  Currently awaiting TCU placement.       PRINCIPAL & ACTIVE DISCHARGE DIAGNOSES     Principal Problem:    Leg hematoma, right, initial encounter  Active Problems:    Accident due to mechanical fall without injury, initial encounter    Acute blood loss anemia    Anxiety disorder, unspecified    Coronary atherosclerosis    Orthostatic hypotension    Parkinsonism, unspecified Parkinsonism type (H)    Restless legs syndrome    Traumatic brain injury (H)    Hematoma of skin    Pain of right lower leg    Knee injury, right, initial encounter    Iron deficiency      SIGNIFICANT FINDINGS (Imaging, labs):     IMPRESSION: No change in the right hip arthroplasty with longstem proximal femoral component and numerous cerclage wire fixation hardware about the proximal right femur. Lateral claw plate projects in unchanged position along the base of the right   greater trochanter. Chronic healed deformity proximal right femur with heterotopic bone along the superolateral right hip, similar to prior. Right total knee arthroplasty. No acute displaced periprosthetic fracture identified. Diffuse bone   demineralization. No sizable knee joint effusion.    IMPRESSION: Anatomic alignment left tibia and fibula. No acute displaced tibia or fibula fracture. Partial visualization of left knee arthroplasty. Distal left leg and bimalleolar ankle soft tissue swelling. Degenerative  change in the left ankle and   visualized foot. Heel spur.    IMPRESSION: Left total knee arthroplasty with patellar resurfacing. No acute displaced periprosthetic fracture or convincing finding for component failure. No sizable left knee joint effusion. Diffuse bone demineralization. Medial left knee soft tissue   Swelling.    IMPRESSION: Right total knee arthroplasty redemonstrated. No acute displaced periprosthetic fracture is identified. No sizable right knee joint effusion. Diffuse bone demineralization. The distal tip of a right hip arthroplasty is noted with   indolent-appearing periosteal new bone along the posterior adjacent femoral shaft.    IMPRESSION: Large soft tissue density along the anterolateral right leg extends craniocaudal approximately 23 cm and could represent the reported large hematoma in the lateral right leg. Anatomic alignment right tibia and fibula. No acute displaced right   tibia or fibula fracture identified. Diffuse bone demineralization. Partial visualization of right total knee arthroplasty. Mild-moderate tibiotalar osteoarthritis. Generalized right leg soft tissue swelling.    CONCLUSION:  1.  Large subcutaneous hematoma the lateral superior aspect of the right calf. No contrast extravasation.  2.  Right leg: No inflow or femoropopliteal stenosis. Venous contamination at the calf station. Is considered and appears to be three-vessel runoff.  3.  Left leg: CTA is within normal limits.  4.  Enlarged uterus with gas within the endometrium. There is a nonspecific finding. Please correlate clinically to exclude infection.  5.  Bilateral nonobstructing renal calculi.  6.  Osteoarthritic changes visualized spine. Right total hip arthroplasty. Bilateral knee arthroplasties.    IMPRESSION:     1.  Air within the endometrial canal as described on recent abdomen/pelvis CT.   2.  Debris noted in the urinary bladder.  3.  Adnexa obscured by bowel gas. Ovaries not identified.    PENDING LABS      [unfilled]      PROCEDURES ( this hospitalization only)      Procedure(s):  SHARP EXCISIONAL DEBRIDEMENT OF RIGHT  LOWER EXTREMITY WOUND    RECOMMENDATIONS TO OUTPATIENT PROVIDER FOR F/U VISIT      Follow up with Dr Subramanian of general surgery within 1 week    DISPOSITION     Skilled Nursing Facility    SUMMARY OF HOSPITAL COURSE:      Tension hematoma right lower extremity  Patient had a fall at her group home on 12/17/2021 that resulted in a large lateral hematoma of the right leg.  CT abdomen and pelvis at time of admission with a large subcutaneous hematoma without extravasation.  Patient did ultimately undergo evacuation of hematoma with general surgery on 12/21/2021 as well as wound debridement on 12/28/2021.  Patient completed 5 days of IV Zosyn and continues to need ongoing wound cares.  Patient should continue with twice daily wound dressings, scheduled Tylenol, as needed oxycodone.  Follow-up to be arranged with Dr. Subramanian of general surgery 1 week after discharge.     Acute blood loss anemia, pancytopenia (resolved  Early on during hospitalization patient with leukopenia, anemia, thrombocytopenia.  Both platelet and WBC counts normalized prior to discharge.  Patient received total 3 units PRBC during hospital stay.  Hemoglobin has been stable above 9.     Chronic hypotension  Midodrine 10 mg p.o. 3 times daily     CAD  Chest pain-free.  Continue home ASA 81 mg p.o. daily, Atorvastatin 40 mg p.o. daily, Metoprolol XL 12.5 mg p.o. daily     Parkinson's  Continue home Sinemet     Traumatic brain injury  Currently resides at group [place]     Restless leg syndrome  Pramipexole 0.5 mg p.o. q. bedtime     Constipation  Daily MiraLAX, senna docusate     Mood disorder  Continue Bupropion 300 mg p.o. daily, Mirtazapine 15 mg p.o. q. Bedtime, Venlafaxine 300 mg p.o. daily, Trazodone 150 mg p.o. q. bedtime    Discharge Medications with Med changes:        Review of your medicines      START taking      Dose /  Directions   ferrous sulfate 325 (65 Fe) MG tablet  Commonly known as: FEROSUL      Dose: 325 mg  Start taking on: January 8, 2022  Take 1 tablet (325 mg) by mouth daily  Refills: 0     oxyCODONE 5 MG tablet  Commonly known as: ROXICODONE      Dose: 2.5-5 mg  Take 0.5-1 tablets (2.5-5 mg) by mouth every 6 hours as needed for moderate to severe pain or severe pain  Refills: 0     senna-docusate 8.6-50 MG tablet  Commonly known as: SENOKOT-S/PERICOLACE      Dose: 1 tablet  Take 1 tablet by mouth 2 times daily  Refills: 0     vitamin C 500 MG tablet  Commonly known as: ASCORBIC ACID      Dose: 500 mg  Start taking on: January 8, 2022  Take 1 tablet (500 mg) by mouth daily  Refills: 0        CONTINUE these medicines which may have CHANGED, or have new prescriptions. If we are uncertain of the size of tablets/capsules you have at home, strength may be listed as something that might have changed.      Dose / Directions   * acetaminophen 325 MG tablet  Commonly known as: TYLENOL  This may have changed:     how much to take    when to take this    reasons to take this      Dose: 975 mg  Take 3 tablets (975 mg) by mouth 2 times daily  Refills: 0     * acetaminophen 325 MG tablet  Commonly known as: TYLENOL  This may have changed: You were already taking a medication with the same name, and this prescription was added. Make sure you understand how and when to take each.      Dose: 650 mg  Take 2 tablets (650 mg) by mouth every 4 hours as needed for other (For optimal non-opioid multimodal pain management to improve pain control.)  Refills: 0     midodrine 10 MG tablet  Commonly known as: PROAMATINE  This may have changed:     medication strength    how much to take    when to take this    additional instructions      Dose: 10 mg  Take 1 tablet (10 mg) by mouth 3 times daily (with meals) Hold for SBP >100 or DBP > 60  Refills: 0     pramipexole 0.5 MG tablet  Commonly known as: MIRAPEX  Indication: Parkinson's Disease  This  may have changed: when to take this  Used for: Generalized anxiety disorder      Dose: 0.5 mg  Take 1 tablet (0.5 mg) by mouth daily  Quantity: 31 tablet  Refills: 3         * This list has 2 medication(s) that are the same as other medications prescribed for you. Read the directions carefully, and ask your doctor or other care provider to review them with you.            CONTINUE these medicines which have NOT CHANGED      Dose / Directions   ASPIRIN PO  Indication: CORONARY ARTERY DISEASE      Dose: 81 mg  Take 81 mg by mouth daily  Refills: 0     atorvastatin 40 MG tablet  Commonly known as: LIPITOR  Indication: High Amount of Fats in the Blood      Dose: 40 mg  Take 40 mg by mouth every evening  Refills: 0     buPROPion 300 MG 24 hr tablet  Commonly known as: WELLBUTRIN XL  Indication: Major Depressive Disorder      Dose: 300 mg  Take 300 mg by mouth daily  Refills: 0     carbidopa-levodopa  MG tablet  Commonly known as: SINEMET  Indication: Parkinson's Disease  Used for: Paralysis agitans (H)      Dose: 2 tablet  Take 2 tablets by mouth 3 times daily  Quantity: 186 tablet  Refills: 5     Cranberry 500 MG Caps      Dose: 500 mg  Take 500 mg by mouth 2 times daily  Refills: 0     cyanocobalamin 100 MCG tablet  Commonly known as: VITAMIN B-12  Indication: Inadequate Vitamin B12      Dose: 100 mcg  Take 100 mcg by mouth daily  Refills: 0     furosemide 20 MG tablet  Commonly known as: LASIX  Indication: Edema      Dose: 20 mg  Take 20 mg by mouth every other day  Refills: 0     LORazepam 0.5 MG tablet  Commonly known as: ATIVAN  Indication: Feeling Anxious      Dose: 0.5 mg  Take 0.5 mg by mouth 2 times daily as needed  Refills: 0     magnesium oxide 400 MG tablet  Commonly known as: MAG-OX  Indication: muscle cramps      Dose: 400 mg  Take 400 mg by mouth 2 times daily  Refills: 0     melatonin 5 MG Caps      Dose: 5 mg  Take 5 mg by mouth At Bedtime  Refills: 0     metoprolol succinate ER 25 MG 24 hr  tablet  Commonly known as: TOPROL-XL  Indication: CAD      Dose: 12.5 mg  Take 12.5 mg by mouth daily  Refills: 0     mirtazapine 15 MG tablet  Commonly known as: REMERON  Indication: Major Depressive Disorder  Used for: MDD (major depressive disorder), recurrent episode, moderate (H)      TAKE 1 TABLET BY MOUTH AT BEDTIME  Quantity: 31 tablet  Refills: 3     MULTIVITAMIN PO  Indication: vitmian deficiency prevention      Take by mouth daily  Refills: 0     nitroGLYcerin 0.4 MG sublingual tablet  Commonly known as: NITROSTAT  Indication: Acute Angina Pectoris      Dose: 0.4 mg  Place 0.4 mg under the tongue every 5 minutes as needed  Refills: 0     nystatin Powd  Indication: cutaneous candidiasis      daily Once a day apply topically to stomach fold  Refills: 0     polyethylene glycol 17 g packet  Commonly known as: MIRALAX  Indication: Constipation      Dose: 1 packet  Take 1 packet by mouth daily  Refills: 0     TRAZODONE HCL PO  Indication: Trouble Sleeping, Major Depressive Disorder      Dose: 150 mg  Take 150 mg by mouth At Bedtime  Refills: 0     * venlafaxine 37.5 MG 24 hr capsule  Commonly known as: EFFEXOR-XR  Indication: Major Depressive Disorder  Used for: MDD (major depressive disorder), recurrent episode, moderate (H)      TAKE 1 CAPSULE BY MOUTH ONCE DAILY (ALONG WITH 300MG FOR TOTAL DOSE = 337.5MG)  Quantity: 31 capsule  Refills: 3     * venlafaxine 150 MG 24 hr capsule  Commonly known as: EFFEXOR-XR  Indication: Major Depressive Disorder  Used for: MDD (major depressive disorder), recurrent episode, moderate (H)      Dose: 300 mg  Take 2 capsules (300 mg) by mouth daily  Quantity: 60 capsule  Refills: 3     VESICARE PO  Indication: Overactive Bladder      Dose: 10 mg  Take 10 mg by mouth daily  Refills: 0     VITAMIN D3 PO  Indication: vitamin d deficiency      Dose: 4,000 Units  Take 4,000 Units by mouth daily  Refills: 0         * This list has 2 medication(s) that are the same as other medications  prescribed for you. Read the directions carefully, and ask your doctor or other care provider to review them with you.            STOP taking    bacitracin 500 UNIT/GM external ointment              Where to get your medicines      These medications were sent to [facility], [address] - [place], [place] - [address] [address]  [address] [facility] [address]    Phone: [phone]     venlafaxine 150 MG 24 hr capsule     Information about where to get these medications is not yet available    Ask your nurse or doctor about these medications    oxyCODONE 5 MG tablet             Rationale for medication changes:            Consults   General surgery      Immunizations given this encounter     [unfilled]       Anticoagulation Information      Recent INR results: No results for input(s): INR in the last 168 hours.      Discharge Orders     Discharge Procedure Orders   Wound Care Referral   Standing Status: Future   Referral Priority: Routine: Next available opening Referral Type: Consultation   Requested Specialty: Wound Care   Number of Visits Requested: 1     Wound care (specify)   Order Comments: Site:  right lower extremity  Instructions:  Please cover wound bed with xeroform dressing and cover with ABD.  Please wrap kurlex from the toes up to the knee. Gently wrap ace bandage from toes to knee.     Please change dressings daily or change as needed.     Johan Subramanian DO  General Surgeon  [facility]  Surgery Clinic - [address]  [place] [address]?  Office: [phone]  Employed by - [employer]  Pager: [phone]     General info for SNF   Order Comments: Length of Stay Estimate: Short Term Care: Estimated # of Days <30  Condition at Discharge: Stable  Level of care:skilled   Rehabilitation Potential: Fair  Admission H&P remains valid and up-to-date: Yes  Recent Chemotherapy: N/A  Use Nursing Home Standing Orders: Yes  "    Mantoux instructions   Order Comments: Give two-step Mantoux (PPD) Per Facility Policy Yes     Follow Up and recommended labs and tests   Order Comments: Follow up with Nursing home physician.  No follow up labs or test are needed.     Reason for your hospital stay   Order Comments: Patient presented with traumatic hematoma of lower extremity that required surgical evacuation with general surgery     Activity - Up with assistive device     Order Specific Question Answer Comments   Is discharge order? Yes      Activity - Up with nursing assistance     Order Specific Question Answer Comments   Is discharge order? Yes      No CPR- Do NOT Intubate     Order Specific Question Answer Comments   Code status determined by: Discussion with patient/ legal decision maker      Physical Therapy Adult Consult   Order Comments: Evaluate and treat as clinically indicated.    Reason:  parkisons, acute leg wound     Occupational Therapy Adult Consult   Order Comments: Evaluate and treat as clinically indicated.    Reason:  parkinson's, acute leg wound     Fall precautions     Diet   Order Comments: Follow this diet upon discharge: Orders Placed This Encounter      Snacks/Supplements Adult: Ensure Enlive; With Meals      Regular Diet Adult     Order Specific Question Answer Comments   Is discharge order? Yes      Examination     Vital signs:  Temp: 97.9  F (36.6  C) Temp src: Oral BP: 115/59 Pulse: 72   Resp: 20 SpO2: 98 % O2 Device: None (Room air) Oxygen Delivery: 1 LPM Height: 180.3 cm (5' 11\") Weight: 91 kg (200 lb 9.6 oz)  Estimated body mass index is 27.98 kg/m  as calculated from the following:    Height as of this encounter: 1.803 m (5' 11\").    Weight as of this encounter: 91 kg (200 lb 9.6 oz).         Physical Examination:   General appearance - alert, well appearing, and in no distress and oriented to person, place, and time  Mental status - alert, oriented to person, place, and time, normal mood, behavior, speaks " slowly, flat facial features, dress, motor activity, and thought processes  HEENT - sclera anicteric, left eye normal, right eye normal, nares normal and patent, no erythema  Lymphatics - no palpable lymphadenopathy, no hepatosplenomegaly  Respiratory - no tachypnea, retractions or cyanosis  Cardiac - normal rate, regular rhythm, normal S1, S2, no murmurs, rubs, clicks or gallops, no JVD  Abdomen - soft, nontender, nondistended, no masses or organomegaly no rebound tenderness noted bowel sounds normal, no bladder distension noted  Neurological - alert, oriented, normal speech, no focal findings or movement disorder noted  Musculoskeletal - right lower  Extremities - peripheral pulses normal, no pedal edema, no clubbing or cyanosis  Skin - normal coloration and turgor, no rashes, no suspicious skin lesions noted      Please see EMR for more detailed significant labs, imaging, consultant notes etc.  Total time spent on discharge: 50 minutes    DO Jt Mcintosh [facility] Service: Ph:[phone]    CC:Clinic, [facility]

## 2022-01-07 NOTE — PLAN OF CARE
Problem: Adult Inpatient Plan of Care  Goal: Optimal Comfort and Wellbeing  Outcome: Improving  Goal: Readiness for Transition of Care  Outcome: Improving     Problem: Risk for Delirium  Goal: Improved Sleep  Outcome: Improving     Problem: Bleeding (Surgery Nonspecified)  Goal: Absence of Bleeding  Outcome: Improving     Problem: Infection (Surgery Nonspecified)  Goal: Absence of Infection Signs and Symptoms  Outcome: Improving     Problem: Postoperative Nausea and Vomiting (Surgery Nonspecified)  Goal: Nausea and Vomiting Relief  Outcome: Improving     Problem: Impaired Wound Healing  Goal: Optimal Wound Healing  Outcome: Improving  Intervention: Promote Wound Healing  Recent Flowsheet Documentation  Taken 1/7/2022 0000 by Afia Santos, RN  Activity Management: activity adjusted per tolerance  Taken 1/6/2022 2356 by Afia Santos, RN  Pain Management Interventions:   medication (see MAR)   emotional support     Problem: Pain Acute  Goal: Acceptable Pain Control and Functional Ability  Outcome: Improving  Intervention: Develop Pain Management Plan  Recent Flowsheet Documentation  Taken 1/6/2022 2356 by Afia Santos, RN  Pain Management Interventions:   medication (see MAR)   emotional support     Pt alert with VSS. Pt pain controlled with prn oxycodone x2. Pt turned and repositioned every 2-3 hours. Will monitor.

## 2022-01-07 NOTE — PLAN OF CARE
Physical Therapy Discharge Summary    Reason for therapy discharge:    Discharged to transitional care facility.    Progress towards therapy goal(s). See goals on Care Plan in [facility] electronic health record for goal details.  Goals partially met.  Barriers to achieving goals:   discharge from facility.    Therapy recommendation(s):    Recommend cont'd PT at [facility]

## 2022-01-08 DIAGNOSIS — Z71.89 OTHER SPECIFIED COUNSELING: ICD-10-CM

## 2022-01-10 ENCOUNTER — PATIENT OUTREACH (OUTPATIENT)
Dept: CARE COORDINATION | Facility: CLINIC | Age: 72
End: 2022-01-10
Payer: MEDICARE

## 2022-01-10 NOTE — PROGRESS NOTES
Clinic Care Coordination Contact      Patient identified for care management outreach, however patient is not on a value based contract so cannot complete outreach. Will escalate to clinic staff if specific needs or resources are indicated.    MAYELA Higgins/MARCELL Care Coordination Supervisor   [facility] - Care Coordination   1/10/2022 9:10 AM  [phone]

## 2022-01-12 ENCOUNTER — VIRTUAL VISIT (OUTPATIENT)
Dept: BEHAVIORAL HEALTH | Facility: CLINIC | Age: 72
End: 2022-01-12
Attending: PSYCHIATRY & NEUROLOGY
Payer: MEDICARE

## 2022-01-12 DIAGNOSIS — F33.1 MDD (MAJOR DEPRESSIVE DISORDER), RECURRENT EPISODE, MODERATE (H): ICD-10-CM

## 2022-01-12 PROCEDURE — 99443 PR PHYSICIAN TELEPHONE EVALUATION 21-30 MIN: CPT | Mod: 95 | Performed by: PSYCHIATRY & NEUROLOGY

## 2022-01-12 RX ORDER — TRAZODONE HYDROCHLORIDE 150 MG/1
150 TABLET ORAL AT BEDTIME
Qty: 30 TABLET | Refills: 3 | Status: SHIPPED | OUTPATIENT
Start: 2022-01-12 | End: 2022-05-18

## 2022-01-12 RX ORDER — LORAZEPAM 0.5 MG/1
0.5 TABLET ORAL 2 TIMES DAILY PRN
Qty: 30 TABLET | Refills: 1 | Status: SHIPPED | OUTPATIENT
Start: 2022-01-12 | End: 2022-05-18

## 2022-01-12 RX ORDER — VENLAFAXINE HYDROCHLORIDE 150 MG/1
300 CAPSULE, EXTENDED RELEASE ORAL DAILY
Qty: 60 CAPSULE | Refills: 3 | Status: SHIPPED | OUTPATIENT
Start: 2022-01-12 | End: 2022-05-18

## 2022-01-12 NOTE — PROGRESS NOTES
This video/telephone visit will be conducted via a call between you and your physician/provider. We have found that certain health care needs can be provided without the need for an in-person physical exam. This service lets us provide the care you need with a video /telephone conversation. If a prescription is necessary we can send it directly to your pharmacy. If lab work is needed we can place an order for that and you can then stop by our lab to have the test done at a later time.    Just as we bill insurance for in-person visits, we also bill insurance for video/telephone visits. If you have questions about your insurance coverage, we recommend that you speak with your insurance company.    Patient has given verbal consent for video/Telephone visit? Yes with group home staff Mark Eli    Patient would like telephone visit, please connect : [phone]  Reason for visit: Medication visit  Patient verified allergies, medications and pharmacy via telephone.     Patient states she  is ready for visit.  [place]  to be reviewed by provider.   Medication refill is pended for review and approval by provider.    Najma Kay CMA January 12, 2022 1:55 PM

## 2022-01-12 NOTE — PROGRESS NOTES
Outpatient Followup Psychiatric Evaluation      Pertinent History: Patient presents today for the purposes of medication management.  She has a history of a mood disorder and also with prior psychotic symptoms.  Per the patient's request we have been tapering the Zyprexa and that was discontinued in 2017.    She was restarted on the Remeron earlier in 2018.  I saw the patient in the summer 2018 and did not make any medication changes.  In September 2018 we did increase the patient's Remeron to 45 mg at night.    I saw the patient in January 2019.  At that time she was continuing to struggle with isolation and hoarding behaviors and was extremely anxious.  She was having some bedwetting issues that have been falling more seeming perhaps to be overmedicated.  There had not been any improvement with the increase in Remeron.  At that visit we did decrease the Effexor and the Remeron.    I saw the patient in the spring 2019 and at that time she was doing fairly well.  We elected to try a medication taper and we decreased her Effexor XR to 225 mg a day and decrease the Remeron down to 30 mg at night.    I saw the patient in December 2019.  She was having increased depression with some worsening behavioral issues.  She had been refusing day programming and there was an increase in her SIBs.  She was more defiant and eating less.  She had a decline in her hygiene.  We did increase both the Remeron and Effexor at that time.     I saw the patient in February 2020. At that time she was more flattered depressed in the context of staff turnover. We did add some low-dose Wellbutrin at that time. Over the course of the summer the patient has had multiple falls at her living place. The coronavirus is led to much more isolation. The patient is reportedly more depressed according to staff much of this apparently has been related to frequent hospitalizations for falls and other medical issues.    I saw the patient for a medication  check via a phone conference on September 28 of 2020.  2 staff member attended that interview and reported the patient seemed more withdrawn and depressed over the few weeks prior.  She had had multiple stressors including some medical hospitalizations as well as the stress of isolation due to the coronavirus.  She was less motivated and less interactive.  She felt that she was perhaps having a bit more difficulty with balance.  She was not dizzy but stated she was having a harder time negotiating furniture walking.  She appeared to be more tired and flat.  We did decrease her trazodone to 75 mg at night at that interview.    I saw the patient for a virtual visit December 21 of 2020.  She was a vague historian but felt she was doing a bit better.  It was unclear whether her unsteadiness was a bit better but she had no significant depression or anxiety.  She was sleeping well.  The staff reported she still had periods of irritability and depression.  They felt she was still unsteady.  The team was looking at other placement options for her.  She had been in rolled in a Parkinson's movement physical therapy class.  At that visit we did decrease her Remeron to 30 mg at at bedtime.    I saw the patient for return visit on February 8, 2021.  She was extremely vague but she minimized concerns or complaints.  The staff however reported the patient was more depressed and frustrated.  She had been declining physical therapy which has been offered to her.  She was flat slow and disinterested.  I did increase her Effexor XR to 337.5 mg a day and we decreased her Remeron at that visit.  The patient had a fall on March 1 of 2021 and apparently was seen in the emergency room with a laceration on her head.  Apparently she had no sequela from that.    I interviewed the patient in May 2021.  She was again quite vague but reported no significant changes.  She was continuing somewhat depressed and was occasionally hearing a choir in  the background.  I did speak with the patient's staff member as well.  We did increase her Effexor XR to 337.5 mg a day.    The patient was seen for a follow-up appointment in August 2021.  She was doing relatively well at that time and was tolerating the medication.  We did not make any medication changes.      Current Symptoms:   On my interview with the patient and staff today both reported that the patient seemed to be doing fairly well from a mood and behavior standpoint.  She did have a fall in the bathroom previously where she had been helped to the floor by staff and did not hit her head but she is now in transitional care.  She apparently developed a blood clot after that and is being monitored by the medical team.  The patient has been in a relatively good mood.  There has been no evidence of any suicidality.  No psychosis.  No trina.  She has been tolerating the medication regime according to the team.  The team does feel that the patient has had slow cognitive decline.  No marked changes recently but she does need more time to do things and needs a bit more assistance.  The staff will continue to monitor that.  They have not noted any obvious side effects to the medication.  The fall was apparently not related to dizziness or obvious side effects to the medication.  There is been no other new medical issues or concerns.  She seems to be tolerating the medication well.  No change in sleep or appetite.  The staff would like to continue with the current treatment plan and the patient agrees.      Current Medications: Please see chart    Medication Compliance: Yes    Side Effects to Medications: No      Vitals:  Wt Readings from Last 3 Encounters:   03/01/21 155 lb (70.3 kg)   09/30/20 168 lb (76.2 kg)   09/25/20 168 lb 12.8 oz (76.6 kg)     Temp Readings from Last 3 Encounters:   03/01/21 97.9  F (36.6  C) (Oral)   10/02/20 98.4  F (36.9  C) (Oral)   09/25/20 98.5  F (36.9  C)     BP Readings from Last 3  Encounters:   03/01/21 126/73   10/02/20 126/59   09/25/20 128/76     Pulse Readings from Last 3 Encounters:   03/01/21 76   10/02/20 73   09/25/20 (!) 57         Mental Status Exam:   Appearance: Patient and staff were interviewed  Behavior: No behavioral dyscontrol.  No agitation or irritability.  The patient does need a bit more time to complete tasks and participate.  Mood/Affect: She continues flat on the exam but the staff does not believe there is any worsening depression.  No evidence of any trina.  Thought content: No apparent hallucinations or delusions.  Suicidal or Homicidal Thoughts: Patient denies.  The staff do not report any suicidality.  Thought Process/Formulation: No obvious change.  The patient continues to need prompts and redirection.  Answers were consistent and appropriate.  Limited initiation but tracking relatively well.  Associations: Fair, better participation today.  Fund of Knowledge: Needing prompts.  Limited participation.  Continues impaired.  No obvious significant changes but better participation.  Attention/Concentration: Somewhat slow and concrete but better participation.  No racing thoughts.  Not disorganized.  Still fairly concrete.  Insight: The patient does need more time to do things and seems to need a bit more redirection this has been a chronic slow decline.  Judgement:  Limited effort.  Continues fair.  Memory:  Limited participation. Slow.  Better effort and participation today.  Motor Status: No new tremors or asymmetries.  Orientation: No reports of any recent change.  No apparent change on exam.  Limited effort at this time.      Diagnosis managed and treated at today's visit :      Major depressive disorder   History of psychosis NOS    Plan:  Medication Adjustment:  I will make no changes at this time.    Other: Patient will return to clinic in 3-4 months for further assessment and treatment the staff and patient agreed to return sooner or call if questions  concerns or problems arise.  She will continue to follow with her medical team.    Continue with the support of the clinic, reassurance, and redirection. Staff monitoring and ongoing assessments per team plan. Current psychotropic medication appears to represent the minimum effective dosage and appears medically necessary. We will continue to monitor and reassess. This team will utilize appropriate emergency services if necessary. I will make myself available if concerns or problems arise.     I saw the patient for 22 minutes which included patient and staff interview, chart review and documentation.      Norberto Johnson MD

## 2022-01-12 NOTE — PATIENT INSTRUCTIONS
I will make no psychotropic medication changes at this time.  The patient will return to this clinic for medication check in 3 to 4 months.  She should continue with her medical care team and follow-up with them as well.

## 2022-01-12 NOTE — PROGRESS NOTES
Medications Phoned  to Pharmacy [] yes [x]no  Name of Pharmacist:  List Medications, including dose, quantity and instructions     Medications ordered this visit were e-scribed.  Verified by order class [x] yes  [] no   Trazdone 150mg, venlafaxine 150mg, lorazepam 0.5mg    Medication changes or discontinuations were communicated to patient's pharmacy: [] yes  [x] no     Dictation completed at time of chart check: [] yes  [x] no     I have checked the documentation for today s encounters and the above information has been reviewed and completed.        Najma Kay, [facility] January 12, 2022 3:23 PM

## 2022-01-26 DIAGNOSIS — F33.1 MDD (MAJOR DEPRESSIVE DISORDER), RECURRENT EPISODE, MODERATE (H): Primary | ICD-10-CM

## 2022-01-27 RX ORDER — BUPROPION HYDROCHLORIDE 300 MG/1
TABLET ORAL
Qty: 31 TABLET | Refills: 11 | Status: SHIPPED | OUTPATIENT
Start: 2022-01-27

## 2022-01-27 NOTE — TELEPHONE ENCOUNTER
Date of Last Office Visit: 1/12/22  Date of Next Office Visit: 5/18/22  No shows since last visit: 0  Cancellations since last visit: 0    Medication requested: buPROPion (WELLBUTRIN XL) 300 MG 24 hr tablet Date last ordered: 4/2/20 Qty: UNKNOWN Refills: UNKNOWN     Review of [place] ?: NA      Lapse in medication adherence greater than 5 days?: UNKNOWN  If yes, call patient and gather details: Staff mentioned that [facility] will be calling and doing their monthly orders so they will need this script  Medication refill request verified as identical to current order?: UNKNOWN  Result of Last DAM, VPA, Li+ Level, CBC, or Carbamazepine Level (at or since last visit): N/A    Last visit treatment plan: Medication Adjustment:  I will make no changes at this time.     Other: Patient will return to clinic in 3-4 months for further assessment and treatment the staff and patient agreed to return sooner or call if questions concerns or problems arise.  She will continue to follow with her medical team.       []Medication refilled per  Medication Refill in Ambulatory Care  policy.  [x]Medication unable to be refilled by RN due to criteria not met as indicated below:    []Eligibility - not seen in the last year   []Supervision - no future appointment   []Compliance - no shows, cancellations or lapse in therapy   []Verification - order discrepancy   []Controlled medication   [x]Medication not included in policy   [x]90-day supply request   []Other

## 2022-02-14 ENCOUNTER — LAB REQUISITION (OUTPATIENT)
Dept: LAB | Facility: CLINIC | Age: 72
End: 2022-02-14

## 2022-02-14 DIAGNOSIS — U07.1 COVID-19: ICD-10-CM

## 2022-02-14 PROCEDURE — U0005 INFEC AGEN DETEC AMPLI PROBE: HCPCS | Performed by: EMERGENCY MEDICINE

## 2022-02-15 ENCOUNTER — LAB REQUISITION (OUTPATIENT)
Dept: LAB | Facility: CLINIC | Age: 72
End: 2022-02-15
Payer: MEDICARE

## 2022-02-15 DIAGNOSIS — Z01.818 ENCOUNTER FOR OTHER PREPROCEDURAL EXAMINATION: ICD-10-CM

## 2022-02-15 LAB — SARS-COV-2 RNA RESP QL NAA+PROBE: NEGATIVE

## 2022-02-16 LAB
ANION GAP SERPL CALCULATED.3IONS-SCNC: 5 MMOL/L (ref 5–18)
BUN SERPL-MCNC: 31 MG/DL (ref 8–28)
CALCIUM SERPL-MCNC: 8.8 MG/DL (ref 8.5–10.5)
CHLORIDE BLD-SCNC: 103 MMOL/L (ref 98–107)
CO2 SERPL-SCNC: 34 MMOL/L (ref 22–31)
CREAT SERPL-MCNC: 0.77 MG/DL (ref 0.6–1.1)
ERYTHROCYTE [DISTWIDTH] IN BLOOD BY AUTOMATED COUNT: 13.6 % (ref 10–15)
GFR SERPL CREATININE-BSD FRML MDRD: 82 ML/MIN/1.73M2
GLUCOSE BLD-MCNC: 73 MG/DL (ref 70–125)
HCT VFR BLD AUTO: 34.9 % (ref 35–47)
HGB BLD-MCNC: 10.8 G/DL (ref 11.7–15.7)
MCH RBC QN AUTO: 27.8 PG (ref 26.5–33)
MCHC RBC AUTO-ENTMCNC: 30.9 G/DL (ref 31.5–36.5)
MCV RBC AUTO: 90 FL (ref 78–100)
PLATELET # BLD AUTO: 160 10E3/UL (ref 150–450)
POTASSIUM BLD-SCNC: 4.4 MMOL/L (ref 3.5–5)
RBC # BLD AUTO: 3.88 10E6/UL (ref 3.8–5.2)
SODIUM SERPL-SCNC: 142 MMOL/L (ref 136–145)
WBC # BLD AUTO: 4.1 10E3/UL (ref 4–11)

## 2022-02-16 PROCEDURE — 82310 ASSAY OF CALCIUM: CPT | Performed by: NURSE PRACTITIONER

## 2022-02-16 PROCEDURE — 85027 COMPLETE CBC AUTOMATED: CPT | Performed by: NURSE PRACTITIONER

## 2022-02-16 PROCEDURE — 36415 COLL VENOUS BLD VENIPUNCTURE: CPT | Performed by: NURSE PRACTITIONER

## 2022-02-16 PROCEDURE — P9603 ONE-WAY ALLOW PRORATED MILES: HCPCS | Performed by: NURSE PRACTITIONER

## 2022-04-21 ENCOUNTER — LAB REQUISITION (OUTPATIENT)
Dept: LAB | Facility: CLINIC | Age: 72
End: 2022-04-21
Payer: MEDICARE

## 2022-04-21 DIAGNOSIS — Z01.810 ENCOUNTER FOR PREPROCEDURAL CARDIOVASCULAR EXAMINATION: ICD-10-CM

## 2022-04-22 LAB
ANION GAP SERPL CALCULATED.3IONS-SCNC: 6 MMOL/L (ref 5–18)
BASOPHILS # BLD AUTO: 0 10E3/UL (ref 0–0.2)
BASOPHILS NFR BLD AUTO: 0 %
BUN SERPL-MCNC: 24 MG/DL (ref 8–28)
CALCIUM SERPL-MCNC: 8.7 MG/DL (ref 8.5–10.5)
CHLORIDE BLD-SCNC: 106 MMOL/L (ref 98–107)
CO2 SERPL-SCNC: 34 MMOL/L (ref 22–31)
CREAT SERPL-MCNC: 0.69 MG/DL (ref 0.6–1.1)
EOSINOPHIL # BLD AUTO: 0.2 10E3/UL (ref 0–0.7)
EOSINOPHIL NFR BLD AUTO: 4 %
ERYTHROCYTE [DISTWIDTH] IN BLOOD BY AUTOMATED COUNT: 14 % (ref 10–15)
GFR SERPL CREATININE-BSD FRML MDRD: >90 ML/MIN/1.73M2
GLUCOSE BLD-MCNC: 95 MG/DL (ref 70–125)
HCT VFR BLD AUTO: 36.8 % (ref 35–47)
HGB BLD-MCNC: 11 G/DL (ref 11.7–15.7)
IMM GRANULOCYTES # BLD: 0 10E3/UL
IMM GRANULOCYTES NFR BLD: 0 %
LYMPHOCYTES # BLD AUTO: 1.6 10E3/UL (ref 0.8–5.3)
LYMPHOCYTES NFR BLD AUTO: 31 %
MCH RBC QN AUTO: 27.3 PG (ref 26.5–33)
MCHC RBC AUTO-ENTMCNC: 29.9 G/DL (ref 31.5–36.5)
MCV RBC AUTO: 91 FL (ref 78–100)
MONOCYTES # BLD AUTO: 0.3 10E3/UL (ref 0–1.3)
MONOCYTES NFR BLD AUTO: 6 %
NEUTROPHILS # BLD AUTO: 3 10E3/UL (ref 1.6–8.3)
NEUTROPHILS NFR BLD AUTO: 59 %
NRBC # BLD AUTO: 0 10E3/UL
NRBC BLD AUTO-RTO: 0 /100
PLATELET # BLD AUTO: 144 10E3/UL (ref 150–450)
POTASSIUM BLD-SCNC: 3.8 MMOL/L (ref 3.5–5)
RBC # BLD AUTO: 4.03 10E6/UL (ref 3.8–5.2)
SODIUM SERPL-SCNC: 146 MMOL/L (ref 136–145)
WBC # BLD AUTO: 5.1 10E3/UL (ref 4–11)

## 2022-04-22 PROCEDURE — P9603 ONE-WAY ALLOW PRORATED MILES: HCPCS | Mod: ORL | Performed by: NURSE PRACTITIONER

## 2022-04-22 PROCEDURE — 80048 BASIC METABOLIC PNL TOTAL CA: CPT | Mod: ORL | Performed by: NURSE PRACTITIONER

## 2022-04-22 PROCEDURE — 85025 COMPLETE CBC W/AUTO DIFF WBC: CPT | Mod: ORL | Performed by: NURSE PRACTITIONER

## 2022-04-22 PROCEDURE — 36415 COLL VENOUS BLD VENIPUNCTURE: CPT | Mod: ORL | Performed by: NURSE PRACTITIONER

## 2022-04-25 PROCEDURE — U0005 INFEC AGEN DETEC AMPLI PROBE: HCPCS | Mod: ORL | Performed by: EMERGENCY MEDICINE

## 2022-04-26 ENCOUNTER — LAB REQUISITION (OUTPATIENT)
Dept: LAB | Facility: CLINIC | Age: 72
End: 2022-04-26
Payer: MEDICARE

## 2022-04-26 DIAGNOSIS — Z11.52 ENCOUNTER FOR SCREENING FOR COVID-19: ICD-10-CM

## 2022-04-27 LAB — SARS-COV-2 RNA RESP QL NAA+PROBE: NEGATIVE

## 2022-04-28 ENCOUNTER — LAB REQUISITION (OUTPATIENT)
Dept: LAB | Facility: CLINIC | Age: 72
End: 2022-04-28
Payer: MEDICARE

## 2022-04-28 DIAGNOSIS — Z01.818 ENCOUNTER FOR OTHER PREPROCEDURAL EXAMINATION: ICD-10-CM

## 2022-04-29 LAB
ANION GAP SERPL CALCULATED.3IONS-SCNC: 7 MMOL/L (ref 5–18)
BASOPHILS # BLD AUTO: 0 10E3/UL (ref 0–0.2)
BASOPHILS NFR BLD AUTO: 0 %
BUN SERPL-MCNC: 17 MG/DL (ref 8–28)
CALCIUM SERPL-MCNC: 8.6 MG/DL (ref 8.5–10.5)
CHLORIDE BLD-SCNC: 103 MMOL/L (ref 98–107)
CO2 SERPL-SCNC: 34 MMOL/L (ref 22–31)
CREAT SERPL-MCNC: 0.68 MG/DL (ref 0.6–1.1)
EOSINOPHIL # BLD AUTO: 0.1 10E3/UL (ref 0–0.7)
EOSINOPHIL NFR BLD AUTO: 4 %
ERYTHROCYTE [DISTWIDTH] IN BLOOD BY AUTOMATED COUNT: 13.9 % (ref 10–15)
GFR SERPL CREATININE-BSD FRML MDRD: >90 ML/MIN/1.73M2
GLUCOSE BLD-MCNC: 84 MG/DL (ref 70–125)
HCT VFR BLD AUTO: 36.8 % (ref 35–47)
HGB BLD-MCNC: 10.9 G/DL (ref 11.7–15.7)
IMM GRANULOCYTES # BLD: 0 10E3/UL
IMM GRANULOCYTES NFR BLD: 0 %
LYMPHOCYTES # BLD AUTO: 1.1 10E3/UL (ref 0.8–5.3)
LYMPHOCYTES NFR BLD AUTO: 35 %
MCH RBC QN AUTO: 27 PG (ref 26.5–33)
MCHC RBC AUTO-ENTMCNC: 29.6 G/DL (ref 31.5–36.5)
MCV RBC AUTO: 91 FL (ref 78–100)
MONOCYTES # BLD AUTO: 0.2 10E3/UL (ref 0–1.3)
MONOCYTES NFR BLD AUTO: 6 %
NEUTROPHILS # BLD AUTO: 1.8 10E3/UL (ref 1.6–8.3)
NEUTROPHILS NFR BLD AUTO: 55 %
NRBC # BLD AUTO: 0 10E3/UL
NRBC BLD AUTO-RTO: 0 /100
PLATELET # BLD AUTO: 143 10E3/UL (ref 150–450)
POTASSIUM BLD-SCNC: 4.3 MMOL/L (ref 3.5–5)
RBC # BLD AUTO: 4.04 10E6/UL (ref 3.8–5.2)
SODIUM SERPL-SCNC: 144 MMOL/L (ref 136–145)
WBC # BLD AUTO: 3.2 10E3/UL (ref 4–11)

## 2022-04-29 PROCEDURE — 85025 COMPLETE CBC W/AUTO DIFF WBC: CPT | Mod: ORL | Performed by: NURSE PRACTITIONER

## 2022-04-29 PROCEDURE — 80048 BASIC METABOLIC PNL TOTAL CA: CPT | Mod: ORL | Performed by: NURSE PRACTITIONER

## 2022-04-29 PROCEDURE — 36415 COLL VENOUS BLD VENIPUNCTURE: CPT | Mod: ORL | Performed by: NURSE PRACTITIONER

## 2022-04-29 PROCEDURE — P9604 ONE-WAY ALLOW PRORATED TRIP: HCPCS | Mod: ORL | Performed by: NURSE PRACTITIONER

## 2022-05-18 ENCOUNTER — VIRTUAL VISIT (OUTPATIENT)
Dept: BEHAVIORAL HEALTH | Facility: CLINIC | Age: 72
End: 2022-05-18
Attending: PSYCHIATRY & NEUROLOGY
Payer: MEDICARE

## 2022-05-18 DIAGNOSIS — F33.1 MDD (MAJOR DEPRESSIVE DISORDER), RECURRENT EPISODE, MODERATE (H): ICD-10-CM

## 2022-05-18 DIAGNOSIS — F41.1 GENERALIZED ANXIETY DISORDER: ICD-10-CM

## 2022-05-18 PROCEDURE — 99443 PR PHYSICIAN TELEPHONE EVALUATION 21-30 MIN: CPT | Mod: 95 | Performed by: PSYCHIATRY & NEUROLOGY

## 2022-05-18 RX ORDER — MIRTAZAPINE 15 MG/1
15 TABLET, FILM COATED ORAL AT BEDTIME
Qty: 31 TABLET | Refills: 3 | Status: SHIPPED | OUTPATIENT
Start: 2022-05-18

## 2022-05-18 RX ORDER — LORAZEPAM 0.5 MG/1
0.5 TABLET ORAL 2 TIMES DAILY PRN
Qty: 30 TABLET | Refills: 1 | Status: SHIPPED | OUTPATIENT
Start: 2022-05-18

## 2022-05-18 RX ORDER — VENLAFAXINE HYDROCHLORIDE 150 MG/1
300 CAPSULE, EXTENDED RELEASE ORAL DAILY
Qty: 60 CAPSULE | Refills: 3 | Status: SHIPPED | OUTPATIENT
Start: 2022-05-18

## 2022-05-18 RX ORDER — PRAMIPEXOLE DIHYDROCHLORIDE 0.5 MG/1
0.5 TABLET ORAL AT BEDTIME
Qty: 30 TABLET | Refills: 3 | Status: SHIPPED | OUTPATIENT
Start: 2022-05-18

## 2022-05-18 RX ORDER — TRAZODONE HYDROCHLORIDE 150 MG/1
150 TABLET ORAL AT BEDTIME
Qty: 30 TABLET | Refills: 3 | Status: SHIPPED | OUTPATIENT
Start: 2022-05-18

## 2022-05-18 RX ORDER — VENLAFAXINE HYDROCHLORIDE 37.5 MG/1
CAPSULE, EXTENDED RELEASE ORAL
Qty: 31 CAPSULE | Refills: 3 | Status: SHIPPED | OUTPATIENT
Start: 2022-05-18

## 2022-05-18 NOTE — PATIENT INSTRUCTIONS
The patient is doing relatively well from a mood and behavior standpoint and tolerating the medication.  I will make no changes at this time and she should return to this clinic in 3 to 4 months for medication check.  The staff agreed to call before then with any questions or concerns.

## 2022-05-18 NOTE — NURSING NOTE
This video/telephone visit will be conducted via a call between you and your physician/provider. We have found that certain health care needs can be provided without the need for an in-person physical exam. This service lets us provide the care you need with a video /telephone conversation. If a prescription is necessary we can send it directly to your pharmacy. If lab work is needed we can place an order for that and you can then stop by our lab to have the test done at a later time.    Just as we bill insurance for in-person visits, we also bill insurance for video/telephone visits. If you have questions about your insurance coverage, we recommend that you speak with your insurance company.    Patient has given verbal consent for video/Telephone visit? Yes      Patient would like telephone visit, please connect : [phone]  Reason for visit: follow up  Patient verified allergies, medications and pharmacy via phone.       Patient states she  is ready for visit.  No refills needed  No longer resides at [facility] and has now moved to [place] [phone]  [place]  to be reviewed by provider      Jose Owusu MA May 18, 2022 1:26 PM      Medications Phoned  to Pharmacy [] yes [x]no     Medications ordered this visit were e-scribed.  Verified by order class [x] yes  [] no  List medications sent to pharmacy:   Lorazepam 0.5mg  Mirtazapine 15mg  Mirapex 0.5mg  Trazodone 150mg  Venlafaxine 37.5mg and 150mg       Medication changes or discontinuations were communicated to patient's pharmacy: [] yes  [x] no     Dictation completed at time of chart check: [x] yes  [] no     I have checked the documentation for today s encounters and the above information has been reviewed and completed.        Jose Owusu MA May 18, 2022 2:19 PM

## 2022-05-18 NOTE — PROGRESS NOTES
Outpatient Followup Psychiatric Evaluation      Pertinent History: Patient presents today for the purposes of medication management.  She has a history of a mood disorder and also with prior psychotic symptoms.  Per the patient's request we have been tapering the Zyprexa and that was discontinued in 2017.    She was restarted on the Remeron earlier in 2018.  I saw the patient in the summer 2018 and did not make any medication changes.  In September 2018 we did increase the patient's Remeron to 45 mg at night.    I saw the patient in January 2019.  At that time she was continuing to struggle with isolation and hoarding behaviors and was extremely anxious.  She was having some bedwetting issues that have been falling more seeming perhaps to be overmedicated.  There had not been any improvement with the increase in Remeron.  At that visit we did decrease the Effexor and the Remeron.    I saw the patient in the spring 2019 and at that time she was doing fairly well.  We elected to try a medication taper and we decreased her Effexor XR to 225 mg a day and decrease the Remeron down to 30 mg at night.    I saw the patient in December 2019.  She was having increased depression with some worsening behavioral issues.  She had been refusing day programming and there was an increase in her SIBs.  She was more defiant and eating less.  She had a decline in her hygiene.  We did increase both the Remeron and Effexor at that time.     I saw the patient in February 2020. At that time she was more flattered depressed in the context of staff turnover. We did add some low-dose Wellbutrin at that time. Over the course of the summer the patient has had multiple falls at her living place. The coronavirus is led to much more isolation. The patient is reportedly more depressed according to staff much of this apparently has been related to frequent hospitalizations for falls and other medical issues.    I saw the patient for a medication  check via a phone conference on September 28 of 2020.  2 staff member attended that interview and reported the patient seemed more withdrawn and depressed over the few weeks prior.  She had had multiple stressors including some medical hospitalizations as well as the stress of isolation due to the coronavirus.  She was less motivated and less interactive.  She felt that she was perhaps having a bit more difficulty with balance.  She was not dizzy but stated she was having a harder time negotiating furniture walking.  She appeared to be more tired and flat.  We did decrease her trazodone to 75 mg at night at that interview.    I saw the patient for a virtual visit December 21 of 2020.  She was a vague historian but felt she was doing a bit better.  It was unclear whether her unsteadiness was a bit better but she had no significant depression or anxiety.  She was sleeping well.  The staff reported she still had periods of irritability and depression.  They felt she was still unsteady.  The team was looking at other placement options for her.  She had been in rolled in a Parkinson's movement physical therapy class.  At that visit we did decrease her Remeron to 30 mg at at bedtime.    I saw the patient for return visit on February 8, 2021.  She was extremely vague but she minimized concerns or complaints.  The staff however reported the patient was more depressed and frustrated.  She had been declining physical therapy which has been offered to her.  She was flat slow and disinterested.  I did increase her Effexor XR to 337.5 mg a day and we decreased her Remeron at that visit.  The patient had a fall on March 1 of 2021 and apparently was seen in the emergency room with a laceration on her head.  Apparently she had no sequela from that.    I interviewed the patient in May 2021.  She was again quite vague but reported no significant changes.  She was continuing somewhat depressed and was occasionally hearing a choir in  the background.  I did speak with the patient's staff member as well.  We did increase her Effexor XR to 337.5 mg a day.    The patient was seen for a follow-up appointment in August 2021.  She was doing relatively well at that time and was tolerating the medication.  We did not make any medication changes.    The patient was seen in January 2022.  At that time she was doing fairly well.  She had a fall not related to dizziness or obvious side effects to the medication and did hit her head.  Well her recovery she developed a blood clot but that was stable and being monitored by the medical team.  We did not make any medication changes at that visit.      Current Symptoms:   I had an opportunity to interview the patient as well as talk with staff members in  and [place].  All reported the patient was doing relatively well.  The patient admitted she was kind of depressed because she had a surgery to have the blood clot taken out and now she is dealing with wound cares which are painful and frustrating.  She however denies having any thoughts of harming herself or anybody else.  No hallucinations or delusions.  No trina.  She states her focus and concentration is good.  She states she is being treated well and there are some activities at the home she can participate in.  She reports no change in her medical status otherwise.  No side effects to the medication.  She and the staff would like to continue with the current treatment plan at this time.      Current Medications: Please see chart    Medication Compliance: Yes    Side Effects to Medications: No      Vitals:  Wt Readings from Last 3 Encounters:   03/01/21 155 lb (70.3 kg)   09/30/20 168 lb (76.2 kg)   09/25/20 168 lb 12.8 oz (76.6 kg)     Temp Readings from Last 3 Encounters:   03/01/21 97.9  F (36.6  C) (Oral)   10/02/20 98.4  F (36.9  C) (Oral)   09/25/20 98.5  F (36.9  C)     BP Readings from Last 3 Encounters:   03/01/21 126/73   10/02/20 126/59   09/25/20  128/76     Pulse Readings from Last 3 Encounters:   03/01/21 76   10/02/20 73   09/25/20 (!) 57         Mental Status Exam:   Appearance: Patient and staff were interviewed by phone  Behavior: Polite and pleasant.  No reports of any behavioral difficulties.  Mood/Affect: No trina or agitation.  No lability.  She continues to appear flat and possibly depressed.  Thought content: No apparent hallucinations or delusions.  Suicidal or Homicidal Thoughts: Patient reports no thoughts of suicide or wishing she was dead.  Staff report no suicidal statements.  Thought Process/Formulation: No obvious change.  Able to participate but needing occasional prompts and she was somewhat vague but answers were appropriate.  Limited initiation but tracking relatively well.  Associations: No significant change.  Adequate.  Fund of Knowledge: Needing prompts.  She however is following and participating.  No obvious change.  Attention/Concentration: Dane and slow.  No obvious change.  Able to track and follow.  Insight: Fair, adequate, no recent change.  Judgement:  Limited effort.  Continues fair.  Memory:  Limited participation. Slow.  Better effort and participation today.  Motor Status: No new tremors or asymmetries.  Orientation: No reports of any recent change.  No apparent change on exam.      Diagnosis managed and treated at today's visit :      Major depressive disorder   History of psychosis NOS    Plan:  Medication Adjustment:  We will continue with the current treatment plan.    Other: Patient will return to clinic in 3-4 months for further assessment and treatment the staff and patient agreed to return sooner or call if questions concerns or problems arise.  She will continue to follow with her medical team.    Continue with the support of the clinic, reassurance, and redirection. Staff monitoring and ongoing assessments per team plan. Current psychotropic medication appears to represent the minimum effective dosage and  appears medically necessary. We will continue to monitor and reassess. This team will utilize appropriate emergency services if necessary. I will make myself available if concerns or problems arise.     I saw the patient for 22 minutes which included patient and staff interview, chart review and documentation.      Norberto Johsnon MD

## 2022-10-04 PROBLEM — F02.80 DEMENTIA ASSOCIATED WITH OTHER UNDERLYING DISEASE WITHOUT BEHAVIORAL DISTURBANCE (H): Status: ACTIVE | Noted: 2020-06-14

## 2022-10-28 ENCOUNTER — LAB REQUISITION (OUTPATIENT)
Dept: LAB | Facility: CLINIC | Age: 72
End: 2022-10-28
Payer: MEDICARE

## 2022-10-28 PROCEDURE — 87086 URINE CULTURE/COLONY COUNT: CPT | Mod: ORL | Performed by: NURSE PRACTITIONER

## 2022-10-28 PROCEDURE — 81001 URINALYSIS AUTO W/SCOPE: CPT | Mod: ORL | Performed by: NURSE PRACTITIONER

## 2022-10-29 LAB
ALBUMIN SERPL BCG-MCNC: 3.3 G/DL (ref 3.5–5.2)
ALP SERPL-CCNC: 85 U/L (ref 35–104)
ALT SERPL W P-5'-P-CCNC: <5 U/L (ref 10–35)
ANION GAP SERPL CALCULATED.3IONS-SCNC: 8 MMOL/L (ref 7–15)
AST SERPL W P-5'-P-CCNC: 16 U/L (ref 10–35)
BILIRUB SERPL-MCNC: 0.3 MG/DL
BUN SERPL-MCNC: 30.5 MG/DL (ref 8–23)
CALCIUM SERPL-MCNC: 9.2 MG/DL (ref 8.8–10.2)
CHLORIDE SERPL-SCNC: 105 MMOL/L (ref 98–107)
CREAT SERPL-MCNC: 0.7 MG/DL (ref 0.51–0.95)
DEPRECATED HCO3 PLAS-SCNC: 30 MMOL/L (ref 22–29)
ERYTHROCYTE [DISTWIDTH] IN BLOOD BY AUTOMATED COUNT: 13.5 % (ref 10–15)
GFR SERPL CREATININE-BSD FRML MDRD: >90 ML/MIN/1.73M2
GLUCOSE SERPL-MCNC: 64 MG/DL (ref 70–99)
HCT VFR BLD AUTO: 37.9 % (ref 35–47)
HGB BLD-MCNC: 11.5 G/DL (ref 11.7–15.7)
MCH RBC QN AUTO: 28 PG (ref 26.5–33)
MCHC RBC AUTO-ENTMCNC: 30.3 G/DL (ref 31.5–36.5)
MCV RBC AUTO: 92 FL (ref 78–100)
PLATELET # BLD AUTO: 164 10E3/UL (ref 150–450)
POTASSIUM SERPL-SCNC: 4.6 MMOL/L (ref 3.4–5.3)
PROT SERPL-MCNC: 6.5 G/DL (ref 6.4–8.3)
RBC # BLD AUTO: 4.1 10E6/UL (ref 3.8–5.2)
SODIUM SERPL-SCNC: 143 MMOL/L (ref 136–145)
WBC # BLD AUTO: 5.5 10E3/UL (ref 4–11)

## 2022-10-29 PROCEDURE — 80053 COMPREHEN METABOLIC PANEL: CPT | Mod: ORL | Performed by: NURSE PRACTITIONER

## 2022-10-29 PROCEDURE — 36415 COLL VENOUS BLD VENIPUNCTURE: CPT | Mod: ORL | Performed by: NURSE PRACTITIONER

## 2022-10-29 PROCEDURE — 85027 COMPLETE CBC AUTOMATED: CPT | Mod: ORL | Performed by: NURSE PRACTITIONER

## 2022-11-04 ENCOUNTER — LAB REQUISITION (OUTPATIENT)
Dept: LAB | Facility: CLINIC | Age: 72
End: 2022-11-04
Payer: MEDICARE

## 2022-11-04 DIAGNOSIS — N39.0 URINARY TRACT INFECTION, SITE NOT SPECIFIED: ICD-10-CM

## 2022-11-05 LAB
ALBUMIN SERPL BCG-MCNC: 3.8 G/DL (ref 3.5–5.2)
ALP SERPL-CCNC: 79 U/L (ref 35–104)
ALT SERPL W P-5'-P-CCNC: <5 U/L (ref 10–35)
ANION GAP SERPL CALCULATED.3IONS-SCNC: 15 MMOL/L (ref 7–15)
AST SERPL W P-5'-P-CCNC: 25 U/L (ref 10–35)
BASOPHILS # BLD AUTO: 0 10E3/UL (ref 0–0.2)
BASOPHILS NFR BLD AUTO: 0 %
BILIRUB SERPL-MCNC: 0.4 MG/DL
BUN SERPL-MCNC: 28.5 MG/DL (ref 8–23)
CALCIUM SERPL-MCNC: 9.5 MG/DL (ref 8.8–10.2)
CHLORIDE SERPL-SCNC: 104 MMOL/L (ref 98–107)
CREAT SERPL-MCNC: 0.58 MG/DL (ref 0.51–0.95)
DEPRECATED HCO3 PLAS-SCNC: 27 MMOL/L (ref 22–29)
EOSINOPHIL # BLD AUTO: 0.2 10E3/UL (ref 0–0.7)
EOSINOPHIL NFR BLD AUTO: 3 %
ERYTHROCYTE [DISTWIDTH] IN BLOOD BY AUTOMATED COUNT: 13.6 % (ref 10–15)
GFR SERPL CREATININE-BSD FRML MDRD: >90 ML/MIN/1.73M2
GLUCOSE SERPL-MCNC: 55 MG/DL (ref 70–99)
HCT VFR BLD AUTO: 41.8 % (ref 35–47)
HGB BLD-MCNC: 12.5 G/DL (ref 11.7–15.7)
IMM GRANULOCYTES # BLD: 0 10E3/UL
IMM GRANULOCYTES NFR BLD: 0 %
LYMPHOCYTES # BLD AUTO: 1.6 10E3/UL (ref 0.8–5.3)
LYMPHOCYTES NFR BLD AUTO: 29 %
MCH RBC QN AUTO: 27.7 PG (ref 26.5–33)
MCHC RBC AUTO-ENTMCNC: 29.9 G/DL (ref 31.5–36.5)
MCV RBC AUTO: 93 FL (ref 78–100)
MONOCYTES # BLD AUTO: 0.4 10E3/UL (ref 0–1.3)
MONOCYTES NFR BLD AUTO: 7 %
NEUTROPHILS # BLD AUTO: 3.5 10E3/UL (ref 1.6–8.3)
NEUTROPHILS NFR BLD AUTO: 61 %
NRBC # BLD AUTO: 0 10E3/UL
NRBC BLD AUTO-RTO: 0 /100
PLATELET # BLD AUTO: 204 10E3/UL (ref 150–450)
POTASSIUM SERPL-SCNC: 4.2 MMOL/L (ref 3.4–5.3)
PROCALCITONIN SERPL IA-MCNC: 0.03 NG/ML
PROT SERPL-MCNC: 7.2 G/DL (ref 6.4–8.3)
RBC # BLD AUTO: 4.52 10E6/UL (ref 3.8–5.2)
SODIUM SERPL-SCNC: 146 MMOL/L (ref 136–145)
WBC # BLD AUTO: 5.6 10E3/UL (ref 4–11)

## 2022-11-05 PROCEDURE — 84145 PROCALCITONIN (PCT): CPT | Mod: ORL | Performed by: NURSE PRACTITIONER

## 2022-11-05 PROCEDURE — 85025 COMPLETE CBC W/AUTO DIFF WBC: CPT | Mod: ORL | Performed by: NURSE PRACTITIONER

## 2022-11-05 PROCEDURE — 80053 COMPREHEN METABOLIC PANEL: CPT | Mod: ORL | Performed by: NURSE PRACTITIONER

## 2022-11-05 PROCEDURE — 36415 COLL VENOUS BLD VENIPUNCTURE: CPT | Mod: ORL | Performed by: NURSE PRACTITIONER

## 2022-11-11 ENCOUNTER — LAB REQUISITION (OUTPATIENT)
Dept: LAB | Facility: CLINIC | Age: 72
End: 2022-11-11
Payer: MEDICARE

## 2022-11-11 DIAGNOSIS — N39.0 URINARY TRACT INFECTION, SITE NOT SPECIFIED: ICD-10-CM

## 2022-11-12 LAB
ALBUMIN SERPL BCG-MCNC: 3.3 G/DL (ref 3.5–5.2)
ALP SERPL-CCNC: 69 U/L (ref 35–104)
ALT SERPL W P-5'-P-CCNC: <5 U/L (ref 10–35)
ANION GAP SERPL CALCULATED.3IONS-SCNC: 9 MMOL/L (ref 7–15)
AST SERPL W P-5'-P-CCNC: 12 U/L (ref 10–35)
BASOPHILS # BLD AUTO: 0 10E3/UL (ref 0–0.2)
BASOPHILS NFR BLD AUTO: 0 %
BILIRUB SERPL-MCNC: 0.3 MG/DL
BUN SERPL-MCNC: 23.8 MG/DL (ref 8–23)
CALCIUM SERPL-MCNC: 9 MG/DL (ref 8.8–10.2)
CHLORIDE SERPL-SCNC: 106 MMOL/L (ref 98–107)
CREAT SERPL-MCNC: 0.7 MG/DL (ref 0.51–0.95)
DEPRECATED HCO3 PLAS-SCNC: 31 MMOL/L (ref 22–29)
EOSINOPHIL # BLD AUTO: 0.2 10E3/UL (ref 0–0.7)
EOSINOPHIL NFR BLD AUTO: 3 %
ERYTHROCYTE [DISTWIDTH] IN BLOOD BY AUTOMATED COUNT: 13.7 % (ref 10–15)
GFR SERPL CREATININE-BSD FRML MDRD: >90 ML/MIN/1.73M2
GLUCOSE SERPL-MCNC: 75 MG/DL (ref 70–99)
HCT VFR BLD AUTO: 39.1 % (ref 35–47)
HGB BLD-MCNC: 11.9 G/DL (ref 11.7–15.7)
IMM GRANULOCYTES # BLD: 0 10E3/UL
IMM GRANULOCYTES NFR BLD: 0 %
LYMPHOCYTES # BLD AUTO: 1.3 10E3/UL (ref 0.8–5.3)
LYMPHOCYTES NFR BLD AUTO: 23 %
MCH RBC QN AUTO: 28.2 PG (ref 26.5–33)
MCHC RBC AUTO-ENTMCNC: 30.4 G/DL (ref 31.5–36.5)
MCV RBC AUTO: 93 FL (ref 78–100)
MONOCYTES # BLD AUTO: 0.3 10E3/UL (ref 0–1.3)
MONOCYTES NFR BLD AUTO: 6 %
NEUTROPHILS # BLD AUTO: 3.7 10E3/UL (ref 1.6–8.3)
NEUTROPHILS NFR BLD AUTO: 68 %
NRBC # BLD AUTO: 0 10E3/UL
NRBC BLD AUTO-RTO: 0 /100
PLATELET # BLD AUTO: 162 10E3/UL (ref 150–450)
POTASSIUM SERPL-SCNC: 3.7 MMOL/L (ref 3.4–5.3)
PROCALCITONIN SERPL IA-MCNC: 0.03 NG/ML
PROT SERPL-MCNC: 6 G/DL (ref 6.4–8.3)
RBC # BLD AUTO: 4.22 10E6/UL (ref 3.8–5.2)
SODIUM SERPL-SCNC: 146 MMOL/L (ref 136–145)
WBC # BLD AUTO: 5.5 10E3/UL (ref 4–11)

## 2022-11-12 PROCEDURE — 84145 PROCALCITONIN (PCT): CPT | Performed by: NURSE PRACTITIONER

## 2022-11-12 PROCEDURE — 80053 COMPREHEN METABOLIC PANEL: CPT | Performed by: NURSE PRACTITIONER

## 2022-11-12 PROCEDURE — 36415 COLL VENOUS BLD VENIPUNCTURE: CPT | Performed by: NURSE PRACTITIONER

## 2022-11-12 PROCEDURE — 85025 COMPLETE CBC W/AUTO DIFF WBC: CPT | Performed by: NURSE PRACTITIONER

## 2022-11-28 ENCOUNTER — LAB REQUISITION (OUTPATIENT)
Dept: LAB | Facility: CLINIC | Age: 72
End: 2022-11-28
Payer: MEDICARE

## 2022-11-28 DIAGNOSIS — I10 ESSENTIAL (PRIMARY) HYPERTENSION: ICD-10-CM

## 2022-11-28 DIAGNOSIS — E11.69 TYPE 2 DIABETES MELLITUS WITH OTHER SPECIFIED COMPLICATION (H): ICD-10-CM

## 2022-11-29 LAB
ANION GAP SERPL CALCULATED.3IONS-SCNC: 9 MMOL/L (ref 7–15)
BUN SERPL-MCNC: 27 MG/DL (ref 8–23)
CALCIUM SERPL-MCNC: 9.2 MG/DL (ref 8.8–10.2)
CHLORIDE SERPL-SCNC: 103 MMOL/L (ref 98–107)
CHOLEST SERPL-MCNC: 89 MG/DL
CREAT SERPL-MCNC: 0.61 MG/DL (ref 0.51–0.95)
DEPRECATED HCO3 PLAS-SCNC: 30 MMOL/L (ref 22–29)
ERYTHROCYTE [DISTWIDTH] IN BLOOD BY AUTOMATED COUNT: 14.1 % (ref 10–15)
GFR SERPL CREATININE-BSD FRML MDRD: >90 ML/MIN/1.73M2
GLUCOSE SERPL-MCNC: 70 MG/DL (ref 70–99)
HBA1C MFR BLD: 5.4 %
HCT VFR BLD AUTO: 42.9 % (ref 35–47)
HDLC SERPL-MCNC: 32 MG/DL
HGB BLD-MCNC: 12.8 G/DL (ref 11.7–15.7)
LDLC SERPL CALC-MCNC: 33 MG/DL
MCH RBC QN AUTO: 27.7 PG (ref 26.5–33)
MCHC RBC AUTO-ENTMCNC: 29.8 G/DL (ref 31.5–36.5)
MCV RBC AUTO: 93 FL (ref 78–100)
NONHDLC SERPL-MCNC: 57 MG/DL
PLATELET # BLD AUTO: 195 10E3/UL (ref 150–450)
POTASSIUM SERPL-SCNC: 4.9 MMOL/L (ref 3.4–5.3)
RBC # BLD AUTO: 4.62 10E6/UL (ref 3.8–5.2)
SODIUM SERPL-SCNC: 142 MMOL/L (ref 136–145)
TRIGL SERPL-MCNC: 122 MG/DL
WBC # BLD AUTO: 5 10E3/UL (ref 4–11)

## 2022-11-29 PROCEDURE — 36415 COLL VENOUS BLD VENIPUNCTURE: CPT | Mod: ORL | Performed by: NURSE PRACTITIONER

## 2022-11-29 PROCEDURE — 80061 LIPID PANEL: CPT | Mod: ORL | Performed by: NURSE PRACTITIONER

## 2022-11-29 PROCEDURE — P9604 ONE-WAY ALLOW PRORATED TRIP: HCPCS | Mod: ORL | Performed by: NURSE PRACTITIONER

## 2022-11-29 PROCEDURE — 85027 COMPLETE CBC AUTOMATED: CPT | Mod: ORL | Performed by: NURSE PRACTITIONER

## 2022-11-29 PROCEDURE — 83036 HEMOGLOBIN GLYCOSYLATED A1C: CPT | Mod: ORL | Performed by: NURSE PRACTITIONER

## 2022-11-29 PROCEDURE — 80048 BASIC METABOLIC PNL TOTAL CA: CPT | Mod: ORL | Performed by: NURSE PRACTITIONER

## 2022-12-15 ENCOUNTER — LAB REQUISITION (OUTPATIENT)
Dept: LAB | Facility: CLINIC | Age: 72
End: 2022-12-15
Payer: MEDICARE

## 2022-12-15 DIAGNOSIS — S89.91XD UNSPECIFIED INJURY OF RIGHT LOWER LEG, SUBSEQUENT ENCOUNTER: ICD-10-CM

## 2022-12-15 PROCEDURE — 87075 CULTR BACTERIA EXCEPT BLOOD: CPT | Mod: ORL | Performed by: NURSE PRACTITIONER

## 2022-12-15 PROCEDURE — 87070 CULTURE OTHR SPECIMN AEROBIC: CPT | Mod: ORL | Performed by: NURSE PRACTITIONER

## 2022-12-17 LAB — BACTERIA WND CULT: ABNORMAL

## 2022-12-22 LAB — BACTERIA WND CULT: ABNORMAL

## 2023-01-01 RX ORDER — OXYCODONE HYDROCHLORIDE 5 MG/1
TABLET ORAL
Qty: 60 TABLET | Refills: 0 | OUTPATIENT
Start: 2023-01-01

## 2023-10-18 NOTE — TELEPHONE ENCOUNTER
Date of Last Office Visit: 12/21/2020  Date of Next Office Visit: 2/8/2021  No shows since last visit: none  Cancellations since last visit: none    Medication requested: Venlafaxine 150 24 hr capsule Date last ordered: 5/15/2020 Qty: 60 Refills: 3     Review of [place] ?: no       Lapse in medication adherence greater than 5 days?: possible  If yes, call patient and gather details: no  Medication refill request verified as identical to current order?: yes  Result of Last DAM, VPA, Li+ Level, CBC, or Carbamazepine Level (at or since last visit): N/A    []Medication refilled per  Medication Refill in Ambulatory Care  policy.  [x]Medication unable to be refilled by RN due to criteria not met as indicated below:    []Eligibility - not seen in the last year   []Supervision - no future appointment   []Compliance - no shows, cancellations or lapse in therapy   []Verification - order discrepancy   []Controlled medication   []Medication not included in policy   []90-day supply request   [x]Other    
Note from OV 5/19/23: Pt c/o diarrhea. Diarrhea has been present for 4 months. They are having 5+ BMs/day. Stool is loose to watery. Associated symptoms include lower abdominal pain, cramping, rectal bleeding and nausea. Workup includes labs on 5/15/23 that showed anemia and elevated LFTs. Hgb 9.6, MCV 77, Total Bilirubin 0.3, alk phos 102, ALT 80, . She denies heavy menstrual cycles. She took antibiotics in December for URI. ---------------------------------------------- Note from OV 7/18/23: Colonoscopy 6/27/23 showed pancolitis. Pathology consistent with ulcerative colitis. She was started on a prednisone taper the day of the procedure. She is feeling better. She is having fewer bowel movements and less bleeding. ----------------------------------------------
89 y/o male with pmhx of HTN, polycythemia, left DVT on Eliquis presents to ED [facility] from [facility] for low H&H. States was recently brought in for same dx and transfused with 4 pints. Records show pt was here December 2017 for GI Bleed. Denies blurriness, ooziness or dizziness.  Pt is on anticoagulations. Also denies SOB, CP, fever/chills, n/v/d and HA. PMD Mohit Calhoun.

## 2024-10-19 NOTE — PLAN OF CARE
Problem: Pain Acute  Goal: Acceptable Pain Control and Functional Ability  Intervention: Develop Pain Management Plan  Recent Flowsheet Documentation  Taken 12/30/2021 0241 by Mey Gonzalez, RN  Pain Management Interventions: medication (see MAR)  Taken 12/30/2021 0220 by Mey Gonzalez, RN  Pain Management Interventions: medication (see MAR)  RLE pain controlled with prn oxycodone    Patient slept quietly tonight. Refused 6am blood sugar check, note left for M.D.    [Follow-Up - Clinic] : a clinic follow-up of [FreeTextEntry2] : 6 month [FreeTextEntry1] : icd

## 2025-02-25 NOTE — LETTER
Letter by Noy Goodrich MBBS at      Author: Noy Goodrich MBBS Service: -- Author Type: --    Filed:  Encounter Date: 6/17/2020 Status: (Other)         Patient: Alison Bashir   MR Number: [id number]   YOB: 1950   Date of Visit: 6/17/2020       [facility] Admission note      Patient: Alison Bashir  MRN: [id number]  Date of Service: 6/17/2020      [facility] [[id number]]  Reason for Visit     Chief Complaint   Patient presents with   ? Review Of Multiple Medical Conditions   Evaluation for weakness and low blood pressures    Code Status     dnr    Assessment     -Persistent hypotension  -Underlying history of Parkinson's disease  -Cognitive impairment  - Generalized weakness quite profound with a history of falls  -Failure to thrive    Plan     Admitted to the TCU.  Blood pressures were reviewed and continue to run on the low side.  She has been taken off most of her antihypertensives now  She is on Midodrin for additional support.  Staff also advised to use compression hose and push fluids  Suspect some degree of deconditioning also contributes and she may need aggressive therapy.  Unfortunately she gets very anxious and her participation remains poor  At present therapy feels that she is testing cognitively much lower than last time and with her progressive weakness suspect that she may become wheelchair-bound.  She remains a high fall risk.  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 female who is admitted to TCU  Patient was evaluated due to persistent low blood pressure she was noted to have profound hypotension with bradycardia in the hospital and remains on midodrine for supplementation.  Unfortunately most of her antihypertensives have been discontinued  Medication: Rosuvastatin passed protocol.   Last office visit date: 11/13/24  Next appointment scheduled?: Yes , 11/18/25 Number of refills given: 3     nursing was advised to push fluids.  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  She also has a history of profound cognitive decline and is testing much worse cognitively than last time she was in the TCU as per my discussion with OT      Past Medical History     Active Ambulatory (Non-Hospital) Problems    Diagnosis   ? Acute hypotension   ? RBBB   ? Chest pain, unspecified   ? Leukopenia   ? Gastroesophageal reflux disease with esophagitis   ? Personal history of fall   ? Chest pain   ? Chronic pain syndrome   ? Traumatic brain injury (H)   ? Stented coronary artery   ? RLS (restless legs syndrome)   ? Overactive bladder   ? HTN (hypertension)   ? Depression   ? CAD (coronary artery disease)   ? Alzheimer disease (H)   ? Orthostatic hypotension   ? Anemia   ? Cognitive impairment   ? Hip fracture (H)   ? Pain management   ? Constipation   ? Essential hypertension   ? Neuroleptic signed 8/29/16     Past Medical History:   Diagnosis Date   ? Acute blood loss anemia    ? Alzheimer disease (H)    ? CAD (coronary artery disease)    ? Chronic pain syndrome    ? Dementia (H)    ? Depression    ? Depression    ? Hip fracture, right (H) 12/21/2017   ? HTN (hypertension)    ? Kidney stone    ? Overactive bladder    ? PMB (postmenopausal bleeding)    ? RLS (restless legs syndrome)    ? Spine pain    ? Stented coronary artery    ? Traumatic brain injury (H)    ? Unsteady gait    ? UTI (lower urinary tract infection)        Past Social History     Reviewed, and she  reports that she has never smoked. She has never used smokeless tobacco. She reports that she does not drink alcohol or use drugs.    Family History     Reviewed, and family  history includes Cancer in her mother; Coronary artery disease in her father; Heart attack in her father; Heart failure in her father.    Medication List   Post Discharge Medication Reconciliation Status: discharge medications reconciled, continue medications without change   Current Outpatient Medications on File Prior to Visit   Medication Sig Dispense Refill   ? acetaminophen (TYLENOL) 500 MG tablet Take 500 mg by mouth every 6 (six) hours as needed for pain.     ? aspirin 81 mg chewable tablet Chew 81 mg daily.     ? atorvastatin (LIPITOR) 40 MG tablet Take 40 mg by mouth every evening.      ? bacitracin 500 unit/gram ointment Apply 1 application topically as needed.     ? buPROPion (WELLBUTRIN XL) 300 MG 24 hr tablet Take 300 mg by mouth every morning.      ? carbidopa-levodopa (SINEMET)  mg per tablet Take 2 tablets by mouth 3 (three) times a day.     ? cholecalciferol, vitamin D3, 1,000 unit tablet Take 4,000 Units by mouth daily.      ? cranberry 500 mg cap Take 500 mg by mouth 2 (two) times a day.     ? cyanocobalamin (VITAMIN B-12) 100 MCG tablet Take 100 mcg by mouth daily.      ? doxycycline (VIBRA-TABS) 100 MG tablet Take 1 tablet (100 mg total) by mouth 2 (two) times a day for 7 days. 14 tablet 0   ? furosemide (LASIX) 20 MG tablet 20 mg daily.      ? ketoconazole (NIZORAL) 2 % cream 1 application daily. Apply to clean and dry stomach fold     ? lansoprazole (PREVACID) 30 MG capsule Take 30 mg by mouth daily.      ? magnesium oxide (MAG-OX) 400 mg tablet Take 400 mg by mouth 2 (two) times a day.     ? midodrine (PROAMATINE) 5 MG tablet Take 1 tablet (5 mg total) by mouth 3 (three) times a day with meals. 30 tablet 0   ? mirtazapine (REMERON) 45 MG tablet TAKE 1 TABLET BY MOUTH AT BEDTIME  *1 TOTAL FILL* 30 tablet 3   ? multivitamin (MULTIVITAMIN) per tablet Take 1 tablet by mouth daily.     ? nitroglycerin (NITROSTAT) 0.4 MG SL tablet Place 0.4 mg under the tongue every 5 (five) minutes as  needed.      ? nystatin (MYCOSTATIN) powder Apply 1 application topically daily.     ? nystatin (MYCOSTATIN) powder Apply 1 application topically 2 (two) times a day as needed.     ? polyethylene glycol (MIRALAX) 17 gram packet Take 17 g by mouth daily.     ? pramipexole (MIRAPEX) 0.25 MG tablet Take 1 tablet (0.25 mg total) by mouth at bedtime. 30 tablet 0   ? solifenacin (VESICARE) 10 MG tablet Take 10 mg by mouth daily.     ? traZODone (DESYREL) 150 MG tablet TAKE 1 TABLET BY MOUTH AT BEDTIME. *1 TOTAL FILL* 31 tablet 3   ? venlafaxine (EFFEXOR-XR) 150 MG 24 hr capsule TAKE 2 CAPSULES (300MG) BY MOUTH ONCE DAILY *1 TOTAL FILL* 60 capsule 3     No current facility-administered medications on file prior to visit.        Allergies     Allergies   Allergen Reactions   ? Blood-Group Specific Substance Other (See Comments)     Patient has anti-K. Blood product orders maybe delayed. Draw one red top and 2 purple top tubes for all Type and Screen/Red blood Cell product orders   ? Fd And C No.5 (Tartrazine)      GI UPSET   ? Ibuprofen Nausea And Vomiting   ? Morphine Rash     swelling       Review of Systems   A comprehensive review of 14 systems was done. Pertinent findings noted here and in history of present illness. All the rest negative.  Constitutional: Negative.  Negative for fever, chills, she has activity change, appetite change and fatigue.   HENT: Negative for congestion and facial swelling.    Eyes: Negative for photophobia, redness and visual disturbance.   Respiratory: Negative for cough and chest tightness.    Cardiovascular: Negative for chest pain, palpitations and leg swelling.   Gastrointestinal: Negative for nausea, diarrhea, constipation, blood in stool and abdominal distention.   Genitourinary: Negative.    Musculoskeletal: Negative.  Very weak  Skin: Negative.    Neurological: Negative for dizziness, tremors, syncope, weakness, light-headedness and headaches.   Hematological: Does not bruise/bleed  easily.   Psychiatric/Behavioral: Negative.  Gets anxious with any therapy cognitively worse than before      Physical Exam     Recent Vitals 6/15/2020   Height -   Weight -   BSA (m2) -   /72   Pulse 77   Temp 97.9   Temp src 1   SpO2 99   Some recent data might be hidden     Recheck blood pressure 90/55 wt 160lb  Constitutional: Oriented to person, and appears well-developed.   HEENT:  Normocephalic and atraumatic.  Eyes: Conjunctivae and EOM are normal. Pupils are equal, round, and reactive to light. No discharge.  No scleral icterus. Nose normal. Mouth/Throat: Oropharynx is clear and moist. No oropharyngeal exudate.    NECK: Normal range of motion. Neck supple. No JVD present. No tracheal deviation present. No thyromegaly present.   CARDIOVASCULAR: Normal rate, regular rhythm and intact distal pulses.  Exam reveals no gallop and no friction rub.  Systolic murmur present.  PULMONARY: Effort normal and breath sounds normal. No respiratory distress.No Wheezing or rales.  ABDOMEN: Soft. Bowel sounds are normal. No distension and no mass.  There is no tenderness. There is no rebound and no guarding. No HSM.  MUSCULOSKELETAL: Normal range of motion. No edema and no tenderness. Mild kyphosis, no tenderness.  LYMPH NODES: Has no cervical, supraclavicular, axillary and groin adenopathy.   NEUROLOGICAL: Alert and oriented to person. No cranial nerve deficit.  Normal muscle tone. Coordination normal.   GENITOURINARY: Deferred exam.  SKIN: Skin is warm and dry. No rash noted. No erythema. No pallor.   EXTREMITIES: No cyanosis, no clubbing, no edema. No Deformity.  PSYCHIATRIC: Normal mood, affect and behavior.  However exhibits anxiety with movement      Lab Results     Recent Results (from the past 240 hour(s))   ECG 12 lead nursing unit performed    Collection Time: 06/08/20 10:29 AM   Result Value Ref Range    SYSTOLIC BLOOD PRESSURE 110 mmHg    DIASTOLIC BLOOD PRESSURE 58 mmHg    VENTRICULAR RATE 71 BPM    ATRIAL  RATE 71 BPM    P-R INTERVAL 162 ms    QRS DURATION 160 ms    Q-T INTERVAL 448 ms    QTC CALCULATION (BEZET) 486 ms    P Axis 64 degrees    R AXIS 12 degrees    T AXIS 46 degrees    MUSE DIAGNOSIS       Normal sinus rhythm  Right bundle branch block  Abnormal ECG  When compared with ECG of 05-MAY-2014 10:14,  Right bundle branch block is now Present  Confirmed by SEE ED PROVIDER NOTE FOR, ECG INTERPRETATION ([id number]),  DELISA LANDEROS ([id number]) on 6/9/2020 7:24:34 AM     Basic metabolic panel    Collection Time: 06/08/20 11:10 AM   Result Value Ref Range    Sodium 144 136 - 145 mmol/L    Potassium 4.7 3.5 - 5.0 mmol/L    Chloride 104 98 - 107 mmol/L    CO2 31 22 - 31 mmol/L    Anion Gap, Calculation 9 5 - 18 mmol/L    Glucose 98 70 - 125 mg/dL    Calcium 9.1 8.5 - 10.5 mg/dL    BUN 29 (H) 8 - 28 mg/dL    Creatinine 0.78 0.60 - 1.10 mg/dL    GFR MDRD Af Amer >60 >60 mL/min/1.73m2    GFR MDRD Non Af Amer >60 >60 mL/min/1.73m2   B-type natriuretic peptide    Collection Time: 06/08/20 11:10 AM   Result Value Ref Range    BNP 80 0 - 120 pg/mL   Troponin I    Collection Time: 06/08/20 11:10 AM   Result Value Ref Range    Troponin I 0.01 0.00 - 0.29 ng/mL   D-dimer, Quantitative    Collection Time: 06/08/20 11:10 AM   Result Value Ref Range    D-Dimer, Quant 0.37 <=0.50 FEU ug/mL   APTT    Collection Time: 06/08/20 11:10 AM   Result Value Ref Range    PTT 29 24 - 37 seconds   Protime-INR    Collection Time: 06/08/20 11:10 AM   Result Value Ref Range    INR 1.10 0.90 - 1.10   CBC    Collection Time: 06/08/20 11:10 AM   Result Value Ref Range    WBC 3.7 (L) 4.0 - 11.0 thou/uL    RBC 4.00 3.80 - 5.40 mill/uL    Hemoglobin 11.4 (L) 12.0 - 16.0 g/dL    Hematocrit 36.2 35.0 - 47.0 %    MCV 91 80 - 100 fL    MCH 28.5 27.0 - 34.0 pg    MCHC 31.5 (L) 32.0 - 36.0 g/dL    RDW 14.3 11.0 - 14.5 %    Platelets 146 140 - 440 thou/uL    MPV 10.7 8.5 - 12.5 fL   Lactic Acid    Collection Time: 06/08/20 11:10 AM   Result Value Ref Range     Lactic Acid 0.6 0.5 - 2.2 mmol/L   COVID-19 Virus PCR MRF    Collection Time: 06/08/20 11:41 AM    Specimen: Respiratory   Result Value Ref Range    COVID-19 VIRUS SPECIMEN SOURCE Nasopharyngeal     2019-nCOV       Test received-See reflex to IDDL test SARS CoV2 (COVID-19) Virus RT-PCR   SARS-CoV-2 (COVID-19) RT-PCR-IDDL    Collection Time: 06/08/20 11:41 AM    Specimen: Respiratory   Result Value Ref Range    SARS-CoV-2 Virus Specimen Source Nasopharyngeal     SARS-CoV-2 PCR Result NEGATIVE     SARS-COV-2 PCR COMMENT       This automated, real-time RT-PCR assay by [employer] on the [employer]oro system Instrument   Troponin I    Collection Time: 06/08/20  8:10 PM   Result Value Ref Range    Troponin I <0.01 0.00 - 0.29 ng/mL   Troponin I    Collection Time: 06/09/20  1:16 AM   Result Value Ref Range    Troponin I <0.01 0.00 - 0.29 ng/mL   Comprehensive Metabolic Panel    Collection Time: 06/09/20  5:32 AM   Result Value Ref Range    Sodium 142 136 - 145 mmol/L    Potassium 4.1 3.5 - 5.0 mmol/L    Chloride 105 98 - 107 mmol/L    CO2 34 (H) 22 - 31 mmol/L    Anion Gap, Calculation 3 (L) 5 - 18 mmol/L    Glucose 92 70 - 125 mg/dL    BUN 27 8 - 28 mg/dL    Creatinine 0.75 0.60 - 1.10 mg/dL    GFR MDRD Af Amer >60 >60 mL/min/1.73m2    GFR MDRD Non Af Amer >60 >60 mL/min/1.73m2    Bilirubin, Total 0.3 0.0 - 1.0 mg/dL    Calcium 8.2 (L) 8.5 - 10.5 mg/dL    Protein, Total 5.2 (L) 6.0 - 8.0 g/dL    Albumin 3.0 (L) 3.5 - 5.0 g/dL    Alkaline Phosphatase 79 45 - 120 U/L    AST 12 0 - 40 U/L    ALT <9 0 - 45 U/L   HM1 (CBC with Diff)    Collection Time: 06/09/20  5:32 AM   Result Value Ref Range    WBC 4.2 4.0 - 11.0 thou/uL    RBC 3.68 (L) 3.80 - 5.40 mill/uL    Hemoglobin 10.4 (L) 12.0 - 16.0 g/dL    Hematocrit 33.7 (L) 35.0 - 47.0 %    MCV 92 80 - 100 fL    MCH 28.3 27.0 - 34.0 pg    MCHC 30.9 (L) 32.0 - 36.0 g/dL    RDW 14.6 (H) 11.0 - 14.5 %    Platelets 127 (L) 140 - 440 thou/uL    MPV 10.6 8.5 - 12.5 fL    Neutrophils % 50 50 - 70 %     Lymphocytes % 40 20 - 40 %    Monocytes % 7 2 - 10 %    Eosinophils % 3 0 - 6 %    Basophils % 0 0 - 2 %    Neutrophils Absolute 2.1 2.0 - 7.7 thou/uL    Lymphocytes Absolute 1.7 0.8 - 4.4 thou/uL    Monocytes Absolute 0.3 0.0 - 0.9 thou/uL    Eosinophils Absolute 0.1 0.0 - 0.4 thou/uL    Basophils Absolute 0.0 0.0 - 0.2 thou/uL   Echo Complete    Collection Time: 06/09/20  9:21 AM   Result Value Ref Range    LV volume diastolic 89.3 46 - 106 cm3    LV volume systolic 31.3 14 - 42 cm3    HR 79 bpm    IVSd 1.43 (!) 0.6 - 0.9 cm    LVIDd 4.68 3.8 - 5.2 cm    LVIDs 2.48 2.2 - 3.5 cm    LVOT diam 2.1 cm    LVOT mean gradient 1 mmHg    LVOT peak VTI 19.5 cm    LVOT mean tali 54 cm/s    LVOT peak atli 73.9 cm/s    LVOT peak gradient 2 mmHg    LV PWd 1.36 (!) 0.6 - 0.9 cm    MV E' lat tali 10.5 cm/s    MV E' med tali 7.54 cm/s    AR decel slope 2,260 mm/s2    AR p 1/2 time 467 ms    AR peak tali 358 cm/s    AO root 3.6 cm    LA size 3.8 cm    LA length 3.7 cm    MV decel slope 4,520 mm/s2    MV decel time 201 ms    MV P 1/2 time 59 ms    MV peak A tali 96 cm/s    MV peak E tali 90.7 cm/s    RVIDd 3.29 cm    TR peak tali 238 cm/s    LA area 2 21.4 cm2    LA area 1 14.8 cm2    BSA 1.89 m2    Hieght 71 in    Weight 2,505.6 lbs    BP 90/71 mmHg    IVS/PW ratio 1.1     TR peak gradent 22.7 mmHg    LV FS 47.0 28 - 44 %    Echo LVEF calculated 65 55 - 75 %    LA volume 72.8 mL    LV mass 262.1 g    MV area p 1/2 time 3.7 cm2    MV E/A Ratio 0.9     LVOT area 3.46 cm2    LVOT SV 67.5 cm3    LV systolic volume index 16.6 8 - 24 cm3/m2    LV diastolic volume index 47.2 29 - 61 cm3/m2    LA volume index 38.5 mL/m2    LV mass index 138.7 g/m2    LV SVi 35.7 ml/m2    TAPSE 2.1 cm    MV med E/e' ratio 12.0     MV lat E/e' ratio 8.6     LV CO 5.3 l/min    LV Ci 2.8 l/min/m2    Height 71.0 in    Weight 157 lbs    MV Avg E/e' Ratio 10.1 cm/s    AR peak gradient 51.3 mmHg   Comprehensive Metabolic Panel    Collection Time: 06/10/20  5:17 AM    Result Value Ref Range    Sodium 141 136 - 145 mmol/L    Potassium 4.2 3.5 - 5.0 mmol/L    Chloride 105 98 - 107 mmol/L    CO2 34 (H) 22 - 31 mmol/L    Anion Gap, Calculation 2 (L) 5 - 18 mmol/L    Glucose 99 70 - 125 mg/dL    BUN 24 8 - 28 mg/dL    Creatinine 0.74 0.60 - 1.10 mg/dL    GFR MDRD Af Amer >60 >60 mL/min/1.73m2    GFR MDRD Non Af Amer >60 >60 mL/min/1.73m2    Bilirubin, Total 0.3 0.0 - 1.0 mg/dL    Calcium 7.9 (L) 8.5 - 10.5 mg/dL    Protein, Total 5.1 (L) 6.0 - 8.0 g/dL    Albumin 2.8 (L) 3.5 - 5.0 g/dL    Alkaline Phosphatase 77 45 - 120 U/L    AST 11 0 - 40 U/L    ALT <9 0 - 45 U/L   HM1 (CBC with Diff)    Collection Time: 06/10/20  5:17 AM   Result Value Ref Range    WBC 4.0 4.0 - 11.0 thou/uL    RBC 3.85 3.80 - 5.40 mill/uL    Hemoglobin 10.8 (L) 12.0 - 16.0 g/dL    Hematocrit 35.6 35.0 - 47.0 %    MCV 93 80 - 100 fL    MCH 28.1 27.0 - 34.0 pg    MCHC 30.3 (L) 32.0 - 36.0 g/dL    RDW 14.6 (H) 11.0 - 14.5 %    Platelets 124 (L) 140 - 440 thou/uL    MPV 10.7 8.5 - 12.5 fL    Neutrophils % 57 50 - 70 %    Lymphocytes % 33 20 - 40 %    Monocytes % 6 2 - 10 %    Eosinophils % 3 0 - 6 %    Basophils % 0 0 - 2 %    Neutrophils Absolute 2.3 2.0 - 7.7 thou/uL    Lymphocytes Absolute 1.3 0.8 - 4.4 thou/uL    Monocytes Absolute 0.3 0.0 - 0.9 thou/uL    Eosinophils Absolute 0.1 0.0 - 0.4 thou/uL    Basophils Absolute 0.0 0.0 - 0.2 thou/uL   Urine culture    Collection Time: 06/10/20  7:56 PM    Specimen: Urine   Result Value Ref Range    Culture >100,000 col/ml Morganella morganii (!)     Culture 10,000-50,000 col/ml Escherichia coli (!)        Susceptibility    Escherichia coli - KULDEEP     Amoxicillin + Clavulanate <=4/2 Sensitive      Ampicillin <=4 Sensitive      Cefazolin <=1 Sensitive      Cefepime <=1 Sensitive      Ceftriaxone <=1 Sensitive      Ciprofloxacin >2 Resistant      Gentamicin <=2 Sensitive      Levofloxacin >4 Resistant      Meropenem <=0.25 Sensitive      Nitrofurantoin <=16 Sensitive       Tetracycline <=2 Sensitive      Tobramycin <=2 Sensitive      Trimethoprim + Sulfamethoxazole <=0.5 Sensitive     Morganella morganii - KULDEEP     Amoxicillin + Clavulanate >16/8 Resistant      Ampicillin >16 Resistant      Cefazolin >16 Resistant      Cefepime <=1 Sensitive      Ceftriaxone <=1 Sensitive      Ciprofloxacin >2 Resistant      Gentamicin <=2 Sensitive      Levofloxacin >4 Resistant      Meropenem <=0.25 Sensitive      Nitrofurantoin 64 Resistant      Tetracycline <=2 Sensitive      Tobramycin <=2 Sensitive      Trimethoprim + Sulfamethoxazole >2/38 Resistant    Comprehensive Metabolic Panel    Collection Time: 06/11/20  5:14 AM   Result Value Ref Range    Sodium 142 136 - 145 mmol/L    Potassium 3.8 3.5 - 5.0 mmol/L    Chloride 108 (H) 98 - 107 mmol/L    CO2 31 22 - 31 mmol/L    Anion Gap, Calculation 3 (L) 5 - 18 mmol/L    Glucose 99 70 - 125 mg/dL    BUN 25 8 - 28 mg/dL    Creatinine 0.77 0.60 - 1.10 mg/dL    GFR MDRD Af Amer >60 >60 mL/min/1.73m2    GFR MDRD Non Af Amer >60 >60 mL/min/1.73m2    Bilirubin, Total 0.3 0.0 - 1.0 mg/dL    Calcium 7.8 (L) 8.5 - 10.5 mg/dL    Protein, Total 4.9 (L) 6.0 - 8.0 g/dL    Albumin 2.7 (L) 3.5 - 5.0 g/dL    Alkaline Phosphatase 82 45 - 120 U/L    AST 10 0 - 40 U/L    ALT <9 0 - 45 U/L   HM1 (CBC with Diff)    Collection Time: 06/11/20  5:14 AM   Result Value Ref Range    WBC 4.1 4.0 - 11.0 thou/uL    RBC 3.64 (L) 3.80 - 5.40 mill/uL    Hemoglobin 10.4 (L) 12.0 - 16.0 g/dL    Hematocrit 34.0 (L) 35.0 - 47.0 %    MCV 93 80 - 100 fL    MCH 28.6 27.0 - 34.0 pg    MCHC 30.6 (L) 32.0 - 36.0 g/dL    RDW 14.6 (H) 11.0 - 14.5 %    Platelets 127 (L) 140 - 440 thou/uL    MPV 10.8 8.5 - 12.5 fL    Neutrophils % 48 (L) 50 - 70 %    Lymphocytes % 40 20 - 40 %    Monocytes % 8 2 - 10 %    Eosinophils % 3 0 - 6 %    Basophils % 0 0 - 2 %    Neutrophils Absolute 2.0 2.0 - 7.7 thou/uL    Lymphocytes Absolute 1.7 0.8 - 4.4 thou/uL    Monocytes Absolute 0.3 0.0 - 0.9 thou/uL     Eosinophils Absolute 0.1 0.0 - 0.4 thou/uL    Basophils Absolute 0.0 0.0 - 0.2 thou/uL   ECG 12 lead MUSE    Collection Time: 06/11/20  7:42 AM   Result Value Ref Range    SYSTOLIC BLOOD PRESSURE      DIASTOLIC BLOOD PRESSURE      VENTRICULAR RATE 70 BPM    ATRIAL RATE 70 BPM    P-R INTERVAL 162 ms    QRS DURATION 162 ms    Q-T INTERVAL 438 ms    QTC CALCULATION (BEZET) 473 ms    P Axis 51 degrees    R AXIS -11 degrees    T AXIS 26 degrees    MUSE DIAGNOSIS       Sinus rhythm with occasional Premature ventricular complexes  Right bundle branch block  Abnormal ECG  When compared with ECG of 08-JUN-2020 10:29,  Premature ventricular complexes are now Present  Confirmed by TAWNYA BEAULIEU MD LOC:[facility] ([id number]) on 6/11/2020 1:28:16 PM     Blood culture from PERIPHERAL SITE    Collection Time: 06/11/20  9:42 AM    Specimen: Vein, Peripheral; Blood   Result Value Ref Range    Anaerobic Blood Culture Bottle No Growth No Growth, No organisms seen, bottle returned to instrument, Specimen not received, No Growth at 24 hours, No Growth at 48 hours, No Growth at 72 hours, No Growth at 96 hours, No Growth at 120 hours    Aerobic Blood Culture Bottle No Growth No Growth, No organisms seen, bottle returned to instrument, Specimen not received, No Growth at 24 hours, No Growth at 120 hours, No Growth at 48 hours, No Growth at 72 hours, No Growth at 96 hours   Blood Culture from PERIPHERAL SITE (2nd One)    Collection Time: 06/11/20  9:42 AM    Specimen: Vein, Peripheral; Blood   Result Value Ref Range    Anaerobic Blood Culture Bottle No Growth No Growth, No organisms seen, bottle returned to instrument, Specimen not received, No Growth at 24 hours, No Growth at 48 hours, No Growth at 72 hours, No Growth at 96 hours, No Growth at 120 hours    Aerobic Blood Culture Bottle No Growth No Growth, No organisms seen, bottle returned to instrument, Specimen not received, No Growth at 24 hours, No Growth at 120 hours, No Growth at 48 hours, No  Growth at 72 hours, No Growth at 96 hours   NM Pharmacologic Stress Test    Collection Time: 06/11/20  1:47 PM   Result Value Ref Range    Pharmacologic Protocol  Lexiscan     Test Type Pharmacological     Baseline HR 81     Baseline Systolic      Baseline Diastolic BP 53     Last Stress HR 87     Last Stress Systolic      Last Stress Diastolic BP 53     Target      PERCENT HR 85%     ST Deviation Elevation II 0.1mm     Deviation Time V2 -0.6mm     ST Elevation Amount II 2.0mm     ST Deviation Amount he V1 -2.1mm     Final Resting /55     Final Resting HR 86     Max Treadmill Speed 0.0     Max Treadmill Grade 0.0     Peak Systolic /44     Peak Diastolic /55     Max HR 89     Stress Phase Resting     Stress Resting Pt Position SUPINE     Current HR 82     Current /53     Stress Phase Stress     Stage Minute EXE 00:00     Exercise Stage STAGE 2     Current HR 81     Current /53     Stress Phase Stress     Stage Minute EXE 01:00     Exercise Stage STAGE 3     Current HR 80     Current /53     Stress Phase Stress     Stage Minute EXE 01:36     Exercise Stage STAGE 3     Current HR 85     Current /44     Stress Phase Stress     Stage Minute EXE 02:00     Exercise Stage STAGE 4     Current HR 85     Current /44     Stress Phase Stress     Stage Minute EXE 02:42     Exercise Stage STAGE 4     Current HR 86     Current BP 98/52     Stress Phase Stress     Stage Minute EXE 03:00     Exercise Stage STAGE 5     Current HR 87     Current BP 98/52     Stress Phase Stress     Stage Minute EXE 03:25     Exercise Stage STAGE 5     Current HR 87     Current /53     Stress Phase Stress     Stage Minute EXE 04:00     Exercise Stage STAGE 6     Current HR 87     Current /53     Stress Phase Stress     Stage Minute EXE 04:00     Exercise Stage STAGE 6     Current HR 87     Current /53     Stress Phase Recovery     Stage Minute REC 00:31     Exercise  Stage Recovery     Current HR 88     Current /51     Stress Phase Recovery     Stage Minute REC 00:59     Exercise Stage Recovery     Current HR 87     Current /51     Stress Phase Recovery     Stage Minute REC 01:59     Exercise Stage Recovery     Current HR 86     Current /51     Stress Phase Recovery     Stage Minute REC 02:06     Exercise Stage Recovery     Current HR 86     Current /55     Stress Phase Recovery     Stage Minute REC 02:59     Exercise Stage Recovery     Current HR 88     Current /55     Stress Phase Recovery     Stage Minute REC 03:59     Exercise Stage Recovery     Current HR 86     Current /55     Stress Phase Recovery     Stage Minute REC 04:01     Exercise Stage Recovery     Current HR 86     Current /55     Calculated Percent HR 59 %    Rate Pressure Product 8,989.0     Left Ventricular EF 69 %   Basic Metabolic Panel    Collection Time: 06/13/20  5:24 AM   Result Value Ref Range    Sodium 142 136 - 145 mmol/L    Potassium 4.0 3.5 - 5.0 mmol/L    Chloride 106 98 - 107 mmol/L    CO2 32 (H) 22 - 31 mmol/L    Anion Gap, Calculation 4 (L) 5 - 18 mmol/L    Glucose 90 70 - 125 mg/dL    Calcium 8.0 (L) 8.5 - 10.5 mg/dL    BUN 25 8 - 28 mg/dL    Creatinine 0.69 0.60 - 1.10 mg/dL    GFR MDRD Af Amer >60 >60 mL/min/1.73m2    GFR MDRD Non Af Amer >60 >60 mL/min/1.73m2   HM2(CBC w/o Differential)    Collection Time: 06/13/20  5:24 AM   Result Value Ref Range    WBC 3.6 (L) 4.0 - 11.0 thou/uL    RBC 3.36 (L) 3.80 - 5.40 mill/uL    Hemoglobin 9.4 (L) 12.0 - 16.0 g/dL    Hematocrit 31.4 (L) 35.0 - 47.0 %    MCV 94 80 - 100 fL    MCH 28.0 27.0 - 34.0 pg    MCHC 29.9 (L) 32.0 - 36.0 g/dL    RDW 14.7 (H) 11.0 - 14.5 %    Platelets 112 (L) 140 - 440 thou/uL    MPV 11.0 8.5 - 12.5 fL   COVID-19 Virus PCR MRF    Collection Time: 06/15/20  9:43 AM    Specimen: Respiratory   Result Value Ref Range    COVID-19 VIRUS SPECIMEN SOURCE Nasopharyngeal     2019-nCOV       Test  received-See reflex to IDDL test SARS CoV2 (COVID-19) Virus RT-PCR   SARS-CoV-2 (COVID-19) RT-PCR-IDDL    Collection Time: 06/15/20  9:43 AM    Specimen: Respiratory   Result Value Ref Range    SARS-CoV-2 Virus Specimen Source Nasopharyngeal     SARS-CoV-2 PCR Result NEGATIVE     SARS-COV-2 PCR COMMENT       The [employer] COVID-19 direct PCR assay by [employer] on the [employer] instrument            Imaging Results     Xr Chest 1 View Portable    Result Date: 6/8/2020  EXAM: XR CHEST 1 VIEW PORTABLE LOCATION: [facility] DATE/TIME: 6/8/2020 11:52 AM INDICATION: chest pain COMPARISON: 7/16/2015.     The lungs are clear of focal consolidation. There is no pneumothorax or pleural effusion. There are postoperative changes with surgical clips over the right hilum. Heart size and pulmonary vascularity are normal. There are old fractures of the left third through sixth posterior ribs.    Xr Ribs Left    Result Date: 6/8/2020  EXAM: XR RIBS LEFT LOCATION: [facility] DATE/TIME: 6/8/2020 6:39 PM INDICATION: left-sided chest pain with hx of falls. previous hx of rib fractures s/p falls. COMPARISON: 2/13/2020 and 1/17/2019     Heart size normal. Tortuous thoracic aorta similar to previous exam with mediastinal clips that project centrally, unchanged. Multiple old healed left rib fractures, no definite acute fracture evident.     Nm Pharmacologic Stress Test    Result Date: 6/11/2020  ?  The nuclear stress test is negative for inducible myocardial ischemia or infarction. ?  The left ventricular ejection fraction at stress is 69%. ?  There is no prior study for comparison. ?  The findings of this examination were communicated to Dr. Stephan Hurd.             NERY Sosa

## (undated) DEVICE — GLOVE BIOGEL PI ULTRATOUCH G SZ 7.0 42170

## (undated) DEVICE — DRESSING XEROFORM PETROLATUM 5X9 33605

## (undated) DEVICE — DRSG ABD TNDRSRB WET PRUF 8IN X 10IN STRL  9194A

## (undated) DEVICE — BANDAGE ELASTIC 4X550 LF DBL 593-94LF

## (undated) DEVICE — ESU PENCIL SMOKE EVAC W/ROCKER SWITCH 0703-047-000

## (undated) DEVICE — SOL WATER IRRIG 1000ML BOTTLE 2F7114

## (undated) DEVICE — DRSG KERLIX 2 1/4"X3YDS ROLL 6720

## (undated) DEVICE — SPONGE LAP 18X18" X8435

## (undated) DEVICE — GLOVE BIOGEL PI SZ 7.0 40870

## (undated) DEVICE — PLATE GROUNDING ADULT W/CORD 9165L

## (undated) DEVICE — DRSG ABDOMINAL PAD UNSTERILE 5X9" 9190

## (undated) DEVICE — NEEDLE HYPO 22X1-1/2 SAFETY 305900

## (undated) DEVICE — CUSTOM PACK GEN MAJOR SBA5BGMHEA

## (undated) DEVICE — BANDAGE ELASTIC VELCRO 6IN REB3016

## (undated) DEVICE — DRSG KERLIX 4 1/2"X4YDS ROLL 6715

## (undated) DEVICE — TRAY PREP DRY SKIN SCRUB 067

## (undated) DEVICE — SOL NACL 0.9% IRRIG 1000ML BOTTLE 2F7124

## (undated) DEVICE — DRAPE SHEET REV FOLD 3/4 9349

## (undated) DEVICE — SUCTION MANIFOLD NEPTUNE 2 SYS 1 PORT 702-025-000

## (undated) DEVICE — DRAPE SHEET EXTREMITY 88X131 89276*

## (undated) RX ORDER — BACITRACIN ZINC 500 [USP'U]/G
OINTMENT TOPICAL
Status: DISPENSED
Start: 2021-12-28

## (undated) RX ORDER — LIDOCAINE HYDROCHLORIDE AND EPINEPHRINE 10; 10 MG/ML; UG/ML
INJECTION, SOLUTION INFILTRATION; PERINEURAL
Status: DISPENSED
Start: 2021-12-28

## (undated) RX ORDER — LIDOCAINE HYDROCHLORIDE 10 MG/ML
INJECTION, SOLUTION EPIDURAL; INFILTRATION; INTRACAUDAL; PERINEURAL
Status: DISPENSED
Start: 2021-12-21

## (undated) RX ORDER — BUPIVACAINE HYDROCHLORIDE 2.5 MG/ML
INJECTION, SOLUTION EPIDURAL; INFILTRATION; INTRACAUDAL
Status: DISPENSED
Start: 2021-12-28